# Patient Record
Sex: FEMALE | Race: BLACK OR AFRICAN AMERICAN | NOT HISPANIC OR LATINO | Employment: OTHER | URBAN - METROPOLITAN AREA
[De-identification: names, ages, dates, MRNs, and addresses within clinical notes are randomized per-mention and may not be internally consistent; named-entity substitution may affect disease eponyms.]

---

## 2017-01-19 RX ORDER — HYDROCODONE BITARTRATE AND ACETAMINOPHEN 10; 325 MG/1; MG/1
1 TABLET ORAL 2 TIMES DAILY PRN
Qty: 120 TABLET | Refills: 0 | Status: SHIPPED | OUTPATIENT
Start: 2017-01-19 | End: 2017-03-24 | Stop reason: SDUPTHER

## 2017-01-19 NOTE — TELEPHONE ENCOUNTER
----- Message from Malu Connell sent at 1/19/2017 10:04 AM CST -----  Contact: pt  Call pt at [phone]///regarding a refill rx for hydrocodone/please call pt/jeffy limon

## 2017-01-27 ENCOUNTER — TELEPHONE (OUTPATIENT)
Dept: INTERNAL MEDICINE | Facility: CLINIC | Age: 62
End: 2017-01-27

## 2017-01-27 ENCOUNTER — OFFICE VISIT (OUTPATIENT)
Dept: URGENT CARE | Facility: CLINIC | Age: 62
End: 2017-01-27
Payer: MEDICARE

## 2017-01-27 VITALS
HEIGHT: 68 IN | SYSTOLIC BLOOD PRESSURE: 120 MMHG | WEIGHT: 242.19 LBS | TEMPERATURE: 98 F | DIASTOLIC BLOOD PRESSURE: 74 MMHG | BODY MASS INDEX: 36.71 KG/M2 | RESPIRATION RATE: 20 BRPM | HEART RATE: 70 BPM | OXYGEN SATURATION: 98 %

## 2017-01-27 DIAGNOSIS — J40 SINOBRONCHITIS: Primary | ICD-10-CM

## 2017-01-27 DIAGNOSIS — J32.9 SINOBRONCHITIS: Primary | ICD-10-CM

## 2017-01-27 PROCEDURE — 99213 OFFICE O/P EST LOW 20 MIN: CPT | Mod: S$PBB,,, | Performed by: NURSE PRACTITIONER

## 2017-01-27 PROCEDURE — 99215 OFFICE O/P EST HI 40 MIN: CPT | Mod: PBBFAC,PN | Performed by: NURSE PRACTITIONER

## 2017-01-27 PROCEDURE — 99999 PR PBB SHADOW E&M-EST. PATIENT-LVL V: CPT | Mod: PBBFAC,,, | Performed by: NURSE PRACTITIONER

## 2017-01-27 PROCEDURE — 96372 THER/PROPH/DIAG INJ SC/IM: CPT | Mod: PBBFAC,PN

## 2017-01-27 RX ORDER — BETAMETHASONE SODIUM PHOSPHATE AND BETAMETHASONE ACETATE 3; 3 MG/ML; MG/ML
9 INJECTION, SUSPENSION INTRA-ARTICULAR; INTRALESIONAL; INTRAMUSCULAR; SOFT TISSUE
Status: COMPLETED | OUTPATIENT
Start: 2017-01-27 | End: 2017-01-27

## 2017-01-27 RX ORDER — PROMETHAZINE HYDROCHLORIDE AND DEXTROMETHORPHAN HYDROBROMIDE 6.25; 15 MG/5ML; MG/5ML
5 SYRUP ORAL NIGHTLY PRN
Qty: 180 ML | Refills: 0 | Status: SHIPPED | OUTPATIENT
Start: 2017-01-27 | End: 2017-02-06

## 2017-01-27 RX ORDER — AMOXICILLIN AND CLAVULANATE POTASSIUM 875; 125 MG/1; MG/1
1 TABLET, FILM COATED ORAL 2 TIMES DAILY
Qty: 20 TABLET | Refills: 0 | Status: SHIPPED | OUTPATIENT
Start: 2017-01-27 | End: 2017-02-06

## 2017-01-27 RX ORDER — BENZONATATE 200 MG/1
200 CAPSULE ORAL 3 TIMES DAILY PRN
Qty: 30 CAPSULE | Refills: 0 | Status: SHIPPED | OUTPATIENT
Start: 2017-01-27 | End: 2018-03-05

## 2017-01-27 RX ADMIN — BETAMETHASONE ACETATE AND BETAMETHASONE SODIUM PHOSPHATE 9 MG: 3; 3 INJECTION, SUSPENSION INTRA-ARTICULAR; INTRALESIONAL; INTRAMUSCULAR; SOFT TISSUE at 05:01

## 2017-01-27 NOTE — TELEPHONE ENCOUNTER
"S/w pt. C/o congestion for awhile now. Requested an antibiotic. I advised that pt will need to be evaluated. Informed her of [facility] near her home. She agreed to "go to the uc there"/TGD  "

## 2017-01-27 NOTE — PROGRESS NOTES
"Subjective:      Patient ID: Maryviji Vo is a 61 y.o. female.    Chief Complaint: Cough; Nasal Congestion; and Headache    Cough   This is a new problem. The current episode started 1 to 4 weeks ago (2 weeks). The problem has been gradually worsening. The problem occurs every few minutes. The cough is non-productive. Associated symptoms include ear congestion, headaches, nasal congestion, postnasal drip, rhinorrhea and wheezing. Pertinent negatives include no fever, sore throat (resolved) or shortness of breath. The symptoms are aggravated by lying down. Risk factors: former smoker. Treatments tried: Mucinex, OTC cough syrup, cough drops. The treatment provided no relief. There is no history of asthma.     Review of Systems   Constitutional: Negative.  Negative for fever.   HENT: Positive for congestion, postnasal drip, rhinorrhea and sinus pressure. Negative for sore throat (resolved).    Respiratory: Positive for cough and wheezing. Negative for shortness of breath.    Cardiovascular: Negative.    Gastrointestinal: Negative.    Musculoskeletal: Negative.    Skin: Negative.    Neurological: Positive for headaches.   Hematological: Negative.        Objective:     Visit Vitals    /74 (BP Location: Right arm, Patient Position: Sitting, BP Method: Manual)    Pulse 70    Temp 98.2 °F (36.8 °C) (Tympanic)    Resp 20    Ht 5' 8.4" (1.737 m)    Wt 109.8 kg (242 lb 2.8 oz)    SpO2 98%    BMI 36.39 kg/m2     Physical Exam   Constitutional: She is oriented to person, place, and time. She appears well-developed and well-nourished. She is active and cooperative. No distress.   HENT:   Head: Normocephalic and atraumatic.   Right Ear: A middle ear effusion is present.   Left Ear: A middle ear effusion is present.   Nose: Mucosal edema and sinus tenderness present.   Mouth/Throat: Uvula is midline and mucous membranes are normal. Posterior oropharyngeal erythema present. No oropharyngeal exudate or posterior " oropharyngeal edema.   Eyes: Right eye exhibits no discharge. Left eye exhibits no discharge.   Neck: Normal range of motion. Neck supple.   Cardiovascular: Regular rhythm and normal heart sounds.    Pulmonary/Chest: Effort normal and breath sounds normal. She has no wheezes.   Musculoskeletal: Normal range of motion.   Lymphadenopathy:     She has no cervical adenopathy.   Neurological: She is alert and oriented to person, place, and time.   Skin: Skin is warm. No rash noted. She is not diaphoretic.   Nursing note and vitals reviewed.    Assessment:      1. Sinobronchitis       Plan:   Sinobronchitis  -     amoxicillin-clavulanate 875-125mg (AUGMENTIN) 875-125 mg per tablet; Take 1 tablet by mouth 2 (two) times daily.  Dispense: 20 tablet; Refill: 0  -     betamethasone acetate-betamethasone sodium phosphate injection 9 mg; Inject 1.5 mLs (9 mg total) into the muscle one time.  -     promethazine-dextromethorphan (PROMETHAZINE-DM) 6.25-15 mg/5 mL Syrp; Take 5 mLs by mouth nightly as needed.  Dispense: 180 mL; Refill: 0  -     benzonatate (TESSALON) 200 MG capsule; Take 1 capsule (200 mg total) by mouth 3 (three) times daily as needed for Cough.  Dispense: 30 capsule; Refill: 0    Instructions, follow up, and supportive care as per AVS.  Follow up with PCP if not improved or for any new or worsening symptoms.  Discussed risks of treatment with steroid injection and offered PO as alternative. Mrs. Vo preferred steroid shot.

## 2017-01-27 NOTE — TELEPHONE ENCOUNTER
----- Message from Suad Correia sent at 1/27/2017  3:24 PM CST -----  Contact: pt  Pt calling to speak to nurse...states that she has been experiencing some severe sinus drainage/ congestion...states that she has been taking over-the-counter meds (mucinex) but still cannot get rid of symptoms..the patient wants to know if she can get some antibiotics or something to dry up symptoms called in to pharmacy before Dr peres for the day...please adv/call pt back at [phone]///thx jw ..    [facility] -  - [place] - [address]  [address] [facility] [address]  Phone: [phone] Fax: [phone]

## 2017-01-27 NOTE — PROGRESS NOTES
Sn Administered Betamethasone 9 mg given IM left ventrogluteal. Patient tolerated well. No distress. Patient was instructed to wait 15 minutes in lobby after injection to assure that no reaction to the medication occurs. Patient was informed that if any unusual feeling occurs to let the  know so that we can address it. Patient stated understanding.

## 2017-01-27 NOTE — PATIENT INSTRUCTIONS
· Rest and increase fluids.   · May apply warm compresses as needed.   · Saline nasal spray or saline irrigation (Neti pot) to loosen nasal congestion.  · Flonase or Nasacort to reduce inflammation in the sinus cavities.  · Take antibiotics exactly as prescribed. Make sure to complete the entire course of antibiotics even if you start feeling better. This will prevent recurrence of your infection and bacterial resistance.   · Do not drive, drink alcohol, or take any other sedating medications or substances while taking cough syrup.   · Follow up with your primary care provider or with ENT if not improved within a few days or sooner for any new or worsening symptoms.   · Go to the ER for any fever that does not improve with Tylenol/Ibuprofen, neck stiffness, rash, severe headache, vision changes, shortness of breath, chest pain, severe facial pain or swelling, or for any other new and concerning symptoms.     Sinusitis (Antibiotic Treatment)    The sinuses are air-filled spaces within the bones of the face. They connect to the inside of the nose. Sinusitis is an inflammation of the tissue lining the sinus cavity. Sinus inflammation can occur during a cold. It can also be due to allergies to pollens and other particles in the air. Sinusitis can cause symptoms of sinus congestion and fullness. A sinus infection causes fever, headache and facial pain. There is often green or yellow drainage from the nose or into the back of the throat (post-nasal drip). You have been given antibiotics to treat this condition.  Home care:  · Take the full course of antibiotics as instructed. Do not stop taking them, even if you feel better.  · Drink plenty of water, hot tea, and other liquids. This may help thin mucus. It also may promote sinus drainage.  · Heat may help soothe painful areas of the face. Use a towel soaked in hot water. Or,  the shower and direct the hot spray onto your face. Using a vaporizer along with a  menthol rub at night may also help.   · An expectorant containing guaifenesin may help thin the mucus and promote drainage from the sinuses.  · Over-the-counter decongestants may be used unless a similar medicine was prescribed. Nasal sprays work the fastest. Use one that contains phenylephrine or oxymetazoline. First blow the nose gently. Then use the spray. Do not use these medicines more often than directed on the label or symptoms may get worse. You may also use tablets containing pseudoephedrine. Avoid products that combine ingredients, because side effects may be increased. Read labels. You can also ask the pharmacist for help. (NOTE: Persons with high blood pressure should not use decongestants. They can raise blood pressure.)  · Over-the-counter antihistamines may help if allergies contributed to your sinusitis.    · Do not use nasal rinses or irrigation during an acute sinus infection, unless told to by your health care provider. Rinsing may spread the infection to other sinuses.  · Use acetaminophen or ibuprofen to control pain, unless another pain medicine was prescribed. (If you have chronic liver or kidney disease or ever had a stomach ulcer, talk with your doctor before using these medicines. Aspirin should never be used in anyone under 18 years of age who is ill with a fever. It may cause severe liver damage.)  · Don't smoke. This can worsen symptoms.  Follow-up care  Follow up with your healthcare provider or our staff if you are not improving within the next week.  When to seek medical advice  Call your healthcare provider if any of these occur:  · Facial pain or headache becoming more severe  · Stiff neck  · Unusual drowsiness or confusion  · Swelling of the forehead or eyelids  · Vision problems, including blurred or double vision  · Fever of 100.4ºF (38ºC) or higher, or as directed by your healthcare provider  · Seizure  · Breathing problems  · Symptoms not resolving within 10 days  © 2694-3851  [employer]. [address] [place] [address]. All rights reserved. This information is not intended as a substitute for professional medical care. Always follow your healthcare professional's instructions.        Bronchitis, Antibiotic Treatment (Adult)    Bronchitis is an infection of the air passages (bronchial tubes) in your lungs. It often occurs when you have a cold. This illness is contagious during the first few days and is spread through the air by coughing and sneezing, or by direct contact (touching the sick person and then touching your own eyes, nose, or mouth).  Symptoms of bronchitis include cough with mucus (phlegm) and low-grade fever. Bronchitis usually lasts 7 to 14 days. Mild cases can be treated with simple home remedies. More severe infection is treated with an antibiotic.  Home care  Follow these guidelines when caring for yourself at home:  · If your symptoms are severe, rest at home for the first 2 to 3 days. When you go back to your usual activities, don't let yourself get too tired.  · Do not smoke. Also avoid being exposed to secondhand smoke.  · You may use over-the-counter medicines to control fever or pain, unless another medicine was prescribed. (Note: If you have chronic liver or kidney disease or have ever had a stomach ulcer or gastrointestinal bleeding, talk with your healthcare provider before using these medicines. Also talk to your provider if you are taking medicine to prevent blood clots.) Aspirin should never be given to anyone younger than 18 years of age who is ill with a viral infection or fever. It may cause severe liver or brain damage.  · Your appetite may be poor, so a light diet is fine. Avoid dehydration by drinking 6 to 8 glasses of fluids per day (such as water, soft drinks, sports drinks, juices, tea, or soup). Extra fluids will help loosen secretions in the nose and lungs.  · Over-the-counter cough, cold, and sore-throat medicines will not  shorten the length of the illness, but they may be helpful to reduce symptoms. (Note: Do not use decongestants if you have high blood pressure.)  · Finish all antibiotic medicine. Do this even if you are feeling better after only a few days.  Follow-up care  Follow up with your healthcare provider, or as advised. If you had an X-ray or ECG (electrocardiogram), a specialist will review it. You will be notified of any new findings that may affect your care.  Note: If you are age 65 or older, or if you have a chronic lung disease or condition that affects your immune system, or you smoke, talk to your healthcare provider about having pneumococcal vaccinations and a yearly influenza vaccination (flu shot).  When to seek medical advice  Call your healthcare provider right away if any of these occur:  · Fever of 100.4°F (38°C) or higher  · Coughing up increased amounts of colored sputum  · Weakness, drowsiness, headache, facial pain, ear pain, or a stiff neck  Call 911, or get immediate medical care  Contact emergency services right away if any of these occur.  · Coughing up blood  · Worsening weakness, drowsiness, headache, or stiff neck  · Trouble breathing, wheezing, or pain with breathing  © 0879-9262 [employer]. [address] [place] [address], [place] [address]. All rights reserved. This information is not intended as a substitute for professional medical care. Always follow your healthcare professional's instructions.

## 2017-01-27 NOTE — MR AVS SNAPSHOT
[facility]  [address]  [place] [address]  Phone: [phone]                  Mary Vo   2017 5:10 PM   Office Visit    Description:  Female : 1955   Provider:  Tory Byers NP   Department:  [facility]           Reason for Visit     Cough     Nasal Congestion     Headache           Diagnoses this Visit        Comments    Sinobronchitis    -  Primary            To Do List           Future Appointments        Provider Department Dept Phone    3/31/2017 8:00 AM Elias Cannon MD [facility] Medicine [phone]      Goals (5 Years of Data)     None      Follow-Up and Disposition     Return if symptoms worsen or fail to improve.       These Medications        Disp Refills Start End    amoxicillin-clavulanate 875-125mg (AUGMENTIN) 875-125 mg per tablet 20 tablet 0 2017    Take 1 tablet by mouth 2 (two) times daily. - Oral    Pharmacy: [facility] [address] Ph #: [phone]       promethazine-dextromethorphan (PROMETHAZINE-DM) 6.25-15 mg/5 mL Syrp 180 mL 0 2017    Take 5 mLs by mouth nightly as needed. - Oral    Pharmacy: [facility] [address] Ph #: [phone]       benzonatate (TESSALON) 200 MG capsule 30 capsule 0 2017     Take 1 capsule (200 mg total) by mouth 3 (three) times daily as needed for Cough. - Oral    Pharmacy: [facility] [address] Ph #: [phone]         [facility]     [facility] -  Assistance  Registered nurses in the [facility] provide clinical advisement, health education, appointment booking, and other advisory services.  Call for this free service at [phone].             Medications           Message regarding Medications     Verify the changes and/or additions to your  medication regime listed below are the same as discussed with your clinician today.  If any of these changes or additions are incorrect, please notify your healthcare provider.        START taking these NEW medications        Refills    amoxicillin-clavulanate 875-125mg (AUGMENTIN) 875-125 mg per tablet 0    Sig: Take 1 tablet by mouth 2 (two) times daily.    Class: Normal    Route: Oral    promethazine-dextromethorphan (PROMETHAZINE-DM) 6.25-15 mg/5 mL Syrp 0    Sig: Take 5 mLs by mouth nightly as needed.    Class: Normal    Route: Oral    benzonatate (TESSALON) 200 MG capsule 0    Sig: Take 1 capsule (200 mg total) by mouth 3 (three) times daily as needed for Cough.    Class: Normal    Route: Oral      These medications were administered today        Dose Freq    betamethasone acetate-betamethasone sodium phosphate injection 9 mg 9 mg [facility] 1 time    Sig: Inject 1.5 mLs (9 mg total) into the muscle one time.    Class: Normal    Route: Intramuscular           Verify that the below list of medications is an accurate representation of the medications you are currently taking.  If none reported, the list may be blank. If incorrect, please contact your healthcare provider. Carry this list with you in case of emergency.           Current Medications     alprazolam (XANAX) 1 MG tablet TAKE 1 TABLET BY MOUTH ONCE A DAY NIGHLTY AS NEEDED FOR ANXIETY    amoxicillin-clavulanate 875-125mg (AUGMENTIN) 875-125 mg per tablet Take 1 tablet by mouth 2 (two) times daily.    benzonatate (TESSALON) 200 MG capsule Take 1 capsule (200 mg total) by mouth 3 (three) times daily as needed for Cough.    bumetanide (BUMEX) 1 MG tablet TAKE ONE TABLET BY MOUTH TWICE DAILY    citalopram (CELEXA) 40 MG tablet TAKE 1 TABLET BY MOUTH ONCE A DAY    cloNIDine (CATAPRES) 0.2 MG tablet Take 1 tablet (0.2 mg total) by mouth every evening.    ERGOCALCIFEROL, VITAMIN D2, (VITAMIN D ORAL) Take 1,000 Units by mouth once daily.    esomeprazole  "(NEXIUM) 40 MG capsule Take 1 capsule (40 mg total) by mouth before breakfast.    hydrocodone-acetaminophen 10-325mg (NORCO)  mg Tab Take 1 tablet by mouth 2 (two) times daily as needed.    lisinopril-hydrochlorothiazide (PRINZIDE,ZESTORETIC) 20-12.5 mg per tablet Take 1 tablet by mouth once daily.    potassium chloride SA (KLOR-CON M20) 20 MEQ tablet Take 1 tablet (20 mEq total) by mouth 2 (two) times daily.    pravastatin (PRAVACHOL) 40 MG tablet Take 1 tablet (40 mg total) by mouth once daily.    promethazine-dextromethorphan (PROMETHAZINE-DM) 6.25-15 mg/5 mL Syrp Take 5 mLs by mouth nightly as needed.    tramadol (ULTRAM) 50 mg tablet TAKE 1 TABLET BY MOUTH EVERY 6 HOURS AS NEEDED FOR PAIN    zolpidem (AMBIEN) 10 mg Tab Take 1 tablet (10 mg total) by mouth nightly as needed.           Clinical Reference Information           Vital Signs - Last Recorded  Most recent update: 1/27/2017  5:11 PM by Eva Wills LPN    BP Pulse Temp Resp    120/74 (BP Location: Right arm, Patient Position: Sitting, BP Method: Manual) 70 98.2 °F (36.8 °C) (Tympanic) 20    Ht Wt SpO2 BMI    5' 8.4" (1.737 m) 109.8 kg (242 lb 2.8 oz) 98% 36.39 kg/m2      Blood Pressure          Most Recent Value    BP  120/74      Allergies as of 1/27/2017     No Known Allergies      Immunizations Administered on Date of Encounter - 1/27/2017     None      Instructions    · Rest and increase fluids.   · May apply warm compresses as needed.   · Saline nasal spray or saline irrigation (Neti pot) to loosen nasal congestion.  · Flonase or Nasacort to reduce inflammation in the sinus cavities.  · Take antibiotics exactly as prescribed. Make sure to complete the entire course of antibiotics even if you start feeling better. This will prevent recurrence of your infection and bacterial resistance.   · Do not drive, drink alcohol, or take any other sedating medications or substances while taking cough syrup.   · Follow up with your primary care " provider or with ENT if not improved within a few days or sooner for any new or worsening symptoms.   · Go to the ER for any fever that does not improve with Tylenol/Ibuprofen, neck stiffness, rash, severe headache, vision changes, shortness of breath, chest pain, severe facial pain or swelling, or for any other new and concerning symptoms.     Sinusitis (Antibiotic Treatment)    The sinuses are air-filled spaces within the bones of the face. They connect to the inside of the nose. Sinusitis is an inflammation of the tissue lining the sinus cavity. Sinus inflammation can occur during a cold. It can also be due to allergies to pollens and other particles in the air. Sinusitis can cause symptoms of sinus congestion and fullness. A sinus infection causes fever, headache and facial pain. There is often green or yellow drainage from the nose or into the back of the throat (post-nasal drip). You have been given antibiotics to treat this condition.  Home care:  · Take the full course of antibiotics as instructed. Do not stop taking them, even if you feel better.  · Drink plenty of water, hot tea, and other liquids. This may help thin mucus. It also may promote sinus drainage.  · Heat may help soothe painful areas of the face. Use a towel soaked in hot water. Or,  the shower and direct the hot spray onto your face. Using a vaporizer along with a menthol rub at night may also help.   · An expectorant containing guaifenesin may help thin the mucus and promote drainage from the sinuses.  · Over-the-counter decongestants may be used unless a similar medicine was prescribed. Nasal sprays work the fastest. Use one that contains phenylephrine or oxymetazoline. First blow the nose gently. Then use the spray. Do not use these medicines more often than directed on the label or symptoms may get worse. You may also use tablets containing pseudoephedrine. Avoid products that combine ingredients, because side effects may be  increased. Read labels. You can also ask the pharmacist for help. (NOTE: Persons with high blood pressure should not use decongestants. They can raise blood pressure.)  · Over-the-counter antihistamines may help if allergies contributed to your sinusitis.    · Do not use nasal rinses or irrigation during an acute sinus infection, unless told to by your health care provider. Rinsing may spread the infection to other sinuses.  · Use acetaminophen or ibuprofen to control pain, unless another pain medicine was prescribed. (If you have chronic liver or kidney disease or ever had a stomach ulcer, talk with your doctor before using these medicines. Aspirin should never be used in anyone under 18 years of age who is ill with a fever. It may cause severe liver damage.)  · Don't smoke. This can worsen symptoms.  Follow-up care  Follow up with your healthcare provider or our staff if you are not improving within the next week.  When to seek medical advice  Call your healthcare provider if any of these occur:  · Facial pain or headache becoming more severe  · Stiff neck  · Unusual drowsiness or confusion  · Swelling of the forehead or eyelids  · Vision problems, including blurred or double vision  · Fever of 100.4ºF (38ºC) or higher, or as directed by your healthcare provider  · Seizure  · Breathing problems  · Symptoms not resolving within 10 days  © 0490-1196 [employer]. [address] [place] [address], [place] [address]. All rights reserved. This information is not intended as a substitute for professional medical care. Always follow your healthcare professional's instructions.        Bronchitis, Antibiotic Treatment (Adult)    Bronchitis is an infection of the air passages (bronchial tubes) in your lungs. It often occurs when you have a cold. This illness is contagious during the first few days and is spread through the air by coughing and sneezing, or by direct contact (touching the sick person and then touching your  own eyes, nose, or mouth).  Symptoms of bronchitis include cough with mucus (phlegm) and low-grade fever. Bronchitis usually lasts 7 to 14 days. Mild cases can be treated with simple home remedies. More severe infection is treated with an antibiotic.  Home care  Follow these guidelines when caring for yourself at home:  · If your symptoms are severe, rest at home for the first 2 to 3 days. When you go back to your usual activities, don't let yourself get too tired.  · Do not smoke. Also avoid being exposed to secondhand smoke.  · You may use over-the-counter medicines to control fever or pain, unless another medicine was prescribed. (Note: If you have chronic liver or kidney disease or have ever had a stomach ulcer or gastrointestinal bleeding, talk with your healthcare provider before using these medicines. Also talk to your provider if you are taking medicine to prevent blood clots.) Aspirin should never be given to anyone younger than 18 years of age who is ill with a viral infection or fever. It may cause severe liver or brain damage.  · Your appetite may be poor, so a light diet is fine. Avoid dehydration by drinking 6 to 8 glasses of fluids per day (such as water, soft drinks, sports drinks, juices, tea, or soup). Extra fluids will help loosen secretions in the nose and lungs.  · Over-the-counter cough, cold, and sore-throat medicines will not shorten the length of the illness, but they may be helpful to reduce symptoms. (Note: Do not use decongestants if you have high blood pressure.)  · Finish all antibiotic medicine. Do this even if you are feeling better after only a few days.  Follow-up care  Follow up with your healthcare provider, or as advised. If you had an X-ray or ECG (electrocardiogram), a specialist will review it. You will be notified of any new findings that may affect your care.  Note: If you are age 65 or older, or if you have a chronic lung disease or condition that affects your immune  system, or you smoke, talk to your healthcare provider about having pneumococcal vaccinations and a yearly influenza vaccination (flu shot).  When to seek medical advice  Call your healthcare provider right away if any of these occur:  · Fever of 100.4°F (38°C) or higher  · Coughing up increased amounts of colored sputum  · Weakness, drowsiness, headache, facial pain, ear pain, or a stiff neck  Call 911, or get immediate medical care  Contact emergency services right away if any of these occur.  · Coughing up blood  · Worsening weakness, drowsiness, headache, or stiff neck  · Trouble breathing, wheezing, or pain with breathing  © 1162-5137 [employer]. [address] [place] [address], [place] [address]. All rights reserved. This information is not intended as a substitute for professional medical care. Always follow your healthcare professional's instructions.

## 2017-02-15 ENCOUNTER — TELEPHONE (OUTPATIENT)
Dept: INTERNAL MEDICINE | Facility: CLINIC | Age: 62
End: 2017-02-15

## 2017-02-15 RX ORDER — FLUCONAZOLE 150 MG/1
150 TABLET ORAL DAILY
Qty: 1 TABLET | Refills: 0 | Status: SHIPPED | OUTPATIENT
Start: 2017-02-15 | End: 2017-02-16

## 2017-02-15 NOTE — TELEPHONE ENCOUNTER
----- Message from Vivina Joseph sent at 2/15/2017  9:31 AM CST -----  Contact: pt  Pt is requesting an rx for a yeast infection be called in to ..  [facility] [place] - [address]  [address] [address]  Phone: [phone] Fax: [phone]    Pt can be reached at [phone]

## 2017-02-15 NOTE — TELEPHONE ENCOUNTER
Returned call. Spoke with patient she states that she was on penicillin for her colon issues and now states she has a yeast infection and would like to know if something can please be called in to pharm for her. Informed would forward info to doctor and return call when response was received.

## 2017-03-13 RX ORDER — ZOLPIDEM TARTRATE 10 MG/1
TABLET ORAL
Qty: 20 TABLET | Refills: 0 | Status: SHIPPED | OUTPATIENT
Start: 2017-03-13 | End: 2017-04-11 | Stop reason: SDUPTHER

## 2017-03-13 RX ORDER — TRAMADOL HYDROCHLORIDE 50 MG/1
50 TABLET ORAL EVERY 6 HOURS PRN
Qty: 60 TABLET | Refills: 5 | Status: SHIPPED | OUTPATIENT
Start: 2017-03-13 | End: 2017-08-09 | Stop reason: SDUPTHER

## 2017-03-15 ENCOUNTER — TELEPHONE (OUTPATIENT)
Dept: INTERNAL MEDICINE | Facility: CLINIC | Age: 62
End: 2017-03-15

## 2017-03-15 NOTE — TELEPHONE ENCOUNTER
----- Message from Devin Mcconnell sent at 3/15/2017 11:42 AM CDT -----  Contact: Patient  Pt missed a call and would like a return call @  [phone]. Thank you/NH

## 2017-03-24 ENCOUNTER — OFFICE VISIT (OUTPATIENT)
Dept: INTERNAL MEDICINE | Facility: CLINIC | Age: 62
End: 2017-03-24
Payer: MEDICARE

## 2017-03-24 VITALS
TEMPERATURE: 97 F | HEART RATE: 96 BPM | WEIGHT: 253.75 LBS | SYSTOLIC BLOOD PRESSURE: 164 MMHG | HEIGHT: 68 IN | DIASTOLIC BLOOD PRESSURE: 90 MMHG | BODY MASS INDEX: 38.46 KG/M2 | OXYGEN SATURATION: 98 %

## 2017-03-24 DIAGNOSIS — E78.00 PURE HYPERCHOLESTEROLEMIA: ICD-10-CM

## 2017-03-24 DIAGNOSIS — I10 ESSENTIAL HYPERTENSION: Primary | ICD-10-CM

## 2017-03-24 PROCEDURE — 99213 OFFICE O/P EST LOW 20 MIN: CPT | Mod: S$PBB,,, | Performed by: INTERNAL MEDICINE

## 2017-03-24 PROCEDURE — 99213 OFFICE O/P EST LOW 20 MIN: CPT | Mod: PBBFAC,PO | Performed by: INTERNAL MEDICINE

## 2017-03-24 PROCEDURE — 99999 PR PBB SHADOW E&M-EST. PATIENT-LVL III: CPT | Mod: PBBFAC,,, | Performed by: INTERNAL MEDICINE

## 2017-03-24 RX ORDER — LOSARTAN POTASSIUM AND HYDROCHLOROTHIAZIDE 25; 100 MG/1; MG/1
1 TABLET ORAL DAILY
Qty: 90 TABLET | Refills: 3 | Status: SHIPPED | OUTPATIENT
Start: 2017-03-24 | End: 2017-12-09 | Stop reason: SDUPTHER

## 2017-03-24 RX ORDER — HYDROCODONE BITARTRATE AND ACETAMINOPHEN 10; 325 MG/1; MG/1
1 TABLET ORAL 2 TIMES DAILY PRN
Qty: 120 TABLET | Refills: 0 | Status: SHIPPED | OUTPATIENT
Start: 2017-03-24 | End: 2017-06-06 | Stop reason: SDUPTHER

## 2017-03-24 RX ORDER — FLUTICASONE PROPIONATE 50 MCG
1 SPRAY, SUSPENSION (ML) NASAL DAILY
Qty: 1 BOTTLE | Refills: 11 | Status: SHIPPED | OUTPATIENT
Start: 2017-03-24 | End: 2018-12-11 | Stop reason: SDUPTHER

## 2017-03-24 NOTE — PROGRESS NOTES
"HPI:  Patient is a 61-year-old female who comes today for follow-up of her hypertension.  Patient states her blood pressure home is 130/70.  She states she checks it almost every day.  Patient on this time only complains of persistent cough.  She states it's dry.  She also has nasal ALLERGIES.  She denies any fever.      Current meds have been verified and updated per the EMR  Exam:BP (!) 164/90  Pulse 96  Temp 96.8 °F (36 °C) (Tympanic)   Ht 5' 8.4" (1.737 m)  Wt 115.1 kg (253 lb 12 oz)  SpO2 98%  BMI 38.13 kg/m2  Chest clear  Cardiovascular regular rate and rhythm without murmur, gallop or rub    Lab Results   Component Value Date    WBC 5.35 12/16/2016    HGB 14.3 12/16/2016    HCT 44.3 12/16/2016     12/16/2016    CHOL 163 12/16/2016    TRIG 139 12/16/2016    HDL 52 12/16/2016    ALT 25 12/16/2016    AST 26 12/16/2016     12/16/2016    K 4.4 12/16/2016     12/16/2016    CREATININE 0.9 12/16/2016    BUN 9 12/16/2016    CO2 30 (H) 12/16/2016    TSH 1.932 12/16/2016       Impression:  Hypertension, controlled by her home blood pressure readings  Persistent cough for 2 months, most likely her ACE inhibitor  Nasal ALLERGIES  Patient Active Problem List   Diagnosis    HTN (hypertension)    Generalized osteoarthrosis, involving multiple sites    History of breast cancer    Obesity, unspecified    Hyperlipidemia       Plan:  Orders Placed This Encounter    Lipid panel    Basic metabolic panel    fluticasone (FLONASE) 50 mcg/actuation nasal spray    losartan-hydrochlorothiazide 100-25 mg (HYZAAR) 100-25 mg per tablet    hydrocodone-acetaminophen 10-325mg (NORCO)  mg Tab     She will switch from lisinopril to losartan.  She will start on Flonase.  She should also take Allegra.  She'll be seen again in 3 months with the above lab work.    "

## 2017-04-11 RX ORDER — ZOLPIDEM TARTRATE 10 MG/1
TABLET ORAL
Qty: 20 TABLET | Refills: 5 | Status: SHIPPED | OUTPATIENT
Start: 2017-04-11 | End: 2017-09-11 | Stop reason: SDUPTHER

## 2017-04-11 RX ORDER — ALPRAZOLAM 1 MG/1
TABLET ORAL
Qty: 30 TABLET | Refills: 5 | Status: SHIPPED | OUTPATIENT
Start: 2017-04-11 | End: 2017-10-12 | Stop reason: SDUPTHER

## 2017-04-24 ENCOUNTER — TELEPHONE (OUTPATIENT)
Dept: INTERNAL MEDICINE | Facility: CLINIC | Age: 62
End: 2017-04-24

## 2017-04-24 NOTE — TELEPHONE ENCOUNTER
----- Message from Renetta Fox sent at 4/24/2017  9:29 AM CDT -----  Contact: pt  Pt calling for a prescription for Allegra. Pt states she is breaking out everywhere.

## 2017-04-24 NOTE — TELEPHONE ENCOUNTER
"S/w pt. Pt is requesting a prescription for "allegra". I advsied that since the medication is OTC , pt does not need a prescription. Pt states, " I am itching all over. Sometimes it's bad and then it goes away and pops right back up. It is red ". Denies SOB. I encouraged pt to go to the UC to be evaluated for best treatment. Pt verbalized understanding and agreed to "go to the uc [place] [facility]  "

## 2017-06-02 ENCOUNTER — LAB VISIT (OUTPATIENT)
Dept: LAB | Facility: HOSPITAL | Age: 62
End: 2017-06-02
Attending: INTERNAL MEDICINE
Payer: MEDICARE

## 2017-06-02 DIAGNOSIS — I10 ESSENTIAL HYPERTENSION: ICD-10-CM

## 2017-06-02 LAB
ANION GAP SERPL CALC-SCNC: 8 MMOL/L
BUN SERPL-MCNC: 15 MG/DL
CALCIUM SERPL-MCNC: 9.6 MG/DL
CHLORIDE SERPL-SCNC: 104 MMOL/L
CHOLEST/HDLC SERPL: 3.7 {RATIO}
CO2 SERPL-SCNC: 31 MMOL/L
CREAT SERPL-MCNC: 0.9 MG/DL
EST. GFR  (AFRICAN AMERICAN): >60 ML/MIN/1.73 M^2
EST. GFR  (NON AFRICAN AMERICAN): >60 ML/MIN/1.73 M^2
GLUCOSE SERPL-MCNC: 101 MG/DL
HDL/CHOLESTEROL RATIO: 26.8 %
HDLC SERPL-MCNC: 157 MG/DL
HDLC SERPL-MCNC: 42 MG/DL
LDLC SERPL CALC-MCNC: 80.2 MG/DL
NONHDLC SERPL-MCNC: 115 MG/DL
POTASSIUM SERPL-SCNC: 3.5 MMOL/L
SODIUM SERPL-SCNC: 143 MMOL/L
TRIGL SERPL-MCNC: 174 MG/DL

## 2017-06-02 PROCEDURE — 36415 COLL VENOUS BLD VENIPUNCTURE: CPT | Mod: PO

## 2017-06-02 PROCEDURE — 80061 LIPID PANEL: CPT

## 2017-06-02 PROCEDURE — 80048 BASIC METABOLIC PNL TOTAL CA: CPT

## 2017-06-06 ENCOUNTER — OFFICE VISIT (OUTPATIENT)
Dept: INTERNAL MEDICINE | Facility: CLINIC | Age: 62
End: 2017-06-06
Payer: MEDICARE

## 2017-06-06 VITALS
HEART RATE: 64 BPM | SYSTOLIC BLOOD PRESSURE: 126 MMHG | DIASTOLIC BLOOD PRESSURE: 76 MMHG | WEIGHT: 255.75 LBS | BODY MASS INDEX: 40.14 KG/M2 | OXYGEN SATURATION: 96 % | HEIGHT: 67 IN | TEMPERATURE: 98 F

## 2017-06-06 DIAGNOSIS — E78.00 PURE HYPERCHOLESTEROLEMIA: ICD-10-CM

## 2017-06-06 DIAGNOSIS — Z12.31 SCREENING MAMMOGRAM, ENCOUNTER FOR: ICD-10-CM

## 2017-06-06 DIAGNOSIS — I10 ESSENTIAL HYPERTENSION: Primary | ICD-10-CM

## 2017-06-06 PROCEDURE — 99213 OFFICE O/P EST LOW 20 MIN: CPT | Mod: S$PBB,,, | Performed by: INTERNAL MEDICINE

## 2017-06-06 PROCEDURE — 99213 OFFICE O/P EST LOW 20 MIN: CPT | Mod: PBBFAC,PO | Performed by: INTERNAL MEDICINE

## 2017-06-06 PROCEDURE — 99999 PR PBB SHADOW E&M-EST. PATIENT-LVL III: CPT | Mod: PBBFAC,,, | Performed by: INTERNAL MEDICINE

## 2017-06-06 RX ORDER — HYDROCODONE BITARTRATE AND ACETAMINOPHEN 10; 325 MG/1; MG/1
1 TABLET ORAL 2 TIMES DAILY PRN
Qty: 120 TABLET | Refills: 0 | Status: SHIPPED | OUTPATIENT
Start: 2017-06-06 | End: 2017-08-14 | Stop reason: SDUPTHER

## 2017-06-06 NOTE — PROGRESS NOTES
"HPI:  Well.  There's been no problems or complaints.  She states her blood pressure home is been very well-controlled.  He's had no other new problems or complaints    Current meds have been verified and updated per the EMR  Exam:/76   Pulse 64   Temp 98.2 °F (36.8 °C) (Tympanic)   Ht 5' 7" (1.702 m)   Wt 116 kg (255 lb 11.7 oz)   SpO2 96%   BMI 40.05 kg/m²   Exam deferred    Lab Results   Component Value Date    WBC 5.35 12/16/2016    HGB 14.3 12/16/2016    HCT 44.3 12/16/2016     12/16/2016    CHOL 157 06/02/2017    TRIG 174 (H) 06/02/2017    HDL 42 06/02/2017    ALT 25 12/16/2016    AST 26 12/16/2016     06/02/2017    K 3.5 06/02/2017     06/02/2017    CREATININE 0.9 06/02/2017    BUN 15 06/02/2017    CO2 31 (H) 06/02/2017    TSH 1.932 12/16/2016       Impression:  Hypertension, well controlled  Patient Active Problem List   Diagnosis    HTN (hypertension)    Generalized osteoarthrosis, involving multiple sites    History of breast cancer    Obesity, unspecified    Hyperlipidemia       Plan:  Orders Placed This Encounter    Mammo Digital Screening Bilat with CAD    Comprehensive metabolic panel    Lipid panel    TSH    CBC auto differential    hydrocodone-acetaminophen 10-325mg (NORCO)  mg Tab     She will see me again in 6 months.  She's due for her mammogram.  She'll have the above lab work done prior to her next appointment.    "

## 2017-06-29 ENCOUNTER — HOSPITAL ENCOUNTER (OUTPATIENT)
Dept: RADIOLOGY | Facility: HOSPITAL | Age: 62
Discharge: HOME | End: 2017-06-29
Attending: INTERNAL MEDICINE
Payer: MEDICARE

## 2017-06-29 VITALS — WEIGHT: 255 LBS | HEIGHT: 67 IN | BODY MASS INDEX: 40.02 KG/M2

## 2017-06-29 DIAGNOSIS — Z12.31 SCREENING MAMMOGRAM, ENCOUNTER FOR: ICD-10-CM

## 2017-06-29 PROCEDURE — 77067 SCR MAMMO BI INCL CAD: CPT | Mod: 26,,, | Performed by: RADIOLOGY

## 2017-06-29 PROCEDURE — 77067 SCR MAMMO BI INCL CAD: CPT | Mod: TC

## 2017-08-09 RX ORDER — TRAMADOL HYDROCHLORIDE 50 MG/1
TABLET ORAL
Qty: 60 TABLET | Refills: 5 | Status: SHIPPED | OUTPATIENT
Start: 2017-08-09 | End: 2018-02-08 | Stop reason: SDUPTHER

## 2017-08-09 RX ORDER — CLONIDINE HYDROCHLORIDE 0.2 MG/1
TABLET ORAL
Qty: 30 TABLET | Refills: 11 | Status: SHIPPED | OUTPATIENT
Start: 2017-08-09 | End: 2017-12-05

## 2017-08-14 RX ORDER — HYDROCODONE BITARTRATE AND ACETAMINOPHEN 10; 325 MG/1; MG/1
1 TABLET ORAL 2 TIMES DAILY PRN
Qty: 120 TABLET | Refills: 0 | Status: SHIPPED | OUTPATIENT
Start: 2017-08-14 | End: 2017-10-30 | Stop reason: SDUPTHER

## 2017-08-14 NOTE — TELEPHONE ENCOUNTER
Pt called requesting a refill of NORCO. Last refilled on 06/06/17 for a quantity of #120 tablets. LV 06/06/17. NV 12/07/17/PANFILO

## 2017-08-14 NOTE — TELEPHONE ENCOUNTER
----- Message from Zohra Aparicio sent at 8/14/2017  9:40 AM CDT -----  Contact: Pt  ...1. What is the name of the medication you are requesting? HYDROCODONE  2. What is the dose? 10 MG  3. How do you take the medication? Orally, topically, etc? Orally  4. How often do you take this medication? Every 6-8 hours  5. Do you need a 30 day or 90 day supply? 30  6. How many refills are you requesting? 1  7. What is your preferred pharmacy and location of the pharmacy? ..  [facility] [address]  [address] [address]  Phone: [phone] Fax: [phone]  8. Who can we contact with further questions? ..[phone] (home)

## 2017-09-08 ENCOUNTER — TELEPHONE (OUTPATIENT)
Dept: INTERNAL MEDICINE | Facility: CLINIC | Age: 62
End: 2017-09-08

## 2017-09-08 NOTE — TELEPHONE ENCOUNTER
----- Message from Evie Moore sent at 2017  7:55 AM CDT -----  Contact: pt  States the prescription she had .     1. What is the name of the medication you are requesting? nexium  2. What is the dose? 40 mg  3. How do you take the medication? Orally, topically, etc? Orally  4. How often do you take this medication? One every morning before breakfast  5. Do you need a 30 day or 90 day supply? ?  6. How many refills are you requesting? ?  7. What is your preferred pharmacy and location of the pharmacy? .  [facility] [address]  [address] [address]  Phone: [phone] Fax: [phone]  8. Who can we contact with further questions? Pt at [phone]  Thank you

## 2017-09-11 RX ORDER — CITALOPRAM 40 MG/1
TABLET, FILM COATED ORAL
Qty: 30 TABLET | Refills: 11 | Status: SHIPPED | OUTPATIENT
Start: 2017-09-11 | End: 2018-09-23 | Stop reason: SDUPTHER

## 2017-09-11 RX ORDER — ZOLPIDEM TARTRATE 10 MG/1
TABLET ORAL
Qty: 20 TABLET | Refills: 5 | Status: SHIPPED | OUTPATIENT
Start: 2017-09-11 | End: 2018-03-06 | Stop reason: SDUPTHER

## 2017-10-02 ENCOUNTER — OFFICE VISIT (OUTPATIENT)
Dept: INTERNAL MEDICINE | Facility: CLINIC | Age: 62
End: 2017-10-02
Payer: MEDICARE

## 2017-10-02 VITALS
DIASTOLIC BLOOD PRESSURE: 80 MMHG | HEART RATE: 64 BPM | WEIGHT: 257.5 LBS | TEMPERATURE: 98 F | HEIGHT: 67 IN | SYSTOLIC BLOOD PRESSURE: 138 MMHG | OXYGEN SATURATION: 98 % | BODY MASS INDEX: 40.42 KG/M2

## 2017-10-02 DIAGNOSIS — M54.12 CERVICAL RADICULOPATHY: Primary | ICD-10-CM

## 2017-10-02 PROCEDURE — 99213 OFFICE O/P EST LOW 20 MIN: CPT | Mod: S$PBB,,, | Performed by: INTERNAL MEDICINE

## 2017-10-02 PROCEDURE — 3075F SYST BP GE 130 - 139MM HG: CPT | Mod: ,,, | Performed by: INTERNAL MEDICINE

## 2017-10-02 PROCEDURE — 99214 OFFICE O/P EST MOD 30 MIN: CPT | Mod: PBBFAC,PO,25 | Performed by: INTERNAL MEDICINE

## 2017-10-02 PROCEDURE — 99999 PR PBB SHADOW E&M-EST. PATIENT-LVL IV: CPT | Mod: PBBFAC,,, | Performed by: INTERNAL MEDICINE

## 2017-10-02 PROCEDURE — 3079F DIAST BP 80-89 MM HG: CPT | Mod: ,,, | Performed by: INTERNAL MEDICINE

## 2017-10-02 PROCEDURE — G0008 ADMIN INFLUENZA VIRUS VAC: HCPCS | Mod: PBBFAC,PO

## 2017-10-02 RX ORDER — MELOXICAM 15 MG/1
15 TABLET ORAL DAILY
Qty: 30 TABLET | Refills: 5 | Status: SHIPPED | OUTPATIENT
Start: 2017-10-02 | End: 2017-12-05 | Stop reason: SDUPTHER

## 2017-10-02 RX ORDER — TIZANIDINE 4 MG/1
4 TABLET ORAL EVERY 6 HOURS PRN
Qty: 90 TABLET | Refills: 1 | Status: SHIPPED | OUTPATIENT
Start: 2017-10-02 | End: 2017-10-30 | Stop reason: SDUPTHER

## 2017-10-02 RX ORDER — GABAPENTIN 300 MG/1
300 CAPSULE ORAL 3 TIMES DAILY
Qty: 90 CAPSULE | Refills: 11 | Status: SHIPPED | OUTPATIENT
Start: 2017-10-02 | End: 2018-03-05

## 2017-10-02 NOTE — PROGRESS NOTES
"HPI:  Pt comes with c/o LUE pain that began about four weeks ago. Pain radiates down into her arm from the shoulder/posterior neck area. In last week she has had numbness along inside of left arm and forearm. She denies any trauma. ER eval was unremarkable.       Current meds have been verified and updated per the EMR  Exam:/80   Pulse 64   Temp 97.5 °F (36.4 °C) (Tympanic)   Ht 5' 7" (1.702 m)   Wt 116.8 kg (257 lb 8 oz)   SpO2 98%   BMI 40.33 kg/m²   Pain on palpation over left posterior medial trapezius muscle. Has pain with extension of left arm and rotation of neck to the right. Has subjective decrease tactile sensation alone medial left arm and forearm    Lab Results   Component Value Date    WBC 5.35 12/16/2016    HGB 14.3 12/16/2016    HCT 44.3 12/16/2016     12/16/2016    CHOL 157 06/02/2017    TRIG 174 (H) 06/02/2017    HDL 42 06/02/2017    ALT 25 12/16/2016    AST 26 12/16/2016     06/02/2017    K 3.5 06/02/2017     06/02/2017    CREATININE 0.9 06/02/2017    BUN 15 06/02/2017    CO2 31 (H) 06/02/2017    TSH 1.932 12/16/2016       Impression:  Suspect cervical radiculopathy.   Patient Active Problem List   Diagnosis    HTN (hypertension)    Generalized osteoarthrosis, involving multiple sites    History of breast cancer    Obesity, unspecified    Hyperlipidemia       Plan:  Orders Placed This Encounter    EMG - 2 Extremities    gabapentin (NEURONTIN) 300 MG capsule    meloxicam (MOBIC) 15 MG tablet    tizanidine (ZANAFLEX) 4 MG tablet     As above    "

## 2017-10-05 ENCOUNTER — TELEPHONE (OUTPATIENT)
Dept: INTERNAL MEDICINE | Facility: CLINIC | Age: 62
End: 2017-10-05

## 2017-10-05 NOTE — TELEPHONE ENCOUNTER
Called pt.  She will check with her insurance as to neurologists covered and will call back to get a referal

## 2017-10-05 NOTE — TELEPHONE ENCOUNTER
----- Message from Ayala Horner sent at 10/5/2017  3:58 PM CDT -----  Would like to speak to nurse about referral. Please call back at [phone]. thanks

## 2017-10-12 RX ORDER — PRAVASTATIN SODIUM 40 MG/1
TABLET ORAL
Qty: 30 TABLET | Refills: 11 | Status: SHIPPED | OUTPATIENT
Start: 2017-10-12 | End: 2018-08-23 | Stop reason: SDUPTHER

## 2017-10-13 RX ORDER — ALPRAZOLAM 1 MG/1
TABLET ORAL
Qty: 30 TABLET | Refills: 5 | Status: SHIPPED | OUTPATIENT
Start: 2017-10-13 | End: 2018-03-06 | Stop reason: SDUPTHER

## 2017-10-30 RX ORDER — HYDROCODONE BITARTRATE AND ACETAMINOPHEN 10; 325 MG/1; MG/1
1 TABLET ORAL 2 TIMES DAILY PRN
Qty: 120 TABLET | Refills: 0 | Status: SHIPPED | OUTPATIENT
Start: 2017-10-30 | End: 2017-12-05 | Stop reason: SDUPTHER

## 2017-10-30 RX ORDER — TIZANIDINE 4 MG/1
TABLET ORAL
Qty: 90 TABLET | Refills: 1 | Status: SHIPPED | OUTPATIENT
Start: 2017-10-30 | End: 2018-03-05

## 2017-10-30 NOTE — TELEPHONE ENCOUNTER
----- Message from Millicenternestina Soria sent at 10/30/2017 10:23 AM CDT -----  Contact: pt  1. What is the name of the medication you are requesting? hydrocodone  2. What is the dose? 10mg  3. How do you take the medication? Orally, topically, etc? orally  4. How often do you take this medication? 2xday  5. Do you need a 30 day or 90 day supply? 30  6. How many refills are you requesting? 1  7. What is your preferred pharmacy and location of the pharmacy? [facility]  8. Who can we contact with further questions? [phone]

## 2017-11-30 ENCOUNTER — LAB VISIT (OUTPATIENT)
Dept: LAB | Facility: HOSPITAL | Age: 62
End: 2017-11-30
Attending: INTERNAL MEDICINE
Payer: MEDICARE

## 2017-11-30 DIAGNOSIS — I10 ESSENTIAL HYPERTENSION: ICD-10-CM

## 2017-11-30 LAB
ALBUMIN SERPL BCP-MCNC: 3.7 G/DL
ALP SERPL-CCNC: 83 U/L
ALT SERPL W/O P-5'-P-CCNC: 31 U/L
ANION GAP SERPL CALC-SCNC: 9 MMOL/L
AST SERPL-CCNC: 30 U/L
BASOPHILS # BLD AUTO: 0.06 K/UL
BASOPHILS NFR BLD: 1.2 %
BILIRUB SERPL-MCNC: 0.4 MG/DL
BUN SERPL-MCNC: 13 MG/DL
CALCIUM SERPL-MCNC: 9.8 MG/DL
CHLORIDE SERPL-SCNC: 102 MMOL/L
CHOLEST SERPL-MCNC: 156 MG/DL
CHOLEST/HDLC SERPL: 3.5 {RATIO}
CO2 SERPL-SCNC: 32 MMOL/L
CREAT SERPL-MCNC: 0.9 MG/DL
DIFFERENTIAL METHOD: NORMAL
EOSINOPHIL # BLD AUTO: 0.2 K/UL
EOSINOPHIL NFR BLD: 3 %
ERYTHROCYTE [DISTWIDTH] IN BLOOD BY AUTOMATED COUNT: 12.8 %
EST. GFR  (AFRICAN AMERICAN): >60 ML/MIN/1.73 M^2
EST. GFR  (NON AFRICAN AMERICAN): >60 ML/MIN/1.73 M^2
GLUCOSE SERPL-MCNC: 109 MG/DL
HCT VFR BLD AUTO: 42.6 %
HDLC SERPL-MCNC: 44 MG/DL
HDLC SERPL: 28.2 %
HGB BLD-MCNC: 13.7 G/DL
IMM GRANULOCYTES # BLD AUTO: 0.01 K/UL
IMM GRANULOCYTES NFR BLD AUTO: 0.2 %
LDLC SERPL CALC-MCNC: 82 MG/DL
LYMPHOCYTES # BLD AUTO: 2 K/UL
LYMPHOCYTES NFR BLD: 39.6 %
MCH RBC QN AUTO: 30.2 PG
MCHC RBC AUTO-ENTMCNC: 32.2 G/DL
MCV RBC AUTO: 94 FL
MONOCYTES # BLD AUTO: 0.5 K/UL
MONOCYTES NFR BLD: 10.3 %
NEUTROPHILS # BLD AUTO: 2.3 K/UL
NEUTROPHILS NFR BLD: 45.7 %
NONHDLC SERPL-MCNC: 112 MG/DL
NRBC BLD-RTO: 0 /100 WBC
PLATELET # BLD AUTO: 208 K/UL
PMV BLD AUTO: 11.5 FL
POTASSIUM SERPL-SCNC: 4.1 MMOL/L
PROT SERPL-MCNC: 7 G/DL
RBC # BLD AUTO: 4.54 M/UL
SODIUM SERPL-SCNC: 143 MMOL/L
TRIGL SERPL-MCNC: 150 MG/DL
TSH SERPL DL<=0.005 MIU/L-ACNC: 1.91 UIU/ML
WBC # BLD AUTO: 5.07 K/UL

## 2017-11-30 PROCEDURE — 36415 COLL VENOUS BLD VENIPUNCTURE: CPT | Mod: PO

## 2017-11-30 PROCEDURE — 84443 ASSAY THYROID STIM HORMONE: CPT

## 2017-11-30 PROCEDURE — 80061 LIPID PANEL: CPT

## 2017-11-30 PROCEDURE — 80053 COMPREHEN METABOLIC PANEL: CPT

## 2017-11-30 PROCEDURE — 85025 COMPLETE CBC W/AUTO DIFF WBC: CPT

## 2017-12-05 ENCOUNTER — OFFICE VISIT (OUTPATIENT)
Dept: INTERNAL MEDICINE | Facility: CLINIC | Age: 62
End: 2017-12-05
Payer: MEDICARE

## 2017-12-05 VITALS
HEIGHT: 67 IN | DIASTOLIC BLOOD PRESSURE: 70 MMHG | BODY MASS INDEX: 40.38 KG/M2 | HEART RATE: 58 BPM | TEMPERATURE: 98 F | OXYGEN SATURATION: 97 % | SYSTOLIC BLOOD PRESSURE: 112 MMHG | WEIGHT: 257.25 LBS

## 2017-12-05 DIAGNOSIS — Z85.3 HISTORY OF BREAST CANCER: ICD-10-CM

## 2017-12-05 DIAGNOSIS — I10 ESSENTIAL HYPERTENSION: Primary | ICD-10-CM

## 2017-12-05 DIAGNOSIS — E78.00 PURE HYPERCHOLESTEROLEMIA: ICD-10-CM

## 2017-12-05 PROCEDURE — 99999 PR PBB SHADOW E&M-EST. PATIENT-LVL IV: CPT | Mod: PBBFAC,,, | Performed by: INTERNAL MEDICINE

## 2017-12-05 PROCEDURE — 99214 OFFICE O/P EST MOD 30 MIN: CPT | Mod: 25,S$PBB,, | Performed by: INTERNAL MEDICINE

## 2017-12-05 PROCEDURE — 90670 PCV13 VACCINE IM: CPT | Mod: PBBFAC,PO

## 2017-12-05 PROCEDURE — G0009 ADMIN PNEUMOCOCCAL VACCINE: HCPCS | Mod: PBBFAC,PO

## 2017-12-05 PROCEDURE — 99214 OFFICE O/P EST MOD 30 MIN: CPT | Mod: PBBFAC,PO | Performed by: INTERNAL MEDICINE

## 2017-12-05 RX ORDER — MELOXICAM 15 MG/1
15 TABLET ORAL DAILY
Qty: 30 TABLET | Refills: 11 | Status: SHIPPED | OUTPATIENT
Start: 2017-12-05 | End: 2019-01-02 | Stop reason: SDUPTHER

## 2017-12-05 RX ORDER — HYDROCODONE BITARTRATE AND ACETAMINOPHEN 10; 325 MG/1; MG/1
1 TABLET ORAL 2 TIMES DAILY PRN
Qty: 120 TABLET | Refills: 0 | Status: SHIPPED | OUTPATIENT
Start: 2017-12-05 | End: 2018-03-05 | Stop reason: SDUPTHER

## 2017-12-05 NOTE — PROGRESS NOTES
"HPI:  Patient is a 62-year-old female who comes in today for follow-up of her hypertension and lipids.  In her annual physical.  Patient is been doing well.  She reports no problems or complaints.  The cervical radiculopathy symptoms.  She was having several months ago.  Has abated.    Current MEDS: medcard review, verified and update  Allergies: Per the electronic medical record    Past Medical History:   Diagnosis Date    Breast cancer     Generalized osteoarthrosis, involving multiple sites     s/p THR bilateral    History of breast cancer 2007    lumpectomy/XRT    HTN (hypertension)     Hyperlipidemia     Obesity, unspecified        Past Surgical History:   Procedure Laterality Date    BREAST LUMPECTOMY Right 2006    XRT    COLONOSCOPY N/A 10/15/2015    Procedure: COLONOSCOPY;  Surgeon: Maylin Shipley MD;  Location: [facility];  Service: Endoscopy;  Laterality: N/A;    TOTAL ABDOMINAL HYSTERECTOMY W/ BILATERAL SALPINGOOPHORECTOMY         SHx: per the electronic medical record    FHx: recorded in the electronic medical record    ROS:    denies any chest pains or shortness of breath. Denies any nausea, vomiting or diarrhea. Denies any fever, chills or sweats. Denies any change in weight, voice, stool, skin or hair. Denies any dysuria, dyspepsia or dysphagia. Denies any change in vision, hearing or headaches. Denies any swollen lymph nodes or loss of memory.    PE:  /70 (BP Location: Right arm)   Pulse (!) 58   Temp 98.2 °F (36.8 °C) (Tympanic)   Ht 5' 7" (1.702 m)   Wt 116.7 kg (257 lb 4.4 oz)   SpO2 97%   BMI 40.30 kg/m²   Gen: Well-developed, well-nourished, female, in no acute distress, oriented x3  HEENT: neck is supple, no adenopathy, carotids 2+ equal without bruits, thyroid exam normal size without nodules.  CHEST: clear to auscultation and percussion  CVS: regular rate and rhythm without significant murmur, gallop, or rubs  ABD: soft, benign, no rebound no guarding, no distention. " Bowel sounds are normal.     Nontender,  no palpable masses, no organomegaly and no audible bruits.  BREAST: no masses.  She has lumpectomy scar consistent with her surgery.  She has no nipple inversion, retraction or deviation.  EXT: no clubbing, cyanosis, or edema  LYMPH: no cervical, inguinal, or axillary adenopathy  FEET: no loss of sensation.  No ulcers or pressure sores.  NEURO: gait normal.  Cranial nerves II- XII intact. No nystagmus.  Speech normal.   Gross motor and sensory unremarkable.  PELVIC: deferred    Lab Results   Component Value Date    WBC 5.07 11/30/2017    HGB 13.7 11/30/2017    HCT 42.6 11/30/2017     11/30/2017    CHOL 156 11/30/2017    TRIG 150 11/30/2017    HDL 44 11/30/2017    ALT 31 11/30/2017    AST 30 11/30/2017     11/30/2017    K 4.1 11/30/2017     11/30/2017    CREATININE 0.9 11/30/2017    BUN 13 11/30/2017    CO2 32 (H) 11/30/2017    TSH 1.912 11/30/2017       Impression:  Multiple medical problems below, stable  Patient Active Problem List   Diagnosis    HTN (hypertension)    Generalized osteoarthrosis, involving multiple sites    History of breast cancer    Obesity, unspecified    Hyperlipidemia       Plan:   Orders Placed This Encounter    (In Office Administered) Pneumococcal Conjugate Vaccine (13 Valent) (IM)    meloxicam (MOBIC) 15 MG tablet    hydrocodone-acetaminophen 10-325mg (NORCO)  mg Tab     She was given Prevnar vaccine.  She'll be seen again in 6 months.  She will see me otherwise as needed

## 2017-12-10 RX ORDER — LOSARTAN POTASSIUM AND HYDROCHLOROTHIAZIDE 25; 100 MG/1; MG/1
TABLET ORAL
Qty: 90 TABLET | Refills: 3 | Status: SHIPPED | OUTPATIENT
Start: 2017-12-10 | End: 2019-02-04 | Stop reason: SDUPTHER

## 2018-02-10 RX ORDER — TRAMADOL HYDROCHLORIDE 50 MG/1
TABLET ORAL
Qty: 60 TABLET | Refills: 5 | Status: SHIPPED | OUTPATIENT
Start: 2018-02-10 | End: 2018-05-19 | Stop reason: SDUPTHER

## 2018-02-11 RX ORDER — POTASSIUM CHLORIDE 20 MEQ/1
TABLET, EXTENDED RELEASE ORAL
Qty: 60 TABLET | Refills: 11 | Status: SHIPPED | OUTPATIENT
Start: 2018-02-11 | End: 2018-09-24

## 2018-03-05 ENCOUNTER — OFFICE VISIT (OUTPATIENT)
Dept: INTERNAL MEDICINE | Facility: CLINIC | Age: 63
End: 2018-03-05
Payer: MEDICARE

## 2018-03-05 VITALS
WEIGHT: 261.44 LBS | HEIGHT: 67 IN | OXYGEN SATURATION: 95 % | TEMPERATURE: 98 F | RESPIRATION RATE: 18 BRPM | DIASTOLIC BLOOD PRESSURE: 78 MMHG | HEART RATE: 68 BPM | BODY MASS INDEX: 41.03 KG/M2 | SYSTOLIC BLOOD PRESSURE: 132 MMHG

## 2018-03-05 DIAGNOSIS — E78.00 PURE HYPERCHOLESTEROLEMIA: ICD-10-CM

## 2018-03-05 DIAGNOSIS — M15.9 GENERALIZED OSTEOARTHROSIS, INVOLVING MULTIPLE SITES: ICD-10-CM

## 2018-03-05 DIAGNOSIS — I10 ESSENTIAL HYPERTENSION: Primary | ICD-10-CM

## 2018-03-05 DIAGNOSIS — G89.4 CHRONIC PAIN SYNDROME: ICD-10-CM

## 2018-03-05 PROCEDURE — 99213 OFFICE O/P EST LOW 20 MIN: CPT | Mod: PBBFAC,PO | Performed by: INTERNAL MEDICINE

## 2018-03-05 PROCEDURE — 99999 PR PBB SHADOW E&M-EST. PATIENT-LVL III: CPT | Mod: PBBFAC,,, | Performed by: INTERNAL MEDICINE

## 2018-03-05 PROCEDURE — 99213 OFFICE O/P EST LOW 20 MIN: CPT | Mod: S$PBB,,, | Performed by: INTERNAL MEDICINE

## 2018-03-05 RX ORDER — HYDROCODONE BITARTRATE AND ACETAMINOPHEN 10; 325 MG/1; MG/1
1 TABLET ORAL 2 TIMES DAILY PRN
Qty: 120 TABLET | Refills: 0 | OUTPATIENT
Start: 2018-03-05

## 2018-03-05 RX ORDER — HYDROCODONE BITARTRATE AND ACETAMINOPHEN 10; 325 MG/1; MG/1
1 TABLET ORAL 2 TIMES DAILY PRN
Qty: 120 TABLET | Refills: 0 | Status: SHIPPED | OUTPATIENT
Start: 2018-03-05 | End: 2018-05-21 | Stop reason: SDUPTHER

## 2018-03-05 NOTE — TELEPHONE ENCOUNTER
----- Message from Shelli Hollis sent at 3/5/2018  8:01 AM CST -----  Contact: Patient  1. What is the name of the medication you are requesting? Rx Hydrocodone  2. What is the dose? 10 mg  3. How do you take the medication? Orally, topically, etc? oral  4. How often do you take this medication? As needed   5. Do you need a 30 day or 90 day supply? 90  6. How many refills are you requesting? n/a  7. What is your preferred pharmacy and location of the pharmacy?   [facility] - [facility] [address]  [address] [address]  Phone: [phone] Fax: [phone]  8. Who can we contact with further questions? Patient/[phone]

## 2018-03-05 NOTE — PROGRESS NOTES
"HPI:  Patient is a 62-year-old female who comes in today for refills of her hydrocodone.  She is doing fine.  She has no complaints.  Her pain is controlled on her current dosage.    Current meds have been verified and updated per the EMR  Exam:/78   Pulse 68   Temp 97.8 °F (36.6 °C)   Resp 18   Ht 5' 7" (1.702 m)   Wt 118.6 kg (261 lb 7.5 oz)   SpO2 95%   BMI 40.95 kg/m²   Exam deferred    Lab Results   Component Value Date    WBC 5.07 11/30/2017    HGB 13.7 11/30/2017    HCT 42.6 11/30/2017     11/30/2017    CHOL 156 11/30/2017    TRIG 150 11/30/2017    HDL 44 11/30/2017    ALT 31 11/30/2017    AST 30 11/30/2017     11/30/2017    K 4.1 11/30/2017     11/30/2017    CREATININE 0.9 11/30/2017    BUN 13 11/30/2017    CO2 32 (H) 11/30/2017    TSH 1.912 11/30/2017       Impression:  Chronic pain secondary to osteoarthritis  Patient Active Problem List   Diagnosis    HTN (hypertension)    Generalized osteoarthrosis, involving multiple sites    History of breast cancer    Obesity, unspecified    Hyperlipidemia       Plan:  Orders Placed This Encounter    Lipid panel    Comprehensive metabolic panel    hydrocodone-acetaminophen 10-325mg (NORCO)  mg Tab     She was given 3 month refill of her hydrocodone.  She will be seen again in 3 months    "

## 2018-03-05 NOTE — PROGRESS NOTES
Patient, Mary Vo (MRN #[id number]), presented with a recorded BMI of 40.95 kg/m^2 consistent with the definition of morbid obesity (ICD-10 E66.01). The patient's morbid obesity was monitored, evaluated, addressed and/or treated. This addendum to the medical record is made on 03/05/2018.

## 2018-03-06 RX ORDER — ZOLPIDEM TARTRATE 10 MG/1
TABLET ORAL
Qty: 20 TABLET | Refills: 5 | Status: SHIPPED | OUTPATIENT
Start: 2018-03-06 | End: 2018-07-23 | Stop reason: SDUPTHER

## 2018-03-06 RX ORDER — ALPRAZOLAM 1 MG/1
TABLET ORAL
Qty: 30 TABLET | Refills: 5 | Status: SHIPPED | OUTPATIENT
Start: 2018-03-06 | End: 2018-09-23 | Stop reason: SDUPTHER

## 2018-05-20 RX ORDER — TRAMADOL HYDROCHLORIDE 50 MG/1
TABLET ORAL
Qty: 60 TABLET | Refills: 0 | Status: SHIPPED | OUTPATIENT
Start: 2018-05-20 | End: 2018-07-23 | Stop reason: SDUPTHER

## 2018-05-21 RX ORDER — HYDROCODONE BITARTRATE AND ACETAMINOPHEN 10; 325 MG/1; MG/1
1 TABLET ORAL 2 TIMES DAILY PRN
Qty: 120 TABLET | Refills: 0 | Status: SHIPPED | OUTPATIENT
Start: 2018-05-21 | End: 2018-07-30 | Stop reason: SDUPTHER

## 2018-05-24 ENCOUNTER — PATIENT OUTREACH (OUTPATIENT)
Dept: ADMINISTRATIVE | Facility: HOSPITAL | Age: 63
End: 2018-05-24

## 2018-05-31 ENCOUNTER — LAB VISIT (OUTPATIENT)
Dept: LAB | Facility: HOSPITAL | Age: 63
End: 2018-05-31
Attending: INTERNAL MEDICINE
Payer: MEDICARE

## 2018-05-31 DIAGNOSIS — I10 ESSENTIAL HYPERTENSION: ICD-10-CM

## 2018-05-31 LAB
ALBUMIN SERPL BCP-MCNC: 4.1 G/DL
ALP SERPL-CCNC: 84 U/L
ALT SERPL W/O P-5'-P-CCNC: 36 U/L
ANION GAP SERPL CALC-SCNC: 9 MMOL/L
AST SERPL-CCNC: 31 U/L
BILIRUB SERPL-MCNC: 0.4 MG/DL
BUN SERPL-MCNC: 13 MG/DL
CALCIUM SERPL-MCNC: 10.1 MG/DL
CHLORIDE SERPL-SCNC: 106 MMOL/L
CHOLEST SERPL-MCNC: 158 MG/DL
CHOLEST/HDLC SERPL: 3.4 {RATIO}
CO2 SERPL-SCNC: 27 MMOL/L
CREAT SERPL-MCNC: 0.9 MG/DL
EST. GFR  (AFRICAN AMERICAN): >60 ML/MIN/1.73 M^2
EST. GFR  (NON AFRICAN AMERICAN): >60 ML/MIN/1.73 M^2
GLUCOSE SERPL-MCNC: 115 MG/DL
HDLC SERPL-MCNC: 47 MG/DL
HDLC SERPL: 29.7 %
LDLC SERPL CALC-MCNC: 83.2 MG/DL
NONHDLC SERPL-MCNC: 111 MG/DL
POTASSIUM SERPL-SCNC: 3.9 MMOL/L
PROT SERPL-MCNC: 7.2 G/DL
SODIUM SERPL-SCNC: 142 MMOL/L
TRIGL SERPL-MCNC: 139 MG/DL

## 2018-05-31 PROCEDURE — 80053 COMPREHEN METABOLIC PANEL: CPT

## 2018-05-31 PROCEDURE — 36415 COLL VENOUS BLD VENIPUNCTURE: CPT | Mod: PO

## 2018-05-31 PROCEDURE — 80061 LIPID PANEL: CPT

## 2018-06-04 ENCOUNTER — OFFICE VISIT (OUTPATIENT)
Dept: INTERNAL MEDICINE | Facility: CLINIC | Age: 63
End: 2018-06-04
Payer: MEDICARE

## 2018-06-04 VITALS
RESPIRATION RATE: 16 BRPM | SYSTOLIC BLOOD PRESSURE: 138 MMHG | DIASTOLIC BLOOD PRESSURE: 80 MMHG | WEIGHT: 259.69 LBS | TEMPERATURE: 98 F | OXYGEN SATURATION: 98 % | HEIGHT: 67 IN | BODY MASS INDEX: 40.76 KG/M2 | HEART RATE: 98 BPM

## 2018-06-04 DIAGNOSIS — M54.12 CERVICAL RADICULOPATHY: ICD-10-CM

## 2018-06-04 DIAGNOSIS — I10 ESSENTIAL HYPERTENSION: Primary | ICD-10-CM

## 2018-06-04 DIAGNOSIS — M15.9 GENERALIZED OSTEOARTHROSIS, INVOLVING MULTIPLE SITES: ICD-10-CM

## 2018-06-04 DIAGNOSIS — E78.00 PURE HYPERCHOLESTEROLEMIA: ICD-10-CM

## 2018-06-04 PROCEDURE — 99213 OFFICE O/P EST LOW 20 MIN: CPT | Mod: PBBFAC,PO | Performed by: INTERNAL MEDICINE

## 2018-06-04 PROCEDURE — 99999 PR PBB SHADOW E&M-EST. PATIENT-LVL III: CPT | Mod: PBBFAC,,, | Performed by: INTERNAL MEDICINE

## 2018-06-04 PROCEDURE — 99213 OFFICE O/P EST LOW 20 MIN: CPT | Mod: S$PBB,,, | Performed by: INTERNAL MEDICINE

## 2018-06-04 RX ORDER — HYDROCODONE BITARTRATE AND ACETAMINOPHEN 10; 325 MG/1; MG/1
1 TABLET ORAL 2 TIMES DAILY PRN
Qty: 120 TABLET | Refills: 0 | Status: CANCELLED | OUTPATIENT
Start: 2018-06-04

## 2018-06-04 NOTE — PROGRESS NOTES
"HPI:  Patient is a 63-year-old female comes today for follow-up of her hypertension.  Patient at this time complaints of radiculopathy type symptoms in the right upper extremity.  She had similar problems about 9 months ago with this.  she never got the EMG test done.  She otherwise is also interested in having weight loss surgery.  She needs to check with her insurance to see if it is a covered benefit.    Current meds have been verified and updated per the EMR  Exam:/80   Pulse 98   Temp 97.6 °F (36.4 °C)   Resp 16   Ht 5' 7" (1.702 m)   Wt 117.8 kg (259 lb 11.2 oz)   SpO2 98%   BMI 40.68 kg/m²   Exam deferred    Lab Results   Component Value Date    WBC 5.07 11/30/2017    HGB 13.7 11/30/2017    HCT 42.6 11/30/2017     11/30/2017    CHOL 158 05/31/2018    TRIG 139 05/31/2018    HDL 47 05/31/2018    ALT 36 05/31/2018    AST 31 05/31/2018     05/31/2018    K 3.9 05/31/2018     05/31/2018    CREATININE 0.9 05/31/2018    BUN 13 05/31/2018    CO2 27 05/31/2018    TSH 1.912 11/30/2017       Impression:  Cervical radiculopathy, mild  Hypertension in lipids well controlled  Patient Active Problem List   Diagnosis    HTN (hypertension)    Generalized osteoarthrosis, involving multiple sites    History of breast cancer    Obesity, unspecified    Hyperlipidemia       Plan:  Orders Placed This Encounter    Nerve conduction test     She will be set up to have nerve conduction test done.  Patient will see me again in 3 months.    "

## 2018-07-11 ENCOUNTER — TELEPHONE (OUTPATIENT)
Dept: INTERNAL MEDICINE | Facility: CLINIC | Age: 63
End: 2018-07-11

## 2018-07-11 DIAGNOSIS — Z12.39 SCREENING BREAST EXAMINATION: Primary | ICD-10-CM

## 2018-07-11 NOTE — TELEPHONE ENCOUNTER
----- Message from Radha George sent at 7/11/2018  7:44 AM CDT -----  pls input [facility] order...[phone]

## 2018-07-23 RX ORDER — ZOLPIDEM TARTRATE 10 MG/1
TABLET ORAL
Qty: 20 TABLET | Refills: 5 | Status: SHIPPED | OUTPATIENT
Start: 2018-07-23 | End: 2019-02-04 | Stop reason: SDUPTHER

## 2018-07-23 RX ORDER — CLONIDINE HYDROCHLORIDE 0.2 MG/1
TABLET ORAL
Qty: 30 TABLET | Refills: 11 | Status: SHIPPED | OUTPATIENT
Start: 2018-07-23 | End: 2018-09-10

## 2018-07-23 RX ORDER — TRAMADOL HYDROCHLORIDE 50 MG/1
TABLET ORAL
Qty: 60 TABLET | Refills: 5 | Status: SHIPPED | OUTPATIENT
Start: 2018-07-23 | End: 2018-09-10

## 2018-07-30 RX ORDER — HYDROCODONE BITARTRATE AND ACETAMINOPHEN 10; 325 MG/1; MG/1
1 TABLET ORAL 2 TIMES DAILY PRN
Qty: 40 TABLET | Refills: 0 | Status: SHIPPED | OUTPATIENT
Start: 2018-07-30 | End: 2018-09-10 | Stop reason: SDUPTHER

## 2018-07-30 RX ORDER — HYDROCODONE BITARTRATE AND ACETAMINOPHEN 10; 325 MG/1; MG/1
1 TABLET ORAL 2 TIMES DAILY PRN
Qty: 120 TABLET | Refills: 0 | OUTPATIENT
Start: 2018-07-30

## 2018-07-30 NOTE — TELEPHONE ENCOUNTER
LOV 3/5/18    Dr. Cannon, I spoke with the patient and she stated that she usually gets the hydrocodone every 3 months and she usually gets a quantity of  #120, I informed her that I would mention this to you, please advise, thanks

## 2018-07-30 NOTE — TELEPHONE ENCOUNTER
Call patient and tell her that I really think she should just take only hydrocodone or tramadol and not both.  The for now I will give her 1 month of the hydrocodone and we will discuss in detail her narcotic use at her next appointment.  Tell her that tramadol is also a narcotic.

## 2018-07-30 NOTE — TELEPHONE ENCOUNTER
----- Message from Zohra Aparicio sent at 7/30/2018  9:38 AM CDT -----  Contact: Pt  ..1. What is the name of the medication you are requesting? Hydrocodone  2. What is the dose? 10 mg  3. How do you take the medication? Orally, topically, etc? Orally  4. How often do you take this medication? Daily  5. Do you need a 30 day or 90 day supply?90  6. How many refills are you requesting?1  7. What is your preferred pharmacy and location of the pharmacy? ..  [facility] [address]  [address] [address]  Phone: [phone] Fax: [phone]    8. Who can we contact with further questions? ..[phone] (home)

## 2018-08-02 ENCOUNTER — HOSPITAL ENCOUNTER (OUTPATIENT)
Dept: RADIOLOGY | Facility: HOSPITAL | Age: 63
Discharge: HOME | End: 2018-08-02
Attending: INTERNAL MEDICINE
Payer: MEDICARE

## 2018-08-02 DIAGNOSIS — Z12.39 SCREENING BREAST EXAMINATION: ICD-10-CM

## 2018-08-02 PROCEDURE — 77067 SCR MAMMO BI INCL CAD: CPT | Mod: TC

## 2018-08-02 PROCEDURE — 77067 SCR MAMMO BI INCL CAD: CPT | Mod: 26,,, | Performed by: RADIOLOGY

## 2018-08-22 ENCOUNTER — TELEPHONE (OUTPATIENT)
Dept: INTERNAL MEDICINE | Facility: CLINIC | Age: 63
End: 2018-08-22

## 2018-08-22 NOTE — TELEPHONE ENCOUNTER
----- Message from Celina Busch sent at 8/22/2018  8:08 AM CDT -----  Contact: self [phone]  States that she would like to be worked in for an appt on 09/10/18 at 1pm. States that she can not do the appt time that she has scheduled. Please call back at [phone]//thank you acc

## 2018-08-24 RX ORDER — PRAVASTATIN SODIUM 40 MG/1
TABLET ORAL
Qty: 30 TABLET | Refills: 11 | Status: SHIPPED | OUTPATIENT
Start: 2018-08-24 | End: 2019-08-19 | Stop reason: SDUPTHER

## 2018-09-10 ENCOUNTER — TELEPHONE (OUTPATIENT)
Dept: INTERNAL MEDICINE | Facility: CLINIC | Age: 63
End: 2018-09-10

## 2018-09-10 ENCOUNTER — TELEPHONE (OUTPATIENT)
Dept: PHYSICAL MEDICINE AND REHAB | Facility: CLINIC | Age: 63
End: 2018-09-10

## 2018-09-10 ENCOUNTER — OFFICE VISIT (OUTPATIENT)
Dept: INTERNAL MEDICINE | Facility: CLINIC | Age: 63
End: 2018-09-10
Payer: MEDICARE

## 2018-09-10 ENCOUNTER — HOSPITAL ENCOUNTER (OUTPATIENT)
Dept: RADIOLOGY | Facility: HOSPITAL | Age: 63
Discharge: HOME | End: 2018-09-10
Attending: INTERNAL MEDICINE
Payer: MEDICARE

## 2018-09-10 VITALS
SYSTOLIC BLOOD PRESSURE: 148 MMHG | TEMPERATURE: 98 F | HEIGHT: 67 IN | BODY MASS INDEX: 39.1 KG/M2 | DIASTOLIC BLOOD PRESSURE: 88 MMHG | OXYGEN SATURATION: 95 % | WEIGHT: 249.13 LBS | HEART RATE: 60 BPM

## 2018-09-10 DIAGNOSIS — G89.29 HIP PAIN, CHRONIC, LEFT: ICD-10-CM

## 2018-09-10 DIAGNOSIS — E78.00 PURE HYPERCHOLESTEROLEMIA: ICD-10-CM

## 2018-09-10 DIAGNOSIS — M25.552 HIP PAIN, CHRONIC, LEFT: ICD-10-CM

## 2018-09-10 DIAGNOSIS — I10 ESSENTIAL HYPERTENSION: Primary | ICD-10-CM

## 2018-09-10 DIAGNOSIS — M81.0 POSTMENOPAUSAL BONE LOSS: ICD-10-CM

## 2018-09-10 PROCEDURE — 99213 OFFICE O/P EST LOW 20 MIN: CPT | Mod: S$PBB,,, | Performed by: INTERNAL MEDICINE

## 2018-09-10 PROCEDURE — 99213 OFFICE O/P EST LOW 20 MIN: CPT | Mod: PBBFAC,PO,25 | Performed by: INTERNAL MEDICINE

## 2018-09-10 PROCEDURE — 73502 X-RAY EXAM HIP UNI 2-3 VIEWS: CPT | Mod: TC,PO,LT

## 2018-09-10 PROCEDURE — 73502 X-RAY EXAM HIP UNI 2-3 VIEWS: CPT | Mod: 26,LT,, | Performed by: RADIOLOGY

## 2018-09-10 PROCEDURE — 99999 PR PBB SHADOW E&M-EST. PATIENT-LVL III: CPT | Mod: PBBFAC,,, | Performed by: INTERNAL MEDICINE

## 2018-09-10 RX ORDER — HYDROCODONE BITARTRATE AND ACETAMINOPHEN 10; 325 MG/1; MG/1
1 TABLET ORAL 2 TIMES DAILY PRN
Qty: 120 TABLET | Refills: 0 | Status: SHIPPED | OUTPATIENT
Start: 2018-09-10 | End: 2018-12-11 | Stop reason: SDUPTHER

## 2018-09-10 NOTE — PROGRESS NOTES
"HPI:  Patient is a 63-year-old female comes today for follow-up of her hypertension.  Patient is having increasing problems with pain in her left hip.  She had a hip replacement done 5-6 years ago.  Patient denies any recent trauma or injury.  Otherwise, she has been doing fairly well.  She has been going to weight watchers and has lost about 10 lb of weight in the last 3 months.    Current meds have been verified and updated per the EMR  Exam:BP (!) 148/88 (BP Location: Right arm, Patient Position: Sitting, BP Method: Medium (Manual))   Pulse 60   Temp 98 °F (36.7 °C) (Tympanic)   Ht 5' 7" (1.702 m)   Wt 113 kg (249 lb 1.9 oz)   SpO2 95%   BMI 39.02 kg/m²   Carotids 2+ equal without bruits  Chest clear  Cardiovascular regular rate and rhythm without murmur gallop or rub      Lab Results   Component Value Date    WBC 5.07 11/30/2017    HGB 13.7 11/30/2017    HCT 42.6 11/30/2017     11/30/2017    CHOL 158 05/31/2018    TRIG 139 05/31/2018    HDL 47 05/31/2018    ALT 36 05/31/2018    AST 31 05/31/2018     05/31/2018    K 3.9 05/31/2018     05/31/2018    CREATININE 0.9 05/31/2018    BUN 13 05/31/2018    CO2 27 05/31/2018    TSH 1.912 11/30/2017       Impression:  Hypertension, slightly elevated.  Hopefully with further weight loss that will continue to improve  Left hip pain  Multiple other problems below, stable  Patient Active Problem List   Diagnosis    HTN (hypertension)    Generalized osteoarthrosis, involving multiple sites    History of breast cancer    Obesity, unspecified    Hyperlipidemia       Plan:  Orders Placed This Encounter    DXA Bone Density Spine And Hip    X-Ray Hip 2 View Left    Comprehensive metabolic panel    Lipid panel    TSH    CBC auto differential    Ambulatory consult to Orthopedics    HYDROcodone-acetaminophen (NORCO)  mg per tablet     Patient will have x-rays of her left hip.  She was referred to see Orthopedics.  She will see me again in 6 " months with above lab work and DEXA scan.

## 2018-09-11 ENCOUNTER — TELEPHONE (OUTPATIENT)
Dept: INTERNAL MEDICINE | Facility: CLINIC | Age: 63
End: 2018-09-11

## 2018-09-11 NOTE — TELEPHONE ENCOUNTER
----- Message from Shanice Hollis sent at 9/11/2018  9:16 AM CDT -----  Contact: pt   Pt was returning nurse call    .[phone] (home)

## 2018-09-11 NOTE — TELEPHONE ENCOUNTER
Call pt and tell her that her left hip xrays suggest the artificial hip has loosened and there maybe some micro-fractures as well. Make sure she goes to her ortho apt Thursday

## 2018-09-13 ENCOUNTER — TELEPHONE (OUTPATIENT)
Dept: INTERNAL MEDICINE | Facility: CLINIC | Age: 63
End: 2018-09-13

## 2018-09-13 ENCOUNTER — OFFICE VISIT (OUTPATIENT)
Dept: PHYSICAL MEDICINE AND REHAB | Facility: CLINIC | Age: 63
End: 2018-09-13
Payer: MEDICARE

## 2018-09-13 VITALS
BODY MASS INDEX: 39.08 KG/M2 | HEART RATE: 53 BPM | WEIGHT: 249 LBS | DIASTOLIC BLOOD PRESSURE: 65 MMHG | RESPIRATION RATE: 14 BRPM | SYSTOLIC BLOOD PRESSURE: 130 MMHG | HEIGHT: 67 IN

## 2018-09-13 DIAGNOSIS — M79.18 MYOFASCIAL PAIN: Primary | ICD-10-CM

## 2018-09-13 DIAGNOSIS — R29.898 ARM WEAKNESS: ICD-10-CM

## 2018-09-13 DIAGNOSIS — G56.03 BILATERAL CARPAL TUNNEL SYNDROME: ICD-10-CM

## 2018-09-13 PROCEDURE — 99999 PR PBB SHADOW E&M-EST. PATIENT-LVL III: CPT | Mod: PBBFAC,,, | Performed by: PHYSICAL MEDICINE & REHABILITATION

## 2018-09-13 PROCEDURE — 99204 OFFICE O/P NEW MOD 45 MIN: CPT | Mod: 25,S$PBB,, | Performed by: PHYSICAL MEDICINE & REHABILITATION

## 2018-09-13 PROCEDURE — 99213 OFFICE O/P EST LOW 20 MIN: CPT | Mod: PBBFAC,PO,25 | Performed by: PHYSICAL MEDICINE & REHABILITATION

## 2018-09-13 PROCEDURE — 95910 NRV CNDJ TEST 7-8 STUDIES: CPT | Mod: PBBFAC,PO | Performed by: PHYSICAL MEDICINE & REHABILITATION

## 2018-09-13 PROCEDURE — 95910 NRV CNDJ TEST 7-8 STUDIES: CPT | Mod: 26,S$PBB,, | Performed by: PHYSICAL MEDICINE & REHABILITATION

## 2018-09-13 NOTE — LETTER
September 13, 2018      Elias Cannon MD  [address]  [address]  [place] [address]           [facility]  [address]  [place] [address]  Phone: [phone]  Fax: [phone]          Patient: Mary Vo   MR Number: [id number]   YOB: 1955   Date of Visit: 9/13/2018       Dear Dr. Elias Cannon:    Thank you for referring Mary Vo to me for evaluation. Attached you will find relevant portions of my assessment and plan of care.    If you have questions, please do not hesitate to call me. I look forward to following Mary Vo along with you.    Sincerely,    Halie Bagley MD    Enclosure  CC:  No Recipients    If you would like to receive this communication electronically, please contact [email or web address] or [phone] to request more information on [employer] access.    For providers and/or their staff who would like to refer a patient to [facility], please contact us through our one-stop-shop provider referral line, [facility], [place], at [phone].    If you feel you have received this communication in error or would no longer like to receive these types of communications, please e-mail [email or web address]

## 2018-09-13 NOTE — PATIENT INSTRUCTIONS
Carpal Tunnel Syndrome    Carpal tunnel syndrome is a painful condition of the wrist and arm. It is caused by pressure on the median nerve.  The median nerve is one of the nerves that give feeling and movement to the hand. It passes through a tunnel in the wrist called the carpal tunnel. This tunnel is made up of bones and ligaments. Narrowing of this tunnel or swelling of the tissues inside the tunnel puts pressure on the median nerve. This causes numbness, pins and needles, or electric shooting pains in your hand and forearm. Often the pain is worse at night and may wake you when you are asleep.  Carpal tunnel syndrome may occur during pregnancy and with use of birth control pills. It is more common in workers who must often bend their wrists. It is also common in people who work with power tools that cause strong vibrations.  Home care  · Rest the painful wrist. Avoid repeated bending of the wrist back and forth. This puts pressure on the median nerve. Avoid using power tools with strong vibrations.  · If you were given a splint, wear it at night while you sleep. You may also wear it during the day for comfort.  · Move your fingers and wrists often to avoid stiffness.  · Elevate your arms on pillows when you lie down.  · Try using the unaffected hand more.  · Try not to hold your wrists in a bent, downward position.  · Sometimes changes in the work place may ease symptoms. If you type most of the day, it may help to change the position of your keyboard or add a wrist support. Your wrist should be in a neutral position and not bent back when typing.  · You may use over-the-counter pain medicine to treat pain and inflammation, unless another medicine was prescribed. Anti-inflammatory pain medicines, such as ibuprofen or naproxen may be more effective than acetaminophen, which treats pain, but not inflammation. If you have chronic liver or kidney disease or ever had a stomach ulcer or GI bleeding, talk with your  doctor before using these medicines.  · Opioid pain medicine will only give temporary relief and does not treat the problem. If pain continues, you may need a shot of a steroid drug into your wrist.  · If the above methods fail, you may need surgery. This will open the carpal tunnel and release the pressure on the trapped nerve.  Follow-up care  Follow up with your healthcare provider, or as advised, if the pain doesnt begin to improve within the next week.  If X-rays were taken, you will be notified of any new findings that may affect your care.  When to seek medical advice  Call your healthcare provider right away if any of these occur:  · Pain not improving with the above treatment  · Fingers or hand become cold, blue, numb, or tingly  · Your whole arm becomes swollen or weak  Date Last Reviewed: 11/23/2015  © 8342-1129 [employer]. [address] [place] [address]. All rights reserved. This information is not intended as a substitute for professional medical care. Always follow your healthcare professional's instructions.        Carpal Tunnel Syndrome Prevention Tips  Some repetitive hand activities put you at higher risk for carpal tunnel syndrome (CTS). But you can reduce your risk. Learn how to change the way you use your hands. Below are tips for at home and on the job. Be sure to also follow the hand and wrist safety policies at your workplace.      Keep your wrist in a neutral (straight) position when exercising.      Keep your wrist in neutral  Keep a neutral (straight) wrist position as often as you can. Dont use your wrist in a bent (flexed) position for long periods of time. This includes extended or twisted positions.  Watch your   Dont just use your thumb and index finger to grasp or lift. This can put stress on your wrist. When you can, use your whole hand and all its fingers to grasp an object.  Minimize repetition  Dont move your arms or hands or hold an object in  the same way for long periods of time. Even simple, light tasks can cause injury this way. Instead, alternate tasks or switch hands.  Rest your hands  Give your hands a break from time to time with a rest. Even a few minutes once an hour can help.  Reduce speed and force  Slow down the speed in which you do a forceful, repetitive motion. This gives your wrist time to recover from the effort. Use power tools to help reduce the force.  Strengthen the muscles  Weak muscles may lead to a poor wrist or arm position. Exercises will make your hand and arm muscles stronger. This can help you keep a better position.  Date Last Reviewed: 9/11/2015 © 2000-2017 [employer]. [address] [place] [address], [place] [address]. All rights reserved. This information is not intended as a substitute for professional medical care. Always follow your healthcare professional's instructions.        Understanding Carpal Tunnel Syndrome    The carpal tunnel is a narrow space inside the wrist. It is ringed by bone and a band of tough tissue called the transverse carpal ligament. A major nerve called the median nerve runs from the forearm into the hand through the carpal tunnel. Tendons also run through the carpal tunnel.  With carpal tunnel syndrome, the tendons or nearby tissues within the carpal tunnel may swell or thicken. Or the transverse carpal ligament may harden and shorten. This narrows the space in the carpal tunnel and puts pressure on the median nerve. This pressure leads to tingling and numbness of the hand and wrist. In time, the condition can make even simple tasks hard to do.  What causes carpal tunnel syndrome?  Doctors arent entirely clear why the condition occurs. Certain things may make a person more likely to have it. These include:  · Being female  · Being pregnant  · Being overweight  · Having diabetes or rheumatoid arthritis  Symptoms of carpal tunnel syndrome  Symptoms often come and go. At first, symptoms  may occur mainly at night. Later, they may be noticed during the day as well. They may get worse with activities such as driving, reading, typing, or holding a phone. Symptoms can include:  · Tingling and numbness in the hand or wrist  · Sharp pain that shoots up the arm or down to the fingers  · Hand stiffness or cramping, especially in the morning  · Trouble making a fist  · Hand weakness and clumsiness  Treatment for carpal tunnel syndrome  Certain treatments help reduce the pressure on the median nerve and relieve symptoms. Choices for treatment may include one or more of the following:  · Wrist splint. This involves wearing a special brace on the wrist and hand. The splint holds the wrist straight, in a neutral position. This helps keep the carpal tunnel as open as possible.  · Cortisone shots. Cortisone is a medicine that helps reduce swelling. It is injected directly into the wrist. It helps shrink tissues inside the carpal tunnel. This relieves symptoms for a time.  · Pain medicines. You may take over-the-counter or prescription medicines to help reduce swelling and relieve symptoms.  · Surgery. If the condition doesnt respond to other treatments and doesnt go away on its own, you may need surgery. During surgery, the surgeon cuts the transverse carpal ligament to relieve pressure on the median nerve.     When to call your healthcare provider  Call your healthcare provider right away if you have any of these:  · Fever of 100.4°F (38°C) or higher, or as directed  · Symptoms that dont get better, or get worse  · New symptoms   Date Last Reviewed: 3/10/2016  © 6210-8043 [employer]. [address] [place] [address], [place] [address]. All rights reserved. This information is not intended as a substitute for professional medical care. Always follow your healthcare professional's instructions.        Myofascial Pain Syndrome: Fibrositis  Your pain is caused by a state of chronic muscle tension. This  condition is called by various names: myofascial pain, fibrositis and trigger point pain. This can also be due to mechanical stress (such as working at a computer terminal for long periods; or work that requires repetitive motions of the arms or hands) or emotional stress (such as problems on the job or in your personal life). Sometimes there is no obvious cause. The pain can occur in the area of the muscle spasm or at a site distant to it. For example, spasm of a neck muscle can cause headache. Spasm of the muscle near the shoulder blade can cause pain shooting down the arm.  Home Care:  · Try to identify the factors that may be causing your problem and change them:  ¨ If you feel that emotional stress is a cause of your pain, learn methods to deal more effectively with the stress in your life. These may include regular exercise, muscle relaxation techniques, meditation or simply taking time out for yourself. Consult your doctor or go to a local bookstore and review the many books and tapes available on the subject of stress reduction.  ¨ If you feel that physical stress is a cause for your pain, try to modify any poor work habits.  · You may use acetaminophen (Tylenol) or ibuprofen (Motrin, Advil) to control pain, unless another medicine was prescribed. [NOTE: If you have chronic liver or kidney disease or ever had a stomach ulcer or GI bleeding, talk with your doctor before using these medicines.]  · The use of heat to the muscle (hot compress or heating pad) will be helpful to reduce muscle spasm. Some persons get relief with ice packs. Apply an ice pack (crushed or cubed ice in a plastic bag, wrapped in a towel) for 20 minutes at a time as needed. Use the method that feels best to you.  · Massaging the trigger point and stretching out the muscle are an important parts of prevention and treatment. Trigger point massage can be done by first applying heat to the area to warm and prepare the muscle. Have someone  apply steady thumb pressure directly on the knot in the muscle (the most tender point) for 30 seconds. Release the pressure, then massage the surrounding muscle. Repeat the process, applying more pressure to the trigger point each time. Do this up to the limit of pain. With each treatment, the trigger point should become less tender and the pain should decrease. You can apply local pressure to trigger points in the back by lying on the floor with a tennis ball under the trigger point.  Follow Up  with your doctor as advised or if not improving within the next week. It may be necessary for you to receive physical therapy if you do not respond to home treatment alone.  Get Prompt Medical Attention  if any of the following occur:  · If your trigger point is in the chest muscles, observe for pain that becomes more severe, lasts longer, or spreads into your shoulder/arm, neck or back; you develop trouble breathing, sweating, nausea or vomiting in association with chest pain  · If you develop weakness or numbness in an extremity  · If your pain worsens, regardless of its location  © 0055-6651 [employer] [address] [place] [address]. All rights reserved. This information is not intended as a substitute for professional medical care. Always follow your healthcare professional's instructions.          Trigger Point Injection  The cause of your muscle pain or spasms may be one or more trigger points. Your health care provider may decide to inject the painful spots to relax the muscle. This can help relieve your pain. Relaxing the muscle can also make movement easier. You may then be able to exercise to strengthen the muscle and help it heal.    What is a trigger point?  A trigger point is a tight, painful knot of muscle fiber. It can form where a muscle is strained or injured. The knot can sometimes be felt under the skin. A trigger point is very tender to the touch. Pain may also spread to  other parts of the affected muscle. Muscles around a knee, shoulder blade, or other bones are prone to trigger points. This is because these muscles are more likely to be injured.    About the injections  Any muscle in the body can have one or more trigger points. Several injections may be needed in each trigger point to best relieve pain. These injections may be given in sessions about 2 weeks apart, depending on the preference of your health care provider. In some cases, you may not feel much change in your symptoms until after the third injection.     © 7997-7593 [employer] [address] [place] [address]. All rights reserved. This information is not intended as a substitute for professional medical care. Always follow your healthcare professional's instructions.            Your Neck Muscles  The muscles in the neck and shoulders need to be strong to hold the neck and head in place. These muscles also help move the neck and shoulders. Your health care provider can recommend exercises to help stretch and strengthen your neck muscles.    © 1245-7051 [employer] [address] [place] [address]. All rights reserved. This information is not intended as a substitute for professional medical care. Always follow your healthcare professional's instructions.          Neck Problems: Relieving Your Symptoms  The first goal of treatment is to relieve your symptoms. Your health care provider may recommend self-care treatments. These include resting, applying ice and heat, taking medication, and doing exercises. Your health care provider may also recommend that you see a physical therapist, who can teach you ways to care for and strengthen your neck.    Self-Care Treatments  Pain can end quickly or last awhile. Either way, youll want relief as soon as possible. Your health care provider can tell you which treatments to do at home to help relieve your pain.  · Lying down for a  short time takes pressure from the head off the neck.  · Ice and heat can help reduce pain. To bring down swelling, rest an ice pack wrapped in a thin towel on your neck for 15 minutes. To relax sore muscles, apply a warm, wet towel to the area. Or take a warm bath or shower.  · Over-the-counter medications, such as ibuprofen, naproxen, and aspirin, can help reduce pain and swelling. Acetaminophen can help relieve pain. Use these only as directed.  · Exercises can relax muscles and prevent stiffness. To prepare, drape a warm, wet towel around your neck and shoulders for 5 minutes. Remove the towel. Then do any exercises recommended to you by your health care provider.  Physical Therapy  If self-care treatments arent helping relieve neck pain, your health care provider may suggest one or more sessions of physical therapy. Physical therapy is performed by a specialist trained to treat injuries. Your physical therapist (PT) will teach you how to strengthen muscles, improve the spines alignment, and help you move properly. Treatment methods used in physical therapy may include:  · Heat. A special heating pad called a neck pack may be applied to your neck.  · Exercises. Your PT will teach you exercises to help strengthen your neck and improve its range of motion.  · Joint mobilization. The PT gently moves your vertebrae to help restore motion in your neck joints and reduce neck pain.  · Soft tissue mobilization. The PT massages and stretches the muscles in your neck and shoulders.  · Electrical stimulation. Electrical impulses are sent into your neck. This helps reduce soreness and inflammation.  · Education in body mechanics. The PT shows you ways to position and move your body that protect the neck.  Other Treatments  If physical therapy doesnt relieve your neck pain, your health care provider may suggest other treatments. For example, medications or injections can help relieve pain and swelling. In some cases,  surgery may be needed to treat neck problems.  © 5257-1669 [employer]. [address] [place] [address], [place] [address]. All rights reserved. This information is not intended as a substitute for professional medical care. Always follow your healthcare professional's instructions.          Understanding Neck Problems       If you suffer from neck pain, youre not alone. Many people have neck pain at some point in their lives. Problems such as poor posture, injury, and wear and tear can lead to neck pain. Your health care provider will work with you to find the treatment thats best for your neck.  Types of Neck Problems  The following problems can cause pain or injury in your neck:  · Strains and sprains: Strains (stretched or torn muscles) and sprains (stretched or torn ligaments) can cause neck pain. Strains and sprains can occur during an accident, or when you overuse your neck through repetitive motion. They can also cause your muscles and ligaments to become inflamed (swollen and painful).  · Whiplash and other injuries: Whiplash can result when an impact throws your head, forcing your neck too far forward (hyperflexion), then too far backward (hyperextension). When combined, the two motions can cause a painful injury to different parts of your neck, such as muscles, ligaments, or joints. The most common cause of whiplash is a car accident. But it can also happen during a fall or sports injury.  · Weakened disks: A simple action, such as a sneeze or a cough, can cause one of your disks to bulge (herniate). A herniated disk can put pressure on your nerve and cause pain. Over time, disks can also thin out (degenerate). Flattened disks dont cushion vertebrae well and can cause vertebrae to rub together. Rubbing vertebrae can pinch nerves and cause pain.  · Weakened joints: Aging and injury can cause joints to slowly degenerate. Thinned joints can also cause vertebrae to rub together. This can cause  abnormal growths of bone (bone spurs) to form on vertebrae. Bone spurs put pressure on nerves, causing pain.  Common Symptoms  If you have a neck problem, you may have one or more of the following symptoms:  · Muscle tension and spasm: You may not be able to move your neck, arms, or shoulders comfortably if you have muscle tension or stiffness in your neck. If your symptoms arent relieved, you may experience muscle spasms, or knots of contracted tissue (trigger points) in areas of your neck and shoulders.  · Aches and pains: Dull aches in your head or neck, sharp pains, and swelling of the soft tissue of your neck and shoulders are common symptoms. If theres pressure on the nerves in your neck, you may feel pain in your arms or hands (referred pain).  · Numbness or weakness: If you injure the nerves in your neck, you may experience numbness, tingling, or weakness in your shoulders, arms, or hands. These symptoms arise when disks or bone spurs press on the nerves in your neck.  © 5883-9807 [employer]. [address] [place] [address]. All rights reserved. This information is not intended as a substitute for professional medical care. Always follow your healthcare professional's instructions.          Neck Spasm [No Trauma]    Spasm of the neck muscles can occur after a sudden awkward neck movement. Sleeping with your neck in a crooked position can also cause spasm. Some persons respond to emotional stress by tensing the muscles of their neck, shoulders and upper back. If neck spasm lasts long enough, it can cause headache.  The treatment described below will usually help the pain to go away in 5-7 days. Pain that continues may require further evaluation or other types of treatment such as physical therapy.  Home Care:  1. Rest and relax the muscles. Use a comfortable pillow that supports the head and keeps the spine in a neutral position. The position of the head should not be tilted forward  or backward. A rolled up towel may help for a custom fit.  2. Some persons find relief with heat (hot shower, hot bath or heating pad) and massage, while others prefer cold packs (crushed or cubed ice in a plastic bag, wrapped in a towel). Try both and use the method that feels best for 20 minutes several times a day.  3. You may use acetaminophen (Tylenol) or ibuprofen (Motrin, Advil) to control pain, unless another medicine was prescribed. [NOTE: If you have chronic liver or kidney disease or ever had a stomach ulcer or GI bleeding, talk with your doctor before using these medicines.]  Follow Up  with your doctor or this facility if your symptoms do not show signs of improvement after one week. Physical therapy or further evaluation may be needed.  [NOTE: If x-rays were taken, they will be reviewed by a radiologist. You will be notified of any new findings that may affect your care.]  Return Promptly  or contact your doctor if any of the following occurs:  · Pain becomes worse or spreads into one or both arms  · Weakness or numbness in one or both arms  · Increasing headache with nausea or vomiting  · Fever over 100.4ºF (38.0ºC)  © 3141-8589 [employer]. [address] [place] [address], [place] [address]. All rights reserved. This information is not intended as a substitute for professional medical care. Always follow your healthcare professional's instructions.          Know Your Neck: The Cervical Spine  By learning about the parts of the neck, you can better understand your neck problem. The bones of the neck are called cervical vertebrae, commonly identified as C1 through C7. Together, they form a bony column called the spine. Vertebrae also protect the spinal cord, a pathway for messages to reach the brain. Surrounding the spine are soft tissues such as muscles, tendons, and nerves.        Flexibility Is Key  For the neck to function normally, it has to be flexible enough to move without discomfort. A  healthy neck can move easily in six different directions.    © 7331-7341 [employer]. [address] [place] [address], [place] [address]. All rights reserved. This information is not intended as a substitute for professional medical care. Always follow your healthcare professional's instructions.          Protecting Your Neck: Posture and Body Mechanics  Protecting your neck from injuries and pain involves practicing good posture and body mechanics. This may mean correcting bad habits you have related to the way you hold and move your body. The tips below can help you improve your posture and body mechanics.    What Is Posture and Why Does It Matter?  Posture is the way you hold your body. For many of us, this means hunching over, thrusting the chin forward, and slouching the shoulders. But this kind of poor posture keeps muscles from properly supporting the neck and puts stress on muscles, disks, ligaments, and joints in your neck. As a result, injury and pain can occur.  How Is Your Posture?  Use a full-length mirror to check your posture. To begin, stand normally. Then slowly back up against a wall. Is there space between your head and the wall? Do you slouch your shoulders? Is your chin pointing up or down? All these can cause neck pain and injury.  Improving Your Posture  Follow these steps to improve your posture:  · Pull your shoulders back.  · Think of the ears, shoulders, and hips as a series of dots. Now, adjust your body to connect the dots in a straight line.  · Keep your chin level.  What Are Body Mechanics and Why Do They Matter?  The way you move and position your body during daily activities is called body mechanics. Good body mechanics help protect the neck. This means learning the right ways to stand, sit, and even sleep. So do whats best for your neck and practice good body mechanics.  Standing   To protect your neck while standing:  · Carry objects close to your body.  · Keep your ears and  shoulders in a line while standing or walking.  · To lower yourself, bend at the knees with a straight back. Do this instead of looking down and reaching for objects.  · Work at eye level. Dont reach above your head or tilt your head back.  Sitting   To protect your neck while sitting:  · Set up your workstation so your monitor is at eye level. Also, use a document [employer]mlin when viewing papers or books.  · Keep your knees at or slightly below the level of your hips.  · Sit up straight, with feet flat on the floor. If your feet dont touch the floor, use a footrest.  · Avoid sitting or driving for long periods. Take frequent breaks.  Sleeping   To protect your neck while sleeping:  · Sleep on your back with a pillow under your knees, or on your side with a pillow between bent knees. This helps align the spine.  · Avoid using pillows that are too high or too low. Instead, use a neck roll or pillow under your neck while you sleep to keep the neck straight.  · Sleep on a mattress that supports you, with a pillow under your neck.  © 9812-7413 "[employer]. [address] [place] [address]. All rights reserved. This information is not intended as a substitute for professional medical care. Always follow your healthcare professional's instructions.          Exercises at Your Workstation: Eyes, Neck, and Head     Tired eyes? Stiff neck? A few easy moves can help prevent these kinds of problems. Take a few minutes during your day to do these exercises--right at your desk. They'll loosen up your muscles, keep you more alert, and make a big difference in how you work and feel.    For your eyes  Eye cup  · Lean forward with your elbows on your desk.  · Cup your hands and place them lightly over your closed eyes. Hold for a minute, while breathing deeply in and out.  · Slowly uncover and open your eyes. Repeat 2 times.  Eye roll  · Close your eyes. Slowly roll your eyeballs clockwise all the way around.  Repeat 3 times.  · Now slowly roll them all the way around counterclockwise. Repeat 3 times.  Eye rest  · Every 20 minutes, look away from the computer screen. Focus on an object at least 20 feet away. Stay focused on this object for a full 20 seconds.    For your neck and head  Warm-up  · Drop your head gently to your chest. While breathing in, slowly roll your head up to your left shoulder. While breathing out, slowly roll your head back to center. Repeat to the right.  · Repeat 3 times on each side.  Head tilt  · Sit up straight. Tuck in your chin.  · Slowly tip your head to the left. Return to the center. Then, tip your head to the right.  · Repeat 3 times on each side.    Head turn  · Sit up straight.  · Slowly turn your head and look over your left shoulder. Hold for a few seconds. Go back to the center, then repeat to your right.  · Repeat 3 times on each side.  © 6081-0325 [employer]. [address] [place] [address]. All rights reserved. This information is not intended as a substitute for professional medical care. Always follow your healthcare professional's instructions.          Reach and Hold Exercise    Do this exercise on your hands and knees. Keep your knees under your hips and your hands under your shoulders. Keep your spine in a neutral position (not arched or sagging). Keep your ears in line with your shoulders. Hold for a few seconds before starting the exercise:  4. Tighten your abdominal muscles and raise one arm straight in front of you, palm down. Hold for 5 seconds, then lower. Repeat 5 times.  5. Do the exercise again, this time lifting your arm to the side. Repeat 5 times.  6. Do the exercise again, this time lifting your arm backward, palm up. Repeat 5 times.  Switch sides and do each exercise with the other arm.  © 5165-4787 [employer]. [address] [place] [address], [place] [address]. All rights reserved. This information is not intended as a substitute  for professional medical care. Always follow your healthcare professional's instructions.        Shoulder and Upper Back Stretch  To start, stand tall with your ears, shoulders, and hips in line. Your feet should be slightly apart, positioned just under your hips. Focus your eyes directly in front of you.  this position for a few seconds before starting your exercise. This helps increase your awareness of proper posture.  Reach overhead and slightly back with both arms. Keep your shoulders and neck aligned and your elbows behind your shoulders:  · With your palms facing the ceiling, turn your fingers inward.  · Take a deep breath. Breathe out, and lower your elbows toward your buttocks. Hold for 5 seconds, then return to starting position.  · Repeat 3 times.    © 8040-1111 [employer]. [address] [place] [address]. All rights reserved. This information is not intended as a substitute for professional medical care. Always follow your healthcare professional's instructions.          Shoulder Clock Exercise  To start, stand tall with your ears, shoulders, and hips in line. Your feet should be slightly apart, positioned just under your hips. Focus your eyes directly in front of you.  this position for a few seconds before starting your exercise. This helps increase your awareness of proper posture.  · Imagine that your right shoulder is the center of a clock. With the outer point of your shoulder, roll it around to slowly trace the outer edge of the clock.  · Move clockwise first, then counterclockwise.  · Repeat 3 times. Switch shoulders.   © 9417-7005 [employer]. [address] [place] [address]. All rights reserved. This information is not intended as a substitute for professional medical care. Always follow your healthcare professional's instructions.          Shoulder Girdle Stretch     To start, sit in a chair with your feet flat on the floor. Your  weight should be slightly forward so that youre balanced evenly on your buttocks. Relax your shoulders and keep your head level. Using a chair with arms may help you keep your balance:  · Place 1 hand on the outside elbow of the other arm.  · Pull the arm across your body. Hold for 30 to 60 seconds. Repeat once.  · Switch sides.    © 3139-0879 [employer]. [address] [place] [address]. All rights reserved. This information is not intended as a substitute for professional medical care. Always follow your healthcare professional's instructions.          Shoulder Exercises      To start, sit in a chair with your feet flat on the floor. Your weight should be slightly forward so that youre balanced evenly on your buttocks. Relax your shoulders and keep your head level. Avoid arching your back or rounding your shoulders. Using a chair with arms may help you keep your balance.  · Raise your arms, elbows bent, to shoulder height.  · Slowly move your forearms together. Hold for 5 seconds.  · Return to starting position. Repeat 5 times.  © 1055-0659 [employer]. [address] [place] [address]. All rights reserved. This information is not intended as a substitute for professional medical care. Always follow your healthcare professional's instructions.        Shoulder Shrug Exercise  To start, sit in a chair with your feet flat on the floor. Shift your weight slightly forward to avoid rounding your back. Relax. Keep your ears, shoulders, and hips aligned:  · Raise both of your shoulders as high as you can, as if you were trying to touch them to your ears. Keep your head and neck still and relaxed.  · Hold for a count of 10. Release.  · Repeat 5 times.    © 2000-2015 [employer]. [address] [place] [address]. All rights reserved. This information is not intended as a substitute for professional medical care. Always follow your healthcare  professional's instructions.          Shoulder Squeeze Exercise     To start, sit in a chair with your feet flat on the floor. Shift your weight slightly forward to avoid rounding your back. Relax. Keep your ears, shoulders, and hips aligned:  · Raise your arms to shoulder height, elbows bent and palms forward.  · Move your arms back, squeezing your shoulder blades together.  · Hold for 10 seconds. Return to starting position.   · Repeat 5 times.     © 9803-7061 [employer]. [address] [place] [address], [place] [address]. All rights reserved. This information is not intended as a substitute for professional medical care. Always follow your healthcare professional's instructions.

## 2018-09-13 NOTE — PROGRESS NOTES
[facility] [address] [place] [address]  Phone: [phone]          Full Name: karen mcgregor YOB: 1955  Patient ID: [id number]      Visit Date: 9/13/2018 14:51  Age: 63 Years 4 Months Old  Examining Physician: Halie Bagley M.D.  Referring Physician:   Reason for Referral: uex pain      Chief Complaint   Patient presents with    Neck Pain    Arm Pain     left       HPI: This is a 63 y.o.  female being seen in clinic today for evaluation of left arm achy pain and numbness that bothered her off and over over the past few weeks, but has gradually improved.  She had tightness around her shoulder that was worse with increased usage. Resting her arms/not overdoing activities provide some relief.  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     History obtained from patient    Past family, medical, social, and surgical history reviewed in chart    Review of Systems:     General- denies lethargy, weight change, fever, chills  Head/neck- denies swallowing difficulties  ENT- denies hearing changes  Cardiovascular-denies chest pain  Pulmonary- denies shortness of breath  GI- denies constipation or bowel incontinence  - denies bladder incontinence  Skin- denies wounds or rashes  Musculoskeletal- denies weakness, +pain  Neurologic- +/-numbness and tingling  Psychiatric- denies depressive or psychotic features, denies anxiety  Lymphatic-denies swelling  Endocrine- denies hypoglycemic symptoms/DM history  All other pertinent systems negative     Physical Examination:  General: Well developed, well nourished female, NAD  HEENT:NCAT EOMI bilaterally   Pulmonary:Normal respirations    Spinal Examination: CERVICAL  Active ROM is within normal limits.  Inspection: No deformity of spinal alignment.  Tight and tender bands at left trapezius and rhomboids, mild at teres minor on left    Spinal Examination: LUMBAR or THORACIC  Active ROM is within  normal limits.  Inspection: No deformity of spinal alignment.      Musculoskeletal Tests:  Phalen:   Elbow compression (ulnar): neg  Tinels at wrist: neg    Bilateral Upper and Lower Extremities:  Pulses are 2+ at radial bilaterally.  Shoulder/Elbow/Wrist/Hand ROM wnl, arthritic nodules at fingers, rot cuff weakness bilaterally-slightly worse on left  Hip/Knee/Ankle ROM   Bilateral Extremities show normal capillary refill.  No signs of cyanosis, rubor, edema, skin changes, or dysvascular changes of appendages.  Nails appear intact.    Neurological Exam:  Cranial Nerves:  II-XII grossly intact    Manual Muscle Testing: (Motor 5=normal)  5/5 strength bilateral upper extremities    No focal atrophy is noted of either upper extremity.    Bilateral Reflexes:hhypo at bic tric br  Coburn's response is absent bilaterally.    Sensation: tested to light touch  - intact in arms  Gait: Narrow base and good arm swing.      Entire procedure explained to patient prior to proceeding.  Verbal consent obtained      SNC      Nerve / Sites Rec. Site Onset Lat Peak Lat Amp Segments Distance Velocity     ms ms µV  mm m/s   L Median - Digit II (Antidromic)      Wrist Dig II 3.1 4.0 15.2 Wrist - Dig  45   R Median - Digit II (Antidromic)      Wrist Dig II 3.3 4.5 13.3 Wrist - Dig  42   L Ulnar - Digit V (Antidromic)      Wrist Dig V 2.6 3.3 13.4 Wrist - Dig V 140 54   L Radial - Anatomical snuff box (Forearm)      Forearm Wrist 1.7 2.5 11.0 Forearm - Wrist 100 58       MNC      Nerve / Sites Muscle Latency Amplitude Duration Rel Amp Segments Distance Lat Diff Velocity     ms mV ms %  mm ms m/s   L Median - APB      Wrist APB 3.6 12.7 5.3 100 Wrist - APB 80        Elbow APB 8.5 9.7 5.6 76.4 Elbow - Wrist 250 4.9 51   R Median - APB      Wrist APB 3.5 4.3 6.1 100 Wrist - APB 80        Elbow APB 7.9 3.7 4.8 85.9 Elbow - Wrist 250 4.3 58   L Ulnar - ADM      Wrist ADM 3.0 10.2 5.8 100 Wrist - ADM 80        B.Elbow ADM 7.2 8.2 6.3  80.2 B.Elbow - Wrist 230 4.2 55      A.Elbow ADM 9.4 8.9 6.1 108 A.Elbow - B.Elbow 110 2.2 50         A.Elbow - Wrist  6.4                               INTERPRETATION  -Bilateral median motor nerve conduction study showed normal latency, dec amplitude on the right when compared to left, and normal conduction velocity  -Bilateral median sensory nerve conduction study showed prolonged peak latency and normal amplitude  -Bilateral ulnar motor nerve conduction study showed normal latency, amplitude, and conduction velocity  -Bilateral ulnar sensory nerve conduction study showed normal peak latency and amplitude  -Bilateral radial sensory nerve conduction study showed normal peak latency and amplitude   -Needle EMG examination not performed     IMPRESSION  1. ABNORMAL study  2. There is electrodiagnostic evidence of a MILD demyelinating median neuropathy (Carpal tunnel syndrome) across the left wrist and MILD-MODERATE across the RIGHT right wrist.   3. There is clinical evidence of cervical myofascial pain and rotator cuff weakness     PLAN  1. Follow up with referring provider: Dr. Elias Cannon  2. Handouts on CTS provided. Handouts on myofascial pain, exercise, stretch provided. Consider PT/OT and wrist braces  3. This study is good for one year. If symptoms worsen or do not improve, please re-consult.    Halie Bagley M.D.  Physical Medicine and Rehab

## 2018-09-14 NOTE — TELEPHONE ENCOUNTER
Patient notified of results she verbalized understanding. She was informed to wear wrist splints on both wrist nightly.

## 2018-09-14 NOTE — TELEPHONE ENCOUNTER
Call pt and tell her that her nerve test confirms she has carpel tunnel syndrome. She needs to wear wrist splints on both wrist every night.

## 2018-09-24 ENCOUNTER — OFFICE VISIT (OUTPATIENT)
Dept: ORTHOPEDICS | Facility: CLINIC | Age: 63
End: 2018-09-24
Payer: MEDICARE

## 2018-09-24 VITALS
DIASTOLIC BLOOD PRESSURE: 75 MMHG | HEART RATE: 60 BPM | WEIGHT: 240 LBS | RESPIRATION RATE: 18 BRPM | SYSTOLIC BLOOD PRESSURE: 135 MMHG | HEIGHT: 67 IN | BODY MASS INDEX: 37.67 KG/M2

## 2018-09-24 DIAGNOSIS — Z96.642 HISTORY OF TOTAL HIP ARTHROPLASTY, LEFT: ICD-10-CM

## 2018-09-24 DIAGNOSIS — Z96.641 HISTORY OF TOTAL HIP ARTHROPLASTY, RIGHT: Primary | ICD-10-CM

## 2018-09-24 PROCEDURE — 99999 PR PBB SHADOW E&M-EST. PATIENT-LVL III: CPT | Mod: PBBFAC,,, | Performed by: ORTHOPAEDIC SURGERY

## 2018-09-24 PROCEDURE — 99213 OFFICE O/P EST LOW 20 MIN: CPT | Mod: PBBFAC,PO | Performed by: ORTHOPAEDIC SURGERY

## 2018-09-24 PROCEDURE — 99204 OFFICE O/P NEW MOD 45 MIN: CPT | Mod: S$PBB,,, | Performed by: ORTHOPAEDIC SURGERY

## 2018-09-24 RX ORDER — TRAMADOL HYDROCHLORIDE 50 MG/1
50 TABLET ORAL EVERY 6 HOURS PRN
Refills: 5 | COMMUNITY
Start: 2018-09-23 | End: 2019-09-18

## 2018-09-24 RX ORDER — CITALOPRAM 40 MG/1
TABLET, FILM COATED ORAL
Qty: 30 TABLET | Refills: 11 | OUTPATIENT
Start: 2018-09-24

## 2018-09-24 RX ORDER — ALPRAZOLAM 1 MG/1
TABLET ORAL
Qty: 30 TABLET | Refills: 5 | Status: SHIPPED | OUTPATIENT
Start: 2018-09-24 | End: 2019-03-07 | Stop reason: SDUPTHER

## 2018-09-24 RX ORDER — CITALOPRAM 40 MG/1
TABLET, FILM COATED ORAL
Qty: 30 TABLET | Refills: 11 | Status: SHIPPED | OUTPATIENT
Start: 2018-09-24 | End: 2019-10-14 | Stop reason: SDUPTHER

## 2018-09-24 RX ORDER — ALPRAZOLAM 1 MG/1
TABLET ORAL
Qty: 30 TABLET | Refills: 5 | OUTPATIENT
Start: 2018-09-24

## 2018-09-24 NOTE — PROGRESS NOTES
Subjective:     Patient ID: Mary Vo is a 63 y.o. female.    Chief Complaint: Pain of the Left Hip    Consult from Dr. Cannon, will receive report electronically    Patient is here for left hip pain. States she had a Lt ABELARDO done in March 2012 and had a Lt ABELARDO revision in July in 2012 by Dr. Leroy. Rt ABELARDO in 2016 by Dr. Vilchis. States it has been bothering for 2 months. No TWIN.      Hip Pain    The pain is present in the left hip. This is a new problem. The current episode started more than 1 month ago. The problem occurs intermittently. The problem has been gradually worsening. The quality of the pain is described as aching and sharp. The pain is at a severity of 6/10. Pertinent negatives include no fever or numbness. The symptoms are aggravated by walking, standing and bearing weight. She has tried NSAIDs for the symptoms. The treatment provided mild relief. Physical therapy was not tried.      Past Medical History:   Diagnosis Date    Breast cancer     Generalized osteoarthrosis, involving multiple sites     s/p THR bilateral    History of breast cancer 2007    lumpectomy/XRT    HTN (hypertension)     Hyperlipidemia     Obesity, unspecified      Past Surgical History:   Procedure Laterality Date    BREAST LUMPECTOMY Right 2006    XRT    COLONOSCOPY N/A 10/15/2015    Procedure: COLONOSCOPY;  Surgeon: Maylin Shipley MD;  [facility];  Service: Endoscopy;  Laterality: N/A;    COLONOSCOPY N/A 10/15/2015    [facility] ENDO    TOTAL ABDOMINAL HYSTERECTOMY W/ BILATERAL SALPINGOOPHORECTOMY       Family History   Problem Relation Age of Onset    Cancer Mother     Diabetes Mother     Hyperlipidemia Father     Hypertension Father     Breast cancer Sister     Breast cancer Sister     Breast cancer Sister     Breast cancer Sister     Eczema Neg Hx     Lupus Neg Hx     Psoriasis Neg Hx     Melanoma Neg Hx      Social History     Socioeconomic  History    Marital status: Single     Spouse name: Not on file    Number of children: Not on file    Years of education: Not on file    Highest education level: Not on file   Social Needs    Financial resource strain: Not on file    Food insecurity - worry: Not on file    Food insecurity - inability: Not on file    Transportation needs - medical: Not on file    Transportation needs - non-medical: Not on file   Occupational History    Occupation: Disabled   Tobacco Use    Smoking status: Former Smoker    Smokeless tobacco: Never Used   Substance and Sexual Activity    Alcohol use: Yes    Drug use: No    Sexual activity: No   Other Topics Concern    Are you pregnant or think you may be? Not Asked    Breast-feeding Not Asked   Social History Narrative    Not on file        Medication List           Accurate as of 9/24/18 10:46 AM. If you have any questions, ask your nurse or doctor.               CONTINUE taking these medications    ALPRAZolam 1 MG tablet  Commonly known as:  XANAX  TAKE 1 TABLET BY MOUTH ONCE A DAY NIGHTLY AS NEEDED FOR ANXIETY     citalopram 40 MG tablet  Commonly known as:  CELEXA  TAKE 1 TABLET BY MOUTH ONCE A DAY     cloNIDine 0.2 MG tablet  Commonly known as:  CATAPRES  Take 1 tablet (0.2 mg total) by mouth every evening.     fluticasone 50 mcg/actuation nasal spray  Commonly known as:  FLONASE  1 spray by Each Nare route once daily.     HYDROcodone-acetaminophen  mg per tablet  Commonly known as:  NORCO  Take 1 tablet by mouth 2 (two) times daily as needed.     losartan-hydrochlorothiazide 100-25 mg 100-25 mg per tablet  Commonly known as:  HYZAAR  TAKE 1 TABLET BY MOUTH DAILY     meloxicam 15 MG tablet  Commonly known as:  MOBIC  Take 1 tablet (15 mg total) by mouth once daily.     pravastatin 40 MG tablet  Commonly known as:  PRAVACHOL  TAKE ONE TABLET BY MOUTH DAILY     traMADol 50 mg tablet  Commonly known as:  ULTRAM     VITAMIN D ORAL     zolpidem 10 mg  Tab  Commonly known as:  AMBIEN  TAKE ONE TABLET BY MOUTH AT BEDTIME AS NEEDED        STOP taking these medications    potassium chloride SA 20 MEQ tablet  Commonly known as:  WAYLON-DUR,KLOR-CON  Stopped by:  Mike Franco MD          Review of patient's allergies indicates:  No Known Allergies  Review of Systems   Constitution: Negative for fever.   HENT: Negative for hearing loss.    Eyes: Negative for blurred vision.   Cardiovascular: Negative for chest pain and leg swelling.   Respiratory: Negative for shortness of breath.    Endocrine: Negative for polyuria.   Hematologic/Lymphatic: Negative for bleeding problem.   Skin: Negative for rash.   Musculoskeletal: Positive for back pain, joint pain and muscle weakness. Negative for joint swelling and muscle cramps.   Gastrointestinal: Negative for melena.   Genitourinary: Negative for hematuria.   Neurological: Negative for loss of balance, numbness and paresthesias.   Psychiatric/Behavioral: Negative for altered mental status.       Objective:   Body mass index is 37.59 kg/m².  Vitals:    09/24/18 1003   BP: 135/75   Pulse: 60   Resp: 18       General: Mary is well-developed, well-nourished, appears stated age, in no acute distress, alert and oriented to time, place and person.       General    Vitals reviewed.  Constitutional: She is oriented to person, place, and time. She appears well-developed and well-nourished. No distress.   HENT:   Mouth/Throat: No oropharyngeal exudate.   Eyes: Right eye exhibits no discharge. Left eye exhibits no discharge.   Neck: Normal range of motion.   Cardiovascular: Normal rate.    Pulmonary/Chest: Effort normal and breath sounds normal. No respiratory distress.   Neurological: She is alert and oriented to person, place, and time. She has normal reflexes. No cranial nerve deficit. Coordination normal.   Psychiatric: She has a normal mood and affect. Her behavior is normal. Judgment and thought content normal.     General  Musculoskeletal Exam   Gait: normal   Pelvic Obliquity: none      Right Knee Exam     Inspection   Alignment:  normal  Effusion: absent    Left Knee Exam     Inspection   Alignment:  normal  Effusion: absent    Right Hip Exam     Inspection   Scars: present  Swelling: absent  Bruising: absent  No deformity of hip.  Quadriceps Atrophy:  Negative  Erythema: absent    Range of Motion   Abduction: 40   Adduction: 20   Extension: 0   Flexion: 110   External rotation: 40   Internal rotation: 30     Tests   Pain w/ forced internal rotation (MICHAELA): absent  Pain w/ forced external rotation (FADIR): absent  Era: positive  Log Roll: negative    Other   Sensation: normal  Left Hip Exam     Inspection   Scars: present  Swelling: absent  No deformity of hip.  Quadriceps Atrophy:  negative  Erythema: absent  Bruising: absent    Tenderness   The patient tender to palpation of the trochanteric bursa.    Range of Motion   Abduction: 40   Adduction: 20   Extension: 0   Flexion: 100   External rotation: 40   Internal rotation: 30     Tests   Pain w/ forced internal rotation (MICHAELA): absent  Pain w/ forced external rotation (FADIR): absent  Era: positive  Log Roll: negative    Other   Sensation: normal      Back (L-Spine & T-Spine) / Neck (C-Spine) Exam   Back exam is normal.      Muscle Strength   Right Lower Extremity   Hip Abduction: 5/5   Hip Adduction: 5/5   Hip Flexion: 5/5   Left Lower Extremity   Hip Abduction: 5/5   Hip Adduction: 5/5   Hip Flexion: 4/5     Reflexes     Left Side  Quadriceps:  2+  Achilles:  2+    Right Side   Quadriceps:  2+  Achilles:  2+    Vascular Exam     Right Pulses  Dorsalis Pedis:      2+  Posterior Tibial:      2+        Left Pulses  Dorsalis Pedis:      2+  Posterior Tibial:      2+        Capillary Refill  Right Hand: normal capillary refill  Left Hand: normal capillary refill    Edema  Right Upper Leg: absent  Left Upper Leg: absent    Narrative     EXAMINATION:  XR HIP 2 VIEW LEFT    CLINICAL  HISTORY:  Pain in left hip    TECHNIQUE:  AP view of the pelvis and frog leg lateral view of the left hip were performed.    COMPARISON:  None    FINDINGS:  Prominent degenerative change in the included lumbosacral spine and to lesser extent bilateral SI joints.  There is partial visualization of right total hip arthroplasty.  Acetabular and visualized femoral components appear well seated without fracture or dislocation.  Pelvic ring is intact.  Numerous calcifications within the lower pelvis.    Findings on the left consistent with total hip arthroplasty with the femoral component possibly representing revision following proximal femoral fracture.  There is fragmentation proximally with appearance of a bone resorption along the minimally displaced fragments.  Concomitant loosening and fracture of the proximal cerclage wires noted.  Correlate clinically.      Impression       Status post left total hip arthroplasty with findings consistent with.  Proximal loosening and acute/subacute fracture involving the proximal femur with minimally displaced fragments and fractured proximal cerclage wires.  See above.    Additional findings as detailed above.    This report was flagged in Epic as abnormal.      Electronically signed by: Momo Torres MD  Date: 09/10/2018  Time: 17:18         Assessment:     Encounter Diagnoses   Name Primary?    History of total hip arthroplasty, right Yes    History of total hip arthroplasty, left         Plan:     Discussed findings with patients. She had a complex revision hip replacement by outside surgeon and is having issue with it. She would like to keep all of her care at [facility] at this time.  I will get her set up with Dr. Lorenzana for evaluation for consideration for revision surgery.

## 2018-09-24 NOTE — LETTER
September 24, 2018      Elias Cannon MD  [address]  [address]  [place] [address]           [facility]  [address]  [place] [address]  Phone: [phone]  Fax: [phone]          Patient: Mary Vo   MR Number: [id number]   YOB: 1955   Date of Visit: 9/24/2018       Dear Dr. Elias Cannon:    Thank you for referring Mary Vo to me for evaluation. Attached you will find relevant portions of my assessment and plan of care.    If you have questions, please do not hesitate to call me. I look forward to following Mary Vo along with you.    Sincerely,    Mike Franco MD    Enclosure  CC:  No Recipients    If you would like to receive this communication electronically, please contact [email or web address] or [phone] to request more information on [employer] access.    For providers and/or their staff who would like to refer a patient to [facility], please contact us through our one-stop-shop provider referral line, [facility], at [phone].    If you feel you have received this communication in error or would no longer like to receive these types of communications, please e-mail [email or web address]

## 2018-09-27 ENCOUNTER — HOSPITAL ENCOUNTER (OUTPATIENT)
Dept: RADIOLOGY | Facility: HOSPITAL | Age: 63
Discharge: HOME | End: 2018-09-27
Attending: ORTHOPAEDIC SURGERY
Payer: MEDICARE

## 2018-09-27 ENCOUNTER — OFFICE VISIT (OUTPATIENT)
Dept: ORTHOPEDICS | Facility: CLINIC | Age: 63
End: 2018-09-27
Payer: MEDICARE

## 2018-09-27 VITALS
HEART RATE: 53 BPM | WEIGHT: 235 LBS | BODY MASS INDEX: 36.88 KG/M2 | DIASTOLIC BLOOD PRESSURE: 58 MMHG | HEIGHT: 67 IN | SYSTOLIC BLOOD PRESSURE: 144 MMHG

## 2018-09-27 DIAGNOSIS — M54.5 LOW BACK PAIN, UNSPECIFIED BACK PAIN LATERALITY, UNSPECIFIED CHRONICITY, WITH SCIATICA PRESENCE UNSPECIFIED: Primary | ICD-10-CM

## 2018-09-27 DIAGNOSIS — M54.5 LOW BACK PAIN, UNSPECIFIED BACK PAIN LATERALITY, UNSPECIFIED CHRONICITY, WITH SCIATICA PRESENCE UNSPECIFIED: ICD-10-CM

## 2018-09-27 DIAGNOSIS — M70.62 GREATER TROCHANTERIC BURSITIS OF LEFT HIP: ICD-10-CM

## 2018-09-27 PROCEDURE — 72148 MRI LUMBAR SPINE W/O DYE: CPT | Mod: TC,PO

## 2018-09-27 PROCEDURE — 99213 OFFICE O/P EST LOW 20 MIN: CPT | Mod: S$PBB,,, | Performed by: ORTHOPAEDIC SURGERY

## 2018-09-27 PROCEDURE — 99999 PR PBB SHADOW E&M-EST. PATIENT-LVL III: CPT | Mod: PBBFAC,,, | Performed by: ORTHOPAEDIC SURGERY

## 2018-09-27 PROCEDURE — 72148 MRI LUMBAR SPINE W/O DYE: CPT | Mod: 26,,, | Performed by: RADIOLOGY

## 2018-09-27 PROCEDURE — 99213 OFFICE O/P EST LOW 20 MIN: CPT | Mod: PBBFAC,25,PO | Performed by: ORTHOPAEDIC SURGERY

## 2018-09-27 NOTE — PROGRESS NOTES
CC:  This is a 63-year-old female that complains of left hip and buttock pain.  Chief Complaint   Patient presents with    Right Hip - Pain, Follow-up    Left Hip - Pain, Follow-up       HPI:  Patient is status post left total hip replacement by Dr. sparks in 2012.  The patient indicates that 7 months later the left hip failed and she underwent a left total hip revision.  Subsequently, the patient underwent a right total hip replacement by Dr. Vilchis    PMH:    Past Medical History:   Diagnosis Date    Breast cancer     Generalized osteoarthrosis, involving multiple sites     s/p THR bilateral    History of breast cancer 2007    lumpectomy/XRT    HTN (hypertension)     Hyperlipidemia     Obesity, unspecified        PSH:    Past Surgical History:   Procedure Laterality Date    BREAST LUMPECTOMY Right 2006    XRT    COLONOSCOPY N/A 10/15/2015    Procedure: COLONOSCOPY;  Surgeon: Maylin Shipley MD;  Location: [facility];  Service: Endoscopy;  Laterality: N/A;    COLONOSCOPY N/A 10/15/2015    Performed by [facility]lin [facility]ley [facility] at [facility] ENDO    TOTAL ABDOMINAL HYSTERECTOMY W/ BILATERAL SALPINGOOPHORECTOMY         Family Hx:    Family History   Problem Relation Age of Onset    Cancer Mother     Diabetes Mother     Hyperlipidemia Father     Hypertension Father     Breast cancer Sister     Breast cancer Sister     Breast cancer Sister     Breast cancer Sister     Eczema Neg Hx     Lupus Neg Hx     Psoriasis Neg Hx     Melanoma Neg Hx        Allergy:  Review of patient's allergies indicates:  No Known Allergies    Medication:    Current Outpatient Medications:     ALPRAZolam (XANAX) 1 MG tablet, TAKE 1 TABLET BY MOUTH ONCE A DAY NIGHTLY AS NEEDED FOR ANXIETY, Disp: 30 tablet, Rfl: 5    citalopram (CELEXA) 40 MG tablet, TAKE 1 TABLET BY MOUTH ONCE A DAY, Disp: 30 tablet, Rfl: 11    cloNIDine (CATAPRES) 0.2 MG tablet, Take 1 tablet (0.2 mg total) by mouth every evening., Disp: 30 tablet, Rfl:  "11    ERGOCALCIFEROL, VITAMIN D2, (VITAMIN D ORAL), Take 1,000 Units by mouth once daily., Disp: , Rfl:     fluticasone (FLONASE) 50 mcg/actuation nasal spray, 1 spray by Each Nare route once daily., Disp: 1 Bottle, Rfl: 11    HYDROcodone-acetaminophen (NORCO)  mg per tablet, Take 1 tablet by mouth 2 (two) times daily as needed., Disp: 120 tablet, Rfl: 0    losartan-hydrochlorothiazide 100-25 mg (HYZAAR) 100-25 mg per tablet, TAKE 1 TABLET BY MOUTH DAILY, Disp: 90 tablet, Rfl: 3    meloxicam (MOBIC) 15 MG tablet, Take 1 tablet (15 mg total) by mouth once daily., Disp: 30 tablet, Rfl: 11    pravastatin (PRAVACHOL) 40 MG tablet, TAKE ONE TABLET BY MOUTH DAILY, Disp: 30 tablet, Rfl: 11    traMADol (ULTRAM) 50 mg tablet, Take 50 mg by mouth every 6 (six) hours as needed., Disp: , Rfl: 5    zolpidem (AMBIEN) 10 mg Tab, TAKE ONE TABLET BY MOUTH AT BEDTIME AS NEEDED, Disp: 20 tablet, Rfl: 5    Social History:    Social History     Socioeconomic History    Marital status: Single     Spouse name: Not on file    Number of children: Not on file    Years of education: Not on file    Highest education level: Not on file   Social Needs    Financial resource strain: Not on file    Food insecurity - worry: Not on file    Food insecurity - inability: Not on file    Transportation needs - medical: Not on file    Transportation needs - non-medical: Not on file   Occupational History    Occupation: Disabled   Tobacco Use    Smoking status: Former Smoker    Smokeless tobacco: Never Used   Substance and Sexual Activity    Alcohol use: Yes    Drug use: No    Sexual activity: No   Other Topics Concern    Are you pregnant or think you may be? Not Asked    Breast-feeding Not Asked   Social History Narrative    Not on file       Vitals:   BP (!) 144/58   Pulse (!) 53   Ht 5' 7" (1.702 m)   Wt 106.6 kg (235 lb)   BMI 36.81 kg/m²      ROS:  GENERAL: No fever, chills, fatigability or weight loss.  SKIN: No " rashes, itching or changes in color or texture of skin.  PERIPHERAL VASCULAR: No claudication or cyanosis.  NEUROLOGIC: No history of seizures, paralysis, alteration of gait or coordination.  MUSCULOSKELETAL: See HPI    PE:  APPEARANCE: Well nourished, well developed, in no acute distress.   NEUROLOGIC: Cranial Nerves: II-XII grossly intact, also see MUSCULOSKELETAL  MUSCULOSKELETAL:     Lumbar spine-negative straight leg raise, symmetrical deep tendon reflexes, motor strength and sensory examination.  Negative long tract sign.  Left greater sciatic notch tenderness.    Left hip-tenderness over the greater trochanter as well as the left greater sciatic notch.  Fair range of motion of the left hip. The patient is grossly neurovascularly intact.        Assessment:  X-Ray Hip 2 View Left  Narrative: EXAMINATION:  XR HIP 2 VIEW LEFT    CLINICAL HISTORY:  Pain in left hip    TECHNIQUE:  AP view of the pelvis and frog leg lateral view of the left hip were performed.    COMPARISON:  None    FINDINGS:  Prominent degenerative change in the included lumbosacral spine and to lesser extent bilateral SI joints.  There is partial visualization of right total hip arthroplasty.  Acetabular and visualized femoral components appear well seated without fracture or dislocation.  Pelvic ring is intact.  Numerous calcifications within the lower pelvis.    Findings on the left consistent with total hip arthroplasty with the femoral component possibly representing revision following proximal femoral fracture.  There is fragmentation proximally with appearance of a bone resorption along the minimally displaced fragments.  Concomitant loosening and fracture of the proximal cerclage wires noted.  Correlate clinically.  Impression: Status post left total hip arthroplasty with findings consistent with.  Proximal loosening and acute/subacute fracture involving the proximal femur with minimally displaced fragments and fractured proximal cerclage  wires.  See above.    Additional findings as detailed above.    This report was flagged in Epic as abnormal.    Electronically signed by: Momo Torres MD  Date:    09/10/2018  Time:    17:18             Diagnosis:         1.  Lumbar spondylosis          2.  Left hip pain and greater trochanteric bursitis                 Diagnostic Studies  MRI-Yes, lumbar spine  X-Ray-Yes, lumbar spine  EMG/NCV-No  Arthrogram-No  Bone Scan-No  CT Scan-No  Doppler-No  ESR-No  CRP-No  CBC with Diff-No   Rheumatoid/Arthritis Panel-No      Plan:                                                 1. PT-yes                                                 2.OT-no                                          3.NSAID-yes                                        4. Narcotics-no                                     5. Wound care-No                                 6. Rest-no                                           7. Surgery-no                                         8. JONATHAN Hose-no                                    9. Anticoagulation therapy-no               10. Elevation-no                                     11. Crutches-no                                    12. Walker-no             13. Cane no                        14. Referral-yes, physiatry or spine surgeon                                  15.Injection-no                            16. Splint   /    Cast   /   Cast Shoe-No              17. RICE-none            18. Follow up-  3 months p.r.n.

## 2018-09-28 PROBLEM — M70.62 GREATER TROCHANTERIC BURSITIS OF LEFT HIP: Status: ACTIVE | Noted: 2018-09-28

## 2018-09-28 PROBLEM — M54.50 LOW BACK PAIN: Status: ACTIVE | Noted: 2018-09-28

## 2018-09-28 NOTE — PATIENT INSTRUCTIONS
Back Care Tips    Caring for your back  These are things you can do to prevent a recurrence of acute back pain and to reduce symptoms from chronic back pain:  · Maintain a healthy weight. If you are overweight, losing weight will help most types of back pain.  · Exercise is an important part of recovery from most types of back pain. The muscles behind and in front of the spine support the back. This means strengthening both the back muscles and the abdominal muscles will provide better support for your spine.   · Swimming and brisk walking are good overall exercises to improve your fitness level.  · Practice safe lifting methods (below).  · Practice good posture when sitting, standing and walking. Avoid prolonged sitting. This puts more stress on the lower back than standing or walking.  · Wear quality shoes with sufficient arch support. Foot and ankle alignment can affect back symptoms. Women should avoid wearing high heels.  · Therapeutic massage can help relax the back muscles without stretching them.  · During the first 24 to 72 hours after an acute injury or flare-up of chronic back pain, apply an ice pack to the painful area for 20 minutes and then remove it for 20 minutes, over a period of 60 to 90 minutes, or several times a day. As a safety precaution, do not use a heating pad at bedtime. Sleeping on a heating pad can lead to skin burns or tissue damage.  · You can alternate ice and heat therapies.  Medications  Talk to your healthcare provider before using medicines, especially if you have other medical problems or are taking other medicines.  · You may use acetaminophen or ibuprofen to control pain, unless your healthcare provider prescribed other pain medicine. If you have chronic conditions like diabetes, liver or kidney disease, stomach ulcers, or gastrointestinal bleeding, or are taking blood thinners, talk with your healthcare provider before taking any medicines.  · Be careful if you are given  prescription pain medicines, narcotics, or medicine for muscle spasm. They can cause drowsiness, affect your coordination, reflexes, and judgment. Do not drive or operate heavy machinery while taking these types of medicines. Take prescription pain medicine only as prescribed by your healthcare provider.  Lumbar stretch  Here is a simple stretching exercise that will help relax muscle spasm and keep your back more limber. If exercise makes your back pain worse, dont do it.  · Lie on your back with your knees bent and both feet on the ground.  · Slowly raise your left knee to your chest as you flatten your lower back against the floor. Hold for 5 seconds.  · Relax and repeat the exercise with your right knee.  · Do 10 of these exercises for each leg.  Safe lifting method  · Dont bend over at the waist to lift an object off the floor.  Instead, bend your knees and hips in a squat.   · Keep your back and head upright  · Hold the object close to your body, directly in front of you.  · Straighten your legs to lift the object.   · Lower the object to the floor in the reverse fashion.  · If you must slide something across the floor, push it.  Posture tips  Sitting  Sit in chairs with straight backs or low-back support. Keep your knees lower than your hips, with your feet flat on the floor.  When driving, sit up straight. Adjust the seat forward so you are not leaning toward the steering wheel.  A small pillow or rolled towel behind your lower back may help if you are driving long distances.   Standing  When standing for long periods, shift most of your weight to one leg at a time. Alternate legs every few minutes.   Sleeping  The best way to sleep is on your side with your knees bent. Put a low pillow under your head to support your neck in a neutral spine position. Avoid thick pillows that bend your neck to one side. Put a pillow between your legs to further relax your lower back. If you sleep on your back, put pillows  under your knees to support your legs in a slightly flexed position. Use a firm mattress. If your mattress sags, replace it, or use a 1/2-inch plywood board under the mattress to add support.  Follow-up care  Follow up with your healthcare provider, or as advised.  If X-rays, a CT scan or an MRI scan were taken, they will be reviewed by a radiologist. You will be notified of any new findings that may affect your care.  Call 911  Seek emergency medical care if any of the following occur:  · Trouble breathing  · Confusion  · Very drowsy  · Fainting or loss of consciousness  · Rapid or very slow heart rate  · Loss of  bowel or bladder control  When to seek medical care  Call your healthcare provider if any of the following occur:  · Pain becomes worse or spreads to your arms or legs  · Weakness or numbness in one or both arms or legs  · Numbness in the groin area  Date Last Reviewed: 6/1/2016  © 6983-9668 [employer] [address] [place] [address], [place] [address]. All rights reserved. This information is not intended as a substitute for professional medical care. Always follow your healthcare professional's instructions.

## 2018-10-12 ENCOUNTER — TELEPHONE (OUTPATIENT)
Dept: PAIN MEDICINE | Facility: CLINIC | Age: 63
End: 2018-10-12

## 2018-10-12 NOTE — TELEPHONE ENCOUNTER
Contacted pt. Pt requesting work in appt. Offered pt appt tomorrow   Date: 10/13/2018 Status: [facility]   Appt Time: 8:45 AM     . Pt gladly accepted. All questions answered.//lp

## 2018-10-12 NOTE — TELEPHONE ENCOUNTER
----- Message from Germania Cordova sent at 10/12/2018 12:53 PM CDT -----  Pt is returning a call to nurse in regards to moving her appt up.        Please call pt back at [phone] ( work

## 2018-10-13 ENCOUNTER — OFFICE VISIT (OUTPATIENT)
Dept: PAIN MEDICINE | Facility: CLINIC | Age: 63
End: 2018-10-13
Payer: MEDICARE

## 2018-10-13 VITALS
RESPIRATION RATE: 16 BRPM | HEIGHT: 67 IN | SYSTOLIC BLOOD PRESSURE: 139 MMHG | WEIGHT: 235 LBS | DIASTOLIC BLOOD PRESSURE: 77 MMHG | BODY MASS INDEX: 36.88 KG/M2 | HEART RATE: 55 BPM

## 2018-10-13 DIAGNOSIS — M54.16 LUMBAR RADICULOPATHY: Primary | ICD-10-CM

## 2018-10-13 DIAGNOSIS — M25.552 BILATERAL HIP PAIN: ICD-10-CM

## 2018-10-13 DIAGNOSIS — M47.816 LUMBAR SPONDYLOSIS: ICD-10-CM

## 2018-10-13 DIAGNOSIS — M25.551 BILATERAL HIP PAIN: ICD-10-CM

## 2018-10-13 PROCEDURE — 99204 OFFICE O/P NEW MOD 45 MIN: CPT | Mod: S$PBB,,, | Performed by: PAIN MEDICINE

## 2018-10-13 PROCEDURE — 99214 OFFICE O/P EST MOD 30 MIN: CPT | Mod: PBBFAC,PO | Performed by: PAIN MEDICINE

## 2018-10-13 PROCEDURE — 99999 PR PBB SHADOW E&M-EST. PATIENT-LVL IV: CPT | Mod: PBBFAC,,, | Performed by: PAIN MEDICINE

## 2018-10-13 RX ORDER — GABAPENTIN 100 MG/1
100 CAPSULE ORAL NIGHTLY
Qty: 30 CAPSULE | Refills: 0 | Status: SHIPPED | OUTPATIENT
Start: 2018-10-13 | End: 2018-11-12

## 2018-10-13 NOTE — LETTER
October 13, 2018      Tomer Lorenzana Sr., MD  [address] [place] [address]           [facility]  [address] [place] [address]  Phone: [phone]  Fax: [phone]          Patient: Mary Vo   MR Number: [id number]   YOB: 1955   Date of Visit: 10/13/2018       Dear Dr. Tomer Lorenzana Sr.:    Thank you for referring Mary Vo to me for evaluation. Attached you will find relevant portions of my assessment and plan of care.    If you have questions, please do not hesitate to call me. I look forward to following Mary Vo along with you.    Sincerely,    Paul Lobato MD    Enclosure  CC:  No Recipients    If you would like to receive this communication electronically, please contact [email or web address] or [phone] to request more information on [employer] Link access.    For providers and/or their staff who would like to refer a patient to [facility], please contact us through our one-stop-shop provider referral line, [facility], at [phone].    If you feel you have received this communication in error or would no longer like to receive these types of communications, please e-mail [email or web address]

## 2018-10-13 NOTE — PROGRESS NOTES
Chief Pain Complaint:  Back Pain (Pt c/o back pain x 5+ years )      History of Present Illness:   This patient is a 63 y.o. female who presents today complaining of the above noted pain/s. The patient describes the pain as follows.  Ms. Vo has a history of low back pain for approximately 5 years.  She is status post bilateral hip replacements with left femoral fracture and has had cerclage wires placed. She has participated in physical therapy which cause increased left lower extremity pain and water aerobics which also caused left lower extremity pain. Currently her pain is rated 7/10 and is described as a constant aching sensation which goes down the left leg to the knee but not below.  She denies having symptoms on the right.  She denies having numbness and weakness in the left lower extremity.  She denies having bowel bladder difficulties.  She currently takes Mobic and Norco; she was previously taking Tramadol however this medication is recently been stopped.  There was no inciting event her symptoms are worse with walking and activity while improved with rest.    Previous Therapy:  Medications:  Mobic, Norco, tramadol  Injections:  None  Surgeries:  Bilateral hip replacements, no lumbar surgery  Physical Therapy:  Has participated in physical therapy and aquatherapy over 1 year ago    Past Surgical History:   Procedure Laterality Date    BREAST LUMPECTOMY Right 2006    XRT    COLONOSCOPY N/A 10/15/2015    Procedure: COLONOSCOPY;  Surgeon: Maylin Shipley MD;  Location: [facility];  Service: Endoscopy;  Laterality: N/A;    COLONOSCOPY N/A 10/15/2015    Performed by Maylin Shipley MD at [facility]    TOTAL ABDOMINAL HYSTERECTOMY W/ BILATERAL SALPINGOOPHORECTOMY           Imaging / Labs / Studies (reviewed on 10/13/2018):    Results for orders placed during the hospital encounter of 09/27/18   MRI Lumbar Spine Without Contrast    Narrative EXAMINATION:  MRI LUMBAR SPINE WITHOUT  CONTRAST    CLINICAL HISTORY:  lumbar spine pain; Low back pain    TECHNIQUE:  Multiplanar, multisequence MR images were acquired from the thoracolumbar junction to the sacrum without the administration of contrast.    COMPARISON:  Left hip radiographs from September 10, 2018.    FINDINGS:  The osseous structures demonstrate nonspecific heterogeneous marrow signal intensity.  No fracture.  No listhesis.  Multilevel degenerative changes are seen with spurring of the endplates and intervertebral disc space narrowing at L4-L5.    Visualized spinal cord demonstrates normal signal intensity.    L1-L2: Mild facet arthropathy.  No spinal canal or neural foraminal encroachment.    L2-L3: Posterior disc bulge and facet and ligamentum flavum hypertrophy.  Mild effacement of the ventral thecal sac.  No spinal canal or neural foraminal stenosis.    L3-L4: Small annular tear is seen.  There is posterior disc protrusion and facet arthropathy.  This results in asymmetric neural foraminal and crow joint slightly greater on the right side.    L4-L5: Posterior disc protrusion and facet ligamentum flavum hypertrophy.  There is mild to moderate narrowing of the spinal canal and bilateral neural foraminal encroachment.    L5-S1: Facet and ligamentum flavum hypertrophy and posterior disc bulge.  Mild bilateral neural foraminal encroachment.  Spinal canal is maintained.      Impression Multifocal areas of facet and ligamentum flavum hypertrophy with disc protrusion resulting in neural foraminal encroachment at multiple levels.  Findings are most pronounced at L4-L5 where there is also spinal canal stenosis.  Degenerative changes and other findings as outlined above.       Review of Systems:  CONSTITUTIONAL: patient denies any fever, chills, or weight loss  SKIN: patient denies any rash or itching  RESPIRATORY: patient denies having any shortness of breath  GASTROINTESTINAL: patient denies having any diarrhea, constipation, or bowel  "incontinence  GENITOURINARY: patient denies having any abnormal bladder function    MUSCULOSKELETAL:  - patient complains of the above noted pain/s (see chief pain complaint)    NEUROLOGICAL:   - pain as above  - strength in Lower extremities is intact, BILATERALLY  - sensation in Lower extremities is intact, BILATERALLY  - patient denies any loss of bowel or bladder control      PSYCHIATRIC: patient denies any change in mood    Other:  All other systems reviewed and are negative      Physical Exam:  /77 (BP Location: Right arm, Patient Position: Sitting)   Pulse (!) 55   Resp 16   Ht 5' 7" (1.702 m)   Wt 106.6 kg (235 lb 0.2 oz)   BMI 36.81 kg/m²  (reviewed on 10/13/2018)\  General:  Alert and oriented, No acute distress.    HEENT:       EOMI.  Normocephalic, atraumatic.   Cardiovascular:  Regular rate.    Gastrointestinal:  Soft.    Respiratory:  Respirations are non-labored.    Cervical Spine:  No masses or atrophy,    Range of motion - Flexion, Extension,  Right Rotation, Left Rotation, Right Lat Bending,  Left Lat Bending         Increased pain with          Palpation - nontender         Spurling's - negative   Thoracic Spine:  Palpation normal.    Lumbar Spine:  No masses or atrophy,         Range of motion - Flexion, Extension,  Right Lat Bending,  Left Lat Bending        Increased pain with -        Palpation - nontender         PSIS tenderness -         Edin's/MICHAELA -         Gaenslen's -              Yeomans (prone) -               SLR - negative  Motor Exam:      Strength:  Rate on 1-5 scale Right Left   L2- hip flexion  5 5   L3- knee extension  5 4   L4- ankle dorsiflexion 5 5   L5- great toe extension 5 5   S1- ankle plantarflexion 5 5     Sensory Exam:  Full and equal sensation to light touch throughout except over left lateral/anterior thigh which has increased sensation  Reflexes   Left DTR's     Knee.   2  Ankle.   2  Right DTR's     Knee.   2  Ankle.   2  Neurologic:  Cranial Nerves " II-XII are grossly intact.    Pathologic reflexes:            Clonus - Neg  Psychiatric:  Cooperative.    Gait: Normal    Assessment  Lumbar Spondylosis  Hip OA status post hip replacement    1. 63 y.o. year old patient with PMH of   Past Medical History:   Diagnosis Date    Breast cancer     Generalized osteoarthrosis, involving multiple sites     s/p THR bilateral    History of breast cancer 2007    lumpectomy/XRT    HTN (hypertension)     Hyperlipidemia     Obesity, unspecified       presenting with pain located left lateral thigh  2. Pain Generators / Etiology ; lumbar degenerative disc disease, lumbar spondylosis, lumbar radiculopathy, bilateral hip replacements; I suspect that the left lateral leg pain that she is feeling is due to loosening of the hardware with the cerclage wires associated with previous femoral fracture however will perform left L3 transforaminal epidural steroid injection for treatment and diagnostic benefit  3. Failed Meds:  Norco, tramadol, Mobic  4. Physical Therapy - has done all water aerobics and land-based physical therapy but it has been many months ago  5. Psychological comorbidities -  none  6. Anticoagulants / Antiplatelets:  None     PLAN:  1. Medications :  Recommend continuing Mobic and Norco as prescribed, we will restart gabapentin 100 mg at bedtime (patient reports she has felt crazy on this medication before at higher doses)  2. PT - provide a referral for physical therapy for lumbar spine  3. Psychological - none  4. Labs - obtain  none; reviewed labs from 05/31/2018, creatinine 0.9  5. Imaging - obtain  none; reviewed lumbar MRI and hip x-rays today with patient in clinic  6. Interventions - schedule left L3 transforaminal epidural steroid injection  7. Referrals - none  8. Records - none  9. Follow up visit - follow up in clinic in 6 weeks  10. Patient Questions - answered all the patient's questions regarding diagnosis, therapy, treatment    MARCY Lobato,  MD  Interventional Pain  [facility]

## 2018-10-13 NOTE — H&P (VIEW-ONLY)
Chief Pain Complaint:  Back Pain (Pt c/o back pain x 5+ years )      History of Present Illness:   This patient is a 63 y.o. female who presents today complaining of the above noted pain/s. The patient describes the pain as follows.  Ms. Vo has a history of low back pain for approximately 5 years.  She is status post bilateral hip replacements with left femoral fracture and has had cerclage wires placed. She has participated in physical therapy which cause increased left lower extremity pain and water aerobics which also caused left lower extremity pain. Currently her pain is rated 7/10 and is described as a constant aching sensation which goes down the left leg to the knee but not below.  She denies having symptoms on the right.  She denies having numbness and weakness in the left lower extremity.  She denies having bowel bladder difficulties.  She currently takes Mobic and Norco; she was previously taking Tramadol however this medication is recently been stopped.  There was no inciting event her symptoms are worse with walking and activity while improved with rest.    Previous Therapy:  Medications:  Mobic, Norco, tramadol  Injections:  None  Surgeries:  Bilateral hip replacements, no lumbar surgery  Physical Therapy:  Has participated in physical therapy and aquatherapy over 1 year ago    Past Surgical History:   Procedure Laterality Date    BREAST LUMPECTOMY Right 2006    XRT    COLONOSCOPY N/A 10/15/2015    Procedure: COLONOSCOPY;  Surgeon: Maylin Shipley MD;  Location: [facility];  Service: Endoscopy;  Laterality: N/A;    COLONOSCOPY N/A 10/15/2015    Performed by Maylin Shipley MD at [facility]    TOTAL ABDOMINAL HYSTERECTOMY W/ BILATERAL SALPINGOOPHORECTOMY           Imaging / Labs / Studies (reviewed on 10/13/2018):    Results for orders placed during the hospital encounter of 09/27/18   MRI Lumbar Spine Without Contrast    Narrative EXAMINATION:  MRI LUMBAR SPINE WITHOUT  CONTRAST    CLINICAL HISTORY:  lumbar spine pain; Low back pain    TECHNIQUE:  Multiplanar, multisequence MR images were acquired from the thoracolumbar junction to the sacrum without the administration of contrast.    COMPARISON:  Left hip radiographs from September 10, 2018.    FINDINGS:  The osseous structures demonstrate nonspecific heterogeneous marrow signal intensity.  No fracture.  No listhesis.  Multilevel degenerative changes are seen with spurring of the endplates and intervertebral disc space narrowing at L4-L5.    Visualized spinal cord demonstrates normal signal intensity.    L1-L2: Mild facet arthropathy.  No spinal canal or neural foraminal encroachment.    L2-L3: Posterior disc bulge and facet and ligamentum flavum hypertrophy.  Mild effacement of the ventral thecal sac.  No spinal canal or neural foraminal stenosis.    L3-L4: Small annular tear is seen.  There is posterior disc protrusion and facet arthropathy.  This results in asymmetric neural foraminal and crow joint slightly greater on the right side.    L4-L5: Posterior disc protrusion and facet ligamentum flavum hypertrophy.  There is mild to moderate narrowing of the spinal canal and bilateral neural foraminal encroachment.    L5-S1: Facet and ligamentum flavum hypertrophy and posterior disc bulge.  Mild bilateral neural foraminal encroachment.  Spinal canal is maintained.      Impression Multifocal areas of facet and ligamentum flavum hypertrophy with disc protrusion resulting in neural foraminal encroachment at multiple levels.  Findings are most pronounced at L4-L5 where there is also spinal canal stenosis.  Degenerative changes and other findings as outlined above.       Review of Systems:  CONSTITUTIONAL: patient denies any fever, chills, or weight loss  SKIN: patient denies any rash or itching  RESPIRATORY: patient denies having any shortness of breath  GASTROINTESTINAL: patient denies having any diarrhea, constipation, or bowel  "incontinence  GENITOURINARY: patient denies having any abnormal bladder function    MUSCULOSKELETAL:  - patient complains of the above noted pain/s (see chief pain complaint)    NEUROLOGICAL:   - pain as above  - strength in Lower extremities is intact, BILATERALLY  - sensation in Lower extremities is intact, BILATERALLY  - patient denies any loss of bowel or bladder control      PSYCHIATRIC: patient denies any change in mood    Other:  All other systems reviewed and are negative      Physical Exam:  /77 (BP Location: Right arm, Patient Position: Sitting)   Pulse (!) 55   Resp 16   Ht 5' 7" (1.702 m)   Wt 106.6 kg (235 lb 0.2 oz)   BMI 36.81 kg/m²  (reviewed on 10/13/2018)\  General:  Alert and oriented, No acute distress.    HEENT:       EOMI.  Normocephalic, atraumatic.   Cardiovascular:  Regular rate.    Gastrointestinal:  Soft.    Respiratory:  Respirations are non-labored.    Cervical Spine:  No masses or atrophy,    Range of motion - Flexion, Extension,  Right Rotation, Left Rotation, Right Lat Bending,  Left Lat Bending         Increased pain with          Palpation - nontender         Spurling's - negative   Thoracic Spine:  Palpation normal.    Lumbar Spine:  No masses or atrophy,         Range of motion - Flexion, Extension,  Right Lat Bending,  Left Lat Bending        Increased pain with -        Palpation - nontender         PSIS tenderness -         Edin's/MICHAELA -         Gaenslen's -              Yeomans (prone) -               SLR - negative  Motor Exam:      Strength:  Rate on 1-5 scale Right Left   L2- hip flexion  5 5   L3- knee extension  5 4   L4- ankle dorsiflexion 5 5   L5- great toe extension 5 5   S1- ankle plantarflexion 5 5     Sensory Exam:  Full and equal sensation to light touch throughout except over left lateral/anterior thigh which has increased sensation  Reflexes   Left DTR's     Knee.   2  Ankle.   2  Right DTR's     Knee.   2  Ankle.   2  Neurologic:  Cranial Nerves " II-XII are grossly intact.    Pathologic reflexes:            Clonus - Neg  Psychiatric:  Cooperative.    Gait: Normal    Assessment  Lumbar Spondylosis  Hip OA status post hip replacement    1. 63 y.o. year old patient with PMH of   Past Medical History:   Diagnosis Date    Breast cancer     Generalized osteoarthrosis, involving multiple sites     s/p THR bilateral    History of breast cancer 2007    lumpectomy/XRT    HTN (hypertension)     Hyperlipidemia     Obesity, unspecified       presenting with pain located left lateral thigh  2. Pain Generators / Etiology ; lumbar degenerative disc disease, lumbar spondylosis, lumbar radiculopathy, bilateral hip replacements; I suspect that the left lateral leg pain that she is feeling is due to loosening of the hardware with the cerclage wires associated with previous femoral fracture however will perform left L3 transforaminal epidural steroid injection for treatment and diagnostic benefit  3. Failed Meds:  Norco, tramadol, Mobic  4. Physical Therapy - has done all water aerobics and land-based physical therapy but it has been many months ago  5. Psychological comorbidities -  none  6. Anticoagulants / Antiplatelets:  None     PLAN:  1. Medications :  Recommend continuing Mobic and Norco as prescribed, we will restart gabapentin 100 mg at bedtime (patient reports she has felt crazy on this medication before at higher doses)  2. PT - provide a referral for physical therapy for lumbar spine  3. Psychological - none  4. Labs - obtain  none; reviewed labs from 05/31/2018, creatinine 0.9  5. Imaging - obtain  none; reviewed lumbar MRI and hip x-rays today with patient in clinic  6. Interventions - schedule left L3 transforaminal epidural steroid injection  7. Referrals - none  8. Records - none  9. Follow up visit - follow up in clinic in 6 weeks  10. Patient Questions - answered all the patient's questions regarding diagnosis, therapy, treatment    MARCY Lobato,  MD  Interventional Pain  [facility] [place]

## 2018-10-13 NOTE — PATIENT INSTRUCTIONS
-restart gabapentin 100 mg at bedtime  -Continue Mobic as prescribed  -provide a referral for physical therapy  -will schedule left L3 transforaminal epidural steroid injection  -follow up in clinic in 6 weeks

## 2018-10-29 ENCOUNTER — TELEPHONE (OUTPATIENT)
Dept: ORTHOPEDICS | Facility: CLINIC | Age: 63
End: 2018-10-29

## 2018-10-29 NOTE — TELEPHONE ENCOUNTER
Left a message for the patient to let her know that Dr. man will be moving to our [facility] location in December. I informed her that he will still see her the same date and time 12/27/18 at 1:30 just at [facility]. I asked her to give the office a call back if she has any questions.FP

## 2018-11-02 ENCOUNTER — TELEPHONE (OUTPATIENT)
Dept: INTERNAL MEDICINE | Facility: CLINIC | Age: 63
End: 2018-11-02

## 2018-11-02 NOTE — TELEPHONE ENCOUNTER
----- Message from Angela Cedillo sent at 11/2/2018 12:18 PM CDT -----  Contact: pt  Pt would like nurse to please return call. Pt didn't state reason for call.

## 2018-11-02 NOTE — TELEPHONE ENCOUNTER
Spoke with the patient and informed her that we could not give her any information on another patient.

## 2018-11-06 ENCOUNTER — HOSPITAL ENCOUNTER (OUTPATIENT)
Facility: HOSPITAL | Age: 63
Discharge: HOME | End: 2018-11-06
Attending: PAIN MEDICINE | Admitting: PAIN MEDICINE
Payer: MEDICARE

## 2018-11-06 VITALS
RESPIRATION RATE: 16 BRPM | TEMPERATURE: 98 F | HEART RATE: 58 BPM | SYSTOLIC BLOOD PRESSURE: 172 MMHG | OXYGEN SATURATION: 95 % | DIASTOLIC BLOOD PRESSURE: 79 MMHG

## 2018-11-06 DIAGNOSIS — M54.16 LUMBAR RADICULOPATHY: ICD-10-CM

## 2018-11-06 PROCEDURE — 63600175 PHARM REV CODE 636 W HCPCS: Performed by: PAIN MEDICINE

## 2018-11-06 PROCEDURE — 64483 NJX AA&/STRD TFRM EPI L/S 1: CPT | Mod: LT,,, | Performed by: PAIN MEDICINE

## 2018-11-06 PROCEDURE — 63600175 PHARM REV CODE 636 W HCPCS

## 2018-11-06 RX ORDER — DEXAMETHASONE SODIUM PHOSPHATE 10 MG/ML
INJECTION INTRAMUSCULAR; INTRAVENOUS
Status: DISCONTINUED | OUTPATIENT
Start: 2018-11-06 | End: 2018-11-06 | Stop reason: HOSPADM

## 2018-11-06 RX ORDER — GABAPENTIN 100 MG/1
100 CAPSULE ORAL NIGHTLY
Qty: 30 CAPSULE | Refills: 3 | Status: SHIPPED | OUTPATIENT
Start: 2018-11-06 | End: 2019-03-22 | Stop reason: SDUPTHER

## 2018-11-06 NOTE — PLAN OF CARE
Problem: Patient Care Overview  Goal: Plan of Care Review  Outcome: Outcome(s) achieved Date Met: 11/06/18  Patient discharged home in stable condition via wheelchair with ride. Verbalized understanding of discharge instructions. Patient voiced no complaints at this time. Patient stood at side of bed, walked steps with no new motor or sensory deficits. Neurologically intact.

## 2018-11-06 NOTE — OP NOTE
"Procedure: Lumbar Transforaminal Epidural Steroid Injection under Fluoroscopic Guidance (supraneural approach)    Level: L3/4     Side: Left    PROCEDURE DATE: 11/6/2018    Pre-operative Diagnosis: Lumbar Radiculopathy  Post-operative Diagnosis: Lumbar Radiculopathy    Provider: MARCY Lobato MD  Assistant(s): None    Anesthesia: Local, No Sedation    >> 0 mg of VERSED    >> 0 mcg of FENTANYL     Indication: Low back pain with radiculopathy consistent with distribution of targeted nerve. Symptoms unresponsive to conservative treatments. Fluoroscopy was used to optimize visualization of needle placement and to maximize safety.     Procedure Description / Technique:  The patient was seen and identified in the preoperative area. Risks, benefits, complications, and alternatives were discussed with the patient. The patient agreed to proceed with the procedure and signed the consent. The site and side of the procedure was identified and marked. An IV was not placed for this procedure. The patient was taken to the procedural suite.    The patient was positioned in prone orientation on procedure table and a pillow was placed under the abdomen to reduce lumbar lordosis. A time out was performed prior to any intervention. The procedure, site, side, and allergies were stated and agreed to by all present. The lumbosacral area was widely prepped with ChloraPrep. The procedural site was draped in usual sterile fashion. Vital signs were closely monitored throughout this procedure. Conscious sedation was not used for this procedure.    The target area was visualized under fluoroscopy. The cephalocaudal angle of the fluoroscope was adjusted as to align the vertebral end plates. The fluoroscopic arm was rotated ipsilaterally to an angle of approximately 30 degrees until the "martín dog" outline came into view and the tip of the inferior superior articular process pointed towards the midline, 6:00 position of the above pedicle. A 25 " "gauge 3.5 inch spinal needle was directed towards the "chin" of the "martín dog" (adjacent to the pars interarticularis and inferior to the pedicle). The needle was advanced until OS was met at the inferior border of the pedicle / pars interface. The needle was adjusted so that it would pass inferior to the osseous border. The fluoroscope was then placed in the lateral position and the needle was slowly advanced until it rested in the posterior 1/3rd of the vertebral foramen. AP fluoroscopy was checked and the needle tip rested at the 6:00 position under the pedicle. No paresthesia was elicited during needle placement. With the needle tip in its final position, gentle aspiration was negative for blood and CSF. Omnipaque 240 (1 to 2 mL) was injected under live fluoroscopy. Microbore tubing was used for injection. There was no pain or paresthesia on injection. The contrast clearly delineated the targeted nerve root on AP fluoroscopy. No vascular uptake was seen. A solution containing 1 mL of 1% PF Lidocaine and 1 mL of Dexamethasone (10 mg/mL) was mixed and 2 mL was injected slowly at each level targeted. There was minimal resistance on injection. No pain or paresthesia was elicited on injection. The stylet was replaced and the needle was withdrawn intact. This procedure was performed for each of the above indicated levels.     Description of Findings: Not applicable    Prosthetic devices, grafts, tissues, or devices implanted: None    Specimen Removed: No    Estimated Blood Loss: minimal    COMPLICATIONS: None    DISPOSITION / PLANS: The patient was transferred to the recovery area in a stable condition for observation. The patient was reexamined prior to discharge. There was no evidence of acute neurologic injury following the procedure.  Patient was discharged from the recovery room after meeting discharge criteria. Home discharge instructions were given to the patient by the staff.      "

## 2018-11-13 ENCOUNTER — TELEPHONE (OUTPATIENT)
Dept: ORTHOPEDICS | Facility: CLINIC | Age: 63
End: 2018-11-13

## 2018-12-10 ENCOUNTER — TELEPHONE (OUTPATIENT)
Dept: INTERNAL MEDICINE | Facility: CLINIC | Age: 63
End: 2018-12-10

## 2018-12-10 RX ORDER — HYDROCODONE BITARTRATE AND ACETAMINOPHEN 10; 325 MG/1; MG/1
1 TABLET ORAL 2 TIMES DAILY PRN
Qty: 120 TABLET | Refills: 0 | OUTPATIENT
Start: 2018-12-10

## 2018-12-11 ENCOUNTER — OFFICE VISIT (OUTPATIENT)
Dept: INTERNAL MEDICINE | Facility: CLINIC | Age: 63
End: 2018-12-11
Payer: MEDICARE

## 2018-12-11 VITALS
HEART RATE: 74 BPM | BODY MASS INDEX: 36.32 KG/M2 | SYSTOLIC BLOOD PRESSURE: 132 MMHG | WEIGHT: 231.94 LBS | OXYGEN SATURATION: 96 % | TEMPERATURE: 98 F | DIASTOLIC BLOOD PRESSURE: 84 MMHG

## 2018-12-11 DIAGNOSIS — I10 ESSENTIAL HYPERTENSION: ICD-10-CM

## 2018-12-11 DIAGNOSIS — M54.16 LUMBAR RADICULOPATHY: Primary | ICD-10-CM

## 2018-12-11 DIAGNOSIS — M54.5 LOW BACK PAIN, UNSPECIFIED BACK PAIN LATERALITY, UNSPECIFIED CHRONICITY, WITH SCIATICA PRESENCE UNSPECIFIED: ICD-10-CM

## 2018-12-11 PROCEDURE — 99213 OFFICE O/P EST LOW 20 MIN: CPT | Mod: PBBFAC,PO | Performed by: INTERNAL MEDICINE

## 2018-12-11 PROCEDURE — 99999 PR PBB SHADOW E&M-EST. PATIENT-LVL III: CPT | Mod: PBBFAC,,, | Performed by: INTERNAL MEDICINE

## 2018-12-11 PROCEDURE — 99213 OFFICE O/P EST LOW 20 MIN: CPT | Mod: S$PBB,,, | Performed by: INTERNAL MEDICINE

## 2018-12-11 RX ORDER — FLUTICASONE PROPIONATE 50 MCG
1 SPRAY, SUSPENSION (ML) NASAL DAILY
Qty: 1 BOTTLE | Refills: 11 | Status: SHIPPED | OUTPATIENT
Start: 2018-12-11 | End: 2022-03-03 | Stop reason: SDUPTHER

## 2018-12-11 RX ORDER — HYDROCODONE BITARTRATE AND ACETAMINOPHEN 10; 325 MG/1; MG/1
1 TABLET ORAL 2 TIMES DAILY PRN
Qty: 120 TABLET | Refills: 0 | Status: SHIPPED | OUTPATIENT
Start: 2018-12-11 | End: 2019-02-18 | Stop reason: SDUPTHER

## 2018-12-11 NOTE — PROGRESS NOTES
HPI:  Patient is a 63-year-old female comes today for follow-up of her chronic back pain.  She is due for refills of her hydrocodone.  She uses about 1-2 per day.  It has been a steady fixed dose.    Current meds have been verified and updated per the EMR  Exam:/84   Pulse 74   Temp 97.8 °F (36.6 °C) (Tympanic)   Wt 105.2 kg (231 lb 14.8 oz)   SpO2 96%   BMI 36.32 kg/m²   Exam deferred    Lab Results   Component Value Date    WBC 5.07 11/30/2017    HGB 13.7 11/30/2017    HCT 42.6 11/30/2017     11/30/2017    CHOL 158 05/31/2018    TRIG 139 05/31/2018    HDL 47 05/31/2018    ALT 36 05/31/2018    AST 31 05/31/2018     05/31/2018    K 3.9 05/31/2018     05/31/2018    CREATININE 0.9 05/31/2018    BUN 13 05/31/2018    CO2 27 05/31/2018    TSH 1.912 11/30/2017       Impression:  Stable problems below  Patient Active Problem List   Diagnosis    HTN (hypertension)    Generalized osteoarthrosis, involving multiple sites    History of breast cancer    Obesity, unspecified    Hyperlipidemia    Myofascial pain    Bilateral carpal tunnel syndrome    History of total hip arthroplasty, right    History of total hip arthroplasty, left    Low back pain    Greater trochanteric bursitis of left hip    Lumbar radiculopathy       Plan:  Orders Placed This Encounter    HYDROcodone-acetaminophen (NORCO)  mg per tablet    fluticasone (FLONASE) 50 mcg/actuation nasal spray     Patient was given refills of her hydrocodone.  She will be seen again in 3 months

## 2018-12-13 ENCOUNTER — OFFICE VISIT (OUTPATIENT)
Dept: PAIN MEDICINE | Facility: CLINIC | Age: 63
End: 2018-12-13
Payer: MEDICARE

## 2018-12-13 ENCOUNTER — HOSPITAL ENCOUNTER (OUTPATIENT)
Dept: RADIOLOGY | Facility: HOSPITAL | Age: 63
Discharge: HOME | End: 2018-12-13
Attending: PAIN MEDICINE
Payer: MEDICARE

## 2018-12-13 VITALS
RESPIRATION RATE: 18 BRPM | WEIGHT: 231 LBS | SYSTOLIC BLOOD PRESSURE: 114 MMHG | HEART RATE: 60 BPM | HEIGHT: 67 IN | BODY MASS INDEX: 36.26 KG/M2 | DIASTOLIC BLOOD PRESSURE: 68 MMHG

## 2018-12-13 DIAGNOSIS — M54.16 LUMBAR RADICULOPATHY: Primary | ICD-10-CM

## 2018-12-13 DIAGNOSIS — M54.12 CERVICAL RADICULOPATHY: ICD-10-CM

## 2018-12-13 PROCEDURE — 99999 PR PBB SHADOW E&M-EST. PATIENT-LVL IV: CPT | Mod: PBBFAC,,, | Performed by: PAIN MEDICINE

## 2018-12-13 PROCEDURE — 99214 OFFICE O/P EST MOD 30 MIN: CPT | Mod: S$PBB,,, | Performed by: PAIN MEDICINE

## 2018-12-13 PROCEDURE — 99214 OFFICE O/P EST MOD 30 MIN: CPT | Mod: PBBFAC,25 | Performed by: PAIN MEDICINE

## 2018-12-13 PROCEDURE — 72040 X-RAY EXAM NECK SPINE 2-3 VW: CPT | Mod: TC

## 2018-12-13 PROCEDURE — 72040 X-RAY EXAM NECK SPINE 2-3 VW: CPT | Mod: 26,,, | Performed by: RADIOLOGY

## 2018-12-13 NOTE — PROGRESS NOTES
Chief Pain Complaint:  Low-back Pain      History of Present Illness:   Ms. Vo returns to clinic.  She underwent left L3 transforaminal epidural steroid injection on November 6, 2018 and reports having ongoing 80% pain relief.  Currently her pain is rated 6/10 and is described as an aching sensation in the low back and a sharp shooting pain in the left shoulder.  She describes left shoulder pain happens periodically throughout the day but does bother her every single day.  She denies having radiation distal to the shoulder and denies having weakness in her upper extremities. She notices this sensation mostly when she rotates her head to the left and to the right.  Her symptoms are worse with activity improved with rest.  She denies having bowel bladder difficulties.    Initial HPI:  This patient is a 63 y.o. female who presents today complaining of the above noted pain/s. The patient describes the pain as follows.  Ms. Vo has a history of low back pain for approximately 5 years.  She is status post bilateral hip replacements with left femoral fracture and has had cerclage wires placed. She has participated in physical therapy which cause increased left lower extremity pain and water aerobics which also caused left lower extremity pain. Currently her pain is rated 7/10 and is described as a constant aching sensation which goes down the left leg to the knee but not below.  She denies having symptoms on the right.  She denies having numbness and weakness in the left lower extremity.  She denies having bowel bladder difficulties.  She currently takes Mobic and Norco; she was previously taking Tramadol however this medication is recently been stopped.  There was no inciting event her symptoms are worse with walking and activity while improved with rest.    Previous Therapy:  Medications:  Mobic, Norco, tramadol, gabapentin  Injections:  Left L3 transforaminal epidural steroid injection  Surgeries:  Bilateral hip  replacements, no lumbar surgery  Physical Therapy:  Has participated in physical therapy and aquatherapy over 1 year ago    Past Surgical History:   Procedure Laterality Date    BREAST LUMPECTOMY Right 2006    XRT    COLONOSCOPY N/A 10/15/2015    Procedure: COLONOSCOPY;  Surgeon: Maylin Shipley MD;  Location[facility];  Service: Endoscopy;  Laterality: N/A;    COLONOSCOPY N/A 10/15/2015    Performed by Maylin Shipley MD at [facility] ENDO    TOTAL ABDOMINAL HYSTERECTOMY W/ BILATERAL SALPINGOOPHORECTOMY       Imaging / Labs / Studies (reviewed on 12/13/2018):    Results for orders placed during the hospital encounter of 09/27/18   MRI Lumbar Spine Without Contrast    Narrative EXAMINATION:  MRI LUMBAR SPINE WITHOUT CONTRAST    CLINICAL HISTORY:  lumbar spine pain; Low back pain    TECHNIQUE:  Multiplanar, multisequence MR images were acquired from the thoracolumbar junction to the sacrum without the administration of contrast.    COMPARISON:  Left hip radiographs from September 10, 2018.    FINDINGS:  The osseous structures demonstrate nonspecific heterogeneous marrow signal intensity.  No fracture.  No listhesis.  Multilevel degenerative changes are seen with spurring of the endplates and intervertebral disc space narrowing at L4-L5.    Visualized spinal cord demonstrates normal signal intensity.    L1-L2: Mild facet arthropathy.  No spinal canal or neural foraminal encroachment.    L2-L3: Posterior disc bulge and facet and ligamentum flavum hypertrophy.  Mild effacement of the ventral thecal sac.  No spinal canal or neural foraminal stenosis.    L3-L4: Small annular tear is seen.  There is posterior disc protrusion and facet arthropathy.  This results in asymmetric neural foraminal and crow joint slightly greater on the right side.    L4-L5: Posterior disc protrusion and facet ligamentum flavum hypertrophy.  There is mild to moderate narrowing of the spinal canal and bilateral neural foraminal  "encroachment.    L5-S1: Facet and ligamentum flavum hypertrophy and posterior disc bulge.  Mild bilateral neural foraminal encroachment.  Spinal canal is maintained.      Impression Multifocal areas of facet and ligamentum flavum hypertrophy with disc protrusion resulting in neural foraminal encroachment at multiple levels.  Findings are most pronounced at L4-L5 where there is also spinal canal stenosis.  Degenerative changes and other findings as outlined above.     Review of Systems:  CONSTITUTIONAL: patient denies any fever, chills, or weight loss  SKIN: patient denies any rash or itching  RESPIRATORY: patient denies having any shortness of breath  GASTROINTESTINAL: patient denies having any diarrhea, constipation, or bowel incontinence  GENITOURINARY: patient denies having any abnormal bladder function    MUSCULOSKELETAL:  - patient complains of the above noted pain/s (see chief pain complaint)    NEUROLOGICAL:   - pain as above  - strength in Lower extremities is intact, BILATERALLY  - sensation in Lower extremities is intact, BILATERALLY  - patient denies any loss of bowel or bladder control      PSYCHIATRIC: patient denies any change in mood    Other:  All other systems reviewed and are negative    Physical Exam:  /68 (BP Location: Right arm, Patient Position: Sitting, BP Method: Medium (Automatic))   Pulse 60   Resp 18   Ht 5' 7" (1.702 m)   Wt 104.8 kg (231 lb)   BMI 36.18 kg/m²  (reviewed on 12/13/2018)\  General:  Alert and oriented, No acute distress.    HEENT:       EOMI.  Normocephalic, atraumatic.   Cardiovascular:  Regular rate.    Gastrointestinal:  Soft.    Respiratory:  Respirations are non-labored.    Cervical Spine:  No masses or atrophy,    Range of motion - Flexion, Extension,  Right Rotation, Left Rotation, Right Lat Bending,  Left Lat Bending         Increased pain with          Palpation - nontender         Spurling's - negative   Thoracic Spine:  Palpation normal.    Lumbar " Spine:        Range of motion - full Flexion, Extension,  Right Lat Bending,  Left Lat Bending  Motor Exam:      Strength:  Rate on 1-5 scale Right Left   L2- hip flexion  5 5   L3- knee extension  5 4   L4- ankle dorsiflexion 5 5   L5- great toe extension 5 5   S1- ankle plantarflexion 5 5     Sensory Exam:  Full and equal sensation to light touch throughout except over left lateral/anterior thigh which has increased sensation  Reflexes   Left DTR's     Knee.   2  Ankle.   2  Right DTR's     Knee.   2  Ankle.   2  Neurologic:  Cranial Nerves II-XII are grossly intact.    Pathologic reflexes:            Clonus - Neg  Psychiatric:  Cooperative.    Gait: Normal    Assessment  Lumbar Spondylosis  Hip OA status post hip replacement    1. 63 y.o. year old patient with PMH of   Past Medical History:   Diagnosis Date    Breast cancer     Generalized osteoarthrosis, involving multiple sites     s/p THR bilateral    History of breast cancer 2007    lumpectomy/XRT    HTN (hypertension)     Hyperlipidemia     Obesity, unspecified       presenting with pain located left lateral thigh  2. Pain Generators / Etiology ; lumbar degenerative disc disease, lumbar spondylosis, lumbar radiculopathy, bilateral hip replacements; I suspect that the left lateral leg pain that she is feeling is due to loosening of the hardware with the cerclage wires associated with previous femoral fracture however will perform left L3 transforaminal epidural steroid injection for treatment and diagnostic benefit  3. Failed Meds:  Norco, tramadol, Mobic  4. Physical Therapy - has done all water aerobics and land-based physical therapy but it has been many months ago  5. Psychological comorbidities -  none  6. Anticoagulants / Antiplatelets:  None     PLAN:  1. Medications :  Recommend continuing Mobic and Norco as prescribed, continue gabapentin 100 mg at bedtime  2. PT - provided a referral for physical therapy for lumbar spine  3. Psychological -  none  4. Labs - obtain  none; reviewed labs from 05/31/2018, creatinine 0.9  5. Imaging - will obtain cervical x-ray today to evaluate radicular symptoms  6. Interventions - none  7. Referrals - none  8. Records - none  9. Follow up visit - follow up in clinic in 6 weeks  10. Patient Questions - answered all the patient's questions regarding diagnosis, therapy, treatment    MARCY Lobato MD  Interventional Pain  [facility] [place]

## 2018-12-13 NOTE — PATIENT INSTRUCTIONS
Provide a referral for physical therapy specifically aqua therapy  Continue gabapentin 100 mg at bedtime  Will obtain cervical spine x-ray today for evaluation of radicular pain  Follow up in clinic in 8 weeks

## 2018-12-31 RX ORDER — TRAMADOL HYDROCHLORIDE 50 MG/1
TABLET ORAL
Qty: 60 TABLET | Refills: 3 | Status: SHIPPED | OUTPATIENT
Start: 2018-12-31 | End: 2020-09-01

## 2019-01-03 RX ORDER — MELOXICAM 15 MG/1
TABLET ORAL
Qty: 30 TABLET | Refills: 11 | Status: SHIPPED | OUTPATIENT
Start: 2019-01-03 | End: 2019-12-04 | Stop reason: SDUPTHER

## 2019-01-07 ENCOUNTER — TELEPHONE (OUTPATIENT)
Dept: INTERNAL MEDICINE | Facility: CLINIC | Age: 64
End: 2019-01-07

## 2019-01-07 NOTE — TELEPHONE ENCOUNTER
Spoke with patient regarding television commercial about losartan recalls. Advised patient to contact pharmacy in regards to brand/lot # specifics. Patient verbalized understanding.

## 2019-01-07 NOTE — TELEPHONE ENCOUNTER
----- Message from Celina Busch sent at 1/7/2019 10:08 AM CST -----  Contact: self [phone]  States that she is calling to speak to nurse regarding the recall on her blood pressure medication Losartan. Please call back at [phone]//thank you acc

## 2019-02-04 RX ORDER — ZOLPIDEM TARTRATE 10 MG/1
TABLET ORAL
Qty: 20 TABLET | Refills: 5 | Status: SHIPPED | OUTPATIENT
Start: 2019-02-04 | End: 2019-07-05 | Stop reason: SDUPTHER

## 2019-02-04 RX ORDER — LOSARTAN POTASSIUM AND HYDROCHLOROTHIAZIDE 25; 100 MG/1; MG/1
TABLET ORAL
Qty: 90 TABLET | Refills: 3 | Status: SHIPPED | OUTPATIENT
Start: 2019-02-04 | End: 2020-01-23

## 2019-02-18 RX ORDER — HYDROCODONE BITARTRATE AND ACETAMINOPHEN 10; 325 MG/1; MG/1
1 TABLET ORAL 2 TIMES DAILY PRN
Qty: 120 TABLET | Refills: 0 | Status: SHIPPED | OUTPATIENT
Start: 2019-02-18 | End: 2019-02-19 | Stop reason: SDUPTHER

## 2019-02-18 NOTE — TELEPHONE ENCOUNTER
----- Message from Celina Busch sent at 2/18/2019  8:29 AM CST -----  Contact: self [phone]  Type:  RX Refill Request    Who Called: Mary Vo  Refill or New Rx: refill  RX Name and Strength: 10mg  How is the patient currently taking it? (ex. 1XDay): 2x day  Is this a 30 day or 90 day RX:30 day  Preferred Pharmacy with phone number:    [facility] [address] [address]  Phone: [phone] Fax: [phone]    Local or Mail Order: local  Ordering Provider: Davis  Would the patient rather a call back or a response via [facility]? Call back  Best Call Back Number:[phone]  Additional Information:

## 2019-02-19 ENCOUNTER — TELEPHONE (OUTPATIENT)
Dept: INTERNAL MEDICINE | Facility: CLINIC | Age: 64
End: 2019-02-19

## 2019-02-19 RX ORDER — HYDROCODONE BITARTRATE AND ACETAMINOPHEN 10; 325 MG/1; MG/1
1 TABLET ORAL 2 TIMES DAILY PRN
Qty: 120 TABLET | Refills: 0 | Status: SHIPPED | OUTPATIENT
Start: 2019-02-19 | End: 2019-05-20 | Stop reason: SDUPTHER

## 2019-02-19 NOTE — TELEPHONE ENCOUNTER
----- Message from Nat Wilkerson sent at 2/19/2019 10:24 AM CST -----  Contact: self  Type:  RX Refill Request    Who Called: pt  Refill or New Rx:refill  RX Name and Strength:narco-10mg  How is the patient currently taking it? (ex. 1XDay):2xday  Is this a 30 day or 90 day RX:90  Preferred Pharmacy with phone number:  [facility] [address] [facility]  [address] [facility] [address]  Phone: [phone] Fax: [phone]  Local or Mail Order:local  Ordering Provider:Dr. Cannon  Would the patient rather a call back or a response via [facility]? Call back  Best Call Back Number:[phone]  Additional Information: pt wants to know if its too soon to get refill but she's only has three days left.    Thanks,  Nat Wilkerson

## 2019-03-06 ENCOUNTER — PES CALL (OUTPATIENT)
Dept: ADMINISTRATIVE | Facility: CLINIC | Age: 64
End: 2019-03-06

## 2019-03-07 ENCOUNTER — LAB VISIT (OUTPATIENT)
Dept: LAB | Facility: HOSPITAL | Age: 64
End: 2019-03-07
Attending: INTERNAL MEDICINE
Payer: MEDICARE

## 2019-03-07 DIAGNOSIS — I10 ESSENTIAL HYPERTENSION: ICD-10-CM

## 2019-03-07 LAB
ALBUMIN SERPL BCP-MCNC: 4 G/DL
ALP SERPL-CCNC: 95 U/L
ALT SERPL W/O P-5'-P-CCNC: 17 U/L
ANION GAP SERPL CALC-SCNC: 8 MMOL/L
AST SERPL-CCNC: 20 U/L
BASOPHILS # BLD AUTO: 0.06 K/UL
BASOPHILS NFR BLD: 1 %
BILIRUB SERPL-MCNC: 0.3 MG/DL
BUN SERPL-MCNC: 15 MG/DL
CALCIUM SERPL-MCNC: 10 MG/DL
CHLORIDE SERPL-SCNC: 104 MMOL/L
CHOLEST SERPL-MCNC: 143 MG/DL
CHOLEST/HDLC SERPL: 2.9 {RATIO}
CO2 SERPL-SCNC: 30 MMOL/L
CREAT SERPL-MCNC: 0.9 MG/DL
DIFFERENTIAL METHOD: ABNORMAL
EOSINOPHIL # BLD AUTO: 0.2 K/UL
EOSINOPHIL NFR BLD: 3.3 %
ERYTHROCYTE [DISTWIDTH] IN BLOOD BY AUTOMATED COUNT: 12.7 %
EST. GFR  (AFRICAN AMERICAN): >60 ML/MIN/1.73 M^2
EST. GFR  (NON AFRICAN AMERICAN): >60 ML/MIN/1.73 M^2
GLUCOSE SERPL-MCNC: 99 MG/DL
HCT VFR BLD AUTO: 45.1 %
HDLC SERPL-MCNC: 50 MG/DL
HDLC SERPL: 35 %
HGB BLD-MCNC: 14.4 G/DL
IMM GRANULOCYTES # BLD AUTO: 0.01 K/UL
IMM GRANULOCYTES NFR BLD AUTO: 0.2 %
LDLC SERPL CALC-MCNC: 74.8 MG/DL
LYMPHOCYTES # BLD AUTO: 2 K/UL
LYMPHOCYTES NFR BLD: 34.5 %
MCH RBC QN AUTO: 30.7 PG
MCHC RBC AUTO-ENTMCNC: 31.9 G/DL
MCV RBC AUTO: 96 FL
MONOCYTES # BLD AUTO: 0.5 K/UL
MONOCYTES NFR BLD: 9.1 %
NEUTROPHILS # BLD AUTO: 3 K/UL
NEUTROPHILS NFR BLD: 51.9 %
NONHDLC SERPL-MCNC: 93 MG/DL
NRBC BLD-RTO: 0 /100 WBC
PLATELET # BLD AUTO: 223 K/UL
PMV BLD AUTO: 11.1 FL
POTASSIUM SERPL-SCNC: 3.8 MMOL/L
PROT SERPL-MCNC: 7.1 G/DL
RBC # BLD AUTO: 4.69 M/UL
SODIUM SERPL-SCNC: 142 MMOL/L
TRIGL SERPL-MCNC: 91 MG/DL
TSH SERPL DL<=0.005 MIU/L-ACNC: 1.53 UIU/ML
WBC # BLD AUTO: 5.8 K/UL

## 2019-03-07 PROCEDURE — 84443 ASSAY THYROID STIM HORMONE: CPT

## 2019-03-07 PROCEDURE — 85025 COMPLETE CBC W/AUTO DIFF WBC: CPT

## 2019-03-07 PROCEDURE — 80061 LIPID PANEL: CPT

## 2019-03-07 PROCEDURE — 80053 COMPREHEN METABOLIC PANEL: CPT

## 2019-03-07 PROCEDURE — 36415 COLL VENOUS BLD VENIPUNCTURE: CPT

## 2019-03-07 RX ORDER — ALPRAZOLAM 1 MG/1
TABLET ORAL
Qty: 30 TABLET | Refills: 5 | Status: SHIPPED | OUTPATIENT
Start: 2019-03-07 | End: 2019-04-05 | Stop reason: SDUPTHER

## 2019-03-14 ENCOUNTER — OFFICE VISIT (OUTPATIENT)
Dept: INTERNAL MEDICINE | Facility: CLINIC | Age: 64
End: 2019-03-14
Payer: MEDICARE

## 2019-03-14 VITALS
SYSTOLIC BLOOD PRESSURE: 132 MMHG | HEIGHT: 67 IN | BODY MASS INDEX: 35.98 KG/M2 | TEMPERATURE: 99 F | RESPIRATION RATE: 16 BRPM | HEART RATE: 68 BPM | DIASTOLIC BLOOD PRESSURE: 76 MMHG | OXYGEN SATURATION: 97 % | WEIGHT: 229.25 LBS

## 2019-03-14 DIAGNOSIS — E78.00 PURE HYPERCHOLESTEROLEMIA: Primary | ICD-10-CM

## 2019-03-14 DIAGNOSIS — Z85.3 HISTORY OF BREAST CANCER: ICD-10-CM

## 2019-03-14 DIAGNOSIS — M54.16 LUMBAR RADICULOPATHY: ICD-10-CM

## 2019-03-14 DIAGNOSIS — I10 ESSENTIAL HYPERTENSION: ICD-10-CM

## 2019-03-14 DIAGNOSIS — M54.5 LOW BACK PAIN, UNSPECIFIED BACK PAIN LATERALITY, UNSPECIFIED CHRONICITY, WITH SCIATICA PRESENCE UNSPECIFIED: ICD-10-CM

## 2019-03-14 PROCEDURE — 99999 PR PBB SHADOW E&M-EST. PATIENT-LVL IV: CPT | Mod: PBBFAC,,, | Performed by: INTERNAL MEDICINE

## 2019-03-14 PROCEDURE — 99214 OFFICE O/P EST MOD 30 MIN: CPT | Mod: S$PBB,,, | Performed by: INTERNAL MEDICINE

## 2019-03-14 PROCEDURE — 99214 PR OFFICE/OUTPT VISIT, EST, LEVL IV, 30-39 MIN: ICD-10-PCS | Mod: S$PBB,,, | Performed by: INTERNAL MEDICINE

## 2019-03-14 PROCEDURE — 99214 OFFICE O/P EST MOD 30 MIN: CPT | Mod: PBBFAC,PO | Performed by: INTERNAL MEDICINE

## 2019-03-14 PROCEDURE — 99999 PR PBB SHADOW E&M-EST. PATIENT-LVL IV: ICD-10-PCS | Mod: PBBFAC,,, | Performed by: INTERNAL MEDICINE

## 2019-03-14 NOTE — PROGRESS NOTES
"HPI:  Patient is a 63-year-old female who comes today for follow-up of her hypertension, lipids, and her annual physical.  She is doing fairly well.  There has been no major problems or complaints.    Current MEDS: medcard review, verified and update  Allergies: Per the electronic medical record    Past Medical History:   Diagnosis Date    Breast cancer     Generalized osteoarthrosis, involving multiple sites     s/p THR bilateral    History of breast cancer 2007    lumpectomy/XRT    HTN (hypertension)     Hyperlipidemia     Obesity, unspecified        Past Surgical History:   Procedure Laterality Date    BREAST LUMPECTOMY Right 2006    XRT    COLONOSCOPY N/A 10/15/2015    Performed by [facility]lin [facility] at [facility]    TOTAL ABDOMINAL HYSTERECTOMY W/ BILATERAL SALPINGOOPHORECTOMY         SHx: per the electronic medical record    FHx: recorded in the electronic medical record    ROS:    denies any chest pains or shortness of breath. Denies any nausea, vomiting or diarrhea. Denies any fever, chills or sweats. Denies any change in weight, voice, stool, skin or hair. Denies any dysuria, dyspepsia or dysphagia. Denies any change in vision, hearing or headaches. Denies any swollen lymph nodes or loss of memory.    PE:  /76 (BP Location: Left arm, Patient Position: Sitting, BP Method: Large (Manual))   Pulse 68   Temp 98.5 °F (36.9 °C) (Tympanic)   Resp 16   Ht 5' 7" (1.702 m)   Wt 104 kg (229 lb 4.5 oz)   SpO2 97%   BMI 35.91 kg/m²   Gen: Well-developed, well-nourished, female, in no acute distress, oriented x3  HEENT: neck is supple, no adenopathy, carotids 2+ equal without bruits, thyroid exam normal size without nodules.  CHEST: clear to auscultation and percussion  CVS: regular rate and rhythm without significant murmur, gallop, or rubs  ABD: soft, benign, no rebound no guarding, no distention. Bowel sounds are normal.     Nontender,  no palpable masses, no organomegaly and no audible " bruits.  BREAST:  Right breast with obvious deformities from her lumpectomy.  No masses.  No nipple inversion, retraction, or deviation.  EXT: no clubbing, cyanosis, or edema  LYMPH: no cervical, inguinal, or axillary adenopathy  FEET: no loss of sensation.  No ulcers or pressure sores.  NEURO: gait normal.  Cranial nerves II- XII intact. No nystagmus.  Speech normal.   Gross motor and sensory unremarkable.  PELVIC: deferred    Lab Results   Component Value Date    WBC 5.80 03/07/2019    HGB 14.4 03/07/2019    HCT 45.1 03/07/2019     03/07/2019    CHOL 143 03/07/2019    TRIG 91 03/07/2019    HDL 50 03/07/2019    ALT 17 03/07/2019    AST 20 03/07/2019     03/07/2019    K 3.8 03/07/2019     03/07/2019    CREATININE 0.9 03/07/2019    BUN 15 03/07/2019    CO2 30 (H) 03/07/2019    TSH 1.528 03/07/2019       Impression:  Multiple medical problems below, stable  Patient Active Problem List   Diagnosis    HTN (hypertension)    Generalized osteoarthrosis, involving multiple sites    History of breast cancer    Obesity, unspecified    Hyperlipidemia    Myofascial pain    Bilateral carpal tunnel syndrome    Low back pain    Lumbar radiculopathy       Plan:      Medications remain the same.  She will be seen again in 3 months.    This note is generated with speech recognition software and is subject to transcription error and sound alike phrases that may be missed by proofreading.

## 2019-03-22 ENCOUNTER — TELEPHONE (OUTPATIENT)
Dept: PAIN MEDICINE | Facility: CLINIC | Age: 64
End: 2019-03-22

## 2019-03-22 RX ORDER — GABAPENTIN 100 MG/1
100 CAPSULE ORAL NIGHTLY
Qty: 30 CAPSULE | Refills: 3 | Status: SHIPPED | OUTPATIENT
Start: 2019-03-22 | End: 2019-08-13 | Stop reason: DRUGHIGH

## 2019-03-22 RX ORDER — GABAPENTIN 100 MG/1
100 CAPSULE ORAL NIGHTLY
Qty: 30 CAPSULE | Refills: 11 | Status: SHIPPED | OUTPATIENT
Start: 2019-03-22 | End: 2019-03-22 | Stop reason: SDUPTHER

## 2019-03-22 NOTE — TELEPHONE ENCOUNTER
No answer. Left message stating that gabapentin was sent to preferred pharmacy. Left message to call back if needed.     ----- Message from Paul Lobato MD sent at 3/22/2019 10:33 AM CDT -----  Contact: Patient  Done!  ----- Message -----  From: Adelfo Rider LPN  Sent: 3/21/2019   9:16 AM  To: Paul Lobato MD    Refill request for gabapentin!      ----- Message -----  From: Frances Hamptondebryant  Sent: 3/21/2019   7:59 AM  To: Luis Alfredo Miller Staff    1. What is the name of the medication you are requesting? Gabapentin  2. What is the dose? 100mg  3. How do you take the medication? Orally, topically, etc? orally  4. How often do you take this medication? Once daily  5. Do you need a 30 day or 90 day supply?  30  6. How many refills are you requesting? 6  7. What is your preferred pharmacy and location of the pharmacy? [facility]  8. Who can we contact with further questions? Patient

## 2019-04-05 RX ORDER — ALPRAZOLAM 1 MG/1
TABLET ORAL
Qty: 30 TABLET | Refills: 5 | Status: SHIPPED | OUTPATIENT
Start: 2019-04-05 | End: 2019-09-10 | Stop reason: SDUPTHER

## 2019-05-20 ENCOUNTER — TELEPHONE (OUTPATIENT)
Dept: INTERNAL MEDICINE | Facility: CLINIC | Age: 64
End: 2019-05-20

## 2019-05-20 RX ORDER — HYDROCODONE BITARTRATE AND ACETAMINOPHEN 10; 325 MG/1; MG/1
1 TABLET ORAL 2 TIMES DAILY PRN
Qty: 120 TABLET | Refills: 0 | Status: SHIPPED | OUTPATIENT
Start: 2019-05-20 | End: 2019-08-12 | Stop reason: SDUPTHER

## 2019-05-20 NOTE — TELEPHONE ENCOUNTER
----- Message from Caryl Kelley sent at 5/20/2019  9:23 AM CDT -----  Contact: self  Type:  RX Refill Request    Who Called: Patient  Refill or New Rx: Refill  RX Name and Strength: Hydrocodone 10 mg   How is the patient currently taking it? (ex. 1XDay): 2xday  Is this a 30 day or 90 day RX: 90  Preferred Pharmacy with phone number:  Pt uses:  [facility] - [address] [address]  Phone: [phone] Fax: [phone]    Local or Mail Order: Local  Ordering Provider: Dr. Cannon  Would the patient rather a call back or a response via [facility]? Call back  Best Call Back Number: [phone]  Additional Information: n/a    Thanks,   Caryl Kelley

## 2019-06-06 ENCOUNTER — OFFICE VISIT (OUTPATIENT)
Dept: INTERNAL MEDICINE | Facility: CLINIC | Age: 64
End: 2019-06-06
Payer: MEDICARE

## 2019-06-06 VITALS
OXYGEN SATURATION: 96 % | TEMPERATURE: 98 F | SYSTOLIC BLOOD PRESSURE: 138 MMHG | HEART RATE: 97 BPM | DIASTOLIC BLOOD PRESSURE: 82 MMHG | HEIGHT: 67 IN | WEIGHT: 240.94 LBS | BODY MASS INDEX: 37.82 KG/M2

## 2019-06-06 DIAGNOSIS — M54.5 LOW BACK PAIN, UNSPECIFIED BACK PAIN LATERALITY, UNSPECIFIED CHRONICITY, WITH SCIATICA PRESENCE UNSPECIFIED: ICD-10-CM

## 2019-06-06 DIAGNOSIS — Z12.39 SCREENING BREAST EXAMINATION: ICD-10-CM

## 2019-06-06 DIAGNOSIS — M54.16 LUMBAR RADICULOPATHY: ICD-10-CM

## 2019-06-06 DIAGNOSIS — I10 ESSENTIAL HYPERTENSION: Primary | ICD-10-CM

## 2019-06-06 PROCEDURE — 99999 PR PBB SHADOW E&M-EST. PATIENT-LVL III: ICD-10-PCS | Mod: PBBFAC,,, | Performed by: INTERNAL MEDICINE

## 2019-06-06 PROCEDURE — 99213 OFFICE O/P EST LOW 20 MIN: CPT | Mod: PBBFAC,PO | Performed by: INTERNAL MEDICINE

## 2019-06-06 PROCEDURE — 99213 PR OFFICE/OUTPT VISIT, EST, LEVL III, 20-29 MIN: ICD-10-PCS | Mod: S$PBB,,, | Performed by: INTERNAL MEDICINE

## 2019-06-06 PROCEDURE — 99213 OFFICE O/P EST LOW 20 MIN: CPT | Mod: S$PBB,,, | Performed by: INTERNAL MEDICINE

## 2019-06-06 PROCEDURE — 99999 PR PBB SHADOW E&M-EST. PATIENT-LVL III: CPT | Mod: PBBFAC,,, | Performed by: INTERNAL MEDICINE

## 2019-06-06 RX ORDER — HYDROCODONE BITARTRATE AND ACETAMINOPHEN 10; 325 MG/1; MG/1
1 TABLET ORAL 2 TIMES DAILY PRN
Qty: 120 TABLET | Refills: 0 | Status: CANCELLED | OUTPATIENT
Start: 2019-06-06

## 2019-06-06 NOTE — PROGRESS NOTES
"HPI:  Patient is a 64-year-old female who comes today for follow-up of her hypertension.  She has been doing well.  Her blood pressure readings at home have been well controlled.  There has been no other new compound or complaints.    Current meds have been verified and updated per the EMR  Exam:/82   Pulse 97   Temp 98.3 °F (36.8 °C)   Ht 5' 7" (1.702 m)   Wt 109.3 kg (240 lb 15.4 oz)   SpO2 96%   BMI 37.74 kg/m²   Chest clear  Cardiovascular regular rate and rhythm without murmur gallop or rub    Lab Results   Component Value Date    WBC 5.80 03/07/2019    HGB 14.4 03/07/2019    HCT 45.1 03/07/2019     03/07/2019    CHOL 143 03/07/2019    TRIG 91 03/07/2019    HDL 50 03/07/2019    ALT 17 03/07/2019    AST 20 03/07/2019     03/07/2019    K 3.8 03/07/2019     03/07/2019    CREATININE 0.9 03/07/2019    BUN 15 03/07/2019    CO2 30 (H) 03/07/2019    TSH 1.528 03/07/2019       Impression:  Multiple problems below, stable  Patient Active Problem List   Diagnosis    HTN (hypertension)    Generalized osteoarthrosis, involving multiple sites    History of breast cancer    Obesity, unspecified    Hyperlipidemia    Myofascial pain    Bilateral carpal tunnel syndrome    Low back pain    Lumbar radiculopathy       Plan:  Orders Placed This Encounter    Mammo Digital Screening Bilat    Lipid panel    Basic metabolic panel     She will see me in 3 months with above lab work and mammogram.    This note is generated with speech recognition software and is subject to transcription error and sound alike phrases that may be missed by proofreading.    "

## 2019-07-06 RX ORDER — ZOLPIDEM TARTRATE 10 MG/1
TABLET ORAL
Qty: 20 TABLET | Refills: 5 | Status: SHIPPED | OUTPATIENT
Start: 2019-07-06 | End: 2020-01-03

## 2019-07-18 ENCOUNTER — TELEPHONE (OUTPATIENT)
Dept: INTERNAL MEDICINE | Facility: CLINIC | Age: 64
End: 2019-07-18

## 2019-07-18 NOTE — TELEPHONE ENCOUNTER
----- Message from Radha George sent at 7/18/2019  8:36 AM CDT -----  .Type:  Patient Returning Call    Who Called:self  Who Left Message for Patient:  Does the patient know what this is regarding?:appt  Would the patient rather a call back or a response via [facility]? call  Best Call Back Number:[phone]  Additional Information:

## 2019-08-05 RX ORDER — CLONIDINE HYDROCHLORIDE 0.2 MG/1
TABLET ORAL
Qty: 30 TABLET | Refills: 11 | Status: SHIPPED | OUTPATIENT
Start: 2019-08-05 | End: 2019-09-18

## 2019-08-12 ENCOUNTER — TELEPHONE (OUTPATIENT)
Dept: INTERNAL MEDICINE | Facility: CLINIC | Age: 64
End: 2019-08-12

## 2019-08-12 DIAGNOSIS — G89.4 CHRONIC PAIN SYNDROME: ICD-10-CM

## 2019-08-12 RX ORDER — HYDROCODONE BITARTRATE AND ACETAMINOPHEN 10; 325 MG/1; MG/1
1 TABLET ORAL 2 TIMES DAILY PRN
Qty: 120 TABLET | Refills: 0 | Status: SHIPPED | OUTPATIENT
Start: 2019-08-12 | End: 2019-08-12 | Stop reason: SDUPTHER

## 2019-08-12 RX ORDER — HYDROCODONE BITARTRATE AND ACETAMINOPHEN 10; 325 MG/1; MG/1
1 TABLET ORAL 2 TIMES DAILY PRN
Qty: 120 TABLET | Refills: 0 | Status: SHIPPED | OUTPATIENT
Start: 2019-08-12 | End: 2019-09-18 | Stop reason: SDUPTHER

## 2019-08-12 NOTE — TELEPHONE ENCOUNTER
----- Message from Angela Cedillo sent at 8/12/2019 12:10 PM CDT -----  Contact: Deysi with [facility]  Caller asked that nurse please contact him regarding pt medication, caller states they need a new prescription for pt. Please contact pt at information listed below:        [facility] -  - [place] - [address] [facility]  [address] [facility] [address]  Phone: [phone] Fax: [phone]

## 2019-08-12 NOTE — TELEPHONE ENCOUNTER
Per pharmacy V[facility] has to be written on a script pad for 30 days due to the new law unless pt has a dx of chronic pain or cancer.  If not, they only dispense 7 days worth.     Please Advise

## 2019-08-13 ENCOUNTER — OFFICE VISIT (OUTPATIENT)
Dept: PAIN MEDICINE | Facility: CLINIC | Age: 64
End: 2019-08-13
Payer: MEDICARE

## 2019-08-13 VITALS
BODY MASS INDEX: 37.35 KG/M2 | SYSTOLIC BLOOD PRESSURE: 125 MMHG | HEIGHT: 67 IN | HEART RATE: 60 BPM | RESPIRATION RATE: 18 BRPM | DIASTOLIC BLOOD PRESSURE: 81 MMHG | WEIGHT: 238 LBS

## 2019-08-13 DIAGNOSIS — M25.519 SHOULDER PAIN, UNSPECIFIED CHRONICITY, UNSPECIFIED LATERALITY: ICD-10-CM

## 2019-08-13 DIAGNOSIS — M54.12 CERVICAL RADICULOPATHY: ICD-10-CM

## 2019-08-13 DIAGNOSIS — M47.816 LUMBAR SPONDYLOSIS: ICD-10-CM

## 2019-08-13 DIAGNOSIS — M54.12 LEFT CERVICAL RADICULOPATHY: ICD-10-CM

## 2019-08-13 DIAGNOSIS — M25.552 BILATERAL HIP PAIN: ICD-10-CM

## 2019-08-13 DIAGNOSIS — M25.551 BILATERAL HIP PAIN: ICD-10-CM

## 2019-08-13 DIAGNOSIS — M50.122 CERVICAL DISC DISORDER AT C5-C6 LEVEL WITH RADICULOPATHY: ICD-10-CM

## 2019-08-13 DIAGNOSIS — M54.16 LUMBAR RADICULOPATHY: Primary | ICD-10-CM

## 2019-08-13 PROCEDURE — 99999 PR PBB SHADOW E&M-EST. PATIENT-LVL IV: ICD-10-PCS | Mod: PBBFAC,,, | Performed by: PHYSICIAN ASSISTANT

## 2019-08-13 PROCEDURE — 99214 PR OFFICE/OUTPT VISIT, EST, LEVL IV, 30-39 MIN: ICD-10-PCS | Mod: S$PBB,,, | Performed by: PHYSICIAN ASSISTANT

## 2019-08-13 PROCEDURE — 99214 OFFICE O/P EST MOD 30 MIN: CPT | Mod: PBBFAC | Performed by: PHYSICIAN ASSISTANT

## 2019-08-13 PROCEDURE — 99999 PR PBB SHADOW E&M-EST. PATIENT-LVL IV: CPT | Mod: PBBFAC,,, | Performed by: PHYSICIAN ASSISTANT

## 2019-08-13 PROCEDURE — 99214 OFFICE O/P EST MOD 30 MIN: CPT | Mod: S$PBB,,, | Performed by: PHYSICIAN ASSISTANT

## 2019-08-13 RX ORDER — GABAPENTIN 100 MG/1
200 CAPSULE ORAL NIGHTLY
Qty: 60 CAPSULE | Refills: 1 | Status: SHIPPED | OUTPATIENT
Start: 2019-08-13 | End: 2019-09-12

## 2019-08-13 NOTE — PROGRESS NOTES
Chief Pain Complaint:  Low back pain, left shoulder/ arm pain (possibly along C5)    Interval History: Since last visit on 12-13-18, she reports that she has continued neck and left arm pain.  It has worsened over last 4 weeks.  Pain is worse at night. She denies any injury.  She reports 8/10 pain today.    Interval History: Ms. Vo returns to clinic.  She underwent left L3 transforaminal epidural steroid injection on November 6, 2018 and reports having ongoing 80% pain relief.  Currently her pain is rated 6/10 and is described as an aching sensation in the low back and a sharp shooting pain in the left shoulder.  She describes left shoulder pain happens periodically throughout the day but does bother her every single day.  She denies having radiation distal to the shoulder and denies having weakness in her upper extremities. She notices this sensation mostly when she rotates her head to the left and to the right.  Her symptoms are worse with activity improved with rest.  She denies having bowel bladder difficulties.    Initial History of Present Illness:  This patient is a 64 y.o. female who presents today complaining of the above noted pain/s. The patient describes the pain as follows.  Ms. Vo has a history of low back pain for approximately 5 years.  She is status post bilateral hip replacements with left femoral fracture and has had cerclage wires placed. She has participated in physical therapy which cause increased left lower extremity pain and water aerobics which also caused left lower extremity pain. Currently her pain is rated 7/10 and is described as a constant aching sensation which goes down the left leg to the knee but not below.  She denies having symptoms on the right.  She denies having numbness and weakness in the left lower extremity.  She denies having bowel bladder difficulties.  She currently takes Mobic and Norco; she was previously taking Tramadol however this medication is recently been  stopped.  There was no inciting event her symptoms are worse with walking and activity while improved with rest.    Previous Therapy:  Medications:  Mobic, Norco, tramadol, gabapentin  Injections:  Left L3 transforaminal epidural steroid injection on 11-6-18 with 80% pain relief   Surgeries:  Bilateral hip replacements, no lumbar surgery  Physical Therapy:  Has participated in physical therapy and aquatherapy over 1 year ago    Past Surgical History:   Procedure Laterality Date    BREAST LUMPECTOMY Right 2006    XRT    COLONOSCOPY N/A 10/15/2015    Performed [facility]    TOTAL ABDOMINAL HYSTERECTOMY W/ BILATERAL SALPINGOOPHORECTOMY           Imaging / Labs / Studies (reviewed on 8/13/2019):    Results for orders placed during the hospital encounter of 12/13/18   X-Ray Cervical Spine AP And Lateral    Narrative FINDINGS:  Straightening of normal cervical lordosis.  This may be positional or secondary to muscle spasm.  No fracture or listhesis.  Multilevel degenerative changes are seen with findings most pronounced at C3-C4 and C5-C6 with intervertebral disc space narrowing and marginal spurring with facet arthropathy.  Odontoid process remains intact.  Lateral masses are symmetric.  Prevertebral soft tissues are maintained.  Calcification about the left carotid vasculature is noted     Results for orders placed during the hospital encounter of 09/27/18   MRI Lumbar Spine Without Contrast    Narrative COMPARISON:  Left hip radiographs from September 10, 2018.  FINDINGS:  The osseous structures demonstrate nonspecific heterogeneous marrow signal intensity.  No fracture.  No listhesis.  Multilevel degenerative changes are seen with spurring of the endplates and intervertebral disc space narrowing at L4-L5.  Visualized spinal cord demonstrates normal signal intensity.  L1-L2: Mild facet arthropathy.  No spinal canal or neural foraminal encroachment.  L2-L3: Posterior disc bulge and facet  "and ligamentum flavum hypertrophy.  Mild effacement of the ventral thecal sac.  No spinal canal or neural foraminal stenosis.  L3-L4: Small annular tear is seen.  There is posterior disc protrusion and facet arthropathy.  This results in asymmetric neural foraminal and crow joint slightly greater on the right side.  L4-L5: Posterior disc protrusion and facet ligamentum flavum hypertrophy.  There is mild to moderate narrowing of the spinal canal and bilateral neural foraminal encroachment.  L5-S1: Facet and ligamentum flavum hypertrophy and posterior disc bulge.  Mild bilateral neural foraminal encroachment.  Spinal canal is maintained.    Impression Multifocal areas of facet and ligamentum flavum hypertrophy with disc protrusion resulting in neural foraminal encroachment at multiple levels.  Findings are most pronounced at L4-L5 where there is also spinal canal stenosis.  Degenerative changes and other findings as outlined above.         Review of Systems:  CONSTITUTIONAL: patient denies any fever, chills, or weight loss  SKIN: patient denies any rash or itching  RESPIRATORY: patient denies having any shortness of breath  GASTROINTESTINAL: patient denies having any diarrhea, constipation, or bowel incontinence  GENITOURINARY: patient denies having any abnormal bladder function    MUSCULOSKELETAL:  - patient complains of the above noted pain/s (see chief pain complaint)    NEUROLOGICAL:   - pain as above  - strength in Lower extremities is intact, BILATERALLY  - sensation in Lower extremities is intact, BILATERALLY  - patient denies any loss of bowel or bladder control      PSYCHIATRIC: patient denies any change in mood    Other:  All other systems reviewed and are negative        Physical Exam:  Vitals:  /81 (BP Location: Right arm, Patient Position: Sitting, BP Method: Medium (Automatic))   Pulse 60   Resp 18   Ht 5' 7" (1.702 m)   Wt 108 kg (238 lb)   BMI 37.28 kg/m²   (reviewed on " 8/13/2019)    General: alert and oriented, in no apparent distress.  Gait: normal gait.  Skin: no rashes, no discoloration, no obvious lesions  HEENT: normocephalic, atraumatic. Pupils equal and round.  Cardiovascular: no significant peripheral edema present.  Respiratory: without use of accessory muscles of respiration.    Musculoskeletal - Cervical Spine:  - Pain on flexion of cervical spine: Present  - Pain on extension of cervical spine: Absent   - Cervical facet loading: Absent   - TTP over the cervical facet joints: Absent  - TTP over the cervical paraspinals: Present  - Spurling's: Equivocal  On left    Left Shoulder:  - Pain on abduction: Absent   - ROM: preserved  - TTP over the AC and GH joint: Absent   - Neer's: Absent   - Hawkin's: Absent   - Apley's Scratch test: Absent     Neuro - Lower Extremities:  - RLE Strength:     >> 5/5 strength with right hip flexion/ extension    >> 5/5 strength with right knee flexion/ extension    >> 5/5 strength in right ankle with plantar and dorsiflexion  - LLE Strength:     >> 5/5 strength with left hip flexion/ extension    >> 4/5 strength with knee flexion extension on the left     >> 5/5 strength in left ankle with plantar and dorsiflexion  - Extremity Reflexes: Brisk and symmetric throughout  - Sensory: Sensation to light touch intact bilaterally      Psych:  Mood and affect is appropriate          Assessment  Lumbar Spondylosis  Hip OA status post hip replacement    1. 64 y.o. year old patient with PMH of   Past Medical History:   Diagnosis Date    Breast cancer     Chronic pain syndrome     Generalized osteoarthrosis, involving multiple sites     s/p THR bilateral    History of breast cancer 2007    lumpectomy/XRT    HTN (hypertension)     Hyperlipidemia     Obesity, unspecified       presenting with pain located left lateral thigh, back pain, shoulder pain.  Diagnoses include:    ICD-10-CM ICD-9-CM   1. Lumbar radiculopathy M54.16 724.4   2. Cervical  radiculopathy M54.12 723.4   3. Bilateral hip pain M25.551 719.45    M25.552    4. Lumbar spondylosis M47.816 721.3   5. Shoulder pain, unspecified chronicity, unspecified laterality M25.519 719.41       2. Pain Generators / Etiology ; lumbar degenerative disc disease, lumbar spondylosis, lumbar radiculopathy, bilateral hip replacements; I suspect that the left lateral leg pain that she is feeling is due to loosening of the hardware with the cerclage wires associated with previous femoral fracture however will perform left L3 transforaminal epidural steroid injection for treatment and diagnostic benefit  3. Failed Meds:  Norco, tramadol, Mobic  4. Physical Therapy - has done all water aerobics and land-based physical therapy but it has been many months ago  5. Psychological comorbidities -  none  6. Anticoagulants / Antiplatelets:  None       Plan:  1. Interventional: None for now. Consider KATYA after reviewing MRI.     2. Pharmacologic:   - Increase  gabapentin 100 mg QHS to 200mg QHS. She reports she has taken higher doses in the past.  - Continue Mobic 15mg QD PRN, Norco 10mg BID PRN (from Dr. Cannon).    3. Rehabilitative: Encouraged regular exercise.    4. Diagnostic: Order cervical MRI to evaluate LUE radiculopathy/ potential discogenic sources of pain.     5. Follow up: will call patient with MRI results/ to schedule KATYA if warranted.     - I discussed the risks, benefits, and alternatives to potential treatment options. All questions and concerns were fully addressed today in clinic. Dr. Lobato was consulted regarding the patient plan and agrees.

## 2019-08-19 RX ORDER — PRAVASTATIN SODIUM 40 MG/1
TABLET ORAL
Qty: 30 TABLET | Refills: 11 | Status: SHIPPED | OUTPATIENT
Start: 2019-08-19 | End: 2020-09-01

## 2019-08-22 ENCOUNTER — HOSPITAL ENCOUNTER (OUTPATIENT)
Dept: RADIOLOGY | Facility: HOSPITAL | Age: 64
Discharge: HOME | End: 2019-08-22
Attending: PHYSICIAN ASSISTANT
Payer: MEDICARE

## 2019-08-22 DIAGNOSIS — M50.122 CERVICAL DISC DISORDER AT C5-C6 LEVEL WITH RADICULOPATHY: ICD-10-CM

## 2019-08-22 DIAGNOSIS — M54.12 LEFT CERVICAL RADICULOPATHY: ICD-10-CM

## 2019-08-22 PROCEDURE — 72141 MRI NECK SPINE W/O DYE: CPT | Mod: TC

## 2019-08-26 ENCOUNTER — DOCUMENTATION ONLY (OUTPATIENT)
Dept: PAIN MEDICINE | Facility: CLINIC | Age: 64
End: 2019-08-26

## 2019-08-26 ENCOUNTER — TELEPHONE (OUTPATIENT)
Dept: PAIN MEDICINE | Facility: CLINIC | Age: 64
End: 2019-08-26

## 2019-08-26 DIAGNOSIS — M54.12 CERVICAL RADICULOPATHY: Primary | ICD-10-CM

## 2019-08-26 NOTE — LETTER
Pain Management Pre-Procedure Instructions  (also available in your [employer] account)    Patient Name:___Mary Vo MRN: [id number] you are scheduled to have the following procedure: Epidural Steroid Injection  withLLarisa Lobato MD on: September 10th, 2019 at: [facility]    You will be contacted the day before your procedure to be given an arrival and procedure time                                                                                                            Day of Procedure   Ensure you have obtained arrival time from the Pain Management department  o We will call 48 hours in advance with your arrival time. Please check any voicemails you may have  o If you arrive past your scheduled procedure time, you may be asked to reschedule your procedure.   For your safety, ensure you have a  with you to remain present throughout your procedure   o If you arrive without a responsible adult to stay with you and drive you home, you may be asked to reschedule your procedure   Take all of your prescribed medications (exceptions noted below) with a small amount of water  o [] Nothing by mouth two (2) hours before your procedure.  It is ok to take your regular medications with a small sip of water.  o [x] Nothing by mouth after midnight the night before your procedure.  It is ok to take your regular medications with a small sip of water.     Wear loose, comfortable clothing    You may wear glasses, dentures, contact lenses and/or hearing aids. Please leave all valuable items at home.   Contact the Pain Management department at [phone] or via [employer] if you are:  o Running a fever above 100 degrees  o Feel ill, have any type of infection, or are taking antibiotics now or have in the past 2 weeks  o Have had any outpatient procedures in the past 2 weeks (colonoscopy, major dental work, etc.)  o If you are allergic to iodine, IVP dye or shellfish.      Contact Information:  [phone], ask to speak to the pain management department with any questions or concerns or send a message via [employer]

## 2019-08-26 NOTE — PROGRESS NOTES
Cervical MRI reviewed, detailed below.     Results for orders placed during the hospital encounter of 08/22/19   MRI Cervical Spine Without Contrast    Narrative COMPARISON:  None.  FINDINGS:  There is no fracture or malalignment of the cervical spine.  No bone marrow replacing process.  No bone marrow edema.  The prevertebral soft tissues have a normal appearance in the STIR sequence without evidence for ligamentous injury.  The cervicocranial junction has a normal appearance.  There is no paraspinal or intrathecal mass.  C2-C3: Mild disc bulge without central canal stenosis.  Mild bilateral neural foraminal narrowing from facet joint hypertrophic changes.  C3-C4: Posterior disc osteophyte complex with ventral surface thecal sac effacement and ventral surface cord contact without cord impingement.  Bilateral uncovertebral joint spurring with moderate bilateral neural foraminal stenosis.  C4-C5: There is a right paracentral focal disc protrusion with right ventral surface cord contact in subtle right ventral surface cord remodeling.  Mild right neural foraminal narrowing.  C5-C6: Degenerative disc disease.  Disc bulge with mild central canal stenosis and ventral surface cord contact and mild ventral surface cord remodeling but no abnormal cord signal intensity.  There is severe bilateral neural foraminal stenosis from uncovertebral joint spurring with probable impingement upon the exiting bilateral C6 nerve roots in the neural foramen.   C6-C7: Focal disc protrusion with mild ventral surface cord remodeling but no abnormal cord signal intensity.  Severe bilateral neural foraminal stenosis from uncovertebral joint spurring.  C7-T1: Mild disc bulge with mild ventral surface thecal sac effacement but no cord impingement.  Moderate bilateral neural foraminal stenosis.    Impression 1. There are multiple levels of neural foraminal stenosis as detailed above.  Of note there is severe bilateral neural foraminal stenosis at  C5-C6 and C6-C7.  There is probable impingement upon the exiting bilateral C6 nerve roots at the C5-C6 level.  2. Multiple disc bulges and posterior disc osteophyte complexes throughout the cervical spine causing mild central canal stenosis as detailed above at each level, some with mild ventral surface cord remodeling but no abnormal cord signal intensity seen in the where in the cervical spine.     Will call patient today with results.

## 2019-09-04 ENCOUNTER — TELEPHONE (OUTPATIENT)
Dept: PAIN MEDICINE | Facility: CLINIC | Age: 64
End: 2019-09-04

## 2019-09-06 ENCOUNTER — LAB VISIT (OUTPATIENT)
Dept: LAB | Facility: HOSPITAL | Age: 64
End: 2019-09-06
Attending: INTERNAL MEDICINE
Payer: MEDICARE

## 2019-09-06 DIAGNOSIS — I10 ESSENTIAL HYPERTENSION: ICD-10-CM

## 2019-09-06 LAB
ANION GAP SERPL CALC-SCNC: 10 MMOL/L (ref 8–16)
BUN SERPL-MCNC: 20 MG/DL (ref 8–23)
CALCIUM SERPL-MCNC: 9.9 MG/DL (ref 8.7–10.5)
CHLORIDE SERPL-SCNC: 104 MMOL/L (ref 95–110)
CHOLEST SERPL-MCNC: 163 MG/DL (ref 120–199)
CHOLEST/HDLC SERPL: 3.1 {RATIO} (ref 2–5)
CO2 SERPL-SCNC: 30 MMOL/L (ref 23–29)
CREAT SERPL-MCNC: 0.9 MG/DL (ref 0.5–1.4)
EST. GFR  (AFRICAN AMERICAN): >60 ML/MIN/1.73 M^2
EST. GFR  (NON AFRICAN AMERICAN): >60 ML/MIN/1.73 M^2
GLUCOSE SERPL-MCNC: 98 MG/DL (ref 70–110)
HDLC SERPL-MCNC: 53 MG/DL (ref 40–75)
HDLC SERPL: 32.5 % (ref 20–50)
LDLC SERPL CALC-MCNC: 80.8 MG/DL (ref 63–159)
NONHDLC SERPL-MCNC: 110 MG/DL
POTASSIUM SERPL-SCNC: 4.6 MMOL/L (ref 3.5–5.1)
SODIUM SERPL-SCNC: 144 MMOL/L (ref 136–145)
TRIGL SERPL-MCNC: 146 MG/DL (ref 30–150)

## 2019-09-06 PROCEDURE — 80061 LIPID PANEL: CPT

## 2019-09-06 PROCEDURE — 36415 COLL VENOUS BLD VENIPUNCTURE: CPT

## 2019-09-06 PROCEDURE — 80048 BASIC METABOLIC PNL TOTAL CA: CPT

## 2019-09-10 RX ORDER — ALPRAZOLAM 1 MG/1
TABLET ORAL
Qty: 30 TABLET | Refills: 5 | Status: SHIPPED | OUTPATIENT
Start: 2019-09-10 | End: 2020-03-03

## 2019-09-12 ENCOUNTER — TELEPHONE (OUTPATIENT)
Dept: INTERNAL MEDICINE | Facility: CLINIC | Age: 64
End: 2019-09-12

## 2019-09-12 RX ORDER — GABAPENTIN 300 MG/1
300 CAPSULE ORAL 3 TIMES DAILY
Qty: 90 CAPSULE | Refills: 11 | Status: SHIPPED | OUTPATIENT
Start: 2019-09-12 | End: 2020-10-03

## 2019-09-12 NOTE — TELEPHONE ENCOUNTER
----- Message from Karen Freitas sent at 9/12/2019  1:46 PM CDT -----  Contact: pt  The pt request a call concerning her Gabapentin medication, no additional info given and can be reached at [phone]///[id number]

## 2019-09-12 NOTE — TELEPHONE ENCOUNTER
Patient states her gabapentin was increase to 200 mg bid. It is not helping she is requesting script for gabapentin 200 mg to take tid. Please send to [facility]

## 2019-09-13 ENCOUNTER — HOSPITAL ENCOUNTER (OUTPATIENT)
Dept: RADIOLOGY | Facility: HOSPITAL | Age: 64
Discharge: HOME | End: 2019-09-13
Attending: INTERNAL MEDICINE
Payer: MEDICARE

## 2019-09-13 VITALS — HEIGHT: 67 IN | BODY MASS INDEX: 37.35 KG/M2 | WEIGHT: 238 LBS

## 2019-09-13 DIAGNOSIS — Z12.39 SCREENING BREAST EXAMINATION: ICD-10-CM

## 2019-09-13 PROCEDURE — 77067 SCR MAMMO BI INCL CAD: CPT | Mod: 26,,, | Performed by: RADIOLOGY

## 2019-09-13 PROCEDURE — 77067 MAMMO DIGITAL SCREENING BILAT WITH CAD: ICD-10-PCS | Mod: 26,,, | Performed by: RADIOLOGY

## 2019-09-13 PROCEDURE — 77067 SCR MAMMO BI INCL CAD: CPT | Mod: TC

## 2019-09-17 ENCOUNTER — HOSPITAL ENCOUNTER (OUTPATIENT)
Facility: HOSPITAL | Age: 64
Discharge: HOME | End: 2019-09-17
Attending: PAIN MEDICINE | Admitting: PAIN MEDICINE
Payer: MEDICARE

## 2019-09-17 VITALS
TEMPERATURE: 98 F | HEIGHT: 69 IN | BODY MASS INDEX: 37.37 KG/M2 | WEIGHT: 252.31 LBS | SYSTOLIC BLOOD PRESSURE: 144 MMHG | OXYGEN SATURATION: 96 % | RESPIRATION RATE: 18 BRPM | HEART RATE: 63 BPM | DIASTOLIC BLOOD PRESSURE: 70 MMHG

## 2019-09-17 DIAGNOSIS — M54.12 CERVICAL RADICULOPATHY: Primary | ICD-10-CM

## 2019-09-17 DIAGNOSIS — M54.16 LUMBAR RADICULOPATHY: ICD-10-CM

## 2019-09-17 PROCEDURE — 25000003 PHARM REV CODE 250: Performed by: PAIN MEDICINE

## 2019-09-17 PROCEDURE — 64479 PR INJECT ANES/STEROID FORAMEN CERV/THORACIC W IMG GUIDE ,1 LEVEL: ICD-10-PCS | Mod: LT,,, | Performed by: PAIN MEDICINE

## 2019-09-17 PROCEDURE — 64479 NJX AA&/STRD TFRM EPI C/T 1: CPT | Mod: LT,,, | Performed by: PAIN MEDICINE

## 2019-09-17 PROCEDURE — 99152 MOD SED SAME PHYS/QHP 5/>YRS: CPT | Mod: ,,, | Performed by: PAIN MEDICINE

## 2019-09-17 PROCEDURE — 63600175 PHARM REV CODE 636 W HCPCS: Performed by: PAIN MEDICINE

## 2019-09-17 PROCEDURE — 25500020 PHARM REV CODE 255: Performed by: PAIN MEDICINE

## 2019-09-17 PROCEDURE — 99152 PR MOD CONSCIOUS SEDATION, SAME PHYS, 5+ YRS, FIRST 15 MIN: ICD-10-PCS | Mod: ,,, | Performed by: PAIN MEDICINE

## 2019-09-17 PROCEDURE — 64479 NJX AA&/STRD TFRM EPI C/T 1: CPT | Performed by: PAIN MEDICINE

## 2019-09-17 RX ORDER — LIDOCAINE HYDROCHLORIDE 10 MG/ML
INJECTION, SOLUTION EPIDURAL; INFILTRATION; INTRACAUDAL; PERINEURAL
Status: DISCONTINUED | OUTPATIENT
Start: 2019-09-17 | End: 2019-09-17 | Stop reason: HOSPADM

## 2019-09-17 RX ORDER — FENTANYL CITRATE 50 UG/ML
INJECTION, SOLUTION INTRAMUSCULAR; INTRAVENOUS
Status: DISCONTINUED | OUTPATIENT
Start: 2019-09-17 | End: 2019-09-17 | Stop reason: HOSPADM

## 2019-09-17 RX ORDER — MIDAZOLAM HYDROCHLORIDE 1 MG/ML
INJECTION, SOLUTION INTRAMUSCULAR; INTRAVENOUS
Status: DISCONTINUED | OUTPATIENT
Start: 2019-09-17 | End: 2019-09-17 | Stop reason: HOSPADM

## 2019-09-17 RX ORDER — DEXAMETHASONE SODIUM PHOSPHATE 10 MG/ML
INJECTION INTRAMUSCULAR; INTRAVENOUS
Status: DISCONTINUED | OUTPATIENT
Start: 2019-09-17 | End: 2019-09-17 | Stop reason: HOSPADM

## 2019-09-17 NOTE — PLAN OF CARE
Problem: Adult Inpatient Plan of Care  Goal: Plan of Care Review  Outcome: Outcome(s) achieved Date Met: 09/17/19  Patient d/c home in stable condition via wheelchair with ride. Verbalized understanding of d/c instructions. Patient voiced no complaints. Patient stood at side of bed, walked steps with no new motor deficits. Neuro intact.

## 2019-09-17 NOTE — H&P
Progress Notes        Chief Pain Complaint:  Low back pain, left shoulder/ arm pain (possibly along C5)     Interval History: Since last visit on 12-13-18, she reports that she has continued neck and left arm pain.  It has worsened over last 4 weeks.  Pain is worse at night. She denies any injury.  She reports 8/10 pain today.     Interval History: Ms. Vo returns to clinic.  She underwent left L3 transforaminal epidural steroid injection on November 6, 2018 and reports having ongoing 80% pain relief.  Currently her pain is rated 6/10 and is described as an aching sensation in the low back and a sharp shooting pain in the left shoulder.  She describes left shoulder pain happens periodically throughout the day but does bother her every single day.  She denies having radiation distal to the shoulder and denies having weakness in her upper extremities. She notices this sensation mostly when she rotates her head to the left and to the right.  Her symptoms are worse with activity improved with rest.  She denies having bowel bladder difficulties.     Initial History of Present Illness:  This patient is a 64 y.o. female who presents today complaining of the above noted pain/s. The patient describes the pain as follows.  Ms. Vo has a history of low back pain for approximately 5 years.  She is status post bilateral hip replacements with left femoral fracture and has had cerclage wires placed. She has participated in physical therapy which cause increased left lower extremity pain and water aerobics which also caused left lower extremity pain. Currently her pain is rated 7/10 and is described as a constant aching sensation which goes down the left leg to the knee but not below.  She denies having symptoms on the right.  She denies having numbness and weakness in the left lower extremity.  She denies having bowel bladder difficulties.  She currently takes Mobic and Norco; she was previously taking Tramadol however this  medication is recently been stopped.  There was no inciting event her symptoms are worse with walking and activity while improved with rest.     Previous Therapy:  Medications:  Mobic, Norco, tramadol, gabapentin  Injections:  Left L3 transforaminal epidural steroid injection on 11-6-18 with 80% pain relief   Surgeries:  Bilateral hip replacements, no lumbar surgery  Physical Therapy:  Has participated in physical therapy and aquatherapy over 1 year ago           Past Surgical History:   Procedure Laterality Date    BREAST LUMPECTOMY Right 2006     XRT    COLONOSCOPY N/A 10/15/2015     Per[facility]lin [facility]ley [facility]    TOTAL ABDOMINAL HYSTERECTOMY W/ BILATERAL SALPINGOOPHORECTOMY                Imaging / Labs / Studies (reviewed on 8/13/2019):          Results for orders placed during the hospital encounter of 12/13/18   X-Ray Cervical Spine AP And Lateral     Narrative FINDINGS:  Straightening of normal cervical lordosis.  This may be positional or secondary to muscle spasm.  No fracture or listhesis.  Multilevel degenerative changes are seen with findings most pronounced at C3-C4 and C5-C6 with intervertebral disc space narrowing and marginal spurring with facet arthropathy.  Odontoid process remains intact.  Lateral masses are symmetric.  Prevertebral soft tissues are maintained.  Calcification about the left carotid vasculature is noted           Results for orders placed during the hospital encounter of 09/27/18   MRI Lumbar Spine Without Contrast     Narrative COMPARISON:  Left hip radiographs from September 10, 2018.  FINDINGS:  The osseous structures demonstrate nonspecific heterogeneous marrow signal intensity.  No fracture.  No listhesis.  Multilevel degenerative changes are seen with spurring of the endplates and intervertebral disc space narrowing at L4-L5.  Visualized spinal cord demonstrates normal signal intensity.  L1-L2: Mild facet arthropathy.  No spinal canal or neural  "foraminal encroachment.  L2-L3: Posterior disc bulge and facet and ligamentum flavum hypertrophy.  Mild effacement of the ventral thecal sac.  No spinal canal or neural foraminal stenosis.  L3-L4: Small annular tear is seen.  There is posterior disc protrusion and facet arthropathy.  This results in asymmetric neural foraminal and crow joint slightly greater on the right side.  L4-L5: Posterior disc protrusion and facet ligamentum flavum hypertrophy.  There is mild to moderate narrowing of the spinal canal and bilateral neural foraminal encroachment.  L5-S1: Facet and ligamentum flavum hypertrophy and posterior disc bulge.  Mild bilateral neural foraminal encroachment.  Spinal canal is maintained.     Impression Multifocal areas of facet and ligamentum flavum hypertrophy with disc protrusion resulting in neural foraminal encroachment at multiple levels.  Findings are most pronounced at L4-L5 where there is also spinal canal stenosis.  Degenerative changes and other findings as outlined above.            Review of Systems:  CONSTITUTIONAL: patient denies any fever, chills, or weight loss  SKIN: patient denies any rash or itching  RESPIRATORY: patient denies having any shortness of breath  GASTROINTESTINAL: patient denies having any diarrhea, constipation, or bowel incontinence  GENITOURINARY: patient denies having any abnormal bladder function     MUSCULOSKELETAL:  - patient complains of the above noted pain/s (see chief pain complaint)     NEUROLOGICAL:   - pain as above  - strength in Lower extremities is intact, BILATERALLY  - sensation in Lower extremities is intact, BILATERALLY  - patient denies any loss of bowel or bladder control      PSYCHIATRIC: patient denies any change in mood     Other:  All other systems reviewed and are negative           Physical Exam:  Vitals:  /81 (BP Location: Right arm, Patient Position: Sitting, BP Method: Medium (Automatic))   Pulse 60   Resp 18   Ht 5' 7" (1.702 m)   " Wt 108 kg (238 lb)   BMI 37.28 kg/m²   (reviewed on 8/13/2019)     General: alert and oriented, in no apparent distress.  Gait: normal gait.  Skin: no rashes, no discoloration, no obvious lesions  HEENT: normocephalic, atraumatic. Pupils equal and round.  Cardiovascular: no significant peripheral edema present.  Respiratory: without use of accessory muscles of respiration.     Musculoskeletal - Cervical Spine:  - Pain on flexion of cervical spine: Present  - Pain on extension of cervical spine: Absent   - Cervical facet loading: Absent   - TTP over the cervical facet joints: Absent  - TTP over the cervical paraspinals: Present  - Spurling's: Equivocal  On left     Left Shoulder:  - Pain on abduction: Absent   - ROM: preserved  - TTP over the AC and GH joint: Absent   - Neer's: Absent   - Hawkin's: Absent   - Apley's Scratch test: Absent      Neuro - Lower Extremities:  - RLE Strength:     >> 5/5 strength with right hip flexion/ extension    >> 5/5 strength with right knee flexion/ extension    >> 5/5 strength in right ankle with plantar and dorsiflexion  - LLE Strength:     >> 5/5 strength with left hip flexion/ extension    >> 4/5 strength with knee flexion extension on the left     >> 5/5 strength in left ankle with plantar and dorsiflexion  - Extremity Reflexes: Brisk and symmetric throughout  - Sensory: Sensation to light touch intact bilaterally      Psych:  Mood and affect is appropriate              Assessment  Lumbar Spondylosis  Hip OA status post hip replacement     1. 64 y.o. year old patient with PMH of        Past Medical History:   Diagnosis Date    Breast cancer      Chronic pain syndrome      Generalized osteoarthrosis, involving multiple sites       s/p THR bilateral    History of breast cancer 2007     lumpectomy/XRT    HTN (hypertension)      Hyperlipidemia      Obesity, unspecified         presenting with pain located left lateral thigh, back pain, shoulder pain.  Diagnoses include:       ICD-10-CM ICD-9-CM   1. Lumbar radiculopathy M54.16 724.4   2. Cervical radiculopathy M54.12 723.4   3. Bilateral hip pain M25.551 719.45     M25.552     4. Lumbar spondylosis M47.816 721.3   5. Shoulder pain, unspecified chronicity, unspecified laterality M25.519 719.41         2. Pain Generators / Etiology ; lumbar degenerative disc disease, lumbar spondylosis, lumbar radiculopathy, bilateral hip replacements; I suspect that the left lateral leg pain that she is feeling is due to loosening of the hardware with the cerclage wires associated with previous femoral fracture however will perform left L3 transforaminal epidural steroid injection for treatment and diagnostic benefit  3. Failed Meds:  Norco, tramadol, Mobic  4. Physical Therapy - has done all water aerobics and land-based physical therapy but it has been many months ago  5. Psychological comorbidities -  none  6. Anticoagulants / Antiplatelets:  None        Plan:  1. Interventional: Schedule for left C5/6 TFESI    MARCY Lobato MD  Interventional Pain Medicine  [facility] [place]

## 2019-09-17 NOTE — OP NOTE
Procedure: Cervical Transforaminal Epidural Steroid Injection under Fluoroscopic Guidance (supraneural approach)    Level: C5/6     Side: Left    PROCEDURE DATE: 9/17/2019    Pre-operative Diagnosis: Lumbar Radiculopathy  Post-operative Diagnosis: Lumbar Radiculopathy    Provider: MARCY Lobato MD  Assistant(s): None    Anesthesia: Local, IV Sedation    >> 1 mg of VERSED    >> 25 mcg of FENTANYL     Indication: Low back pain with radiculopathy consistent with distribution of targeted nerve. Symptoms unresponsive to conservative treatments. Fluoroscopy was used to optimize visualization of needle placement and to maximize safety.     Procedure Description / Technique:  The patient was seen and identified in the preoperative area. Risks, benefits, complications, and alternatives were discussed with the patient. The patient agreed to proceed with the procedure and signed the consent. The site and side of the procedure was identified and marked. An IV was started. The patient was taken to the procedural suite.    The patient was positioned in prone orientation on procedure table and a pillow was placed under the abdomen to reduce lumbar lordosis. A time out was performed prior to any intervention. The procedure, site, side, and allergies were stated and agreed to by all present. The lumbosacral area was widely prepped with ChloraPrep. The procedural site was draped in usual sterile fashion. Vital signs were closely monitored throughout this procedure. Conscious sedation was used for this procedure to decrease patient anxiety.    The target area was visualized under fluoroscopy. The cephalocaudal angle of the fluoroscope was adjusted as to align the vertebral end plates. The fluoroscopic arm was rotated ipsilaterally to an angle of approximately 30 degrees until the neural foramen were visualized. A 25 gauge 3.5 inch spinal needle was directed towards the superior posterior aspect of the foramen. The fluoroscope was  then placed in the AP position and the needle was slowly advanced until it rested in the posterior 1/3rd of the vertebral foramen.  No paresthesia was elicited during needle placement. With the needle tip in its final position, gentle aspiration was negative for blood and CSF. Omnipaque 240 (1 to 2 mL) was injected under live fluoroscopy. Microbore tubing was used for injection. There was no pain or paresthesia on injection. The contrast clearly delineated the targeted nerve root on AP fluoroscopy. No vascular uptake was seen. A solution containing 1 mL of 1% PF Lidocaine and 1 mL of Dexamethasone (10 mg/mL) was mixed and 1.5 mL was injected slowly at each level targeted. There was minimal resistance on injection. No pain or paresthesia was elicited on injection. The stylet was replaced and the needle was withdrawn intact. This procedure was performed for each of the above indicated levels.     Description of Findings: Not applicable    Prosthetic devices, grafts, tissues, or devices implanted: None    Specimen Removed: No    Estimated Blood Loss: minimal    COMPLICATIONS: None    DISPOSITION / PLANS: The patient was transferred to the recovery area in a stable condition for observation. The patient was reexamined prior to discharge. There was no evidence of acute neurologic injury following the procedure.  Patient was discharged from the recovery room after meeting discharge criteria. Home discharge instructions were given to the patient by the staff.

## 2019-09-18 ENCOUNTER — OFFICE VISIT (OUTPATIENT)
Dept: INTERNAL MEDICINE | Facility: CLINIC | Age: 64
End: 2019-09-18
Payer: MEDICARE

## 2019-09-18 VITALS
HEART RATE: 66 BPM | DIASTOLIC BLOOD PRESSURE: 82 MMHG | BODY MASS INDEX: 38.53 KG/M2 | WEIGHT: 260.13 LBS | SYSTOLIC BLOOD PRESSURE: 136 MMHG | OXYGEN SATURATION: 99 % | TEMPERATURE: 97 F | HEIGHT: 69 IN

## 2019-09-18 DIAGNOSIS — E78.00 PURE HYPERCHOLESTEROLEMIA: ICD-10-CM

## 2019-09-18 DIAGNOSIS — I10 ESSENTIAL HYPERTENSION: Primary | ICD-10-CM

## 2019-09-18 PROCEDURE — 99213 OFFICE O/P EST LOW 20 MIN: CPT | Mod: S$PBB,,, | Performed by: INTERNAL MEDICINE

## 2019-09-18 PROCEDURE — 99213 PR OFFICE/OUTPT VISIT, EST, LEVL III, 20-29 MIN: ICD-10-PCS | Mod: S$PBB,,, | Performed by: INTERNAL MEDICINE

## 2019-09-18 PROCEDURE — 99213 OFFICE O/P EST LOW 20 MIN: CPT | Mod: PBBFAC,PO | Performed by: INTERNAL MEDICINE

## 2019-09-18 PROCEDURE — 99999 PR PBB SHADOW E&M-EST. PATIENT-LVL III: ICD-10-PCS | Mod: PBBFAC,,, | Performed by: INTERNAL MEDICINE

## 2019-09-18 PROCEDURE — 99999 PR PBB SHADOW E&M-EST. PATIENT-LVL III: CPT | Mod: PBBFAC,,, | Performed by: INTERNAL MEDICINE

## 2019-09-18 RX ORDER — HYDROCODONE BITARTRATE AND ACETAMINOPHEN 10; 325 MG/1; MG/1
1 TABLET ORAL 2 TIMES DAILY PRN
Qty: 180 TABLET | Refills: 0 | Status: SHIPPED | OUTPATIENT
Start: 2019-09-18 | End: 2019-10-17 | Stop reason: SDUPTHER

## 2019-09-18 NOTE — PROGRESS NOTES
"HPI:  Patient is a 64-year-old female comes in today for her regular follow-up of her hypertension and for her chronic pain.  Patient has been doing about the same.  There has been no major changes.  She did have cervical nerve root injection done yesterday.    Current meds have been verified and updated per the EMR  Exam:/82 (BP Location: Right arm)   Pulse 66   Temp 96.9 °F (36.1 °C) (Tympanic)   Ht 5' 9" (1.753 m)   Wt 118 kg (260 lb 2.3 oz)   SpO2 99%   BMI 38.42 kg/m²   Exam deferred    Lab Results   Component Value Date    WBC 5.80 03/07/2019    HGB 14.4 03/07/2019    HCT 45.1 03/07/2019     03/07/2019    CHOL 163 09/06/2019    TRIG 146 09/06/2019    HDL 53 09/06/2019    ALT 17 03/07/2019    AST 20 03/07/2019     09/06/2019    K 4.6 09/06/2019     09/06/2019    CREATININE 0.9 09/06/2019    BUN 20 09/06/2019    CO2 30 (H) 09/06/2019    TSH 1.528 03/07/2019       Impression:  Multiple medical problems below, stable  Patient Active Problem List   Diagnosis    HTN (hypertension)    Generalized osteoarthrosis, involving multiple sites    History of breast cancer    Obesity, unspecified    Hyperlipidemia    Myofascial pain    Bilateral carpal tunnel syndrome    Lumbar radiculopathy    Chronic pain syndrome    Cervical radiculopathy       Plan:  Orders Placed This Encounter    HYDROcodone-acetaminophen (NORCO)  mg per tablet     She will be seen again in 3 months.    This note is generated with speech recognition software and is subject to transcription error and sound alike phrases that may be missed by proofreading.    "

## 2019-09-19 NOTE — DISCHARGE SUMMARY
[facility]  Short Stay  Discharge Summary    Admit Date: 9/17/2019    Discharge Date and Time: 9/17/2019  9:15 AM      Discharge Attending Physician: MARCY Lobato MD     Hospital Course (synopsis of major diagnoses, care, treatment, and services provided during the course of the hospital stay): Patient was admitted to Pre-op where informed consent was signed.  The patient was then taken to the procedure suite where the procedure was performed.  The patient was then return to the Pre-Op area and discharge was performed.     Final Diagnoses:    Principal Problem: <principal problem not specified>   Secondary Diagnoses:   Active Hospital Problems    Diagnosis  POA    Cervical radiculopathy [M54.12]  Yes      Resolved Hospital Problems   No resolved problems to display.       Discharged Condition: good    Disposition: Home or Self Care    Follow up/Patient Instructions:    Medications:  Reconciled Home Medications:      Medication List      CHANGE how you take these medications    cloNIDine 0.2 MG tablet  Commonly known as:  CATAPRES  Take 1 tablet (0.2 mg total) by mouth every evening.  What changed:  Another medication with the same name was removed. Continue taking this medication, and follow the directions you see here.     traMADol 50 mg tablet  Commonly known as:  ULTRAM  TAKE ONE TABLET BY MOUTH EVERY 6 HOURS AS NEEDED  What changed:  Another medication with the same name was removed. Continue taking this medication, and follow the directions you see here.        CONTINUE taking these medications    ALPRAZolam 1 MG tablet  Commonly known as:  XANAX  TAKE 1 TABLET BY MOUTH ONCE A DAY NIGHTLY AS NEEDED FOR ANXIETY     citalopram 40 MG tablet  Commonly known as:  CELEXA  TAKE 1 TABLET BY MOUTH ONCE A DAY     fluticasone propionate 50 mcg/actuation nasal spray  Commonly known as:  FLONASE  1 spray (50 mcg total) by Each Nare route once daily.     gabapentin 300 MG capsule  Commonly known as:   NEURONTIN  Take 1 capsule (300 mg total) by mouth 3 (three) times daily.     losartan-hydrochlorothiazide 100-25 mg 100-25 mg per tablet  Commonly known as:  HYZAAR  TAKE 1 TABLET BY MOUTH DAILY     meloxicam 15 MG tablet  Commonly known as:  MOBIC  TAKE ONE TABLET BY MOUTH DAILY WITH FOOD     pravastatin 40 MG tablet  Commonly known as:  PRAVACHOL  TAKE ONE TABLET BY MOUTH DAILY     VITAMIN D ORAL  Take 1,000 Units by mouth once daily.     zolpidem 10 mg Tab  Commonly known as:  AMBIEN  TAKE ONE TABLET BY MOUTH AT BEDTIME AS NEEDED          Discharge Procedure Orders   Diet general     Call MD for:  severe uncontrolled pain     Call MD for:  difficulty breathing, headache or visual disturbances     Call MD for:  redness, tenderness, or signs of infection (pain, swelling, redness, odor or green/yellow discharge around incision site)     Activity as tolerated

## 2019-10-11 ENCOUNTER — OFFICE VISIT (OUTPATIENT)
Dept: PAIN MEDICINE | Facility: CLINIC | Age: 64
End: 2019-10-11
Payer: MEDICARE

## 2019-10-11 VITALS
HEART RATE: 73 BPM | SYSTOLIC BLOOD PRESSURE: 130 MMHG | DIASTOLIC BLOOD PRESSURE: 68 MMHG | RESPIRATION RATE: 18 BRPM | WEIGHT: 260 LBS | HEIGHT: 69 IN | BODY MASS INDEX: 38.51 KG/M2

## 2019-10-11 DIAGNOSIS — M50.122 CERVICAL DISC DISORDER AT C5-C6 LEVEL WITH RADICULOPATHY: ICD-10-CM

## 2019-10-11 DIAGNOSIS — M47.812 SPONDYLOSIS OF CERVICAL JOINT WITHOUT MYELOPATHY: ICD-10-CM

## 2019-10-11 DIAGNOSIS — M50.30 DDD (DEGENERATIVE DISC DISEASE), CERVICAL: ICD-10-CM

## 2019-10-11 DIAGNOSIS — M54.12 CERVICAL RADICULOPATHY: Primary | ICD-10-CM

## 2019-10-11 PROCEDURE — 99213 OFFICE O/P EST LOW 20 MIN: CPT | Mod: PBBFAC | Performed by: PHYSICIAN ASSISTANT

## 2019-10-11 PROCEDURE — 99214 PR OFFICE/OUTPT VISIT, EST, LEVL IV, 30-39 MIN: ICD-10-PCS | Mod: S$PBB,,, | Performed by: PHYSICIAN ASSISTANT

## 2019-10-11 PROCEDURE — 99999 PR PBB SHADOW E&M-EST. PATIENT-LVL III: CPT | Mod: PBBFAC,,, | Performed by: PHYSICIAN ASSISTANT

## 2019-10-11 PROCEDURE — 99999 PR PBB SHADOW E&M-EST. PATIENT-LVL III: ICD-10-PCS | Mod: PBBFAC,,, | Performed by: PHYSICIAN ASSISTANT

## 2019-10-11 PROCEDURE — 99214 OFFICE O/P EST MOD 30 MIN: CPT | Mod: S$PBB,,, | Performed by: PHYSICIAN ASSISTANT

## 2019-10-11 NOTE — PROGRESS NOTES
Chief Pain Complaint:  Low back pain, left shoulder/ arm pain (possibly along C5)    Interval History: Patient was seen on 9/17/19. At that time she underwent .  The patient reports that she is/was better following the procedure.  she reports 80% pain relief.  The changes lasted 3 weeks so far.  The changes have continued through this visit.  She continues to have numbness, but pain has improved.     Interval History: Since last visit on 12-13-18, she reports that she has continued neck and left arm pain.  It has worsened over last 4 weeks.  Pain is worse at night. She denies any injury.  She reports 8/10 pain today.    Interval History: Ms. Vo returns to clinic.  She underwent left L3 transforaminal epidural steroid injection on November 6, 2018 and reports having ongoing 80% pain relief.  Currently her pain is rated 6/10 and is described as an aching sensation in the low back and a sharp shooting pain in the left shoulder.  She describes left shoulder pain happens periodically throughout the day but does bother her every single day.  She denies having radiation distal to the shoulder and denies having weakness in her upper extremities. She notices this sensation mostly when she rotates her head to the left and to the right.  Her symptoms are worse with activity improved with rest.  She denies having bowel bladder difficulties.    Initial History of Present Illness:  This patient is a 64 y.o. female who presents today complaining of the above noted pain/s. The patient describes the pain as follows.  Ms. Vo has a history of low back pain for approximately 5 years.  She is status post bilateral hip replacements with left femoral fracture and has had cerclage wires placed. She has participated in physical therapy which cause increased left lower extremity pain and water aerobics which also caused left lower extremity pain. Currently her pain is rated 7/10 and is described as a constant aching sensation which  goes down the left leg to the knee but not below.  She denies having symptoms on the right.  She denies having numbness and weakness in the left lower extremity.  She denies having bowel bladder difficulties.  She currently takes Mobic and Norco; she was previously taking Tramadol however this medication is recently been stopped.  There was no inciting event her symptoms are worse with walking and activity while improved with rest.    Previous Therapy:  Medications:  Mobic, Norco, tramadol, gabapentin  Injections:     - Left L3 transforaminal epidural steroid injection on 11-6-18 with 80% pain relief    - Right C5/6 TF KATYA with RN IV sedation on 9/17/19 with 80% pain relief  Surgeries:  Bilateral hip replacements, no lumbar surgery  Physical Therapy:  Has participated in physical therapy and aquatherapy over 1 year ago    Past Surgical History:   Procedure Laterality Date    BREAST LUMPECTOMY Right 2006    XRT    COLONOSCOPY N/A 10/15/2015    Procedure: COLONOSCOPY;  Surgeon: Maylin Shipley MD;  Location: [facility];  Service: Endoscopy;  Laterality: N/A;    HYSTERECTOMY      TRANSFORAMINAL EPIDURAL INJECTION OF STEROID Left 9/17/2019    Procedure: Left C5/6 TF KATYA w/ RN IV sedation;  Surgeon: Paul Lobato MD;  Location: [facility];  Service: Pain Management;  Laterality: Left;         Imaging / Labs / Studies (reviewed on 10/11/2019):    Results for orders placed during the hospital encounter of 08/22/19   MRI Cervical Spine Without Contrast    Narrative COMPARISON:  None.  FINDINGS:  There is no fracture or malalignment of the cervical spine.  No bone marrow replacing process.  No bone marrow edema.  The prevertebral soft tissues have a normal appearance in the STIR sequence without evidence for ligamentous injury.  The cervicocranial junction has a normal appearance.  There is no paraspinal or intrathecal mass.  C2-C3: Mild disc bulge without central canal stenosis.  Mild bilateral neural  foraminal narrowing from facet joint hypertrophic changes.  C3-C4: Posterior disc osteophyte complex with ventral surface thecal sac effacement and ventral surface cord contact without cord impingement.  Bilateral uncovertebral joint spurring with moderate bilateral neural foraminal stenosis.  C4-C5: There is a right paracentral focal disc protrusion with right ventral surface cord contact in subtle right ventral surface cord remodeling.  Mild right neural foraminal narrowing.  C5-C6: Degenerative disc disease.  Disc bulge with mild central canal stenosis and ventral surface cord contact and mild ventral surface cord remodeling but no abnormal cord signal intensity.  There is severe bilateral neural foraminal stenosis from uncovertebral joint spurring with probable impingement upon the exiting bilateral C6 nerve roots in the neural foramen.   C6-C7: Focal disc protrusion with mild ventral surface cord remodeling but no abnormal cord signal intensity.  Severe bilateral neural foraminal stenosis from uncovertebral joint spurring.  C7-T1: Mild disc bulge with mild ventral surface thecal sac effacement but no cord impingement.  Moderate bilateral neural foraminal stenosis.    Impression 1. There are multiple levels of neural foraminal stenosis as detailed above.  Of note there is severe bilateral neural foraminal stenosis at C5-C6 and C6-C7.  There is probable impingement upon the exiting bilateral C6 nerve roots at the C5-C6 level.  2. Multiple disc bulges and posterior disc osteophyte complexes throughout the cervical spine causing mild central canal stenosis as detailed above at each level, some with mild ventral surface cord remodeling but no abnormal cord signal intensity seen in the where in the cervical spine.     Results for orders placed during the hospital encounter of 12/13/18   X-Ray Cervical Spine AP And Lateral    Narrative FINDINGS:  Straightening of normal cervical lordosis.  This may be positional or  secondary to muscle spasm.  No fracture or listhesis.  Multilevel degenerative changes are seen with findings most pronounced at C3-C4 and C5-C6 with intervertebral disc space narrowing and marginal spurring with facet arthropathy.  Odontoid process remains intact.  Lateral masses are symmetric.  Prevertebral soft tissues are maintained.  Calcification about the left carotid vasculature is noted     Results for orders placed during the hospital encounter of 09/27/18   MRI Lumbar Spine Without Contrast    Narrative COMPARISON:  Left hip radiographs from September 10, 2018.  FINDINGS:  The osseous structures demonstrate nonspecific heterogeneous marrow signal intensity.  No fracture.  No listhesis.  Multilevel degenerative changes are seen with spurring of the endplates and intervertebral disc space narrowing at L4-L5.  Visualized spinal cord demonstrates normal signal intensity.  L1-L2: Mild facet arthropathy.  No spinal canal or neural foraminal encroachment.  L2-L3: Posterior disc bulge and facet and ligamentum flavum hypertrophy.  Mild effacement of the ventral thecal sac.  No spinal canal or neural foraminal stenosis.  L3-L4: Small annular tear is seen.  There is posterior disc protrusion and facet arthropathy.  This results in asymmetric neural foraminal and crow joint slightly greater on the right side.  L4-L5: Posterior disc protrusion and facet ligamentum flavum hypertrophy.  There is mild to moderate narrowing of the spinal canal and bilateral neural foraminal encroachment.  L5-S1: Facet and ligamentum flavum hypertrophy and posterior disc bulge.  Mild bilateral neural foraminal encroachment.  Spinal canal is maintained.    Impression Multifocal areas of facet and ligamentum flavum hypertrophy with disc protrusion resulting in neural foraminal encroachment at multiple levels.  Findings are most pronounced at L4-L5 where there is also spinal canal stenosis.  Degenerative changes and other findings as outlined  "above.         Review of Systems:  CONSTITUTIONAL: patient denies any fever, chills, or weight loss  SKIN: patient denies any rash or itching  RESPIRATORY: patient denies having any shortness of breath  GASTROINTESTINAL: patient denies having any diarrhea, constipation, or bowel incontinence  GENITOURINARY: patient denies having any abnormal bladder function    MUSCULOSKELETAL:  - patient complains of the above noted pain/s (see chief pain complaint)    NEUROLOGICAL:   - pain as above  - strength in Lower extremities is intact, BILATERALLY  - sensation in Lower extremities is intact, BILATERALLY  - patient denies any loss of bowel or bladder control      PSYCHIATRIC: patient denies any change in mood    Other:  All other systems reviewed and are negative        Physical Exam:  Vitals:  /68 (BP Location: Right arm, Patient Position: Sitting, BP Method: Medium (Automatic))   Pulse 73   Resp 18   Ht 5' 9" (1.753 m)   Wt 117.9 kg (260 lb)   BMI 38.40 kg/m²   (reviewed on 10/11/2019)    General: alert and oriented, in no apparent distress.  Gait: normal gait.  Skin: no rashes, no discoloration, no obvious lesions  HEENT: normocephalic, atraumatic. Pupils equal and round.  Cardiovascular: no significant peripheral edema present.  Respiratory: without use of accessory muscles of respiration.    Musculoskeletal - Cervical Spine:  - Pain on flexion of cervical spine: Present, improved  - Pain on extension of cervical spine: Absent   - Cervical facet loading: Absent   - TTP over the cervical facet joints: Absent  - TTP over the cervical paraspinals: Present  - Spurling's: Equivocal  On left    Left Shoulder:  - ROM: preserved  - TTP over the AC and GH joint: Absent   - Neer's: Absent   - Hawkin's: Absent   - Apley's Scratch test: Absent     Neuro - Lower Extremities:  - RLE Strength:     >> 5/5 strength with right hip flexion/ extension    >> 5/5 strength with right knee flexion/ extension    >> 5/5 strength in " right ankle with plantar and dorsiflexion  - LLE Strength:     >> 5/5 strength with left hip flexion/ extension    >> 4/5 strength with knee flexion extension on the left     >> 5/5 strength in left ankle with plantar and dorsiflexion  - Extremity Reflexes: Brisk and symmetric throughout  - Sensory: Sensation to light touch intact bilaterally      Psych:  Mood and affect is appropriate          Assessment  Lumbar Spondylosis  Hip OA status post hip replacement    1. 64 y.o. year old patient with PMH of   Past Medical History:   Diagnosis Date    Breast cancer     Chronic pain syndrome     Generalized osteoarthrosis, involving multiple sites     s/p THR bilateral    History of breast cancer 2007    lumpectomy/XRT    HTN (hypertension)     Hyperlipidemia     Obesity, unspecified       presenting with pain located left lateral thigh, back pain, shoulder pain.  Diagnoses include:    ICD-10-CM ICD-9-CM   1. Cervical radiculopathy M54.12 723.4   2. Cervical disc disorder at C5-C6 level with radiculopathy M50.122 722.91     723.4   3. Spondylosis of cervical joint without myelopathy M47.812 721.0   4. DDD (degenerative disc disease), cervical M50.30 722.4       2. Pain Generators / Etiology ; lumbar degenerative disc disease, lumbar spondylosis, lumbar radiculopathy, bilateral hip replacements; I suspect that the left lateral leg pain that she is feeling is due to loosening of the hardware with the cerclage wires associated with previous femoral fracture however will perform left L3 transforaminal epidural steroid injection for treatment and diagnostic benefit  3. Failed Meds:  Norco, tramadol, Mobic  4. Physical Therapy - has done all water aerobics and land-based physical therapy but it has been many months ago  5. Psychological comorbidities -  none  6. Anticoagulants / Antiplatelets:  None       Plan:  1. Interventional: S/p Right C5/6 TF KATYA with RN IV sedation on 9/17/19 with 80% pain relief.    2.  Pharmacologic:   - Continue gabapentin 300mg up to TID.    - Continue Mobic 15mg QD PRN, Norco 10mg BID PRN (from Dr. Cannon).    3. Rehabilitative: Encouraged regular exercise.    4. Diagnostic: None.     5. Follow up: PRN.     - I discussed the risks, benefits, and alternatives to potential treatment options. All questions and concerns were fully addressed today in clinic. Dr. Lobato was consulted regarding the patient plan and agrees.

## 2019-10-14 RX ORDER — CITALOPRAM 40 MG/1
TABLET, FILM COATED ORAL
Qty: 30 TABLET | Refills: 11 | Status: SHIPPED | OUTPATIENT
Start: 2019-10-14 | End: 2019-10-15 | Stop reason: SDUPTHER

## 2019-10-15 RX ORDER — CITALOPRAM 40 MG/1
TABLET, FILM COATED ORAL
Qty: 30 TABLET | Refills: 11 | Status: SHIPPED | OUTPATIENT
Start: 2019-10-15 | End: 2020-11-17 | Stop reason: SDUPTHER

## 2019-10-16 ENCOUNTER — TELEPHONE (OUTPATIENT)
Dept: INTERNAL MEDICINE | Facility: CLINIC | Age: 64
End: 2019-10-16

## 2019-10-16 NOTE — TELEPHONE ENCOUNTER
----- Message from Shivani Benjamin sent at 10/16/2019  8:51 AM CDT -----  Contact: Pt   States she's calling to speak with Kenyon rg her prescription / she was told to call to discuss and can be reached at [phone] until 3//thanks/gabe

## 2019-10-16 NOTE — TELEPHONE ENCOUNTER
Patient states that you increased her Norco to 180 tablets to take two to three times daily. She was seen on 09/18 and states you told her to call this week to have script sent in. Please advise.

## 2019-10-16 NOTE — TELEPHONE ENCOUNTER
----- Message from Jennifer Deyanira sent at 10/16/2019  2:03 PM CDT -----  Type:  RX Refill Request    Who Called:  Pt  Mary  Refill or New Rx:   Refill   RX Name and Strength: Norco  How is the patient currently taking it? (ex. 1XDay):  Takes 1-2 daily as needed  Is this a 30 day or 90 day RX:    Preferred Pharmacy with phone number   [facility]  Local or Mail Order:  Local  Ordering Provider:    Dr Cannon  Would the patient rather a call back or a response via [facility]?    Call back  Best Call Back Number: [phone]////////States she had left a message earlier and has not gotten a response//Dr Cannon had increased her med and she has run out early//she is needing a refilll///please call//abby/trista  Additional Information:

## 2019-10-16 NOTE — TELEPHONE ENCOUNTER
----- Message from Shivani Benjamin sent at 10/16/2019  8:51 AM CDT -----  Contact: Pt   States she's calling to speak with Kenyon rg her prescription / she was told to call to discuss and can be reached at [phone] until 3//thanks/gabe

## 2019-10-17 RX ORDER — HYDROCODONE BITARTRATE AND ACETAMINOPHEN 10; 325 MG/1; MG/1
1 TABLET ORAL 2 TIMES DAILY PRN
Qty: 180 TABLET | Refills: 0 | Status: SHIPPED | OUTPATIENT
Start: 2019-10-17 | End: 2019-12-24 | Stop reason: SDUPTHER

## 2019-10-18 ENCOUNTER — TELEPHONE (OUTPATIENT)
Dept: INTERNAL MEDICINE | Facility: CLINIC | Age: 64
End: 2019-10-18

## 2019-10-18 NOTE — TELEPHONE ENCOUNTER
----- Message from Shivani Benjamin sent at 10/18/2019 12:03 PM CDT -----  Contact: Cassie from [facility]  Type:  Pharmacy Calling to Clarify an RX    Name of Caller: cassie   Pharmacy Name:[facility]  Prescription Name: norco  What do they need to clarify?:zeinab states a number of things   Best Call Back Number:[phone]  Additional Information:

## 2019-10-18 NOTE — TELEPHONE ENCOUNTER
----- Message from Chapincito Correia sent at 10/18/2019  9:48 AM CDT -----  Contact: pt   .the patient calling to check on script request spoke with nurse about previously. pls return call.             [phone]

## 2019-12-05 RX ORDER — MELOXICAM 15 MG/1
TABLET ORAL
Qty: 30 TABLET | Refills: 11 | Status: SHIPPED | OUTPATIENT
Start: 2019-12-05 | End: 2020-09-01

## 2019-12-10 ENCOUNTER — TELEPHONE (OUTPATIENT)
Dept: OPHTHALMOLOGY | Facility: CLINIC | Age: 64
End: 2019-12-10

## 2019-12-10 NOTE — TELEPHONE ENCOUNTER
----- Message from Ashwini Hall LPN sent at 12/10/2019 12:20 PM CST -----  Hi,  Pt has been Dx with cataracts and would like to schedule appt with Dr. Poe.  Please Contact pts at [phone]     Thanks,   Loree VIDAL LPN Care Coordinator  Care Coordination Department  [facility]  [phone]

## 2019-12-24 ENCOUNTER — OFFICE VISIT (OUTPATIENT)
Dept: INTERNAL MEDICINE | Facility: CLINIC | Age: 64
End: 2019-12-24
Payer: MEDICARE

## 2019-12-24 VITALS
WEIGHT: 259.5 LBS | DIASTOLIC BLOOD PRESSURE: 78 MMHG | BODY MASS INDEX: 38.44 KG/M2 | OXYGEN SATURATION: 98 % | SYSTOLIC BLOOD PRESSURE: 132 MMHG | HEART RATE: 53 BPM | TEMPERATURE: 98 F | HEIGHT: 69 IN

## 2019-12-24 DIAGNOSIS — G89.4 CHRONIC PAIN SYNDROME: ICD-10-CM

## 2019-12-24 DIAGNOSIS — E78.00 PURE HYPERCHOLESTEROLEMIA: ICD-10-CM

## 2019-12-24 DIAGNOSIS — I10 ESSENTIAL HYPERTENSION: Primary | ICD-10-CM

## 2019-12-24 DIAGNOSIS — Z85.3 HISTORY OF BREAST CANCER: ICD-10-CM

## 2019-12-24 DIAGNOSIS — M54.16 LUMBAR RADICULOPATHY: ICD-10-CM

## 2019-12-24 DIAGNOSIS — M15.9 GENERALIZED OSTEOARTHROSIS, INVOLVING MULTIPLE SITES: ICD-10-CM

## 2019-12-24 PROCEDURE — 99213 PR OFFICE/OUTPT VISIT, EST, LEVL III, 20-29 MIN: ICD-10-PCS | Mod: S$PBB,,, | Performed by: INTERNAL MEDICINE

## 2019-12-24 PROCEDURE — 99999 PR PBB SHADOW E&M-EST. PATIENT-LVL III: ICD-10-PCS | Mod: PBBFAC,,, | Performed by: INTERNAL MEDICINE

## 2019-12-24 PROCEDURE — 99213 OFFICE O/P EST LOW 20 MIN: CPT | Mod: PBBFAC,PO | Performed by: INTERNAL MEDICINE

## 2019-12-24 PROCEDURE — 99999 PR PBB SHADOW E&M-EST. PATIENT-LVL III: CPT | Mod: PBBFAC,,, | Performed by: INTERNAL MEDICINE

## 2019-12-24 PROCEDURE — 99213 OFFICE O/P EST LOW 20 MIN: CPT | Mod: S$PBB,,, | Performed by: INTERNAL MEDICINE

## 2019-12-24 RX ORDER — HYDROCODONE BITARTRATE AND ACETAMINOPHEN 10; 325 MG/1; MG/1
1 TABLET ORAL 2 TIMES DAILY PRN
Qty: 180 TABLET | Refills: 0 | Status: SHIPPED | OUTPATIENT
Start: 2020-01-17 | End: 2020-04-17 | Stop reason: SDUPTHER

## 2019-12-24 NOTE — PROGRESS NOTES
"HPI:  Patient is a 64-year-old female comes today for follow-up of her hypertension and also for refills of her hydrocodone.  She has been doing well.  She does not check her blood pressure though.  She has no new complaints    Current meds have been verified and updated per the EMR  Exam:/78   Pulse (!) 53   Temp 97.6 °F (36.4 °C) (Tympanic)   Ht 5' 9" (1.753 m)   Wt 117.7 kg (259 lb 7.7 oz)   SpO2 98%   BMI 38.32 kg/m²   Exam deferred    Lab Results   Component Value Date    WBC 5.80 03/07/2019    HGB 14.4 03/07/2019    HCT 45.1 03/07/2019     03/07/2019    CHOL 163 09/06/2019    TRIG 146 09/06/2019    HDL 53 09/06/2019    ALT 17 03/07/2019    AST 20 03/07/2019     09/06/2019    K 4.6 09/06/2019     09/06/2019    CREATININE 0.9 09/06/2019    BUN 20 09/06/2019    CO2 30 (H) 09/06/2019    TSH 1.528 03/07/2019       Impression:  Stable problems below  Patient Active Problem List   Diagnosis    HTN (hypertension)    Generalized osteoarthrosis, involving multiple sites    History of breast cancer    Obesity, unspecified    Hyperlipidemia    Myofascial pain    Bilateral carpal tunnel syndrome    Lumbar radiculopathy    Chronic pain syndrome    Cervical radiculopathy       Plan:  Orders Placed This Encounter    Lipid panel    Comprehensive metabolic panel    TSH    CBC auto differential    HYDROcodone-acetaminophen (NORCO)  mg per tablet    She will see me in April with the above lab work.  She is due for her hydrocodone refill on January 18th.  It was given for 3 months.      This note is generated with speech recognition software and is subject to transcription error and sound alike phrases that may be missed by proofreading.    "

## 2020-01-02 ENCOUNTER — OFFICE VISIT (OUTPATIENT)
Dept: OPHTHALMOLOGY | Facility: CLINIC | Age: 65
End: 2020-01-02
Payer: MEDICARE

## 2020-01-02 DIAGNOSIS — H35.89: ICD-10-CM

## 2020-01-02 DIAGNOSIS — H26.9 CORTICAL CATARACT OF BOTH EYES: ICD-10-CM

## 2020-01-02 DIAGNOSIS — H25.13 NUCLEAR SCLEROSIS OF BOTH EYES: Primary | ICD-10-CM

## 2020-01-02 PROCEDURE — 92134 CPTRZ OPH DX IMG PST SGM RTA: CPT | Mod: PBBFAC | Performed by: OPHTHALMOLOGY

## 2020-01-02 PROCEDURE — 99999 PR PBB SHADOW E&M-EST. PATIENT-LVL II: CPT | Mod: PBBFAC,,, | Performed by: OPHTHALMOLOGY

## 2020-01-02 PROCEDURE — 92136 OPHTHALMIC BIOMETRY: CPT | Mod: PBBFAC,LT | Performed by: OPHTHALMOLOGY

## 2020-01-02 PROCEDURE — 92136 IOL MASTER - OS - LEFT EYE: ICD-10-PCS | Mod: 26,S$PBB,, | Performed by: OPHTHALMOLOGY

## 2020-01-02 PROCEDURE — 92134 POSTERIOR SEGMENT OCT RETINA (OCULAR COHERENCE TOMOGRAPHY)-BOTH EYES: ICD-10-PCS | Mod: 26,S$PBB,, | Performed by: OPHTHALMOLOGY

## 2020-01-02 PROCEDURE — 99999 PR PBB SHADOW E&M-EST. PATIENT-LVL II: ICD-10-PCS | Mod: PBBFAC,,, | Performed by: OPHTHALMOLOGY

## 2020-01-02 PROCEDURE — 92004 COMPRE OPH EXAM NEW PT 1/>: CPT | Mod: S$PBB,,, | Performed by: OPHTHALMOLOGY

## 2020-01-02 PROCEDURE — 99212 OFFICE O/P EST SF 10 MIN: CPT | Mod: PBBFAC | Performed by: OPHTHALMOLOGY

## 2020-01-02 PROCEDURE — 92004 PR EYE EXAM, NEW PATIENT,COMPREHESV: ICD-10-PCS | Mod: S$PBB,,, | Performed by: OPHTHALMOLOGY

## 2020-01-02 RX ORDER — GATIFLOXACIN 5 MG/ML
1 SOLUTION/ DROPS OPHTHALMIC 2 TIMES DAILY
Qty: 1 BOTTLE | Refills: 2 | Status: SHIPPED | OUTPATIENT
Start: 2020-01-02 | End: 2020-05-22

## 2020-01-02 RX ORDER — KETOROLAC TROMETHAMINE 5 MG/ML
1 SOLUTION OPHTHALMIC 4 TIMES DAILY
Qty: 1 BOTTLE | Refills: 2 | Status: SHIPPED | OUTPATIENT
Start: 2020-01-02 | End: 2020-05-22

## 2020-01-02 RX ORDER — PREDNISOLONE ACETATE 10 MG/ML
1 SUSPENSION/ DROPS OPHTHALMIC 4 TIMES DAILY
Qty: 1 BOTTLE | Refills: 2 | Status: SHIPPED | OUTPATIENT
Start: 2020-01-02 | End: 2020-05-22

## 2020-01-02 NOTE — PROGRESS NOTES
SUBJECTIVE:   Mary Vo is a 64 y.o. female   Corrected distance visual acuity was 20/50 in the right eye and 20/60 in the left eye.   Chief Complaint   Patient presents with    Cataract     Cataract Eval        HPI:  HPI     Cataract      Additional comments: Cataract Eval              Comments     PT C/O: VA had decreased at all ranges in OU gradually over the last 2-3   years, images appear cloudy and dull along with glare sensitivity when   driving at night so patient has limited her nighttime driving.  Also at   times OU will feel dry and bothersome.  Patient was seen by a doctor [facility] about 1 month ago and was told she had cataracts in OU.    Daughter is a [employer] employee    1. [employer]          Last edited by Ursula Molina, Patient Care Assistant on 1/2/2020  1:40   PM. (History)        Assessment /Plan :  1. Nuclear sclerosis of both eyes  Visually Significant Cataract OS > OD:   Patient reports decreased vision consistent with the clinical amount of lenticular opacity,  which reaches the level of visual significance and affects activities of daily living such as  drive. Risks, benefits, and alternatives to cataract surgery were  discussed.  IOL options were discussed, including Premium IOL'S and the associated  side effects and additional patient cost associated with them as well as patient's visual  goals. The pt expressed a desire to proceed with surgery with the potential for some  reasonable degree of visual improvement and was consented.  Risks of loss of vision and eye reviewed as well as possibility of need for spectacle correction after surgery even with premium implants. Verbal and written preop  instructions were provided to the patient.       Pt is interested in traditional monofocal IOL aiming for:    Distance OU and understands that glasses will be generally needed at all times for near vision and often for finer distance correction especially for higher degrees of  astigmatism.       Final visual acuity may be limited by foveal cyst and astigmatism    Pt wishes to have Phaco/IOL  OS     Requests a Monofocal IOL.    Will aim for distance    Other considerations: None     2. Cortical cataract of both eyes as above   3. Cystic lesion of fovea of eye, left eye - monitor for now

## 2020-01-03 RX ORDER — ZOLPIDEM TARTRATE 10 MG/1
TABLET ORAL
Qty: 20 TABLET | Refills: 5 | Status: SHIPPED | OUTPATIENT
Start: 2020-01-03 | End: 2020-05-22

## 2020-01-15 ENCOUNTER — OUTSIDE PLACE OF SERVICE (OUTPATIENT)
Dept: ADMINISTRATIVE | Facility: OTHER | Age: 65
End: 2020-01-15
Payer: MEDICARE

## 2020-01-15 PROCEDURE — 66984 PR REMOVAL, CATARACT, W/INSRT INTRAOC LENS, W/O ENDO CYCLO: ICD-10-PCS | Mod: LT,,, | Performed by: OPHTHALMOLOGY

## 2020-01-15 PROCEDURE — 66984 XCAPSL CTRC RMVL W/O ECP: CPT | Mod: LT,,, | Performed by: OPHTHALMOLOGY

## 2020-01-16 ENCOUNTER — OFFICE VISIT (OUTPATIENT)
Dept: OPHTHALMOLOGY | Facility: CLINIC | Age: 65
End: 2020-01-16
Payer: MEDICARE

## 2020-01-16 DIAGNOSIS — Z98.890 POST-OPERATIVE STATE: Primary | ICD-10-CM

## 2020-01-16 PROCEDURE — 99024 POSTOP FOLLOW-UP VISIT: CPT | Mod: POP,,, | Performed by: OPHTHALMOLOGY

## 2020-01-16 PROCEDURE — 99999 PR PBB SHADOW E&M-EST. PATIENT-LVL I: CPT | Mod: PBBFAC,,, | Performed by: OPHTHALMOLOGY

## 2020-01-16 PROCEDURE — 99211 OFF/OP EST MAY X REQ PHY/QHP: CPT | Mod: PBBFAC | Performed by: OPHTHALMOLOGY

## 2020-01-16 PROCEDURE — 99999 PR PBB SHADOW E&M-EST. PATIENT-LVL I: ICD-10-PCS | Mod: PBBFAC,,, | Performed by: OPHTHALMOLOGY

## 2020-01-16 PROCEDURE — 99024 PR POST-OP FOLLOW-UP VISIT: ICD-10-PCS | Mod: POP,,, | Performed by: OPHTHALMOLOGY

## 2020-01-16 NOTE — PROGRESS NOTES
SUBJECTIVE:   Mary Vo is a 64 y.o. female   Uncorrected distance visual acuity was not recorded in the right eye and 20/40 in the left eye.   Chief Complaint   Patient presents with    Post-op Evaluation        HPI:  HPI     Pt here for 1 day PCIOL OS PO. No pain or discomfort. Pt states her   vision is cloudy. 100% compliant with gtts.     Daughter is a [employer] employee    1. Catatacts OD  PCIOL OS 1/15/20    OS: Gatifloxacin BID, Pred QID, Ketorolac QID    Last edited by Fuentes Spencer, Patient Care Assistant on 1/16/2020  1:00   PM. (History)        Assessment /Plan :  1. Post-operative state Exam:  SLE:  S/C: normal  K : trace k fold  AC: trace cell and flare  Iris: normal  Lens: PCIOL    IMP:  PO Day 1 S/P Phaco/IOL OS : Doing well.    Plan:  Continue gtts to operative eye:  Pred 1% QID  Ketorolac QID  Zymaxid BID  Reinstructed in importance of absolute compliance with Post-OP instructions including medications, shield at bedtime, and limitation of activities. Follow up appointments in approximately one and six weeks or call immediately for increased pain, redness or vision loss.

## 2020-01-23 ENCOUNTER — OFFICE VISIT (OUTPATIENT)
Dept: OPHTHALMOLOGY | Facility: CLINIC | Age: 65
End: 2020-01-23
Payer: MEDICARE

## 2020-01-23 DIAGNOSIS — H25.11 NUCLEAR SCLEROSIS OF RIGHT EYE: ICD-10-CM

## 2020-01-23 DIAGNOSIS — H26.9 CORTICAL CATARACT OF RIGHT EYE: ICD-10-CM

## 2020-01-23 DIAGNOSIS — Z98.890 POST-OPERATIVE STATE: Primary | ICD-10-CM

## 2020-01-23 PROCEDURE — 99999 PR PBB SHADOW E&M-EST. PATIENT-LVL III: ICD-10-PCS | Mod: PBBFAC,,, | Performed by: OPHTHALMOLOGY

## 2020-01-23 PROCEDURE — 99213 OFFICE O/P EST LOW 20 MIN: CPT | Mod: PBBFAC | Performed by: OPHTHALMOLOGY

## 2020-01-23 PROCEDURE — 99024 PR POST-OP FOLLOW-UP VISIT: ICD-10-PCS | Mod: POP,,, | Performed by: OPHTHALMOLOGY

## 2020-01-23 PROCEDURE — 99999 PR PBB SHADOW E&M-EST. PATIENT-LVL III: CPT | Mod: PBBFAC,,, | Performed by: OPHTHALMOLOGY

## 2020-01-23 PROCEDURE — 99024 POSTOP FOLLOW-UP VISIT: CPT | Mod: POP,,, | Performed by: OPHTHALMOLOGY

## 2020-01-23 RX ORDER — KETOROLAC TROMETHAMINE 5 MG/ML
1 SOLUTION OPHTHALMIC 4 TIMES DAILY
Qty: 1 BOTTLE | Refills: 2 | Status: CANCELLED | OUTPATIENT
Start: 2020-01-23

## 2020-01-23 RX ORDER — PREDNISOLONE ACETATE 10 MG/ML
1 SUSPENSION/ DROPS OPHTHALMIC 4 TIMES DAILY
Qty: 1 BOTTLE | Refills: 2 | Status: SHIPPED | OUTPATIENT
Start: 2020-01-23 | End: 2020-05-22

## 2020-01-23 RX ORDER — PREDNISOLONE ACETATE 10 MG/ML
1 SUSPENSION/ DROPS OPHTHALMIC 4 TIMES DAILY
Qty: 1 BOTTLE | Refills: 2 | Status: CANCELLED | OUTPATIENT
Start: 2020-01-23

## 2020-01-23 RX ORDER — KETOROLAC TROMETHAMINE 5 MG/ML
1 SOLUTION OPHTHALMIC 4 TIMES DAILY
Qty: 1 BOTTLE | Refills: 2 | Status: SHIPPED | OUTPATIENT
Start: 2020-01-23 | End: 2020-05-22

## 2020-01-23 RX ORDER — GATIFLOXACIN 5 MG/ML
1 SOLUTION/ DROPS OPHTHALMIC 2 TIMES DAILY
Qty: 1 BOTTLE | Refills: 2 | Status: CANCELLED | OUTPATIENT
Start: 2020-01-23

## 2020-01-23 RX ORDER — LOSARTAN POTASSIUM AND HYDROCHLOROTHIAZIDE 25; 100 MG/1; MG/1
TABLET ORAL
Qty: 90 TABLET | Refills: 3 | Status: SHIPPED | OUTPATIENT
Start: 2020-01-23 | End: 2020-08-03

## 2020-01-23 RX ORDER — GATIFLOXACIN 5 MG/ML
1 SOLUTION/ DROPS OPHTHALMIC 2 TIMES DAILY
Qty: 1 BOTTLE | Refills: 2 | Status: SHIPPED | OUTPATIENT
Start: 2020-01-23 | End: 2020-05-22 | Stop reason: SDUPTHER

## 2020-01-23 NOTE — PROGRESS NOTES
SUBJECTIVE  Mary HERNÁN Vo is 64 y.o. female  Uncorrected distance visual acuity was not recorded in the right eye and 20/20 -1 in the left eye.   Chief Complaint   Patient presents with    Post-op Evaluation     1 wk s/p phaco OS. OS outer corner feels like pressure     Blurred Vision     pt c/o blurred vision OD at a distance           HPI     Post-op Evaluation      Additional comments: 1 wk s/p phaco OS. OS outer corner feels like   pressure               Blurred Vision      Additional comments: pt c/o blurred vision OD at a distance               Comments     1. Catatacts OD  PCIOL OS +22.0 SN60WF (Distance) / 1/15/2020    OS: Gatifloxacin BID, Pred QID, Ketorolac QID          Last edited by Suad Tierney MA on 1/23/2020  1:12 PM. (History)         Assessment /Plan :  1. Post-operative state Slit lamp exam:  L/L: nl  K: clear, wound sealed  AC: 0 cell and flare  Iris/Lens: IOL centered and stable      IMP:  PO Week 1 S/P Phaco/ IOL OS : Doing well with no evidence of infection or abnormal inflammation.     Plan:  Pt given and instructed in one week PO instructions. D/C zymaxid and start to taper off Ketorlac and Pred Forte 1% weekly. Can resume normal activitites and d/c eye shield. OTC reading glasses can be used until evaluated for final MR. Follow up in one month or PRN pain, redness, vision loss, or other concerns.     2. Cortical cataract of right eye   Patient reports decreased vision in the fellow eye consistent with the clinical amount of lenticular opacity, which reaches the level of visual significance and affects activities of daily living including reading and glare. Risks, benefits, and alternatives to cataract surgery were discussed and pt desired to schedule cataract surgery. Pt was consented and the biometry and lens options were reviewed.    Schedule cataract surgery in OD       Pt is interested in traditional monofocal IOL aiming for:    Distance OU and understands that glasses will  be generally needed at all times for near vision and often for finer distance correction especially for higher degrees of astigmatism.       Final visual acuity may be limited by astigmatism    Pt wishes to have Phaco/IOL  OD     Requests a Monofocal IOL.    Will aim for distance    Other considerations: None       3. Nuclear sclerosis of right eye as above

## 2020-01-29 ENCOUNTER — OUTSIDE PLACE OF SERVICE (OUTPATIENT)
Dept: ADMINISTRATIVE | Facility: OTHER | Age: 65
End: 2020-01-29
Payer: MEDICARE

## 2020-01-29 PROCEDURE — 66984 PR REMOVAL, CATARACT, W/INSRT INTRAOC LENS, W/O ENDO CYCLO: ICD-10-PCS | Mod: 79,RT,, | Performed by: OPHTHALMOLOGY

## 2020-01-29 PROCEDURE — 66984 XCAPSL CTRC RMVL W/O ECP: CPT | Mod: 79,RT,, | Performed by: OPHTHALMOLOGY

## 2020-01-30 ENCOUNTER — OFFICE VISIT (OUTPATIENT)
Dept: OPHTHALMOLOGY | Facility: CLINIC | Age: 65
End: 2020-01-30
Payer: MEDICARE

## 2020-01-30 DIAGNOSIS — Z98.890 POST-OPERATIVE STATE: Primary | ICD-10-CM

## 2020-01-30 PROCEDURE — 99999 PR PBB SHADOW E&M-EST. PATIENT-LVL II: CPT | Mod: PBBFAC,,, | Performed by: OPHTHALMOLOGY

## 2020-01-30 PROCEDURE — 99024 PR POST-OP FOLLOW-UP VISIT: ICD-10-PCS | Mod: POP,,, | Performed by: OPHTHALMOLOGY

## 2020-01-30 PROCEDURE — 99212 OFFICE O/P EST SF 10 MIN: CPT | Mod: PBBFAC | Performed by: OPHTHALMOLOGY

## 2020-01-30 PROCEDURE — 99024 POSTOP FOLLOW-UP VISIT: CPT | Mod: POP,,, | Performed by: OPHTHALMOLOGY

## 2020-01-30 PROCEDURE — 99999 PR PBB SHADOW E&M-EST. PATIENT-LVL II: ICD-10-PCS | Mod: PBBFAC,,, | Performed by: OPHTHALMOLOGY

## 2020-01-30 NOTE — PROGRESS NOTES
SUBJECTIVE  Maryreji Vo is 64 y.o. female  Uncorrected distance visual acuity was 20/25 in the right eye and not recorded in the left eye.   Chief Complaint   Patient presents with    Post-op Evaluation     1 day s/p phaco OD           HPI     Post-op Evaluation      Additional comments: 1 day s/p phaco OD               Comments     1. PCIOL OD +22.0 SN60WF 1/29/20  PCIOL OS +22.0 SN60WF (Distance) / 1/15/2020    OD: Gatifloxacin BID, Pred QID, Ketorolac QID  OS: pred TID, ket TID          Last edited by Suad Tierney MA on 1/30/2020  1:09 PM. (History)         Assessment /Plan :  1. Post-operative state Exam:  SLE:  S/C: normal  K : trace k fold  AC: trace cell and flare  Iris: normal  Lens: PCIOL    IMP:  PO Day 1 S/P Phaco/IOL OD : Doing well.    Plan:  Continue gtts to operative eye:  Pred 1% QID  Ketorolac QID  Zymaxid BID  Reinstructed in importance of absolute compliance with Post-OP instructions including medications, shield at bedtime, and limitation of activities. Follow up appointments in approximately one and six weeks or call immediately for increased pain, redness or vision loss.

## 2020-02-06 ENCOUNTER — OFFICE VISIT (OUTPATIENT)
Dept: OPHTHALMOLOGY | Facility: CLINIC | Age: 65
End: 2020-02-06
Payer: MEDICARE

## 2020-02-06 DIAGNOSIS — Z98.890 POST-OPERATIVE STATE: Primary | ICD-10-CM

## 2020-02-06 PROCEDURE — 99024 PR POST-OP FOLLOW-UP VISIT: ICD-10-PCS | Mod: POP,,, | Performed by: OPHTHALMOLOGY

## 2020-02-06 PROCEDURE — 99999 PR PBB SHADOW E&M-EST. PATIENT-LVL II: ICD-10-PCS | Mod: PBBFAC,,, | Performed by: OPHTHALMOLOGY

## 2020-02-06 PROCEDURE — 99999 PR PBB SHADOW E&M-EST. PATIENT-LVL II: CPT | Mod: PBBFAC,,, | Performed by: OPHTHALMOLOGY

## 2020-02-06 PROCEDURE — 99024 POSTOP FOLLOW-UP VISIT: CPT | Mod: POP,,, | Performed by: OPHTHALMOLOGY

## 2020-02-06 PROCEDURE — 99212 OFFICE O/P EST SF 10 MIN: CPT | Mod: PBBFAC | Performed by: OPHTHALMOLOGY

## 2020-02-06 NOTE — PROGRESS NOTES
SUBJECTIVE  Mary HERNÁN Vo is 64 y.o. female  Uncorrected distance visual acuity was 20/25 -1 in the right eye and not recorded in the left eye.   Chief Complaint   Patient presents with    Post-op Evaluation                HPI     Post-op Evaluation      Additional comments:                Comments     1 week Phaco OD the patient states that her right eye is doing well. 100%   drop compliance.    Daughter is a [employer] employee    1. PCIOL OD +22.0 SN60WF 1/29/20  PCIOL OS +22.0 SN60WF (Distance) / 1/15/2020    OD: Gatifloxacin BID, Pred QID, Ketorolac QID  OS: pred TID, ket TID          Last edited by Otilia Warner on 2/6/2020  2:09 PM. (History)         Assessment /Plan :  1. Post-operative state Slit lamp exam:  L/L: nl  K: clear, wound sealed  AC: 0 cell and flare  Iris/Lens: IOL centered and stable      IMP:  PO Week 1 S/P Phaco/ IOL OD : Doing well with no evidence of infection or abnormal inflammation.     Plan:  Pt given and instructed in one week PO instructions. D/C zymaxid and start to taper off Ketorlac and Pred Forte 1% weekly. Can resume normal activitites and d/c eye shield. OTC reading glasses can be used until evaluated for final MR. Follow up in one month or PRN pain, redness, vision loss, or other concerns.

## 2020-03-03 RX ORDER — ALPRAZOLAM 1 MG/1
TABLET ORAL
Qty: 30 TABLET | Refills: 5 | Status: SHIPPED | OUTPATIENT
Start: 2020-03-03 | End: 2020-09-02

## 2020-03-12 ENCOUNTER — OFFICE VISIT (OUTPATIENT)
Dept: OPHTHALMOLOGY | Facility: CLINIC | Age: 65
End: 2020-03-12
Payer: MEDICARE

## 2020-03-12 DIAGNOSIS — Z98.890 POST-OPERATIVE STATE: Primary | ICD-10-CM

## 2020-03-12 PROCEDURE — 99024 POSTOP FOLLOW-UP VISIT: CPT | Mod: POP,,, | Performed by: OPHTHALMOLOGY

## 2020-03-12 PROCEDURE — 99024 PR POST-OP FOLLOW-UP VISIT: ICD-10-PCS | Mod: POP,,, | Performed by: OPHTHALMOLOGY

## 2020-03-12 PROCEDURE — 99999 PR PBB SHADOW E&M-EST. PATIENT-LVL II: CPT | Mod: PBBFAC,,, | Performed by: OPHTHALMOLOGY

## 2020-03-12 PROCEDURE — 99212 OFFICE O/P EST SF 10 MIN: CPT | Mod: PBBFAC | Performed by: OPHTHALMOLOGY

## 2020-03-12 PROCEDURE — 99999 PR PBB SHADOW E&M-EST. PATIENT-LVL II: ICD-10-PCS | Mod: PBBFAC,,, | Performed by: OPHTHALMOLOGY

## 2020-03-12 NOTE — PROGRESS NOTES
SUBJECTIVE  Maryreji Vo is 64 y.o. female  Uncorrected distance visual acuity was 20/20 in the right eye and 20/20 in the left eye.   Chief Complaint   Patient presents with    Post-op Evaluation     1 month phaco OD 1/29/2020           HPI     Post-op Evaluation      Additional comments: 1 month phaco OD 1/29/2020               Comments     Pt states no problems since her last visit.     Daughter is a [employer] employee    1. PCIOL OD +22.0 SN60WF 1/29/20  PCIOL OS +22.0 SN60WF (Distance) / 1/15/2020          Last edited by Jonathan Hwang on 3/12/2020  1:50 PM. (History)         Assessment /Plan :  1. Post-operative state   Exam:    Slit lamp exam:  L/L: nl  S/C: quiet and uninflamed  K: clear, wound sealed  AC: no cell or flare  Iris/Lens: IOL centered and stable      Assessment:    PO Month 1 S/P Phaco/IOL OD : Doing Well and completing healing process. Final visual correction options discussed and appropriate prescriptions and/or OTC reading glass recommendations offered to patient. Pt desires OTC Readers and to keep current glasses. Pt instructed to follow up with Dr. Houston in 6 months for next eye exam or PRN any pain, redness, vision changes or other concerns.

## 2020-03-17 ENCOUNTER — LAB VISIT (OUTPATIENT)
Dept: LAB | Facility: HOSPITAL | Age: 65
End: 2020-03-17
Attending: INTERNAL MEDICINE
Payer: MEDICARE

## 2020-03-17 DIAGNOSIS — I10 ESSENTIAL HYPERTENSION: ICD-10-CM

## 2020-03-17 LAB
ALBUMIN SERPL BCP-MCNC: 3.8 G/DL (ref 3.5–5.2)
ALP SERPL-CCNC: 82 U/L (ref 55–135)
ALT SERPL W/O P-5'-P-CCNC: 25 U/L (ref 10–44)
ANION GAP SERPL CALC-SCNC: 9 MMOL/L (ref 8–16)
AST SERPL-CCNC: 26 U/L (ref 10–40)
BASOPHILS # BLD AUTO: 0.08 K/UL (ref 0–0.2)
BASOPHILS NFR BLD: 1.3 % (ref 0–1.9)
BILIRUB SERPL-MCNC: 0.4 MG/DL (ref 0.1–1)
BUN SERPL-MCNC: 13 MG/DL (ref 8–23)
CALCIUM SERPL-MCNC: 9.5 MG/DL (ref 8.7–10.5)
CHLORIDE SERPL-SCNC: 101 MMOL/L (ref 95–110)
CHOLEST SERPL-MCNC: 153 MG/DL (ref 120–199)
CHOLEST/HDLC SERPL: 3 {RATIO} (ref 2–5)
CO2 SERPL-SCNC: 31 MMOL/L (ref 23–29)
CREAT SERPL-MCNC: 0.9 MG/DL (ref 0.5–1.4)
DIFFERENTIAL METHOD: ABNORMAL
EOSINOPHIL # BLD AUTO: 0.3 K/UL (ref 0–0.5)
EOSINOPHIL NFR BLD: 4.1 % (ref 0–8)
ERYTHROCYTE [DISTWIDTH] IN BLOOD BY AUTOMATED COUNT: 12.6 % (ref 11.5–14.5)
EST. GFR  (AFRICAN AMERICAN): >60 ML/MIN/1.73 M^2
EST. GFR  (NON AFRICAN AMERICAN): >60 ML/MIN/1.73 M^2
GLUCOSE SERPL-MCNC: 106 MG/DL (ref 70–110)
HCT VFR BLD AUTO: 44.5 % (ref 37–48.5)
HDLC SERPL-MCNC: 51 MG/DL (ref 40–75)
HDLC SERPL: 33.3 % (ref 20–50)
HGB BLD-MCNC: 13.9 G/DL (ref 12–16)
IMM GRANULOCYTES # BLD AUTO: 0.01 K/UL (ref 0–0.04)
IMM GRANULOCYTES NFR BLD AUTO: 0.2 % (ref 0–0.5)
LDLC SERPL CALC-MCNC: 70.2 MG/DL (ref 63–159)
LYMPHOCYTES # BLD AUTO: 2.5 K/UL (ref 1–4.8)
LYMPHOCYTES NFR BLD: 39.3 % (ref 18–48)
MCH RBC QN AUTO: 30.2 PG (ref 27–31)
MCHC RBC AUTO-ENTMCNC: 31.2 G/DL (ref 32–36)
MCV RBC AUTO: 97 FL (ref 82–98)
MONOCYTES # BLD AUTO: 0.6 K/UL (ref 0.3–1)
MONOCYTES NFR BLD: 10 % (ref 4–15)
NEUTROPHILS # BLD AUTO: 2.9 K/UL (ref 1.8–7.7)
NEUTROPHILS NFR BLD: 45.1 % (ref 38–73)
NONHDLC SERPL-MCNC: 102 MG/DL
NRBC BLD-RTO: 0 /100 WBC
PLATELET # BLD AUTO: 223 K/UL (ref 150–350)
PMV BLD AUTO: 11.2 FL (ref 9.2–12.9)
POTASSIUM SERPL-SCNC: 3.5 MMOL/L (ref 3.5–5.1)
PROT SERPL-MCNC: 7.2 G/DL (ref 6–8.4)
RBC # BLD AUTO: 4.6 M/UL (ref 4–5.4)
SODIUM SERPL-SCNC: 141 MMOL/L (ref 136–145)
TRIGL SERPL-MCNC: 159 MG/DL (ref 30–150)
TSH SERPL DL<=0.005 MIU/L-ACNC: 2.71 UIU/ML (ref 0.4–4)
WBC # BLD AUTO: 6.39 K/UL (ref 3.9–12.7)

## 2020-03-17 PROCEDURE — 36415 COLL VENOUS BLD VENIPUNCTURE: CPT | Mod: PO

## 2020-03-17 PROCEDURE — 84443 ASSAY THYROID STIM HORMONE: CPT

## 2020-03-17 PROCEDURE — 80061 LIPID PANEL: CPT

## 2020-03-17 PROCEDURE — 85025 COMPLETE CBC W/AUTO DIFF WBC: CPT

## 2020-03-17 PROCEDURE — 80053 COMPREHEN METABOLIC PANEL: CPT

## 2020-03-18 ENCOUNTER — PATIENT MESSAGE (OUTPATIENT)
Dept: INTERNAL MEDICINE | Facility: CLINIC | Age: 65
End: 2020-03-18

## 2020-03-18 ENCOUNTER — TELEPHONE (OUTPATIENT)
Dept: INTERNAL MEDICINE | Facility: CLINIC | Age: 65
End: 2020-03-18

## 2020-03-18 NOTE — TELEPHONE ENCOUNTER
Call patient and tell her that her lab work was all in acceptable ranges.  Tell her I would like to postpone her appointment to see me until May.  Please read book.

## 2020-03-18 NOTE — TELEPHONE ENCOUNTER
----- Message from Jasmin Joseph sent at 3/18/2020 11:31 AM CDT -----  Contact: pt  Type:  Patient Returning Call    Who Called:pt  Who Left Message for Patient:nurse  Does the patient know what this is regarding?:not suure  Would the patient rather a call back or a response via [facility]? Call back  Best Call Back Number:[phone]  Additional Information:

## 2020-03-18 NOTE — TELEPHONE ENCOUNTER
Spoke with patient and informed her that her labs are in acceptable ranges and postpone her appointment until May. Patient verbalized understanding and stated that she will need her pain medication next week. Please Advise

## 2020-03-19 NOTE — TELEPHONE ENCOUNTER
Spoke with patient and informed her of what the provider stated and she stated she will call next week when she needs it

## 2020-04-08 ENCOUNTER — TELEPHONE (OUTPATIENT)
Dept: INTERNAL MEDICINE | Facility: CLINIC | Age: 65
End: 2020-04-08

## 2020-04-08 RX ORDER — HYDROCODONE BITARTRATE AND ACETAMINOPHEN 10; 325 MG/1; MG/1
1 TABLET ORAL 2 TIMES DAILY PRN
Qty: 180 TABLET | Refills: 0 | OUTPATIENT
Start: 2020-04-08

## 2020-04-08 NOTE — TELEPHONE ENCOUNTER
Pt called stating it is time for refill for her pain medication pt wants to know why refill denied

## 2020-04-08 NOTE — TELEPHONE ENCOUNTER
----- Message from Millicent Soria sent at 4/8/2020  8:11 AM CDT -----  Contact: p  Type:  RX Refill Request    Who Called: Patient  Refill or New Rxrefill  RX Name and Strength:Hydrocodone   How is the patient currently taking it? (ex. 1XDay): 2xday  Is this a 30 day or 90 day RX:30  Preferred Pharmacy with phone number[facility]  Local or Mail Order:local  Ordering Provider:Dr Cannon  Would the patient rather a call back or a response via [facility]? Call back  Best Call Back Number:[phone]  Additional Information: na

## 2020-04-08 NOTE — TELEPHONE ENCOUNTER
site shows last refill 1/17/20 so her next refill is not till 4/17/20. She is 9 days too soon. Please always check  site and explain to pt that Dr Cannon always does. No early refills

## 2020-04-08 NOTE — TELEPHONE ENCOUNTER
Informed pt of what provider stated, patient verbalized understanding and will give us a cb on the 17th

## 2020-04-08 NOTE — TELEPHONE ENCOUNTER
----- Message from Nick Durant sent at 4/8/2020  3:07 PM CDT -----  Contact: pt  Type:  RX Refill Request    Who Called: JEFFY MAR   Refill or New Rx: refill  RX Name and Strength: NORCO  mg  How is the patient currently taking it? (ex. 1XDay):  Twice daily  Is this a 30 day or 90 day RX:   Preferred Pharmacy with phone number:     [facility] - [address] [address]  Phone: [phone] Fax: [phone]    Local or Mail Order:  local  Ordering Provider: Davis  Would the patient rather a call back or a response via [facility]?  call  Best Call Back Number: [phone]  Additional Information: this is the pt 3rd time calling please call the pt to let her know that you did receive this message please

## 2020-04-08 NOTE — TELEPHONE ENCOUNTER
----- Message from Nick Durant sent at 4/8/2020  3:07 PM CDT -----  Contact: pt  Type:  RX Refill Request    Who Called: JEFFY MAR   Refill or New Rx: refill  RX Name and Strength: NORCO  mg  How is the patient currently taking it? (ex. 1XDay):  Twice daily  Is this a 30 day or 90 day RX:   Preferred Pharmacy with phone number:     [facility] - [address] [address]  Phone: [phone] Fax: [phone]    Local or Mail Order:  local  Ordering Provider: Davis  Would the patient rather a call back or a response via [facility]?  call  Best Call Back Number: [phone]  Additional Information: this is the pt 3rd time calling please call the pt to let her know that you did receive this message please

## 2020-04-17 ENCOUNTER — TELEPHONE (OUTPATIENT)
Dept: INTERNAL MEDICINE | Facility: CLINIC | Age: 65
End: 2020-04-17

## 2020-04-17 RX ORDER — HYDROCODONE BITARTRATE AND ACETAMINOPHEN 10; 325 MG/1; MG/1
1 TABLET ORAL 2 TIMES DAILY PRN
Qty: 180 TABLET | Refills: 0 | Status: SHIPPED | OUTPATIENT
Start: 2020-04-17 | End: 2020-07-15 | Stop reason: SDUPTHER

## 2020-04-17 NOTE — TELEPHONE ENCOUNTER
----- Message from Frances Vora sent at 4/17/2020  9:48 AM CDT -----  Contact: Patient  Patient, is checking on status of a refill for Norco, a message was sent but pharmacy has not received anything, please call her back at [phone]. Thank you

## 2020-04-17 NOTE — TELEPHONE ENCOUNTER
Her narco was last refilled on 12/24 for a 90 day supply which means she is not due till 4/24/20 which is a week from today. Can not refill early.     In future when pt's call about controlled drug refills please check the  site and if it is early just let them know. No need to send message to me.

## 2020-04-17 NOTE — TELEPHONE ENCOUNTER
Patient called to confirm medication was refilled for her Norco. I informed her it was sent in today.

## 2020-04-17 NOTE — TELEPHONE ENCOUNTER
----- Message from Millicent Soria sent at 4/17/2020 12:17 PM CDT -----  Contact: pt  Type:  Patient Returning Call    Who Called:Patient  Who Left Message for Patient:nurse  Does the patient know what this is regarding?:na  Would the patient rather a call back or a response via [facility]? Call back  Best Call Back Number:[phone]  Additional Information: na

## 2020-05-22 ENCOUNTER — OFFICE VISIT (OUTPATIENT)
Dept: INTERNAL MEDICINE | Facility: CLINIC | Age: 65
End: 2020-05-22
Payer: MEDICARE

## 2020-05-22 VITALS
SYSTOLIC BLOOD PRESSURE: 148 MMHG | DIASTOLIC BLOOD PRESSURE: 78 MMHG | HEART RATE: 75 BPM | HEIGHT: 69 IN | WEIGHT: 257.5 LBS | OXYGEN SATURATION: 98 % | BODY MASS INDEX: 38.14 KG/M2

## 2020-05-22 DIAGNOSIS — I10 ESSENTIAL HYPERTENSION: Primary | ICD-10-CM

## 2020-05-22 DIAGNOSIS — M54.12 CERVICAL RADICULOPATHY: ICD-10-CM

## 2020-05-22 DIAGNOSIS — G89.4 CHRONIC PAIN SYNDROME: ICD-10-CM

## 2020-05-22 DIAGNOSIS — M54.16 LUMBAR RADICULOPATHY: ICD-10-CM

## 2020-05-22 DIAGNOSIS — Z85.3 HISTORY OF BREAST CANCER: ICD-10-CM

## 2020-05-22 DIAGNOSIS — E66.9 OBESITY, UNSPECIFIED CLASSIFICATION, UNSPECIFIED OBESITY TYPE, UNSPECIFIED WHETHER SERIOUS COMORBIDITY PRESENT: ICD-10-CM

## 2020-05-22 DIAGNOSIS — Z12.31 BREAST CANCER SCREENING BY MAMMOGRAM: ICD-10-CM

## 2020-05-22 DIAGNOSIS — E78.00 PURE HYPERCHOLESTEROLEMIA: ICD-10-CM

## 2020-05-22 PROCEDURE — 99213 OFFICE O/P EST LOW 20 MIN: CPT | Mod: PBBFAC,PO | Performed by: INTERNAL MEDICINE

## 2020-05-22 PROCEDURE — 99214 OFFICE O/P EST MOD 30 MIN: CPT | Mod: S$PBB,,, | Performed by: INTERNAL MEDICINE

## 2020-05-22 PROCEDURE — 99999 PR PBB SHADOW E&M-EST. PATIENT-LVL III: CPT | Mod: PBBFAC,,, | Performed by: INTERNAL MEDICINE

## 2020-05-22 PROCEDURE — 99999 PR PBB SHADOW E&M-EST. PATIENT-LVL III: ICD-10-PCS | Mod: PBBFAC,,, | Performed by: INTERNAL MEDICINE

## 2020-05-22 PROCEDURE — 99214 PR OFFICE/OUTPT VISIT, EST, LEVL IV, 30-39 MIN: ICD-10-PCS | Mod: S$PBB,,, | Performed by: INTERNAL MEDICINE

## 2020-05-22 NOTE — PROGRESS NOTES
"HPI:  Patient is a 65-year-old female who comes today for follow-up of her hypertension, lipids, and for her annual physical.  She has been doing well.  There have been no new problems or complaints.    Current MEDS: medcard review, verified and update  Allergies: Per the electronic medical record    Past Medical History:   Diagnosis Date    Breast cancer     Chronic pain syndrome     Generalized osteoarthrosis, involving multiple sites     s/p THR bilateral    History of breast cancer 2007    lumpectomy/XRT    HTN (hypertension)     Hyperlipidemia     Obesity, unspecified        Past Surgical History:   Procedure Laterality Date    BREAST LUMPECTOMY Right 2006    XRT    CATARACT EXTRACTION W/  INTRAOCULAR LENS IMPLANT Left 01/15/2020    COLONOSCOPY N/A 10/15/2015    Procedure: COLONOSCOPY;  Surgeon: Maylin Shipley MD;  Location: [facility];  Service: Endoscopy;  Laterality: N/A;    HYSTERECTOMY      TRANSFORAMINAL EPIDURAL INJECTION OF STEROID Left 9/17/2019    Procedure: Left C5/6 TF KATYA w/ RN IV sedation;  Surgeon: Paul Lobato MD;  Location: [facility];  Service: Pain Management;  Laterality: Left;       SHx: per the electronic medical record    FHx: recorded in the electronic medical record    ROS:    denies any chest pains or shortness of breath. Denies any nausea, vomiting or diarrhea. Denies any fever, chills or sweats. Denies any change in weight, voice, stool, skin or hair. Denies any dysuria, dyspepsia or dysphagia. Denies any change in vision, hearing or headaches. Denies any swollen lymph nodes or loss of memory.    PE:  BP (!) 148/78   Pulse 75   Ht 5' 9" (1.753 m)   Wt 116.8 kg (257 lb 8 oz)   SpO2 98%   BMI 38.03 kg/m²   Gen: Well-developed, well-nourished, female, in no acute distress, oriented x3  HEENT: neck is supple, no adenopathy, carotids 2+ equal without bruits, thyroid exam normal size without nodules.  CHEST: clear to auscultation and percussion  CVS: regular " rate and rhythm without significant murmur, gallop, or rubs  ABD: soft, benign, no rebound no guarding, no distention. Bowel sounds are normal.     Nontender,  no palpable masses, no organomegaly and no audible bruits.  BREAST: no masses.  No nipple inversion, retraction, or deviation.  She has lumpectomy scar in the right breast  EXT: no clubbing, cyanosis, or edema  LYMPH: no cervical, inguinal, or axillary adenopathy  FEET: no loss of sensation.  No ulcers or pressure sores.  NEURO: gait normal.  Cranial nerves II- XII intact. No nystagmus.  Speech normal.   Gross motor and sensory unremarkable.  PELVIC: deferred    Lab Results   Component Value Date    WBC 6.39 03/17/2020    HGB 13.9 03/17/2020    HCT 44.5 03/17/2020     03/17/2020    CHOL 153 03/17/2020    TRIG 159 (H) 03/17/2020    HDL 51 03/17/2020    ALT 25 03/17/2020    AST 26 03/17/2020     03/17/2020    K 3.5 03/17/2020     03/17/2020    CREATININE 0.9 03/17/2020    BUN 13 03/17/2020    CO2 31 (H) 03/17/2020    TSH 2.705 03/17/2020       Impression:  Multiple medical problems below, stable  Patient Active Problem List   Diagnosis    HTN (hypertension)    Generalized osteoarthrosis, involving multiple sites    History of breast cancer    Obesity, unspecified    Hyperlipidemia    Myofascial pain    Bilateral carpal tunnel syndrome    Lumbar radiculopathy    Chronic pain syndrome    Cervical radiculopathy       Plan:   Orders Placed This Encounter    Mammo Digital Screening Bilat    Comprehensive metabolic panel    Lipid Panel    TSH    CBC auto differential    Medications remain the same.  She will be seen again in 6 months with above lab work      This note is generated with speech recognition software and is subject to transcription error and sound alike phrases that may be missed by proofreading.

## 2020-07-15 RX ORDER — HYDROCODONE BITARTRATE AND ACETAMINOPHEN 10; 325 MG/1; MG/1
1 TABLET ORAL 2 TIMES DAILY PRN
Qty: 180 TABLET | Refills: 0 | Status: SHIPPED | OUTPATIENT
Start: 2020-07-15 | End: 2020-10-16 | Stop reason: SDUPTHER

## 2020-07-15 NOTE — TELEPHONE ENCOUNTER
----- Message from Carylbeatrice De Souza sent at 7/15/2020  1:32 PM CDT -----  Contact: Self   Requesting an RX refill or new RX.  Is this a refill or new RX:    RX name and strength: HYDROcodone-acetaminophen (NORCO)  mg per tablet  Directions (copy/paste from chart):  Take 1 tablet by mouth 2 (two) times daily as needed. - Oral   Is this a 30 day or 90 day RX:    Local pharmacy or mail order pharmacy:    Pharmacy name and phone # (copy/paste from chart):   [facility] - [address] [phone] (Phone)  [phone] (Fax)      Comments:

## 2020-09-01 ENCOUNTER — TELEPHONE (OUTPATIENT)
Dept: PAIN MEDICINE | Facility: CLINIC | Age: 65
End: 2020-09-01

## 2020-09-01 ENCOUNTER — OFFICE VISIT (OUTPATIENT)
Dept: INTERNAL MEDICINE | Facility: CLINIC | Age: 65
End: 2020-09-01
Payer: MEDICARE

## 2020-09-01 VITALS
WEIGHT: 262.81 LBS | HEIGHT: 69 IN | SYSTOLIC BLOOD PRESSURE: 160 MMHG | HEART RATE: 68 BPM | OXYGEN SATURATION: 96 % | DIASTOLIC BLOOD PRESSURE: 90 MMHG | BODY MASS INDEX: 38.93 KG/M2 | TEMPERATURE: 98 F

## 2020-09-01 DIAGNOSIS — M54.16 LUMBAR RADICULOPATHY: ICD-10-CM

## 2020-09-01 DIAGNOSIS — F41.1 GAD (GENERALIZED ANXIETY DISORDER): Primary | ICD-10-CM

## 2020-09-01 DIAGNOSIS — I10 ESSENTIAL HYPERTENSION: Primary | ICD-10-CM

## 2020-09-01 DIAGNOSIS — E78.5 HYPERLIPIDEMIA, UNSPECIFIED HYPERLIPIDEMIA TYPE: Primary | ICD-10-CM

## 2020-09-01 DIAGNOSIS — G89.4 CHRONIC PAIN SYNDROME: ICD-10-CM

## 2020-09-01 PROCEDURE — 99999 PR PBB SHADOW E&M-EST. PATIENT-LVL IV: CPT | Mod: PBBFAC,,, | Performed by: INTERNAL MEDICINE

## 2020-09-01 PROCEDURE — 99999 PR PBB SHADOW E&M-EST. PATIENT-LVL IV: ICD-10-PCS | Mod: PBBFAC,,, | Performed by: INTERNAL MEDICINE

## 2020-09-01 PROCEDURE — 99213 PR OFFICE/OUTPT VISIT, EST, LEVL III, 20-29 MIN: ICD-10-PCS | Mod: S$PBB,,, | Performed by: INTERNAL MEDICINE

## 2020-09-01 PROCEDURE — 99214 OFFICE O/P EST MOD 30 MIN: CPT | Mod: PBBFAC,PO | Performed by: INTERNAL MEDICINE

## 2020-09-01 PROCEDURE — 99213 OFFICE O/P EST LOW 20 MIN: CPT | Mod: S$PBB,,, | Performed by: INTERNAL MEDICINE

## 2020-09-01 RX ORDER — PRAVASTATIN SODIUM 40 MG/1
TABLET ORAL
Qty: 30 TABLET | Refills: 11 | Status: SHIPPED | OUTPATIENT
Start: 2020-09-01 | End: 2021-08-02

## 2020-09-01 RX ORDER — CELECOXIB 200 MG/1
200 CAPSULE ORAL 2 TIMES DAILY
Qty: 60 CAPSULE | Refills: 11 | Status: SHIPPED | OUTPATIENT
Start: 2020-09-01 | End: 2021-04-13

## 2020-09-01 RX ORDER — AMLODIPINE BESYLATE 5 MG/1
5 TABLET ORAL DAILY
Qty: 90 TABLET | Refills: 3 | Status: SHIPPED | OUTPATIENT
Start: 2020-09-01 | End: 2021-04-13

## 2020-09-01 NOTE — TELEPHONE ENCOUNTER
----- Message from Eve Freitas NP sent at 9/1/2020  3:24 PM CDT -----  Please call pt,she's requesting to see Dr. Lobato due to severe neck/arm pain

## 2020-09-01 NOTE — PROGRESS NOTES
"HPI:  Patient is 65-year-old female comes today for follow-up of her hypertension.  She states has been running high at home.  It has been in the 140-160/90 range.    Current meds have been verified and updated per the EMR  Exam:BP (!) 160/90 (BP Location: Left arm)   Pulse 68   Temp 97.7 °F (36.5 °C) (Tympanic)   Ht 5' 9" (1.753 m)   Wt 119.2 kg (262 lb 12.6 oz)   SpO2 96%   BMI 38.81 kg/m²   Exam deferred    Lab Results   Component Value Date    WBC 6.39 03/17/2020    HGB 13.9 03/17/2020    HCT 44.5 03/17/2020     03/17/2020    CHOL 153 03/17/2020    TRIG 159 (H) 03/17/2020    HDL 51 03/17/2020    ALT 25 03/17/2020    AST 26 03/17/2020     03/17/2020    K 3.5 03/17/2020     03/17/2020    CREATININE 0.9 03/17/2020    BUN 13 03/17/2020    CO2 31 (H) 03/17/2020    TSH 2.705 03/17/2020       Impression:  Hypertension, not to goal  Patient Active Problem List   Diagnosis    HTN (hypertension)    Generalized osteoarthrosis, involving multiple sites    History of breast cancer    Obesity, unspecified    Hyperlipidemia    Myofascial pain    Bilateral carpal tunnel syndrome    Lumbar radiculopathy    Chronic pain syndrome    Cervical radiculopathy       Plan:  Orders Placed This Encounter    amLODIPine (NORVASC) 5 MG tablet    celecoxib (CELEBREX) 200 MG capsule     We will add Norvasc 5 mg a day to her regimen.  Patient has been encouraged to lose weight and exercise.  Patient will see me again and 7 weeks to recheck her blood pressure.    This note is generated with speech recognition software and is subject to transcription error and sound alike phrases that may be missed by proofreading.      "

## 2020-09-02 ENCOUNTER — TELEPHONE (OUTPATIENT)
Dept: PAIN MEDICINE | Facility: CLINIC | Age: 65
End: 2020-09-02

## 2020-09-02 RX ORDER — ALPRAZOLAM 1 MG/1
TABLET ORAL
Qty: 30 TABLET | Refills: 5 | Status: SHIPPED | OUTPATIENT
Start: 2020-09-02 | End: 2021-04-02

## 2020-09-02 NOTE — TELEPHONE ENCOUNTER
----- Message from Karen Freitas sent at 9/2/2020 10:11 AM CDT -----  Contact: pt  Type:  Patient Returning Call    Who Called: pt  Who Left Message for Patient: unknown   Does the patient know what this is regarding?: no  Would the patient rather a call back or a response via [facility]? Call back  Best Call Back Number: [phone] or [phone]  Additional Information:  n/a

## 2020-09-02 NOTE — TELEPHONE ENCOUNTER
Was able tog et pt scheduled for in clinic visti with Lobato 09/08/2020 all questions answered//dp

## 2020-09-04 ENCOUNTER — TELEPHONE (OUTPATIENT)
Dept: INTERNAL MEDICINE | Facility: CLINIC | Age: 65
End: 2020-09-04

## 2020-09-04 NOTE — TELEPHONE ENCOUNTER
----- Message from Marta Benjamin sent at 9/4/2020 11:38 AM CDT -----  Contact: JEFFY  Would like to consult with nurse about a mammo please call back  [phone]

## 2020-09-08 ENCOUNTER — OFFICE VISIT (OUTPATIENT)
Dept: PAIN MEDICINE | Facility: CLINIC | Age: 65
End: 2020-09-08
Payer: MEDICARE

## 2020-09-08 ENCOUNTER — TELEPHONE (OUTPATIENT)
Dept: ORTHOPEDICS | Facility: CLINIC | Age: 65
End: 2020-09-08

## 2020-09-08 VITALS
WEIGHT: 257.69 LBS | SYSTOLIC BLOOD PRESSURE: 134 MMHG | DIASTOLIC BLOOD PRESSURE: 73 MMHG | HEART RATE: 63 BPM | HEIGHT: 69 IN | BODY MASS INDEX: 38.17 KG/M2

## 2020-09-08 DIAGNOSIS — M54.12 CERVICAL RADICULOPATHY: Primary | ICD-10-CM

## 2020-09-08 PROCEDURE — 99214 OFFICE O/P EST MOD 30 MIN: CPT | Mod: S$PBB,,, | Performed by: PAIN MEDICINE

## 2020-09-08 PROCEDURE — 96372 THER/PROPH/DIAG INJ SC/IM: CPT | Mod: PBBFAC

## 2020-09-08 PROCEDURE — 99999 PR PBB SHADOW E&M-EST. PATIENT-LVL IV: ICD-10-PCS | Mod: PBBFAC,,, | Performed by: PAIN MEDICINE

## 2020-09-08 PROCEDURE — 99214 PR OFFICE/OUTPT VISIT, EST, LEVL IV, 30-39 MIN: ICD-10-PCS | Mod: S$PBB,,, | Performed by: PAIN MEDICINE

## 2020-09-08 PROCEDURE — 99214 OFFICE O/P EST MOD 30 MIN: CPT | Mod: PBBFAC,25 | Performed by: PAIN MEDICINE

## 2020-09-08 PROCEDURE — 99999 PR PBB SHADOW E&M-EST. PATIENT-LVL IV: CPT | Mod: PBBFAC,,, | Performed by: PAIN MEDICINE

## 2020-09-08 RX ORDER — KETOROLAC TROMETHAMINE 30 MG/ML
15 INJECTION, SOLUTION INTRAMUSCULAR; INTRAVENOUS ONCE
Status: COMPLETED | OUTPATIENT
Start: 2020-09-08 | End: 2020-09-08

## 2020-09-08 RX ADMIN — KETOROLAC TROMETHAMINE 15 MG: 30 INJECTION, SOLUTION INTRAMUSCULAR; INTRAVENOUS at 02:09

## 2020-09-08 NOTE — H&P (VIEW-ONLY)
"Chief Pain Complaint:  Low back pain, left shoulder/ arm pain (possibly along C5)    Interval History:  Ms. Vo returns to clinic for follow-up.  She underwent a left C5/6 transforaminal epidural steroid injection a September of 2019 reports 100% symptomatic pain relief.  She reports she has started to have symptoms again in her left upper extremity.  She has numbness in her left hand in a nondermatomal distribution.  She occasionally has symptoms in the right upper extremity deny newly is frequently and left.  She is looking for a "fix" her her current pain complaints.  Her symptoms mostly started return approximately 2-3 months ago and she currently rates her pain as 7-8/10.    Interval History: Patient was seen on 9/17/19. At that time she underwent .  The patient reports that she is/was better following the procedure.  she reports 80% pain relief.  The changes lasted 3 weeks so far.  The changes have continued through this visit.  She continues to have numbness, but pain has improved.     Interval History: Since last visit on 12-13-18, she reports that she has continued neck and left arm pain.  It has worsened over last 4 weeks.  Pain is worse at night. She denies any injury.  She reports 8/10 pain today.    Interval History: Ms. Vo returns to clinic.  She underwent left L3 transforaminal epidural steroid injection on November 6, 2018 and reports having ongoing 80% pain relief.  Currently her pain is rated 6/10 and is described as an aching sensation in the low back and a sharp shooting pain in the left shoulder.  She describes left shoulder pain happens periodically throughout the day but does bother her every single day.  She denies having radiation distal to the shoulder and denies having weakness in her upper extremities. She notices this sensation mostly when she rotates her head to the left and to the right.  Her symptoms are worse with activity improved with rest.  She denies having bowel bladder " difficulties.    Initial History of Present Illness:  This patient is a 65 y.o. female who presents today complaining of the above noted pain/s. The patient describes the pain as follows.  Ms. Vo has a history of low back pain for approximately 5 years.  She is status post bilateral hip replacements with left femoral fracture and has had cerclage wires placed. She has participated in physical therapy which cause increased left lower extremity pain and water aerobics which also caused left lower extremity pain. Currently her pain is rated 7/10 and is described as a constant aching sensation which goes down the left leg to the knee but not below.  She denies having symptoms on the right.  She denies having numbness and weakness in the left lower extremity.  She denies having bowel bladder difficulties.  She currently takes Mobic and Norco; she was previously taking Tramadol however this medication is recently been stopped.  There was no inciting event her symptoms are worse with walking and activity while improved with rest.    Previous Therapy:  Medications:  Mobic, Norco, tramadol, gabapentin  Injections:     - Left L3 transforaminal epidural steroid injection on 11-6-18 with 80% pain relief    - Right C5/6 TF KATYA with RN IV sedation on 9/17/19 with 80% pain relief  Surgeries:  Bilateral hip replacements, no lumbar surgery  Physical Therapy:  Has participated in physical therapy and aquatherapy over 1 year ago    Past Surgical History:   Procedure Laterality Date    BREAST LUMPECTOMY Right 2006    XRT    CATARACT EXTRACTION W/  INTRAOCULAR LENS IMPLANT Left 01/15/2020    COLONOSCOPY N/A 10/15/2015    Procedure: COLONOSCOPY;  Surgeon: Maylin Shipley MD;  Location: [facility];  Service: Endoscopy;  Laterality: N/A;    HYSTERECTOMY      TRANSFORAMINAL EPIDURAL INJECTION OF STEROID Left 9/17/2019    Procedure: Left C5/6 TF KATYA w/ RN IV sedation;  Surgeon: Paul Lobato MD;  Location: [facility];   Service: Pain Management;  Laterality: Left;     Imaging / Labs / Studies (reviewed on 9/8/2020):  Results for orders placed during the hospital encounter of 08/22/19   MRI Cervical Spine Without Contrast    Narrative COMPARISON:  None.  FINDINGS:  There is no fracture or malalignment of the cervical spine.  No bone marrow replacing process.  No bone marrow edema.  The prevertebral soft tissues have a normal appearance in the STIR sequence without evidence for ligamentous injury.  The cervicocranial junction has a normal appearance.  There is no paraspinal or intrathecal mass.  C2-C3: Mild disc bulge without central canal stenosis.  Mild bilateral neural foraminal narrowing from facet joint hypertrophic changes.  C3-C4: Posterior disc osteophyte complex with ventral surface thecal sac effacement and ventral surface cord contact without cord impingement.  Bilateral uncovertebral joint spurring with moderate bilateral neural foraminal stenosis.  C4-C5: There is a right paracentral focal disc protrusion with right ventral surface cord contact in subtle right ventral surface cord remodeling.  Mild right neural foraminal narrowing.  C5-C6: Degenerative disc disease.  Disc bulge with mild central canal stenosis and ventral surface cord contact and mild ventral surface cord remodeling but no abnormal cord signal intensity.  There is severe bilateral neural foraminal stenosis from uncovertebral joint spurring with probable impingement upon the exiting bilateral C6 nerve roots in the neural foramen.   C6-C7: Focal disc protrusion with mild ventral surface cord remodeling but no abnormal cord signal intensity.  Severe bilateral neural foraminal stenosis from uncovertebral joint spurring.  C7-T1: Mild disc bulge with mild ventral surface thecal sac effacement but no cord impingement.  Moderate bilateral neural foraminal stenosis.    Impression 1. There are multiple levels of neural foraminal stenosis as detailed above.  Of  note there is severe bilateral neural foraminal stenosis at C5-C6 and C6-C7.  There is probable impingement upon the exiting bilateral C6 nerve roots at the C5-C6 level.  2. Multiple disc bulges and posterior disc osteophyte complexes throughout the cervical spine causing mild central canal stenosis as detailed above at each level, some with mild ventral surface cord remodeling but no abnormal cord signal intensity seen in the where in the cervical spine.     Results for orders placed during the hospital encounter of 12/13/18   X-Ray Cervical Spine AP And Lateral    Narrative FINDINGS:  Straightening of normal cervical lordosis.  This may be positional or secondary to muscle spasm.  No fracture or listhesis.  Multilevel degenerative changes are seen with findings most pronounced at C3-C4 and C5-C6 with intervertebral disc space narrowing and marginal spurring with facet arthropathy.  Odontoid process remains intact.  Lateral masses are symmetric.  Prevertebral soft tissues are maintained.  Calcification about the left carotid vasculature is noted     Results for orders placed during the hospital encounter of 09/27/18   MRI Lumbar Spine Without Contrast    Narrative COMPARISON:  Left hip radiographs from September 10, 2018.  FINDINGS:  The osseous structures demonstrate nonspecific heterogeneous marrow signal intensity.  No fracture.  No listhesis.  Multilevel degenerative changes are seen with spurring of the endplates and intervertebral disc space narrowing at L4-L5.  Visualized spinal cord demonstrates normal signal intensity.  L1-L2: Mild facet arthropathy.  No spinal canal or neural foraminal encroachment.  L2-L3: Posterior disc bulge and facet and ligamentum flavum hypertrophy.  Mild effacement of the ventral thecal sac.  No spinal canal or neural foraminal stenosis.  L3-L4: Small annular tear is seen.  There is posterior disc protrusion and facet arthropathy.  This results in asymmetric neural foraminal and  "crow joint slightly greater on the right side.  L4-L5: Posterior disc protrusion and facet ligamentum flavum hypertrophy.  There is mild to moderate narrowing of the spinal canal and bilateral neural foraminal encroachment.  L5-S1: Facet and ligamentum flavum hypertrophy and posterior disc bulge.  Mild bilateral neural foraminal encroachment.  Spinal canal is maintained.    Impression Multifocal areas of facet and ligamentum flavum hypertrophy with disc protrusion resulting in neural foraminal encroachment at multiple levels.  Findings are most pronounced at L4-L5 where there is also spinal canal stenosis.  Degenerative changes and other findings as outlined above.     Review of Systems:  CONSTITUTIONAL: patient denies any fever, chills, or weight loss  SKIN: patient denies any rash or itching  RESPIRATORY: patient denies having any shortness of breath  GASTROINTESTINAL: patient denies having any diarrhea, constipation, or bowel incontinence  GENITOURINARY: patient denies having any abnormal bladder function    MUSCULOSKELETAL:  - patient complains of the above noted pain/s (see chief pain complaint)    NEUROLOGICAL:   - pain as above  - strength in Lower extremities is intact, BILATERALLY  - sensation in Lower extremities is intact, BILATERALLY  - patient denies any loss of bowel or bladder control      PSYCHIATRIC: patient denies any change in mood    Other:  All other systems reviewed and are negative    Physical Exam:  Vitals:  /73   Pulse 63   Ht 5' 9" (1.753 m)   Wt 116.9 kg (257 lb 11.5 oz)   BMI 38.06 kg/m²   (reviewed on 9/8/2020)    General: alert and oriented, in no apparent distress.  Gait: normal gait.  Skin: no rashes, no discoloration, no obvious lesions  HEENT: normocephalic, atraumatic. Pupils equal and round.  Cardiovascular: no significant peripheral edema present.  Respiratory: without use of accessory muscles of respiration.    Musculoskeletal - Cervical Spine:  - Pain on flexion of " cervical spine: Present, improved  - Pain on extension of cervical spine:  Present   - Cervical facet loading:  Present  - TTP over the cervical facet joints: Absent  - TTP over the cervical paraspinals: Present  - Spurling's:  Negative for radicular symptoms    Neuro - Upper extremities  5/5 strength bilateral upper extremities including deltoid, biceps, triceps    - Extremity Reflexes: Brisk and symmetric throughout  - Sensory: Sensation to light touch intact bilaterally      Psych:  Mood and affect is appropriate    Assessment  Lumbar Spondylosis  Hip OA status post hip replacement    1. 65 y.o. year old patient with PMH of   Past Medical History:   Diagnosis Date    Breast cancer     Chronic pain syndrome     Generalized osteoarthrosis, involving multiple sites     s/p THR bilateral    History of breast cancer 2007    lumpectomy/XRT    HTN (hypertension)     Hyperlipidemia     Obesity, unspecified       presenting with pain located left lateral thigh, back pain, shoulder pain.  Diagnoses include:    ICD-10-CM ICD-9-CM   1. Cervical radiculopathy  M54.12 723.4       2. Pain Generators / Etiology ; lumbar degenerative disc disease, lumbar spondylosis, lumbar radiculopathy, bilateral hip replacements; I suspect that the left lateral leg pain that she is feeling is due to loosening of the hardware with the cerclage wires associated with previous femoral fracture however will perform left L3 transforaminal epidural steroid injection for treatment and diagnostic benefit  3. Failed Meds:  Norco, tramadol, Mobic  4. Physical Therapy - has done all water aerobics and land-based physical therapy but it has been many months ago  5. Psychological comorbidities -  none  6. Anticoagulants / Antiplatelets:  None       Plan:  1. Interventional:  Will schedule for C7/T1 interlaminar epidural steroid injection    2. Pharmacologic:   - Continue gabapentin 300mg up to TID.    - Continue Mobic 15mg QD PRN, Norco 10mg BID PRN  as prescribed  -will provide a 15 mg Toradol injection today in clinic    3. Rehabilitative: Encouraged regular exercise.    4. Diagnostic: None.     5. Follow up:  Follow up in clinic 4 weeks after the injection; will also put in a referral for patient to be seen and evaluated by Neurosurgery    - I discussed the risks, benefits, and alternatives to potential treatment options. All questions and concerns were fully addressed today in clinic. Dr. Lobato was consulted regarding the patient plan and agrees.    MARCY Lobato MD  Interventional Pain Medicine  [facility] [place]

## 2020-09-08 NOTE — PROGRESS NOTES
"Chief Pain Complaint:  Low back pain, left shoulder/ arm pain (possibly along C5)    Interval History:  Ms. Vo returns to clinic for follow-up.  She underwent a left C5/6 transforaminal epidural steroid injection a September of 2019 reports 100% symptomatic pain relief.  She reports she has started to have symptoms again in her left upper extremity.  She has numbness in her left hand in a nondermatomal distribution.  She occasionally has symptoms in the right upper extremity deny newly is frequently and left.  She is looking for a "fix" her her current pain complaints.  Her symptoms mostly started return approximately 2-3 months ago and she currently rates her pain as 7-8/10.    Interval History: Patient was seen on 9/17/19. At that time she underwent .  The patient reports that she is/was better following the procedure.  she reports 80% pain relief.  The changes lasted 3 weeks so far.  The changes have continued through this visit.  She continues to have numbness, but pain has improved.     Interval History: Since last visit on 12-13-18, she reports that she has continued neck and left arm pain.  It has worsened over last 4 weeks.  Pain is worse at night. She denies any injury.  She reports 8/10 pain today.    Interval History: Ms. Vo returns to clinic.  She underwent left L3 transforaminal epidural steroid injection on November 6, 2018 and reports having ongoing 80% pain relief.  Currently her pain is rated 6/10 and is described as an aching sensation in the low back and a sharp shooting pain in the left shoulder.  She describes left shoulder pain happens periodically throughout the day but does bother her every single day.  She denies having radiation distal to the shoulder and denies having weakness in her upper extremities. She notices this sensation mostly when she rotates her head to the left and to the right.  Her symptoms are worse with activity improved with rest.  She denies having bowel bladder " difficulties.    Initial History of Present Illness:  This patient is a 65 y.o. female who presents today complaining of the above noted pain/s. The patient describes the pain as follows.  Ms. Vo has a history of low back pain for approximately 5 years.  She is status post bilateral hip replacements with left femoral fracture and has had cerclage wires placed. She has participated in physical therapy which cause increased left lower extremity pain and water aerobics which also caused left lower extremity pain. Currently her pain is rated 7/10 and is described as a constant aching sensation which goes down the left leg to the knee but not below.  She denies having symptoms on the right.  She denies having numbness and weakness in the left lower extremity.  She denies having bowel bladder difficulties.  She currently takes Mobic and Norco; she was previously taking Tramadol however this medication is recently been stopped.  There was no inciting event her symptoms are worse with walking and activity while improved with rest.    Previous Therapy:  Medications:  Mobic, Norco, tramadol, gabapentin  Injections:     - Left L3 transforaminal epidural steroid injection on 11-6-18 with 80% pain relief    - Right C5/6 TF KATYA with RN IV sedation on 9/17/19 with 80% pain relief  Surgeries:  Bilateral hip replacements, no lumbar surgery  Physical Therapy:  Has participated in physical therapy and aquatherapy over 1 year ago    Past Surgical History:   Procedure Laterality Date    BREAST LUMPECTOMY Right 2006    XRT    CATARACT EXTRACTION W/  INTRAOCULAR LENS IMPLANT Left 01/15/2020    COLONOSCOPY N/A 10/15/2015    Procedure: COLONOSCOPY;  Surgeon: Maylin Shipley MD;  Location: [facility];  Service: Endoscopy;  Laterality: N/A;    HYSTERECTOMY      TRANSFORAMINAL EPIDURAL INJECTION OF STEROID Left 9/17/2019    Procedure: Left C5/6 TF KATYA w/ RN IV sedation;  Surgeon: Paul Lobato MD;  Location: [facility];   Service: Pain Management;  Laterality: Left;     Imaging / Labs / Studies (reviewed on 9/8/2020):  Results for orders placed during the hospital encounter of 08/22/19   MRI Cervical Spine Without Contrast    Narrative COMPARISON:  None.  FINDINGS:  There is no fracture or malalignment of the cervical spine.  No bone marrow replacing process.  No bone marrow edema.  The prevertebral soft tissues have a normal appearance in the STIR sequence without evidence for ligamentous injury.  The cervicocranial junction has a normal appearance.  There is no paraspinal or intrathecal mass.  C2-C3: Mild disc bulge without central canal stenosis.  Mild bilateral neural foraminal narrowing from facet joint hypertrophic changes.  C3-C4: Posterior disc osteophyte complex with ventral surface thecal sac effacement and ventral surface cord contact without cord impingement.  Bilateral uncovertebral joint spurring with moderate bilateral neural foraminal stenosis.  C4-C5: There is a right paracentral focal disc protrusion with right ventral surface cord contact in subtle right ventral surface cord remodeling.  Mild right neural foraminal narrowing.  C5-C6: Degenerative disc disease.  Disc bulge with mild central canal stenosis and ventral surface cord contact and mild ventral surface cord remodeling but no abnormal cord signal intensity.  There is severe bilateral neural foraminal stenosis from uncovertebral joint spurring with probable impingement upon the exiting bilateral C6 nerve roots in the neural foramen.   C6-C7: Focal disc protrusion with mild ventral surface cord remodeling but no abnormal cord signal intensity.  Severe bilateral neural foraminal stenosis from uncovertebral joint spurring.  C7-T1: Mild disc bulge with mild ventral surface thecal sac effacement but no cord impingement.  Moderate bilateral neural foraminal stenosis.    Impression 1. There are multiple levels of neural foraminal stenosis as detailed above.  Of  note there is severe bilateral neural foraminal stenosis at C5-C6 and C6-C7.  There is probable impingement upon the exiting bilateral C6 nerve roots at the C5-C6 level.  2. Multiple disc bulges and posterior disc osteophyte complexes throughout the cervical spine causing mild central canal stenosis as detailed above at each level, some with mild ventral surface cord remodeling but no abnormal cord signal intensity seen in the where in the cervical spine.     Results for orders placed during the hospital encounter of 12/13/18   X-Ray Cervical Spine AP And Lateral    Narrative FINDINGS:  Straightening of normal cervical lordosis.  This may be positional or secondary to muscle spasm.  No fracture or listhesis.  Multilevel degenerative changes are seen with findings most pronounced at C3-C4 and C5-C6 with intervertebral disc space narrowing and marginal spurring with facet arthropathy.  Odontoid process remains intact.  Lateral masses are symmetric.  Prevertebral soft tissues are maintained.  Calcification about the left carotid vasculature is noted     Results for orders placed during the hospital encounter of 09/27/18   MRI Lumbar Spine Without Contrast    Narrative COMPARISON:  Left hip radiographs from September 10, 2018.  FINDINGS:  The osseous structures demonstrate nonspecific heterogeneous marrow signal intensity.  No fracture.  No listhesis.  Multilevel degenerative changes are seen with spurring of the endplates and intervertebral disc space narrowing at L4-L5.  Visualized spinal cord demonstrates normal signal intensity.  L1-L2: Mild facet arthropathy.  No spinal canal or neural foraminal encroachment.  L2-L3: Posterior disc bulge and facet and ligamentum flavum hypertrophy.  Mild effacement of the ventral thecal sac.  No spinal canal or neural foraminal stenosis.  L3-L4: Small annular tear is seen.  There is posterior disc protrusion and facet arthropathy.  This results in asymmetric neural foraminal and  "crow joint slightly greater on the right side.  L4-L5: Posterior disc protrusion and facet ligamentum flavum hypertrophy.  There is mild to moderate narrowing of the spinal canal and bilateral neural foraminal encroachment.  L5-S1: Facet and ligamentum flavum hypertrophy and posterior disc bulge.  Mild bilateral neural foraminal encroachment.  Spinal canal is maintained.    Impression Multifocal areas of facet and ligamentum flavum hypertrophy with disc protrusion resulting in neural foraminal encroachment at multiple levels.  Findings are most pronounced at L4-L5 where there is also spinal canal stenosis.  Degenerative changes and other findings as outlined above.     Review of Systems:  CONSTITUTIONAL: patient denies any fever, chills, or weight loss  SKIN: patient denies any rash or itching  RESPIRATORY: patient denies having any shortness of breath  GASTROINTESTINAL: patient denies having any diarrhea, constipation, or bowel incontinence  GENITOURINARY: patient denies having any abnormal bladder function    MUSCULOSKELETAL:  - patient complains of the above noted pain/s (see chief pain complaint)    NEUROLOGICAL:   - pain as above  - strength in Lower extremities is intact, BILATERALLY  - sensation in Lower extremities is intact, BILATERALLY  - patient denies any loss of bowel or bladder control      PSYCHIATRIC: patient denies any change in mood    Other:  All other systems reviewed and are negative    Physical Exam:  Vitals:  /73   Pulse 63   Ht 5' 9" (1.753 m)   Wt 116.9 kg (257 lb 11.5 oz)   BMI 38.06 kg/m²   (reviewed on 9/8/2020)    General: alert and oriented, in no apparent distress.  Gait: normal gait.  Skin: no rashes, no discoloration, no obvious lesions  HEENT: normocephalic, atraumatic. Pupils equal and round.  Cardiovascular: no significant peripheral edema present.  Respiratory: without use of accessory muscles of respiration.    Musculoskeletal - Cervical Spine:  - Pain on flexion of " cervical spine: Present, improved  - Pain on extension of cervical spine:  Present   - Cervical facet loading:  Present  - TTP over the cervical facet joints: Absent  - TTP over the cervical paraspinals: Present  - Spurling's:  Negative for radicular symptoms    Neuro - Upper extremities  5/5 strength bilateral upper extremities including deltoid, biceps, triceps    - Extremity Reflexes: Brisk and symmetric throughout  - Sensory: Sensation to light touch intact bilaterally      Psych:  Mood and affect is appropriate    Assessment  Lumbar Spondylosis  Hip OA status post hip replacement    1. 65 y.o. year old patient with PMH of   Past Medical History:   Diagnosis Date    Breast cancer     Chronic pain syndrome     Generalized osteoarthrosis, involving multiple sites     s/p THR bilateral    History of breast cancer 2007    lumpectomy/XRT    HTN (hypertension)     Hyperlipidemia     Obesity, unspecified       presenting with pain located left lateral thigh, back pain, shoulder pain.  Diagnoses include:    ICD-10-CM ICD-9-CM   1. Cervical radiculopathy  M54.12 723.4       2. Pain Generators / Etiology ; lumbar degenerative disc disease, lumbar spondylosis, lumbar radiculopathy, bilateral hip replacements; I suspect that the left lateral leg pain that she is feeling is due to loosening of the hardware with the cerclage wires associated with previous femoral fracture however will perform left L3 transforaminal epidural steroid injection for treatment and diagnostic benefit  3. Failed Meds:  Norco, tramadol, Mobic  4. Physical Therapy - has done all water aerobics and land-based physical therapy but it has been many months ago  5. Psychological comorbidities -  none  6. Anticoagulants / Antiplatelets:  None       Plan:  1. Interventional:  Will schedule for C7/T1 interlaminar epidural steroid injection    2. Pharmacologic:   - Continue gabapentin 300mg up to TID.    - Continue Mobic 15mg QD PRN, Norco 10mg BID PRN  as prescribed  -will provide a 15 mg Toradol injection today in clinic    3. Rehabilitative: Encouraged regular exercise.    4. Diagnostic: None.     5. Follow up:  Follow up in clinic 4 weeks after the injection; will also put in a referral for patient to be seen and evaluated by Neurosurgery    - I discussed the risks, benefits, and alternatives to potential treatment options. All questions and concerns were fully addressed today in clinic. Dr. Lobato was consulted regarding the patient plan and agrees.    MARCY Lobato MD  Interventional Pain Medicine  [facility] [place]

## 2020-09-08 NOTE — PATIENT INSTRUCTIONS
-will schedule for C7/T1 interlaminar epidural steroid injection  -will provide 15 mg Toradol injection today in clinic  -will provide referral to Neurosurgery for surgical evaluation  -continue all medications as prescribed  -follow up in clinic 4 weeks after the injection

## 2020-09-15 ENCOUNTER — HOSPITAL ENCOUNTER (OUTPATIENT)
Dept: RADIOLOGY | Facility: HOSPITAL | Age: 65
Discharge: HOME | End: 2020-09-15
Attending: INTERNAL MEDICINE
Payer: MEDICARE

## 2020-09-15 VITALS — BODY MASS INDEX: 38.17 KG/M2 | HEIGHT: 69 IN | WEIGHT: 257.69 LBS

## 2020-09-15 DIAGNOSIS — Z12.31 BREAST CANCER SCREENING BY MAMMOGRAM: ICD-10-CM

## 2020-09-15 PROCEDURE — 77067 SCR MAMMO BI INCL CAD: CPT | Mod: TC

## 2020-09-15 PROCEDURE — 77067 MAMMO DIGITAL SCREENING BILAT WITH CAD: ICD-10-PCS | Mod: 26,,, | Performed by: RADIOLOGY

## 2020-09-15 PROCEDURE — 77067 SCR MAMMO BI INCL CAD: CPT | Mod: 26,,, | Performed by: RADIOLOGY

## 2020-09-18 NOTE — PRE-PROCEDURE INSTRUCTIONS
Spoke with patient regarding procedure scheduled on 9/25  Arrival time 0800    Has patient been sick with fever or on antibiotics within the last 7 days? No    Has patient received a vaccination within the last 7 days? no    Has the patient stopped all medications as directed? celebrex and vitamin d on 9/18.     Does patient have a pacemaker and or defibrillator? no    Does the patient have a ride to and from procedure and someone reliable to remain with patient? Coordinating transportation. Aware of  Policy     Is the patient diabetic? no    Does the patient have sleep apnea? Or use O2 at home? No and no     Is the patient receiving sedation? yes    Is the patient instructed to remain NPO beginning at midnight the night before their procedure? yes    Procedure location confirmed with patient? Yes    Covid- Denies signs/symptoms. Instructed to notify PAT/MD if any changes.

## 2020-09-22 ENCOUNTER — PATIENT OUTREACH (OUTPATIENT)
Dept: ADMINISTRATIVE | Facility: OTHER | Age: 65
End: 2020-09-22

## 2020-09-22 ENCOUNTER — OFFICE VISIT (OUTPATIENT)
Dept: OPHTHALMOLOGY | Facility: CLINIC | Age: 65
End: 2020-09-22
Payer: MEDICARE

## 2020-09-22 DIAGNOSIS — Z96.1 PSEUDOPHAKIA: Primary | ICD-10-CM

## 2020-09-22 PROCEDURE — 92014 PR EYE EXAM, EST PATIENT,COMPREHESV: ICD-10-PCS | Mod: S$PBB,,, | Performed by: OPHTHALMOLOGY

## 2020-09-22 PROCEDURE — 99212 OFFICE O/P EST SF 10 MIN: CPT | Mod: PBBFAC | Performed by: OPHTHALMOLOGY

## 2020-09-22 PROCEDURE — 92014 COMPRE OPH EXAM EST PT 1/>: CPT | Mod: S$PBB,,, | Performed by: OPHTHALMOLOGY

## 2020-09-22 PROCEDURE — 99999 PR PBB SHADOW E&M-EST. PATIENT-LVL II: ICD-10-PCS | Mod: PBBFAC,,, | Performed by: OPHTHALMOLOGY

## 2020-09-22 PROCEDURE — 99999 PR PBB SHADOW E&M-EST. PATIENT-LVL II: CPT | Mod: PBBFAC,,, | Performed by: OPHTHALMOLOGY

## 2020-09-22 NOTE — PROGRESS NOTES
SUBJECTIVE  Mary HERNÁN Vo is 65 y.o. female  Uncorrected distance visual acuity was 20/30 +1 in the right eye and 20/20 in the left eye.   Chief Complaint   Patient presents with    Hypertensive Eye Exam          HPI     6 month Pseudophakic check  No changes noted in [facility]  No pain or discomfort  Wearing +2.00    Daughter is a [employer] employee    1. PCIOL OD +22.0 SN60WF 1/29/20  PCIOL OS +22.0 SN60WF (Distance) / 1/15/2020    Last edited by Dayana Mackey on 9/22/2020 10:21 AM. (History)         Assessment /Plan :  1. Pseudophakia  -- Condition stable, no therapeutic change required. Monitoring routinely.    RTC in 1 year or prn any changes

## 2020-09-22 NOTE — PROGRESS NOTES
Health Maintenance Due   Topic Date Due    HIV Screening  04/28/1970    Shingles Vaccine (1 of 2) 04/28/2006     Updates were requested from care everywhere.  Chart was reviewed for overdue Proactive [facility] Encounters (TEE) topics (CRS, Breast Cancer Screening, Eye exam)  Health Maintenance has been updated.  LINKS immunization registry triggered.  Immunizations were reconciled.

## 2020-09-25 ENCOUNTER — HOSPITAL ENCOUNTER (OUTPATIENT)
Facility: HOSPITAL | Age: 65
Discharge: HOME | End: 2020-09-25
Attending: PAIN MEDICINE | Admitting: PAIN MEDICINE
Payer: MEDICARE

## 2020-09-25 VITALS
RESPIRATION RATE: 16 BRPM | HEART RATE: 58 BPM | DIASTOLIC BLOOD PRESSURE: 67 MMHG | HEIGHT: 69 IN | OXYGEN SATURATION: 97 % | SYSTOLIC BLOOD PRESSURE: 138 MMHG | BODY MASS INDEX: 37.22 KG/M2 | TEMPERATURE: 98 F | WEIGHT: 251.31 LBS

## 2020-09-25 DIAGNOSIS — M54.12 CERVICAL RADICULOPATHY: Primary | ICD-10-CM

## 2020-09-25 PROCEDURE — 63600175 PHARM REV CODE 636 W HCPCS: Performed by: PAIN MEDICINE

## 2020-09-25 PROCEDURE — 25500020 PHARM REV CODE 255: Performed by: PAIN MEDICINE

## 2020-09-25 PROCEDURE — 99152 MOD SED SAME PHYS/QHP 5/>YRS: CPT | Mod: ,,, | Performed by: PAIN MEDICINE

## 2020-09-25 PROCEDURE — 99152 PR MOD CONSCIOUS SEDATION, SAME PHYS, 5+ YRS, FIRST 15 MIN: ICD-10-PCS | Mod: ,,, | Performed by: PAIN MEDICINE

## 2020-09-25 PROCEDURE — 25000003 PHARM REV CODE 250: Performed by: PAIN MEDICINE

## 2020-09-25 PROCEDURE — 62321 NJX INTERLAMINAR CRV/THRC: CPT | Performed by: PAIN MEDICINE

## 2020-09-25 PROCEDURE — 64483 NJX AA&/STRD TFRM EPI L/S 1: CPT

## 2020-09-25 PROCEDURE — 62323 NJX INTERLAMINAR LMBR/SAC: CPT | Mod: ,,, | Performed by: PAIN MEDICINE

## 2020-09-25 PROCEDURE — 62323 PR INJ LUMBAR/SACRAL, W/IMAGING GUIDANCE: ICD-10-PCS | Mod: ,,, | Performed by: PAIN MEDICINE

## 2020-09-25 RX ORDER — DEXAMETHASONE SODIUM PHOSPHATE 10 MG/ML
INJECTION INTRAMUSCULAR; INTRAVENOUS
Status: DISCONTINUED | OUTPATIENT
Start: 2020-09-25 | End: 2020-09-25 | Stop reason: HOSPADM

## 2020-09-25 RX ORDER — LIDOCAINE HYDROCHLORIDE 10 MG/ML
INJECTION, SOLUTION EPIDURAL; INFILTRATION; INTRACAUDAL; PERINEURAL
Status: DISCONTINUED | OUTPATIENT
Start: 2020-09-25 | End: 2020-09-25 | Stop reason: HOSPADM

## 2020-09-25 RX ORDER — FENTANYL CITRATE 50 UG/ML
INJECTION, SOLUTION INTRAMUSCULAR; INTRAVENOUS
Status: DISCONTINUED | OUTPATIENT
Start: 2020-09-25 | End: 2020-09-25 | Stop reason: HOSPADM

## 2020-09-25 RX ORDER — MIDAZOLAM HYDROCHLORIDE 1 MG/ML
INJECTION, SOLUTION INTRAMUSCULAR; INTRAVENOUS
Status: DISCONTINUED | OUTPATIENT
Start: 2020-09-25 | End: 2020-09-25 | Stop reason: HOSPADM

## 2020-09-25 NOTE — DISCHARGE INSTRUCTIONS

## 2020-09-25 NOTE — OP NOTE
PROCEDURE: Cervical epidural steroid injection under fluoroscopic guidance    LEVEL: C7/T1   SIDE: Midline     PROCEDURE DATE: 9/25/2020    PREOPERATIVE DIAGNOSIS: Cervical radiculopathy  POSTOPERATIVE DIAGNOSIS: Cervical radiculopathy    PROVIDER: MARCY Lobato MD  Assistant(s): None    ANESTHESIA: Local, IV Sedation    >> 1 mg of VERSED    >> 25 mcg of FENTANYL     INDICATION: The patient has neck pain and radiculopathy symptoms unresponsive to conservative treatments. Fluoroscopy was used to optimize visualization of needle placement and to maximize safety.        PROCEDURE DESCRIPTION / TECHNIQUE:   The patient was seen and identified in the preoperative area. Risks, benefits, complications, and alternatives were discussed with the patient. The patient agreed to proceed with the procedure and signed the consent. The site and side of the procedure was identified and marked. An IV was started.    The patient was taken to the procedural suite. The patient was positioned in prone orientation on procedure table. A time out was performed prior to any intervention. The procedure, site, side, and allergies were stated and agreed to by all present. The posterior cervical area was widely prepped with ChloraPrep. The procedural site was draped in usual sterile fashion. Vital signs were closely monitored throughout this procedure. Conscious sedation was used for this procedure to decrease patient anxiety.    Using anterior-posterior fluoroscopy, the above noted INTERLAMINAR SPACE was identified and the overlying subcutaneous tissues were infiltrated with 3-4 mL of PF 1% LIDOCAINE using a 1.5 inch 27 gauge needle. A 20-gauge Tuohy epidural needle was then introduced through the same puncture site and advanced toward the epidural space incrementally under fluoroscopic guidance. A Loss of Resistance technique was used as the epidural needle was advanced. Contralateral oblique imaging was used to help gauge needle depth as  the Tuohy was advanced. Once loss of resistance was realized and after negative aspiration of blood and spinal fluid, correct needle placement within the epidural space was verified with the injection of 0.5 to 1 mL of water soluble contrast dye (Omnipaque 240). Appropriate epidural contrast spread was seen on imaging. Again, after negative aspiration of blood and spinal fluid a 3 mL mixture containing 1 mL of Dexamethasone (10 mg/mL) and 1 mL of preservative free Normal Saline and 1ml preservative free 1% lidocaine was injected. No pain or paresthesias were noted with injection. There was low resistance during the injection. Washout of epidurogram was seen on fluoroscopy following injection. The stylet was replaced and the Tuohy needle was withdrawn intact. The skin was cleansed, and bandages were applied.    Description of Findings: Not applicable    Prosthetic devices, grafts, tissues, or devices implanted: None    Specimen Removed: No    Estimated Blood Loss: minimal    COMPLICATIONS: None    DISPOSITION / PLANS: The patient was transferred to the recovery area in a stable condition for observation. The patient was reexamined prior to discharge. There was no evidence of acute neurologic injury following the procedure.  Patient was discharged from the recovery room after meeting discharge criteria. Home discharge instructions were given to the patient by the staff.

## 2020-09-28 NOTE — DISCHARGE SUMMARY
[facility]  Short Stay  Discharge Summary    Admit Date: 9/25/2020    Discharge Date and Time: 9/25/2020  9:48 AM      Discharge Attending Physician: MARCY Lobato MD     Hospital Course (synopsis of major diagnoses, care, treatment, and services provided during the course of the hospital stay): Patient was admitted to Pre-op where informed consent was signed.  The patient was then taken to the procedure suite where the procedure was performed.  The patient was then return to the Pre-Op area and discharge was performed.     Final Diagnoses:    Principal Problem: <principal problem not specified>   Secondary Diagnoses:   Active Hospital Problems    Diagnosis  POA    Cervical radiculopathy [M54.12]  Yes      Resolved Hospital Problems   No resolved problems to display.       Discharged Condition: good    Disposition: Home or Self Care    Follow up/Patient Instructions:    Medications:  Reconciled Home Medications:      Medication List      CONTINUE taking these medications    ALPRAZolam 1 MG tablet  Commonly known as: XANAX  TAKE 1 TABLET BY MOUTH ONCE A DAY NIGHTLY AS NEEDED FOR ANXIETY     amLODIPine 5 MG tablet  Commonly known as: NORVASC  Take 1 tablet (5 mg total) by mouth once daily.     celecoxib 200 MG capsule  Commonly known as: CeleBREX  Take 1 capsule (200 mg total) by mouth 2 (two) times daily.     citalopram 40 MG tablet  Commonly known as: CELEXA  TAKE ONE TABLET BY MOUTH DAILY     cloNIDine 0.2 MG tablet  Commonly known as: CATAPRES  TAKE ONE TABLET BY MOUTH EACH EVENING     fluticasone propionate 50 mcg/actuation nasal spray  Commonly known as: FLONASE  1 spray (50 mcg total) by Each Nare route once daily.     gabapentin 300 MG capsule  Commonly known as: NEURONTIN  Take 1 capsule (300 mg total) by mouth 3 (three) times daily.     HYDROcodone-acetaminophen  mg per tablet  Commonly known as: NORCO  Take 1 tablet by mouth 2 (two) times daily as needed.      losartan-hydrochlorothiazide 100-25 mg 100-25 mg per tablet  Commonly known as: HYZAAR  TAKE 1 TABLET BY MOUTH ONCE DAILY     pravastatin 40 MG tablet  Commonly known as: PRAVACHOL  TAKE ONE TABLET BY MOUTH DAILY     VITAMIN D ORAL  Take 1,000 Units by mouth once daily.     zolpidem 10 mg Tab  Commonly known as: AMBIEN  TAKE ONE TABLET BY MOUTH AT BEDTIME AS NEEDED          Discharge Procedure Orders   Diet general     Call MD for:  severe uncontrolled pain     Call MD for:  difficulty breathing, headache or visual disturbances     Call MD for:  redness, tenderness, or signs of infection (pain, swelling, redness, odor or green/yellow discharge around incision site)     Activity as tolerated

## 2020-09-29 ENCOUNTER — OFFICE VISIT (OUTPATIENT)
Dept: PAIN MEDICINE | Facility: CLINIC | Age: 65
End: 2020-09-29
Payer: MEDICARE

## 2020-09-29 ENCOUNTER — PATIENT MESSAGE (OUTPATIENT)
Dept: OTHER | Facility: OTHER | Age: 65
End: 2020-09-29

## 2020-09-29 ENCOUNTER — HOSPITAL ENCOUNTER (OUTPATIENT)
Dept: RADIOLOGY | Facility: HOSPITAL | Age: 65
Discharge: HOME | End: 2020-09-29
Attending: NEUROLOGICAL SURGERY
Payer: MEDICARE

## 2020-09-29 ENCOUNTER — TELEPHONE (OUTPATIENT)
Dept: NEUROSURGERY | Facility: CLINIC | Age: 65
End: 2020-09-29

## 2020-09-29 ENCOUNTER — OFFICE VISIT (OUTPATIENT)
Dept: NEUROSURGERY | Facility: CLINIC | Age: 65
End: 2020-09-29
Payer: MEDICARE

## 2020-09-29 VITALS
WEIGHT: 256.81 LBS | DIASTOLIC BLOOD PRESSURE: 72 MMHG | RESPIRATION RATE: 16 BRPM | SYSTOLIC BLOOD PRESSURE: 125 MMHG | HEIGHT: 69 IN | BODY MASS INDEX: 38.04 KG/M2 | HEART RATE: 60 BPM

## 2020-09-29 VITALS
DIASTOLIC BLOOD PRESSURE: 64 MMHG | HEART RATE: 65 BPM | BODY MASS INDEX: 38.04 KG/M2 | SYSTOLIC BLOOD PRESSURE: 118 MMHG | WEIGHT: 256.81 LBS | HEIGHT: 69 IN

## 2020-09-29 DIAGNOSIS — M54.2 NECK PAIN: Primary | ICD-10-CM

## 2020-09-29 DIAGNOSIS — M54.12 RADICULOPATHY, CERVICAL REGION: ICD-10-CM

## 2020-09-29 DIAGNOSIS — M54.16 LUMBAR RADICULOPATHY: Primary | ICD-10-CM

## 2020-09-29 DIAGNOSIS — M47.892 OTHER SPONDYLOSIS, CERVICAL REGION: ICD-10-CM

## 2020-09-29 DIAGNOSIS — M54.12 CERVICAL RADICULOPATHY: ICD-10-CM

## 2020-09-29 DIAGNOSIS — M54.9 DORSALGIA, UNSPECIFIED: ICD-10-CM

## 2020-09-29 DIAGNOSIS — M54.2 NECK PAIN: ICD-10-CM

## 2020-09-29 PROCEDURE — 99214 OFFICE O/P EST MOD 30 MIN: CPT | Mod: S$PBB,,, | Performed by: PHYSICIAN ASSISTANT

## 2020-09-29 PROCEDURE — 99215 OFFICE O/P EST HI 40 MIN: CPT | Mod: PBBFAC,25,27 | Performed by: PAIN MEDICINE

## 2020-09-29 PROCEDURE — 72040 X-RAY EXAM NECK SPINE 2-3 VW: CPT | Mod: TC

## 2020-09-29 PROCEDURE — 99214 PR OFFICE/OUTPT VISIT, EST, LEVL IV, 30-39 MIN: ICD-10-PCS | Mod: S$PBB,,, | Performed by: PAIN MEDICINE

## 2020-09-29 PROCEDURE — 99999 PR PBB SHADOW E&M-EST. PATIENT-LVL V: CPT | Mod: PBBFAC,,, | Performed by: PAIN MEDICINE

## 2020-09-29 PROCEDURE — 99999 PR PBB SHADOW E&M-EST. PATIENT-LVL V: ICD-10-PCS | Mod: PBBFAC,,, | Performed by: PAIN MEDICINE

## 2020-09-29 PROCEDURE — 99214 PR OFFICE/OUTPT VISIT, EST, LEVL IV, 30-39 MIN: ICD-10-PCS | Mod: S$PBB,,, | Performed by: PHYSICIAN ASSISTANT

## 2020-09-29 PROCEDURE — 72040 XR CERVICAL SPINE 2 OR 3 VIEWS: ICD-10-PCS | Mod: 26,,, | Performed by: RADIOLOGY

## 2020-09-29 PROCEDURE — 99999 PR PBB SHADOW E&M-EST. PATIENT-LVL IV: ICD-10-PCS | Mod: PBBFAC,,, | Performed by: PHYSICIAN ASSISTANT

## 2020-09-29 PROCEDURE — 99214 OFFICE O/P EST MOD 30 MIN: CPT | Mod: PBBFAC,25 | Performed by: PHYSICIAN ASSISTANT

## 2020-09-29 PROCEDURE — 99999 PR PBB SHADOW E&M-EST. PATIENT-LVL IV: CPT | Mod: PBBFAC,,, | Performed by: PHYSICIAN ASSISTANT

## 2020-09-29 PROCEDURE — 99214 OFFICE O/P EST MOD 30 MIN: CPT | Mod: S$PBB,,, | Performed by: PAIN MEDICINE

## 2020-09-29 PROCEDURE — 72040 X-RAY EXAM NECK SPINE 2-3 VW: CPT | Mod: 26,,, | Performed by: RADIOLOGY

## 2020-09-29 RX ORDER — DIAZEPAM 5 MG/1
TABLET ORAL
Qty: 1 TABLET | Refills: 0 | Status: ON HOLD | OUTPATIENT
Start: 2020-09-29 | End: 2020-11-03 | Stop reason: ALTCHOICE

## 2020-09-29 RX ORDER — INFLUENZA A VIRUS A/NEBRASKA/14/2019 (H1N1) ANTIGEN (MDCK CELL DERIVED, PROPIOLACTONE INACTIVATED), INFLUENZA A VIRUS A/DELAWARE/39/2019 (H3N2) ANTIGEN (MDCK CELL DERIVED, PROPIOLACTONE INACTIVATED), INFLUENZA B VIRUS B/SINGAPORE/INFTT-16-0610/2016 ANTIGEN (MDCK CELL DERIVED, PROPIOLACTONE INACTIVATED), INFLUENZA B VIRUS B/DARWIN/7/2019 ANTIGEN (MDCK CELL DERIVED, PROPIOLACTONE INACTIVATED) 15; 15; 15; 15 UG/.5ML; UG/.5ML; UG/.5ML; UG/.5ML
INJECTION, SUSPENSION INTRAMUSCULAR
COMMUNITY
Start: 2020-09-16 | End: 2021-04-13

## 2020-09-29 NOTE — LETTER
September 29, 2020      Paul Lobato MD  [address]  [place] [address]           [facility]  [address]  [place] [address]  Phone: [phone]  Fax: [phone]          Patient: Mary Vo   MR Number: [id number]   YOB: 1955   Date of Visit: 9/29/2020       Dear Dr. Paul Lobato:    Thank you for referring Mary Vo to me for evaluation. Attached you will find relevant portions of my assessment and plan of care.    If you have questions, please do not hesitate to call me. I look forward to following Mary Vo along with you.    Sincerely,    Miesha Linn PA-C    Enclosure  CC:  No Recipients    If you would like to receive this communication electronically, please contact [email or web address] or [phone] to request more information on [employer] Link access.    For providers and/or their staff who would like to refer a patient to [facility], please contact us through our one-stop-shop provider referral line, [facility], at [phone].    If you feel you have received this communication in error or would no longer like to receive these types of communications, please e-mail [email or web address]          intact

## 2020-09-29 NOTE — PROGRESS NOTES
Subjective:      Patient ID: Mary Vo is a 65 y.o. female.    Chief Complaint: Neck Pain    HPI  The patient is here today for evaluation of neck pain.  She is referred to us by Dr. Lobato.  Patient states that she started having neck pain about 2-3 years ago.    Her pain is localized on both sides of her neck and arms. It is worse on the L side.  She has numbness of her hands, worse on the L, also diagnosed with CTS.  Does report weakness and decreased  strength.    Currently taking Gabapentin and Norco, prescribed by Dr. Lobato.  Patient did have injections for her neck pain.   No previous neck surgery.  Rates her pain 7/10 today.    Previous Therapy:  Medications:  Mobic, Norco, tramadol, gabapentin  Injections:                - Left L3 transforaminal epidural steroid injection on 11-6-18 with 80% pain relief               - Right C5/6 TF KATYA with RN IV sedation on 9/17/19 with 80% pain relief              - C7/T1 IL KATYA (still too soon to determine)  Surgeries:  Bilateral hip replacements, no lumbar surgery  Physical Therapy:  Has participated in physical therapy and aquatherapy over 1 year ago.      Past Medical History:   Diagnosis Date    Breast cancer     Chronic pain syndrome     Generalized osteoarthrosis, involving multiple sites     s/p THR bilateral    History of breast cancer 2007    lumpectomy/XRT    HTN (hypertension)     Hyperlipidemia     Obesity, unspecified      Past Surgical History:   Procedure Laterality Date    BREAST LUMPECTOMY Right 2006    XRT    CATARACT EXTRACTION W/  INTRAOCULAR LENS IMPLANT Left 01/15/2020    CATARACT EXTRACTION W/  INTRAOCULAR LENS IMPLANT Right 01/29/2020    COLONOSCOPY N/A 10/15/2015    Procedure: COLONOSCOPY;  Surgeon: Maylin Shipley MD;  [facility];  Service: Endoscopy;  Laterality: N/A;    EPIDURAL STEROID INJECTION INTO CERVICAL SPINE N/A 9/25/2020    Procedure: C7/T1 IL KATYA;  Surgeon: Paul Lobato MD;  Location: [facility]  PAIN MGT;  Service: Pain Management;  Laterality: N/A;    HYSTERECTOMY      TRANSFORAMINAL EPIDURAL INJECTION OF STEROID Left 9/17/2019    Procedure: Left C5/6 TF KATYA w/ RN IV sedation;  Surgeon: Paul Lobato MD;  Location: [facility] PAIN [facility]GT;  Service: Pain Management;  Laterality: Left;     Family History   Problem Relation Age of Onset    Cancer Mother     Diabetes Mother     Hyperlipidemia Father     Hypertension Father     Breast cancer Sister     Breast cancer Sister     Breast cancer Sister     Breast cancer Sister     Eczema Neg Hx     Lupus Neg Hx     Psoriasis Neg Hx     Melanoma Neg Hx      Social History     Socioeconomic History    Marital status: Single     Spouse name: Not on file    Number of children: Not on file    Years of education: Not on file    Highest education level: Not on file   Occupational History    Occupation: Disabled   Social Needs    Financial resource strain: Not on file    Food insecurity     Worry: Not on file     Inability: Not on file    Transportation needs     Medical: Not on file     Non-medical: Not on file   Tobacco Use    Smoking status: Former Smoker    Smokeless tobacco: Never Used   Substance and Sexual Activity    Alcohol use: Yes    Drug use: No    Sexual activity: Never   Lifestyle    Physical activity     Days per week: Not on file     Minutes per session: Not on file    Stress: Not on file   Relationships    Social connections     Talks on phone: Not on file     Gets together: Not on file     Attends Taoist service: Not on file     Active member of club or organization: Not on file     Attends meetings of clubs or organizations: Not on file     Relationship status: Not on file   Other Topics Concern    Are you pregnant or think you may be? Not Asked    Breast-feeding Not Asked   Social History Narrative    Not on file     Medication List with Changes/Refills   New Medications    DIAZEPAM (VALIUM) 5 MG TABLET    Take 1 tablet by  mouth 30 minutes prior to MRI to help decrease anxiety.   Current Medications    ALPRAZOLAM (XANAX) 1 MG TABLET    TAKE 1 TABLET BY MOUTH ONCE A DAY NIGHTLY AS NEEDED FOR ANXIETY    AMLODIPINE (NORVASC) 5 MG TABLET    Take 1 tablet (5 mg total) by mouth once daily.    CELECOXIB (CELEBREX) 200 MG CAPSULE    Take 1 capsule (200 mg total) by mouth 2 (two) times daily.    CITALOPRAM (CELEXA) 40 MG TABLET    TAKE ONE TABLET BY MOUTH DAILY    CLONIDINE (CATAPRES) 0.2 MG TABLET    TAKE ONE TABLET BY MOUTH EACH EVENING    ERGOCALCIFEROL, VITAMIN D2, (VITAMIN D ORAL)    Take 1,000 Units by mouth once daily.    FLUCELVAX QUAD 2707-4113, PF, 60 MCG (15 MCG X 4)/0.5 ML SYRG    ADM 0.5ML IM UTD    FLUTICASONE (FLONASE) 50 MCG/ACTUATION NASAL SPRAY    1 spray (50 mcg total) by Each Nare route once daily.    GABAPENTIN (NEURONTIN) 300 MG CAPSULE    Take 1 capsule (300 mg total) by mouth 3 (three) times daily.    HYDROCODONE-ACETAMINOPHEN (NORCO)  MG PER TABLET    Take 1 tablet by mouth 2 (two) times daily as needed.    LOSARTAN-HYDROCHLOROTHIAZIDE 100-25 MG (HYZAAR) 100-25 MG PER TABLET    TAKE 1 TABLET BY MOUTH ONCE DAILY    PRAVASTATIN (PRAVACHOL) 40 MG TABLET    TAKE ONE TABLET BY MOUTH DAILY    ZOLPIDEM (AMBIEN) 10 MG TAB    TAKE ONE TABLET BY MOUTH AT BEDTIME AS NEEDED     Review of patient's allergies indicates:  No Known Allergies  Review of Systems   Constitution: Negative for chills and decreased appetite.   HENT: Negative for congestion.    Eyes: Negative for blurred vision.   Cardiovascular: Negative for chest pain and claudication.   Respiratory: Negative for cough and hemoptysis.    Endocrine: Negative for cold intolerance.   Skin: Negative for color change and rash.   Musculoskeletal: Positive for neck pain.   Gastrointestinal: Negative for abdominal pain.   Genitourinary: Negative for bladder incontinence.   Neurological: Positive for numbness. Negative for tremors.   Psychiatric/Behavioral: Negative for  altered mental status.   Allergic/Immunologic: Negative for environmental allergies.         Objective:     Body mass index is 37.93 kg/m².  Vitals:    09/29/20 1415   BP: 125/72   Pulse: 60   Resp: 16      Neurosurgery Physical Exam  Ortho/SPM Exam    Nursing note and vitals reviewed  Gen:Oriented to person, place, and time.             Appears stated age   Skin: no rashes or lesions identified   Head:Normocephalic and atraumatic.  Nose: Nose normal.    Eyes: EOM are normal. Pupils are equal, round, and reactive to light.   Neck: Neck supple. No masses or lesions palpated  Cardiovascular: Intact distal pulses.    Abdominal: Soft.   Neurological: Alert and oriented to person, place, and time.  No cranial nerve deficit.  Coordination normal. Normal muscle tone  Psychiatric: Normal mood and affect. Behavior is normal.      Neck:  None  Paraspinal tenderness   None  Paraspinal muscle spasms   None  Pain with flexion and extention   WNL  Range of motion shoulders   Neg  Spurling's sign     Motor:   Right Right Left Left  Level Group   5 4+ 5 4+ C5 Deltoid   5 4+ 5 4+ C6 Bicep   5  5   Wrist extension    5  5  C7 Triceps   5  5   Wrist flexion   5  5  C8    5  5  T1 Interossei      Sensation:  NL Decreased (R/L/BL) Level Sensation    X  C5 Lateral upper arm   X  C6 Thumb and index finger, lat forearm   X  C7 Middle finger   X  C8 Ring and little finger   X  T1 Medial arm      Reflex:  2+  Bicep tendon   2+  Brachioradialis   2+  Triceps tendon   Not present  Coburn's   none  Clonus   neg + Left Tinel's             Results:  Results for orders placed during the hospital encounter of 08/22/19   MRI Cervical Spine Without Contrast    Narrative EXAMINATION:  MRI CERVICAL SPINE WITHOUT CONTRAST    CLINICAL HISTORY:  CERVICAL RADICULOPATHY/ DDD;.  Radiculopathy, cervical region    TECHNIQUE:  Multiplanar, multisequence MR images of the cervical spine were acquired without the administration of  contrast.    COMPARISON:  None.    FINDINGS:  There is no fracture or malalignment of the cervical spine.  No bone marrow replacing process.  No bone marrow edema.  The prevertebral soft tissues have a normal appearance in the STIR sequence without evidence for ligamentous injury.  The cervicocranial junction has a normal appearance.  There is no paraspinal or intrathecal mass.    C2-C3: Mild disc bulge without central canal stenosis.  Mild bilateral neural foraminal narrowing from facet joint hypertrophic changes.    C3-C4: Posterior disc osteophyte complex with ventral surface thecal sac effacement and ventral surface cord contact without cord impingement.  Bilateral uncovertebral joint spurring with moderate bilateral neural foraminal stenosis.    C4-C5: There is a right paracentral focal disc protrusion with right ventral surface cord contact in subtle right ventral surface cord remodeling.  Mild right neural foraminal narrowing.    C5-C6: Degenerative disc disease.  Disc bulge with mild central canal stenosis and ventral surface cord contact and mild ventral surface cord remodeling but no abnormal cord signal intensity.  There is severe bilateral neural foraminal stenosis from uncovertebral joint spurring with probable impingement upon the exiting bilateral C6 nerve roots in the neural foramen.    C6-C7: Focal disc protrusion with mild ventral surface cord remodeling but no abnormal cord signal intensity.  Severe bilateral neural foraminal stenosis from uncovertebral joint spurring.    C7-T1: Mild disc bulge with mild ventral surface thecal sac effacement but no cord impingement.  Moderate bilateral neural foraminal stenosis.      Impression 1. There are multiple levels of neural foraminal stenosis as detailed above.  Of note there is severe bilateral neural foraminal stenosis at C5-C6 and C6-C7.  There is probable impingement upon the exiting bilateral C6 nerve roots at the C5-C6 level.  2. Multiple disc  bulges and posterior disc osteophyte complexes throughout the cervical spine causing mild central canal stenosis as detailed above at each level, some with mild ventral surface cord remodeling but no abnormal cord signal intensity seen in the where in the cervical spine.      Electronically signed by: Keven Kyle MD  Date:    08/22/2019  Time:    18:31      Results for orders placed during the hospital encounter of 09/29/20   X-Ray Cervical Spine 2 or 3 Views    Narrative EXAMINATION:  XR CERVICAL SPINE 2 OR 3 VIEWS    CLINICAL HISTORY:  Cervicalgia    TECHNIQUE:  AP, lateral, and open mouth views of the cervical spine were performed.    COMPARISON:  12/13/2018    FINDINGS:  There is slight retrolisthesis of C3 on C4 which was not well demonstrated on prior.  Vertebral body heights appear within normal limits.  There is mild-to-moderate disc height loss from C3-4 through C6-7, most severe at C5-6.  No appreciable change from prior.  No acute fractures visualized.  Odontoid process appears intact.  Atlantoaxial articulations appear normal.  Prevertebral soft tissues are within normal limits.      Impression Degenerative findings as above      Electronically signed by: Gary Powell MD  Date:    09/29/2020  Time:    13:23         Relevant imaging results reviewed and interpreted by me, discussed with the patient and / or family today.  Assessment:     1. Neck pain    2. Cervical radiculopathy    3. Other spondylosis, cervical region    4. Radiculopathy, cervical region      Plan:     Neck pain    Cervical radiculopathy  -     Ambulatory referral/consult to Neurosurgery    Other spondylosis, cervical region  -     MRI Cervical Spine Without Contrast; Future; Expected date: 09/29/2020    Radiculopathy, cervical region  -     MRI Cervical Spine Without Contrast; Future; Expected date: 09/29/2020    Other orders  -     diazePAM (VALIUM) 5 MG tablet; Take 1 tablet by mouth 30 minutes prior to MRI to help decrease  anxiety.  Dispense: 1 tablet; Refill: 0      Will get updated MRI of cervical spine due to previous MRI being over 1 year out and persistent symptoms.  Patient was given Rx for Valium to take prior to MRI since she has anxiety with MRIs.  She will follow-up after MRI- pt prefers to wait at least 2 weeks- to go over imaging and discuss any other treatment options.  She is not interested in surgery at this time.      Miesha Linn PA-C  [facility]

## 2020-09-29 NOTE — PATIENT INSTRUCTIONS
-will schedule for left L5/S1 transforaminal epidural steroid injection  -will schedule for updated lumbar MRI  -continue all medications as prescribed  -follow up in clinic 4 weeks after the injection

## 2020-09-29 NOTE — PROGRESS NOTES
"Chief Pain Complaint:  Low back pain, and leg pain    Interval History:  Ms. Vo returns to clinic for follow-up.  She has been having severe pain starts in the low back and radiates into the left lower extremity distally to the ankle in the L5 distribution.  She describes a sharp and aching sensation which is constant.  Today she rates her pain as a 7/10 and finds that her symptoms are worse with activity and somewhat improved with rest.  She denies having any symptoms in the right lower extremity.  She has undergone left L3/4 transforaminal epidural steroid injection in the past which provided significant pain relief for her for a long period of time.    Interval History:  Ms. Vo returns to clinic for follow-up.  She underwent a left C5/6 transforaminal epidural steroid injection a September of 2019 reports 100% symptomatic pain relief.  She reports she has started to have symptoms again in her left upper extremity.  She has numbness in her left hand in a nondermatomal distribution.  She occasionally has symptoms in the right upper extremity deny newly is frequently and left.  She is looking for a "fix" her her current pain complaints.  Her symptoms mostly started return approximately 2-3 months ago and she currently rates her pain as 7-8/10.    Interval History: Patient was seen on 9/17/19. At that time she underwent .  The patient reports that she is/was better following the procedure.  she reports 80% pain relief.  The changes lasted 3 weeks so far.  The changes have continued through this visit.  She continues to have numbness, but pain has improved.     Interval History: Since last visit on 12-13-18, she reports that she has continued neck and left arm pain.  It has worsened over last 4 weeks.  Pain is worse at night. She denies any injury.  She reports 8/10 pain today.    Interval History: Ms. Vo returns to clinic.  She underwent left L3 transforaminal epidural steroid injection on November 6, 2018 " and reports having ongoing 80% pain relief.  Currently her pain is rated 6/10 and is described as an aching sensation in the low back and a sharp shooting pain in the left shoulder.  She describes left shoulder pain happens periodically throughout the day but does bother her every single day.  She denies having radiation distal to the shoulder and denies having weakness in her upper extremities. She notices this sensation mostly when she rotates her head to the left and to the right.  Her symptoms are worse with activity improved with rest.  She denies having bowel bladder difficulties.    Initial History of Present Illness:  This patient is a 65 y.o. female who presents today complaining of the above noted pain/s. The patient describes the pain as follows.  Ms. Vo has a history of low back pain for approximately 5 years.  She is status post bilateral hip replacements with left femoral fracture and has had cerclage wires placed. She has participated in physical therapy which cause increased left lower extremity pain and water aerobics which also caused left lower extremity pain. Currently her pain is rated 7/10 and is described as a constant aching sensation which goes down the left leg to the knee but not below.  She denies having symptoms on the right.  She denies having numbness and weakness in the left lower extremity.  She denies having bowel bladder difficulties.  She currently takes Mobic and Norco; she was previously taking Tramadol however this medication is recently been stopped.  There was no inciting event her symptoms are worse with walking and activity while improved with rest.    Previous Therapy:  Medications:  Mobic, Norco, tramadol, gabapentin  Injections:     - Left L3 transforaminal epidural steroid injection on 11-6-18 with 80% pain relief    - Right C5/6 TF KATYA with RN IV sedation on 9/17/19 with 80% pain relief  Surgeries:  Bilateral hip replacements, no lumbar surgery  Physical Therapy:   Has participated in physical therapy and aquatherapy over 1 year ago    Past Surgical History:   Procedure Laterality Date    BREAST LUMPECTOMY Right 2006    XRT    CATARACT EXTRACTION W/  INTRAOCULAR LENS IMPLANT Left 01/15/2020    CATARACT EXTRACTION W/  INTRAOCULAR LENS IMPLANT Right 01/29/2020    COLONOSCOPY N/A 10/15/2015    Procedure: COLONOSCOPY;  Surgeon: Maylin Shipley MD;  [facility]: [facility];  Service: Endoscopy;  Laterality: N/A;    EPIDURAL STEROID INJECTION INTO CERVICAL SPINE N/A 9/25/2020    Procedure: C7/T1 IL KATYA;  Surgeon: Paul Lobato MD;  Location: [facility];  Service: Pain Management;  Laterality: N/A;    HYSTERECTOMY      TRANSFORAMINAL EPIDURAL INJECTION OF STEROID Left 9/17/2019    Procedure: Left C5/6 TF KATYA w/ RN IV sedation;  Surgeon: Paul Lobato MD;  Location: [facility];  Service: Pain Management;  Laterality: Left;     Imaging / Labs / Studies (reviewed on 9/29/2020):  Results for orders placed during the hospital encounter of 08/22/19   MRI Cervical Spine Without Contrast    Narrative COMPARISON:  None.  FINDINGS:  There is no fracture or malalignment of the cervical spine.  No bone marrow replacing process.  No bone marrow edema.  The prevertebral soft tissues have a normal appearance in the STIR sequence without evidence for ligamentous injury.  The cervicocranial junction has a normal appearance.  There is no paraspinal or intrathecal mass.  C2-C3: Mild disc bulge without central canal stenosis.  Mild bilateral neural foraminal narrowing from facet joint hypertrophic changes.  C3-C4: Posterior disc osteophyte complex with ventral surface thecal sac effacement and ventral surface cord contact without cord impingement.  Bilateral uncovertebral joint spurring with moderate bilateral neural foraminal stenosis.  C4-C5: There is a right paracentral focal disc protrusion with right ventral surface cord contact in subtle right ventral surface cord remodeling.   Mild right neural foraminal narrowing.  C5-C6: Degenerative disc disease.  Disc bulge with mild central canal stenosis and ventral surface cord contact and mild ventral surface cord remodeling but no abnormal cord signal intensity.  There is severe bilateral neural foraminal stenosis from uncovertebral joint spurring with probable impingement upon the exiting bilateral C6 nerve roots in the neural foramen.   C6-C7: Focal disc protrusion with mild ventral surface cord remodeling but no abnormal cord signal intensity.  Severe bilateral neural foraminal stenosis from uncovertebral joint spurring.  C7-T1: Mild disc bulge with mild ventral surface thecal sac effacement but no cord impingement.  Moderate bilateral neural foraminal stenosis.    Impression 1. There are multiple levels of neural foraminal stenosis as detailed above.  Of note there is severe bilateral neural foraminal stenosis at C5-C6 and C6-C7.  There is probable impingement upon the exiting bilateral C6 nerve roots at the C5-C6 level.  2. Multiple disc bulges and posterior disc osteophyte complexes throughout the cervical spine causing mild central canal stenosis as detailed above at each level, some with mild ventral surface cord remodeling but no abnormal cord signal intensity seen in the where in the cervical spine.     Results for orders placed during the hospital encounter of 12/13/18   X-Ray Cervical Spine AP And Lateral    Narrative FINDINGS:  Straightening of normal cervical lordosis.  This may be positional or secondary to muscle spasm.  No fracture or listhesis.  Multilevel degenerative changes are seen with findings most pronounced at C3-C4 and C5-C6 with intervertebral disc space narrowing and marginal spurring with facet arthropathy.  Odontoid process remains intact.  Lateral masses are symmetric.  Prevertebral soft tissues are maintained.  Calcification about the left carotid vasculature is noted     Results for orders placed during the  hospital encounter of 09/27/18   MRI Lumbar Spine Without Contrast    Narrative COMPARISON:  Left hip radiographs from September 10, 2018.  FINDINGS:  The osseous structures demonstrate nonspecific heterogeneous marrow signal intensity.  No fracture.  No listhesis.  Multilevel degenerative changes are seen with spurring of the endplates and intervertebral disc space narrowing at L4-L5.  Visualized spinal cord demonstrates normal signal intensity.  L1-L2: Mild facet arthropathy.  No spinal canal or neural foraminal encroachment.  L2-L3: Posterior disc bulge and facet and ligamentum flavum hypertrophy.  Mild effacement of the ventral thecal sac.  No spinal canal or neural foraminal stenosis.  L3-L4: Small annular tear is seen.  There is posterior disc protrusion and facet arthropathy.  This results in asymmetric neural foraminal and crow joint slightly greater on the right side.  L4-L5: Posterior disc protrusion and facet ligamentum flavum hypertrophy.  There is mild to moderate narrowing of the spinal canal and bilateral neural foraminal encroachment.  L5-S1: Facet and ligamentum flavum hypertrophy and posterior disc bulge.  Mild bilateral neural foraminal encroachment.  Spinal canal is maintained.    Impression Multifocal areas of facet and ligamentum flavum hypertrophy with disc protrusion resulting in neural foraminal encroachment at multiple levels.  Findings are most pronounced at L4-L5 where there is also spinal canal stenosis.  Degenerative changes and other findings as outlined above.     Review of Systems:  CONSTITUTIONAL: patient denies any fever, chills, or weight loss  SKIN: patient denies any rash or itching  RESPIRATORY: patient denies having any shortness of breath  GASTROINTESTINAL: patient denies having any diarrhea, constipation, or bowel incontinence  GENITOURINARY: patient denies having any abnormal bladder function    MUSCULOSKELETAL:  - patient complains of the above noted pain/s (see chief pain  "complaint)    NEUROLOGICAL:   - pain as above  - strength in Lower extremities is intact, BILATERALLY  - sensation in Lower extremities is intact, BILATERALLY  - patient denies any loss of bowel or bladder control      PSYCHIATRIC: patient denies any change in mood    Other:  All other systems reviewed and are negative    Physical Exam:  Vitals:  /64   Pulse 65   Ht 5' 9" (1.753 m)   Wt 116.5 kg (256 lb 13.4 oz)   BMI 37.93 kg/m²   (reviewed on 9/29/2020)    General: alert and oriented, in no apparent distress.  Gait: normal gait.  Skin: no rashes, no discoloration, no obvious lesions  HEENT: normocephalic, atraumatic. Pupils equal and round.  Cardiovascular: no significant peripheral edema present.  Respiratory: without use of accessory muscles of respiration.    Musculoskeletal:  Lumbar spine  -5/5 strength with hip flexion, knee extension, knee flexion, plantar in dorsiflexion bilaterally    Neuro:  -positive straight leg raise on the left for radicular symptoms, negative on the right; sensation intact to light touch bilateral lower extremities    - Extremity Reflexes: Brisk and symmetric throughout  - Sensory: Sensation to light touch intact bilaterally      Psych:  Mood and affect is appropriate    Assessment  Lumbar Spondylosis  Hip OA status post hip replacement    1. 65 y.o. year old patient with PMH of   Past Medical History:   Diagnosis Date    Breast cancer     Chronic pain syndrome     Generalized osteoarthrosis, involving multiple sites     s/p THR bilateral    History of breast cancer 2007    lumpectomy/XRT    HTN (hypertension)     Hyperlipidemia     Obesity, unspecified       presenting with pain located left lateral thigh, back pain, shoulder pain.  Diagnoses include:    ICD-10-CM ICD-9-CM   1. Lumbar radiculopathy  M54.16 724.4   2. Dorsalgia, unspecified  M54.9 724.5       2. Pain Generators / Etiology ; lumbar degenerative disc disease, lumbar spondylosis, lumbar radiculopathy, " bilateral hip replacements; I suspect that the left lateral leg pain that she is feeling is due to loosening of the hardware with the cerclage wires associated with previous femoral fracture however will perform left L3 transforaminal epidural steroid injection for treatment and diagnostic benefit  3. Failed Meds:  Norco, tramadol, Mobic  4. Physical Therapy - has done all water aerobics and land-based physical therapy but it has been many months ago  5. Psychological comorbidities -  none  6. Anticoagulants / Antiplatelets:  None       Plan:  1. Interventional:  Will schedule for left L5/S1 transforaminal epidural steroid injection    2. Pharmacologic:   - Continue gabapentin 300mg up to TID.    - Continue Mobic 15mg QD PRN, Norco 10mg BID PRN as prescribed    3. Rehabilitative: Encouraged regular exercise.    4. Diagnostic:  Will obtain updated lumbar MRI for worsening pain in the left leg    5. Follow up:  With patient follow up in clinic 4 weeks after the injections      MARCY Lobato MD  Interventional Pain Medicine  [facility] [place]

## 2020-10-13 ENCOUNTER — HOSPITAL ENCOUNTER (OUTPATIENT)
Dept: RADIOLOGY | Facility: HOSPITAL | Age: 65
Discharge: HOME | End: 2020-10-13
Attending: PAIN MEDICINE
Payer: MEDICARE

## 2020-10-13 ENCOUNTER — HOSPITAL ENCOUNTER (OUTPATIENT)
Dept: RADIOLOGY | Facility: HOSPITAL | Age: 65
Discharge: HOME | End: 2020-10-13
Attending: PHYSICIAN ASSISTANT
Payer: MEDICARE

## 2020-10-13 DIAGNOSIS — M47.892 OTHER SPONDYLOSIS, CERVICAL REGION: ICD-10-CM

## 2020-10-13 DIAGNOSIS — M54.12 RADICULOPATHY, CERVICAL REGION: ICD-10-CM

## 2020-10-13 DIAGNOSIS — M54.9 DORSALGIA, UNSPECIFIED: ICD-10-CM

## 2020-10-13 PROCEDURE — 72148 MRI LUMBAR SPINE W/O DYE: CPT | Mod: 26,,, | Performed by: RADIOLOGY

## 2020-10-13 PROCEDURE — 72141 MRI NECK SPINE W/O DYE: CPT | Mod: 26,,, | Performed by: RADIOLOGY

## 2020-10-13 PROCEDURE — 72148 MRI LUMBAR SPINE WITHOUT CONTRAST: ICD-10-PCS | Mod: 26,,, | Performed by: RADIOLOGY

## 2020-10-13 PROCEDURE — 72141 MRI NECK SPINE W/O DYE: CPT | Mod: TC

## 2020-10-13 PROCEDURE — 72148 MRI LUMBAR SPINE W/O DYE: CPT | Mod: TC

## 2020-10-13 PROCEDURE — 72141 MRI CERVICAL SPINE WITHOUT CONTRAST: ICD-10-PCS | Mod: 26,,, | Performed by: RADIOLOGY

## 2020-10-14 ENCOUNTER — TELEPHONE (OUTPATIENT)
Dept: NEUROSURGERY | Facility: CLINIC | Age: 65
End: 2020-10-14

## 2020-10-14 DIAGNOSIS — M54.16 LUMBAR RADICULOPATHY: Primary | ICD-10-CM

## 2020-10-14 NOTE — TELEPHONE ENCOUNTER
Spoke with pt and informed her that she'd missed her 2:30 appt with Rickygreg today and asked would she like to reschedule the pt stated she needed her appt to fall on the same day due to her staying so far out. I asked the pt if she'd like to do a virtual appt instead of a in clinic visit. I was able to get the pt scheduled for a vv appt on 10/23 at 3pm. The pt verbalized understanding of her new appt.

## 2020-10-15 ENCOUNTER — TELEPHONE (OUTPATIENT)
Dept: PAIN MEDICINE | Facility: CLINIC | Age: 65
End: 2020-10-15

## 2020-10-15 NOTE — TELEPHONE ENCOUNTER
----- Message from Paul Lobato MD sent at 10/14/2020  6:37 PM CDT -----  Ajith Gottlieb,    Let's change her to an L5/S1 ILESI.  I'll place the orders.    Thanks!

## 2020-10-16 ENCOUNTER — OFFICE VISIT (OUTPATIENT)
Dept: INTERNAL MEDICINE | Facility: CLINIC | Age: 65
End: 2020-10-16
Payer: MEDICARE

## 2020-10-16 VITALS
SYSTOLIC BLOOD PRESSURE: 120 MMHG | OXYGEN SATURATION: 98 % | WEIGHT: 256.63 LBS | RESPIRATION RATE: 20 BRPM | HEART RATE: 82 BPM | HEIGHT: 67 IN | DIASTOLIC BLOOD PRESSURE: 84 MMHG | BODY MASS INDEX: 40.28 KG/M2 | TEMPERATURE: 98 F

## 2020-10-16 DIAGNOSIS — E78.00 PURE HYPERCHOLESTEROLEMIA: ICD-10-CM

## 2020-10-16 DIAGNOSIS — I10 ESSENTIAL HYPERTENSION: Primary | ICD-10-CM

## 2020-10-16 PROCEDURE — 99213 OFFICE O/P EST LOW 20 MIN: CPT | Mod: S$PBB,,, | Performed by: INTERNAL MEDICINE

## 2020-10-16 PROCEDURE — 99999 PR PBB SHADOW E&M-EST. PATIENT-LVL IV: CPT | Mod: PBBFAC,,, | Performed by: INTERNAL MEDICINE

## 2020-10-16 PROCEDURE — 99999 PR PBB SHADOW E&M-EST. PATIENT-LVL IV: ICD-10-PCS | Mod: PBBFAC,,, | Performed by: INTERNAL MEDICINE

## 2020-10-16 PROCEDURE — 99214 OFFICE O/P EST MOD 30 MIN: CPT | Mod: PBBFAC,PO | Performed by: INTERNAL MEDICINE

## 2020-10-16 PROCEDURE — 99213 PR OFFICE/OUTPT VISIT, EST, LEVL III, 20-29 MIN: ICD-10-PCS | Mod: S$PBB,,, | Performed by: INTERNAL MEDICINE

## 2020-10-16 RX ORDER — HYDROCODONE BITARTRATE AND ACETAMINOPHEN 10; 325 MG/1; MG/1
1 TABLET ORAL 2 TIMES DAILY PRN
Qty: 180 TABLET | Refills: 0 | Status: SHIPPED | OUTPATIENT
Start: 2020-10-16 | End: 2021-01-12 | Stop reason: SDUPTHER

## 2020-10-16 NOTE — PROGRESS NOTES
"HPI:  Is a 65-year-old female comes in today for follow-up of her hypertension.  Been doing well.  She has no complaints.  Her blood pressure readings are much improved    Current meds have been verified and updated per the EMR  Exam:/84 (BP Location: Left arm, Patient Position: Sitting, BP Method: Large (Manual))   Pulse 82   Temp 98.1 °F (36.7 °C) (Temporal)   Resp 20   Ht 5' 6.5" (1.689 m)   Wt 116.4 kg (256 lb 9.9 oz)   SpO2 98%   BMI 40.80 kg/m²   Chest clear  Cardiovascular regular rate and rhythm without murmur gallop or rub    Lab Results   Component Value Date    WBC 6.39 03/17/2020    HGB 13.9 03/17/2020    HCT 44.5 03/17/2020     03/17/2020    CHOL 153 03/17/2020    TRIG 159 (H) 03/17/2020    HDL 51 03/17/2020    ALT 25 03/17/2020    AST 26 03/17/2020     03/17/2020    K 3.5 03/17/2020     03/17/2020    CREATININE 0.9 03/17/2020    BUN 13 03/17/2020    CO2 31 (H) 03/17/2020    TSH 2.705 03/17/2020       Impression:  Hypertension, well controlled  Patient Active Problem List   Diagnosis    HTN (hypertension)    Generalized osteoarthrosis, involving multiple sites    History of breast cancer    Obesity, unspecified    Hyperlipidemia    Myofascial pain    Bilateral carpal tunnel syndrome    Lumbar radiculopathy    Chronic pain syndrome    Cervical radiculopathy       Plan:  Orders Placed This Encounter    HYDROcodone-acetaminophen (NORCO)  mg per tablet     Medications remain the same.  She will be seen again in 3 months.    This note is generated with speech recognition software and is subject to transcription error and sound alike phrases that may be missed by proofreading.      "

## 2020-10-20 ENCOUNTER — PATIENT MESSAGE (OUTPATIENT)
Dept: PAIN MEDICINE | Facility: CLINIC | Age: 65
End: 2020-10-20

## 2020-10-20 ENCOUNTER — TELEPHONE (OUTPATIENT)
Dept: PAIN MEDICINE | Facility: CLINIC | Age: 65
End: 2020-10-20

## 2020-10-20 NOTE — TELEPHONE ENCOUNTER
Attempted to call patient to confirm appointment with Dr. Lobato tomorrow 10/21/2020. Left v/m for patient.

## 2020-10-23 ENCOUNTER — PATIENT MESSAGE (OUTPATIENT)
Dept: NEUROSURGERY | Facility: CLINIC | Age: 65
End: 2020-10-23

## 2020-10-23 ENCOUNTER — OFFICE VISIT (OUTPATIENT)
Dept: NEUROSURGERY | Facility: CLINIC | Age: 65
End: 2020-10-23
Payer: MEDICARE

## 2020-10-23 DIAGNOSIS — M50.30 DDD (DEGENERATIVE DISC DISEASE), CERVICAL: Primary | ICD-10-CM

## 2020-10-23 DIAGNOSIS — M54.12 CERVICAL RADICULOPATHY: ICD-10-CM

## 2020-10-23 DIAGNOSIS — M47.892 OTHER SPONDYLOSIS, CERVICAL REGION: ICD-10-CM

## 2020-10-23 PROCEDURE — 99441 PR PHYSICIAN TELEPHONE EVALUATION 5-10 MIN: CPT | Mod: 95,,, | Performed by: PHYSICIAN ASSISTANT

## 2020-10-23 PROCEDURE — 99441 PR PHYSICIAN TELEPHONE EVALUATION 5-10 MIN: ICD-10-PCS | Mod: 95,,, | Performed by: PHYSICIAN ASSISTANT

## 2020-10-23 NOTE — PROGRESS NOTES
Established Patient - Audio Only Telehealth Visit     The patient location is: Home  The chief complaint leading to consultation is: MRI follow-up  Visit type: Virtual visit with audio only (telephone)  Total time spent with patient: 10 min       The reason for the audio only service rather than synchronous audio and video virtual visit was related to technical difficulties or patient preference/necessity.     Each patient to whom I provide medical services by telemedicine is:  (1) informed of the relationship between the physician and patient and the respective role of any other health care provider with respect to management of the patient; and (2) notified that they may decline to receive medical services by telemedicine and may withdraw from such care at any time. Patient verbally consented to receive this service via voice-only telephone call.       HPI:   Patient is here today for imaging review- MRI of C spine.  No changes since last visit.      From previous notes-  She is referred to us by Dr. Lobato.  Patient states that she started having neck pain about 2-3 years ago.   Her pain is localized on both sides of her neck and arms. It is worse on the L side.  She has numbness of her hands, worse on the L, also diagnosed with CTS.  Does report weakness and decreased  strength.   Currently taking Gabapentin and Norco, prescribed by Dr. Lobato.  Patient did have injections for her neck pain.   No previous neck surgery.  Rates her pain 7/10 today.   Previous Therapy:  Medications:  Mobic, Norco, tramadol, gabapentin  Injections:                - Left L3 transforaminal epidural steroid injection on 11-6-18 with 80% pain relief               - Right C5/6 TF KATYA with RN IV sedation on 9/17/19 with 80% pain relief              - C7/T1 IL KATYA  Surgeries:  Bilateral hip replacements, no lumbar surgery  Physical Therapy:  Has participated in physical therapy and aquatherapy over 1 year ago.        Assessment and plan:       Imaging/results-  Narrative & Impression     EXAMINATION:  MRI CERVICAL SPINE WITHOUT CONTRAST     CLINICAL HISTORY:  Cervical osteoarthritis;Cervical radiculopathy; Other spondylosis, cervical region     TECHNIQUE:  Multiplanar, multisequence MR images of the cervical spine were performed without the administration of contrast.     COMPARISON:  09/29/2020 radiograph; MRI cervical spine 08/22/2019     FINDINGS:  Vertebral body heights and alignment are unchanged with minimal retrolisthesis of C3 on C4.  No concerning marrow signal present.  Similar multilevel disc desiccation and height loss most prevalent at C5-6 and C6-7.     Posterior fossa is unremarkable.  Spinal cord is unremarkable.     Neck soft tissue structures unremarkable.     C2-C3: No significant spinal canal stenosis or neural foraminal narrowing.     C3-C4: Similar posterior disc osteophyte complex present effacing the anterior thecal sac without significant spinal canal stenosis.  Bilateral uncovertebral degenerative changes resulting in mild-to-moderate neural foraminal stenosis.     C4-C5: Posterior disc osteophyte complex present with right paracentral disc protrusion which contacts the anterior right aspect of the cervical cord.  Right greater than left facet/uncovertebral hypertrophy resulting mild right neural foraminal narrowing.     C5-C6: Posterior disc osteophyte complex slightly asymmetric to the right paracentral location effacing the anterior thecal sac and causing mild spinal cord flattening.  No intrinsic cord signal abnormality.  Spinal canal measures 7.6 mm in midline AP dimension.  Bilateral uncovertebral/facet degenerative findings causes severe bilateral neural foraminal narrowing.     C6-C7: Posterior disc osteophyte complex present with more focal central disc protrusion causing similar effacement and mild indention of the thecal sac and spinal cord without intrinsic cord signal abnormality.  Spinal canal measures 8.3 mm  in AP midline dimension.  Bilateral moderate to severe neural foraminal narrowing present.     C7-T1: Mild posterior disc osteophyte complex present mildly effacing the anterior thecal sac without significant spinal stenosis.  Mild-to-moderate bilateral neural foraminal narrowing.     T1-2: Posterior disc osteophyte changes present causing at least mild spinal canal stenosis with severe right and moderate left neural foraminal narrowing.     Impression:     Similar multilevel degenerative findings as above          I reviewed imaging studies with the patient today and informed her that recent MRI reveals worsening disc protrusion with foraminal narrowing especially at C5/6.    At this time patient wants to hold off on any surgical intervention. She wants to see if injections will help.    I would like for her to follow-up with us in 2 months. In the event that she does have worsening pain or any changes she will update us. Will recommend CT of C spine and flex/ext x-rays before she sees MD Fernando.               This service was not originating from a related E/M service provided within the previous 7 days nor will  to an E/M service or procedure within the next 24 hours or my soonest available appointment.  Prevailing standard of care was able to be met in this audio-only visit.

## 2020-10-27 NOTE — PRE-PROCEDURE INSTRUCTIONS
Spoke with patient regarding procedure scheduled on 11/3    Arrival time 0700    Has patient been sick with fever or on antibiotics within the last 7 days? No    Has patient received a vaccination within the last 7 days? no    Has the patient stopped all medications as directed? Celebrex and vit. D on 10.27. Hold vitamins, supplements and other NSAIDS. Denies blood thinners.    Does patient have a pacemaker and or defibrillator? no    Does the patient have a ride to and from procedure and someone reliable to remain with patient? Coordinating transportation. Aware of policy.    Is the patient diabetic? no    Does the patient have sleep apnea? Or use O2 at home? No and no     Is the patient receiving sedation? yes    Is the patient instructed to remain NPO beginning at midnight the night before their procedure? yes    Procedure location confirmed with patient? Yes    Covid- Denies signs/symptoms. Instructed to notify PAT/MD if any changes.

## 2020-11-03 ENCOUNTER — HOSPITAL ENCOUNTER (OUTPATIENT)
Facility: HOSPITAL | Age: 65
Discharge: HOME | End: 2020-11-03
Attending: PAIN MEDICINE | Admitting: PAIN MEDICINE
Payer: MEDICARE

## 2020-11-03 VITALS
BODY MASS INDEX: 39.62 KG/M2 | TEMPERATURE: 98 F | SYSTOLIC BLOOD PRESSURE: 155 MMHG | HEART RATE: 62 BPM | DIASTOLIC BLOOD PRESSURE: 72 MMHG | WEIGHT: 252.44 LBS | RESPIRATION RATE: 18 BRPM | OXYGEN SATURATION: 95 % | HEIGHT: 67 IN

## 2020-11-03 DIAGNOSIS — M54.16 LUMBAR RADICULOPATHY: Primary | ICD-10-CM

## 2020-11-03 PROCEDURE — 25000003 PHARM REV CODE 250: Performed by: PAIN MEDICINE

## 2020-11-03 PROCEDURE — 25500020 PHARM REV CODE 255: Performed by: PAIN MEDICINE

## 2020-11-03 PROCEDURE — 62323 NJX INTERLAMINAR LMBR/SAC: CPT | Mod: ,,, | Performed by: PAIN MEDICINE

## 2020-11-03 PROCEDURE — 62323 PR INJ LUMBAR/SACRAL, W/IMAGING GUIDANCE: ICD-10-PCS | Mod: ,,, | Performed by: PAIN MEDICINE

## 2020-11-03 PROCEDURE — 62323 NJX INTERLAMINAR LMBR/SAC: CPT | Performed by: PAIN MEDICINE

## 2020-11-03 PROCEDURE — 63600175 PHARM REV CODE 636 W HCPCS: Performed by: PAIN MEDICINE

## 2020-11-03 RX ORDER — LIDOCAINE HYDROCHLORIDE 10 MG/ML
INJECTION, SOLUTION EPIDURAL; INFILTRATION; INTRACAUDAL; PERINEURAL
Status: DISCONTINUED | OUTPATIENT
Start: 2020-11-03 | End: 2020-11-03 | Stop reason: HOSPADM

## 2020-11-03 RX ORDER — FENTANYL CITRATE 50 UG/ML
INJECTION, SOLUTION INTRAMUSCULAR; INTRAVENOUS
Status: DISCONTINUED | OUTPATIENT
Start: 2020-11-03 | End: 2020-11-03 | Stop reason: HOSPADM

## 2020-11-03 RX ORDER — DEXAMETHASONE SODIUM PHOSPHATE 10 MG/ML
INJECTION INTRAMUSCULAR; INTRAVENOUS
Status: DISCONTINUED | OUTPATIENT
Start: 2020-11-03 | End: 2020-11-03 | Stop reason: HOSPADM

## 2020-11-03 RX ORDER — MIDAZOLAM HYDROCHLORIDE 1 MG/ML
INJECTION, SOLUTION INTRAMUSCULAR; INTRAVENOUS
Status: DISCONTINUED | OUTPATIENT
Start: 2020-11-03 | End: 2020-11-03 | Stop reason: HOSPADM

## 2020-11-03 NOTE — DISCHARGE SUMMARY
[facility]  Short Stay  Discharge Summary    Admit Date: 11/3/2020    Discharge Date and Time: 11/3/2020  8:56 AM      Discharge Attending Physician: MARCY Lobato MD     Hospital Course (synopsis of major diagnoses, care, treatment, and services provided during the course of the hospital stay): Patient was admitted to Pre-op where informed consent was signed.  The patient was then taken to the procedure suite where the procedure was performed.  The patient was then return to the Pre-Op area and discharge was performed.     Final Diagnoses:    Principal Problem: <principal problem not specified>   Secondary Diagnoses:   Active Hospital Problems    Diagnosis  POA    Lumbar radiculopathy [M54.16]  Yes      Resolved Hospital Problems   No resolved problems to display.       Discharged Condition: good    Disposition: Home or Self Care    Follow up/Patient Instructions:    Medications:  Reconciled Home Medications:      Medication List      CONTINUE taking these medications    ALPRAZolam 1 MG tablet  Commonly known as: XANAX  TAKE 1 TABLET BY MOUTH ONCE A DAY NIGHTLY AS NEEDED FOR ANXIETY     amLODIPine 5 MG tablet  Commonly known as: NORVASC  Take 1 tablet (5 mg total) by mouth once daily.     celecoxib 200 MG capsule  Commonly known as: CeleBREX  Take 1 capsule (200 mg total) by mouth 2 (two) times daily.     citalopram 40 MG tablet  Commonly known as: CELEXA  TAKE ONE TABLET BY MOUTH DAILY     cloNIDine 0.2 MG tablet  Commonly known as: CATAPRES  TAKE ONE TABLET BY MOUTH EACH EVENING     FLUCELVAX QUAD 1917-1223 (PF) 60 mcg (15 mcg x 4)/0.5 mL Syrg  Generic drug: flu vac qs 2020(4 yr up)CD(PF)  ADM 0.5ML IM UTD     fluticasone propionate 50 mcg/actuation nasal spray  Commonly known as: FLONASE  1 spray (50 mcg total) by Each Nare route once daily.     gabapentin 300 MG capsule  Commonly known as: NEURONTIN  Take 1 capsule (300 mg total) by mouth 3 (three) times daily.     HYDROcodone-acetaminophen   mg per tablet  Commonly known as: NORCO  Take 1 tablet by mouth 2 (two) times daily as needed.     losartan-hydrochlorothiazide 100-25 mg 100-25 mg per tablet  Commonly known as: HYZAAR  TAKE 1 TABLET BY MOUTH ONCE DAILY     pravastatin 40 MG tablet  Commonly known as: PRAVACHOL  TAKE ONE TABLET BY MOUTH DAILY     VITAMIN D ORAL  Take 1,000 Units by mouth once daily.     zolpidem 10 mg Tab  Commonly known as: AMBIEN  TAKE ONE TABLET BY MOUTH AT BEDTIME AS NEEDED          Discharge Procedure Orders   Diet general     Call MD for:  severe uncontrolled pain     Call MD for:  difficulty breathing, headache or visual disturbances     Call MD for:  redness, tenderness, or signs of infection (pain, swelling, redness, odor or green/yellow discharge around incision site)     Activity as tolerated

## 2020-11-03 NOTE — OP NOTE
PROCEDURE: Lumbar epidural steroid injection under fluoroscopic guidance    LEVEL: L5/S1  SIDE: Midline     PROCEDURE DATE: 11/3/2020    PREOPERATIVE DIAGNOSIS: Lumbar radiculopathy  POSTOPERATIVE DIAGNOSIS: Lumbar radiculopathy    PROVIDER: MARCY Lobato MD  Assistant(s): None    ANESTHESIA: Local, IV Sedation    >> 1 mg of VERSED    >> 75 mcg of FENTANYL    INDICATION: The patient has low back pain and radiculopathy symptoms unresponsive to conservative treatments. Fluoroscopy was used to optimize visualization of needle placement and to maximize safety.        PROCEDURE DESCRIPTION / TECHNIQUE:   The patient was seen and identified in the preoperative area. Risks, benefits, complications, and alternatives were discussed with the patient. The patient agreed to proceed with the procedure and signed the consent. The site and side of the procedure was identified and marked. An IV was started.    The patient was taken to the procedural suite. The patient was positioned in prone orientation on procedure table and a pillow was placed under the abdomen to reduce lumbar lordosis. A time out was performed prior to any intervention. The procedure, site, side, and allergies were stated and agreed to by all present. The lumbosacral area was widely prepped with ChloraPrep. The procedural site was draped in usual sterile fashion. Vital signs were closely monitored throughout this procedure. Conscious sedation was used for this procedure to decrease patient anxiety.    Using anterior-posterior fluoroscopy, the above noted INTERLAMINAR SPACE was identified and the skin over this site of intended entry was marked and then infiltrated with 3-4 mL of PF 1% LIDOCAINE subcutaneously using a 1.5 inch 27 gauge needle. A 20-gauge Tuohy epidural needle was then inserted and advanced toward the epidural space incrementally under fluoroscopic guidance. A Loss of Resistance technique was used as the epidural needle was advanced. Once  loss of resistance was realized and after negative aspiration of blood and spinal fluid, correct needle position within the epidural space was verified with injection of 0.5 to 1 mL of contrast dye (Omnipaque 240). Appropriate epidural spread was seen on imaging. Again, after negative aspiration for blood and spinal fluid a 5 mL mixture containing 1 mL of Dexamethasone (10 mg/mL) and 2 mL of preservative free Normal Saline and 2ml of 1% preservative free lidocaine was then injected. No pain or paresthesias were noted with injection. There was low resistance during the injection. Washout of epidurogram was seen on fluoroscopy following injection of the above solution. The stylet was replaced and the Tuohy needle was withdrawn intact. The skin was cleansed, and bandages were applied.    Description of Findings: Not applicable    Prosthetic devices, grafts, tissues, or devices implanted: None    Specimen Removed: No    Estimated Blood Loss: minimal    COMPLICATIONS: None    DISPOSITION / PLANS: The patient was transferred to the recovery area in a stable condition for observation. The patient was reexamined prior to discharge. There was no evidence of acute neurologic injury following the procedure.  Patient was discharged from the recovery room after meeting discharge criteria. Home discharge instructions were given to the patient by the staff.

## 2020-11-03 NOTE — H&P
"Progress Notes  Paul Lobato MD (Physician)   Pain Medicine   9/29/2020  1:40 PM   Signed     Chief Pain Complaint:  Low back pain, and leg pain     Interval History:  Ms. Vo returns to clinic for follow-up.  She has been having severe pain starts in the low back and radiates into the left lower extremity distally to the ankle in the L5 distribution.  She describes a sharp and aching sensation which is constant.  Today she rates her pain as a 7/10 and finds that her symptoms are worse with activity and somewhat improved with rest.  She denies having any symptoms in the right lower extremity.  She has undergone left L3/4 transforaminal epidural steroid injection in the past which provided significant pain relief for her for a long period of time.     Interval History:  Ms. Vo returns to clinic for follow-up.  She underwent a left C5/6 transforaminal epidural steroid injection a September of 2019 reports 100% symptomatic pain relief.  She reports she has started to have symptoms again in her left upper extremity.  She has numbness in her left hand in a nondermatomal distribution.  She occasionally has symptoms in the right upper extremity deny newly is frequently and left.  She is looking for a "fix" her her current pain complaints.  Her symptoms mostly started return approximately 2-3 months ago and she currently rates her pain as 7-8/10.     Interval History: Patient was seen on 9/17/19. At that time she underwent .  The patient reports that she is/was better following the procedure.  she reports 80% pain relief.  The changes lasted 3 weeks so far.  The changes have continued through this visit.  She continues to have numbness, but pain has improved.      Interval History: Since last visit on 12-13-18, she reports that she has continued neck and left arm pain.  It has worsened over last 4 weeks.  Pain is worse at night. She denies any injury.  She reports 8/10 pain today.     Interval History: Ms. " Tavo returns to clinic.  She underwent left L3 transforaminal epidural steroid injection on November 6, 2018 and reports having ongoing 80% pain relief.  Currently her pain is rated 6/10 and is described as an aching sensation in the low back and a sharp shooting pain in the left shoulder.  She describes left shoulder pain happens periodically throughout the day but does bother her every single day.  She denies having radiation distal to the shoulder and denies having weakness in her upper extremities. She notices this sensation mostly when she rotates her head to the left and to the right.  Her symptoms are worse with activity improved with rest.  She denies having bowel bladder difficulties.     Initial History of Present Illness:  This patient is a 65 y.o. female who presents today complaining of the above noted pain/s. The patient describes the pain as follows.  Ms. Vo has a history of low back pain for approximately 5 years.  She is status post bilateral hip replacements with left femoral fracture and has had cerclage wires placed. She has participated in physical therapy which cause increased left lower extremity pain and water aerobics which also caused left lower extremity pain. Currently her pain is rated 7/10 and is described as a constant aching sensation which goes down the left leg to the knee but not below.  She denies having symptoms on the right.  She denies having numbness and weakness in the left lower extremity.  She denies having bowel bladder difficulties.  She currently takes Mobic and Norco; she was previously taking Tramadol however this medication is recently been stopped.  There was no inciting event her symptoms are worse with walking and activity while improved with rest.     Previous Therapy:  Medications:  Mobic, Norco, tramadol, gabapentin  Injections:                - Left L3 transforaminal epidural steroid injection on 11-6-18 with 80% pain relief               - Right C5/6 TF  KATYA with RN IV sedation on 9/17/19 with 80% pain relief  Surgeries:  Bilateral hip replacements, no lumbar surgery  Physical Therapy:  Has participated in physical therapy and aquatherapy over 1 year ago           Past Surgical History:   Procedure Laterality Date    BREAST LUMPECTOMY Right 2006     XRT    CATARACT EXTRACTION W/  INTRAOCULAR LENS IMPLANT Left 01/15/2020    CATARACT EXTRACTION W/  INTRAOCULAR LENS IMPLANT Right 01/29/2020    COLONOSCOPY N/A 10/15/2015     Procedure: COLONOSCOPY;  Surgeon: Maylin Shipley MD;  Location: [facility] ENDO;  Service: Endoscopy;  Laterality: N/A;    EPIDURAL STEROID INJECTION INTO CERVICAL SPINE N/A 9/25/2020     Procedure: C7/T1 IL KATYA;  Surgeon: Paul Lobato MD;  Location: [facility] PAIN MGT;  Service: Pain Management;  Laterality: N/A;    HYSTERECTOMY        TRANSFORAMINAL EPIDURAL INJECTION OF STEROID Left 9/17/2019     Procedure: Left C5/6 TF KATYA w/ RN IV sedation;  Surgeon: Paul Lobato MD;  Location: [facility] PAIN MGT;  Service: Pain Management;  Laterality: Left;      Imaging / Labs / Studies (reviewed on 9/29/2020):       Results for orders placed during the hospital encounter of 08/22/19   MRI Cervical Spine Without Contrast     Narrative COMPARISON:  None.  FINDINGS:  There is no fracture or malalignment of the cervical spine.  No bone marrow replacing process.  No bone marrow edema.  The prevertebral soft tissues have a normal appearance in the STIR sequence without evidence for ligamentous injury.  The cervicocranial junction has a normal appearance.  There is no paraspinal or intrathecal mass.  C2-C3: Mild disc bulge without central canal stenosis.  Mild bilateral neural foraminal narrowing from facet joint hypertrophic changes.  C3-C4: Posterior disc osteophyte complex with ventral surface thecal sac effacement and ventral surface cord contact without cord impingement.  Bilateral uncovertebral joint spurring with moderate bilateral neural foraminal  stenosis.  C4-C5: There is a right paracentral focal disc protrusion with right ventral surface cord contact in subtle right ventral surface cord remodeling.  Mild right neural foraminal narrowing.  C5-C6: Degenerative disc disease.  Disc bulge with mild central canal stenosis and ventral surface cord contact and mild ventral surface cord remodeling but no abnormal cord signal intensity.  There is severe bilateral neural foraminal stenosis from uncovertebral joint spurring with probable impingement upon the exiting bilateral C6 nerve roots in the neural foramen.   C6-C7: Focal disc protrusion with mild ventral surface cord remodeling but no abnormal cord signal intensity.  Severe bilateral neural foraminal stenosis from uncovertebral joint spurring.  C7-T1: Mild disc bulge with mild ventral surface thecal sac effacement but no cord impingement.  Moderate bilateral neural foraminal stenosis.     Impression 1. There are multiple levels of neural foraminal stenosis as detailed above.  Of note there is severe bilateral neural foraminal stenosis at C5-C6 and C6-C7.  There is probable impingement upon the exiting bilateral C6 nerve roots at the C5-C6 level.  2. Multiple disc bulges and posterior disc osteophyte complexes throughout the cervical spine causing mild central canal stenosis as detailed above at each level, some with mild ventral surface cord remodeling but no abnormal cord signal intensity seen in the where in the cervical spine.           Results for orders placed during the hospital encounter of 12/13/18   X-Ray Cervical Spine AP And Lateral     Narrative FINDINGS:  Straightening of normal cervical lordosis.  This may be positional or secondary to muscle spasm.  No fracture or listhesis.  Multilevel degenerative changes are seen with findings most pronounced at C3-C4 and C5-C6 with intervertebral disc space narrowing and marginal spurring with facet arthropathy.  Odontoid process remains intact.  Lateral  masses are symmetric.  Prevertebral soft tissues are maintained.  Calcification about the left carotid vasculature is noted           Results for orders placed during the hospital encounter of 09/27/18   MRI Lumbar Spine Without Contrast     Narrative COMPARISON:  Left hip radiographs from September 10, 2018.  FINDINGS:  The osseous structures demonstrate nonspecific heterogeneous marrow signal intensity.  No fracture.  No listhesis.  Multilevel degenerative changes are seen with spurring of the endplates and intervertebral disc space narrowing at L4-L5.  Visualized spinal cord demonstrates normal signal intensity.  L1-L2: Mild facet arthropathy.  No spinal canal or neural foraminal encroachment.  L2-L3: Posterior disc bulge and facet and ligamentum flavum hypertrophy.  Mild effacement of the ventral thecal sac.  No spinal canal or neural foraminal stenosis.  L3-L4: Small annular tear is seen.  There is posterior disc protrusion and facet arthropathy.  This results in asymmetric neural foraminal and crow joint slightly greater on the right side.  L4-L5: Posterior disc protrusion and facet ligamentum flavum hypertrophy.  There is mild to moderate narrowing of the spinal canal and bilateral neural foraminal encroachment.  L5-S1: Facet and ligamentum flavum hypertrophy and posterior disc bulge.  Mild bilateral neural foraminal encroachment.  Spinal canal is maintained.     Impression Multifocal areas of facet and ligamentum flavum hypertrophy with disc protrusion resulting in neural foraminal encroachment at multiple levels.  Findings are most pronounced at L4-L5 where there is also spinal canal stenosis.  Degenerative changes and other findings as outlined above.      Review of Systems:  CONSTITUTIONAL: patient denies any fever, chills, or weight loss  SKIN: patient denies any rash or itching  RESPIRATORY: patient denies having any shortness of breath  GASTROINTESTINAL: patient denies having any diarrhea,  "constipation, or bowel incontinence  GENITOURINARY: patient denies having any abnormal bladder function     MUSCULOSKELETAL:  - patient complains of the above noted pain/s (see chief pain complaint)     NEUROLOGICAL:   - pain as above  - strength in Lower extremities is intact, BILATERALLY  - sensation in Lower extremities is intact, BILATERALLY  - patient denies any loss of bowel or bladder control      PSYCHIATRIC: patient denies any change in mood     Other:  All other systems reviewed and are negative     Physical Exam:  Vitals:  /64   Pulse 65   Ht 5' 9" (1.753 m)   Wt 116.5 kg (256 lb 13.4 oz)   BMI 37.93 kg/m²   (reviewed on 9/29/2020)     General: alert and oriented, in no apparent distress.  Gait: normal gait.  Skin: no rashes, no discoloration, no obvious lesions  HEENT: normocephalic, atraumatic. Pupils equal and round.  Cardiovascular: no significant peripheral edema present.  Respiratory: without use of accessory muscles of respiration.     Musculoskeletal:  Lumbar spine  -5/5 strength with hip flexion, knee extension, knee flexion, plantar in dorsiflexion bilaterally     Neuro:  -positive straight leg raise on the left for radicular symptoms, negative on the right; sensation intact to light touch bilateral lower extremities     - Extremity Reflexes: Brisk and symmetric throughout  - Sensory: Sensation to light touch intact bilaterally      Psych:  Mood and affect is appropriate     Assessment  Lumbar Spondylosis  Hip OA status post hip replacement     1. 65 y.o. year old patient with PMH of        Past Medical History:   Diagnosis Date    Breast cancer      Chronic pain syndrome      Generalized osteoarthrosis, involving multiple sites       s/p THR bilateral    History of breast cancer 2007     lumpectomy/XRT    HTN (hypertension)      Hyperlipidemia      Obesity, unspecified         presenting with pain located left lateral thigh, back pain, shoulder pain.  Diagnoses include:      " ICD-10-CM ICD-9-CM   1. Lumbar radiculopathy  M54.16 724.4   2. Dorsalgia, unspecified  M54.9 724.5         2. Pain Generators / Etiology ; lumbar degenerative disc disease, lumbar spondylosis, lumbar radiculopathy, bilateral hip replacements; I suspect that the left lateral leg pain that she is feeling is due to loosening of the hardware with the cerclage wires associated with previous femoral fracture however will perform left L3 transforaminal epidural steroid injection for treatment and diagnostic benefit  3. Failed Meds:  Norco, tramadol, Mobic  4. Physical Therapy - has done all water aerobics and land-based physical therapy but it has been many months ago  5. Psychological comorbidities -  none  6. Anticoagulants / Antiplatelets:  None        Plan:  1. Interventional:  Will schedule for L5/S1 ILESI     2. Pharmacologic:   - Continue gabapentin 300mg up to TID.    - Continue Mobic 15mg QD PRN, Norco 10mg BID PRN as prescribed     3. Rehabilitative: Encouraged regular exercise.     4. Diagnostic:  Will obtain updated lumbar MRI for worsening pain in the left leg     5. Follow up:  With patient follow up in clinic 4 weeks after the injections        MARCY Lobato MD  Interventional Pain Medicine  [facility] [place]

## 2020-11-03 NOTE — DISCHARGE INSTRUCTIONS

## 2020-11-17 RX ORDER — CITALOPRAM 40 MG/1
TABLET, FILM COATED ORAL
Qty: 30 TABLET | Refills: 11 | Status: SHIPPED | OUTPATIENT
Start: 2020-11-17 | End: 2021-11-02

## 2020-12-11 ENCOUNTER — PATIENT MESSAGE (OUTPATIENT)
Dept: OTHER | Facility: OTHER | Age: 65
End: 2020-12-11

## 2020-12-15 ENCOUNTER — OFFICE VISIT (OUTPATIENT)
Dept: NEUROSURGERY | Facility: CLINIC | Age: 65
End: 2020-12-15
Payer: MEDICARE

## 2020-12-15 ENCOUNTER — OFFICE VISIT (OUTPATIENT)
Dept: PAIN MEDICINE | Facility: CLINIC | Age: 65
End: 2020-12-15
Attending: FAMILY MEDICINE
Payer: MEDICARE

## 2020-12-15 VITALS — BODY MASS INDEX: 41.11 KG/M2 | HEIGHT: 67 IN | WEIGHT: 261.94 LBS | RESPIRATION RATE: 18 BRPM

## 2020-12-15 VITALS
HEART RATE: 61 BPM | SYSTOLIC BLOOD PRESSURE: 134 MMHG | BODY MASS INDEX: 41.12 KG/M2 | WEIGHT: 262 LBS | RESPIRATION RATE: 18 BRPM | HEIGHT: 67 IN | DIASTOLIC BLOOD PRESSURE: 70 MMHG

## 2020-12-15 DIAGNOSIS — M54.12 CERVICAL RADICULOPATHY: ICD-10-CM

## 2020-12-15 DIAGNOSIS — M50.30 DDD (DEGENERATIVE DISC DISEASE), CERVICAL: Primary | ICD-10-CM

## 2020-12-15 DIAGNOSIS — G89.4 CHRONIC PAIN SYNDROME: ICD-10-CM

## 2020-12-15 DIAGNOSIS — M54.16 LUMBAR RADICULOPATHY: Primary | ICD-10-CM

## 2020-12-15 PROCEDURE — 99212 PR OFFICE/OUTPT VISIT, EST, LEVL II, 10-19 MIN: ICD-10-PCS | Mod: S$PBB,,, | Performed by: PHYSICIAN ASSISTANT

## 2020-12-15 PROCEDURE — 99999 PR PBB SHADOW E&M-EST. PATIENT-LVL III: CPT | Mod: PBBFAC,,, | Performed by: PHYSICIAN ASSISTANT

## 2020-12-15 PROCEDURE — 99213 OFFICE O/P EST LOW 20 MIN: CPT | Mod: S$PBB,,, | Performed by: PAIN MEDICINE

## 2020-12-15 PROCEDURE — 99999 PR PBB SHADOW E&M-EST. PATIENT-LVL IV: ICD-10-PCS | Mod: PBBFAC,,, | Performed by: PAIN MEDICINE

## 2020-12-15 PROCEDURE — 99999 PR PBB SHADOW E&M-EST. PATIENT-LVL III: ICD-10-PCS | Mod: PBBFAC,,, | Performed by: PHYSICIAN ASSISTANT

## 2020-12-15 PROCEDURE — 99212 OFFICE O/P EST SF 10 MIN: CPT | Mod: S$PBB,,, | Performed by: PHYSICIAN ASSISTANT

## 2020-12-15 PROCEDURE — 99213 OFFICE O/P EST LOW 20 MIN: CPT | Mod: PBBFAC,27 | Performed by: PHYSICIAN ASSISTANT

## 2020-12-15 PROCEDURE — 99214 OFFICE O/P EST MOD 30 MIN: CPT | Mod: PBBFAC | Performed by: PAIN MEDICINE

## 2020-12-15 PROCEDURE — 99213 PR OFFICE/OUTPT VISIT, EST, LEVL III, 20-29 MIN: ICD-10-PCS | Mod: S$PBB,,, | Performed by: PAIN MEDICINE

## 2020-12-15 PROCEDURE — 99999 PR PBB SHADOW E&M-EST. PATIENT-LVL IV: CPT | Mod: PBBFAC,,, | Performed by: PAIN MEDICINE

## 2020-12-15 NOTE — PROGRESS NOTES
"Chief Pain Complaint:  Low back pain, and leg pain    Interval History:  Ms. Vo returns to clinic for follow-up.  She underwent L5/S1 interlaminar epidural steroid injection on November 3, 2020 is sensitive to her recent symptomatic pain relief rates her pain as a 0/10.  She has been very pleased with the results of the injection.  She continues to have some left thigh pain associated with a bj in her leg and finds that she limps and has to use a cane for ambulation.  Previous x-rays of the hips have shown a cerclage wire that is broken and she reports that she has seen orthopedics in the past has been told there is nothing that can be done for this.    Interval History:  Ms. Vo returns to clinic for follow-up.  She has been having severe pain starts in the low back and radiates into the left lower extremity distally to the ankle in the L5 distribution.  She describes a sharp and aching sensation which is constant.  Today she rates her pain as a 7/10 and finds that her symptoms are worse with activity and somewhat improved with rest.  She denies having any symptoms in the right lower extremity.  She has undergone left L3/4 transforaminal epidural steroid injection in the past which provided significant pain relief for her for a long period of time.    Interval History:  Ms. Vo returns to clinic for follow-up.  She underwent a left C5/6 transforaminal epidural steroid injection a September of 2019 reports 100% symptomatic pain relief.  She reports she has started to have symptoms again in her left upper extremity.  She has numbness in her left hand in a nondermatomal distribution.  She occasionally has symptoms in the right upper extremity deny newly is frequently and left.  She is looking for a "fix" her her current pain complaints.  Her symptoms mostly started return approximately 2-3 months ago and she currently rates her pain as 7-8/10.    Interval History: Patient was seen on 9/17/19. At that time she " underwent .  The patient reports that she is/was better following the procedure.  she reports 80% pain relief.  The changes lasted 3 weeks so far.  The changes have continued through this visit.  She continues to have numbness, but pain has improved.     Interval History: Since last visit on 12-13-18, she reports that she has continued neck and left arm pain.  It has worsened over last 4 weeks.  Pain is worse at night. She denies any injury.  She reports 8/10 pain today.    Interval History: Ms. Vo returns to clinic.  She underwent left L3 transforaminal epidural steroid injection on November 6, 2018 and reports having ongoing 80% pain relief.  Currently her pain is rated 6/10 and is described as an aching sensation in the low back and a sharp shooting pain in the left shoulder.  She describes left shoulder pain happens periodically throughout the day but does bother her every single day.  She denies having radiation distal to the shoulder and denies having weakness in her upper extremities. She notices this sensation mostly when she rotates her head to the left and to the right.  Her symptoms are worse with activity improved with rest.  She denies having bowel bladder difficulties.    Initial History of Present Illness:  This patient is a 65 y.o. female who presents today complaining of the above noted pain/s. The patient describes the pain as follows.  Ms. Vo has a history of low back pain for approximately 5 years.  She is status post bilateral hip replacements with left femoral fracture and has had cerclage wires placed. She has participated in physical therapy which cause increased left lower extremity pain and water aerobics which also caused left lower extremity pain. Currently her pain is rated 7/10 and is described as a constant aching sensation which goes down the left leg to the knee but not below.  She denies having symptoms on the right.  She denies having numbness and weakness in the left lower  extremity.  She denies having bowel bladder difficulties.  She currently takes Mobic and Norco; she was previously taking Tramadol however this medication is recently been stopped.  There was no inciting event her symptoms are worse with walking and activity while improved with rest.    Previous Therapy:  Medications:  Mobic, Norco, tramadol, gabapentin  Injections:     - Left L3 transforaminal epidural steroid injection on 11-6-18 with 80% pain relief    - Right C5/6 TF KATYA with RN IV sedation on 9/17/19 with 80% pain relief   -L5/S1 interlaminar epidural steroid injection with 100% symptomatic pain relief on November 3, 2020  Surgeries:  Bilateral hip replacements, no lumbar surgery  Physical Therapy:  Has participated in physical therapy and aquatherapy over 1 year ago    Past Surgical History:   Procedure Laterality Date    BREAST LUMPECTOMY Right 2006    XRT    CATARACT EXTRACTION W/  INTRAOCULAR LENS IMPLANT Left 01/15/2020    CATARACT EXTRACTION W/  INTRAOCULAR LENS IMPLANT Right 01/29/2020    COLONOSCOPY N/A 10/15/2015    Procedure: COLONOSCOPY;  Surgeon: Maylin Shipley MD;  [facility];  Service: Endoscopy;  Laterality: N/A;    EPIDURAL STEROID INJECTION N/A 11/3/2020    Procedure: Lumbar L5/S1 IL KATYA;  Surgeon: Paul Lobato MD;  Location: [facility];  Service: Pain Management;  Laterality: N/A;    EPIDURAL STEROID INJECTION INTO CERVICAL SPINE N/A 9/25/2020    Procedure: C7/T1 IL KATYA;  Surgeon: Paul Lobato MD;  Location: [facility];  Service: Pain Management;  Laterality: N/A;    HYSTERECTOMY      TRANSFORAMINAL EPIDURAL INJECTION OF STEROID Left 9/17/2019    Procedure: Left C5/6 TF KATYA w/ RN IV sedation;  Surgeon: Paul Lobato MD;  Location: [facility];  Service: Pain Management;  Laterality: Left;     Imaging / Labs / Studies (reviewed on 12/15/2020):  Results for orders placed during the hospital encounter of 08/22/19   MRI Cervical Spine Without Contrast     Narrative COMPARISON:  None.  FINDINGS:  There is no fracture or malalignment of the cervical spine.  No bone marrow replacing process.  No bone marrow edema.  The prevertebral soft tissues have a normal appearance in the STIR sequence without evidence for ligamentous injury.  The cervicocranial junction has a normal appearance.  There is no paraspinal or intrathecal mass.  C2-C3: Mild disc bulge without central canal stenosis.  Mild bilateral neural foraminal narrowing from facet joint hypertrophic changes.  C3-C4: Posterior disc osteophyte complex with ventral surface thecal sac effacement and ventral surface cord contact without cord impingement.  Bilateral uncovertebral joint spurring with moderate bilateral neural foraminal stenosis.  C4-C5: There is a right paracentral focal disc protrusion with right ventral surface cord contact in subtle right ventral surface cord remodeling.  Mild right neural foraminal narrowing.  C5-C6: Degenerative disc disease.  Disc bulge with mild central canal stenosis and ventral surface cord contact and mild ventral surface cord remodeling but no abnormal cord signal intensity.  There is severe bilateral neural foraminal stenosis from uncovertebral joint spurring with probable impingement upon the exiting bilateral C6 nerve roots in the neural foramen.   C6-C7: Focal disc protrusion with mild ventral surface cord remodeling but no abnormal cord signal intensity.  Severe bilateral neural foraminal stenosis from uncovertebral joint spurring.  C7-T1: Mild disc bulge with mild ventral surface thecal sac effacement but no cord impingement.  Moderate bilateral neural foraminal stenosis.    Impression 1. There are multiple levels of neural foraminal stenosis as detailed above.  Of note there is severe bilateral neural foraminal stenosis at C5-C6 and C6-C7.  There is probable impingement upon the exiting bilateral C6 nerve roots at the C5-C6 level.  2. Multiple disc bulges and posterior  disc osteophyte complexes throughout the cervical spine causing mild central canal stenosis as detailed above at each level, some with mild ventral surface cord remodeling but no abnormal cord signal intensity seen in the where in the cervical spine.     Results for orders placed during the hospital encounter of 12/13/18   X-Ray Cervical Spine AP And Lateral    Narrative FINDINGS:  Straightening of normal cervical lordosis.  This may be positional or secondary to muscle spasm.  No fracture or listhesis.  Multilevel degenerative changes are seen with findings most pronounced at C3-C4 and C5-C6 with intervertebral disc space narrowing and marginal spurring with facet arthropathy.  Odontoid process remains intact.  Lateral masses are symmetric.  Prevertebral soft tissues are maintained.  Calcification about the left carotid vasculature is noted     Results for orders placed during the hospital encounter of 09/27/18   MRI Lumbar Spine Without Contrast    Narrative COMPARISON:  Left hip radiographs from September 10, 2018.  FINDINGS:  The osseous structures demonstrate nonspecific heterogeneous marrow signal intensity.  No fracture.  No listhesis.  Multilevel degenerative changes are seen with spurring of the endplates and intervertebral disc space narrowing at L4-L5.  Visualized spinal cord demonstrates normal signal intensity.  L1-L2: Mild facet arthropathy.  No spinal canal or neural foraminal encroachment.  L2-L3: Posterior disc bulge and facet and ligamentum flavum hypertrophy.  Mild effacement of the ventral thecal sac.  No spinal canal or neural foraminal stenosis.  L3-L4: Small annular tear is seen.  There is posterior disc protrusion and facet arthropathy.  This results in asymmetric neural foraminal and crow joint slightly greater on the right side.  L4-L5: Posterior disc protrusion and facet ligamentum flavum hypertrophy.  There is mild to moderate narrowing of the spinal canal and bilateral neural foraminal  "encroachment.  L5-S1: Facet and ligamentum flavum hypertrophy and posterior disc bulge.  Mild bilateral neural foraminal encroachment.  Spinal canal is maintained.    Impression Multifocal areas of facet and ligamentum flavum hypertrophy with disc protrusion resulting in neural foraminal encroachment at multiple levels.  Findings are most pronounced at L4-L5 where there is also spinal canal stenosis.  Degenerative changes and other findings as outlined above.     Review of Systems:  CONSTITUTIONAL: patient denies any fever, chills, or weight loss  SKIN: patient denies any rash or itching  RESPIRATORY: patient denies having any shortness of breath  GASTROINTESTINAL: patient denies having any diarrhea, constipation, or bowel incontinence  GENITOURINARY: patient denies having any abnormal bladder function    MUSCULOSKELETAL:  - patient complains of the above noted pain/s (see chief pain complaint)    NEUROLOGICAL:   - pain as above  - strength in Lower extremities is intact, BILATERALLY  - sensation in Lower extremities is intact, BILATERALLY  - patient denies any loss of bowel or bladder control      PSYCHIATRIC: patient denies any change in mood    Other:  All other systems reviewed and are negative    Physical Exam:  Vitals:  /70 (BP Location: Right arm, Patient Position: Sitting, BP Method: Medium (Automatic))   Pulse 61   Resp 18   Ht 5' 7" (1.702 m)   Wt 118.8 kg (262 lb)   BMI 41.04 kg/m²   (reviewed on 12/15/2020)    General: alert and oriented, in no apparent distress.  Gait: normal gait.  Skin: no rashes, no discoloration, no obvious lesions  HEENT: normocephalic, atraumatic. Pupils equal and round.  Cardiovascular: no significant peripheral edema present.  Respiratory: without use of accessory muscles of respiration.    Musculoskeletal:  Moves all extremities well    Neuro:  Cranial nerves 2-12 grossly intact      Psych:  Mood and affect is appropriate    Assessment  Lumbar Spondylosis  Hip OA " status post hip replacement    1. 65 y.o. year old patient with PMH of   Past Medical History:   Diagnosis Date    Breast cancer     Chronic pain syndrome     Generalized osteoarthrosis, involving multiple sites     s/p THR bilateral    History of breast cancer 2007    lumpectomy/XRT    HTN (hypertension)     Hyperlipidemia     Obesity, unspecified       presenting with pain located left lateral thigh, back pain, shoulder pain.  Diagnoses include:  No diagnosis found.    2. Pain Generators / Etiology ; lumbar degenerative disc disease, lumbar spondylosis, lumbar radiculopathy, bilateral hip replacements; I suspect that the left lateral leg pain that she is feeling is due to loosening of the hardware with the cerclage wires associated with previous femoral fracture however will perform left L3 transforaminal epidural steroid injection for treatment and diagnostic benefit  3. Failed Meds:  Norco, tramadol, Mobic  4. Physical Therapy - has done all water aerobics and land-based physical therapy but it has been many months ago  5. Psychological comorbidities -  none  6. Anticoagulants / Antiplatelets:  None       Plan:  1. Interventional:  None    2. Pharmacologic:   - Continue gabapentin 300mg up to TID.    - Continue Mobic 15mg QD PRN, Norco 10mg BID PRN as prescribed    3. Rehabilitative: Encouraged regular exercise.  Will consider aqua physical therapy in the future    4. Diagnostic:  None; will send message to our orthopedic surgeon asking to look at imaging if there is anything they can do for the broken cerclage wire    5. Follow up:  Will have patient follow up in clinic as needed      MARCY Lobato MD  Interventional Pain Medicine  [facility] [place]

## 2021-01-07 ENCOUNTER — LAB VISIT (OUTPATIENT)
Dept: LAB | Facility: HOSPITAL | Age: 66
End: 2021-01-07
Attending: INTERNAL MEDICINE
Payer: MEDICARE

## 2021-01-07 DIAGNOSIS — I10 ESSENTIAL HYPERTENSION: ICD-10-CM

## 2021-01-07 LAB
ALBUMIN SERPL BCP-MCNC: 3.8 G/DL (ref 3.5–5.2)
ALP SERPL-CCNC: 89 U/L (ref 55–135)
ALT SERPL W/O P-5'-P-CCNC: 22 U/L (ref 10–44)
ANION GAP SERPL CALC-SCNC: 8 MMOL/L (ref 8–16)
AST SERPL-CCNC: 25 U/L (ref 10–40)
BASOPHILS # BLD AUTO: 0.09 K/UL (ref 0–0.2)
BASOPHILS NFR BLD: 1.5 % (ref 0–1.9)
BILIRUB SERPL-MCNC: 0.4 MG/DL (ref 0.1–1)
BUN SERPL-MCNC: 11 MG/DL (ref 8–23)
CALCIUM SERPL-MCNC: 9.4 MG/DL (ref 8.7–10.5)
CHLORIDE SERPL-SCNC: 101 MMOL/L (ref 95–110)
CHOLEST SERPL-MCNC: 143 MG/DL (ref 120–199)
CHOLEST/HDLC SERPL: 3 {RATIO} (ref 2–5)
CO2 SERPL-SCNC: 32 MMOL/L (ref 23–29)
CREAT SERPL-MCNC: 0.9 MG/DL (ref 0.5–1.4)
DIFFERENTIAL METHOD: ABNORMAL
EOSINOPHIL # BLD AUTO: 0.2 K/UL (ref 0–0.5)
EOSINOPHIL NFR BLD: 3 % (ref 0–8)
ERYTHROCYTE [DISTWIDTH] IN BLOOD BY AUTOMATED COUNT: 13 % (ref 11.5–14.5)
EST. GFR  (AFRICAN AMERICAN): >60 ML/MIN/1.73 M^2
EST. GFR  (NON AFRICAN AMERICAN): >60 ML/MIN/1.73 M^2
GLUCOSE SERPL-MCNC: 111 MG/DL (ref 70–110)
HCT VFR BLD AUTO: 43 % (ref 37–48.5)
HDLC SERPL-MCNC: 47 MG/DL (ref 40–75)
HDLC SERPL: 32.9 % (ref 20–50)
HGB BLD-MCNC: 13.2 G/DL (ref 12–16)
IMM GRANULOCYTES # BLD AUTO: 0 K/UL (ref 0–0.04)
IMM GRANULOCYTES NFR BLD AUTO: 0 % (ref 0–0.5)
LDLC SERPL CALC-MCNC: 63.2 MG/DL (ref 63–159)
LYMPHOCYTES # BLD AUTO: 2.1 K/UL (ref 1–4.8)
LYMPHOCYTES NFR BLD: 35.2 % (ref 18–48)
MCH RBC QN AUTO: 29.7 PG (ref 27–31)
MCHC RBC AUTO-ENTMCNC: 30.7 G/DL (ref 32–36)
MCV RBC AUTO: 97 FL (ref 82–98)
MONOCYTES # BLD AUTO: 0.8 K/UL (ref 0.3–1)
MONOCYTES NFR BLD: 12.5 % (ref 4–15)
NEUTROPHILS # BLD AUTO: 2.9 K/UL (ref 1.8–7.7)
NEUTROPHILS NFR BLD: 47.8 % (ref 38–73)
NONHDLC SERPL-MCNC: 96 MG/DL
NRBC BLD-RTO: 0 /100 WBC
PLATELET # BLD AUTO: 221 K/UL (ref 150–350)
PMV BLD AUTO: 11.4 FL (ref 9.2–12.9)
POTASSIUM SERPL-SCNC: 3.9 MMOL/L (ref 3.5–5.1)
PROT SERPL-MCNC: 7.1 G/DL (ref 6–8.4)
RBC # BLD AUTO: 4.45 M/UL (ref 4–5.4)
SODIUM SERPL-SCNC: 141 MMOL/L (ref 136–145)
TRIGL SERPL-MCNC: 164 MG/DL (ref 30–150)
TSH SERPL DL<=0.005 MIU/L-ACNC: 2.42 UIU/ML (ref 0.4–4)
WBC # BLD AUTO: 6 K/UL (ref 3.9–12.7)

## 2021-01-07 PROCEDURE — 80061 LIPID PANEL: CPT

## 2021-01-07 PROCEDURE — 84443 ASSAY THYROID STIM HORMONE: CPT

## 2021-01-07 PROCEDURE — 80053 COMPREHEN METABOLIC PANEL: CPT

## 2021-01-07 PROCEDURE — 36415 COLL VENOUS BLD VENIPUNCTURE: CPT | Mod: PO

## 2021-01-07 PROCEDURE — 85025 COMPLETE CBC W/AUTO DIFF WBC: CPT

## 2021-01-12 ENCOUNTER — OFFICE VISIT (OUTPATIENT)
Dept: INTERNAL MEDICINE | Facility: CLINIC | Age: 66
End: 2021-01-12
Payer: MEDICARE

## 2021-01-12 VITALS
HEIGHT: 67 IN | HEART RATE: 68 BPM | OXYGEN SATURATION: 93 % | SYSTOLIC BLOOD PRESSURE: 126 MMHG | WEIGHT: 257.69 LBS | BODY MASS INDEX: 40.45 KG/M2 | DIASTOLIC BLOOD PRESSURE: 80 MMHG | TEMPERATURE: 97 F

## 2021-01-12 DIAGNOSIS — M54.16 LUMBAR RADICULOPATHY: ICD-10-CM

## 2021-01-12 DIAGNOSIS — E78.00 PURE HYPERCHOLESTEROLEMIA: ICD-10-CM

## 2021-01-12 DIAGNOSIS — I10 ESSENTIAL HYPERTENSION: Primary | ICD-10-CM

## 2021-01-12 DIAGNOSIS — Z85.3 HISTORY OF BREAST CANCER: ICD-10-CM

## 2021-01-12 DIAGNOSIS — M15.9 GENERALIZED OSTEOARTHROSIS, INVOLVING MULTIPLE SITES: ICD-10-CM

## 2021-01-12 DIAGNOSIS — M54.12 CERVICAL RADICULOPATHY: ICD-10-CM

## 2021-01-12 PROCEDURE — 99213 PR OFFICE/OUTPT VISIT, EST, LEVL III, 20-29 MIN: ICD-10-PCS | Mod: S$PBB,,, | Performed by: INTERNAL MEDICINE

## 2021-01-12 PROCEDURE — 99213 OFFICE O/P EST LOW 20 MIN: CPT | Mod: S$PBB,,, | Performed by: INTERNAL MEDICINE

## 2021-01-12 PROCEDURE — 99213 OFFICE O/P EST LOW 20 MIN: CPT | Mod: PBBFAC,PO | Performed by: INTERNAL MEDICINE

## 2021-01-12 PROCEDURE — 99999 PR PBB SHADOW E&M-EST. PATIENT-LVL III: CPT | Mod: PBBFAC,,, | Performed by: INTERNAL MEDICINE

## 2021-01-12 PROCEDURE — 99999 PR PBB SHADOW E&M-EST. PATIENT-LVL III: ICD-10-PCS | Mod: PBBFAC,,, | Performed by: INTERNAL MEDICINE

## 2021-01-12 RX ORDER — HYDROCODONE BITARTRATE AND ACETAMINOPHEN 10; 325 MG/1; MG/1
1 TABLET ORAL 2 TIMES DAILY PRN
Qty: 180 TABLET | Refills: 0 | Status: SHIPPED | OUTPATIENT
Start: 2021-01-12 | End: 2021-04-13 | Stop reason: SDUPTHER

## 2021-04-13 ENCOUNTER — OFFICE VISIT (OUTPATIENT)
Dept: INTERNAL MEDICINE | Facility: CLINIC | Age: 66
End: 2021-04-13
Payer: MEDICARE

## 2021-04-13 VITALS
HEIGHT: 67 IN | SYSTOLIC BLOOD PRESSURE: 128 MMHG | DIASTOLIC BLOOD PRESSURE: 80 MMHG | TEMPERATURE: 98 F | BODY MASS INDEX: 42.25 KG/M2 | OXYGEN SATURATION: 99 % | HEART RATE: 67 BPM | WEIGHT: 269.19 LBS

## 2021-04-13 DIAGNOSIS — M54.16 LUMBAR RADICULOPATHY: ICD-10-CM

## 2021-04-13 DIAGNOSIS — M15.9 GENERALIZED OSTEOARTHROSIS, INVOLVING MULTIPLE SITES: ICD-10-CM

## 2021-04-13 DIAGNOSIS — E78.00 PURE HYPERCHOLESTEROLEMIA: ICD-10-CM

## 2021-04-13 DIAGNOSIS — Z85.3 HISTORY OF BREAST CANCER: ICD-10-CM

## 2021-04-13 DIAGNOSIS — I10 ESSENTIAL HYPERTENSION: Primary | ICD-10-CM

## 2021-04-13 PROCEDURE — 99999 PR PBB SHADOW E&M-EST. PATIENT-LVL III: ICD-10-PCS | Mod: PBBFAC,,, | Performed by: INTERNAL MEDICINE

## 2021-04-13 PROCEDURE — 99999 PR PBB SHADOW E&M-EST. PATIENT-LVL III: CPT | Mod: PBBFAC,,, | Performed by: INTERNAL MEDICINE

## 2021-04-13 PROCEDURE — 99213 OFFICE O/P EST LOW 20 MIN: CPT | Mod: S$PBB,,, | Performed by: INTERNAL MEDICINE

## 2021-04-13 PROCEDURE — 99213 PR OFFICE/OUTPT VISIT, EST, LEVL III, 20-29 MIN: ICD-10-PCS | Mod: S$PBB,,, | Performed by: INTERNAL MEDICINE

## 2021-04-13 PROCEDURE — 99213 OFFICE O/P EST LOW 20 MIN: CPT | Mod: PBBFAC,PO | Performed by: INTERNAL MEDICINE

## 2021-04-13 RX ORDER — HYDROCODONE BITARTRATE AND ACETAMINOPHEN 10; 325 MG/1; MG/1
1 TABLET ORAL 2 TIMES DAILY PRN
Qty: 180 TABLET | Refills: 0 | Status: SHIPPED | OUTPATIENT
Start: 2021-04-13 | End: 2021-07-12 | Stop reason: SDUPTHER

## 2021-04-21 ENCOUNTER — TELEPHONE (OUTPATIENT)
Dept: INTERNAL MEDICINE | Facility: CLINIC | Age: 66
End: 2021-04-21

## 2021-04-22 ENCOUNTER — OFFICE VISIT (OUTPATIENT)
Dept: INTERNAL MEDICINE | Facility: CLINIC | Age: 66
End: 2021-04-22
Payer: MEDICARE

## 2021-04-22 VITALS
WEIGHT: 264.31 LBS | HEART RATE: 70 BPM | BODY MASS INDEX: 41.48 KG/M2 | SYSTOLIC BLOOD PRESSURE: 122 MMHG | HEIGHT: 67 IN | OXYGEN SATURATION: 98 % | TEMPERATURE: 98 F | DIASTOLIC BLOOD PRESSURE: 78 MMHG

## 2021-04-22 DIAGNOSIS — M15.9 GENERALIZED OSTEOARTHROSIS, INVOLVING MULTIPLE SITES: ICD-10-CM

## 2021-04-22 DIAGNOSIS — I10 ESSENTIAL HYPERTENSION: Primary | ICD-10-CM

## 2021-04-22 PROCEDURE — 99213 OFFICE O/P EST LOW 20 MIN: CPT | Mod: PBBFAC,PO | Performed by: INTERNAL MEDICINE

## 2021-04-22 PROCEDURE — 99213 OFFICE O/P EST LOW 20 MIN: CPT | Mod: S$PBB,,, | Performed by: INTERNAL MEDICINE

## 2021-04-22 PROCEDURE — 99999 PR PBB SHADOW E&M-EST. PATIENT-LVL III: ICD-10-PCS | Mod: PBBFAC,,, | Performed by: INTERNAL MEDICINE

## 2021-04-22 PROCEDURE — 99999 PR PBB SHADOW E&M-EST. PATIENT-LVL III: CPT | Mod: PBBFAC,,, | Performed by: INTERNAL MEDICINE

## 2021-04-22 PROCEDURE — 99213 PR OFFICE/OUTPT VISIT, EST, LEVL III, 20-29 MIN: ICD-10-PCS | Mod: S$PBB,,, | Performed by: INTERNAL MEDICINE

## 2021-04-22 RX ORDER — FUROSEMIDE 40 MG/1
40 TABLET ORAL DAILY PRN
Qty: 30 TABLET | Refills: 11 | Status: SHIPPED | OUTPATIENT
Start: 2021-04-22 | End: 2021-10-28 | Stop reason: SDUPTHER

## 2021-04-28 ENCOUNTER — PATIENT MESSAGE (OUTPATIENT)
Dept: RESEARCH | Facility: HOSPITAL | Age: 66
End: 2021-04-28

## 2021-05-10 ENCOUNTER — TELEPHONE (OUTPATIENT)
Dept: ORTHOPEDICS | Facility: CLINIC | Age: 66
End: 2021-05-10

## 2021-05-10 ENCOUNTER — TELEPHONE (OUTPATIENT)
Dept: INTERNAL MEDICINE | Facility: CLINIC | Age: 66
End: 2021-05-10

## 2021-05-10 DIAGNOSIS — M17.0 PRIMARY OSTEOARTHRITIS OF KNEES, BILATERAL: Primary | ICD-10-CM

## 2021-05-10 DIAGNOSIS — M25.569 KNEE PAIN, UNSPECIFIED CHRONICITY, UNSPECIFIED LATERALITY: Primary | ICD-10-CM

## 2021-05-11 ENCOUNTER — HOSPITAL ENCOUNTER (OUTPATIENT)
Dept: RADIOLOGY | Facility: HOSPITAL | Age: 66
Discharge: HOME | End: 2021-05-11
Attending: ORTHOPAEDIC SURGERY
Payer: MEDICARE

## 2021-05-11 ENCOUNTER — TELEPHONE (OUTPATIENT)
Dept: ORTHOPEDICS | Facility: CLINIC | Age: 66
End: 2021-05-11

## 2021-05-11 ENCOUNTER — OFFICE VISIT (OUTPATIENT)
Dept: ORTHOPEDICS | Facility: CLINIC | Age: 66
End: 2021-05-11
Payer: MEDICARE

## 2021-05-11 ENCOUNTER — HOSPITAL ENCOUNTER (OUTPATIENT)
Dept: RADIOLOGY | Facility: HOSPITAL | Age: 66
Discharge: HOME | End: 2021-05-11
Attending: PHYSICAL MEDICINE & REHABILITATION
Payer: MEDICARE

## 2021-05-11 VITALS — BODY MASS INDEX: 41.48 KG/M2 | WEIGHT: 264.31 LBS | HEIGHT: 67 IN

## 2021-05-11 DIAGNOSIS — M79.661 RIGHT CALF PAIN: Primary | ICD-10-CM

## 2021-05-11 DIAGNOSIS — M79.89 LEG SWELLING: ICD-10-CM

## 2021-05-11 DIAGNOSIS — M17.0 PRIMARY OSTEOARTHRITIS OF KNEES, BILATERAL: ICD-10-CM

## 2021-05-11 DIAGNOSIS — M25.569 KNEE PAIN, UNSPECIFIED CHRONICITY, UNSPECIFIED LATERALITY: ICD-10-CM

## 2021-05-11 DIAGNOSIS — M79.661 RIGHT CALF PAIN: ICD-10-CM

## 2021-05-11 PROCEDURE — 99999 PR PBB SHADOW E&M-EST. PATIENT-LVL IV: ICD-10-PCS | Mod: PBBFAC,,, | Performed by: PHYSICAL MEDICINE & REHABILITATION

## 2021-05-11 PROCEDURE — 93971 EXTREMITY STUDY: CPT | Mod: TC,RT

## 2021-05-11 PROCEDURE — 73564 X-RAY EXAM KNEE 4 OR MORE: CPT | Mod: 26,50,, | Performed by: RADIOLOGY

## 2021-05-11 PROCEDURE — 93971 US LOWER EXTREMITY VEINS RIGHT: ICD-10-PCS | Mod: 26,RT,, | Performed by: RADIOLOGY

## 2021-05-11 PROCEDURE — 99214 PR OFFICE/OUTPT VISIT, EST, LEVL IV, 30-39 MIN: ICD-10-PCS | Mod: S$PBB,ICN,, | Performed by: PHYSICAL MEDICINE & REHABILITATION

## 2021-05-11 PROCEDURE — 99214 OFFICE O/P EST MOD 30 MIN: CPT | Mod: PBBFAC,25 | Performed by: PHYSICAL MEDICINE & REHABILITATION

## 2021-05-11 PROCEDURE — 73564 XR KNEE ORTHO BILAT WITH FLEXION: ICD-10-PCS | Mod: 26,50,, | Performed by: RADIOLOGY

## 2021-05-11 PROCEDURE — 93971 EXTREMITY STUDY: CPT | Mod: 26,RT,, | Performed by: RADIOLOGY

## 2021-05-11 PROCEDURE — 73564 X-RAY EXAM KNEE 4 OR MORE: CPT | Mod: TC,50

## 2021-05-11 PROCEDURE — 99999 PR PBB SHADOW E&M-EST. PATIENT-LVL IV: CPT | Mod: PBBFAC,,, | Performed by: PHYSICAL MEDICINE & REHABILITATION

## 2021-05-11 PROCEDURE — 99214 OFFICE O/P EST MOD 30 MIN: CPT | Mod: S$PBB,ICN,, | Performed by: PHYSICAL MEDICINE & REHABILITATION

## 2021-06-11 ENCOUNTER — PES CALL (OUTPATIENT)
Dept: ADMINISTRATIVE | Facility: CLINIC | Age: 66
End: 2021-06-11

## 2021-06-22 ENCOUNTER — OFFICE VISIT (OUTPATIENT)
Dept: ORTHOPEDICS | Facility: CLINIC | Age: 66
End: 2021-06-22
Payer: MEDICARE

## 2021-06-22 VITALS — HEIGHT: 67 IN | WEIGHT: 264 LBS | BODY MASS INDEX: 41.44 KG/M2

## 2021-06-22 DIAGNOSIS — M79.89 LEG SWELLING: ICD-10-CM

## 2021-06-22 DIAGNOSIS — M17.0 PRIMARY OSTEOARTHRITIS OF KNEES, BILATERAL: ICD-10-CM

## 2021-06-22 DIAGNOSIS — M79.661 RIGHT CALF PAIN: Primary | ICD-10-CM

## 2021-06-22 PROCEDURE — 99213 OFFICE O/P EST LOW 20 MIN: CPT | Mod: S$PBB,,, | Performed by: PHYSICAL MEDICINE & REHABILITATION

## 2021-06-22 PROCEDURE — 99999 PR PBB SHADOW E&M-EST. PATIENT-LVL III: ICD-10-PCS | Mod: PBBFAC,,, | Performed by: PHYSICAL MEDICINE & REHABILITATION

## 2021-06-22 PROCEDURE — 99999 PR PBB SHADOW E&M-EST. PATIENT-LVL III: CPT | Mod: PBBFAC,,, | Performed by: PHYSICAL MEDICINE & REHABILITATION

## 2021-06-22 PROCEDURE — 99213 OFFICE O/P EST LOW 20 MIN: CPT | Mod: PBBFAC | Performed by: PHYSICAL MEDICINE & REHABILITATION

## 2021-06-22 PROCEDURE — 99213 PR OFFICE/OUTPT VISIT, EST, LEVL III, 20-29 MIN: ICD-10-PCS | Mod: S$PBB,,, | Performed by: PHYSICAL MEDICINE & REHABILITATION

## 2021-07-07 RX ORDER — LOSARTAN POTASSIUM AND HYDROCHLOROTHIAZIDE 25; 100 MG/1; MG/1
1 TABLET ORAL DAILY
Qty: 90 TABLET | Refills: 3 | Status: SHIPPED | OUTPATIENT
Start: 2021-07-07 | End: 2022-05-04

## 2021-07-08 ENCOUNTER — LAB VISIT (OUTPATIENT)
Dept: LAB | Facility: HOSPITAL | Age: 66
End: 2021-07-08
Attending: INTERNAL MEDICINE
Payer: MEDICARE

## 2021-07-08 DIAGNOSIS — I10 ESSENTIAL HYPERTENSION: ICD-10-CM

## 2021-07-08 LAB
ALBUMIN SERPL BCP-MCNC: 3.7 G/DL (ref 3.5–5.2)
ALP SERPL-CCNC: 83 U/L (ref 55–135)
ALT SERPL W/O P-5'-P-CCNC: 28 U/L (ref 10–44)
ANION GAP SERPL CALC-SCNC: 12 MMOL/L (ref 8–16)
AST SERPL-CCNC: 28 U/L (ref 10–40)
BASOPHILS # BLD AUTO: 0.06 K/UL (ref 0–0.2)
BASOPHILS NFR BLD: 1 % (ref 0–1.9)
BILIRUB SERPL-MCNC: 0.4 MG/DL (ref 0.1–1)
BUN SERPL-MCNC: 17 MG/DL (ref 8–23)
CALCIUM SERPL-MCNC: 9.7 MG/DL (ref 8.7–10.5)
CHLORIDE SERPL-SCNC: 103 MMOL/L (ref 95–110)
CHOLEST SERPL-MCNC: 161 MG/DL (ref 120–199)
CHOLEST/HDLC SERPL: 3.6 {RATIO} (ref 2–5)
CO2 SERPL-SCNC: 30 MMOL/L (ref 23–29)
CREAT SERPL-MCNC: 0.9 MG/DL (ref 0.5–1.4)
DIFFERENTIAL METHOD: ABNORMAL
EOSINOPHIL # BLD AUTO: 0.2 K/UL (ref 0–0.5)
EOSINOPHIL NFR BLD: 3 % (ref 0–8)
ERYTHROCYTE [DISTWIDTH] IN BLOOD BY AUTOMATED COUNT: 13 % (ref 11.5–14.5)
EST. GFR  (AFRICAN AMERICAN): >60 ML/MIN/1.73 M^2
EST. GFR  (NON AFRICAN AMERICAN): >60 ML/MIN/1.73 M^2
GLUCOSE SERPL-MCNC: 105 MG/DL (ref 70–110)
HCT VFR BLD AUTO: 44.1 % (ref 37–48.5)
HDLC SERPL-MCNC: 45 MG/DL (ref 40–75)
HDLC SERPL: 28 % (ref 20–50)
HGB BLD-MCNC: 13.9 G/DL (ref 12–16)
IMM GRANULOCYTES # BLD AUTO: 0.01 K/UL (ref 0–0.04)
IMM GRANULOCYTES NFR BLD AUTO: 0.2 % (ref 0–0.5)
LDLC SERPL CALC-MCNC: 82.4 MG/DL (ref 63–159)
LYMPHOCYTES # BLD AUTO: 2.7 K/UL (ref 1–4.8)
LYMPHOCYTES NFR BLD: 46.2 % (ref 18–48)
MCH RBC QN AUTO: 30.6 PG (ref 27–31)
MCHC RBC AUTO-ENTMCNC: 31.5 G/DL (ref 32–36)
MCV RBC AUTO: 97 FL (ref 82–98)
MONOCYTES # BLD AUTO: 0.6 K/UL (ref 0.3–1)
MONOCYTES NFR BLD: 9.8 % (ref 4–15)
NEUTROPHILS # BLD AUTO: 2.4 K/UL (ref 1.8–7.7)
NEUTROPHILS NFR BLD: 39.8 % (ref 38–73)
NONHDLC SERPL-MCNC: 116 MG/DL
NRBC BLD-RTO: 0 /100 WBC
PLATELET # BLD AUTO: 224 K/UL (ref 150–450)
PMV BLD AUTO: 11.3 FL (ref 9.2–12.9)
POTASSIUM SERPL-SCNC: 3.4 MMOL/L (ref 3.5–5.1)
PROT SERPL-MCNC: 6.9 G/DL (ref 6–8.4)
RBC # BLD AUTO: 4.54 M/UL (ref 4–5.4)
SODIUM SERPL-SCNC: 145 MMOL/L (ref 136–145)
TRIGL SERPL-MCNC: 168 MG/DL (ref 30–150)
TSH SERPL DL<=0.005 MIU/L-ACNC: 2.99 UIU/ML (ref 0.4–4)
WBC # BLD AUTO: 5.91 K/UL (ref 3.9–12.7)

## 2021-07-08 PROCEDURE — 84443 ASSAY THYROID STIM HORMONE: CPT | Performed by: INTERNAL MEDICINE

## 2021-07-08 PROCEDURE — 80061 LIPID PANEL: CPT | Performed by: INTERNAL MEDICINE

## 2021-07-08 PROCEDURE — 36415 COLL VENOUS BLD VENIPUNCTURE: CPT | Mod: PO | Performed by: INTERNAL MEDICINE

## 2021-07-08 PROCEDURE — 85025 COMPLETE CBC W/AUTO DIFF WBC: CPT | Performed by: INTERNAL MEDICINE

## 2021-07-08 PROCEDURE — 80053 COMPREHEN METABOLIC PANEL: CPT | Performed by: INTERNAL MEDICINE

## 2021-07-12 ENCOUNTER — OFFICE VISIT (OUTPATIENT)
Dept: INTERNAL MEDICINE | Facility: CLINIC | Age: 66
End: 2021-07-12
Payer: MEDICARE

## 2021-07-12 ENCOUNTER — TELEPHONE (OUTPATIENT)
Dept: INTERNAL MEDICINE | Facility: CLINIC | Age: 66
End: 2021-07-12

## 2021-07-12 VITALS
WEIGHT: 265 LBS | DIASTOLIC BLOOD PRESSURE: 80 MMHG | HEART RATE: 51 BPM | OXYGEN SATURATION: 98 % | SYSTOLIC BLOOD PRESSURE: 128 MMHG | HEIGHT: 66 IN | BODY MASS INDEX: 42.59 KG/M2

## 2021-07-12 DIAGNOSIS — Z12.31 BREAST CANCER SCREENING BY MAMMOGRAM: ICD-10-CM

## 2021-07-12 DIAGNOSIS — E66.01 MORBID OBESITY: ICD-10-CM

## 2021-07-12 DIAGNOSIS — G56.03 BILATERAL CARPAL TUNNEL SYNDROME: ICD-10-CM

## 2021-07-12 DIAGNOSIS — E66.01 MORBID OBESITY WITH BMI OF 40.0-44.9, ADULT: ICD-10-CM

## 2021-07-12 DIAGNOSIS — E78.00 PURE HYPERCHOLESTEROLEMIA: ICD-10-CM

## 2021-07-12 DIAGNOSIS — I10 ESSENTIAL HYPERTENSION: Primary | ICD-10-CM

## 2021-07-12 DIAGNOSIS — M54.12 CERVICAL RADICULOPATHY: ICD-10-CM

## 2021-07-12 DIAGNOSIS — G89.4 CHRONIC PAIN SYNDROME: ICD-10-CM

## 2021-07-12 DIAGNOSIS — M54.16 LUMBAR RADICULOPATHY: ICD-10-CM

## 2021-07-12 DIAGNOSIS — Z85.3 HISTORY OF BREAST CANCER: ICD-10-CM

## 2021-07-12 PROCEDURE — 99214 OFFICE O/P EST MOD 30 MIN: CPT | Mod: PBBFAC,PO | Performed by: INTERNAL MEDICINE

## 2021-07-12 PROCEDURE — 99214 OFFICE O/P EST MOD 30 MIN: CPT | Mod: S$PBB,,, | Performed by: INTERNAL MEDICINE

## 2021-07-12 PROCEDURE — 99999 PR PBB SHADOW E&M-EST. PATIENT-LVL IV: ICD-10-PCS | Mod: PBBFAC,,, | Performed by: INTERNAL MEDICINE

## 2021-07-12 PROCEDURE — 99214 PR OFFICE/OUTPT VISIT, EST, LEVL IV, 30-39 MIN: ICD-10-PCS | Mod: S$PBB,,, | Performed by: INTERNAL MEDICINE

## 2021-07-12 PROCEDURE — 99999 PR PBB SHADOW E&M-EST. PATIENT-LVL IV: CPT | Mod: PBBFAC,,, | Performed by: INTERNAL MEDICINE

## 2021-07-12 RX ORDER — HYDROCODONE BITARTRATE AND ACETAMINOPHEN 10; 325 MG/1; MG/1
1 TABLET ORAL 2 TIMES DAILY PRN
Qty: 180 TABLET | Refills: 0 | Status: SHIPPED | OUTPATIENT
Start: 2021-07-12 | End: 2021-10-04 | Stop reason: SDUPTHER

## 2021-08-17 ENCOUNTER — PATIENT OUTREACH (OUTPATIENT)
Dept: ADMINISTRATIVE | Facility: OTHER | Age: 66
End: 2021-08-17

## 2021-08-18 ENCOUNTER — OFFICE VISIT (OUTPATIENT)
Dept: SURGERY | Facility: CLINIC | Age: 66
End: 2021-08-18
Payer: MEDICARE

## 2021-08-18 VITALS
DIASTOLIC BLOOD PRESSURE: 77 MMHG | WEIGHT: 263.88 LBS | SYSTOLIC BLOOD PRESSURE: 131 MMHG | TEMPERATURE: 98 F | HEART RATE: 59 BPM | BODY MASS INDEX: 42.59 KG/M2

## 2021-08-18 DIAGNOSIS — I10 ESSENTIAL HYPERTENSION: Primary | ICD-10-CM

## 2021-08-18 DIAGNOSIS — E66.01 MORBID OBESITY WITH BMI OF 40.0-44.9, ADULT: ICD-10-CM

## 2021-08-18 DIAGNOSIS — E78.00 PURE HYPERCHOLESTEROLEMIA: ICD-10-CM

## 2021-08-18 PROCEDURE — 99214 OFFICE O/P EST MOD 30 MIN: CPT | Mod: PBBFAC | Performed by: SURGERY

## 2021-08-18 PROCEDURE — 99999 PR PBB SHADOW E&M-EST. PATIENT-LVL IV: CPT | Mod: PBBFAC,,, | Performed by: SURGERY

## 2021-08-18 PROCEDURE — 99204 PR OFFICE/OUTPT VISIT, NEW, LEVL IV, 45-59 MIN: ICD-10-PCS | Mod: S$PBB,,, | Performed by: SURGERY

## 2021-08-18 PROCEDURE — 99999 PR PBB SHADOW E&M-EST. PATIENT-LVL IV: ICD-10-PCS | Mod: PBBFAC,,, | Performed by: SURGERY

## 2021-08-18 PROCEDURE — 99204 OFFICE O/P NEW MOD 45 MIN: CPT | Mod: S$PBB,,, | Performed by: SURGERY

## 2021-08-23 ENCOUNTER — HOSPITAL ENCOUNTER (OUTPATIENT)
Dept: RADIOLOGY | Facility: HOSPITAL | Age: 66
Discharge: HOME | End: 2021-08-23
Attending: SURGERY
Payer: MEDICARE

## 2021-08-23 ENCOUNTER — CLINICAL SUPPORT (OUTPATIENT)
Dept: CARDIOLOGY | Facility: CLINIC | Age: 66
End: 2021-08-23
Payer: MEDICARE

## 2021-08-23 ENCOUNTER — CLINICAL SUPPORT (OUTPATIENT)
Dept: INTERNAL MEDICINE | Facility: CLINIC | Age: 66
End: 2021-08-23
Payer: MEDICARE

## 2021-08-23 ENCOUNTER — TELEPHONE (OUTPATIENT)
Dept: NEUROSURGERY | Facility: CLINIC | Age: 66
End: 2021-08-23

## 2021-08-23 DIAGNOSIS — M47.892 OTHER SPONDYLOSIS, CERVICAL REGION: ICD-10-CM

## 2021-08-23 DIAGNOSIS — Z01.818 ENCOUNTER FOR OTHER PREPROCEDURAL EXAMINATION: ICD-10-CM

## 2021-08-23 DIAGNOSIS — M50.30 DDD (DEGENERATIVE DISC DISEASE), CERVICAL: Primary | ICD-10-CM

## 2021-08-23 DIAGNOSIS — E78.00 PURE HYPERCHOLESTEROLEMIA: ICD-10-CM

## 2021-08-23 DIAGNOSIS — I10 ESSENTIAL HYPERTENSION: ICD-10-CM

## 2021-08-23 DIAGNOSIS — Z71.3 ENCOUNTER FOR NUTRITION EVALUATION PRIOR TO BARIATRIC SURGERY: ICD-10-CM

## 2021-08-23 DIAGNOSIS — E66.01 MORBID OBESITY WITH BMI OF 40.0-44.9, ADULT: ICD-10-CM

## 2021-08-23 PROCEDURE — 93010 ELECTROCARDIOGRAM REPORT: CPT | Mod: ,,, | Performed by: INTERNAL MEDICINE

## 2021-08-23 PROCEDURE — 71046 X-RAY EXAM CHEST 2 VIEWS: CPT | Mod: 26,,, | Performed by: RADIOLOGY

## 2021-08-23 PROCEDURE — 93005 ELECTROCARDIOGRAM TRACING: CPT

## 2021-08-23 PROCEDURE — 97802 MEDICAL NUTRITION INDIV IN: CPT | Mod: 95,,, | Performed by: DIETITIAN, REGISTERED

## 2021-08-23 PROCEDURE — 71046 XR CHEST PA AND LATERAL: ICD-10-PCS | Mod: 26,,, | Performed by: RADIOLOGY

## 2021-08-23 PROCEDURE — 97802 PR MED NUTR THER, 1ST, INDIV, EA 15 MIN: ICD-10-PCS | Mod: 95,,, | Performed by: DIETITIAN, REGISTERED

## 2021-08-23 PROCEDURE — 93010 EKG 12-LEAD: ICD-10-PCS | Mod: ,,, | Performed by: INTERNAL MEDICINE

## 2021-08-23 PROCEDURE — 71046 X-RAY EXAM CHEST 2 VIEWS: CPT | Mod: TC,PO

## 2021-08-24 ENCOUNTER — TELEPHONE (OUTPATIENT)
Dept: NEUROSURGERY | Facility: CLINIC | Age: 66
End: 2021-08-24

## 2021-08-24 ENCOUNTER — PATIENT MESSAGE (OUTPATIENT)
Dept: NEUROSURGERY | Facility: CLINIC | Age: 66
End: 2021-08-24

## 2021-08-24 DIAGNOSIS — Z01.818 ENCOUNTER FOR OTHER PREPROCEDURAL EXAMINATION: ICD-10-CM

## 2021-08-24 DIAGNOSIS — M50.30 DDD (DEGENERATIVE DISC DISEASE), CERVICAL: Primary | ICD-10-CM

## 2021-08-31 ENCOUNTER — TELEPHONE (OUTPATIENT)
Dept: RADIOLOGY | Facility: HOSPITAL | Age: 66
End: 2021-08-31

## 2021-09-01 ENCOUNTER — HOSPITAL ENCOUNTER (OUTPATIENT)
Dept: RADIOLOGY | Facility: HOSPITAL | Age: 66
Discharge: HOME | End: 2021-09-01
Attending: PHYSICIAN ASSISTANT
Payer: MEDICARE

## 2021-09-01 ENCOUNTER — PATIENT MESSAGE (OUTPATIENT)
Dept: NEUROSURGERY | Facility: CLINIC | Age: 66
End: 2021-09-01

## 2021-09-01 DIAGNOSIS — M47.892 OTHER SPONDYLOSIS, CERVICAL REGION: ICD-10-CM

## 2021-09-01 DIAGNOSIS — Z01.818 ENCOUNTER FOR OTHER PREPROCEDURAL EXAMINATION: ICD-10-CM

## 2021-09-01 DIAGNOSIS — M50.30 DDD (DEGENERATIVE DISC DISEASE), CERVICAL: ICD-10-CM

## 2021-09-01 PROCEDURE — 72125 CT NECK SPINE W/O DYE: CPT | Mod: TC

## 2021-09-01 PROCEDURE — 72125 CT NECK SPINE W/O DYE: CPT | Mod: 26,,, | Performed by: RADIOLOGY

## 2021-09-01 PROCEDURE — 72125 CT CERVICAL SPINE WITHOUT CONTRAST: ICD-10-PCS | Mod: 26,,, | Performed by: RADIOLOGY

## 2021-09-01 PROCEDURE — 72040 XR CERVICAL SPINE FLEXION  AND EXTENSION ONLY: ICD-10-PCS | Mod: 26,,, | Performed by: RADIOLOGY

## 2021-09-01 PROCEDURE — 72040 X-RAY EXAM NECK SPINE 2-3 VW: CPT | Mod: 26,,, | Performed by: RADIOLOGY

## 2021-09-01 PROCEDURE — 72040 X-RAY EXAM NECK SPINE 2-3 VW: CPT | Mod: TC

## 2021-09-02 ENCOUNTER — OFFICE VISIT (OUTPATIENT)
Dept: NEUROSURGERY | Facility: CLINIC | Age: 66
End: 2021-09-02
Payer: MEDICARE

## 2021-09-02 ENCOUNTER — HOSPITAL ENCOUNTER (OUTPATIENT)
Dept: RADIOLOGY | Facility: HOSPITAL | Age: 66
Discharge: HOME | End: 2021-09-02
Attending: PHYSICIAN ASSISTANT
Payer: MEDICARE

## 2021-09-02 VITALS
DIASTOLIC BLOOD PRESSURE: 74 MMHG | HEART RATE: 59 BPM | BODY MASS INDEX: 41.8 KG/M2 | SYSTOLIC BLOOD PRESSURE: 128 MMHG | WEIGHT: 260.13 LBS | HEIGHT: 66 IN | RESPIRATION RATE: 16 BRPM

## 2021-09-02 DIAGNOSIS — M47.892 OTHER SPONDYLOSIS, CERVICAL REGION: ICD-10-CM

## 2021-09-02 DIAGNOSIS — M50.30 DDD (DEGENERATIVE DISC DISEASE), CERVICAL: ICD-10-CM

## 2021-09-02 DIAGNOSIS — M50.30 DDD (DEGENERATIVE DISC DISEASE), CERVICAL: Primary | ICD-10-CM

## 2021-09-02 DIAGNOSIS — M54.12 CERVICAL RADICULOPATHY: ICD-10-CM

## 2021-09-02 PROCEDURE — 99213 OFFICE O/P EST LOW 20 MIN: CPT | Mod: PBBFAC | Performed by: PHYSICIAN ASSISTANT

## 2021-09-02 PROCEDURE — 99999 PR PBB SHADOW E&M-EST. PATIENT-LVL III: CPT | Mod: PBBFAC,,, | Performed by: PHYSICIAN ASSISTANT

## 2021-09-02 PROCEDURE — 72050 XR CERVICAL SPINE COMPLETE 5 VIEW: ICD-10-PCS | Mod: 26,,, | Performed by: RADIOLOGY

## 2021-09-02 PROCEDURE — 99214 PR OFFICE/OUTPT VISIT, EST, LEVL IV, 30-39 MIN: ICD-10-PCS | Mod: S$PBB,,, | Performed by: PHYSICIAN ASSISTANT

## 2021-09-02 PROCEDURE — 72050 X-RAY EXAM NECK SPINE 4/5VWS: CPT | Mod: 26,,, | Performed by: RADIOLOGY

## 2021-09-02 PROCEDURE — 99214 OFFICE O/P EST MOD 30 MIN: CPT | Mod: S$PBB,,, | Performed by: PHYSICIAN ASSISTANT

## 2021-09-02 PROCEDURE — 72050 X-RAY EXAM NECK SPINE 4/5VWS: CPT | Mod: TC

## 2021-09-02 PROCEDURE — 99999 PR PBB SHADOW E&M-EST. PATIENT-LVL III: ICD-10-PCS | Mod: PBBFAC,,, | Performed by: PHYSICIAN ASSISTANT

## 2021-09-20 ENCOUNTER — CLINICAL SUPPORT (OUTPATIENT)
Dept: INTERNAL MEDICINE | Facility: CLINIC | Age: 66
End: 2021-09-20
Payer: MEDICARE

## 2021-09-20 DIAGNOSIS — Z71.3 ENCOUNTER FOR NUTRITION EVALUATION PRIOR TO BARIATRIC SURGERY: ICD-10-CM

## 2021-09-20 PROCEDURE — 97803 PR MED NUTR THER, SUBSQ, INDIV, EA 15 MIN: ICD-10-PCS | Mod: 95,,, | Performed by: DIETITIAN, REGISTERED

## 2021-09-20 PROCEDURE — 97803 MED NUTRITION INDIV SUBSEQ: CPT | Mod: 95,,, | Performed by: DIETITIAN, REGISTERED

## 2021-09-22 ENCOUNTER — OFFICE VISIT (OUTPATIENT)
Dept: PHYSICAL MEDICINE AND REHAB | Facility: CLINIC | Age: 66
End: 2021-09-22
Payer: MEDICARE

## 2021-09-22 VITALS
DIASTOLIC BLOOD PRESSURE: 84 MMHG | HEIGHT: 66 IN | RESPIRATION RATE: 14 BRPM | HEART RATE: 57 BPM | WEIGHT: 260 LBS | BODY MASS INDEX: 41.78 KG/M2 | SYSTOLIC BLOOD PRESSURE: 162 MMHG

## 2021-09-22 DIAGNOSIS — G56.03 BILATERAL CARPAL TUNNEL SYNDROME: Primary | ICD-10-CM

## 2021-09-22 DIAGNOSIS — M54.12 CERVICAL RADICULOPATHY: ICD-10-CM

## 2021-09-22 PROCEDURE — 95912 NRV CNDJ TEST 11-12 STUDIES: CPT | Mod: 26,S$PBB,, | Performed by: PHYSICAL MEDICINE & REHABILITATION

## 2021-09-22 PROCEDURE — 95912 NRV CNDJ TEST 11-12 STUDIES: CPT | Mod: PBBFAC | Performed by: PHYSICAL MEDICINE & REHABILITATION

## 2021-09-22 PROCEDURE — 95886 MUSC TEST DONE W/N TEST COMP: CPT | Mod: PBBFAC | Performed by: PHYSICAL MEDICINE & REHABILITATION

## 2021-09-22 PROCEDURE — 95886 PR EMG COMPLETE, W/ NERVE CONDUCTION STUDIES, 5+ MUSCLES: ICD-10-PCS | Mod: 26,S$PBB,, | Performed by: PHYSICAL MEDICINE & REHABILITATION

## 2021-09-22 PROCEDURE — 99999 PR PBB SHADOW E&M-EST. PATIENT-LVL III: ICD-10-PCS | Mod: PBBFAC,,, | Performed by: PHYSICAL MEDICINE & REHABILITATION

## 2021-09-22 PROCEDURE — 99999 PR PBB SHADOW E&M-EST. PATIENT-LVL III: CPT | Mod: PBBFAC,,, | Performed by: PHYSICAL MEDICINE & REHABILITATION

## 2021-09-22 PROCEDURE — 99214 PR OFFICE/OUTPT VISIT, EST, LEVL IV, 30-39 MIN: ICD-10-PCS | Mod: 25,S$PBB,, | Performed by: PHYSICAL MEDICINE & REHABILITATION

## 2021-09-22 PROCEDURE — 95912 PR NERVE CONDUCTION STUDY; 11 -12 STUDIES: ICD-10-PCS | Mod: 26,S$PBB,, | Performed by: PHYSICAL MEDICINE & REHABILITATION

## 2021-09-22 PROCEDURE — 95886 MUSC TEST DONE W/N TEST COMP: CPT | Mod: 26,S$PBB,, | Performed by: PHYSICAL MEDICINE & REHABILITATION

## 2021-09-22 PROCEDURE — 99214 OFFICE O/P EST MOD 30 MIN: CPT | Mod: 25,S$PBB,, | Performed by: PHYSICAL MEDICINE & REHABILITATION

## 2021-09-22 PROCEDURE — 99213 OFFICE O/P EST LOW 20 MIN: CPT | Mod: PBBFAC,25 | Performed by: PHYSICAL MEDICINE & REHABILITATION

## 2021-09-23 ENCOUNTER — PATIENT OUTREACH (OUTPATIENT)
Dept: ADMINISTRATIVE | Facility: OTHER | Age: 66
End: 2021-09-23

## 2021-09-24 ENCOUNTER — TELEPHONE (OUTPATIENT)
Dept: PAIN MEDICINE | Facility: CLINIC | Age: 66
End: 2021-09-24

## 2021-09-24 ENCOUNTER — OFFICE VISIT (OUTPATIENT)
Dept: NEUROSURGERY | Facility: CLINIC | Age: 66
End: 2021-09-24
Payer: MEDICARE

## 2021-09-24 VITALS
RESPIRATION RATE: 16 BRPM | BODY MASS INDEX: 41.78 KG/M2 | WEIGHT: 260 LBS | HEART RATE: 74 BPM | SYSTOLIC BLOOD PRESSURE: 141 MMHG | HEIGHT: 66 IN | DIASTOLIC BLOOD PRESSURE: 73 MMHG

## 2021-09-24 DIAGNOSIS — M47.892 OTHER SPONDYLOSIS, CERVICAL REGION: ICD-10-CM

## 2021-09-24 DIAGNOSIS — M54.12 CERVICAL RADICULOPATHY: ICD-10-CM

## 2021-09-24 DIAGNOSIS — M54.2 NECK PAIN, BILATERAL: ICD-10-CM

## 2021-09-24 DIAGNOSIS — M50.30 DDD (DEGENERATIVE DISC DISEASE), CERVICAL: Primary | ICD-10-CM

## 2021-09-24 DIAGNOSIS — M50.30 DEGENERATIVE DISC DISEASE, CERVICAL: ICD-10-CM

## 2021-09-24 PROCEDURE — 99999 PR PBB SHADOW E&M-EST. PATIENT-LVL III: ICD-10-PCS | Mod: PBBFAC,,, | Performed by: NEUROLOGICAL SURGERY

## 2021-09-24 PROCEDURE — 99213 OFFICE O/P EST LOW 20 MIN: CPT | Mod: S$PBB,,, | Performed by: NEUROLOGICAL SURGERY

## 2021-09-24 PROCEDURE — 99213 PR OFFICE/OUTPT VISIT, EST, LEVL III, 20-29 MIN: ICD-10-PCS | Mod: S$PBB,,, | Performed by: NEUROLOGICAL SURGERY

## 2021-09-24 PROCEDURE — 99999 PR PBB SHADOW E&M-EST. PATIENT-LVL III: CPT | Mod: PBBFAC,,, | Performed by: NEUROLOGICAL SURGERY

## 2021-09-24 PROCEDURE — 99213 OFFICE O/P EST LOW 20 MIN: CPT | Mod: PBBFAC | Performed by: NEUROLOGICAL SURGERY

## 2021-09-24 NOTE — PROGRESS NOTES
Subjective:      Patient ID: Mary Vo is a 66 y.o. female.    HPI:  Follow-up and Results (emg/xray)  Pt here for follow up   Neck pain and symptoms through the UE   Previously L uE worse than R   Now with R UE symptoms through her hand   Neck pain bilaterally    has had PT, injections, pain management without lasting symptom relief     CT     C4-C5: Bilateral right greater than left facet arthropathy with mild uncovertebral spurring.  Central and right paracentral disc osteophyte complex better demonstrated on the MRI.  There is either small focus of calcification of the posterior longitudinal ligament versus calcification of extruded disc material at this level on series 3, image 136.  No spinal canal stenosis.  There is mild right greater than left neural foraminal narrowing.     C5-C6: Posterior disc osteophyte complex with uncovertebral and facet arthropathy.  There is relative narrowing of the spinal canal at this level, unchanged since the MRI.  There is marked bilateral neural foraminal narrowing, also unchanged.     C6-C7: Posterior disc osteophyte complex with facet and uncovertebral hypertrophy.  There is mild relative narrowing of the spinal canal, unchanged.  There is marked bilateral neural foraminal narrowing.    MR      C4-C5: Posterior disc osteophyte complex present with right paracentral disc protrusion which contacts the anterior right aspect of the cervical cord.  Right greater than left facet/uncovertebral hypertrophy resulting mild right neural foraminal narrowing.     C5-C6: Posterior disc osteophyte complex slightly asymmetric to the right paracentral location effacing the anterior thecal sac and causing mild spinal cord flattening.  No intrinsic cord signal abnormality.  Spinal canal measures 7.6 mm in midline AP dimension.  Bilateral uncovertebral/facet degenerative findings causes severe bilateral neural foraminal narrowing.     C6-C7: Posterior disc osteophyte complex present  with more focal central disc protrusion causing similar effacement and mild indention of the thecal sac and spinal cord without intrinsic cord signal abnormality.  Spinal canal measures 8.3 mm in AP midline dimension.  Bilateral moderate to severe neural foraminal narrowing present.        Objective:     Body mass index is 41.97 kg/m².  Vitals:    09/24/21 0938   BP: (!) 141/73   Pulse: 74   Resp: 16            Neck:  None  Paraspinal tenderness   None  Paraspinal muscle spasms   None present Pain with flexion and extention   WNL  Range of motion shoulders   Neg Not tested Spurling's sign     Motor:   Right Right Left Left  Level Group   5  5  C5 Deltoid   5  5  C6 Bicep   5  5   Wrist extension    5 4+ 5 4+ C7 Triceps   5 4+ 5 4+  Wrist flexion   5  5  C8    5  5  T1 Interossei      Sensation:  NL Decreased (R/L/BL) Level Sensation    [x]   C5 Lateral upper arm   []  Dec mike C6 Thumb and index finger, lat forearm   []  Dec mike C7 Middle finger   []   C8 Ring and little finger   []   T1 Medial arm      Reflex:  2+  Bicep tendon   2+  Brachioradialis   2+  Triceps tendon   Not present  Coburn's   none  Clonus   neg + mike Tinel's         Lab Results   Component Value Date    WBC 5.12 12/15/2021    HCT 45.6 12/15/2021           Assessment:     1. DDD (degenerative disc disease), cervical    2. Other spondylosis, cervical region    3. Cervical radiculopathy    4. Degenerative disc disease, cervical    5. Neck pain, bilateral      Plan:     DDD (degenerative disc disease), cervical  -     Ambulatory referral/consult to Pain Clinic; Future; Expected date: 10/01/2021    Other spondylosis, cervical region  -     Ambulatory referral/consult to Pain Clinic; Future; Expected date: 10/01/2021    Cervical radiculopathy  -     Ambulatory referral/consult to Pain Clinic; Future; Expected date: 10/01/2021    Degenerative disc disease, cervical    Neck pain, bilateral      PLAN   Given pt symptoms I have consented her for acdf    She has UE symptoms with neck pain agoing through  The UE   Tried PT,pain management, injections without relief    The patient was informed of all benefits and potential risk of the operation including but not limited to:  Pain, infection, bleeding, coma, paralysis, death.  Cerebrospinal fluid leak, failure of any instrumentation, the need for additional procedures in the future. No guarantee was given that this procedure would alleviate all of the symptoms.    Consents signed in office today     Thank you for the referral   Please call with any questions    Pradip Fernando MD  Neurosurgery     Disclaimer: This note was prepared using a voice recognition system and is likely to have sound alike errors within the text.

## 2021-09-27 ENCOUNTER — TELEPHONE (OUTPATIENT)
Dept: PAIN MEDICINE | Facility: CLINIC | Age: 66
End: 2021-09-27

## 2021-09-28 ENCOUNTER — PATIENT MESSAGE (OUTPATIENT)
Dept: NEUROSURGERY | Facility: CLINIC | Age: 66
End: 2021-09-28

## 2021-09-28 ENCOUNTER — TELEPHONE (OUTPATIENT)
Dept: PAIN MEDICINE | Facility: CLINIC | Age: 66
End: 2021-09-28

## 2021-09-29 ENCOUNTER — OFFICE VISIT (OUTPATIENT)
Dept: PAIN MEDICINE | Facility: CLINIC | Age: 66
End: 2021-09-29
Payer: MEDICARE

## 2021-09-29 VITALS
HEIGHT: 66 IN | BODY MASS INDEX: 42.32 KG/M2 | WEIGHT: 263.31 LBS | SYSTOLIC BLOOD PRESSURE: 126 MMHG | HEART RATE: 61 BPM | DIASTOLIC BLOOD PRESSURE: 73 MMHG

## 2021-09-29 DIAGNOSIS — M79.2 NEUROPATHIC PAIN: ICD-10-CM

## 2021-09-29 DIAGNOSIS — M47.812 FACET ARTHROPATHY, CERVICAL: Primary | ICD-10-CM

## 2021-09-29 DIAGNOSIS — M54.12 CERVICAL RADICULOPATHY: ICD-10-CM

## 2021-09-29 DIAGNOSIS — M47.812 CERVICAL SPONDYLOSIS: ICD-10-CM

## 2021-09-29 PROCEDURE — 99999 PR PBB SHADOW E&M-EST. PATIENT-LVL IV: ICD-10-PCS | Mod: PBBFAC,,, | Performed by: ANESTHESIOLOGY

## 2021-09-29 PROCEDURE — 99214 PR OFFICE/OUTPT VISIT, EST, LEVL IV, 30-39 MIN: ICD-10-PCS | Mod: S$PBB,,, | Performed by: ANESTHESIOLOGY

## 2021-09-29 PROCEDURE — 99999 PR PBB SHADOW E&M-EST. PATIENT-LVL IV: CPT | Mod: PBBFAC,,, | Performed by: ANESTHESIOLOGY

## 2021-09-29 PROCEDURE — 99214 OFFICE O/P EST MOD 30 MIN: CPT | Mod: PBBFAC | Performed by: ANESTHESIOLOGY

## 2021-09-29 PROCEDURE — 99214 OFFICE O/P EST MOD 30 MIN: CPT | Mod: S$PBB,,, | Performed by: ANESTHESIOLOGY

## 2021-10-04 ENCOUNTER — OFFICE VISIT (OUTPATIENT)
Dept: INTERNAL MEDICINE | Facility: CLINIC | Age: 66
End: 2021-10-04
Payer: MEDICARE

## 2021-10-04 VITALS
DIASTOLIC BLOOD PRESSURE: 80 MMHG | HEIGHT: 66 IN | HEART RATE: 65 BPM | WEIGHT: 267.44 LBS | SYSTOLIC BLOOD PRESSURE: 132 MMHG | BODY MASS INDEX: 42.98 KG/M2 | OXYGEN SATURATION: 95 %

## 2021-10-04 DIAGNOSIS — E78.00 PURE HYPERCHOLESTEROLEMIA: ICD-10-CM

## 2021-10-04 DIAGNOSIS — M54.12 CERVICAL RADICULOPATHY: ICD-10-CM

## 2021-10-04 DIAGNOSIS — I10 PRIMARY HYPERTENSION: Primary | ICD-10-CM

## 2021-10-04 DIAGNOSIS — E66.01 MORBID OBESITY WITH BMI OF 40.0-44.9, ADULT: ICD-10-CM

## 2021-10-04 DIAGNOSIS — G89.4 CHRONIC PAIN SYNDROME: ICD-10-CM

## 2021-10-04 PROCEDURE — 99213 PR OFFICE/OUTPT VISIT, EST, LEVL III, 20-29 MIN: ICD-10-PCS | Mod: S$PBB,,, | Performed by: INTERNAL MEDICINE

## 2021-10-04 PROCEDURE — 99213 OFFICE O/P EST LOW 20 MIN: CPT | Mod: S$PBB,,, | Performed by: INTERNAL MEDICINE

## 2021-10-04 PROCEDURE — 99999 PR PBB SHADOW E&M-EST. PATIENT-LVL III: CPT | Mod: PBBFAC,,, | Performed by: INTERNAL MEDICINE

## 2021-10-04 PROCEDURE — 99999 PR PBB SHADOW E&M-EST. PATIENT-LVL III: ICD-10-PCS | Mod: PBBFAC,,, | Performed by: INTERNAL MEDICINE

## 2021-10-04 PROCEDURE — 99213 OFFICE O/P EST LOW 20 MIN: CPT | Mod: PBBFAC,PO | Performed by: INTERNAL MEDICINE

## 2021-10-04 RX ORDER — HYDROCODONE BITARTRATE AND ACETAMINOPHEN 10; 325 MG/1; MG/1
1 TABLET ORAL 2 TIMES DAILY PRN
Qty: 180 TABLET | Refills: 0 | Status: SHIPPED | OUTPATIENT
Start: 2021-10-04 | End: 2021-12-17 | Stop reason: SDUPTHER

## 2021-10-05 ENCOUNTER — HOSPITAL ENCOUNTER (OUTPATIENT)
Facility: HOSPITAL | Age: 66
Discharge: HOME | End: 2021-10-05
Attending: ANESTHESIOLOGY | Admitting: ANESTHESIOLOGY
Payer: MEDICARE

## 2021-10-05 VITALS
TEMPERATURE: 97 F | DIASTOLIC BLOOD PRESSURE: 58 MMHG | OXYGEN SATURATION: 95 % | HEIGHT: 67 IN | RESPIRATION RATE: 15 BRPM | HEART RATE: 58 BPM | WEIGHT: 263.88 LBS | BODY MASS INDEX: 41.42 KG/M2 | SYSTOLIC BLOOD PRESSURE: 123 MMHG

## 2021-10-05 DIAGNOSIS — M54.12 CERVICAL RADICULOPATHY: ICD-10-CM

## 2021-10-05 PROBLEM — M47.812 CERVICAL SPONDYLOSIS: Status: ACTIVE | Noted: 2021-10-05

## 2021-10-05 PROCEDURE — 63600175 PHARM REV CODE 636 W HCPCS: Performed by: ANESTHESIOLOGY

## 2021-10-05 PROCEDURE — 25000003 PHARM REV CODE 250: Performed by: ANESTHESIOLOGY

## 2021-10-05 PROCEDURE — 62321 PR INJ CERV/THORAC, W/GUIDANCE: ICD-10-PCS | Mod: ,,, | Performed by: ANESTHESIOLOGY

## 2021-10-05 PROCEDURE — 62321 NJX INTERLAMINAR CRV/THRC: CPT | Performed by: ANESTHESIOLOGY

## 2021-10-05 PROCEDURE — 25500020 PHARM REV CODE 255: Performed by: ANESTHESIOLOGY

## 2021-10-05 PROCEDURE — 62321 NJX INTERLAMINAR CRV/THRC: CPT | Mod: ,,, | Performed by: ANESTHESIOLOGY

## 2021-10-05 RX ORDER — MIDAZOLAM HYDROCHLORIDE 1 MG/ML
INJECTION, SOLUTION INTRAMUSCULAR; INTRAVENOUS
Status: DISCONTINUED | OUTPATIENT
Start: 2021-10-05 | End: 2021-10-05 | Stop reason: HOSPADM

## 2021-10-05 RX ORDER — DEXAMETHASONE SODIUM PHOSPHATE 10 MG/ML
INJECTION INTRAMUSCULAR; INTRAVENOUS
Status: DISCONTINUED | OUTPATIENT
Start: 2021-10-05 | End: 2021-10-05 | Stop reason: HOSPADM

## 2021-10-05 RX ORDER — SODIUM BICARBONATE 1 MEQ/ML
SYRINGE (ML) INTRAVENOUS
Status: DISCONTINUED | OUTPATIENT
Start: 2021-10-05 | End: 2021-10-05 | Stop reason: HOSPADM

## 2021-10-05 RX ORDER — FENTANYL CITRATE 50 UG/ML
INJECTION, SOLUTION INTRAMUSCULAR; INTRAVENOUS
Status: DISCONTINUED | OUTPATIENT
Start: 2021-10-05 | End: 2021-10-05 | Stop reason: HOSPADM

## 2021-10-05 RX ORDER — BUPIVACAINE HYDROCHLORIDE 2.5 MG/ML
INJECTION, SOLUTION EPIDURAL; INFILTRATION; INTRACAUDAL
Status: DISCONTINUED | OUTPATIENT
Start: 2021-10-05 | End: 2021-10-05 | Stop reason: HOSPADM

## 2021-10-11 ENCOUNTER — NUTRITION (OUTPATIENT)
Dept: INTERNAL MEDICINE | Facility: CLINIC | Age: 66
End: 2021-10-11
Payer: MEDICARE

## 2021-10-11 ENCOUNTER — OFFICE VISIT (OUTPATIENT)
Dept: PSYCHIATRY | Facility: CLINIC | Age: 66
End: 2021-10-11
Payer: MEDICARE

## 2021-10-11 DIAGNOSIS — I10 ESSENTIAL HYPERTENSION: ICD-10-CM

## 2021-10-11 DIAGNOSIS — Z71.3 ENCOUNTER FOR NUTRITION EVALUATION PRIOR TO BARIATRIC SURGERY: ICD-10-CM

## 2021-10-11 DIAGNOSIS — Z01.818 PREOP EXAMINATION: Primary | ICD-10-CM

## 2021-10-11 DIAGNOSIS — E66.01 MORBID OBESITY WITH BMI OF 40.0-44.9, ADULT: ICD-10-CM

## 2021-10-11 DIAGNOSIS — E78.00 PURE HYPERCHOLESTEROLEMIA: ICD-10-CM

## 2021-10-11 PROCEDURE — 97803 MED NUTRITION INDIV SUBSEQ: CPT | Mod: S$GLB,,, | Performed by: DIETITIAN, REGISTERED

## 2021-10-11 PROCEDURE — 90791 PSYCH DIAGNOSTIC EVALUATION: CPT | Mod: ,,, | Performed by: SOCIAL WORKER

## 2021-10-11 PROCEDURE — 97803 PR MED NUTR THER, SUBSQ, INDIV, EA 15 MIN: ICD-10-PCS | Mod: S$GLB,,, | Performed by: DIETITIAN, REGISTERED

## 2021-10-11 PROCEDURE — 90791 PR PSYCHIATRIC DIAGNOSTIC EVALUATION: ICD-10-PCS | Mod: ,,, | Performed by: SOCIAL WORKER

## 2021-10-19 ENCOUNTER — CLINICAL SUPPORT (OUTPATIENT)
Dept: INTERNAL MEDICINE | Facility: CLINIC | Age: 66
End: 2021-10-19
Payer: MEDICARE

## 2021-10-19 PROCEDURE — 99999 PR PBB SHADOW E&M-EST. PATIENT-LVL II: ICD-10-PCS | Mod: PBBFAC,,,

## 2021-10-19 PROCEDURE — 99999 PR PBB SHADOW E&M-EST. PATIENT-LVL II: CPT | Mod: PBBFAC,,,

## 2021-10-19 PROCEDURE — 99212 OFFICE O/P EST SF 10 MIN: CPT | Mod: PBBFAC,PN

## 2021-10-19 PROCEDURE — 90694 VACC AIIV4 NO PRSRV 0.5ML IM: CPT | Mod: PBBFAC,PN

## 2021-10-19 PROCEDURE — G0008 ADMIN INFLUENZA VIRUS VAC: HCPCS | Mod: PBBFAC,PN

## 2021-10-21 ENCOUNTER — HOSPITAL ENCOUNTER (OUTPATIENT)
Dept: RADIOLOGY | Facility: HOSPITAL | Age: 66
Discharge: HOME | End: 2021-10-21
Attending: INTERNAL MEDICINE
Payer: MEDICARE

## 2021-10-21 VITALS — BODY MASS INDEX: 41.42 KG/M2 | HEIGHT: 67 IN | WEIGHT: 263.88 LBS

## 2021-10-21 DIAGNOSIS — Z12.31 BREAST CANCER SCREENING BY MAMMOGRAM: ICD-10-CM

## 2021-10-21 PROCEDURE — 77063 MAMMO DIGITAL SCREENING BILAT WITH TOMO: ICD-10-PCS | Mod: 26,,, | Performed by: RADIOLOGY

## 2021-10-21 PROCEDURE — 77067 MAMMO DIGITAL SCREENING BILAT WITH TOMO: ICD-10-PCS | Mod: 26,,, | Performed by: RADIOLOGY

## 2021-10-21 PROCEDURE — 77067 SCR MAMMO BI INCL CAD: CPT | Mod: TC

## 2021-10-21 PROCEDURE — 77063 BREAST TOMOSYNTHESIS BI: CPT | Mod: 26,,, | Performed by: RADIOLOGY

## 2021-10-21 PROCEDURE — 77067 SCR MAMMO BI INCL CAD: CPT | Mod: 26,,, | Performed by: RADIOLOGY

## 2021-10-25 ENCOUNTER — PATIENT MESSAGE (OUTPATIENT)
Dept: PAIN MEDICINE | Facility: CLINIC | Age: 66
End: 2021-10-25
Payer: MEDICARE

## 2021-10-25 RX ORDER — GABAPENTIN 300 MG/1
CAPSULE ORAL
Qty: 90 CAPSULE | Refills: 2 | Status: SHIPPED | OUTPATIENT
Start: 2021-10-25 | End: 2022-01-06 | Stop reason: SDUPTHER

## 2021-10-27 ENCOUNTER — PATIENT MESSAGE (OUTPATIENT)
Dept: NEUROSURGERY | Facility: CLINIC | Age: 66
End: 2021-10-27
Payer: MEDICARE

## 2021-10-28 ENCOUNTER — PATIENT MESSAGE (OUTPATIENT)
Dept: NEUROSURGERY | Facility: CLINIC | Age: 66
End: 2021-10-28

## 2021-10-28 ENCOUNTER — TELEPHONE (OUTPATIENT)
Dept: PREADMISSION TESTING | Facility: HOSPITAL | Age: 66
End: 2021-10-28
Payer: MEDICARE

## 2021-10-28 ENCOUNTER — OFFICE VISIT (OUTPATIENT)
Dept: NEUROSURGERY | Facility: CLINIC | Age: 66
End: 2021-10-28
Payer: MEDICARE

## 2021-10-28 ENCOUNTER — HOSPITAL ENCOUNTER (OUTPATIENT)
Dept: RADIOLOGY | Facility: HOSPITAL | Age: 66
Discharge: HOME | End: 2021-10-28
Attending: NEUROLOGICAL SURGERY
Payer: MEDICARE

## 2021-10-28 ENCOUNTER — TELEPHONE (OUTPATIENT)
Dept: NEUROSURGERY | Facility: CLINIC | Age: 66
End: 2021-10-28
Payer: MEDICARE

## 2021-10-28 VITALS
BODY MASS INDEX: 41.28 KG/M2 | DIASTOLIC BLOOD PRESSURE: 76 MMHG | HEIGHT: 67 IN | RESPIRATION RATE: 16 BRPM | WEIGHT: 263 LBS | HEART RATE: 68 BPM | SYSTOLIC BLOOD PRESSURE: 130 MMHG

## 2021-10-28 DIAGNOSIS — M54.12 CERVICAL RADICULOPATHY: ICD-10-CM

## 2021-10-28 DIAGNOSIS — M50.30 DDD (DEGENERATIVE DISC DISEASE), CERVICAL: Primary | ICD-10-CM

## 2021-10-28 DIAGNOSIS — Z01.818 PRE-OP TESTING: ICD-10-CM

## 2021-10-28 DIAGNOSIS — M47.892 OTHER SPONDYLOSIS, CERVICAL REGION: ICD-10-CM

## 2021-10-28 DIAGNOSIS — D69.9 BLEEDING DISORDER: ICD-10-CM

## 2021-10-28 PROCEDURE — 71046 XR CHEST PA AND LATERAL PRE-OP: ICD-10-PCS | Mod: 26,,, | Performed by: RADIOLOGY

## 2021-10-28 PROCEDURE — 99999 PR PBB SHADOW E&M-EST. PATIENT-LVL IV: ICD-10-PCS | Mod: PBBFAC,,, | Performed by: NEUROLOGICAL SURGERY

## 2021-10-28 PROCEDURE — 71046 X-RAY EXAM CHEST 2 VIEWS: CPT | Mod: TC

## 2021-10-28 PROCEDURE — 99212 PR OFFICE/OUTPT VISIT, EST, LEVL II, 10-19 MIN: ICD-10-PCS | Mod: S$PBB,,, | Performed by: NEUROLOGICAL SURGERY

## 2021-10-28 PROCEDURE — 99214 OFFICE O/P EST MOD 30 MIN: CPT | Mod: PBBFAC,25,PO | Performed by: NEUROLOGICAL SURGERY

## 2021-10-28 PROCEDURE — 71046 X-RAY EXAM CHEST 2 VIEWS: CPT | Mod: 26,,, | Performed by: RADIOLOGY

## 2021-10-28 PROCEDURE — 99212 OFFICE O/P EST SF 10 MIN: CPT | Mod: S$PBB,,, | Performed by: NEUROLOGICAL SURGERY

## 2021-10-28 PROCEDURE — 99999 PR PBB SHADOW E&M-EST. PATIENT-LVL IV: CPT | Mod: PBBFAC,,, | Performed by: NEUROLOGICAL SURGERY

## 2021-10-28 RX ORDER — BUMETANIDE 2 MG/1
TABLET ORAL
COMMUNITY
End: 2021-10-28 | Stop reason: SDUPTHER

## 2021-10-28 RX ORDER — CLONIDINE HYDROCHLORIDE 0.1 MG/1
TABLET ORAL
COMMUNITY
End: 2021-10-28 | Stop reason: SDUPTHER

## 2021-10-28 RX ORDER — FUROSEMIDE 40 MG/1
40 TABLET ORAL DAILY
COMMUNITY
End: 2022-05-04

## 2021-10-28 RX ORDER — POTASSIUM CHLORIDE 20 MEQ/1
TABLET, EXTENDED RELEASE ORAL
COMMUNITY
End: 2022-03-22 | Stop reason: SDUPTHER

## 2021-11-02 ENCOUNTER — TELEPHONE (OUTPATIENT)
Dept: NEUROSURGERY | Facility: CLINIC | Age: 66
End: 2021-11-02
Payer: MEDICARE

## 2021-11-04 ENCOUNTER — HOSPITAL ENCOUNTER (OUTPATIENT)
Facility: HOSPITAL | Age: 66
Discharge: HOME | End: 2021-11-04
Attending: SURGERY | Admitting: SURGERY
Payer: MEDICARE

## 2021-11-04 ENCOUNTER — ANESTHESIA (OUTPATIENT)
Dept: ENDOSCOPY | Facility: HOSPITAL | Age: 66
End: 2021-11-04
Payer: MEDICARE

## 2021-11-04 ENCOUNTER — ANESTHESIA EVENT (OUTPATIENT)
Dept: ENDOSCOPY | Facility: HOSPITAL | Age: 66
End: 2021-11-04

## 2021-11-04 VITALS
BODY MASS INDEX: 40.57 KG/M2 | TEMPERATURE: 98 F | SYSTOLIC BLOOD PRESSURE: 139 MMHG | HEIGHT: 67 IN | RESPIRATION RATE: 16 BRPM | DIASTOLIC BLOOD PRESSURE: 70 MMHG | OXYGEN SATURATION: 97 % | WEIGHT: 258.5 LBS | HEART RATE: 66 BPM

## 2021-11-04 DIAGNOSIS — E66.01 MORBID OBESITY: ICD-10-CM

## 2021-11-04 DIAGNOSIS — Z12.11 COLON CANCER SCREENING: ICD-10-CM

## 2021-11-04 PROCEDURE — 63600175 PHARM REV CODE 636 W HCPCS: Performed by: NURSE ANESTHETIST, CERTIFIED REGISTERED

## 2021-11-04 PROCEDURE — 88305 TISSUE EXAM BY PATHOLOGIST: ICD-10-PCS | Mod: 26,,, | Performed by: PATHOLOGY

## 2021-11-04 PROCEDURE — D9220A PRA ANESTHESIA: ICD-10-PCS | Mod: ,,, | Performed by: NURSE ANESTHETIST, CERTIFIED REGISTERED

## 2021-11-04 PROCEDURE — 43239 EGD BIOPSY SINGLE/MULTIPLE: CPT | Mod: ,,, | Performed by: SURGERY

## 2021-11-04 PROCEDURE — 37000009 HC ANESTHESIA EA ADD 15 MINS: Performed by: SURGERY

## 2021-11-04 PROCEDURE — 88305 TISSUE EXAM BY PATHOLOGIST: CPT | Mod: 26,,, | Performed by: PATHOLOGY

## 2021-11-04 PROCEDURE — 27201012 HC FORCEPS, HOT/COLD, DISP: Performed by: SURGERY

## 2021-11-04 PROCEDURE — 25000003 PHARM REV CODE 250: Performed by: NURSE ANESTHETIST, CERTIFIED REGISTERED

## 2021-11-04 PROCEDURE — 43239 PR EGD, FLEX, W/BIOPSY, SGL/MULTI: ICD-10-PCS | Mod: ,,, | Performed by: SURGERY

## 2021-11-04 PROCEDURE — 63600175 PHARM REV CODE 636 W HCPCS: Performed by: SURGERY

## 2021-11-04 PROCEDURE — D9220A PRA ANESTHESIA: Mod: ,,, | Performed by: NURSE ANESTHETIST, CERTIFIED REGISTERED

## 2021-11-04 PROCEDURE — 43239 EGD BIOPSY SINGLE/MULTIPLE: CPT | Performed by: SURGERY

## 2021-11-04 PROCEDURE — 37000008 HC ANESTHESIA 1ST 15 MINUTES: Performed by: SURGERY

## 2021-11-04 PROCEDURE — 88305 TISSUE EXAM BY PATHOLOGIST: CPT | Performed by: PATHOLOGY

## 2021-11-04 RX ORDER — LIDOCAINE HYDROCHLORIDE 20 MG/ML
INJECTION INTRAVENOUS
Status: DISCONTINUED | OUTPATIENT
Start: 2021-11-04 | End: 2021-11-04

## 2021-11-04 RX ORDER — PROPOFOL 10 MG/ML
VIAL (ML) INTRAVENOUS
Status: DISCONTINUED | OUTPATIENT
Start: 2021-11-04 | End: 2021-11-04

## 2021-11-04 RX ORDER — SODIUM CHLORIDE, SODIUM LACTATE, POTASSIUM CHLORIDE, CALCIUM CHLORIDE 600; 310; 30; 20 MG/100ML; MG/100ML; MG/100ML; MG/100ML
INJECTION, SOLUTION INTRAVENOUS CONTINUOUS
Status: DISCONTINUED | OUTPATIENT
Start: 2021-11-04 | End: 2021-11-04 | Stop reason: HOSPADM

## 2021-11-04 RX ADMIN — PROPOFOL 20 MG: 10 INJECTION, EMULSION INTRAVENOUS at 09:11

## 2021-11-04 RX ADMIN — SODIUM CHLORIDE, SODIUM LACTATE, POTASSIUM CHLORIDE, AND CALCIUM CHLORIDE: .6; .31; .03; .02 INJECTION, SOLUTION INTRAVENOUS at 09:11

## 2021-11-04 RX ADMIN — Medication 100 MG: at 09:11

## 2021-11-04 RX ADMIN — PROPOFOL 100 MG: 10 INJECTION, EMULSION INTRAVENOUS at 09:11

## 2021-11-04 RX ADMIN — PROPOFOL 30 MG: 10 INJECTION, EMULSION INTRAVENOUS at 09:11

## 2021-11-04 RX ADMIN — PROPOFOL 50 MG: 10 INJECTION, EMULSION INTRAVENOUS at 09:11

## 2021-11-04 RX ADMIN — GLYCOPYRROLATE 0.2 MG: 0.2 INJECTION, SOLUTION INTRAMUSCULAR; INTRAVITREAL at 09:11

## 2021-11-12 LAB
FINAL PATHOLOGIC DIAGNOSIS: NORMAL
Lab: NORMAL

## 2021-11-16 ENCOUNTER — PATIENT MESSAGE (OUTPATIENT)
Dept: SURGERY | Facility: HOSPITAL | Age: 66
End: 2021-11-16
Payer: MEDICARE

## 2021-11-16 DIAGNOSIS — A04.8 H. PYLORI INFECTION: Primary | ICD-10-CM

## 2021-11-16 RX ORDER — PANTOPRAZOLE SODIUM 40 MG/1
40 TABLET, DELAYED RELEASE ORAL 2 TIMES DAILY
Qty: 28 TABLET | Refills: 0 | Status: ON HOLD | OUTPATIENT
Start: 2021-11-16 | End: 2023-02-03 | Stop reason: HOSPADM

## 2021-11-16 RX ORDER — BISMUTH SUBSALICYLATE 262 MG/1
1 TABLET ORAL 4 TIMES DAILY
Qty: 56 TABLET | Refills: 0 | Status: SHIPPED | OUTPATIENT
Start: 2021-11-16 | End: 2021-11-30

## 2021-11-16 RX ORDER — TETRACYCLINE HYDROCHLORIDE 500 MG/1
500 CAPSULE ORAL 4 TIMES DAILY
Qty: 56 CAPSULE | Refills: 0 | Status: SHIPPED | OUTPATIENT
Start: 2021-11-16 | End: 2021-11-30

## 2021-11-16 RX ORDER — METRONIDAZOLE 500 MG/1
500 TABLET ORAL 3 TIMES DAILY
Qty: 42 TABLET | Refills: 0 | Status: SHIPPED | OUTPATIENT
Start: 2021-11-16 | End: 2021-11-30

## 2021-11-18 ENCOUNTER — TELEPHONE (OUTPATIENT)
Dept: SURGERY | Facility: CLINIC | Age: 66
End: 2021-11-18
Payer: MEDICARE

## 2021-11-22 RX ORDER — AMOXICILLIN 500 MG/1
1000 TABLET, FILM COATED ORAL EVERY 12 HOURS
Qty: 56 TABLET | Refills: 0 | Status: SHIPPED | OUTPATIENT
Start: 2021-11-22 | End: 2021-12-06

## 2021-12-03 ENCOUNTER — TELEPHONE (OUTPATIENT)
Dept: INTERNAL MEDICINE | Facility: CLINIC | Age: 66
End: 2021-12-03
Payer: MEDICARE

## 2021-12-03 ENCOUNTER — TELEPHONE (OUTPATIENT)
Dept: NEUROSURGERY | Facility: CLINIC | Age: 66
End: 2021-12-03
Payer: MEDICARE

## 2021-12-03 DIAGNOSIS — Z01.818 PRE-OP TESTING: Primary | ICD-10-CM

## 2021-12-03 DIAGNOSIS — D69.9 BLEEDING DISORDER: ICD-10-CM

## 2021-12-03 RX ORDER — FLUCONAZOLE 150 MG/1
150 TABLET ORAL DAILY
Qty: 1 TABLET | Refills: 0 | Status: SHIPPED | OUTPATIENT
Start: 2021-12-03 | End: 2021-12-04

## 2021-12-03 NOTE — TELEPHONE ENCOUNTER
----- Message from Afua Rai LPN sent at 12/3/2021 11:13 AM CST -----  Please advise    Returned phone call pt requesting prescription for yeast infection surgeon that she was referred to started her on antibiotic amoxicillin 500mg she have been taking for a week now and started with vaginal itching on yesterday

## 2021-12-07 ENCOUNTER — TELEPHONE (OUTPATIENT)
Dept: INTERNAL MEDICINE | Facility: CLINIC | Age: 66
End: 2021-12-07
Payer: MEDICARE

## 2021-12-08 ENCOUNTER — TELEPHONE (OUTPATIENT)
Dept: INTERNAL MEDICINE | Facility: CLINIC | Age: 66
End: 2021-12-08
Payer: MEDICARE

## 2021-12-15 ENCOUNTER — LAB VISIT (OUTPATIENT)
Dept: LAB | Facility: HOSPITAL | Age: 66
End: 2021-12-15
Attending: INTERNAL MEDICINE
Payer: MEDICARE

## 2021-12-15 DIAGNOSIS — I10 PRIMARY HYPERTENSION: ICD-10-CM

## 2021-12-15 LAB — TSH SERPL DL<=0.005 MIU/L-ACNC: 2.16 UIU/ML (ref 0.4–4)

## 2021-12-15 PROCEDURE — 84443 ASSAY THYROID STIM HORMONE: CPT | Performed by: INTERNAL MEDICINE

## 2021-12-15 PROCEDURE — 80048 BASIC METABOLIC PNL TOTAL CA: CPT | Performed by: INTERNAL MEDICINE

## 2021-12-15 PROCEDURE — 80061 LIPID PANEL: CPT | Performed by: INTERNAL MEDICINE

## 2021-12-16 LAB
ANION GAP SERPL CALC-SCNC: 12 MMOL/L (ref 8–16)
BUN SERPL-MCNC: 13 MG/DL (ref 8–23)
CALCIUM SERPL-MCNC: 10.1 MG/DL (ref 8.7–10.5)
CHLORIDE SERPL-SCNC: 103 MMOL/L (ref 95–110)
CHOLEST SERPL-MCNC: 160 MG/DL (ref 120–199)
CHOLEST/HDLC SERPL: 3.8 {RATIO} (ref 2–5)
CO2 SERPL-SCNC: 28 MMOL/L (ref 23–29)
CREAT SERPL-MCNC: 0.8 MG/DL (ref 0.5–1.4)
EST. GFR  (AFRICAN AMERICAN): >60 ML/MIN/1.73 M^2
EST. GFR  (NON AFRICAN AMERICAN): >60 ML/MIN/1.73 M^2
GLUCOSE SERPL-MCNC: 109 MG/DL (ref 70–110)
HDLC SERPL-MCNC: 42 MG/DL (ref 40–75)
HDLC SERPL: 26.3 % (ref 20–50)
LDLC SERPL CALC-MCNC: 90.8 MG/DL (ref 63–159)
NONHDLC SERPL-MCNC: 118 MG/DL
POTASSIUM SERPL-SCNC: 4.4 MMOL/L (ref 3.5–5.1)
SODIUM SERPL-SCNC: 143 MMOL/L (ref 136–145)
TRIGL SERPL-MCNC: 136 MG/DL (ref 30–150)

## 2021-12-17 ENCOUNTER — OFFICE VISIT (OUTPATIENT)
Dept: INTERNAL MEDICINE | Facility: CLINIC | Age: 66
End: 2021-12-17
Payer: MEDICARE

## 2021-12-17 VITALS
WEIGHT: 260.81 LBS | HEIGHT: 67 IN | OXYGEN SATURATION: 96 % | BODY MASS INDEX: 40.93 KG/M2 | HEART RATE: 85 BPM | SYSTOLIC BLOOD PRESSURE: 120 MMHG | DIASTOLIC BLOOD PRESSURE: 68 MMHG | TEMPERATURE: 98 F

## 2021-12-17 DIAGNOSIS — M54.16 LUMBAR RADICULOPATHY: ICD-10-CM

## 2021-12-17 DIAGNOSIS — I10 PRIMARY HYPERTENSION: ICD-10-CM

## 2021-12-17 DIAGNOSIS — G89.4 CHRONIC PAIN SYNDROME: Primary | ICD-10-CM

## 2021-12-17 DIAGNOSIS — E78.00 PURE HYPERCHOLESTEROLEMIA: ICD-10-CM

## 2021-12-17 DIAGNOSIS — M54.12 CERVICAL RADICULOPATHY: ICD-10-CM

## 2021-12-17 PROCEDURE — 99213 OFFICE O/P EST LOW 20 MIN: CPT | Mod: PBBFAC,PO | Performed by: INTERNAL MEDICINE

## 2021-12-17 PROCEDURE — 99213 PR OFFICE/OUTPT VISIT, EST, LEVL III, 20-29 MIN: ICD-10-PCS | Mod: S$PBB,,, | Performed by: INTERNAL MEDICINE

## 2021-12-17 PROCEDURE — 99213 OFFICE O/P EST LOW 20 MIN: CPT | Mod: S$PBB,,, | Performed by: INTERNAL MEDICINE

## 2021-12-17 PROCEDURE — 99999 PR PBB SHADOW E&M-EST. PATIENT-LVL III: CPT | Mod: PBBFAC,,, | Performed by: INTERNAL MEDICINE

## 2021-12-17 PROCEDURE — 99999 PR PBB SHADOW E&M-EST. PATIENT-LVL III: ICD-10-PCS | Mod: PBBFAC,,, | Performed by: INTERNAL MEDICINE

## 2021-12-17 RX ORDER — HYDROCODONE BITARTRATE AND ACETAMINOPHEN 10; 325 MG/1; MG/1
1 TABLET ORAL 2 TIMES DAILY PRN
Qty: 180 TABLET | Refills: 0 | Status: SHIPPED | OUTPATIENT
Start: 2022-01-03 | End: 2022-03-22 | Stop reason: SDUPTHER

## 2021-12-28 ENCOUNTER — PATIENT MESSAGE (OUTPATIENT)
Dept: PREADMISSION TESTING | Facility: HOSPITAL | Age: 66
End: 2021-12-28
Payer: MEDICARE

## 2021-12-28 NOTE — PRE ADMISSION SCREENING
Pre op instructions reviewed with patient per phone:    To confirm, Your surgeon has instructed you:  Surgery is scheduled 01/03/22at 0700am.      [facility] main lobby by 0530am.   Pre admit office to call afternoon prior to surgery with final arrival time    INSTRUCTIONS IMPORTANT!!!  ¨ Do not eat, drink, or smoke after 12 midnight prior to surgery, including water. OK to brush teeth, no gum, candy or mints!    ¨ Take only these medicines with a small swallow of water-morning of surgery.  Citalopram  Gabapentin  Pravastatin      -------   Do not shave legs/underarms 3 days prior to surgery  ____   1 visitor is  allowed in the pre operative area, no one under the age of 16,and must adhere to social distancing guidelines.  1 visitor/family member is allowed to              visit non covid  in-patients in their room    ____   Family/caregivers will be updated re pt status via text/cell phone      ____  Do not wear makeup, including mascara.  ____  No powder, lotions or creams to surgical area.  ____  Please remove all jewelry, including piercings and leave at home.  ____  No money or valuables needed. Please leave at home.  ____  Please bring identification and insurance information to hospital.  ____  If going home the same day, arrange for a ride home. You will not be able to   drive if Anesthesia was used.  ____  Children, under 12 years old, must remain in the waiting room with an adult.  They are not allowed in patient areas.  ____  Wear loose fitting clothing. Allow for dressings, bandages.  ____  Stop Aspirin, Ibuprofen, Motrin and Aleve at least 5-7 days before surgery, unless otherwise instructed by your doctor, or the nurse.   You MAY use Tylenol/acetaminophen until day of surgery.  ____  If you take diabetic medication, do not take am of surgery unless instructed by   Doctor.  ____ Stop taking any Fish Oil supplement or any Vitamins that contain Vitamin E  at least 5 days prior to surgery.          Bathing Instructions-- The night before surgery and the morning prior to coming to the hospital:   -Do not shave the surgical area.   -Shower and wash your hair and body as usual with anti-bacterial  soap and shampoo.   -Rinse your hair and body completely.   -Use one packet of hibiclens to wash the surgical site (using your hand) gently for 5 minutes.  Do not scrub you skin too hard.   -Do not use hibiclens on your head, face, or genitals.   -Do not wash with anti-bacterial soap after you use the hibiclens.   -Rinse your body thoroughly.   -Dry with clean, soft towel.  Do not use lotion, cream, deodorant, or powders on   the surgical site.    Use antibacterial soap in place of hibiclens if your surgery is on the head, face or genitals.         Surgical Site Infection    Prevention of surgical site infections:     -Keep incisions clean and dry.   -Do not soak/submerge incisions in water until completely healed.   -Do not apply lotions, powders, creams, or deodorants to site.   -Always make sure hands are cleaned with antibacterial soap/ alcohol-based   prior to touching the surgical site.  (This includes doctors, nurses, staff, and yourself.)    Signs and symptoms:   -Redness and pain around the area where you had surgery   -Drainage of cloudy fluid from your surgical wound   -Fever over 100.4  I have read or had read and explained to me, and understand the above information.

## 2021-12-29 ENCOUNTER — TELEPHONE (OUTPATIENT)
Dept: NEUROSURGERY | Facility: CLINIC | Age: 66
End: 2021-12-29
Payer: MEDICARE

## 2021-12-29 ENCOUNTER — PATIENT MESSAGE (OUTPATIENT)
Dept: SURGERY | Facility: HOSPITAL | Age: 66
End: 2021-12-29
Payer: MEDICARE

## 2021-12-29 ENCOUNTER — PATIENT MESSAGE (OUTPATIENT)
Dept: PREADMISSION TESTING | Facility: HOSPITAL | Age: 66
End: 2021-12-29
Payer: MEDICARE

## 2021-12-29 DIAGNOSIS — Z01.818 PRE-OP TESTING: ICD-10-CM

## 2022-01-01 ENCOUNTER — LAB VISIT (OUTPATIENT)
Dept: LAB | Facility: HOSPITAL | Age: 67
End: 2022-01-01
Attending: NEUROLOGICAL SURGERY
Payer: MEDICARE

## 2022-01-01 ENCOUNTER — LAB VISIT (OUTPATIENT)
Dept: URGENT CARE | Facility: CLINIC | Age: 67
End: 2022-01-01
Payer: MEDICARE

## 2022-01-01 DIAGNOSIS — Z01.818 PRE-OP TESTING: ICD-10-CM

## 2022-01-01 LAB
ABO + RH BLD: NORMAL
BLD GP AB SCN CELLS X3 SERPL QL: NORMAL

## 2022-01-01 PROCEDURE — 36415 COLL VENOUS BLD VENIPUNCTURE: CPT | Performed by: NEUROLOGICAL SURGERY

## 2022-01-01 PROCEDURE — 86901 BLOOD TYPING SEROLOGIC RH(D): CPT | Performed by: NEUROLOGICAL SURGERY

## 2022-01-01 PROCEDURE — U0003 INFECTIOUS AGENT DETECTION BY NUCLEIC ACID (DNA OR RNA); SEVERE ACUTE RESPIRATORY SYNDROME CORONAVIRUS 2 (SARS-COV-2) (CORONAVIRUS DISEASE [COVID-19]), AMPLIFIED PROBE TECHNIQUE, MAKING USE OF HIGH THROUGHPUT TECHNOLOGIES AS DESCRIBED BY CMS-2020-01-R: HCPCS | Performed by: NEUROLOGICAL SURGERY

## 2022-01-03 ENCOUNTER — ANESTHESIA (OUTPATIENT)
Dept: SURGERY | Facility: HOSPITAL | Age: 67
End: 2022-01-03
Payer: MEDICARE

## 2022-01-03 ENCOUNTER — ANESTHESIA EVENT (OUTPATIENT)
Dept: SURGERY | Facility: HOSPITAL | Age: 67
End: 2022-01-03
Payer: MEDICARE

## 2022-01-03 ENCOUNTER — HOSPITAL ENCOUNTER (OUTPATIENT)
Facility: HOSPITAL | Age: 67
Discharge: HOME | End: 2022-01-04
Attending: NEUROLOGICAL SURGERY | Admitting: NEUROLOGICAL SURGERY
Payer: MEDICARE

## 2022-01-03 ENCOUNTER — PATIENT MESSAGE (OUTPATIENT)
Dept: SURGERY | Facility: HOSPITAL | Age: 67
End: 2022-01-03
Payer: MEDICARE

## 2022-01-03 DIAGNOSIS — M47.812 CERVICAL SPONDYLOSIS: ICD-10-CM

## 2022-01-03 DIAGNOSIS — M54.12 CERVICAL RADICULOPATHY: Primary | ICD-10-CM

## 2022-01-03 DIAGNOSIS — M50.30 DDD (DEGENERATIVE DISC DISEASE), CERVICAL: ICD-10-CM

## 2022-01-03 LAB — SARS-COV-2 RDRP RESP QL NAA+PROBE: NEGATIVE

## 2022-01-03 PROCEDURE — 22853 PR INSERT BIOMECH DEV W/INTERBODY ARTHRODESIS, EA CONTIGUOUS DEFECT: ICD-10-PCS | Mod: ,,, | Performed by: NEUROLOGICAL SURGERY

## 2022-01-03 PROCEDURE — 27800903 OPTIME MED/SURG SUP & DEVICES OTHER IMPLANTS: Performed by: NEUROLOGICAL SURGERY

## 2022-01-03 PROCEDURE — 22845 PR ANTERIOR INSTRUMENTATION 2-3 VERTEBRAL SEGMENTS: ICD-10-PCS | Mod: 59,,, | Performed by: NEUROLOGICAL SURGERY

## 2022-01-03 PROCEDURE — 25000003 PHARM REV CODE 250: Performed by: PHYSICIAN ASSISTANT

## 2022-01-03 PROCEDURE — 63600175 PHARM REV CODE 636 W HCPCS: Performed by: NEUROLOGICAL SURGERY

## 2022-01-03 PROCEDURE — 36000711: Performed by: NEUROLOGICAL SURGERY

## 2022-01-03 PROCEDURE — 94799 UNLISTED PULMONARY SVC/PX: CPT

## 2022-01-03 PROCEDURE — 20930 PR ALLOGRAFT FOR SPINE SURGERY ONLY MORSELIZED: ICD-10-PCS | Mod: ,,, | Performed by: NEUROLOGICAL SURGERY

## 2022-01-03 PROCEDURE — 22845 PR ANTERIOR INSTRUMENTATION 2-3 VERTEBRAL SEGMENTS: ICD-10-PCS | Mod: AS,59,, | Performed by: PHYSICIAN ASSISTANT

## 2022-01-03 PROCEDURE — 63600175 PHARM REV CODE 636 W HCPCS: Performed by: NURSE ANESTHETIST, CERTIFIED REGISTERED

## 2022-01-03 PROCEDURE — 22853 INSJ BIOMECHANICAL DEVICE: CPT | Mod: AS,,, | Performed by: PHYSICIAN ASSISTANT

## 2022-01-03 PROCEDURE — 22845 INSERT SPINE FIXATION DEVICE: CPT | Mod: 59,,, | Performed by: NEUROLOGICAL SURGERY

## 2022-01-03 PROCEDURE — 22552 PR ARTHRODESIS ANT INTERBODY INC DISCECTOMY, CERVICAL BELOW C2 EACH ADDL: ICD-10-PCS | Mod: ,,, | Performed by: NEUROLOGICAL SURGERY

## 2022-01-03 PROCEDURE — U0002 COVID-19 LAB TEST NON-CDC: HCPCS | Performed by: NEUROLOGICAL SURGERY

## 2022-01-03 PROCEDURE — 37000009 HC ANESTHESIA EA ADD 15 MINS: Performed by: NEUROLOGICAL SURGERY

## 2022-01-03 PROCEDURE — C1713 ANCHOR/SCREW BN/BN,TIS/BN: HCPCS | Performed by: NEUROLOGICAL SURGERY

## 2022-01-03 PROCEDURE — 71000033 HC RECOVERY, INTIAL HOUR: Performed by: NEUROLOGICAL SURGERY

## 2022-01-03 PROCEDURE — 22551 ARTHRD ANT NTRBDY CERVICAL: CPT | Mod: AS,,, | Performed by: PHYSICIAN ASSISTANT

## 2022-01-03 PROCEDURE — 22853 PR INSERT BIOMECH DEV W/INTERBODY ARTHRODESIS, EA CONTIGUOUS DEFECT: ICD-10-PCS | Mod: AS,,, | Performed by: PHYSICIAN ASSISTANT

## 2022-01-03 PROCEDURE — 27201423 OPTIME MED/SURG SUP & DEVICES STERILE SUPPLY: Performed by: NEUROLOGICAL SURGERY

## 2022-01-03 PROCEDURE — 22551 ARTHRD ANT NTRBDY CERVICAL: CPT | Mod: ,,, | Performed by: NEUROLOGICAL SURGERY

## 2022-01-03 PROCEDURE — 22551 PR ARTHRODESIS ANT INTERBODY INC DISCECTOMY, CERVICAL BELOW C2: ICD-10-PCS | Mod: AS,,, | Performed by: PHYSICIAN ASSISTANT

## 2022-01-03 PROCEDURE — 22552 ARTHRD ANT NTRBD CERVICAL EA: CPT | Mod: AS,,, | Performed by: PHYSICIAN ASSISTANT

## 2022-01-03 PROCEDURE — 22552 PR ARTHRODESIS ANT INTERBODY INC DISCECTOMY, CERVICAL BELOW C2 EACH ADDL: ICD-10-PCS | Mod: AS,,, | Performed by: PHYSICIAN ASSISTANT

## 2022-01-03 PROCEDURE — 25000003 PHARM REV CODE 250: Performed by: NURSE ANESTHETIST, CERTIFIED REGISTERED

## 2022-01-03 PROCEDURE — 22845 INSERT SPINE FIXATION DEVICE: CPT | Mod: AS,59,, | Performed by: PHYSICIAN ASSISTANT

## 2022-01-03 PROCEDURE — 63600175 PHARM REV CODE 636 W HCPCS: Performed by: PHYSICIAN ASSISTANT

## 2022-01-03 PROCEDURE — 22552 ARTHRD ANT NTRBD CERVICAL EA: CPT | Mod: ,,, | Performed by: NEUROLOGICAL SURGERY

## 2022-01-03 PROCEDURE — 20930 SP BONE ALGRFT MORSEL ADD-ON: CPT | Mod: ,,, | Performed by: NEUROLOGICAL SURGERY

## 2022-01-03 PROCEDURE — 71000039 HC RECOVERY, EACH ADD'L HOUR: Performed by: NEUROLOGICAL SURGERY

## 2022-01-03 PROCEDURE — 99900035 HC TECH TIME PER 15 MIN (STAT)

## 2022-01-03 PROCEDURE — 37000008 HC ANESTHESIA 1ST 15 MINUTES: Performed by: NEUROLOGICAL SURGERY

## 2022-01-03 PROCEDURE — 36000710: Performed by: NEUROLOGICAL SURGERY

## 2022-01-03 PROCEDURE — C1729 CATH, DRAINAGE: HCPCS | Performed by: NEUROLOGICAL SURGERY

## 2022-01-03 PROCEDURE — 22551 PR ARTHRODESIS ANT INTERBODY INC DISCECTOMY, CERVICAL BELOW C2: ICD-10-PCS | Mod: ,,, | Performed by: NEUROLOGICAL SURGERY

## 2022-01-03 PROCEDURE — 22853 INSJ BIOMECHANICAL DEVICE: CPT | Mod: ,,, | Performed by: NEUROLOGICAL SURGERY

## 2022-01-03 DEVICE — CAGE 5030741 ANATOMIC PTC 14X11X7MM
Type: IMPLANTABLE DEVICE | Site: NECK | Status: FUNCTIONAL
Brand: ANATOMIC PEEK PTC CERVICAL FUSION SYSTEM

## 2022-01-03 DEVICE — PIN FIXATION ANTERIOR CERVIAL: Type: IMPLANTABLE DEVICE | Site: NECK | Status: FUNCTIONAL

## 2022-01-03 DEVICE — PUTTY GRAFTON DBF 6CC: Type: IMPLANTABLE DEVICE | Site: NECK | Status: FUNCTIONAL

## 2022-01-03 DEVICE — SCREW ATLANTIS VASD 4MM X14MM: Type: IMPLANTABLE DEVICE | Site: NECK | Status: FUNCTIONAL

## 2022-01-03 DEVICE — PLATE ATLANTIS ANT CERV 37.5MM: Type: IMPLANTABLE DEVICE | Site: NECK | Status: FUNCTIONAL

## 2022-01-03 DEVICE — CAGE ANATOMIC PTC 14X11X6: Type: IMPLANTABLE DEVICE | Site: NECK | Status: FUNCTIONAL

## 2022-01-03 RX ORDER — VANCOMYCIN HCL IN 5 % DEXTROSE 1G/250ML
1000 PLASTIC BAG, INJECTION (ML) INTRAVENOUS
Status: COMPLETED | OUTPATIENT
Start: 2022-01-03 | End: 2022-01-03

## 2022-01-03 RX ORDER — ROCURONIUM BROMIDE 10 MG/ML
INJECTION, SOLUTION INTRAVENOUS
Status: DISCONTINUED | OUTPATIENT
Start: 2022-01-03 | End: 2022-01-03

## 2022-01-03 RX ORDER — HYDROMORPHONE HYDROCHLORIDE 2 MG/ML
0.2 INJECTION, SOLUTION INTRAMUSCULAR; INTRAVENOUS; SUBCUTANEOUS EVERY 5 MIN PRN
Status: DISCONTINUED | OUTPATIENT
Start: 2022-01-03 | End: 2022-01-03 | Stop reason: HOSPADM

## 2022-01-03 RX ORDER — DOCUSATE SODIUM 100 MG/1
100 CAPSULE, LIQUID FILLED ORAL DAILY
Status: DISCONTINUED | OUTPATIENT
Start: 2022-01-04 | End: 2022-01-04 | Stop reason: HOSPADM

## 2022-01-03 RX ORDER — OXYCODONE AND ACETAMINOPHEN 7.5; 325 MG/1; MG/1
1 TABLET ORAL EVERY 4 HOURS PRN
Status: DISCONTINUED | OUTPATIENT
Start: 2022-01-03 | End: 2022-01-04 | Stop reason: HOSPADM

## 2022-01-03 RX ORDER — DIPHENHYDRAMINE HCL 25 MG
50 CAPSULE ORAL EVERY 6 HOURS PRN
Status: DISCONTINUED | OUTPATIENT
Start: 2022-01-03 | End: 2022-01-04 | Stop reason: HOSPADM

## 2022-01-03 RX ORDER — AMOXICILLIN 250 MG
2 CAPSULE ORAL NIGHTLY PRN
Status: DISCONTINUED | OUTPATIENT
Start: 2022-01-03 | End: 2022-01-04 | Stop reason: HOSPADM

## 2022-01-03 RX ORDER — OXYCODONE AND ACETAMINOPHEN 10; 325 MG/1; MG/1
1 TABLET ORAL EVERY 4 HOURS PRN
Status: DISCONTINUED | OUTPATIENT
Start: 2022-01-03 | End: 2022-01-04 | Stop reason: HOSPADM

## 2022-01-03 RX ORDER — VANCOMYCIN HYDROCHLORIDE 1 G/20ML
INJECTION, POWDER, LYOPHILIZED, FOR SOLUTION INTRAVENOUS
Status: DISCONTINUED | OUTPATIENT
Start: 2022-01-03 | End: 2022-01-03 | Stop reason: HOSPADM

## 2022-01-03 RX ORDER — DEXTROSE MONOHYDRATE AND SODIUM CHLORIDE 5; .9 G/100ML; G/100ML
INJECTION, SOLUTION INTRAVENOUS CONTINUOUS
Status: DISCONTINUED | OUTPATIENT
Start: 2022-01-03 | End: 2022-01-03

## 2022-01-03 RX ORDER — PROPOFOL 10 MG/ML
VIAL (ML) INTRAVENOUS
Status: DISCONTINUED | OUTPATIENT
Start: 2022-01-03 | End: 2022-01-03

## 2022-01-03 RX ORDER — METHOCARBAMOL 750 MG/1
750 TABLET, FILM COATED ORAL EVERY 8 HOURS PRN
Status: DISCONTINUED | OUTPATIENT
Start: 2022-01-03 | End: 2022-01-04 | Stop reason: HOSPADM

## 2022-01-03 RX ORDER — PRAVASTATIN SODIUM 20 MG/1
40 TABLET ORAL DAILY
Status: DISCONTINUED | OUTPATIENT
Start: 2022-01-04 | End: 2022-01-04 | Stop reason: HOSPADM

## 2022-01-03 RX ORDER — EPHEDRINE SULFATE 50 MG/ML
INJECTION, SOLUTION INTRAVENOUS
Status: DISCONTINUED | OUTPATIENT
Start: 2022-01-03 | End: 2022-01-03

## 2022-01-03 RX ORDER — DEXAMETHASONE SODIUM PHOSPHATE 4 MG/ML
INJECTION, SOLUTION INTRA-ARTICULAR; INTRALESIONAL; INTRAMUSCULAR; INTRAVENOUS; SOFT TISSUE
Status: DISCONTINUED | OUTPATIENT
Start: 2022-01-03 | End: 2022-01-03

## 2022-01-03 RX ORDER — CITALOPRAM 20 MG/1
40 TABLET, FILM COATED ORAL DAILY
Status: DISCONTINUED | OUTPATIENT
Start: 2022-01-04 | End: 2022-01-04 | Stop reason: HOSPADM

## 2022-01-03 RX ORDER — LOSARTAN POTASSIUM 50 MG/1
100 TABLET ORAL DAILY
Status: DISCONTINUED | OUTPATIENT
Start: 2022-01-04 | End: 2022-01-04 | Stop reason: HOSPADM

## 2022-01-03 RX ORDER — OXYCODONE AND ACETAMINOPHEN 5; 325 MG/1; MG/1
1 TABLET ORAL EVERY 4 HOURS PRN
Status: DISCONTINUED | OUTPATIENT
Start: 2022-01-03 | End: 2022-01-04 | Stop reason: HOSPADM

## 2022-01-03 RX ORDER — ZOLPIDEM TARTRATE 5 MG/1
5 TABLET ORAL NIGHTLY PRN
Status: DISCONTINUED | OUTPATIENT
Start: 2022-01-03 | End: 2022-01-04 | Stop reason: HOSPADM

## 2022-01-03 RX ORDER — SODIUM CHLORIDE, SODIUM LACTATE, POTASSIUM CHLORIDE, CALCIUM CHLORIDE 600; 310; 30; 20 MG/100ML; MG/100ML; MG/100ML; MG/100ML
INJECTION, SOLUTION INTRAVENOUS CONTINUOUS PRN
Status: DISCONTINUED | OUTPATIENT
Start: 2022-01-03 | End: 2022-01-03

## 2022-01-03 RX ORDER — ONDANSETRON 8 MG/1
8 TABLET, ORALLY DISINTEGRATING ORAL EVERY 6 HOURS PRN
Status: DISCONTINUED | OUTPATIENT
Start: 2022-01-03 | End: 2022-01-04 | Stop reason: HOSPADM

## 2022-01-03 RX ORDER — PROCHLORPERAZINE EDISYLATE 5 MG/ML
5 INJECTION INTRAMUSCULAR; INTRAVENOUS EVERY 6 HOURS PRN
Status: DISCONTINUED | OUTPATIENT
Start: 2022-01-03 | End: 2022-01-04 | Stop reason: HOSPADM

## 2022-01-03 RX ORDER — HYDROCHLOROTHIAZIDE 25 MG/1
25 TABLET ORAL DAILY
Status: DISCONTINUED | OUTPATIENT
Start: 2022-01-04 | End: 2022-01-04 | Stop reason: HOSPADM

## 2022-01-03 RX ORDER — HYDROMORPHONE HYDROCHLORIDE 2 MG/ML
2 INJECTION, SOLUTION INTRAMUSCULAR; INTRAVENOUS; SUBCUTANEOUS
Status: DISCONTINUED | OUTPATIENT
Start: 2022-01-03 | End: 2022-01-04 | Stop reason: HOSPADM

## 2022-01-03 RX ORDER — FUROSEMIDE 40 MG/1
40 TABLET ORAL DAILY
Status: DISCONTINUED | OUTPATIENT
Start: 2022-01-04 | End: 2022-01-04 | Stop reason: HOSPADM

## 2022-01-03 RX ORDER — ONDANSETRON 2 MG/ML
4 INJECTION INTRAMUSCULAR; INTRAVENOUS DAILY PRN
Status: DISCONTINUED | OUTPATIENT
Start: 2022-01-03 | End: 2022-01-03 | Stop reason: HOSPADM

## 2022-01-03 RX ORDER — PROPOFOL 10 MG/ML
VIAL (ML) INTRAVENOUS CONTINUOUS PRN
Status: DISCONTINUED | OUTPATIENT
Start: 2022-01-03 | End: 2022-01-03

## 2022-01-03 RX ORDER — FENTANYL CITRATE 50 UG/ML
INJECTION, SOLUTION INTRAMUSCULAR; INTRAVENOUS
Status: DISCONTINUED | OUTPATIENT
Start: 2022-01-03 | End: 2022-01-03

## 2022-01-03 RX ORDER — MAG HYDROX/ALUMINUM HYD/SIMETH 200-200-20
30 SUSPENSION, ORAL (FINAL DOSE FORM) ORAL EVERY 4 HOURS PRN
Status: DISCONTINUED | OUTPATIENT
Start: 2022-01-03 | End: 2022-01-04 | Stop reason: HOSPADM

## 2022-01-03 RX ORDER — ONDANSETRON 2 MG/ML
INJECTION INTRAMUSCULAR; INTRAVENOUS
Status: DISCONTINUED | OUTPATIENT
Start: 2022-01-03 | End: 2022-01-03

## 2022-01-03 RX ORDER — SODIUM CHLORIDE 9 MG/ML
INJECTION, SOLUTION INTRAVENOUS CONTINUOUS
Status: ACTIVE | OUTPATIENT
Start: 2022-01-03 | End: 2022-01-04

## 2022-01-03 RX ORDER — OXYCODONE AND ACETAMINOPHEN 5; 325 MG/1; MG/1
1 TABLET ORAL
Status: DISCONTINUED | OUTPATIENT
Start: 2022-01-03 | End: 2022-01-03 | Stop reason: HOSPADM

## 2022-01-03 RX ORDER — CLONIDINE HYDROCHLORIDE 0.2 MG/1
0.2 TABLET ORAL NIGHTLY
Status: DISCONTINUED | OUTPATIENT
Start: 2022-01-03 | End: 2022-01-04 | Stop reason: HOSPADM

## 2022-01-03 RX ORDER — ACETAMINOPHEN 325 MG/1
650 TABLET ORAL EVERY 6 HOURS PRN
Status: DISCONTINUED | OUTPATIENT
Start: 2022-01-03 | End: 2022-01-04 | Stop reason: HOSPADM

## 2022-01-03 RX ORDER — SUCCINYLCHOLINE CHLORIDE 20 MG/ML
INJECTION INTRAMUSCULAR; INTRAVENOUS
Status: DISCONTINUED | OUTPATIENT
Start: 2022-01-03 | End: 2022-01-03

## 2022-01-03 RX ORDER — LIDOCAINE HYDROCHLORIDE 10 MG/ML
INJECTION, SOLUTION EPIDURAL; INFILTRATION; INTRACAUDAL; PERINEURAL
Status: DISCONTINUED | OUTPATIENT
Start: 2022-01-03 | End: 2022-01-03

## 2022-01-03 RX ORDER — LOSARTAN POTASSIUM AND HYDROCHLOROTHIAZIDE 25; 100 MG/1; MG/1
1 TABLET ORAL DAILY
Status: DISCONTINUED | OUTPATIENT
Start: 2022-01-04 | End: 2022-01-03 | Stop reason: CLARIF

## 2022-01-03 RX ADMIN — SODIUM CHLORIDE: 0.9 INJECTION, SOLUTION INTRAVENOUS at 05:01

## 2022-01-03 RX ADMIN — PROPOFOL 150 MG: 10 INJECTION, EMULSION INTRAVENOUS at 07:01

## 2022-01-03 RX ADMIN — LIDOCAINE HYDROCHLORIDE 50 MG: 10 INJECTION, SOLUTION EPIDURAL; INFILTRATION; INTRACAUDAL; PERINEURAL at 07:01

## 2022-01-03 RX ADMIN — PROPOFOL 100 MCG/KG/MIN: 10 INJECTION, EMULSION INTRAVENOUS at 07:01

## 2022-01-03 RX ADMIN — OXYCODONE AND ACETAMINOPHEN 1 TABLET: 10; 325 TABLET ORAL at 05:01

## 2022-01-03 RX ADMIN — FENTANYL CITRATE 100 MCG: 50 INJECTION, SOLUTION INTRAMUSCULAR; INTRAVENOUS at 07:01

## 2022-01-03 RX ADMIN — ROCURONIUM BROMIDE 15 MG: 10 INJECTION, SOLUTION INTRAVENOUS at 07:01

## 2022-01-03 RX ADMIN — VANCOMYCIN HYDROCHLORIDE 1500 MG: 1.5 INJECTION, POWDER, LYOPHILIZED, FOR SOLUTION INTRAVENOUS at 08:01

## 2022-01-03 RX ADMIN — OXYCODONE AND ACETAMINOPHEN 1 TABLET: 10; 325 TABLET ORAL at 09:01

## 2022-01-03 RX ADMIN — DEXTROSE AND SODIUM CHLORIDE: 5; .9 INJECTION, SOLUTION INTRAVENOUS at 03:01

## 2022-01-03 RX ADMIN — VANCOMYCIN HYDROCHLORIDE 1000 MG: 1 INJECTION, POWDER, LYOPHILIZED, FOR SOLUTION INTRAVENOUS at 08:01

## 2022-01-03 RX ADMIN — ONDANSETRON 4 MG: 2 INJECTION, SOLUTION INTRAMUSCULAR; INTRAVENOUS at 11:01

## 2022-01-03 RX ADMIN — REMIFENTANIL HYDROCHLORIDE 0.05 MCG/KG/MIN: 1 INJECTION, POWDER, LYOPHILIZED, FOR SOLUTION INTRAVENOUS at 08:01

## 2022-01-03 RX ADMIN — ROCURONIUM BROMIDE 5 MG: 10 INJECTION, SOLUTION INTRAVENOUS at 07:01

## 2022-01-03 RX ADMIN — SUCCINYLCHOLINE CHLORIDE 120 MG: 20 INJECTION, SOLUTION INTRAMUSCULAR; INTRAVENOUS at 07:01

## 2022-01-03 RX ADMIN — CLONIDINE HYDROCHLORIDE 0.2 MG: 0.2 TABLET ORAL at 08:01

## 2022-01-03 RX ADMIN — DEXAMETHASONE SODIUM PHOSPHATE 8 MG: 4 INJECTION, SOLUTION INTRAMUSCULAR; INTRAVENOUS at 08:01

## 2022-01-03 RX ADMIN — SODIUM CHLORIDE, SODIUM LACTATE, POTASSIUM CHLORIDE, AND CALCIUM CHLORIDE: 600; 310; 30; 20 INJECTION, SOLUTION INTRAVENOUS at 09:01

## 2022-01-03 RX ADMIN — EPHEDRINE SULFATE 15 MG: 50 INJECTION INTRAVENOUS at 10:01

## 2022-01-03 RX ADMIN — GLYCOPYRROLATE 0.2 MG: 0.2 INJECTION, SOLUTION INTRAMUSCULAR; INTRAVENOUS at 09:01

## 2022-01-03 RX ADMIN — EPHEDRINE SULFATE 10 MG: 50 INJECTION INTRAVENOUS at 09:01

## 2022-01-03 RX ADMIN — SODIUM CHLORIDE, SODIUM LACTATE, POTASSIUM CHLORIDE, AND CALCIUM CHLORIDE: 600; 310; 30; 20 INJECTION, SOLUTION INTRAVENOUS at 07:01

## 2022-01-03 NOTE — OP NOTE
[facility] - Surgery ([facility])  Neurosurgery  Operative Note    SUMMARY      Date of Procedure: 1/3/2022     Procedure: Procedure(s) (LRB):  DISCECTOMY, SPINE, CERVICAL, ANTERIOR APPROACH, WITH FUSION (Left)  APPLICATION, ACELLULAR HUMAN DERMAL ALLOGRAFT (Left)   ACDF C4-6    Surgeon(s) and Role:     * Pradip Fernando MD - Primary    Assistant: Miesha Johns PA-C    Pre-Operative Diagnosis: DDD (degenerative disc disease), cervical [M50.30]  Cervical radiculopathy [M54.12]  Other spondylosis, cervical region [M47.892]    Post-Operative Diagnosis: Post-Op Diagnosis Codes:     * DDD (degenerative disc disease), cervical [M50.30]     * Cervical radiculopathy [M54.12]     * Other spondylosis, cervical region [M47.892]    Anesthesia: General    Operative Findings (including complications, if any):   Degenerative disc with stenosis C4-6    Description of Technical Procedures: *  1.  Cervical diskectomy and decompression and bilateral foraminotomy C4-6  2.  Cervical arthrodesis C4-6  3.  Placement of peek interbody graft C4-6  4.  Placement on anterior plate and screws C4-6  5.  Use of allograft bone   6.  Use of microscope for dissection.   7.  Use of intraoperative neuro monitoring      Estimated Blood Loss (EBL): * No values recorded between 1/3/2022  8:27 AM and 1/3/2022 11:26 AM *           Specimens:   Specimen (24h ago, onward)            None           Implants:   Implant Name Type Inv. Item Serial No.  Lot No. LRB No. Used Action   PIN DISTRACTION 12MM - [id number]  PIN DISTRACTION 12MM  AESCULAP  Left 3 Implanted and Explanted   [id number]  PUTTY SANA DBF 6CC  [employer] [place]  Left 1 Implanted   CAGE ANATOMIC PTC [id number] - [id number]  CAGE ANATOMIC PTC [id number]  [employer] [place] [id number] Left 1 Implanted   SPACER 8MM X 14MM X 11MM    [employer] 24LH Left 1 Explanted   7mm x 14mm x 11mm    [employer] 20LV Left 1 Implanted   PLATE ATLANTIS ANT CERV 37.5MM - [id number]  PLATE ATLANTIS ANT CERV 37.5MM  [employer]  [place]  Left 1 Implanted   SCREW ATLANTIS VASD 4MM X14MM - [id number]  SCREW ATLANTIS VASD 4MM X14MM  [employer] [place]  Left 5 Implanted   15 mm rescue screw    [employer]  Left 1 Implanted   PIN FIXATION ANTERIOR CERVIAL - [id number]  PIN FIXATION ANTERIOR CERVIAL  [employer] [place]  Left 1 Implanted              Condition: Good    Disposition: PACU - hemodynamically stable.    Attestation: I was present and scrubbed for the entire procedure.     Patient is 66-year-old female who presented to my office with complaints of neck pain and symptoms going into the upper extremities.  She had an MRI which did show degenerative disc disease at multiple levels with stenosis worse at C4-5 5-6.  She did have physical therapy as well as EMG and pain management without any relief of the symptoms.  Because she was having continued symptoms I offered her an anterior cervical diskectomy with fusion.     The patient was informed of all benefits and potential risk of the operation including but not limited to:  Pain, infection, bleeding, coma, paralysis, death.  Cerebrospinal fluid leak, failure of any instrumentation, the need for additional procedures in the future. No guarantee was given that this procedure would alleviate all of the symptoms.     The patient was transferred onto the operating room table.  She was given preoperative prophylactic IV antibiotics.  The patient was sedated and intubated without difficulty by the anesthesia service.  Eyes were taped shut after ointment was applied to prevent corneal abrasion.  A Christopher Hugger was placed over the lower body to maintain control of the core body temperature. The neuro monitoring service then fixated there electrodes in the appropriate position.  The patient was placed supine with a sheet under the shoulder and a gel roll under the head.  All pressure points were carefully padded.     The skin was prepped and draped in the standard surgical fashion in the planned incision was made  using on marker pen as well as fluoroscopy.      Transverse incision with the lower left side of the neck.  The platysma was dissected sharply.  The medial border of the sternocleidomastoid was identified.  Using sharp dissection a plane was made along the medial border of the sternocleidomastoid to the anterior portion of spine.      The C-arm fluoroscopy was draped with sterile drapes and brought into the operative field.  The C 4-6 space was confirmed by placing a marker needle into the disc space and visualized with fluoroscopy.  The longus coli was undermined on either side using monopolar cautery along to expose the uncovertebral joint.  Self retraining retractors were inserted underneath the longus coli musculature.       A #11 Blade was used to initiate the diskectomy after incision of the annulus.  The diskectomy was carried out utilizing pituitary rongeurs, Kerrison Garcia and curettes to widen and cleaned the disc space.  The operating microscope was draped with sterile drapes and brought into the operative field and with microsurgical technique; the diskectomy was carried down to the level of the posterior longitudinal ligament and widened laterally to reveal bilateral uncovertebral joints.  Using a high-speed electric drill the posterior osteophytes were removed.       At the C4-5 level there was a  herniated disc with bilateral posterior osteophytes going into the foramen.  Combination of this along with a herniated disc was causing centralize cord compression.  The osteophytes removed without difficulty the superior endplate of the C5 vertebral body was drilled slightly to decompress the foramen as well as to remove the herniated disc.     At C5-6 again the annulus was incised.  Dissection was taken back to the ligament.  Plane was created using an upgoing curette.  Using the care since the thecal sac as well as foramina bilaterally were decompressed.  There was foraminal stenosis and dessication  posteriorly causing compression as well.      To achieve arthrodesis ease the endplates were brace and again and in a 14 x 6 mm  interbody cage was measured to size and inserted at C5-6 After sure in a good fit, for arthrodesis, the center of the interbody device was filled with several cc of allograft bone.  The interbody device was inserted without any difficulty.   At C4-5 a 14 x 7 mm interbody cage was placed       An anterior cervical plate was placed anterior to the construct  and was fastened into place with 6x13mm screws.       X-ray showed hardware in place ACDF C 4-6 without any hardware complications noted.     Fluoroscopy was used to confirm good placement of the cage screws. The wound was irrigated with bacitracin solution and hemostasis was of trained.  1 g of vancomycin powder was placed to the surgical cavity. Platysma was approximated using 0 Vicryl sutures as and  2-0 Vicryl sutures on the subcutaneous tissue.  Dermabond was used on the skin.     Sponge, needle and instrument counts were all accounted for at the end of the case x2.  The patient tolerated the procedure well and was transferred to the recovery room in a stable condition.  I was present for the entire portion of the procedure.    Pradip Fernando MD  Neurosurgery

## 2022-01-03 NOTE — OR NURSING
Patient following commands and attempting to takebipap mask off of self.    1230: Tried to wean patient off BiPaP to venti mask, patient did not tolerate with lowest O2 sat of 84%. BiPap placed back on patient at previous settings, patient tolerating well. Dr. Rider @ b/s and advised  to keep Bipap on longer and the patient should respond to weaning soon. VSS, safety measures in place, will continue to monitor.

## 2022-01-03 NOTE — ANESTHESIA PROCEDURE NOTES
Intubation    Date/Time: 1/3/2022 7:49 AM  Performed by: Edin Pascual CRNA  Authorized by: Lavelle Rider II, MD     Intubation:     Induction:  Intravenous    Intubated:  Postinduction    Mask Ventilation:  Easy with oral airway    Attempts:  1    Attempted By:  CRNA    Method of Intubation:  Direct    Blade:  Yohan 3    Laryngeal View Grade: Grade IIA - cords partially seen      Difficult Airway Encountered?: No      Complications:  None    Airway Device:  EMG ETT (NIMS)    Airway Device Size:  7.0    Style/Cuff Inflation:  Cuffed (inflated to minimal occlusive pressure)    Inflation Amount (mL):  7    Tube secured:  21    Secured at:  The lips    Placement Verified By:  Capnometry    Complicating Factors:  None    Findings Post-Intubation:  BS equal bilateral and atraumatic/condition of teeth unchanged

## 2022-01-03 NOTE — ANESTHESIA POSTPROCEDURE EVALUATION
Anesthesia Post Evaluation    Patient: Mary Vo    Procedure(s) Performed: Procedure(s) (LRB):  DISCECTOMY, SPINE, CERVICAL, ANTERIOR APPROACH, WITH FUSION (Left)  APPLICATION, ACELLULAR HUMAN DERMAL ALLOGRAFT (Left)    Final Anesthesia Type: general      Patient location during evaluation: PACU  Patient participation: Yes- Able to Participate  Level of consciousness: awake and alert  Post-procedure vital signs: reviewed and stable  Pain management: adequate  Airway patency: patent  RADHA mitigation strategies: Verification of full reversal of neuromuscular block, Postoperative administration of CPAP, nasopharyngeal airway, or oral appliance in the postanesthesia care unit (PACU) and Extubation and recovery carried out in lateral, semiupright, or other nonsupine position  PONV status at discharge: No PONV  Anesthetic complications: no      Cardiovascular status: hemodynamically stable  Respiratory status: spontaneous ventilation  Hydration status: euvolemic  Follow-up not needed.          Vitals Value Taken Time   /71 01/03/22 1427   Temp 36.4 °C (97.6 °F) 01/03/22 1205   Pulse 87 01/03/22 1434   Resp 65 01/03/22 1434   SpO2 91 % 01/03/22 1434   Vitals shown include unvalidated device data.      No case tracking events are documented in the log.      Pain/Macho Score: Macho Score: 8 (1/3/2022  1:30 PM)

## 2022-01-03 NOTE — TRANSFER OF CARE
"Anesthesia Transfer of Care Note    Patient: Mary Vo    Procedure(s) Performed: Procedure(s) (LRB):  DISCECTOMY, SPINE, CERVICAL, ANTERIOR APPROACH, WITH FUSION (Left)  APPLICATION, ACELLULAR HUMAN DERMAL ALLOGRAFT (Left)    Patient location: PACU    Anesthesia Type: general    Transport from OR: Transported from OR on room air with adequate spontaneous ventilation    Post pain: adequate analgesia    Post assessment: no apparent anesthetic complications and tolerated procedure well    Post vital signs: stable    Level of consciousness: responds to stimulation    Nausea/Vomiting: no nausea/vomiting    Complications: none    Transfer of care protocol was followed      Last vitals:   Visit Vitals  BP (!) 148/68 (BP Location: Right arm, Patient Position: Sitting)   Pulse (!) 55   Temp 36.3 °C (97.3 °F) (Tympanic)   Resp 16   Ht 5' 7" (1.702 m)   Wt 122 kg (268 lb 15.4 oz)   SpO2 96%   Breastfeeding No   BMI 42.13 kg/m²     "

## 2022-01-03 NOTE — PLAN OF CARE
POC discussed with pt. Verbalized understanding. VSS, safety measures in place. Will continue to monitor.

## 2022-01-03 NOTE — ANESTHESIA PREPROCEDURE EVALUATION
01/03/2022  Mary Vo is a 66 y.o., female.  Patient Active Problem List   Diagnosis    HTN (hypertension)    Generalized osteoarthrosis, involving multiple sites    History of breast cancer    Hyperlipidemia    Myofascial pain    Bilateral carpal tunnel syndrome    Lumbar radiculopathy    Chronic pain syndrome    Cervical radiculopathy    Morbid obesity with BMI of 40.0-44.9, adult    Cervical spondylosis     Past Surgical History:   Procedure Laterality Date    BREAST LUMPECTOMY Right 2006    XRT    CATARACT EXTRACTION W/  INTRAOCULAR LENS IMPLANT Left 01/15/2020    CATARACT EXTRACTION W/  INTRAOCULAR LENS IMPLANT Right 01/29/2020    COLONOSCOPY N/A 10/15/2015    Procedure: COLONOSCOPY;  Surgeon: Maylin Shipley MD;  Location: [facility] ENDO;  Service: Endoscopy;  Laterality: N/A;    EPIDURAL STEROID INJECTION N/A 11/3/2020    Procedure: Lumbar L5/S1 IL KAYTA;  Surgeon: Paul Lobato MD;  Location: [facility] PAIN MGT;  Service: Pain Management;  Laterality: N/A;    EPIDURAL STEROID INJECTION INTO CERVICAL SPINE N/A 9/25/2020    Procedure: C7/T1 IL KATYA;  Surgeon: Paul Lobato MD;  Location: [facility] PAIN MGT;  Service: Pain Management;  Laterality: N/A;    EPIDURAL STEROID INJECTION INTO CERVICAL SPINE N/A 10/5/2021    Procedure: C7/T1 IL KATYA with RN IV sedation;  Surgeon: Cynthia Soares MD;  Location: [facility] PAIN MGT;  Service: Pain Management;  Laterality: N/A;    ESOPHAGOGASTRODUODENOSCOPY N/A 11/4/2021    Procedure: ESOPHAGOGASTRODUODENOSCOPY (EGD);  Surgeon: Blas Hampton MD;  Location: [facility] ENDO;  Service: Endoscopy;  Laterality: N/A;    HYSTERECTOMY      TRANSFORAMINAL EPIDURAL INJECTION OF STEROID Left 9/17/2019    Procedure: Left C5/6 TF KATYA w/ RN IV sedation;  Surgeon: Paul Lboato MD;  Location: [facility] PAIN MGT;  Service: Pain Management;  Laterality: Left;        Anesthesia Evaluation    I have reviewed the Patient Summary Reports.    I have reviewed the Nursing Notes. I have reviewed the NPO Status.   I have reviewed the Medications.     Review of Systems  Anesthesia Hx:  No problems with previous Anesthesia    Social:  Former Smoker    Hematology/Oncology:  Hematology Normal        Cardiovascular:   Hypertension hyperlipidemia ECG has been reviewed.    Pulmonary:  Pulmonary Normal    Renal/:  Renal/ Normal     Hepatic/GI:  Hepatic/GI Normal    Musculoskeletal:   Chronic pain syndrome    Myofascial pain   Neurological:   Neuromuscular Disease, Lumbar radiculopathy  Chronic pain syndrome  Cervical radiculopathy    Cervical spondylosis   Endocrine:  Endocrine Normal        Physical Exam  General:  Morbid Obesity    Airway/Jaw/Neck:  Airway Findings: Mouth Opening: Normal Tongue: Normal  General Airway Assessment: Adult  Mallampati: II  TM Distance: 4 - 6 cm  Jaw/Neck Findings:  Neck ROM: Extension Painful, Extension Decreased, Mod.      Dental:  Dental Findings: In tact, Upper Dentures    Chest/Lungs:  Chest/Lungs Findings: Clear to auscultation, Normal Respiratory Rate     Heart/Vascular:  Heart Findings: Rate: Normal  Rhythm: Regular Rhythm  Sounds: Normal        Mental Status:  Mental Status Findings:  Cooperative, Alert and Oriented         Anesthesia Plan  Type of Anesthesia, risks & benefits discussed:  Anesthesia Type:  general    Patient's Preference:   Plan Factors:          Intra-op Monitoring Plan: standard ASA monitors  Intra-op Monitoring Plan Comments:   Post Op Pain Control Plan: multimodal analgesia and per primary service following discharge from PACU  Post Op Pain Control Plan Comments:     Induction:   IV  Beta Blocker:  Patient is not currently on a Beta-Blocker (No further documentation required).       Informed Consent: Patient understands risks and agrees with Anesthesia plan.  Questions answered. Anesthesia consent signed with patient.  ASA  Score: 3     Day of Surgery Review of History & Physical:  There are no significant changes.          Ready For Surgery From Anesthesia Perspective.         Chemistry        Component Value Date/Time     12/15/2021 1035     12/15/2021 1035    K 4.2 12/15/2021 1035    K 4.4 12/15/2021 1035     (H) 12/15/2021 1035     12/15/2021 1035    CO2 27 12/15/2021 1035    CO2 28 12/15/2021 1035    BUN 13 12/15/2021 1035    BUN 13 12/15/2021 1035    CREATININE 0.9 12/15/2021 1035    CREATININE 0.8 12/15/2021 1035     12/15/2021 1035     12/15/2021 1035        Component Value Date/Time    CALCIUM 10.1 12/15/2021 1035    CALCIUM 10.1 12/15/2021 1035    ALKPHOS 79 12/15/2021 1035    AST 39 12/15/2021 1035    ALT 43 12/15/2021 1035    BILITOT 0.6 12/15/2021 1035    ESTGFRAFRICA >60.0 12/15/2021 1035    ESTGFRAFRICA >60.0 12/15/2021 1035    EGFRNONAA >60.0 12/15/2021 1035    EGFRNONAA >60.0 12/15/2021 1035        Lab Results   Component Value Date    WBC 5.12 12/15/2021    HGB 14.5 12/15/2021    HCT 45.6 12/15/2021    MCV 95 12/15/2021     12/15/2021     Normal sinus rhythm   T wave abnormality, consider anterolateral ischemia   Abnormal ECG   When compared with ECG of 23-AUG-2021 10:27,   No significant change was found   Confirmed by MEHRAN TAYLOR MD ([id number]) on 8/24/2021 1:53:01 PM

## 2022-01-04 VITALS
BODY MASS INDEX: 41.39 KG/M2 | RESPIRATION RATE: 18 BRPM | SYSTOLIC BLOOD PRESSURE: 134 MMHG | HEIGHT: 67 IN | DIASTOLIC BLOOD PRESSURE: 60 MMHG | OXYGEN SATURATION: 96 % | HEART RATE: 60 BPM | TEMPERATURE: 98 F | WEIGHT: 263.69 LBS

## 2022-01-04 DIAGNOSIS — R33.9 RETENTION OF URINE, UNSPECIFIED: Primary | ICD-10-CM

## 2022-01-04 PROBLEM — M50.30 DDD (DEGENERATIVE DISC DISEASE), CERVICAL: Chronic | Status: ACTIVE | Noted: 2021-10-05

## 2022-01-04 PROCEDURE — 25000003 PHARM REV CODE 250: Performed by: NEUROLOGICAL SURGERY

## 2022-01-04 PROCEDURE — 99900035 HC TECH TIME PER 15 MIN (STAT)

## 2022-01-04 PROCEDURE — 25000003 PHARM REV CODE 250: Performed by: PHYSICIAN ASSISTANT

## 2022-01-04 PROCEDURE — 94761 N-INVAS EAR/PLS OXIMETRY MLT: CPT

## 2022-01-04 PROCEDURE — 97162 PT EVAL MOD COMPLEX 30 MIN: CPT | Performed by: PHYSICAL THERAPIST

## 2022-01-04 PROCEDURE — 27000221 HC OXYGEN, UP TO 24 HOURS

## 2022-01-04 PROCEDURE — 97530 THERAPEUTIC ACTIVITIES: CPT | Performed by: PHYSICAL THERAPIST

## 2022-01-04 PROCEDURE — 94799 UNLISTED PULMONARY SVC/PX: CPT

## 2022-01-04 RX ORDER — METHOCARBAMOL 750 MG/1
750 TABLET, FILM COATED ORAL EVERY 8 HOURS PRN
Qty: 30 TABLET | Refills: 0 | Status: SHIPPED | OUTPATIENT
Start: 2022-01-04 | End: 2022-01-04

## 2022-01-04 RX ORDER — DOCUSATE SODIUM 100 MG/1
100 CAPSULE, LIQUID FILLED ORAL DAILY
Qty: 12 CAPSULE | Refills: 0 | Status: SHIPPED | OUTPATIENT
Start: 2022-01-05 | End: 2022-01-04

## 2022-01-04 RX ORDER — DOCUSATE SODIUM 100 MG/1
100 CAPSULE, LIQUID FILLED ORAL DAILY
Qty: 12 CAPSULE | Refills: 0 | Status: SHIPPED | OUTPATIENT
Start: 2022-01-05 | End: 2022-09-14 | Stop reason: ALTCHOICE

## 2022-01-04 RX ORDER — METHOCARBAMOL 750 MG/1
750 TABLET, FILM COATED ORAL EVERY 8 HOURS PRN
Qty: 30 TABLET | Refills: 0 | Status: SHIPPED | OUTPATIENT
Start: 2022-01-04 | End: 2022-01-14

## 2022-01-04 RX ORDER — AMOXICILLIN 250 MG
1 CAPSULE ORAL 2 TIMES DAILY PRN
Qty: 20 TABLET | Refills: 0 | Status: SHIPPED | OUTPATIENT
Start: 2022-01-04 | End: 2022-01-04

## 2022-01-04 RX ORDER — AMOXICILLIN 250 MG
1 CAPSULE ORAL 2 TIMES DAILY PRN
Qty: 20 TABLET | Refills: 0 | Status: ON HOLD | OUTPATIENT
Start: 2022-01-04 | End: 2023-02-01

## 2022-01-04 RX ADMIN — THERA TABS 1 TABLET: TAB at 09:01

## 2022-01-04 RX ADMIN — CITALOPRAM HYDROBROMIDE 40 MG: 20 TABLET ORAL at 09:01

## 2022-01-04 RX ADMIN — DOCUSATE SODIUM 100 MG: 100 CAPSULE ORAL at 09:01

## 2022-01-04 RX ADMIN — LOSARTAN POTASSIUM 100 MG: 50 TABLET, FILM COATED ORAL at 09:01

## 2022-01-04 RX ADMIN — PRAVASTATIN SODIUM 40 MG: 20 TABLET ORAL at 09:01

## 2022-01-04 RX ADMIN — HYDROCHLOROTHIAZIDE 25 MG: 25 TABLET ORAL at 09:01

## 2022-01-04 RX ADMIN — OXYCODONE AND ACETAMINOPHEN 1 TABLET: 10; 325 TABLET ORAL at 02:01

## 2022-01-04 RX ADMIN — OXYCODONE AND ACETAMINOPHEN 1 TABLET: 10; 325 TABLET ORAL at 07:01

## 2022-01-04 RX ADMIN — SODIUM CHLORIDE: 0.9 INJECTION, SOLUTION INTRAVENOUS at 07:01

## 2022-01-04 RX ADMIN — OXYCODONE AND ACETAMINOPHEN 1 TABLET: 10; 325 TABLET ORAL at 11:01

## 2022-01-04 RX ADMIN — FUROSEMIDE 40 MG: 40 TABLET ORAL at 09:01

## 2022-01-04 NOTE — DISCHARGE INSTRUCTIONS
Please read instructions below:    Wear Aspen collar at all times except for taking a shower or eating. Okay to loosen when eating.   Adjust collar on side with velcro straps. Loosen/tighten with yellow and white knob on front of collar.     No NSAIDs (aleve, ibuprofen, advil, celebrex, etc.) for 3 months.    Please resume blood thinners (if taking any) in 5 days!    Minimize bending and lifting. No lifting anything greater than 5-10 lbs for first two weeks.    Patient may take a shower or bath. Don't scrub off the glue (Dermabond). Leave in place.

## 2022-01-04 NOTE — PT/OT/SLP EVAL
Physical Therapy Evaluation and Discharge Note    Patient Name:  Mary Vo   MRN:  [id number]    Recommendations:     Discharge Recommendations:  home with home health   Discharge Equipment Recommendations: none   Barriers to discharge: UNKNOWN    Assessment:     Mary Vo is a 66 y.o. female admitted with a medical diagnosis of DDD (degenerative disc disease), cervical. .  At this time, patient is functioning at their prior level of function and does not require further acute PT services.     Recent Surgery: Procedure(s) (LRB):  DISCECTOMY, SPINE, CERVICAL, ANTERIOR APPROACH, WITH FUSION (Left)  APPLICATION, ACELLULAR HUMAN DERMAL ALLOGRAFT (Left) 1 Day Post-Op    Plan:     During this hospitalization, patient does not require further acute PT services.  Please re-consult if situation changes.      Subjective     Chief Complaint: no complaints  Patient/Family Comments/goals: to return home  Pain/Comfort:  · Pain Rating 1: 0/10  · Location 1: cervical spine  · Pain Addressed 1: Pre-medicate for activity    Patients cultural, spiritual, Christian conflicts given the current situation:      Living Environment:  Lives alone I 1 story house with 4 steps and (B) Handrails. Pt reports that she has family that will be helping her at [place]. Pt is retired and still drives.   Prior to admission, patients level of function was Mod I with ADLs and Ambulates in home without AD and use APC for longer distances.  Equipment used at home: cane, straight,bath bench,other (see comments) (Has RW but does not use).  DME owned (not currently used): rolling walker.  Upon discharge, patient will have assistance from family.    Objective:     Communicated with Nurse Nanette MARTIN and epic chart review prior to session.  Patient found HOB elevated with peripheral IV,PureWick upon PT entry to room.    General Precautions: Standard, fall   Orthopedic Precautions:spinal precautions   Braces: Aspen collar   Respiratory Status: Room  air    Exams:  · Cognitive Exam:  Patient is oriented to Person, Place, Time and Situation  · Gross Motor Coordination:  WFL  · Postural Exam:  Patient presented with the following abnormalities:    · -       No postural abnormalities identified  · Sensation:    · -       Intact  · RLE ROM: WFL  · RLE Strength: WFL  · LLE ROM: WFL  · LLE Strength: WFL    Functional Mobility:  · Bed Mobility:     · Rolling Left:  supervision  · Rolling Right: supervision  · Scooting: supervision  · Supine to Sit: supervision  · Transfers:     · Sit to Stand:  supervision with rolling walker  · Bed to Chair: supervision with  rolling walker  using  Step Transfer  · Toilet Transfer: supervision with  rolling walker  using  Step Transfer  · Gait: ~230ft usign RW with Supervision; no LOBs  · Balance: Good standing   · Pt performed toilet hygiene Mod I    AM-PAC 6 CLICK MOBILITY  Total Score:20       Therapeutic Activities and Exercises:   PT Eval completed as listed above. Pt functioning at high level and does not require skilled PT services at this time. Pt is discharged to [facility] for continued maintenance program.     Pt educated in safety with bed mobility and t/fs, RW use, role of PT and POC. Pt encouraged to increase time OOB and to eat all meals sitting upright. Pt with good understanding of all.     AM-PAC 6 CLICK MOBILITY  Total Score:20     Patient left up in chair with all lines intact, call button in reach and Nurse Nanette MARTIN notified.    GOALS:   Multidisciplinary Problems     Physical Therapy Goals     Not on file                History:     Past Medical History:   Diagnosis Date    Breast cancer 1996    right    Chronic pain syndrome     Generalized osteoarthrosis, involving multiple sites     s/p THR bilateral    History of breast cancer 2007    lumpectomy/XRT    HTN (hypertension)     Hyperlipidemia     Morbid obesity with BMI of 40.0-44.9, adult        Past Surgical History:   Procedure Laterality  Date    ANTERIOR CERVICAL DISCECTOMY W/ FUSION Left 1/3/2022    Procedure: DISCECTOMY, SPINE, CERVICAL, ANTERIOR APPROACH, WITH FUSION;  Surgeon: Pradip Fernando MD;  Location: [facility] OR;  Service: Neurosurgery;  Laterality: Left;  Three Level ACDF  C4/5, 5/6, 6/7      BREAST LUMPECTOMY Right 2006    XRT    CATARACT EXTRACTION W/  INTRAOCULAR LENS IMPLANT Left 01/15/2020    CATARACT EXTRACTION W/  INTRAOCULAR LENS IMPLANT Right 01/29/2020    COLONOSCOPY N/A 10/15/2015    Procedure: COLONOSCOPY;  Surgeon: Maylin Shipley MD;  Location: [facility] ENDO;  Service: Endoscopy;  Laterality: N/A;    EPIDURAL STEROID INJECTION N/A 11/3/2020    Procedure: Lumbar L5/S1 IL KATYA;  Surgeon: Paul Lobato MD;  Location: [facility] PAIN MGT;  Service: Pain Management;  Laterality: N/A;    EPIDURAL STEROID INJECTION INTO CERVICAL SPINE N/A 9/25/2020    Procedure: C7/T1 IL KATYA;  Surgeon: Paul Lobato MD;  Location: [facility] PAIN MGT;  Service: Pain Management;  Laterality: N/A;    EPIDURAL STEROID INJECTION INTO CERVICAL SPINE N/A 10/5/2021    Procedure: C7/T1 IL KATYA with RN IV sedation;  Surgeon: Cynthia Soares MD;  Location: [facility] PAIN MGT;  Service: Pain Management;  Laterality: N/A;    ESOPHAGOGASTRODUODENOSCOPY N/A 11/4/2021    Procedure: ESOPHAGOGASTRODUODENOSCOPY (EGD);  Surgeon: Blas Hampton MD;  Location: [facility] ENDO;  Service: Endoscopy;  Laterality: N/A;    HYSTERECTOMY      PLACEMENT OF ACELLULAR HUMAN DERMAL ALLOGRAFT Left 1/3/2022    Procedure: APPLICATION, ACELLULAR HUMAN DERMAL ALLOGRAFT;  Surgeon: Pradip eFrnando MD;  Location: [facility] OR;  Service: Neurosurgery;  Laterality: Left;    TRANSFORAMINAL EPIDURAL INJECTION OF STEROID Left 9/17/2019    Procedure: Left C5/6 TF KATYA w/ RN IV sedation;  Surgeon: Paul Lobato MD;  Location: [facility] PAIN MGT;  Service: Pain Management;  Laterality: Left;       Time Tracking:     PT Received On: 01/04/22  PT Start Time: 0838     PT Stop Time: 0901  PT Total Time (min): 23  min     Billable Minutes: Evaluation 15 min and Therapeutic Activity 8 min      01/04/2022

## 2022-01-04 NOTE — PLAN OF CARE
Pt remains free from falls/injuries this shift. Safety precautions maintained. Pain managed with pain medication. No s/s of acute distress noted. Neck brace in place per orders. Drain removed by PA. Will continue to monitor. Chart check completed.

## 2022-01-04 NOTE — PT/OT/SLP PROGRESS
Occupational Therapy      Patient Name:  Mary Vo   MRN:  [id number]    EVAL RECEIVED AND CHART REVIEW COMPLETED. PT IS FUNCTIONING AT HIGH LEVEL AND THEREFORE NO O.T. SERVICES WARRANTED., THEREFORE, PT IS DISCHARGED FROM SKILLED O.T. THANK YOU  Jasmyn Snowden OT  12:40  1/4/2022

## 2022-01-04 NOTE — NURSING
Pt being discharged Home in stable condition. IV removed, catheter intact, pt tolerated well. Discharge instructions given to pt, pt verbalized understanding.

## 2022-01-04 NOTE — PLAN OF CARE
[facility]  Initial Discharge Assessment       Primary Care Provider: Elias Cannon MD    Admission Diagnosis: DDD (degenerative disc disease), cervical [M50.30]  Cervical radiculopathy [M54.12]  Other spondylosis, cervical region [M47.892]    Admission Date: 1/3/2022  Expected Discharge Date: 1/4/2022    Discharge Barriers Identified: None    Payor: MEDICARE / Plan: MEDICARE PART A & B / Product Type: Government /     Extended Emergency Contact Information  Primary Emergency Contact: Zoe Freitas   [facility]  Home Phone: [phone]  Relation: Daughter    Discharge Plan A: Home  Discharge Plan B: Home      [facility] - [facility] [address]  [address] [address]  Phone: [phone] Fax: [phone]    [facility] - [address] [address]  Phone: [phone] Fax: [phone]      Initial Assessment (most recent)     Adult Discharge Assessment - 01/04/22 1406        Discharge Assessment    Assessment Type Discharge Planning Assessment     Confirmed/corrected address, phone number and insurance Yes     Confirmed Demographics Correct on Facesheet     Source of Information patient     Communicated CASSIUS with patient/caregiver Yes     Reason For Admission degenerative disc disease     Lives With alone     Facility Arrived From: home     Do you expect to return to your current living situation? Yes     Do you have help at home or someone to help you manage your care at home? No     Prior to hospitilization cognitive status: Alert/Oriented     Current cognitive status: Alert/Oriented     Walking or Climbing Stairs Difficulty none     Dressing/Bathing Difficulty none     Equipment Currently Used at Home cane, quad     Readmission within 30 days? No     Patient currently being followed by outpatient case management? No     Do you currently have service(s) that help you manage your care at home?  No     Do you take prescription medications? Yes     Do you have prescription coverage? Yes     Coverage Medicare A&B     Do you have any problems affording any of your prescribed medications? No     Is the patient taking medications as prescribed? yes     Who is going to help you get home at discharge? patients daughter     How do you get to doctors appointments? car, drives self;family or friend will provide     Are you on dialysis? No     Do you take coumadin? No     Discharge Plan A Home     Discharge Plan B Home     DME Needed Upon Discharge  none     Discharge Plan discussed with: Patient     Discharge Barriers Identified None               Initial assessment completed with patient. Patient reports independence with ADL's  prior to hospitalization. Patient uses a cane at home. Patient currently has no cm needs upon DC. CM will continue following to assist with other needs.   CM provided a transitional care folder, information on advanced directives, information on pharmacy bedside delivery, and discharge planning begins on admission with contact information for any needs/questions.

## 2022-01-04 NOTE — PLAN OF CARE
PT Eval completed. Pt required Supervision with all bed mobility, t/fs, and ambulated ~230ft with RW. Pt is discharged to [facility] for continued maintenance program. Recommend home with Home Health.

## 2022-01-04 NOTE — H&P
Please note this in H&P for patient Tavo  Is linked to a prior clinic follow-up appointment  She is here today for anterior cervical diskectomy and fusion C4-C6    The patient was informed of all benefits and potential risk of the operation including but not limited to:  Pain, infection, bleeding, coma, paralysis, death.  Cerebrospinal fluid leak, failure of any instrumentation, the need for additional procedures in the future. No guarantee was given that this procedure would alleviate all of the symptoms.    Consents are signed in the chart    To OR this a.m. for C4-6  ACDF    Thank you for the referral   Please call with any questions    Pradip Fernando MD  Neurosurgery     Disclaimer: This note was prepared using a voice recognition system and is likely to have sound alike errors within the text.

## 2022-01-04 NOTE — PLAN OF CARE
[facility]  Discharge Final Note    Primary Care Provider: Elias Cannon MD    Expected Discharge Date: 1/4/2022    Final Discharge Note (most recent)     Final Note - 01/04/22 1411        Final Note    Assessment Type Final Discharge Note     Anticipated Discharge Disposition Home or Self Care     Hospital Resources/Appts/Education Provided Provided patient/caregiver with written discharge plan information;Appointments scheduled and added to AVS        Post-Acute Status    Discharge Delays None known at this time                 Important Message from Medicare             Contact Info     Miesha Linn PA-C   Specialty: Neurosurgery    [address] [place] [address] [address] [address]   Phone: [phone]       Next Steps: Go in 2 week(s)    Instructions: For wound re-check, X-rays

## 2022-01-04 NOTE — PROGRESS NOTES
[facility]  Neurosurgery  Progress Note    Subjective:     Interval History:  The patient was seen this AM.  No acute events overnight.  Drain output 30 mL recorded.    Denies increased pain, weakness or numbness/tingling.  Has posterior neck discomfort and pain with swallowing which is expected given her surgery.  Aspen collar in place.    Patient reports urinary retention- ongoing for at least 6 months.  States she feels the urge to urinate but unable to do so.  She has urinated 5-6X since her surgery (according to patient) but has not urinated overnight.      History of Present Illness: See H&P.    Post-Op Info:  Procedure(s) (LRB):  DISCECTOMY, SPINE, CERVICAL, ANTERIOR APPROACH, WITH FUSION (Left)  APPLICATION, ACELLULAR HUMAN DERMAL ALLOGRAFT (Left)   1 Day Post-Op      Medications:  Continuous Infusions:   sodium chloride 0.9% 75 mL/hr at 01/04/22 0722     Scheduled Meds:   citalopram  40 mg Oral Daily    cloNIDine  0.2 mg Oral QHS    docusate sodium  100 mg Oral Daily    furosemide  40 mg Oral Daily    losartan  100 mg Oral Daily    And    hydroCHLOROthiazide  25 mg Oral Daily    multivitamin  1 tablet Oral Daily    pravastatin  40 mg Oral Daily     PRN Meds:acetaminophen, aluminum-magnesium hydroxide-simethicone, diphenhydrAMINE, HYDROmorphone, methocarbamoL, ondansetron, oxyCODONE-acetaminophen, oxyCODONE-acetaminophen, oxyCODONE-acetaminophen, prochlorperazine, senna-docusate 8.6-50 mg, zolpidem     Review of Systems  Objective:     Weight: 119.6 kg (263 lb 10.7 oz)  Body mass index is 41.3 kg/m².  Vital Signs (Most Recent):  Temp: 98 °F (36.7 °C) (01/04/22 0722)  Pulse: (!) 59 (01/04/22 0722)  Resp: 18 (01/04/22 0722)  BP: 139/65 (01/04/22 0722)  SpO2: 95 % (01/04/22 0722) Vital Signs (24h Range):  Temp:  [97.6 °F (36.4 °C)-98.7 °F (37.1 °C)] 98 °F (36.7 °C)  Pulse:  [59-96] 59  Resp:  [14-23] 18  SpO2:  [87 %-100 %] 95 %  BP: (105-193)/(51-91) 139/65                 Oxygen Concentration (%):   [] 28         Closed/Suction Drain 01/03/22 1111 Anterior Neck Bulb 15 Fr. (Active)   Site Description Healing 01/03/22 1205   Dressing Type Biopatch in place 01/04/22 0302   Dressing Status Clean;Dry;Intact 01/04/22 0302   Dressing Intervention Integrity maintained 01/04/22 0302   Drainage Serosanguineous 01/04/22 0302   Status To bulb suction 01/04/22 0302   Output (mL) 30 mL 01/03/22 1800       Neurosurgery Physical Exam    Vitals reviewed  Moves all 4 Ext  Sensation intact to light touch  Trachea midline  Tolerates diet  Incision CDI  No erythema, swelling or fluctuance  Drain removed, drain site reinforced      Significant Labs:  No results for input(s): GLU, NA, K, CL, CO2, BUN, CREATININE, CALCIUM, MG in the last 48 hours.  No results for input(s): WBC, HGB, HCT, PLT in the last 48 hours.  No results for input(s): LABPT, INR, APTT in the last 48 hours.  Microbiology Results (last 7 days)       ** No results found for the last 168 hours. **          All pertinent labs from the last 24 hours have been reviewed.  Significant Diagnostics:  I have reviewed all pertinent imaging results/findings within the past 24 hours.    Assessment/Plan:     Active Diagnoses:    Diagnosis Date Noted POA    PRINCIPAL PROBLEM:  DDD (degenerative disc disease), cervical [M50.30] 10/05/2021 Yes    Cervical radiculopathy [M54.12] 09/17/2019 Yes     Chronic      Problems Resolved During this Admission:     POD #1  - Work with PT/OT  -Aspen collar in place  -Will recommend follow-up with outpatient urology  -Consider discharge to home this afternoon    Patient will follow-up in the clinic in 2 weeks for a wound check and x-rays.    Miesha Linn PA-C  Neurosurgery  [facility]

## 2022-01-04 NOTE — PLAN OF CARE
Discussed poc with pt, pt verbalized understanding     Purposeful rounding every 2hours     Fall precautions in place, remains injury free  Pain managed with PRN meds     IVFs  Bed locked at lowest position  Call light within reach     Chart check complete  Will cont with POC

## 2022-01-04 NOTE — PROGRESS NOTES
Subjective:      Patient ID: Mary Vo is a 66 y.o. female.    HPI:  Follow-up and Results  Pt here for follow up   Neck pain and symptoms through the UE   Previously L uE worse than R   Now with R UE symptoms through her hand   Neck pain bilaterally    has had PT, injections, pain management without lasting symptom relief     CT     C4-C5: Bilateral right greater than left facet arthropathy with mild uncovertebral spurring.  Central and right paracentral disc osteophyte complex better demonstrated on the MRI.  There is either small focus of calcification of the posterior longitudinal ligament versus calcification of extruded disc material at this level on series 3, image 136.  No spinal canal stenosis.  There is mild right greater than left neural foraminal narrowing.     C5-C6: Posterior disc osteophyte complex with uncovertebral and facet arthropathy.  There is relative narrowing of the spinal canal at this level, unchanged since the MRI.  There is marked bilateral neural foraminal narrowing, also unchanged.     C6-C7: Posterior disc osteophyte complex with facet and uncovertebral hypertrophy.  There is mild relative narrowing of the spinal canal, unchanged.  There is marked bilateral neural foraminal narrowing.    MR      C4-C5: Posterior disc osteophyte complex present with right paracentral disc protrusion which contacts the anterior right aspect of the cervical cord.  Right greater than left facet/uncovertebral hypertrophy resulting mild right neural foraminal narrowing.     C5-C6: Posterior disc osteophyte complex slightly asymmetric to the right paracentral location effacing the anterior thecal sac and causing mild spinal cord flattening.  No intrinsic cord signal abnormality.  Spinal canal measures 7.6 mm in midline AP dimension.  Bilateral uncovertebral/facet degenerative findings causes severe bilateral neural foraminal narrowing.     C6-C7: Posterior disc osteophyte complex present with more  focal central disc protrusion causing similar effacement and mild indention of the thecal sac and spinal cord without intrinsic cord signal abnormality.  Spinal canal measures 8.3 mm in AP midline dimension.  Bilateral moderate to severe neural foraminal narrowing present.    EMG  . There is electrodiagnostic evidence of an acute on chronic radiculopathy of the LEFT C6 nerve root and a subacute on chronic radic on the right C6 nerve root.  There is a chronic radiculopathy of the other nerve roots.    Objective:     Body mass index is 41.19 kg/m².  Vitals:    10/28/21 1047   BP: 130/76   Pulse: 68   Resp: 16            Neck:  None  Paraspinal tenderness   None  Paraspinal muscle spasms   None present Pain with flexion and extention   WNL  Range of motion shoulders   Neg Not tested Spurling's sign     Motor:   Right Right Left Left  Level Group   5  5  C5 Deltoid   5  5  C6 Bicep   5  5   Wrist extension    5 4+ 5 4+ C7 Triceps   5 4+ 5 4+  Wrist flexion   5  5  C8    5  5  T1 Interossei      Sensation:  NL Decreased (R/L/BL) Level Sensation    [x]   C5 Lateral upper arm   []  Dec mike C6 Thumb and index finger, lat forearm   []  Dec mike C7 Middle finger   []   C8 Ring and little finger   []   T1 Medial arm      Reflex:  2+  Bicep tendon   2+  Brachioradialis   2+  Triceps tendon   Not present  Coburn's   none  Clonus   neg + mike Tinel's         Lab Results   Component Value Date    WBC 5.12 12/15/2021    HCT 45.6 12/15/2021           Assessment:     1. DDD (degenerative disc disease), cervical    2. Cervical radiculopathy    3. Other spondylosis, cervical region      Plan:     DDD (degenerative disc disease), cervical  -     Case Request Operating Room: DISCECTOMY, SPINE, CERVICAL, ANTERIOR APPROACH, WITH FUSION    Cervical radiculopathy  -     Case Request Operating Room: DISCECTOMY, SPINE, CERVICAL, ANTERIOR APPROACH, WITH FUSION    Other spondylosis, cervical region  -     Case Request Operating Room:  DISCECTOMY, SPINE, CERVICAL, ANTERIOR APPROACH, WITH FUSION      PLAN   Given pt symptoms I have consented her for acdf   She has UE symptoms with neck pain agoing through  The UE   Tried PT,pain management, injections without relief    The patient was informed of all benefits and potential risk of the operation including but not limited to:  Pain, infection, bleeding, coma, paralysis, death.  Cerebrospinal fluid leak, failure of any instrumentation, the need for additional procedures in the future. No guarantee was given that this procedure would alleviate all of the symptoms.    Consents signed in office today plan for surgery Harry 3    Thank you for the referral   Please call with any questions    Pradip Fernando MD  Neurosurgery     Disclaimer: This note was prepared using a voice recognition system and is likely to have sound alike errors within the text.

## 2022-01-05 LAB
SARS-COV-2 RNA RESP QL NAA+PROBE: NOT DETECTED
SARS-COV-2- CYCLE NUMBER: NORMAL

## 2022-01-05 NOTE — DISCHARGE SUMMARY
"[facility]  Neurosurgery  Discharge Summary      Patient Name: Mary Vo  MRN: [id number]  Admission Date: 1/3/2022  Hospital Length of Stay: 0 days  Discharge Date and Time: 1/4/2022  3:32 PM  Attending Physician: No att. providers found   Discharging Provider: Pradip Fernando MD  Primary Care Provider: Elias Cannon MD    HPI:   No notes on file    Procedure(s) (LRB):  DISCECTOMY, SPINE, CERVICAL, ANTERIOR APPROACH, WITH FUSION (Left)  APPLICATION, ACELLULAR HUMAN DERMAL ALLOGRAFT (Left)     Hospital Course: No notes on file    Goals of Care Treatment Preferences:         Consults:     Significant Diagnostic Studies: none    Pending Diagnostic Studies:     None        Final Active Diagnoses:    Diagnosis Date Noted POA    PRINCIPAL PROBLEM:  DDD (degenerative disc disease), cervical [M50.30] 10/05/2021 Yes     Chronic    Cervical radiculopathy [M54.12] 09/17/2019 Yes     Chronic      Problems Resolved During this Admission:      Discharged Condition: good     Disposition: Home or Self Care    Follow Up:   Follow-up Information     Miesha Linn PA-C. Go in 2 weeks.    Specialty: Neurosurgery  Why: For wound re-check, X-rays  Contact information:  [address] [place] [address] [address] [place] [address] [phone]                       Patient Instructions:      WALKER FOR HOME USE     Order Specific Question Answer Comments   Type of Walker: Adult (5'4"-6'6")    With wheels? Yes    Height: 5' 7" (1.702 m)    Weight: 119.6 kg (263 lb 10.7 oz)    Length of need (1-99 months): 99    Please check all that apply: Patient's condition impairs ambulation.    Please check all that apply: Patient is unable to safely ambulate without equipment.      Diet general     Call MD for:  extreme fatigue     Call MD for:  hives     Call MD for:  redness, tenderness, or signs of infection (pain, swelling, redness, odor or green/yellow discharge around incision site)     Call MD for:  difficulty breathing, headache or " visual disturbances     Call MD for:  persistent nausea and vomiting     Call MD for:  temperature >100.4     Call MD for:  severe uncontrolled pain     Call MD for:  persistent dizziness or light-headedness     No dressing needed     Medications:  Reconciled Home Medications:      Medication List      START taking these medications    docusate sodium 100 MG capsule  Commonly known as: COLACE  Take 1 capsule (100 mg total) by mouth once daily.     methocarbamoL 750 MG Tab  Commonly known as: ROBAXIN  Take 1 tablet (750 mg total) by mouth every 8 (eight) hours as needed (take to relieve muscle spasms).     STOOL SOFTENER-LAXATIVE 8.6-50 mg per tablet  Generic drug: senna-docusate 8.6-50 mg  Take 1 tablet by mouth 2 (two) times daily as needed for Constipation.        CONTINUE taking these medications    ALPRAZolam 1 MG tablet  Commonly known as: XANAX  TAKE 1 TABLET BY MOUTH NIGHTLY AS NEEDED FOR ANXIETY     citalopram 40 MG tablet  Commonly known as: CeleXA  TAKE ONE TABLET BY MOUTH DAILY     cloNIDine 0.2 MG tablet  Commonly known as: CATAPRES  TAKE 1 TABLET BY MOUTH EVERY EVENING     fluticasone propionate 50 mcg/actuation nasal spray  Commonly known as: FLONASE  1 spray (50 mcg total) by Each Nare route once daily.     furosemide 40 MG tablet  Commonly known as: LASIX  Take 40 mg by mouth once daily.     gabapentin 300 MG capsule  Commonly known as: NEURONTIN  Take 1 capsule (300 mg total) by mouth once daily AND 1 capsule (300 mg total) after lunch AND 2 capsules (600 mg total) every evening.     HYDROcodone-acetaminophen  mg per tablet  Commonly known as: NORCO  Take 1 tablet by mouth 2 (two) times daily as needed.     losartan-hydrochlorothiazide 100-25 mg 100-25 mg per tablet  Commonly known as: HYZAAR  Take 1 tablet by mouth once daily.     pantoprazole 40 MG tablet  Commonly known as: PROTONIX  Take 1 tablet (40 mg total) by mouth 2 (two) times daily. for 14 days     potassium chloride SA 20 MEQ  tablet  Commonly known as: K-DUR,KLOR-CON, K-TAB  1 tab(s) By Mouth 2 Times A Day     pravastatin 40 MG tablet  Commonly known as: PRAVACHOL  TAKE ONE TABLET BY MOUTH DAILY     VITAMIN D ORAL  Take 1,000 Units by mouth once daily.     zolpidem 10 mg Tab  Commonly known as: AMBIEN  TAKE ONE TABLET BY MOUTH AT BEDTIME AS NEEDED            Pradip Fernando MD  Neurosurgery  '[facility]

## 2022-01-06 ENCOUNTER — PATIENT MESSAGE (OUTPATIENT)
Dept: INTERNAL MEDICINE | Facility: CLINIC | Age: 67
End: 2022-01-06
Payer: MEDICARE

## 2022-01-06 RX ORDER — CLONIDINE HYDROCHLORIDE 0.2 MG/1
TABLET ORAL
Qty: 90 TABLET | Refills: 4 | Status: SHIPPED | OUTPATIENT
Start: 2022-01-06 | End: 2022-11-29

## 2022-01-06 RX ORDER — ZOLPIDEM TARTRATE 10 MG/1
TABLET ORAL
Qty: 20 TABLET | Refills: 5 | Status: SHIPPED | OUTPATIENT
Start: 2022-01-06 | End: 2022-06-01

## 2022-01-06 RX ORDER — GABAPENTIN 300 MG/1
CAPSULE ORAL
Qty: 360 CAPSULE | Refills: 3 | Status: SHIPPED | OUTPATIENT
Start: 2022-01-06 | End: 2023-04-03

## 2022-01-06 NOTE — TELEPHONE ENCOUNTER
No new care gaps identified.  Powered by [employer]. Reference number: [id number].   1/06/2022 9:23:19 AM CST

## 2022-01-18 ENCOUNTER — HOSPITAL ENCOUNTER (OUTPATIENT)
Dept: RADIOLOGY | Facility: HOSPITAL | Age: 67
Discharge: HOME | End: 2022-01-18
Attending: PHYSICIAN ASSISTANT
Payer: MEDICARE

## 2022-01-18 ENCOUNTER — OFFICE VISIT (OUTPATIENT)
Dept: NEUROSURGERY | Facility: CLINIC | Age: 67
End: 2022-01-18
Payer: MEDICARE

## 2022-01-18 VITALS
DIASTOLIC BLOOD PRESSURE: 67 MMHG | RESPIRATION RATE: 20 BRPM | WEIGHT: 260 LBS | HEIGHT: 69 IN | BODY MASS INDEX: 38.51 KG/M2 | HEART RATE: 57 BPM | SYSTOLIC BLOOD PRESSURE: 106 MMHG

## 2022-01-18 DIAGNOSIS — Z48.89 ENCOUNTER FOR POSTOPERATIVE WOUND CHECK: ICD-10-CM

## 2022-01-18 DIAGNOSIS — M54.12 CERVICAL RADICULOPATHY: ICD-10-CM

## 2022-01-18 DIAGNOSIS — Z98.1 S/P CERVICAL SPINAL FUSION: ICD-10-CM

## 2022-01-18 DIAGNOSIS — M54.12 CERVICAL RADICULOPATHY: Primary | ICD-10-CM

## 2022-01-18 PROCEDURE — 99214 OFFICE O/P EST MOD 30 MIN: CPT | Mod: PBBFAC,PO | Performed by: PHYSICIAN ASSISTANT

## 2022-01-18 PROCEDURE — 72040 X-RAY EXAM NECK SPINE 2-3 VW: CPT | Mod: TC

## 2022-01-18 PROCEDURE — 99999 PR PBB SHADOW E&M-EST. PATIENT-LVL IV: ICD-10-PCS | Mod: PBBFAC,,, | Performed by: PHYSICIAN ASSISTANT

## 2022-01-18 PROCEDURE — 99024 PR POST-OP FOLLOW-UP VISIT: ICD-10-PCS | Mod: POP,,, | Performed by: PHYSICIAN ASSISTANT

## 2022-01-18 PROCEDURE — 72040 X-RAY EXAM NECK SPINE 2-3 VW: CPT | Mod: 26,,, | Performed by: RADIOLOGY

## 2022-01-18 PROCEDURE — 72040 XR CERVICAL SPINE AP LATERAL: ICD-10-PCS | Mod: 26,,, | Performed by: RADIOLOGY

## 2022-01-18 PROCEDURE — 99024 POSTOP FOLLOW-UP VISIT: CPT | Mod: POP,,, | Performed by: PHYSICIAN ASSISTANT

## 2022-01-18 PROCEDURE — 99999 PR PBB SHADOW E&M-EST. PATIENT-LVL IV: CPT | Mod: PBBFAC,,, | Performed by: PHYSICIAN ASSISTANT

## 2022-01-18 NOTE — PROGRESS NOTES
Subjective:      Patient ID: Mary Vo is a 66 y.o. female.    Chief Complaint: Post-op Evaluation (ACDF 1-3-22, no pain today. Wearing neck brace, ambulating with walker. Pt states she does not need the walker. )    HPI  The patient is here today for postop evaluation #1.  She is doing well and denies any worsening neck pain.  Patient does have intermittent tingling of L arm and around posterior neck.  Denies arm pain and weakness.  Currently tolerating diet.   Has some discomfort with swallowing but improving each day.  Aspen collar in place today but requests to transition out of it.  No acute bladder/bowel issues.    Date of Procedure: 1/3/2022    Procedure: Procedure(s) (LRB):  DISCECTOMY, SPINE, CERVICAL, ANTERIOR APPROACH, WITH FUSION (Left)  APPLICATION, ACELLULAR HUMAN DERMAL ALLOGRAFT (Left)   ACDF C4-6    Operative Findings (including complications, if any):   Degenerative disc with stenosis C4-6   Description of Technical Procedures: *  1.  Cervical diskectomy and decompression and bilateral foraminotomy C4-6  2.  Cervical arthrodesis C4-6  3.  Placement of peek interbody graft C4-6  4.  Placement on anterior plate and screws C4-6  5.  Use of allograft bone   6.  Use of microscope for dissection.   7.  Use of intraoperative neuro monitoring              Objective:            General    Constitutional: She is oriented to person, place, and time. She appears well-developed and well-nourished.   Cardiovascular: Normal rate and regular rhythm.    Pulmonary/Chest: Effort normal.   Abdominal: Soft.   Neurological: She is alert and oriented to person, place, and time.   Psychiatric: She has a normal mood and affect. Her behavior is normal.             Neurosurgery Exam:  Vitals reviewed  Tolerating diet  Trachea midline    Incision CDI  No erythema, swelling or fluctuance  MAEW                  Assessment:       Encounter Diagnoses   Name Primary?    Cervical radiculopathy Yes    Encounter for  postoperative wound check     S/P cervical spinal fusion           Plan:       Mary was seen today for post-op evaluation.    Diagnoses and all orders for this visit:    Cervical radiculopathy  -     X-Ray Cervical Spine AP And Lateral; Future  -     X-Ray Cervical Spine 2 or 3 Views; Future  -     Back/Cervical Brace For Home Use    Encounter for postoperative wound check  -     X-Ray Cervical Spine 2 or 3 Views; Future  -     Back/Cervical Brace For Home Use    S/P cervical spinal fusion  -     X-Ray Cervical Spine 2 or 3 Views; Future  -     Back/Cervical Brace For Home Use        The patient will transition to soft cervical collar today.  She was advised to consider PT in outpatient setting. At this time, she does not want to move forward with therapy.  She will get x-rays today and again in 6 weeks to check on her progress.  Please call with any changes or issues.            Miesha Linn PA-C

## 2022-02-17 DIAGNOSIS — A04.8 H. PYLORI INFECTION: Primary | ICD-10-CM

## 2022-02-22 ENCOUNTER — PATIENT MESSAGE (OUTPATIENT)
Dept: ADMINISTRATIVE | Facility: OTHER | Age: 67
End: 2022-02-22
Payer: MEDICARE

## 2022-02-24 ENCOUNTER — TELEPHONE (OUTPATIENT)
Dept: NEUROSURGERY | Facility: CLINIC | Age: 67
End: 2022-02-24
Payer: MEDICARE

## 2022-03-08 ENCOUNTER — HOSPITAL ENCOUNTER (OUTPATIENT)
Dept: RADIOLOGY | Facility: HOSPITAL | Age: 67
Discharge: HOME | End: 2022-03-08
Attending: PHYSICIAN ASSISTANT
Payer: MEDICARE

## 2022-03-08 ENCOUNTER — OFFICE VISIT (OUTPATIENT)
Dept: NEUROSURGERY | Facility: CLINIC | Age: 67
End: 2022-03-08
Payer: MEDICARE

## 2022-03-08 VITALS
BODY MASS INDEX: 38.2 KG/M2 | SYSTOLIC BLOOD PRESSURE: 132 MMHG | RESPIRATION RATE: 18 BRPM | HEIGHT: 69 IN | WEIGHT: 257.94 LBS | DIASTOLIC BLOOD PRESSURE: 76 MMHG | HEART RATE: 65 BPM

## 2022-03-08 DIAGNOSIS — Z98.1 S/P CERVICAL SPINAL FUSION: ICD-10-CM

## 2022-03-08 DIAGNOSIS — Z48.89 ENCOUNTER FOR POSTOPERATIVE WOUND CHECK: ICD-10-CM

## 2022-03-08 DIAGNOSIS — M54.12 CERVICAL RADICULOPATHY: ICD-10-CM

## 2022-03-08 DIAGNOSIS — Z48.89 ENCOUNTER FOR POSTOPERATIVE WOUND CHECK: Primary | ICD-10-CM

## 2022-03-08 PROCEDURE — 99024 POSTOP FOLLOW-UP VISIT: CPT | Mod: POP,,, | Performed by: NEUROLOGICAL SURGERY

## 2022-03-08 PROCEDURE — 99999 PR PBB SHADOW E&M-EST. PATIENT-LVL IV: CPT | Mod: PBBFAC,,, | Performed by: NEUROLOGICAL SURGERY

## 2022-03-08 PROCEDURE — 99024 PR POST-OP FOLLOW-UP VISIT: ICD-10-PCS | Mod: POP,,, | Performed by: NEUROLOGICAL SURGERY

## 2022-03-08 PROCEDURE — 72040 X-RAY EXAM NECK SPINE 2-3 VW: CPT | Mod: TC

## 2022-03-08 PROCEDURE — 72040 XR CERVICAL SPINE 2 OR 3 VIEWS: ICD-10-PCS | Mod: 26,,, | Performed by: RADIOLOGY

## 2022-03-08 PROCEDURE — 99214 OFFICE O/P EST MOD 30 MIN: CPT | Mod: PBBFAC,PO | Performed by: NEUROLOGICAL SURGERY

## 2022-03-08 PROCEDURE — 72040 X-RAY EXAM NECK SPINE 2-3 VW: CPT | Mod: 26,,, | Performed by: RADIOLOGY

## 2022-03-08 PROCEDURE — 99999 PR PBB SHADOW E&M-EST. PATIENT-LVL IV: ICD-10-PCS | Mod: PBBFAC,,, | Performed by: NEUROLOGICAL SURGERY

## 2022-03-08 NOTE — PROGRESS NOTES
Subjective:      Patient ID: Mary Vo is a 66 y.o. female.    Chief Complaint: Post-op Evaluation (The pt is here today for po#2 ACDF on 1/3/22. The pt stated she is 1/10 pain today and stated she having a little pain in her neck d/t looking down. The pt admitted to not wearing her neck brace all the time and verbally articulated to me that the provider informed her to wear the brace at all times unless she's bathing. The pt denies having any falls)    HPI  This here today postop 2.  She is compliant with her brace  Denies any symptoms down the arms  overall she is pleased with her progress   Pain is 1/10    Date of Procedure: 1/3/2022    Procedure: Procedure(s) (LRB):  DISCECTOMY, SPINE, CERVICAL, ANTERIOR APPROACH, WITH FUSION (Left)  APPLICATION, ACELLULAR HUMAN DERMAL ALLOGRAFT (Left)   ACDF C4-6    Operative Findings (including complications, if any):   Degenerative disc with stenosis C4-6   Description of Technical Procedures: *  1.  Cervical diskectomy and decompression and bilateral foraminotomy C4-6  2.  Cervical arthrodesis C4-6  3.  Placement of peek interbody graft C4-6  4.  Placement on anterior plate and screws C4-6  5.  Use of allograft bone   6.  Use of microscope for dissection.   7.  Use of intraoperative neuro monitoring              Objective:            General    Constitutional: She is oriented to person, place, and time. She appears well-developed and well-nourished.   Cardiovascular: Normal rate and regular rhythm.    Pulmonary/Chest: Effort normal.   Abdominal: Soft.   Neurological: She is alert and oriented to person, place, and time.   Psychiatric: She has a normal mood and affect. Her behavior is normal.             Neurosurgery Exam:  Vitals reviewed  Tolerating diet  Trachea midline    Incision CDI  No erythema, swelling or fluctuance  MAEW    X-ray shows instrumentation in place without any hardware complication noted            Assessment:       Encounter Diagnosis   Name  Primary?    Encounter for postoperative wound check Yes          Plan:       Mary was seen today for post-op evaluation.    Diagnoses and all orders for this visit:    Encounter for postoperative wound check  -     X-Ray Cervical Spine AP And Lateral; Future  -     Ambulatory referral/consult to Physical/Occupational Therapy; Future        Doing well wearing her cervical collar she will follow-up in 4-6 weeks after doing outpatient physical therapy with an AP lateral the x-ray for three-month follow-up visit        Thank you for the referral   Please call with any questions    Pradip Fernando MD  Neurosurgery     Disclaimer: This note was prepared using a voice recognition system and is likely to have sound alike errors within the text.

## 2022-03-21 ENCOUNTER — TELEPHONE (OUTPATIENT)
Dept: INTERNAL MEDICINE | Facility: CLINIC | Age: 67
End: 2022-03-21
Payer: MEDICARE

## 2022-03-21 NOTE — TELEPHONE ENCOUNTER
----- Message from Evie Moore sent at 3/21/2022 12:18 PM CDT -----  States she would like to be worked in tomorrow morning. States she has spots on her stomach. Please call pt [phone]. She would like a call back in an hour. Thank you

## 2022-03-21 NOTE — TELEPHONE ENCOUNTER
----- Message from Alva Chavez sent at 3/21/2022  4:01 PM CDT -----  Contact: PT  .Type:  Patient Returning Call    Who Called: Meka   Who Left Message for Patient: Donna   Does the patient know what this is regarding?: missed call   Would the patient rather a call back or a response via [facility]?  Callback   Best Call Back Number: .[phone]  Additional Information: #

## 2022-03-22 ENCOUNTER — OFFICE VISIT (OUTPATIENT)
Dept: INTERNAL MEDICINE | Facility: CLINIC | Age: 67
End: 2022-03-22
Payer: MEDICARE

## 2022-03-22 VITALS
HEIGHT: 69 IN | SYSTOLIC BLOOD PRESSURE: 110 MMHG | HEART RATE: 67 BPM | BODY MASS INDEX: 37.82 KG/M2 | TEMPERATURE: 98 F | WEIGHT: 255.31 LBS | DIASTOLIC BLOOD PRESSURE: 64 MMHG | OXYGEN SATURATION: 96 %

## 2022-03-22 DIAGNOSIS — L98.9 SKIN LESION: Primary | ICD-10-CM

## 2022-03-22 PROCEDURE — 99999 PR PBB SHADOW E&M-EST. PATIENT-LVL III: CPT | Mod: PBBFAC,,, | Performed by: INTERNAL MEDICINE

## 2022-03-22 PROCEDURE — 99213 PR OFFICE/OUTPT VISIT, EST, LEVL III, 20-29 MIN: ICD-10-PCS | Mod: S$PBB,,, | Performed by: INTERNAL MEDICINE

## 2022-03-22 PROCEDURE — 99213 OFFICE O/P EST LOW 20 MIN: CPT | Mod: S$PBB,,, | Performed by: INTERNAL MEDICINE

## 2022-03-22 PROCEDURE — 99999 PR PBB SHADOW E&M-EST. PATIENT-LVL III: ICD-10-PCS | Mod: PBBFAC,,, | Performed by: INTERNAL MEDICINE

## 2022-03-22 PROCEDURE — 99213 OFFICE O/P EST LOW 20 MIN: CPT | Mod: PBBFAC,PO | Performed by: INTERNAL MEDICINE

## 2022-03-22 RX ORDER — POTASSIUM CHLORIDE 20 MEQ/1
20 TABLET, EXTENDED RELEASE ORAL DAILY
Qty: 90 TABLET | Refills: 3 | Status: ON HOLD | OUTPATIENT
Start: 2022-03-22 | End: 2023-02-01

## 2022-03-22 RX ORDER — HYDROCODONE BITARTRATE AND ACETAMINOPHEN 10; 325 MG/1; MG/1
1 TABLET ORAL 2 TIMES DAILY PRN
Qty: 180 TABLET | Refills: 0 | Status: SHIPPED | OUTPATIENT
Start: 2022-03-22 | End: 2022-06-28 | Stop reason: SDUPTHER

## 2022-03-22 NOTE — PROGRESS NOTES
"HPI:  Patient is 66-year-old female comes today with complaints of a sore on her lower abdomen.  She states it began as 2 red blisters at that then opened up and drained and now it is just got a scab over it.  She denies any fever chills or sweats.    Current meds have been verified and updated per the EMR  Exam:/64   Pulse 67   Temp 97.7 °F (36.5 °C) (Temporal)   Ht 5' 9" (1.753 m)   Wt 115.8 kg (255 lb 4.7 oz)   SpO2 96%   BMI 37.70 kg/m²   She has 2 resolving skin lesions over the lower mid abdominal wall.  Both had a well-formed eschar over it.  Both have some mild granulation tissue around the edges.  There is no warmth.  There is no discharge.    Lab Results   Component Value Date    WBC 5.12 12/15/2021    HGB 14.5 12/15/2021    HCT 45.6 12/15/2021     12/15/2021    CHOL 160 12/15/2021    TRIG 136 12/15/2021    HDL 42 12/15/2021    ALT 43 12/15/2021    AST 39 12/15/2021     12/15/2021     12/15/2021    K 4.2 12/15/2021    K 4.4 12/15/2021     (H) 12/15/2021     12/15/2021    CREATININE 0.9 12/15/2021    CREATININE 0.8 12/15/2021    BUN 13 12/15/2021    BUN 13 12/15/2021    CO2 27 12/15/2021    CO2 28 12/15/2021    TSH 2.159 12/15/2021    INR 1.0 12/15/2021    HGBA1C 6.3 (H) 08/23/2021       Impression:  Resolving skin infection.  Patient Active Problem List   Diagnosis    HTN (hypertension)    Generalized osteoarthrosis, involving multiple sites    History of breast cancer    Hyperlipidemia    Myofascial pain    Bilateral carpal tunnel syndrome    Lumbar radiculopathy    Chronic pain syndrome    Cervical radiculopathy    Morbid obesity with BMI of 40.0-44.9, adult    DDD (degenerative disc disease), cervical       Plan:  Orders Placed This Encounter    potassium chloride SA (K-DUR,KLOR-CON, K-TAB) 20 MEQ tablet    HYDROcodone-acetaminophen (NORCO)  mg per tablet     Patient reassured that she does not need to do anything but leave it alone and let " it heal.    This note is generated with speech recognition software and is subject to transcription error and sound alike phrases that may be missed by proofreading.

## 2022-03-23 NOTE — TELEPHONE ENCOUNTER
Contacted patient; procedure scheduled with Dr. Lobato.  Instructions given and also mailed to listed address.  Patient verbalized understanding.    Yes

## 2022-04-18 ENCOUNTER — TELEPHONE (OUTPATIENT)
Dept: NEUROSURGERY | Facility: CLINIC | Age: 67
End: 2022-04-18
Payer: MEDICARE

## 2022-04-19 ENCOUNTER — HOSPITAL ENCOUNTER (OUTPATIENT)
Dept: RADIOLOGY | Facility: HOSPITAL | Age: 67
Discharge: HOME | End: 2022-04-19
Attending: NEUROLOGICAL SURGERY
Payer: MEDICARE

## 2022-04-19 ENCOUNTER — OFFICE VISIT (OUTPATIENT)
Dept: NEUROSURGERY | Facility: CLINIC | Age: 67
End: 2022-04-19
Payer: MEDICARE

## 2022-04-19 VITALS
SYSTOLIC BLOOD PRESSURE: 125 MMHG | RESPIRATION RATE: 16 BRPM | HEART RATE: 59 BPM | BODY MASS INDEX: 37.77 KG/M2 | HEIGHT: 69 IN | DIASTOLIC BLOOD PRESSURE: 71 MMHG | WEIGHT: 255 LBS

## 2022-04-19 DIAGNOSIS — Z98.1 S/P CERVICAL SPINAL FUSION: Primary | ICD-10-CM

## 2022-04-19 DIAGNOSIS — Z48.89 ENCOUNTER FOR POSTOPERATIVE WOUND CHECK: ICD-10-CM

## 2022-04-19 PROCEDURE — 99024 PR POST-OP FOLLOW-UP VISIT: ICD-10-PCS | Mod: POP,,, | Performed by: NEUROLOGICAL SURGERY

## 2022-04-19 PROCEDURE — 99999 PR PBB SHADOW E&M-EST. PATIENT-LVL III: ICD-10-PCS | Mod: PBBFAC,,, | Performed by: NEUROLOGICAL SURGERY

## 2022-04-19 PROCEDURE — 99213 OFFICE O/P EST LOW 20 MIN: CPT | Mod: PBBFAC,PO | Performed by: NEUROLOGICAL SURGERY

## 2022-04-19 PROCEDURE — 99999 PR PBB SHADOW E&M-EST. PATIENT-LVL III: CPT | Mod: PBBFAC,,, | Performed by: NEUROLOGICAL SURGERY

## 2022-04-19 PROCEDURE — 72040 XR CERVICAL SPINE AP LATERAL: ICD-10-PCS | Mod: 26,,, | Performed by: RADIOLOGY

## 2022-04-19 PROCEDURE — 72040 X-RAY EXAM NECK SPINE 2-3 VW: CPT | Mod: TC

## 2022-04-19 PROCEDURE — 99024 POSTOP FOLLOW-UP VISIT: CPT | Mod: POP,,, | Performed by: NEUROLOGICAL SURGERY

## 2022-04-19 PROCEDURE — 72040 X-RAY EXAM NECK SPINE 2-3 VW: CPT | Mod: 26,,, | Performed by: RADIOLOGY

## 2022-04-19 NOTE — PROGRESS NOTES
Subjective:      Patient ID: Mary Vo is a 66 y.o. female.    Chief Complaint: Post-op Evaluation (Pt is here today for PO#3 ACDF 1/3/22. Pt states that she is currently in no pain. Currently not in pain but when she is, it's an aching pain inback)    Patient is doing well overall 3 months status post ACDF C4-6  She has some chronic pain issue which is managed by hydrocodone as needed  Her neck pain today is 0/10  It will flare up with sleeping in certain positions  No difficulty with swallowing  She does not wear her cervical collar during the day  Will occasionally wear the collar at night for extra support  Overall her radicular symptoms have resolved and her neck pain is better than it was prior        Date of Procedure: 1/3/2022    Procedure: Procedure(s) (LRB):  DISCECTOMY, SPINE, CERVICAL, ANTERIOR APPROACH, WITH FUSION (Left)  APPLICATION, ACELLULAR HUMAN DERMAL ALLOGRAFT (Left)   ACDF C4-6        Objective:            General    Constitutional: She is oriented to person, place, and time. She appears well-developed and well-nourished.   Cardiovascular: Normal rate and regular rhythm.    Pulmonary/Chest: Effort normal.   Abdominal: Soft.   Neurological: She is alert and oriented to person, place, and time.   Psychiatric: She has a normal mood and affect. Her behavior is normal.             Neurosurgery Exam:  Vitals reviewed  Tolerating diet  Trachea midline    Incision CDI  No erythema, swelling or fluctuance  MAEW    ACDF instrumentation in place C4-C6  No hardware complication identified            Assessment:       Encounter Diagnosis   Name Primary?    S/P cervical spinal fusion Yes          Plan:       Mary was seen today for post-op evaluation.    Diagnoses and all orders for this visit:    S/P cervical spinal fusion  -     X-Ray Cervical Spine AP And Lateral; Future    Patient is doing well 3 months status post ACDF  Follow-up in 3 months for 6 month postop evaluation with AP and  lateral x-ray cervical spine        Thank you for the referral   Please call with any questions    Pradip Fernando MD  Neurosurgery     Disclaimer: This note was prepared using a voice recognition system and is likely to have sound alike errors within the text.

## 2022-04-25 ENCOUNTER — LAB VISIT (OUTPATIENT)
Dept: LAB | Facility: HOSPITAL | Age: 67
End: 2022-04-25
Attending: SURGERY
Payer: MEDICARE

## 2022-04-25 DIAGNOSIS — A04.8 H. PYLORI INFECTION: ICD-10-CM

## 2022-04-25 PROCEDURE — 87338 HPYLORI STOOL AG IA: CPT | Performed by: SURGERY

## 2022-05-03 NOTE — TELEPHONE ENCOUNTER
Refill Routing Note   Medication(s) are not appropriate for processing by [facility] for the following reason(s):      - D/C back in October    [facility] action(s):  Defer       Medication Therapy Plan: D/C 10/21  Medication reconciliation completed: No     Appointments  past 12m or future 3m with PCP    Date Provider   Last Visit   3/22/2022 Elias Cannon MD   Next Visit   6/28/2022 Elias Cannon MD   ED visits in past 90 days: 0        Note composed:6:48 PM 05/03/2022

## 2022-05-03 NOTE — TELEPHONE ENCOUNTER
No new care gaps identified.  Powered by [employer]. Reference number: [id number].   5/03/2022 8:37:48 AM CDT

## 2022-05-04 LAB
H PYLORI AG STL QL IA: NOT DETECTED
SPECIMEN SOURCE: NORMAL

## 2022-05-04 RX ORDER — FUROSEMIDE 40 MG/1
TABLET ORAL
Qty: 19 TABLET | Refills: 11 | Status: SHIPPED | OUTPATIENT
Start: 2022-05-04 | End: 2023-07-11

## 2022-05-04 RX ORDER — LOSARTAN POTASSIUM AND HYDROCHLOROTHIAZIDE 25; 100 MG/1; MG/1
TABLET ORAL
Qty: 90 TABLET | Refills: 3 | Status: SHIPPED | OUTPATIENT
Start: 2022-05-04 | End: 2023-02-28 | Stop reason: SDUPTHER

## 2022-05-04 NOTE — TELEPHONE ENCOUNTER
No new care gaps identified.  [facility] Embedded Care Gaps. Reference number: [id number]. 5/04/2022   9:00:40 AM CDT

## 2022-05-04 NOTE — TELEPHONE ENCOUNTER
Refill Routing Note   Medication(s) are not appropriate for processing by [facility] for the following reason(s):      - Drug-Drug Interaction (Duplicate Therapy: furosemide, losartan-hydrochlorothiazide 100-25 mg)    [facility] action(s):  Defer Medication-related problems identified: Drug-drug interaction        Medication reconciliation completed: No     Appointments  past 12m or future 3m with PCP    Date Provider   Last Visit   3/22/2022 Elias Cannon MD   Next Visit   6/28/2022 Elias Cannon MD   ED visits in past 90 days: 0        Note composed:3:26 PM 05/04/2022

## 2022-06-01 RX ORDER — ZOLPIDEM TARTRATE 10 MG/1
TABLET ORAL
Qty: 20 TABLET | Refills: 5 | Status: SHIPPED | OUTPATIENT
Start: 2022-06-01 | End: 2022-09-26 | Stop reason: SDUPTHER

## 2022-06-01 NOTE — TELEPHONE ENCOUNTER
No new care gaps identified.  [facility] Embedded Care Gaps. Reference number: [id number]. 6/01/2022   4:26:29 PM CDT

## 2022-06-22 ENCOUNTER — PATIENT MESSAGE (OUTPATIENT)
Dept: NEUROSURGERY | Facility: CLINIC | Age: 67
End: 2022-06-22
Payer: MEDICARE

## 2022-06-28 ENCOUNTER — OFFICE VISIT (OUTPATIENT)
Dept: INTERNAL MEDICINE | Facility: CLINIC | Age: 67
End: 2022-06-28
Payer: MEDICARE

## 2022-06-28 VITALS
HEIGHT: 69 IN | TEMPERATURE: 97 F | OXYGEN SATURATION: 96 % | BODY MASS INDEX: 38.53 KG/M2 | SYSTOLIC BLOOD PRESSURE: 130 MMHG | HEART RATE: 71 BPM | DIASTOLIC BLOOD PRESSURE: 78 MMHG | WEIGHT: 260.13 LBS

## 2022-06-28 DIAGNOSIS — M50.30 DDD (DEGENERATIVE DISC DISEASE), CERVICAL: Chronic | ICD-10-CM

## 2022-06-28 DIAGNOSIS — I10 PRIMARY HYPERTENSION: Primary | ICD-10-CM

## 2022-06-28 DIAGNOSIS — Z85.3 HISTORY OF BREAST CANCER: ICD-10-CM

## 2022-06-28 DIAGNOSIS — G89.4 CHRONIC PAIN SYNDROME: ICD-10-CM

## 2022-06-28 DIAGNOSIS — E78.00 PURE HYPERCHOLESTEROLEMIA: ICD-10-CM

## 2022-06-28 DIAGNOSIS — E66.01 MORBID OBESITY WITH BMI OF 40.0-44.9, ADULT: ICD-10-CM

## 2022-06-28 PROCEDURE — 99214 OFFICE O/P EST MOD 30 MIN: CPT | Mod: S$PBB,,, | Performed by: INTERNAL MEDICINE

## 2022-06-28 PROCEDURE — 99999 PR PBB SHADOW E&M-EST. PATIENT-LVL IV: ICD-10-PCS | Mod: PBBFAC,,, | Performed by: INTERNAL MEDICINE

## 2022-06-28 PROCEDURE — 99214 PR OFFICE/OUTPT VISIT, EST, LEVL IV, 30-39 MIN: ICD-10-PCS | Mod: S$PBB,,, | Performed by: INTERNAL MEDICINE

## 2022-06-28 PROCEDURE — 99214 OFFICE O/P EST MOD 30 MIN: CPT | Mod: PBBFAC,PO | Performed by: INTERNAL MEDICINE

## 2022-06-28 PROCEDURE — 99999 PR PBB SHADOW E&M-EST. PATIENT-LVL IV: CPT | Mod: PBBFAC,,, | Performed by: INTERNAL MEDICINE

## 2022-06-28 RX ORDER — HYDROCODONE BITARTRATE AND ACETAMINOPHEN 10; 325 MG/1; MG/1
1 TABLET ORAL 2 TIMES DAILY PRN
Qty: 180 TABLET | Refills: 0 | Status: SHIPPED | OUTPATIENT
Start: 2022-06-28 | End: 2022-09-21 | Stop reason: SDUPTHER

## 2022-06-28 NOTE — PROGRESS NOTES
"HPI:  Patient is 67-year-old female comes today for refills of her hydrocodone.  There has been no change in her status.  She is doing well.  She is going to be having weight loss surgery in the near future.    Current meds have been verified and updated per the EMR  Exam:/78   Pulse 71   Temp 96.9 °F (36.1 °C) (Temporal)   Ht 5' 9" (1.753 m)   Wt 118 kg (260 lb 2.3 oz)   SpO2 96%   BMI 38.42 kg/m²   Carotids 2+ equal without bruits  Chest clear  Cardiovascular regular rate and rhythm without murmur gallop or rub    Lab Results   Component Value Date    WBC 5.12 12/15/2021    HGB 14.5 12/15/2021    HCT 45.6 12/15/2021     12/15/2021    CHOL 160 12/15/2021    TRIG 136 12/15/2021    HDL 42 12/15/2021    ALT 43 12/15/2021    AST 39 12/15/2021     12/15/2021     12/15/2021    K 4.2 12/15/2021    K 4.4 12/15/2021     (H) 12/15/2021     12/15/2021    CREATININE 0.9 12/15/2021    CREATININE 0.8 12/15/2021    BUN 13 12/15/2021    BUN 13 12/15/2021    CO2 27 12/15/2021    CO2 28 12/15/2021    TSH 2.159 12/15/2021    INR 1.0 12/15/2021    HGBA1C 6.3 (H) 08/23/2021       Impression:  Multiple medical problems below, stable  Patient Active Problem List   Diagnosis    HTN (hypertension)    Generalized osteoarthrosis, involving multiple sites    History of breast cancer    Hyperlipidemia    Myofascial pain    Bilateral carpal tunnel syndrome    Lumbar radiculopathy    Chronic pain syndrome    Cervical radiculopathy    Morbid obesity with BMI of 40.0-44.9, adult    DDD (degenerative disc disease), cervical       Plan:  Orders Placed This Encounter    Lipid Panel    Comprehensive Metabolic Panel    TSH    HYDROcodone-acetaminophen (NORCO)  mg per tablet     She will see me again in 3 months with above lab work.  Her medications remain the same.  She is given refills of the hydrocodone.    This note is generated with speech recognition software and is subject to " transcription error and sound alike phrases that may be missed by proofreading.

## 2022-07-11 ENCOUNTER — OFFICE VISIT (OUTPATIENT)
Dept: SURGERY | Facility: CLINIC | Age: 67
End: 2022-07-11
Payer: MEDICARE

## 2022-07-11 VITALS
HEART RATE: 63 BPM | TEMPERATURE: 97 F | SYSTOLIC BLOOD PRESSURE: 143 MMHG | DIASTOLIC BLOOD PRESSURE: 80 MMHG | WEIGHT: 259.5 LBS | BODY MASS INDEX: 38.32 KG/M2

## 2022-07-11 DIAGNOSIS — I10 PRIMARY HYPERTENSION: ICD-10-CM

## 2022-07-11 DIAGNOSIS — I10 ESSENTIAL HYPERTENSION: ICD-10-CM

## 2022-07-11 DIAGNOSIS — E66.01 MORBID OBESITY WITH BMI OF 40.0-44.9, ADULT: Primary | ICD-10-CM

## 2022-07-11 DIAGNOSIS — E78.00 PURE HYPERCHOLESTEROLEMIA: ICD-10-CM

## 2022-07-11 PROCEDURE — 99213 OFFICE O/P EST LOW 20 MIN: CPT | Mod: PBBFAC | Performed by: SURGERY

## 2022-07-11 PROCEDURE — 99215 OFFICE O/P EST HI 40 MIN: CPT | Mod: S$PBB,,, | Performed by: SURGERY

## 2022-07-11 PROCEDURE — 99999 PR PBB SHADOW E&M-EST. PATIENT-LVL III: ICD-10-PCS | Mod: PBBFAC,,, | Performed by: SURGERY

## 2022-07-11 PROCEDURE — 99999 PR PBB SHADOW E&M-EST. PATIENT-LVL III: CPT | Mod: PBBFAC,,, | Performed by: SURGERY

## 2022-07-11 PROCEDURE — 99215 PR OFFICE/OUTPT VISIT, EST, LEVL V, 40-54 MIN: ICD-10-PCS | Mod: S$PBB,,, | Performed by: SURGERY

## 2022-07-11 RX ORDER — SODIUM CHLORIDE 9 MG/ML
INJECTION, SOLUTION INTRAVENOUS CONTINUOUS
Status: CANCELLED | OUTPATIENT
Start: 2022-07-11

## 2022-07-11 RX ORDER — LIDOCAINE HYDROCHLORIDE 10 MG/ML
1 INJECTION, SOLUTION EPIDURAL; INFILTRATION; INTRACAUDAL; PERINEURAL ONCE
Status: CANCELLED | OUTPATIENT
Start: 2022-07-11 | End: 2022-07-11

## 2022-07-26 NOTE — PROGRESS NOTES
BARIATRIC NEW PATIENT EVALUATION    CHIEF COMPLAINT:   morbid obesity with a BMI of 42 and inability to lose weight.    HISTORY OF PRESENT ILLNESS:  Mary Vo is a 67 y.o.-year-old female presents for bariatric follow up/preop. In the interim she has completed dietician and psych eval and felt to be a good candidate. She has also completed EGD.    Initially presenting for morbid obesity with a BMI of 42 and inability to lose weight. The patient has tried exercising which she enjoys as well as different diets but struggling to lose any further weight.  She has severe arthritis that is started to limit her mobility..    Height 5 ft 6 in  Weight 263 lb  BMI 42    HPI    CO-MORBIDITIES:  dyslipidemia, hypertension and osteoarthritis    PAST MEDICAL HISTORY:  Past Medical History:   Diagnosis Date    Breast cancer 1996    right    Chronic pain syndrome     Generalized osteoarthrosis, involving multiple sites     s/p THR bilateral    History of breast cancer 2007    lumpectomy/XRT    HTN (hypertension)     Hyperlipidemia     Morbid obesity with BMI of 40.0-44.9, adult         PAST SURGICAL HISTORY:  Past Surgical History:   Procedure Laterality Date    ANTERIOR CERVICAL DISCECTOMY W/ FUSION Left 1/3/2022    Procedure: DISCECTOMY, SPINE, CERVICAL, ANTERIOR APPROACH, WITH FUSION;  Surgeon: Pradip Fernando MD;  Location: [facility] OR;  Service: Neurosurgery;  Laterality: Left;  Three Level ACDF  C4/5, 5/6, 6/7      BREAST LUMPECTOMY Right 2006    XRT    CATARACT EXTRACTION W/  INTRAOCULAR LENS IMPLANT Left 01/15/2020    CATARACT EXTRACTION W/  INTRAOCULAR LENS IMPLANT Right 01/29/2020    COLONOSCOPY N/A 10/15/2015    Procedure: COLONOSCOPY;  Surgeon: Maylin Shipley MD;  Location: [facility] ENDO;  Service: Endoscopy;  Laterality: N/A;    EPIDURAL STEROID INJECTION N/A 11/3/2020    Procedure: Lumbar L5/S1 IL KATYA;  Surgeon: Paul Lobato MD;  Location: [facility] PAIN MGT;  Service: Pain Management;  Laterality:  N/A;    EPIDURAL STEROID INJECTION INTO CERVICAL SPINE N/A 9/25/2020    Procedure: C7/T1 IL KATYA;  Surgeon: Paul Lobato MD;  Location: [facility] PAIN MGT;  Service: Pain Management;  Laterality: N/A;    EPIDURAL STEROID INJECTION INTO CERVICAL SPINE N/A 10/5/2021    Procedure: C7/T1 IL KATYA with RN IV sedation;  Surgeon: Cynthia Soares MD;  Location: [facility] PAIN MGT;  Service: Pain Management;  Laterality: N/A;    ESOPHAGOGASTRODUODENOSCOPY N/A 11/4/2021    Procedure: ESOPHAGOGASTRODUODENOSCOPY (EGD);  Surgeon: Blas Hampton MD;  Location: [facility] ENDO;  Service: Endoscopy;  Laterality: N/A;    HYSTERECTOMY      PLACEMENT OF ACELLULAR HUMAN DERMAL ALLOGRAFT Left 1/3/2022    Procedure: APPLICATION, ACELLULAR HUMAN DERMAL ALLOGRAFT;  Surgeon: Pradip Fernando MD;  Location: [facility] OR;  Service: Neurosurgery;  Laterality: Left;    TRANSFORAMINAL EPIDURAL INJECTION OF STEROID Left 9/17/2019    Procedure: Left C5/6 TF KATYA w/ RN IV sedation;  Surgeon: Paul Lobato MD;  Location: [facility] PAIN MGT;  Service: Pain Management;  Laterality: Left;       FAMILY HISTORY:  Family History   Problem Relation Age of Onset    Cancer Mother     Diabetes Mother     Hyperlipidemia Father     Hypertension Father     Breast cancer Sister     Breast cancer Sister     Breast cancer Sister     Breast cancer Sister     Eczema Neg Hx     Lupus Neg Hx     Psoriasis Neg Hx     Melanoma Neg Hx         SOCIAL HISTORY:   reports that she has quit smoking. She has never used smokeless tobacco. She reports that she does not drink alcohol and does not use drugs.     MEDICATIONS:  Current Outpatient Medications on File Prior to Visit   Medication Sig Dispense Refill    ALPRAZolam (XANAX) 1 MG tablet TAKE 1 TABLET BY MOUTH NIGHTLY AS NEEDED FOR ANXIETY 30 tablet 5    citalopram (CELEXA) 40 MG tablet TAKE ONE TABLET BY MOUTH DAILY 30 tablet 11    cloNIDine (CATAPRES) 0.2 MG tablet TAKE 1 TABLET BY MOUTH EVERY EVENING 90 tablet 4    docusate  sodium (COLACE) 100 MG capsule Take 1 capsule (100 mg total) by mouth once daily. 12 capsule 0    ERGOCALCIFEROL, VITAMIN D2, (VITAMIN D ORAL) Take 1,000 Units by mouth once daily.      fluticasone propionate (FLONASE) 50 mcg/actuation nasal spray 2 sprays (100 mcg total) by Each Nostril route once daily. 48 g 3    furosemide (LASIX) 40 MG tablet TAKE ONE TABLET BY MOUTH ONCE A DAY AS NEEDED 19 tablet 11    HYDROcodone-acetaminophen (NORCO)  mg per tablet Take 1 tablet by mouth 2 (two) times daily as needed. 180 tablet 0    losartan-hydrochlorothiazide 100-25 mg (HYZAAR) 100-25 mg per tablet TAKE ONE TABLET BY MOUTH ONCE A DAY 90 tablet 3    potassium chloride SA (K-DUR,KLOR-CON, K-TAB) 20 MEQ tablet Take 1 tablet (20 mEq total) by mouth once daily. 90 tablet 3    pravastatin (PRAVACHOL) 40 MG tablet TAKE ONE TABLET BY MOUTH DAILY 30 tablet 11    senna-docusate 8.6-50 mg (PERICOLACE) 8.6-50 mg per tablet Take 1 tablet by mouth 2 (two) times daily as needed for Constipation. 20 tablet 0    zolpidem (AMBIEN) 10 mg Tab TAKE ONE TABLET BY MOUTH AT BEDTIME AS NEEDED 20 tablet 5    gabapentin (NEURONTIN) 300 MG capsule Take 1 capsule (300 mg total) by mouth once daily AND 1 capsule (300 mg total) after lunch AND 2 capsules (600 mg total) every evening. 360 capsule 3    pantoprazole (PROTONIX) 40 MG tablet Take 1 tablet (40 mg total) by mouth 2 (two) times daily. for 14 days 28 tablet 0     No current facility-administered medications on file prior to visit.       Medications have been reviewed.    ALLERGIES:  Review of patient's allergies indicates:  No Known Allergies    Allergies have been reviewed.    ROS:  Review of Systems   Constitutional: Negative for activity change, appetite change, chills, diaphoresis, fatigue, fever and unexpected weight change.   HENT: Negative for congestion, dental problem, rhinorrhea and sore throat.    Eyes: Negative for visual disturbance.   Respiratory: Negative for  cough, chest tightness, shortness of breath and wheezing.    Cardiovascular: Negative for chest pain, palpitations and leg swelling.   Gastrointestinal: Negative for abdominal distention, abdominal pain, constipation, diarrhea, nausea and vomiting.   Endocrine: Negative for cold intolerance, heat intolerance, polydipsia, polyphagia and polyuria.   Genitourinary: Negative for difficulty urinating, dysuria, frequency, hematuria and urgency.   Musculoskeletal: Negative for arthralgias, gait problem, myalgias and neck pain.   Skin: Negative for color change, pallor, rash and wound.   Neurological: Negative for dizziness, syncope, weakness, light-headedness, numbness and headaches.   Hematological: Negative for adenopathy. Does not bruise/bleed easily.   Psychiatric/Behavioral: Negative for confusion, decreased concentration and sleep disturbance. The patient is not nervous/anxious.        PE:  Physical Exam  Vitals reviewed.   Constitutional:       General: She is not in acute distress.     Appearance: She is well-developed. She is not diaphoretic.   HENT:      Head: Normocephalic and atraumatic.      Right Ear: External ear normal.      Left Ear: External ear normal.   Eyes:      General: No scleral icterus.     Conjunctiva/sclera: Conjunctivae normal.      Pupils: Pupils are equal, round, and reactive to light.   Neck:      Thyroid: No thyromegaly.      Trachea: No tracheal deviation.   Cardiovascular:      Rate and Rhythm: Normal rate and regular rhythm.      Heart sounds: Normal heart sounds. No murmur heard.    No friction rub. No gallop.   Pulmonary:      Effort: Pulmonary effort is normal. No respiratory distress.      Breath sounds: Normal breath sounds. No wheezing or rales.   Chest:      Chest wall: No tenderness.   Breasts:      Right: No inverted nipple, mass, nipple discharge, skin change or tenderness.      Left: No inverted nipple, mass, nipple discharge, skin change or tenderness.       Abdominal:       General: Bowel sounds are normal. There is no distension.      Palpations: Abdomen is soft.      Tenderness: There is no abdominal tenderness.      Hernia: No hernia is present.      Comments: Subcostal scar from prior open cholecystectomy  Prior abdominal plasty   Musculoskeletal:         General: No tenderness or deformity. Normal range of motion.      Cervical back: Normal range of motion and neck supple.   Lymphadenopathy:      Cervical: No cervical adenopathy.   Skin:     General: Skin is warm and dry.      Coloration: Skin is not pale.      Findings: No erythema or rash.   Neurological:      Mental Status: She is alert and oriented to person, place, and time.   Psychiatric:         Behavior: Behavior normal.         Thought Content: Thought content normal.         Judgment: Judgment normal.       EGD:  Impression:            - Normal esophagus.                          - Z-line regular, 38 cm from the incisors.                          - A few gastric polyps. Biopsied.                          - Normal antrum. Biopsied.                          - Normal second portion of the duodenum.    DIAGNOSIS:  1. Morbid obesity with a BMI of 42 and inability to lose weight.  2. Co-morbidities: dyslipidemia, hypertension and osteoarthritis    PLAN:  Robotic sleeve gastrectomy 8/30/22  Preop: cbc, cmp, ekg     I have outlined the risks of the procedures including, but not limited to, bleeding, slippage, erosion, stricture, death, deep venous thrombosis, pulmonary embolus, healing issues including intra-abdominal leakage or perforation with abscess or need for drainage or reoperation, wound infection, need for open surgery, injury to intra-abdominal organs or bleeding requiring transfusion, intensive care unit care, and possible prolonged hospitalization.    Other long-term issues were discussed including, but not limited to, permanent change in volume of food and type of foods eaten and importance of ongoing long-term  followup.  I advised the patient that if they can lose weight before surgery, it will reduce her operative risk.  The patient understands and wishes to proceed.    HTN - stable/monitor/antihypertensives  HLD - statin therapy/dietary modifications  Osteoarthritis - weight loss    Referring Physician: No ref. provider found  RTC: As scheduled.

## 2022-07-29 ENCOUNTER — HOSPITAL ENCOUNTER (OUTPATIENT)
Dept: RADIOLOGY | Facility: HOSPITAL | Age: 67
Discharge: HOME | End: 2022-07-29
Attending: NEUROLOGICAL SURGERY
Payer: MEDICARE

## 2022-07-29 ENCOUNTER — OFFICE VISIT (OUTPATIENT)
Dept: NEUROSURGERY | Facility: CLINIC | Age: 67
End: 2022-07-29
Payer: MEDICARE

## 2022-07-29 VITALS
HEIGHT: 69 IN | WEIGHT: 257.94 LBS | SYSTOLIC BLOOD PRESSURE: 148 MMHG | BODY MASS INDEX: 38.2 KG/M2 | DIASTOLIC BLOOD PRESSURE: 84 MMHG | RESPIRATION RATE: 18 BRPM | HEART RATE: 72 BPM

## 2022-07-29 DIAGNOSIS — M54.12 CERVICAL RADICULOPATHY: Primary | ICD-10-CM

## 2022-07-29 DIAGNOSIS — M50.30 DDD (DEGENERATIVE DISC DISEASE), CERVICAL: ICD-10-CM

## 2022-07-29 DIAGNOSIS — Z98.1 S/P CERVICAL SPINAL FUSION: ICD-10-CM

## 2022-07-29 DIAGNOSIS — M50.30 DEGENERATIVE DISC DISEASE, CERVICAL: ICD-10-CM

## 2022-07-29 PROCEDURE — 72040 X-RAY EXAM NECK SPINE 2-3 VW: CPT | Mod: 26,,, | Performed by: RADIOLOGY

## 2022-07-29 PROCEDURE — 99213 OFFICE O/P EST LOW 20 MIN: CPT | Mod: S$PBB,,, | Performed by: NEUROLOGICAL SURGERY

## 2022-07-29 PROCEDURE — 72040 XR CERVICAL SPINE AP LATERAL: ICD-10-PCS | Mod: 26,,, | Performed by: RADIOLOGY

## 2022-07-29 PROCEDURE — 99213 OFFICE O/P EST LOW 20 MIN: CPT | Mod: PBBFAC | Performed by: NEUROLOGICAL SURGERY

## 2022-07-29 PROCEDURE — 99999 PR PBB SHADOW E&M-EST. PATIENT-LVL III: ICD-10-PCS | Mod: PBBFAC,,, | Performed by: NEUROLOGICAL SURGERY

## 2022-07-29 PROCEDURE — 99213 PR OFFICE/OUTPT VISIT, EST, LEVL III, 20-29 MIN: ICD-10-PCS | Mod: S$PBB,,, | Performed by: NEUROLOGICAL SURGERY

## 2022-07-29 PROCEDURE — 99999 PR PBB SHADOW E&M-EST. PATIENT-LVL III: CPT | Mod: PBBFAC,,, | Performed by: NEUROLOGICAL SURGERY

## 2022-07-29 PROCEDURE — 72040 X-RAY EXAM NECK SPINE 2-3 VW: CPT | Mod: TC

## 2022-07-29 RX ORDER — BACLOFEN 10 MG/1
10 TABLET ORAL 3 TIMES DAILY
Qty: 90 TABLET | Refills: 0 | Status: ON HOLD | OUTPATIENT
Start: 2022-07-29 | End: 2023-02-01

## 2022-07-29 NOTE — PROGRESS NOTES
Subjective:      Patient ID: Mary Vo is a 67 y.o. female.    Chief Complaint: Follow-up (Pt presents today for 6 month f/u ACDF 1/20/22 w/XR. )    Patient is doing well overall 3 months status post ACDF C4-6  She has some chronic pain issue which is managed by hydrocodone as needed  Her neck pain today is 0/10    Also seeing gen surgery for gastric sleeve   She has severe DDD Lumbar spine   Stenosis L3-5     Pain through both legs   Down through toes bilateral   Ambulates unassisted       Follow-up          Date of Procedure: 1/3/2022    Procedure: Procedure(s) (LRB):  DISCECTOMY, SPINE, CERVICAL, ANTERIOR APPROACH, WITH FUSION (Left)  APPLICATION, ACELLULAR HUMAN DERMAL ALLOGRAFT (Left)   ACDF C4-6        Objective:            General    Constitutional: She is oriented to person, place, and time. She appears well-developed and well-nourished.   Cardiovascular: Normal rate and regular rhythm.    Pulmonary/Chest: Effort normal.   Abdominal: Soft.   Neurological: She is alert and oriented to person, place, and time.   Psychiatric: She has a normal mood and affect. Her behavior is normal.             Neurosurgery Exam:  Vitals reviewed  Tolerating diet  Trachea midline    Incision CDI  No erythema, swelling or fluctuance  MAEW    ACDF instrumentation in place C4-C6  No hardware complication identified            Assessment:       Encounter Diagnoses   Name Primary?    Cervical radiculopathy Yes    DDD (degenerative disc disease), cervical     Degenerative disc disease, cervical           Plan:       Mary was seen today for follow-up.    Diagnoses and all orders for this visit:    Cervical radiculopathy  -     baclofen (LIORESAL) 10 MG tablet; Take 1 tablet (10 mg total) by mouth 3 (three) times daily.    DDD (degenerative disc disease), cervical  -     baclofen (LIORESAL) 10 MG tablet; Take 1 tablet (10 mg total) by mouth 3 (three) times daily.    Degenerative disc disease, cervical  -     baclofen  (LIORESAL) 10 MG tablet; Take 1 tablet (10 mg total) by mouth 3 (three) times daily.    try baclofen for muscle relaxers   We will follow her back after gastric sleeve surgery   I do think that this will help with her recovery   She will call in the interm if having any new serve symptoms   Cervical spine  Healing well         Thank you for the referral   Please call with any questions    Pradip Fernando MD  Neurosurgery     Disclaimer: This note was prepared using a voice recognition system and is likely to have sound alike errors within the text.

## 2022-08-01 ENCOUNTER — TELEPHONE (OUTPATIENT)
Dept: NEUROSURGERY | Facility: CLINIC | Age: 67
End: 2022-08-01
Payer: MEDICARE

## 2022-08-01 ENCOUNTER — PATIENT MESSAGE (OUTPATIENT)
Dept: NEUROSURGERY | Facility: CLINIC | Age: 67
End: 2022-08-01
Payer: MEDICARE

## 2022-08-01 NOTE — TELEPHONE ENCOUNTER
I spoke to the pt in regards to her request for a call. The pt stated she is now and in a great deal of pain and wishes to move forward with sx. The pt asked if a MRI can be ordered and/or inj to help relieve the pain and find out what's wrong. The pt stated she'd gotten a response back from the provider via the [facility] and will send a message in regards to her request.    Understanding was verbalized        ----- Message from Karen Freitas sent at 8/1/2022  3:02 PM CDT -----  Contact: pt  Type:  Patient Returning Call    Who Called: pt  Who Left Message for Patient: unknown   Does the patient know what this is regarding? no  Would the patient rather a call back or a response via [facility]? Call back  Best Call Back Number: [phone]  Additional Information: n/a

## 2022-08-01 NOTE — TELEPHONE ENCOUNTER
I attempted to call the pt but was unsuccessful.. voicemail left.      ----- Message from Kim Ramírez sent at 8/1/2022  8:02 AM CDT -----  Mary Vo would like a call back at [phone], in regards to consulting with a nurse about her pain, and getting a MRI ordered. Pt would like a call back as soon as possible.

## 2022-08-02 ENCOUNTER — PATIENT MESSAGE (OUTPATIENT)
Dept: NEUROSURGERY | Facility: CLINIC | Age: 67
End: 2022-08-02
Payer: MEDICARE

## 2022-08-02 DIAGNOSIS — M54.9 DORSALGIA, UNSPECIFIED: ICD-10-CM

## 2022-08-03 ENCOUNTER — HOSPITAL ENCOUNTER (OUTPATIENT)
Dept: RADIOLOGY | Facility: HOSPITAL | Age: 67
Discharge: HOME | End: 2022-08-03
Attending: PHYSICIAN ASSISTANT
Payer: MEDICARE

## 2022-08-03 DIAGNOSIS — M54.9 DORSALGIA, UNSPECIFIED: ICD-10-CM

## 2022-08-03 PROCEDURE — 72148 MRI LUMBAR SPINE WITHOUT CONTRAST: ICD-10-PCS | Mod: 26,,, | Performed by: RADIOLOGY

## 2022-08-03 PROCEDURE — 72148 MRI LUMBAR SPINE W/O DYE: CPT | Mod: TC

## 2022-08-03 PROCEDURE — 72148 MRI LUMBAR SPINE W/O DYE: CPT | Mod: 26,,, | Performed by: RADIOLOGY

## 2022-08-04 ENCOUNTER — PATIENT MESSAGE (OUTPATIENT)
Dept: NEUROSURGERY | Facility: CLINIC | Age: 67
End: 2022-08-04
Payer: MEDICARE

## 2022-08-05 ENCOUNTER — PATIENT MESSAGE (OUTPATIENT)
Dept: NEUROSURGERY | Facility: CLINIC | Age: 67
End: 2022-08-05
Payer: MEDICARE

## 2022-08-08 ENCOUNTER — PATIENT MESSAGE (OUTPATIENT)
Dept: SURGERY | Facility: HOSPITAL | Age: 67
End: 2022-08-08
Payer: MEDICARE

## 2022-08-09 ENCOUNTER — PATIENT MESSAGE (OUTPATIENT)
Dept: NEUROSURGERY | Facility: CLINIC | Age: 67
End: 2022-08-09
Payer: MEDICARE

## 2022-08-09 ENCOUNTER — TELEPHONE (OUTPATIENT)
Dept: SURGERY | Facility: CLINIC | Age: 67
End: 2022-08-09
Payer: MEDICARE

## 2022-08-09 NOTE — TELEPHONE ENCOUNTER
----- Message from Joanne Jiang MA sent at 8/9/2022  9:17 AM CDT -----  There's no sx date discussed with the pt as of yet. Sx for this pt may not be until September. I'll have to get with the providers on Friday in regards to whether they have decided to move forward with sx for this pt.    Joanne  ----- Message -----  From: Susy Francis MA  Sent: 8/8/2022  11:59 AM CDT  To: [facility]ose[facility] Staff    PATIENT STATES SHE IS GOING TO BE HAVING SURGERY WITH DR PEDRAZA AND MIGHT HAVE TO RESCHEDULE HER SURGERY WITH DR MARES. I AM TRYING TO SEE IF WE WILL NEED TO RESCHEDULE HER SURGERY WITH DR MARES ON THE 30TH.

## 2022-08-15 NOTE — TELEPHONE ENCOUNTER
I called patient to review MRI results and tx plan.  We recommend that she proceed with surgery (gastric sleeve) and follow-up afterwards to discuss moving forward with back surgery.    At this time, her symptoms have improved from 1-2 weeks ago but she is still in pain.   No current bladder/bowel incontinence.  She is able to walk and move all extremities.    Patient will reach out to [facility] to see if they are okay with us prescribing steroids and/or pain medication.    Miesha Linn PA-C

## 2022-08-16 ENCOUNTER — LAB VISIT (OUTPATIENT)
Dept: LAB | Facility: HOSPITAL | Age: 67
End: 2022-08-16
Attending: SURGERY
Payer: MEDICARE

## 2022-08-16 DIAGNOSIS — E78.00 PURE HYPERCHOLESTEROLEMIA: ICD-10-CM

## 2022-08-16 DIAGNOSIS — I10 ESSENTIAL HYPERTENSION: ICD-10-CM

## 2022-08-16 DIAGNOSIS — E66.01 MORBID OBESITY WITH BMI OF 40.0-44.9, ADULT: ICD-10-CM

## 2022-08-16 PROCEDURE — 36415 COLL VENOUS BLD VENIPUNCTURE: CPT | Mod: PO | Performed by: SURGERY

## 2022-08-16 PROCEDURE — 85025 COMPLETE CBC W/AUTO DIFF WBC: CPT | Performed by: SURGERY

## 2022-08-16 PROCEDURE — 80053 COMPREHEN METABOLIC PANEL: CPT | Performed by: SURGERY

## 2022-08-17 LAB
ALBUMIN SERPL BCP-MCNC: 4.1 G/DL (ref 3.5–5.2)
ALP SERPL-CCNC: 81 U/L (ref 55–135)
ALT SERPL W/O P-5'-P-CCNC: 26 U/L (ref 10–44)
ANION GAP SERPL CALC-SCNC: 9 MMOL/L (ref 8–16)
AST SERPL-CCNC: 24 U/L (ref 10–40)
BASOPHILS # BLD AUTO: 0.07 K/UL (ref 0–0.2)
BASOPHILS NFR BLD: 1.1 % (ref 0–1.9)
BILIRUB SERPL-MCNC: 0.6 MG/DL (ref 0.1–1)
BUN SERPL-MCNC: 13 MG/DL (ref 8–23)
CALCIUM SERPL-MCNC: 10.2 MG/DL (ref 8.7–10.5)
CHLORIDE SERPL-SCNC: 102 MMOL/L (ref 95–110)
CO2 SERPL-SCNC: 31 MMOL/L (ref 23–29)
CREAT SERPL-MCNC: 0.8 MG/DL (ref 0.5–1.4)
DIFFERENTIAL METHOD: ABNORMAL
EOSINOPHIL # BLD AUTO: 0.2 K/UL (ref 0–0.5)
EOSINOPHIL NFR BLD: 2.4 % (ref 0–8)
ERYTHROCYTE [DISTWIDTH] IN BLOOD BY AUTOMATED COUNT: 12.7 % (ref 11.5–14.5)
EST. GFR  (NO RACE VARIABLE): >60 ML/MIN/1.73 M^2
GLUCOSE SERPL-MCNC: 101 MG/DL (ref 70–110)
HCT VFR BLD AUTO: 45.5 % (ref 37–48.5)
HGB BLD-MCNC: 14.3 G/DL (ref 12–16)
IMM GRANULOCYTES # BLD AUTO: 0.01 K/UL (ref 0–0.04)
IMM GRANULOCYTES NFR BLD AUTO: 0.2 % (ref 0–0.5)
LYMPHOCYTES # BLD AUTO: 2.8 K/UL (ref 1–4.8)
LYMPHOCYTES NFR BLD: 41.6 % (ref 18–48)
MCH RBC QN AUTO: 30.7 PG (ref 27–31)
MCHC RBC AUTO-ENTMCNC: 31.4 G/DL (ref 32–36)
MCV RBC AUTO: 98 FL (ref 82–98)
MONOCYTES # BLD AUTO: 0.6 K/UL (ref 0.3–1)
MONOCYTES NFR BLD: 9.3 % (ref 4–15)
NEUTROPHILS # BLD AUTO: 3 K/UL (ref 1.8–7.7)
NEUTROPHILS NFR BLD: 45.4 % (ref 38–73)
NRBC BLD-RTO: 0 /100 WBC
PLATELET # BLD AUTO: 217 K/UL (ref 150–450)
PMV BLD AUTO: 11.7 FL (ref 9.2–12.9)
POTASSIUM SERPL-SCNC: 4.2 MMOL/L (ref 3.5–5.1)
PROT SERPL-MCNC: 7.1 G/DL (ref 6–8.4)
RBC # BLD AUTO: 4.66 M/UL (ref 4–5.4)
SODIUM SERPL-SCNC: 142 MMOL/L (ref 136–145)
WBC # BLD AUTO: 6.66 K/UL (ref 3.9–12.7)

## 2022-08-17 NOTE — PRE ADMISSION SCREENING
Pre op instructions reviewed with patient per phone on 8/17: Spoke about pre op process and surgery instructions, verbalized understanding.    Surgery is scheduled on 8/30. Please arrive at 5:30am. We will call you the afternoon prior to surgery to confirm arrival time, as it is subject to change due to cancellations & emergencies.    Please report to the [facility] (1st Floor) at [facility] located off of [facility] (2nd building on the left, in front of the [facility]),address: [address] [place] [address] [place]. [id number]      INSTRUCTIONS IMPORTANT!!!  Do Not Eat, Drink, or Smoke after 12 midnight! NO WATER after midnight! OK to brush teeth, no gum, candy or mints!    Take only these medicines with a small swallow of water-morning of surgery:  Celexa  Pravastatin    ____  NO Acrylic/fake nails or nail polish worn day of surgery (specifically hand/arm & foot surgeries).  ____  NO powder, lotions, deodorants, oils or creams on body.  ____  Please Remove All jewelry & piercings prior to surgery.  ____  Please Remove Dentures, Hearing Aids & Contact Lens prior to the start of surgery.  ____  Please bring photo ID and insurance information to hospital (Leave Valuables at Home).  ____  If going home the same day, arrange for a ride home. You will not be able to drive 24 hrs if Anesthesia was used.   ____  Females (ages 11-60) may need to give a urine sample the morning of surgery; please see Pre op Nurse prior to voiding.  ____  Wear clean, loose fitting clothing. Allow for dressings, bandages.  ____  Stop all Aspirin products, Ibuprofen, Advil, Motrin & Aleve at least 5-7 days before surgery, unless otherwise instructed by your doctor, or the nurse.   ____  Blood Thinners are stopped based on your Provider's recommendation; Call Surgeon's Office to inquire when to stop/hold.  ____  Stop taking any Fish Oil supplements or Vitamins at least 5 days prior to surgery, unless instructed otherwise by your Doctor.             Diabetic Patients: If you take diabetic medication, do NOT take morning of surgery unless instructed by             Doctor. Metformin to be stopped 24 hrs prior to surgery time. DO NOT take long-acting insulin the evening before surgery. Blood sugars will be checked in pre-op morning of.    Bathing Instructions:    -Do not shave your face or body the day before or the day of surgery.              -Do not shave abdominal area prior to surgery   -Shower & Rinse your body as usual with anti-bacterial Soap (Dial)              -Use one packet of hibiclens to wash the surgical site (using your hand) gently for 5 minutes.                    -Do not scrub you skin too hard.        -Do not use hibiclens on your head, face, or genitals.   -Do not wash with anti-bacterial soap after you use the hibiclens.              -Rinse your body thoroughly.     [facility] Visitor/Ride Policy:  Only 2 adults allowed (over the age of 18) to accompany you to the Hospital. You Must have a ride home from a responsible adult that you know and trust. Medical Transport, Uber or Lyft can only be used if patient has a responsible adult to accompany them during ride home.    Post-Op Instructions: You will receive Post-op/Discharge instructions by your Discharge Nurse prior to going home. Please call your Surgeon's office with any post-surgery questions/concerns.    *Call [facility] Pre-Admissions Department with surgery instruction questions @ [phone] or [phone] (Mon-Fri 8 am to 4 pm)    *If you are running late or have questions the morning of surgery, please call the Surgery Dept @ [phone]  *Insurance/ Financial Questions, please call [phone].

## 2022-08-24 DIAGNOSIS — Z78.0 MENOPAUSE: ICD-10-CM

## 2022-08-29 ENCOUNTER — ANESTHESIA EVENT (OUTPATIENT)
Dept: SURGERY | Facility: HOSPITAL | Age: 67
DRG: 621 | End: 2022-08-29
Payer: MEDICARE

## 2022-08-29 ENCOUNTER — TELEPHONE (OUTPATIENT)
Dept: PREADMISSION TESTING | Facility: HOSPITAL | Age: 67
End: 2022-08-29
Payer: MEDICARE

## 2022-08-29 DIAGNOSIS — Z01.818 PREOP TESTING: Primary | ICD-10-CM

## 2022-08-29 NOTE — ANESTHESIA PREPROCEDURE EVALUATION
08/29/2022  Mary Vo is a 67 y.o., female.    Patient Active Problem List   Diagnosis    HTN (hypertension)    Generalized osteoarthrosis, involving multiple sites    History of breast cancer    Hyperlipidemia    Myofascial pain    Bilateral carpal tunnel syndrome    Lumbar radiculopathy    Chronic pain syndrome    Cervical radiculopathy    Morbid obesity with BMI of 40.0-44.9, adult    DDD (degenerative disc disease), cervical       Pre-op Assessment    I have reviewed the Patient Summary Reports.    I have reviewed the NPO Status.   I have reviewed the Medications.     Review of Systems  Anesthesia Hx:  No problems with previous Anesthesia    Social:  Non-Smoker    Hematology/Oncology:  Hematology Normal        Cardiovascular:   Hypertension hyperlipidemia ECG has been reviewed.    Pulmonary:  Pulmonary Normal    Renal/:  Renal/ Normal     Hepatic/GI:  Hepatic/GI Normal    Musculoskeletal:   Arthritis     Neurological:  Neurology Normal Lumbar radiculopathy  Cervical radiculopathy   Endocrine:  Endocrine Normal  Morbid Obesity / BMI > 40      Physical Exam  General: Well nourished    Airway:  Mallampati: II   Mouth Opening: Normal  TM Distance: Normal  Neck ROM: Normal ROM    Dental:  Edentulous        Anesthesia Plan  Type of Anesthesia, risks & benefits discussed:    Anesthesia Type: Gen ETT  Intra-op Monitoring Plan: Standard ASA Monitors  Post Op Pain Control Plan: multimodal analgesia  Induction:  IV  Airway Plan: , Post-Induction  Informed Consent: Informed consent signed with the Patient and all parties understand the risks and agree with anesthesia plan.  All questions answered.   ASA Score: 2    Ready For Surgery From Anesthesia Perspective.     .      Chemistry        Component Value Date/Time     08/16/2022 1022    K 4.2 08/16/2022 1022     08/16/2022 1022     CO2 31 (H) 08/16/2022 1022    BUN 13 08/16/2022 1022    CREATININE 0.8 08/16/2022 1022     08/16/2022 1022        Component Value Date/Time    CALCIUM 10.2 08/16/2022 1022    ALKPHOS 81 08/16/2022 1022    AST 24 08/16/2022 1022    ALT 26 08/16/2022 1022    BILITOT 0.6 08/16/2022 1022    ESTGFRAFRICA >60.0 12/15/2021 1035    ESTGFRAFRICA >60.0 12/15/2021 1035    EGFRNONAA >60.0 12/15/2021 1035    EGFRNONAA >60.0 12/15/2021 1035        Lab Results   Component Value Date    WBC 6.66 08/16/2022    HGB 14.3 08/16/2022    HCT 45.5 08/16/2022    MCV 98 08/16/2022     08/16/2022     Normal sinus rhythm   T wave abnormality, consider anterolateral ischemia   Abnormal ECG   When compared with ECG of 23-AUG-2021 10:27,   No significant change was found   Confirmed by MEHRAN TAYLOR MD ([id number]) on 8/24/2021 1:53:01 PM

## 2022-08-30 ENCOUNTER — HOSPITAL ENCOUNTER (INPATIENT)
Facility: HOSPITAL | Age: 67
LOS: 1 days | Discharge: HOME | DRG: 621 | End: 2022-08-31
Attending: SURGERY | Admitting: SURGERY
Payer: MEDICARE

## 2022-08-30 ENCOUNTER — ANESTHESIA (OUTPATIENT)
Dept: SURGERY | Facility: HOSPITAL | Age: 67
DRG: 621 | End: 2022-08-30
Payer: MEDICARE

## 2022-08-30 DIAGNOSIS — Z01.810 PREOP CARDIOVASCULAR EXAM: ICD-10-CM

## 2022-08-30 DIAGNOSIS — I10 ESSENTIAL HYPERTENSION: ICD-10-CM

## 2022-08-30 DIAGNOSIS — G89.4 CHRONIC PAIN SYNDROME: ICD-10-CM

## 2022-08-30 DIAGNOSIS — E66.01 MORBID OBESITY WITH BMI OF 40.0-44.9, ADULT: Primary | ICD-10-CM

## 2022-08-30 DIAGNOSIS — E78.00 PURE HYPERCHOLESTEROLEMIA: ICD-10-CM

## 2022-08-30 LAB — POCT GLUCOSE: 107 MG/DL (ref 70–110)

## 2022-08-30 PROCEDURE — 25000003 PHARM REV CODE 250: Performed by: SURGERY

## 2022-08-30 PROCEDURE — 37000008 HC ANESTHESIA 1ST 15 MINUTES: Performed by: SURGERY

## 2022-08-30 PROCEDURE — C1894 INTRO/SHEATH, NON-LASER: HCPCS | Performed by: SURGERY

## 2022-08-30 PROCEDURE — 21400001 HC TELEMETRY ROOM

## 2022-08-30 PROCEDURE — 88342 IMHCHEM/IMCYTCHM 1ST ANTB: CPT | Mod: 26,,, | Performed by: PATHOLOGY

## 2022-08-30 PROCEDURE — 36000713 HC OR TIME LEV V EA ADD 15 MIN: Performed by: SURGERY

## 2022-08-30 PROCEDURE — 36000712 HC OR TIME LEV V 1ST 15 MIN: Performed by: SURGERY

## 2022-08-30 PROCEDURE — 25000003 PHARM REV CODE 250: Performed by: NURSE ANESTHETIST, CERTIFIED REGISTERED

## 2022-08-30 PROCEDURE — 71000033 HC RECOVERY, INTIAL HOUR: Performed by: SURGERY

## 2022-08-30 PROCEDURE — 93010 ELECTROCARDIOGRAM REPORT: CPT | Mod: ,,, | Performed by: INTERNAL MEDICINE

## 2022-08-30 PROCEDURE — 93005 ELECTROCARDIOGRAM TRACING: CPT

## 2022-08-30 PROCEDURE — 27201423 OPTIME MED/SURG SUP & DEVICES STERILE SUPPLY: Performed by: SURGERY

## 2022-08-30 PROCEDURE — C9113 INJ PANTOPRAZOLE SODIUM, VIA: HCPCS | Performed by: SURGERY

## 2022-08-30 PROCEDURE — 88342 IMHCHEM/IMCYTCHM 1ST ANTB: CPT | Performed by: PATHOLOGY

## 2022-08-30 PROCEDURE — 37000009 HC ANESTHESIA EA ADD 15 MINS: Performed by: SURGERY

## 2022-08-30 PROCEDURE — 71000039 HC RECOVERY, EACH ADD'L HOUR: Performed by: SURGERY

## 2022-08-30 PROCEDURE — 99900035 HC TECH TIME PER 15 MIN (STAT)

## 2022-08-30 PROCEDURE — 93010 EKG 12-LEAD: ICD-10-PCS | Mod: ,,, | Performed by: INTERNAL MEDICINE

## 2022-08-30 PROCEDURE — 88307 TISSUE EXAM BY PATHOLOGIST: CPT | Performed by: PATHOLOGY

## 2022-08-30 PROCEDURE — 88307 TISSUE EXAM BY PATHOLOGIST: CPT | Mod: 26,,, | Performed by: PATHOLOGY

## 2022-08-30 PROCEDURE — 88342 CHG IMMUNOCYTOCHEMISTRY: ICD-10-PCS | Mod: 26,,, | Performed by: PATHOLOGY

## 2022-08-30 PROCEDURE — 63600175 PHARM REV CODE 636 W HCPCS: Performed by: NURSE ANESTHETIST, CERTIFIED REGISTERED

## 2022-08-30 PROCEDURE — 88307 PR  SURG PATH,LEVEL V: ICD-10-PCS | Mod: 26,,, | Performed by: PATHOLOGY

## 2022-08-30 PROCEDURE — 43775 PR LAP, GAST RESTRICT PROC, LONGITUDINAL GASTRECTOMY: ICD-10-PCS | Mod: ,,, | Performed by: SURGERY

## 2022-08-30 PROCEDURE — 27000221 HC OXYGEN, UP TO 24 HOURS

## 2022-08-30 PROCEDURE — 43775 LAP SLEEVE GASTRECTOMY: CPT | Mod: ,,, | Performed by: SURGERY

## 2022-08-30 PROCEDURE — 63600175 PHARM REV CODE 636 W HCPCS: Performed by: ANESTHESIOLOGY

## 2022-08-30 PROCEDURE — 63600175 PHARM REV CODE 636 W HCPCS: Performed by: SURGERY

## 2022-08-30 RX ORDER — NALOXONE HCL 0.4 MG/ML
0.02 VIAL (ML) INJECTION
Status: DISCONTINUED | OUTPATIENT
Start: 2022-08-30 | End: 2022-08-31 | Stop reason: HOSPADM

## 2022-08-30 RX ORDER — HYDROMORPHONE HCL IN 0.9% NACL 6 MG/30 ML
PATIENT CONTROLLED ANALGESIA SYRINGE INTRAVENOUS CONTINUOUS
Status: DISCONTINUED | OUTPATIENT
Start: 2022-08-30 | End: 2022-08-31

## 2022-08-30 RX ORDER — GLUCAGON 1 MG
1 KIT INJECTION
Status: DISCONTINUED | OUTPATIENT
Start: 2022-08-30 | End: 2022-08-31 | Stop reason: HOSPADM

## 2022-08-30 RX ORDER — INSULIN ASPART 100 [IU]/ML
0-5 INJECTION, SOLUTION INTRAVENOUS; SUBCUTANEOUS EVERY 6 HOURS PRN
Status: DISCONTINUED | OUTPATIENT
Start: 2022-08-30 | End: 2022-08-31 | Stop reason: HOSPADM

## 2022-08-30 RX ORDER — ONDANSETRON 2 MG/ML
4 INJECTION INTRAMUSCULAR; INTRAVENOUS DAILY PRN
Status: DISCONTINUED | OUTPATIENT
Start: 2022-08-30 | End: 2022-08-30 | Stop reason: HOSPADM

## 2022-08-30 RX ORDER — CEFAZOLIN SODIUM 1 G/3ML
INJECTION, POWDER, FOR SOLUTION INTRAMUSCULAR; INTRAVENOUS
Status: DISCONTINUED | OUTPATIENT
Start: 2022-08-30 | End: 2022-08-30

## 2022-08-30 RX ORDER — FENTANYL CITRATE 50 UG/ML
INJECTION, SOLUTION INTRAMUSCULAR; INTRAVENOUS
Status: DISCONTINUED | OUTPATIENT
Start: 2022-08-30 | End: 2022-08-30

## 2022-08-30 RX ORDER — PROPOFOL 10 MG/ML
VIAL (ML) INTRAVENOUS
Status: DISCONTINUED | OUTPATIENT
Start: 2022-08-30 | End: 2022-08-30

## 2022-08-30 RX ORDER — HEPARIN SODIUM 5000 [USP'U]/ML
5000 INJECTION, SOLUTION INTRAVENOUS; SUBCUTANEOUS ONCE
Status: DISCONTINUED | OUTPATIENT
Start: 2022-08-30 | End: 2022-08-30 | Stop reason: HOSPADM

## 2022-08-30 RX ORDER — LIDOCAINE HYDROCHLORIDE 20 MG/ML
INJECTION INTRAVENOUS
Status: DISCONTINUED | OUTPATIENT
Start: 2022-08-30 | End: 2022-08-30

## 2022-08-30 RX ORDER — PANTOPRAZOLE SODIUM 40 MG/10ML
40 INJECTION, POWDER, LYOPHILIZED, FOR SOLUTION INTRAVENOUS DAILY
Status: DISCONTINUED | OUTPATIENT
Start: 2022-08-30 | End: 2022-08-31 | Stop reason: HOSPADM

## 2022-08-30 RX ORDER — MIDAZOLAM HYDROCHLORIDE 1 MG/ML
INJECTION, SOLUTION INTRAMUSCULAR; INTRAVENOUS
Status: DISCONTINUED | OUTPATIENT
Start: 2022-08-30 | End: 2022-08-30

## 2022-08-30 RX ORDER — ONDANSETRON 2 MG/ML
4 INJECTION INTRAMUSCULAR; INTRAVENOUS EVERY 8 HOURS PRN
Status: DISCONTINUED | OUTPATIENT
Start: 2022-08-30 | End: 2022-08-31 | Stop reason: HOSPADM

## 2022-08-30 RX ORDER — LIDOCAINE HYDROCHLORIDE 10 MG/ML
1 INJECTION, SOLUTION EPIDURAL; INFILTRATION; INTRACAUDAL; PERINEURAL ONCE
Status: DISCONTINUED | OUTPATIENT
Start: 2022-08-30 | End: 2022-08-30 | Stop reason: HOSPADM

## 2022-08-30 RX ORDER — NEOSTIGMINE METHYLSULFATE 1 MG/ML
INJECTION, SOLUTION INTRAVENOUS
Status: DISCONTINUED | OUTPATIENT
Start: 2022-08-30 | End: 2022-08-30

## 2022-08-30 RX ORDER — HYDROMORPHONE HYDROCHLORIDE 2 MG/ML
0.2 INJECTION, SOLUTION INTRAMUSCULAR; INTRAVENOUS; SUBCUTANEOUS EVERY 5 MIN PRN
Status: DISCONTINUED | OUTPATIENT
Start: 2022-08-30 | End: 2022-08-30 | Stop reason: HOSPADM

## 2022-08-30 RX ORDER — BUPIVACAINE HYDROCHLORIDE 2.5 MG/ML
INJECTION, SOLUTION EPIDURAL; INFILTRATION; INTRACAUDAL
Status: DISCONTINUED | OUTPATIENT
Start: 2022-08-30 | End: 2022-08-30 | Stop reason: HOSPADM

## 2022-08-30 RX ORDER — HYDRALAZINE HYDROCHLORIDE 20 MG/ML
10 INJECTION INTRAMUSCULAR; INTRAVENOUS EVERY 6 HOURS PRN
Status: DISCONTINUED | OUTPATIENT
Start: 2022-08-30 | End: 2022-08-31 | Stop reason: HOSPADM

## 2022-08-30 RX ORDER — CEFAZOLIN SODIUM 2 G/50ML
2 SOLUTION INTRAVENOUS
Status: DISCONTINUED | OUTPATIENT
Start: 2022-08-30 | End: 2022-08-30 | Stop reason: HOSPADM

## 2022-08-30 RX ORDER — KETOROLAC TROMETHAMINE 30 MG/ML
15 INJECTION, SOLUTION INTRAMUSCULAR; INTRAVENOUS EVERY 8 HOURS PRN
Status: DISCONTINUED | OUTPATIENT
Start: 2022-08-30 | End: 2022-08-30 | Stop reason: HOSPADM

## 2022-08-30 RX ORDER — ONDANSETRON 2 MG/ML
INJECTION INTRAMUSCULAR; INTRAVENOUS
Status: DISCONTINUED | OUTPATIENT
Start: 2022-08-30 | End: 2022-08-30

## 2022-08-30 RX ORDER — SODIUM CHLORIDE 9 MG/ML
INJECTION, SOLUTION INTRAVENOUS CONTINUOUS
Status: DISCONTINUED | OUTPATIENT
Start: 2022-08-30 | End: 2022-08-30

## 2022-08-30 RX ORDER — SODIUM CHLORIDE 9 MG/ML
INJECTION, SOLUTION INTRAVENOUS CONTINUOUS
Status: DISCONTINUED | OUTPATIENT
Start: 2022-08-30 | End: 2022-08-31 | Stop reason: HOSPADM

## 2022-08-30 RX ORDER — OXYCODONE AND ACETAMINOPHEN 5; 325 MG/1; MG/1
1 TABLET ORAL
Status: DISCONTINUED | OUTPATIENT
Start: 2022-08-30 | End: 2022-08-30 | Stop reason: HOSPADM

## 2022-08-30 RX ORDER — LIDOCAINE HYDROCHLORIDE 10 MG/ML
INJECTION, SOLUTION EPIDURAL; INFILTRATION; INTRACAUDAL; PERINEURAL
Status: DISCONTINUED | OUTPATIENT
Start: 2022-08-30 | End: 2022-08-30 | Stop reason: HOSPADM

## 2022-08-30 RX ORDER — CHLORHEXIDINE GLUCONATE ORAL RINSE 1.2 MG/ML
10 SOLUTION DENTAL 2 TIMES DAILY
Status: DISCONTINUED | OUTPATIENT
Start: 2022-08-30 | End: 2022-08-31 | Stop reason: HOSPADM

## 2022-08-30 RX ORDER — PHENYLEPHRINE HYDROCHLORIDE 10 MG/ML
INJECTION INTRAVENOUS
Status: DISCONTINUED | OUTPATIENT
Start: 2022-08-30 | End: 2022-08-30

## 2022-08-30 RX ORDER — ROCURONIUM BROMIDE 10 MG/ML
INJECTION, SOLUTION INTRAVENOUS
Status: DISCONTINUED | OUTPATIENT
Start: 2022-08-30 | End: 2022-08-30

## 2022-08-30 RX ORDER — SUCCINYLCHOLINE CHLORIDE 20 MG/ML
INJECTION INTRAMUSCULAR; INTRAVENOUS
Status: DISCONTINUED | OUTPATIENT
Start: 2022-08-30 | End: 2022-08-30

## 2022-08-30 RX ORDER — DEXAMETHASONE SODIUM PHOSPHATE 4 MG/ML
INJECTION, SOLUTION INTRA-ARTICULAR; INTRALESIONAL; INTRAMUSCULAR; INTRAVENOUS; SOFT TISSUE
Status: DISCONTINUED | OUTPATIENT
Start: 2022-08-30 | End: 2022-08-30

## 2022-08-30 RX ADMIN — HYDROMORPHONE HYDROCHLORIDE 0.2 MG: 2 INJECTION, SOLUTION INTRAMUSCULAR; INTRAVENOUS; SUBCUTANEOUS at 10:08

## 2022-08-30 RX ADMIN — CEFAZOLIN 2 G: 1 INJECTION, POWDER, FOR SOLUTION INTRAMUSCULAR; INTRAVENOUS at 07:08

## 2022-08-30 RX ADMIN — GLYCOPYRROLATE 0.2 MG: 0.2 INJECTION, SOLUTION INTRAMUSCULAR; INTRAVENOUS at 07:08

## 2022-08-30 RX ADMIN — FENTANYL CITRATE 50 MCG: 50 INJECTION, SOLUTION INTRAMUSCULAR; INTRAVENOUS at 09:08

## 2022-08-30 RX ADMIN — CHLORHEXIDINE GLUCONATE 0.12% ORAL RINSE 10 ML: 1.2 LIQUID ORAL at 08:08

## 2022-08-30 RX ADMIN — FENTANYL CITRATE 100 MCG: 50 INJECTION, SOLUTION INTRAMUSCULAR; INTRAVENOUS at 07:08

## 2022-08-30 RX ADMIN — ROCURONIUM BROMIDE 45 MG: 10 INJECTION, SOLUTION INTRAVENOUS at 07:08

## 2022-08-30 RX ADMIN — FENTANYL CITRATE 50 MCG: 50 INJECTION, SOLUTION INTRAMUSCULAR; INTRAVENOUS at 08:08

## 2022-08-30 RX ADMIN — ONDANSETRON 4 MG: 2 INJECTION, SOLUTION INTRAMUSCULAR; INTRAVENOUS at 09:08

## 2022-08-30 RX ADMIN — SODIUM CHLORIDE, POTASSIUM CHLORIDE, SODIUM LACTATE AND CALCIUM CHLORIDE: 600; 310; 30; 20 INJECTION, SOLUTION INTRAVENOUS at 06:08

## 2022-08-30 RX ADMIN — NEOSTIGMINE METHYLSULFATE 5 MG: 1 INJECTION INTRAVENOUS at 09:08

## 2022-08-30 RX ADMIN — ROCURONIUM BROMIDE 30 MG: 10 INJECTION, SOLUTION INTRAVENOUS at 08:08

## 2022-08-30 RX ADMIN — ROCURONIUM BROMIDE 20 MG: 10 INJECTION, SOLUTION INTRAVENOUS at 09:08

## 2022-08-30 RX ADMIN — PHENYLEPHRINE HYDROCHLORIDE 200 MCG: 10 INJECTION INTRAVENOUS at 07:08

## 2022-08-30 RX ADMIN — PROPOFOL 100 MG: 10 INJECTION, EMULSION INTRAVENOUS at 07:08

## 2022-08-30 RX ADMIN — PANTOPRAZOLE SODIUM 40 MG: 40 INJECTION, POWDER, FOR SOLUTION INTRAVENOUS at 03:08

## 2022-08-30 RX ADMIN — SODIUM CHLORIDE: 9 INJECTION, SOLUTION INTRAVENOUS at 08:08

## 2022-08-30 RX ADMIN — LIDOCAINE HYDROCHLORIDE 100 MG: 20 INJECTION, SOLUTION INTRAVENOUS at 07:08

## 2022-08-30 RX ADMIN — KETOROLAC TROMETHAMINE 15 MG: 30 INJECTION, SOLUTION INTRAMUSCULAR at 10:08

## 2022-08-30 RX ADMIN — PHENYLEPHRINE HYDROCHLORIDE 100 MCG: 10 INJECTION INTRAVENOUS at 07:08

## 2022-08-30 RX ADMIN — DEXAMETHASONE SODIUM PHOSPHATE 4 MG: 4 INJECTION, SOLUTION INTRAMUSCULAR; INTRAVENOUS at 07:08

## 2022-08-30 RX ADMIN — SODIUM CHLORIDE, POTASSIUM CHLORIDE, SODIUM LACTATE AND CALCIUM CHLORIDE: 600; 310; 30; 20 INJECTION, SOLUTION INTRAVENOUS at 08:08

## 2022-08-30 RX ADMIN — Medication: at 03:08

## 2022-08-30 RX ADMIN — GLYCOPYRROLATE 0.8 MG: 0.2 INJECTION, SOLUTION INTRAMUSCULAR; INTRAVENOUS at 09:08

## 2022-08-30 RX ADMIN — SUGAMMADEX 200 MG: 100 INJECTION, SOLUTION INTRAVENOUS at 09:08

## 2022-08-30 RX ADMIN — ROCURONIUM BROMIDE 5 MG: 10 INJECTION, SOLUTION INTRAVENOUS at 07:08

## 2022-08-30 RX ADMIN — SUCCINYLCHOLINE CHLORIDE 160 MG: 20 INJECTION, SOLUTION INTRAMUSCULAR; INTRAVENOUS at 07:08

## 2022-08-30 RX ADMIN — MIDAZOLAM 2 MG: 1 INJECTION INTRAMUSCULAR; INTRAVENOUS at 06:08

## 2022-08-30 RX ADMIN — PHENYLEPHRINE HYDROCHLORIDE 300 MCG: 10 INJECTION INTRAVENOUS at 07:08

## 2022-08-30 RX ADMIN — SODIUM CHLORIDE: 9 INJECTION, SOLUTION INTRAVENOUS at 12:08

## 2022-08-30 NOTE — ANESTHESIA POSTPROCEDURE EVALUATION
Anesthesia Post Evaluation    Patient: Mary Vo    Procedure(s) Performed: Procedure(s) (LRB):  XI ROBOTIC SLEEVE GASTRECTOMY (N/A)  LYSIS, ADHESIONS, LAPAROSCOPIC (N/A)    Final Anesthesia Type: general      Patient location during evaluation: PACU  Patient participation: Yes- Able to Participate  Level of consciousness: awake  Post-procedure vital signs: reviewed and stable  Pain management: adequate  Airway patency: patent    PONV status at discharge: No PONV  Anesthetic complications: no      Cardiovascular status: blood pressure returned to baseline and hemodynamically stable  Respiratory status: unassisted and spontaneous ventilation  Hydration status: euvolemic  Follow-up not needed.          Vitals Value Taken Time   /63 08/30/22 0602   Temp 36.4 °C (97.5 °F) 08/30/22 0602   Pulse 51 08/30/22 0602   Resp 18 08/30/22 0602   SpO2 98 % 08/30/22 0602         No case tracking events are documented in the log.      Pain/Macho Score: No data recorded

## 2022-08-30 NOTE — H&P
BARIATRIC NEW PATIENT EVALUATION     CHIEF COMPLAINT:   morbid obesity with a BMI of 42 and inability to lose weight.     HISTORY OF PRESENT ILLNESS:  Mary Vo is a 67 y.o.-year-old female presents for bariatric follow up/preop. In the interim she has completed dietician and psych eval and felt to be a good candidate. She has also completed EGD.     Initially presenting for morbid obesity with a BMI of 42 and inability to lose weight. The patient has tried exercising which she enjoys as well as different diets but struggling to lose any further weight.  She has severe arthritis that is started to limit her mobility..     Height 5 ft 6 in  Weight 263 lb  BMI 42     HPI     CO-MORBIDITIES:  dyslipidemia, hypertension and osteoarthritis     PAST MEDICAL HISTORY:       Past Medical History:   Diagnosis Date    Breast cancer 1996     right    Chronic pain syndrome      Generalized osteoarthrosis, involving multiple sites       s/p THR bilateral    History of breast cancer 2007     lumpectomy/XRT    HTN (hypertension)      Hyperlipidemia      Morbid obesity with BMI of 40.0-44.9, adult           PAST SURGICAL HISTORY:        Past Surgical History:   Procedure Laterality Date    ANTERIOR CERVICAL DISCECTOMY W/ FUSION Left 1/3/2022     Procedure: DISCECTOMY, SPINE, CERVICAL, ANTERIOR APPROACH, WITH FUSION;  Surgeon: Pradip Fernando MD;  Location: [facility] OR;  Service: Neurosurgery;  Laterality: Left;  Three Level ACDF  C4/5, 5/6, 6/7       BREAST LUMPECTOMY Right 2006     XRT    CATARACT EXTRACTION W/  INTRAOCULAR LENS IMPLANT Left 01/15/2020    CATARACT EXTRACTION W/  INTRAOCULAR LENS IMPLANT Right 01/29/2020    COLONOSCOPY N/A 10/15/2015     Procedure: COLONOSCOPY;  Surgeon: Maylin Shipley MD;  Location: [facility] ENDO;  Service: Endoscopy;  Laterality: N/A;    EPIDURAL STEROID INJECTION N/A 11/3/2020     Procedure: Lumbar L5/S1 IL KATYA;  Surgeon: Paul Lobato MD;  Location: [facility] PAIN MGT;  Service: Pain  Management;  Laterality: N/A;    EPIDURAL STEROID INJECTION INTO CERVICAL SPINE N/A 9/25/2020     Procedure: C7/T1 IL KATYA;  Surgeon: Paul Lobato MD;  Location: [facility] PAIN MGT;  Service: Pain Management;  Laterality: N/A;    EPIDURAL STEROID INJECTION INTO CERVICAL SPINE N/A 10/5/2021     Procedure: C7/T1 IL KATYA with RN IV sedation;  Surgeon: Cynthia Soares MD;  Location: [facility] PAIN MGT;  Service: Pain Management;  Laterality: N/A;    ESOPHAGOGASTRODUODENOSCOPY N/A 11/4/2021     Procedure: ESOPHAGOGASTRODUODENOSCOPY (EGD);  Surgeon: Blas Hampton MD;  Location: [facility] ENDO;  Service: Endoscopy;  Laterality: N/A;    HYSTERECTOMY        PLACEMENT OF ACELLULAR HUMAN DERMAL ALLOGRAFT Left 1/3/2022     Procedure: APPLICATION, ACELLULAR HUMAN DERMAL ALLOGRAFT;  Surgeon: Pradip Fernando MD;  Location: [facility] OR;  Service: Neurosurgery;  Laterality: Left;    TRANSFORAMINAL EPIDURAL INJECTION OF STEROID Left 9/17/2019     Procedure: Left C5/6 TF KATYA w/ RN IV sedation;  Surgeon: Paul Lobato MD;  Location: [facility] PAIN MGT;  Service: Pain Management;  Laterality: Left;         FAMILY HISTORY:        Family History   Problem Relation Age of Onset    Cancer Mother      Diabetes Mother      Hyperlipidemia Father      Hypertension Father      Breast cancer Sister      Breast cancer Sister      Breast cancer Sister      Breast cancer Sister      Eczema Neg Hx      Lupus Neg Hx      Psoriasis Neg Hx      Melanoma Neg Hx           SOCIAL HISTORY:   reports that she has quit smoking. She has never used smokeless tobacco. She reports that she does not drink alcohol and does not use drugs.      MEDICATIONS:         Current Outpatient Medications on File Prior to Visit   Medication Sig Dispense Refill    ALPRAZolam (XANAX) 1 MG tablet TAKE 1 TABLET BY MOUTH NIGHTLY AS NEEDED FOR ANXIETY 30 tablet 5    citalopram (CELEXA) 40 MG tablet TAKE ONE TABLET BY MOUTH DAILY 30 tablet 11    cloNIDine (CATAPRES) 0.2 MG tablet TAKE 1 TABLET BY  MOUTH EVERY EVENING 90 tablet 4    docusate sodium (COLACE) 100 MG capsule Take 1 capsule (100 mg total) by mouth once daily. 12 capsule 0    ERGOCALCIFEROL, VITAMIN D2, (VITAMIN D ORAL) Take 1,000 Units by mouth once daily.        fluticasone propionate (FLONASE) 50 mcg/actuation nasal spray 2 sprays (100 mcg total) by Each Nostril route once daily. 48 g 3    furosemide (LASIX) 40 MG tablet TAKE ONE TABLET BY MOUTH ONCE A DAY AS NEEDED 19 tablet 11    HYDROcodone-acetaminophen (NORCO)  mg per tablet Take 1 tablet by mouth 2 (two) times daily as needed. 180 tablet 0    losartan-hydrochlorothiazide 100-25 mg (HYZAAR) 100-25 mg per tablet TAKE ONE TABLET BY MOUTH ONCE A DAY 90 tablet 3    potassium chloride SA (K-DUR,KLOR-CON, K-TAB) 20 MEQ tablet Take 1 tablet (20 mEq total) by mouth once daily. 90 tablet 3    pravastatin (PRAVACHOL) 40 MG tablet TAKE ONE TABLET BY MOUTH DAILY 30 tablet 11    senna-docusate 8.6-50 mg (PERICOLACE) 8.6-50 mg per tablet Take 1 tablet by mouth 2 (two) times daily as needed for Constipation. 20 tablet 0    zolpidem (AMBIEN) 10 mg Tab TAKE ONE TABLET BY MOUTH AT BEDTIME AS NEEDED 20 tablet 5    gabapentin (NEURONTIN) 300 MG capsule Take 1 capsule (300 mg total) by mouth once daily AND 1 capsule (300 mg total) after lunch AND 2 capsules (600 mg total) every evening. 360 capsule 3    pantoprazole (PROTONIX) 40 MG tablet Take 1 tablet (40 mg total) by mouth 2 (two) times daily. for 14 days 28 tablet 0      No current facility-administered medications on file prior to visit.         Medications have been reviewed.     ALLERGIES:  Review of patient's allergies indicates:  No Known Allergies     Allergies have been reviewed.     ROS:  Review of Systems   Constitutional: Negative for activity change, appetite change, chills, diaphoresis, fatigue, fever and unexpected weight change.   HENT: Negative for congestion, dental problem, rhinorrhea and sore throat.    Eyes: Negative for visual  disturbance.   Respiratory: Negative for cough, chest tightness, shortness of breath and wheezing.    Cardiovascular: Negative for chest pain, palpitations and leg swelling.   Gastrointestinal: Negative for abdominal distention, abdominal pain, constipation, diarrhea, nausea and vomiting.   Endocrine: Negative for cold intolerance, heat intolerance, polydipsia, polyphagia and polyuria.   Genitourinary: Negative for difficulty urinating, dysuria, frequency, hematuria and urgency.   Musculoskeletal: Negative for arthralgias, gait problem, myalgias and neck pain.   Skin: Negative for color change, pallor, rash and wound.   Neurological: Negative for dizziness, syncope, weakness, light-headedness, numbness and headaches.   Hematological: Negative for adenopathy. Does not bruise/bleed easily.   Psychiatric/Behavioral: Negative for confusion, decreased concentration and sleep disturbance. The patient is not nervous/anxious.          PE:  Physical Exam  Vitals reviewed.   Constitutional:       General: She is not in acute distress.     Appearance: She is well-developed. She is not diaphoretic.   HENT:      Head: Normocephalic and atraumatic.      Right Ear: External ear normal.      Left Ear: External ear normal.   Eyes:      General: No scleral icterus.     Conjunctiva/sclera: Conjunctivae normal.      Pupils: Pupils are equal, round, and reactive to light.   Neck:      Thyroid: No thyromegaly.      Trachea: No tracheal deviation.   Cardiovascular:      Rate and Rhythm: Normal rate and regular rhythm.      Heart sounds: Normal heart sounds. No murmur heard.    No friction rub. No gallop.   Pulmonary:      Effort: Pulmonary effort is normal. No respiratory distress.      Breath sounds: Normal breath sounds. No wheezing or rales.   Chest:      Chest wall: No tenderness.   Breasts:      Right: No inverted nipple, mass, nipple discharge, skin change or tenderness.      Left: No inverted nipple, mass, nipple discharge, skin  change or tenderness.         Abdominal:      General: Bowel sounds are normal. There is no distension.      Palpations: Abdomen is soft.      Tenderness: There is no abdominal tenderness.      Hernia: No hernia is present.      Comments: Subcostal scar from prior open cholecystectomy  Prior abdominal plasty   Musculoskeletal:         General: No tenderness or deformity. Normal range of motion.      Cervical back: Normal range of motion and neck supple.   Lymphadenopathy:      Cervical: No cervical adenopathy.   Skin:     General: Skin is warm and dry.      Coloration: Skin is not pale.      Findings: No erythema or rash.   Neurological:      Mental Status: She is alert and oriented to person, place, and time.   Psychiatric:         Behavior: Behavior normal.         Thought Content: Thought content normal.         Judgment: Judgment normal.         EGD:  Impression:            - Normal esophagus.                          - Z-line regular, 38 cm from the incisors.                          - A few gastric polyps. Biopsied.                          - Normal antrum. Biopsied.                          - Normal second portion of the duodenum.     DIAGNOSIS:  1. Morbid obesity with a BMI of 42 and inability to lose weight.  2. Co-morbidities: dyslipidemia, hypertension and osteoarthritis     PLAN:  Robotic sleeve gastrectomy 8/30/22  Preop: cbc, cmp, ekg     I have outlined the risks of the procedures including, but not limited to, bleeding, slippage, erosion, stricture, death, deep venous thrombosis, pulmonary embolus, healing issues including intra-abdominal leakage or perforation with abscess or need for drainage or reoperation, wound infection, need for open surgery, injury to intra-abdominal organs or bleeding requiring transfusion, intensive care unit care, and possible prolonged hospitalization.    Other long-term issues were discussed including, but not limited to, permanent change in volume of food and type of  foods eaten and importance of ongoing long-term followup.  I advised the patient that if they can lose weight before surgery, it will reduce her operative risk.  The patient understands and wishes to proceed.     HTN - stable/monitor/antihypertensives  HLD - statin therapy/dietary modifications  Osteoarthritis - weight loss     Referring Physician: No ref. provider found  RTC: As scheduled.

## 2022-08-30 NOTE — OP NOTE
[facility])  Surgery Department  Operative Note    SUMMARY     Date of Procedure: 8/30/2022     Procedure:   Laparoscopic lysis of adhesions   Robotic sleeve gastrectomy    Surgeon(s) and Role:     * Moshe Mares MD - Primary    Assisting Surgeon: None    Pre-Operative Diagnosis: Morbid obesity with BMI of 40.0-44.9, adult [E66.01, Z68.41]  Essential hypertension [I10]  Pure hypercholesterolemia [E78.00]    Post-Operative Diagnosis: Post-Op Diagnosis Codes:     * Morbid obesity with BMI of 40.0-44.9, adult [E66.01, Z68.41]     * Essential hypertension [I10]     * Pure hypercholesterolemia [E78.00]    Anesthesia: General    Operative Findings (including complications, if any): Laparoscopic lysis of adhesions   Robotic sleeve gastrectomy    Description of Technical Procedures:  Omental adhesions to abdominal wall throughout the right mid and upper abdomen lysed approximate time 30 minutes, robotic sleeve gastrectomy    Estimated Blood Loss (EBL): 20cc           Implants: * No implants in log *    Specimens:   Specimen (24h ago, onward)       Start     Ordered    08/30/22 0926  Specimen to Pathology, Surgery General Surgery  Once        Comments: Pre-op Diagnosis: Morbid obesity with BMI of 40.0-44.9, adult [E66.01, Z68.41]Essential hypertension [I10]Pure hypercholesterolemia [E78.00]Procedure(s):XI ROBOTIC SLEEVE GASTRECTOMYLYSIS, ADHESIONS, LAPAROSCOPIC Number of specimens:1Name of specimens1. Greater curvature of stomach-perm     References:    Click here for ordering Quick Tip   Question Answer Comment   Procedure Type: General Surgery    Specimen Class: Routine/Screening    Which provider would you like to cc? MOSHE MARES    Release to patient Immediate        08/30/22 0925    Pending  Specimen to Pathology, Surgery General Surgery  Once        Comments: Pre-op Diagnosis: Morbid obesity with BMI of 40.0-44.9, adult [E66.01, Z68.41]Essential hypertension [I10]Pure hypercholesterolemia  [E78.00]Procedure(s):XI ROBOTIC SLEEVE GASTRECTOMYLYSIS, ADHESIONS, LAPAROSCOPIC Number of specimens: 1Name of specimens:     References:    Click here for ordering Quick Tip   Question Answer Comment   Procedure Type: General Surgery    Specimen Class: Routine/Screening    Which provider would you like to cc? MOHSE MARES    Release to patient Immediate        Pending                            Condition: Good    Disposition: PACU - hemodynamically stable.    Procedure in detail:   The patient was placed under general anesthesia. Pre-op antibiotics were given within an hour before the incision and SCD's were placed for DVT prophylaxis. The abdomen was prepped and draped in sterile fashion. General anesthesia was applied with lidocaine.     An incision was made superior to the umbilicus. The fascia was elevated and the Veress needle was inserted. Proper position was confirmed by aspiration and saline meniscus test. The abdomen was insufflated with carbon dioxide to a pressure of 15 mmHg. The patient tolerated insufflation well.  An 8-mm trocar was then inserted under direct vision using the optiview technique in the left mid abdomen. 8 mm trocars were also placed in the left mid abdomen and at the supraumbilically.     Due to omental adhesions to the abdominal wall through the right mid and upper abdomen the laparoscopic LigaSure was used to lyse adhesions until adequate space to place ports. This took approximately 30 minutes.  Once the adhesions were lysed and adequate space was created we placed the following ports. A 5mm trocar was placed in the right subcostal area, and a 12 mm trocar was placed in the right mid abdomen.  A liver retractor was placed through the 5 mm port.     The short gastric vessels were divided with the vessel sealer from 5 cm proximal to the pylorus to the GE junction. A 38-Ukrainian bougie was then placed orally and positioned against the lesser curvature. A vertical sleeve gastrectomy was  then performed. Starting 5 cm proximal to the pylorus, the stomach was stapled, keeping the staple line several centimeters from the lesser curve when inferior to the angularis. Superior to the angularis, the staple line was placed against the bougie. The final staple firing was 1 cm lateral to the bougie in order to avoid stapling esophageal tissue. Two green loads were used on the inferior aspect of the staple line, and the rest were blue loads.  There was good hemostasis.     The superior aspect of the staple line was reinforced using strata fix absorbable suture.  The greater curve of the stomach was removed.   The liver retractor and trocars were removed.   The 12 mm trocar site was closed with 0 vicryl. Skin incisions were closed with 4-0 Monocryl and acrylate adhesive.  The patient tolerated the procedure in a stable and satisfactory condition and was transferred to recovery postoperatively.

## 2022-08-30 NOTE — ANESTHESIA PROCEDURE NOTES
Intubation    Date/Time: 8/30/2022 7:09 AM  Performed by: Сергей Hurtado CRNA  Authorized by: Lavelle Rider II, MD     Intubation:     Induction:  Intravenous    Intubated:  Postinduction    Mask Ventilation:  Easy mask    Attempts:  1    Attempted By:  CRNA and student    Method of Intubation:  Video laryngoscopy    Blade:  Hebert 3    Laryngeal View Grade: Grade I - full view of cords      Difficult Airway Encountered?: No      Complications:  None    Airway Device:  Oral endotracheal tube    Airway Device Size:  7.5    Style/Cuff Inflation:  Cuffed (inflated to minimal occlusive pressure)    Tube secured:  21    Secured at:  The lips    Placement Verified By:  Capnometry    Complicating Factors:  None    Findings Post-Intubation:  Atraumatic/condition of teeth unchanged and BS equal bilateral

## 2022-08-30 NOTE — TRANSFER OF CARE
"Anesthesia Transfer of Care Note    Patient: Mary Vo    Procedure(s) Performed: Procedure(s) (LRB):  XI ROBOTIC SLEEVE GASTRECTOMY (N/A)  LYSIS, ADHESIONS, LAPAROSCOPIC (N/A)    Patient location: PACU    Anesthesia Type: general    Transport from OR: Transported from OR on room air with adequate spontaneous ventilation    Post pain: adequate analgesia    Post assessment: no apparent anesthetic complications and tolerated procedure well    Post vital signs: stable    Level of consciousness: awake    Nausea/Vomiting: no nausea/vomiting    Complications: none    Transfer of care protocol was followed      Last vitals:   Visit Vitals  /63   Pulse (!) 51   Temp 36.4 °C (97.5 °F) (Temporal)   Resp 18   Ht 5' 9" (1.753 m)   Wt 112.2 kg (247 lb 7.5 oz)   SpO2 98%   Breastfeeding No   BMI 36.54 kg/m²     "

## 2022-08-31 VITALS
HEART RATE: 67 BPM | HEIGHT: 69 IN | BODY MASS INDEX: 38.07 KG/M2 | RESPIRATION RATE: 18 BRPM | SYSTOLIC BLOOD PRESSURE: 139 MMHG | WEIGHT: 257.06 LBS | TEMPERATURE: 99 F | OXYGEN SATURATION: 97 % | DIASTOLIC BLOOD PRESSURE: 73 MMHG

## 2022-08-31 LAB
ANION GAP SERPL CALC-SCNC: 13 MMOL/L (ref 8–16)
BUN SERPL-MCNC: 12 MG/DL (ref 8–23)
CALCIUM SERPL-MCNC: 8.9 MG/DL (ref 8.7–10.5)
CHLORIDE SERPL-SCNC: 105 MMOL/L (ref 95–110)
CO2 SERPL-SCNC: 26 MMOL/L (ref 23–29)
CREAT SERPL-MCNC: 0.8 MG/DL (ref 0.5–1.4)
ERYTHROCYTE [DISTWIDTH] IN BLOOD BY AUTOMATED COUNT: 12.3 % (ref 11.5–14.5)
EST. GFR  (NO RACE VARIABLE): >60 ML/MIN/1.73 M^2
GLUCOSE SERPL-MCNC: 90 MG/DL (ref 70–110)
HCT VFR BLD AUTO: 41.3 % (ref 37–48.5)
HGB BLD-MCNC: 13.1 G/DL (ref 12–16)
MCH RBC QN AUTO: 29.8 PG (ref 27–31)
MCHC RBC AUTO-ENTMCNC: 31.7 G/DL (ref 32–36)
MCV RBC AUTO: 94 FL (ref 82–98)
PLATELET # BLD AUTO: 212 K/UL (ref 150–450)
PMV BLD AUTO: 10.9 FL (ref 9.2–12.9)
POCT GLUCOSE: 103 MG/DL (ref 70–110)
POCT GLUCOSE: 112 MG/DL (ref 70–110)
POCT GLUCOSE: 85 MG/DL (ref 70–110)
POCT GLUCOSE: 87 MG/DL (ref 70–110)
POTASSIUM SERPL-SCNC: 3.6 MMOL/L (ref 3.5–5.1)
RBC # BLD AUTO: 4.4 M/UL (ref 4–5.4)
SODIUM SERPL-SCNC: 144 MMOL/L (ref 136–145)
WBC # BLD AUTO: 11.02 K/UL (ref 3.9–12.7)

## 2022-08-31 PROCEDURE — 99900035 HC TECH TIME PER 15 MIN (STAT)

## 2022-08-31 PROCEDURE — 36415 COLL VENOUS BLD VENIPUNCTURE: CPT | Performed by: SURGERY

## 2022-08-31 PROCEDURE — 85027 COMPLETE CBC AUTOMATED: CPT | Performed by: SURGERY

## 2022-08-31 PROCEDURE — 25000003 PHARM REV CODE 250: Performed by: SURGERY

## 2022-08-31 PROCEDURE — C9113 INJ PANTOPRAZOLE SODIUM, VIA: HCPCS | Performed by: SURGERY

## 2022-08-31 PROCEDURE — 63600175 PHARM REV CODE 636 W HCPCS: Performed by: SURGERY

## 2022-08-31 PROCEDURE — 80048 BASIC METABOLIC PNL TOTAL CA: CPT | Performed by: SURGERY

## 2022-08-31 RX ORDER — OXYCODONE HYDROCHLORIDE 5 MG/1
5 TABLET ORAL EVERY 6 HOURS PRN
Qty: 20 TABLET | Refills: 0 | Status: SHIPPED | OUTPATIENT
Start: 2022-08-31 | End: 2022-09-14 | Stop reason: ALTCHOICE

## 2022-08-31 RX ORDER — LOSARTAN POTASSIUM AND HYDROCHLOROTHIAZIDE 25; 100 MG/1; MG/1
1 TABLET ORAL DAILY
Status: DISCONTINUED | OUTPATIENT
Start: 2022-08-31 | End: 2022-08-31

## 2022-08-31 RX ORDER — CITALOPRAM 20 MG/1
40 TABLET, FILM COATED ORAL DAILY
Status: DISCONTINUED | OUTPATIENT
Start: 2022-08-31 | End: 2022-08-31 | Stop reason: HOSPADM

## 2022-08-31 RX ORDER — ALPRAZOLAM 1 MG/1
1 TABLET ORAL NIGHTLY PRN
Status: DISCONTINUED | OUTPATIENT
Start: 2022-08-31 | End: 2022-08-31 | Stop reason: HOSPADM

## 2022-08-31 RX ORDER — CLONIDINE HYDROCHLORIDE 0.2 MG/1
0.2 TABLET ORAL NIGHTLY
Status: DISCONTINUED | OUTPATIENT
Start: 2022-08-31 | End: 2022-08-31 | Stop reason: HOSPADM

## 2022-08-31 RX ORDER — PRAVASTATIN SODIUM 20 MG/1
40 TABLET ORAL DAILY
Status: DISCONTINUED | OUTPATIENT
Start: 2022-08-31 | End: 2022-08-31 | Stop reason: HOSPADM

## 2022-08-31 RX ORDER — MORPHINE SULFATE 2 MG/ML
2 INJECTION, SOLUTION INTRAMUSCULAR; INTRAVENOUS EVERY 4 HOURS PRN
Status: DISCONTINUED | OUTPATIENT
Start: 2022-08-31 | End: 2022-08-31 | Stop reason: HOSPADM

## 2022-08-31 RX ORDER — ENOXAPARIN SODIUM 100 MG/ML
40 INJECTION SUBCUTANEOUS EVERY 24 HOURS
Status: DISCONTINUED | OUTPATIENT
Start: 2022-08-31 | End: 2022-08-31 | Stop reason: HOSPADM

## 2022-08-31 RX ORDER — ONDANSETRON 4 MG/1
4 TABLET, FILM COATED ORAL EVERY 6 HOURS PRN
Qty: 1 TABLET | Refills: 0 | Status: SHIPPED | OUTPATIENT
Start: 2022-08-31 | End: 2022-09-01

## 2022-08-31 RX ORDER — GABAPENTIN 300 MG/1
300 CAPSULE ORAL 2 TIMES DAILY
Status: DISCONTINUED | OUTPATIENT
Start: 2022-08-31 | End: 2022-08-31 | Stop reason: HOSPADM

## 2022-08-31 RX ORDER — POLYETHYLENE GLYCOL 3350 17 G/17G
17 POWDER, FOR SOLUTION ORAL 2 TIMES DAILY
Status: DISCONTINUED | OUTPATIENT
Start: 2022-08-31 | End: 2022-08-31 | Stop reason: HOSPADM

## 2022-08-31 RX ORDER — LOSARTAN POTASSIUM 50 MG/1
100 TABLET ORAL DAILY
Status: DISCONTINUED | OUTPATIENT
Start: 2022-08-31 | End: 2022-08-31 | Stop reason: HOSPADM

## 2022-08-31 RX ORDER — HYDROCHLOROTHIAZIDE 25 MG/1
25 TABLET ORAL DAILY
Status: DISCONTINUED | OUTPATIENT
Start: 2022-08-31 | End: 2022-08-31 | Stop reason: HOSPADM

## 2022-08-31 RX ORDER — HYDROCODONE BITARTRATE AND ACETAMINOPHEN 10; 325 MG/1; MG/1
1 TABLET ORAL EVERY 4 HOURS PRN
Status: DISCONTINUED | OUTPATIENT
Start: 2022-08-31 | End: 2022-08-31 | Stop reason: HOSPADM

## 2022-08-31 RX ADMIN — PRAVASTATIN SODIUM 40 MG: 20 TABLET ORAL at 10:08

## 2022-08-31 RX ADMIN — ENOXAPARIN SODIUM 40 MG: 100 INJECTION SUBCUTANEOUS at 04:08

## 2022-08-31 RX ADMIN — HYDROCODONE BITARTRATE AND ACETAMINOPHEN 1 TABLET: 10; 325 TABLET ORAL at 08:08

## 2022-08-31 RX ADMIN — GABAPENTIN 300 MG: 300 CAPSULE ORAL at 10:08

## 2022-08-31 RX ADMIN — CITALOPRAM HYDROBROMIDE 40 MG: 20 TABLET ORAL at 10:08

## 2022-08-31 RX ADMIN — CHLORHEXIDINE GLUCONATE 0.12% ORAL RINSE 10 ML: 1.2 LIQUID ORAL at 08:08

## 2022-08-31 RX ADMIN — HYDROCHLOROTHIAZIDE 25 MG: 25 TABLET ORAL at 10:08

## 2022-08-31 RX ADMIN — PANTOPRAZOLE SODIUM 40 MG: 40 INJECTION, POWDER, FOR SOLUTION INTRAVENOUS at 08:08

## 2022-08-31 RX ADMIN — SODIUM CHLORIDE: 9 INJECTION, SOLUTION INTRAVENOUS at 05:08

## 2022-08-31 RX ADMIN — HYDRALAZINE HYDROCHLORIDE 10 MG: 20 INJECTION INTRAMUSCULAR; INTRAVENOUS at 08:08

## 2022-08-31 RX ADMIN — LOSARTAN POTASSIUM 100 MG: 50 TABLET, FILM COATED ORAL at 10:08

## 2022-08-31 RX ADMIN — ONDANSETRON 4 MG: 2 INJECTION INTRAMUSCULAR; INTRAVENOUS at 05:08

## 2022-08-31 NOTE — NURSING
Patient is A/O times 4, able to ambulate independently.   Tele monitor and IV discharged.  Discharge paper work given and prescriptions delivered to bedside.   Waiting for transportation.

## 2022-08-31 NOTE — HOSPITAL COURSE
Status post robotic sleeve gastrectomy     Postop day 1 pain controlled, ambulating, headache, trial bariatric clear liquids    Later this day, tolerating clears with pain controlled and no nausea and vomiting.

## 2022-08-31 NOTE — ASSESSMENT & PLAN NOTE
Status post robotic sleeve gastrectomy     Bariatric clear liquids   Pain control  Incentive spirometry  Ambulate/DVT/GI prophylaxis   Antiemetics p.r.n.

## 2022-08-31 NOTE — PLAN OF CARE
Patient in bed resting comfortably. Reviewed medications and PCA pump, patient encouraged to walk to help decrease gas pain. Call light in reach and safety devices in place. Family at bedside.  Problem: Adult Inpatient Plan of Care  Goal: Plan of Care Review  Outcome: Ongoing, Progressing  Goal: Patient-Specific Goal (Individualized)  Outcome: Ongoing, Progressing  Goal: Absence of Hospital-Acquired Illness or Injury  Outcome: Ongoing, Progressing  Goal: Optimal Comfort and Wellbeing  Outcome: Ongoing, Progressing  Goal: Readiness for Transition of Care  Outcome: Ongoing, Progressing

## 2022-08-31 NOTE — PLAN OF CARE
[facility] - Tele[facility] (Hospital)  Initial Discharge Assessment       Primary Care Provider: Elias Cannon MD    Admission Diagnosis: Morbid obesity with BMI of 40.0-44.9, adult [E66.01, Z68.41]  Essential hypertension [I10]  Pure hypercholesterolemia [E78.00]    Admission Date: 8/30/2022  Expected Discharge Date:     Discharge Barriers Identified: None    Payor: MEDICARE / Plan: MEDICARE PART A & B / Product Type: Government /     Extended Emergency Contact Information  Primary Emergency Contact: Lavelle Vo  Mobile Phone: [phone]  Relation: Grandchild  Preferred language: English   needed? No  Secondary Emergency Contact: Zoe Freitas   [facility]  Home Phone: [phone]  Relation: Daughter    Discharge Plan A: Home with family         [facility] [address]  [address] [facility] [address] [address]  Phone: [phone] Fax: [phone]    [facility] [address] [place] [id number]  Phone: [phone] Fax: [phone]      Initial Assessment (most recent)       Adult Discharge Assessment - 08/31/22 1109          Discharge Assessment    Assessment Type Discharge Planning Assessment     Confirmed/corrected address, phone number and insurance Yes     Confirmed Demographics Correct on Facesheet     Source of Information patient     Communicated CASSIUS with patient/caregiver Date not available/Unable to determine     Reason For Admission s/p robotic sleeve gastrectomy     Lives With alone     Facility Arrived From: home     Do you expect to return to your current living situation? Yes     Do you have help at home or someone to help you manage your care at home? Yes     Who are your caregiver(s) and their phone number(s)? son lives behind patient in apt.     Prior to hospitilization cognitive status: Alert/Oriented     Current cognitive status: Alert/Oriented     Walking or Climbing Stairs  Difficulty none     Dressing/Bathing Difficulty none     Equipment Currently Used at Home none     Readmission within 30 days? No     Patient currently being followed by outpatient case management? No     Do you currently have service(s) that help you manage your care at home? No     Do you take prescription medications? Yes     Do you have prescription coverage? Yes     Do you have any problems affording any of your prescribed medications? No     Is the patient taking medications as prescribed? yes     Who is going to help you get home at discharge? daughter     How do you get to doctors appointments? family or friend will provide     Are you on dialysis? No     Discharge Plan A Home with family     DME Needed Upon Discharge  none     Discharge Plan discussed with: Patient     Discharge Barriers Identified None                      Anticipated DC Dispo: home with family  Prior Level of Function: independent, son lives behind patient  PCP: Dr. Cannon  Comments:  CM met with patient at bedside to introduce role and discuss d/c planning. Patient is independent, no identified needs at this time.

## 2022-08-31 NOTE — SUBJECTIVE & OBJECTIVE
Interval History: pain controlled, ambulating, headache,     Medications:  Continuous Infusions:   sodium chloride 0.9% 75 mL/hr at 08/31/22 0707     Scheduled Meds:   chlorhexidine  10 mL Mouth/Throat BID    citalopram  40 mg Oral Daily    cloNIDine  0.2 mg Oral QHS    gabapentin  300 mg Oral BID    hydroCHLOROthiazide  25 mg Oral Daily    losartan  100 mg Oral Daily    pantoprazole  40 mg Intravenous Daily    pravastatin  40 mg Oral Daily     PRN Meds:ALPRAZolam, dextrose 10%, dextrose 10%, glucagon (human recombinant), hydrALAZINE, HYDROcodone-acetaminophen, insulin aspart U-100, morphine, naloxone, ondansetron     Review of patient's allergies indicates:  No Known Allergies  Objective:     Vital Signs (Most Recent):  Temp: 98.5 °F (36.9 °C) (08/31/22 0720)  Pulse: 69 (08/31/22 1011)  Resp: 18 (08/31/22 0857)  BP: (!) 188/80 (08/31/22 1018)  SpO2: (!) 93 % (08/31/22 0720)   Vital Signs (24h Range):  Temp:  [97.4 °F (36.3 °C)-98.5 °F (36.9 °C)] 98.5 °F (36.9 °C)  Pulse:  [60-74] 69  Resp:  [8-24] 18  SpO2:  [92 %-100 %] 93 %  BP: (140-201)/(65-85) 188/80     Weight: 116.6 kg (257 lb 0.9 oz)  Body mass index is 37.96 kg/m².    Intake/Output - Last 3 Shifts         08/29 0700 08/30 0659 08/30 0700 08/31 0659 08/31 0700 09/01 0659    P.O.   240    I.V. (mL/kg)  719.2 (6.2) 0 (0)    IV Piggyback  1300     Total Intake(mL/kg)  2019.2 (17.3) 240 (2.1)    Net  +2019.2 +240           Urine Occurrence  2 x 1 x            Physical Exam  Vitals reviewed.   Constitutional:       Appearance: She is well-developed. She is obese.   HENT:      Head: Normocephalic and atraumatic.   Cardiovascular:      Rate and Rhythm: Normal rate and regular rhythm.   Pulmonary:      Effort: Pulmonary effort is normal.      Breath sounds: Normal breath sounds.   Abdominal:      General: There is no distension.      Palpations: Abdomen is soft.      Tenderness: There is abdominal tenderness (incisional).      Comments: Incisions clean dry and  intact   Musculoskeletal:      Cervical back: Neck supple.   Skin:     General: Skin is warm and dry.   Neurological:      Mental Status: She is alert and oriented to person, place, and time.       Significant Labs:  I have reviewed all pertinent lab results within the past 24 hours.  CBC:   Recent Labs   Lab 08/31/22  0420   WBC 11.02   RBC 4.40   HGB 13.1   HCT 41.3      MCV 94   MCH 29.8   MCHC 31.7*     BMP:   Recent Labs   Lab 08/31/22  0420   GLU 90      K 3.6      CO2 26   BUN 12   CREATININE 0.8   CALCIUM 8.9       Significant Diagnostics:

## 2022-08-31 NOTE — PROGRESS NOTES
[facility] - Telemetry ([facility])  General Surgery  Progress Note    Subjective:     History of Present Illness:  No notes on file    Post-Op Info:  Procedure(s) (LRB):  XI ROBOTIC SLEEVE GASTRECTOMY (N/A)  LYSIS, ADHESIONS, LAPAROSCOPIC (N/A)   1 Day Post-Op     Interval History: pain controlled, ambulating, headache,     Medications:  Continuous Infusions:   sodium chloride 0.9% 75 mL/hr at 08/31/22 0707     Scheduled Meds:   chlorhexidine  10 mL Mouth/Throat BID    citalopram  40 mg Oral Daily    cloNIDine  0.2 mg Oral QHS    gabapentin  300 mg Oral BID    hydroCHLOROthiazide  25 mg Oral Daily    losartan  100 mg Oral Daily    pantoprazole  40 mg Intravenous Daily    pravastatin  40 mg Oral Daily     PRN Meds:ALPRAZolam, dextrose 10%, dextrose 10%, glucagon (human recombinant), hydrALAZINE, HYDROcodone-acetaminophen, insulin aspart U-100, morphine, naloxone, ondansetron     Review of patient's allergies indicates:  No Known Allergies  Objective:     Vital Signs (Most Recent):  Temp: 98.5 °F (36.9 °C) (08/31/22 0720)  Pulse: 69 (08/31/22 1011)  Resp: 18 (08/31/22 0857)  BP: (!) 188/80 (08/31/22 1018)  SpO2: (!) 93 % (08/31/22 0720)   Vital Signs (24h Range):  Temp:  [97.4 °F (36.3 °C)-98.5 °F (36.9 °C)] 98.5 °F (36.9 °C)  Pulse:  [60-74] 69  Resp:  [8-24] 18  SpO2:  [92 %-100 %] 93 %  BP: (140-201)/(65-85) 188/80     Weight: 116.6 kg (257 lb 0.9 oz)  Body mass index is 37.96 kg/m².    Intake/Output - Last 3 Shifts         08/29 0700 08/30 0659 08/30 0700 08/31 0659 08/31 0700 09/01 0659    P.O.   240    I.V. (mL/kg)  719.2 (6.2) 0 (0)    IV Piggyback  1300     Total Intake(mL/kg)  2019.2 (17.3) 240 (2.1)    Net  +2019.2 +240           Urine Occurrence  2 x 1 x            Physical Exam  Vitals reviewed.   Constitutional:       Appearance: She is well-developed. She is obese.   HENT:      Head: Normocephalic and atraumatic.   Cardiovascular:      Rate and Rhythm: Normal rate and regular rhythm.   Pulmonary:       Effort: Pulmonary effort is normal.      Breath sounds: Normal breath sounds.   Abdominal:      General: There is no distension.      Palpations: Abdomen is soft.      Tenderness: There is abdominal tenderness (incisional).      Comments: Incisions clean dry and intact   Musculoskeletal:      Cervical back: Neck supple.   Skin:     General: Skin is warm and dry.   Neurological:      Mental Status: She is alert and oriented to person, place, and time.       Significant Labs:  I have reviewed all pertinent lab results within the past 24 hours.  CBC:   Recent Labs   Lab 08/31/22  0420   WBC 11.02   RBC 4.40   HGB 13.1   HCT 41.3      MCV 94   MCH 29.8   MCHC 31.7*     BMP:   Recent Labs   Lab 08/31/22  0420   GLU 90      K 3.6      CO2 26   BUN 12   CREATININE 0.8   CALCIUM 8.9       Significant Diagnostics:      Assessment/Plan:     * Morbid obesity with BMI of 40.0-44.9, adult  Status post robotic sleeve gastrectomy     Bariatric clear liquids   Pain control  Incentive spirometry  Ambulate/DVT/GI prophylaxis   Antiemetics p.r.n.      Pure hypercholesterolemia  Home statin therapy   Dietary modifications/weight loss    Generalized osteoarthrosis, involving multiple sites  Weight loss    HTN (hypertension)  Monitor/stable   Home p.o. antihypertensives  IV hypertensives p.r.n. uncontrolled with p.o.        Blas Hampton MD  General Surgery  [facility] - Telemetry ([facility])

## 2022-08-31 NOTE — DISCHARGE SUMMARY
[facility] - Tele[facility] ([facility])  General Surgery  Discharge Summary      Patient Name: Mary Vo  MRN: [id number]  Admission Date: 8/30/2022  Hospital Length of Stay: 1 days  Discharge Date and Time:  08/31/2022 4:35 PM  Attending Physician: Blas Hampton MD   Discharging Provider: Joyce Farris PA-C  Primary Care Provider: Elias Cannon MD    HPI:   66 y/o obese female who presents for surgical management via sleeve gastrectomy.       Procedure(s) (LRB):  XI ROBOTIC SLEEVE GASTRECTOMY (N/A)  LYSIS, ADHESIONS, LAPAROSCOPIC (N/A)      Indwelling Lines/Drains at time of discharge:   Lines/Drains/Airways     None               Hospital Course: Status post robotic sleeve gastrectomy     Postop day 1 pain controlled, ambulating, headache, trial bariatric clear liquids    Later this day, tolerating clears with pain controlled and no nausea and vomiting.      Goals of Care Treatment Preferences:  Code Status: Full Code      Consults:     Significant Diagnostic Studies: Labs:   CMP   Recent Labs   Lab 08/31/22  0420      K 3.6      CO2 26   GLU 90   BUN 12   CREATININE 0.8   CALCIUM 8.9   ANIONGAP 13    and CBC   Recent Labs   Lab 08/31/22  0420   WBC 11.02   HGB 13.1   HCT 41.3          Pending Diagnostic Studies:     Procedure Component Value Units Date/Time    Specimen to Pathology, Surgery General Surgery [[id number]] Collected: 08/30/22 0926    Order Status: Sent Lab Status: In process Updated: 08/30/22 1037    Specimen: Tissue         Final Active Diagnoses:    Diagnosis Date Noted POA    PRINCIPAL PROBLEM:  Morbid obesity with BMI of 40.0-44.9, adult [E66.01, Z68.41]  Not Applicable    Pure hypercholesterolemia [E78.00] 08/30/2022 Yes    HTN (hypertension) [I10]  Yes    Generalized osteoarthrosis, involving multiple sites [M15.9]  Yes      Problems Resolved During this Admission:      Discharged Condition: good    Disposition: Home or Self Care    Follow Up:   Follow-up Information      Blas Hampton MD Follow up in 2 week(s).    Specialties: General Surgery, Bariatrics  Why: Post-op sleeve  Contact information:  [address] [address]  [address]  [place] [address]  [phone]                       Patient Instructions:      Ambulatory referral/consult to Outpatient Case Management   Referral Priority: Routine Referral Type: Consultation   Referral Reason: Specialty Services Required   Number of Visits Requested: 1     Lifting restrictions   Order Comments: No lifting over 10 pounds.     Notify your health care provider if you experience any of the following:  increased confusion or weakness     Notify your health care provider if you experience any of the following:  persistent dizziness, light-headedness, or visual disturbances     Notify your health care provider if you experience any of the following:  worsening rash     Notify your health care provider if you experience any of the following:  severe persistent headache     Notify your health care provider if you experience any of the following:  difficulty breathing or increased cough     Notify your health care provider if you experience any of the following:  redness, tenderness, or signs of infection (pain, swelling, redness, odor or green/yellow discharge around incision site)     Notify your health care provider if you experience any of the following:  severe uncontrolled pain     Notify your health care provider if you experience any of the following:  persistent nausea and vomiting or diarrhea     Notify your health care provider if you experience any of the following:  temperature >100.4     Shower on day dressing removed (No bath)   Order Comments: Okay to shower but no submerging incisions in water.     Medications:  Reconciled Home Medications:      Medication List      START taking these medications    ondansetron 4 MG tablet  Commonly known as: ZOFRAN  Take 1 tablet (4 mg total) by mouth every 6 (six) hours as needed for  Nausea.     oxyCODONE 5 MG immediate release tablet  Commonly known as: ROXICODONE  Take 1 tablet (5 mg total) by mouth every 6 (six) hours as needed for Pain.     simethicone 42 mg Chew  Take 42 mg by mouth 3 (three) times daily as needed (gas pains).        CHANGE how you take these medications    gabapentin 300 MG capsule  Commonly known as: NEURONTIN  Take 1 capsule (300 mg total) by mouth once daily AND 1 capsule (300 mg total) after lunch AND 2 capsules (600 mg total) every evening.  What changed: See the new instructions.        CONTINUE taking these medications    ALPRAZolam 1 MG tablet  Commonly known as: XANAX  TAKE 1 TABLET BY MOUTH NIGHTLY AS NEEDED FOR ANXIETY     baclofen 10 MG tablet  Commonly known as: LIORESAL  Take 1 tablet (10 mg total) by mouth 3 (three) times daily.     citalopram 40 MG tablet  Commonly known as: CeleXA  TAKE ONE TABLET BY MOUTH DAILY     cloNIDine 0.2 MG tablet  Commonly known as: CATAPRES  TAKE 1 TABLET BY MOUTH EVERY EVENING     docusate sodium 100 MG capsule  Commonly known as: COLACE  Take 1 capsule (100 mg total) by mouth once daily.     fluticasone propionate 50 mcg/actuation nasal spray  Commonly known as: FLONASE  2 sprays (100 mcg total) by Each Nostril route once daily.     furosemide 40 MG tablet  Commonly known as: LASIX  TAKE ONE TABLET BY MOUTH ONCE A DAY AS NEEDED     HYDROcodone-acetaminophen  mg per tablet  Commonly known as: NORCO  Take 1 tablet by mouth 2 (two) times daily as needed.     losartan-hydrochlorothiazide 100-25 mg 100-25 mg per tablet  Commonly known as: HYZAAR  TAKE ONE TABLET BY MOUTH ONCE A DAY     multivitamin capsule  Take 1 capsule by mouth once daily.     pantoprazole 40 MG tablet  Commonly known as: PROTONIX  Take 1 tablet (40 mg total) by mouth 2 (two) times daily. for 14 days     potassium chloride SA 20 MEQ tablet  Commonly known as: K-DUR,KLOR-CON, K-TAB  Take 1 tablet (20 mEq total) by mouth once daily.     pravastatin 40 MG  tablet  Commonly known as: PRAVACHOL  TAKE ONE TABLET BY MOUTH DAILY     STOOL SOFTENER-LAXATIVE 8.6-50 mg per tablet  Generic drug: senna-docusate 8.6-50 mg  Take 1 tablet by mouth 2 (two) times daily as needed for Constipation.     VITAMIN D ORAL  Take 1,000 Units by mouth once daily.     zolpidem 10 mg Tab  Commonly known as: AMBIEN  TAKE ONE TABLET BY MOUTH AT BEDTIME AS NEEDED          Time spent on the discharge of patient: 20 minutes    Joyce Farris PA-C  General Surgery  [facility] - Tele[facility] ([facility])

## 2022-09-02 ENCOUNTER — PATIENT MESSAGE (OUTPATIENT)
Dept: NEUROSURGERY | Facility: CLINIC | Age: 67
End: 2022-09-02
Payer: MEDICARE

## 2022-09-02 LAB
FINAL PATHOLOGIC DIAGNOSIS: NORMAL
GROSS: NORMAL
Lab: NORMAL
SUPPLEMENTAL DIAGNOSIS: NORMAL

## 2022-09-07 ENCOUNTER — OUTPATIENT CASE MANAGEMENT (OUTPATIENT)
Dept: ADMINISTRATIVE | Facility: OTHER | Age: 67
End: 2022-09-07
Payer: MEDICARE

## 2022-09-07 NOTE — PROGRESS NOTES
Attempt #:  1  This LMSW attempted to reach patient/caregiver to provide resource and left a message requesting a return call.       SW received a return call from patient. SW explained to patient reason for referral to assist with community resources. Patient denied having any social needs. SW will close case as no needs.

## 2022-09-14 ENCOUNTER — OFFICE VISIT (OUTPATIENT)
Dept: SURGERY | Facility: CLINIC | Age: 67
End: 2022-09-14
Payer: MEDICARE

## 2022-09-14 VITALS
SYSTOLIC BLOOD PRESSURE: 129 MMHG | BODY MASS INDEX: 35.36 KG/M2 | DIASTOLIC BLOOD PRESSURE: 68 MMHG | HEART RATE: 66 BPM | WEIGHT: 239.44 LBS

## 2022-09-14 DIAGNOSIS — E66.01 MORBID OBESITY WITH BMI OF 40.0-44.9, ADULT: Primary | ICD-10-CM

## 2022-09-14 PROCEDURE — 99024 POSTOP FOLLOW-UP VISIT: CPT | Mod: POP,,,

## 2022-09-14 PROCEDURE — 99999 PR PBB SHADOW E&M-EST. PATIENT-LVL IV: CPT | Mod: PBBFAC,,,

## 2022-09-14 PROCEDURE — 99999 PR PBB SHADOW E&M-EST. PATIENT-LVL IV: ICD-10-PCS | Mod: PBBFAC,,,

## 2022-09-14 PROCEDURE — 99024 PR POST-OP FOLLOW-UP VISIT: ICD-10-PCS | Mod: POP,,,

## 2022-09-14 PROCEDURE — 99214 OFFICE O/P EST MOD 30 MIN: CPT | Mod: PBBFAC

## 2022-09-14 NOTE — PROGRESS NOTES
BARIATRIC PATIENT EVALUATION    CHIEF COMPLAINT:   morbid obesity with a BMI of 42 and inability to lose weight.    HISTORY OF PRESENT ILLNESS:  Mary Vo is a 67 y.o.-year-old female presents for bariatric follow post-op s/p sleeve gastrectomy on 08/22/2022. She has been doing well since then. She has been advancing her diet and is starting pureed foods today. She is having some decreased frequency of bowel movements and has been drinking mag citrate PRN. She denies odynophagia, dysphagia, fevers, chills, nausea, and vomiting.     Initially presenting for morbid obesity with a BMI of 42 and inability to lose weight. The patient has tried exercising which she enjoys as well as different diets but struggling to lose any further weight.  She has severe arthritis that is started to limit her mobility..    Height 5 ft 6 in  Weight 263 lb --> 239lbs  BMI 42 --> 35      CO-MORBIDITIES:  dyslipidemia, hypertension and osteoarthritis    PAST MEDICAL HISTORY:  Past Medical History:   Diagnosis Date    Breast cancer 1996    right    Chronic pain syndrome     Generalized osteoarthrosis, involving multiple sites     s/p THR bilateral    History of breast cancer 2007    lumpectomy/XRT    HTN (hypertension)     Hyperlipidemia     Morbid obesity with BMI of 40.0-44.9, adult         PAST SURGICAL HISTORY:  Past Surgical History:   Procedure Laterality Date    ANTERIOR CERVICAL DISCECTOMY W/ FUSION Left 01/03/2022    Procedure: DISCECTOMY, SPINE, CERVICAL, ANTERIOR APPROACH, WITH FUSION;  Surgeon: Pradip Fernando MD;  Location: [facility] OR;  Service: Neurosurgery;  Laterality: Left;  Three Level ACDF  C4/5, 5/6, 6/7      BREAST LUMPECTOMY Right 2006    XRT    CATARACT EXTRACTION W/  INTRAOCULAR LENS IMPLANT Left 01/15/2020    CATARACT EXTRACTION W/  INTRAOCULAR LENS IMPLANT Right 01/29/2020    COLONOSCOPY N/A 10/15/2015    Procedure: COLONOSCOPY;  Surgeon: Maylin Shipley MD;  Location: [facility];  Service: Endoscopy;   Laterality: N/A;    EPIDURAL STEROID INJECTION N/A 11/03/2020    Procedure: Lumbar L5/S1 IL KATYA;  Surgeon: Paul Lobato MD;  Location: [facility] PAIN MGT;  Service: Pain Management;  Laterality: N/A;    EPIDURAL STEROID INJECTION INTO CERVICAL SPINE N/A 09/25/2020    Procedure: C7/T1 IL KATYA;  Surgeon: Paul Lobato MD;  Location: [facility] PAIN MGT;  Service: Pain Management;  Laterality: N/A;    EPIDURAL STEROID INJECTION INTO CERVICAL SPINE N/A 10/05/2021    Procedure: C7/T1 IL KATYA with RN IV sedation;  Surgeon: Cynthia Soares MD;  Location: [facility] PAIN MGT;  Service: Pain Management;  Laterality: N/A;    ESOPHAGOGASTRODUODENOSCOPY N/A 11/04/2021    Procedure: ESOPHAGOGASTRODUODENOSCOPY (EGD);  Surgeon: Blas Hampton MD;  Location: [facility] ENDO;  Service: Endoscopy;  Laterality: N/A;    HYSTERECTOMY      LAPAROSCOPIC LYSIS OF ADHESIONS N/A 8/30/2022    Procedure: LYSIS, ADHESIONS, LAPAROSCOPIC;  Surgeon: Blas Hampton MD;  Location: [facility] OR;  Service: General;  Laterality: N/A;    PLACEMENT OF ACELLULAR HUMAN DERMAL ALLOGRAFT Left 01/03/2022    Procedure: APPLICATION, ACELLULAR HUMAN DERMAL ALLOGRAFT;  Surgeon: Pradip Fernando MD;  Location: [facility] OR;  Service: Neurosurgery;  Laterality: Left;    ROBOT-ASSISTED LAPAROSCOPIC SLEEVE GASTRECTOMY USING DA FLORENTINO XI N/A 8/30/2022    Procedure: XI ROBOTIC SLEEVE GASTRECTOMY;  Surgeon: Blas Hampton MD;  Location: [facility] OR;  Service: General;  Laterality: N/A;    TRANSFORAMINAL EPIDURAL INJECTION OF STEROID Left 09/17/2019    Procedure: Left C5/6 TF KATYA w/ RN IV sedation;  Surgeon: Paul Lobato MD;  Location: [facility] PAIN MGT;  Service: Pain Management;  Laterality: Left;       FAMILY HISTORY:  Family History   Problem Relation Age of Onset    Cancer Mother     Diabetes Mother     Hyperlipidemia Father     Hypertension Father     Breast cancer Sister     Breast cancer Sister     Breast cancer Sister     Breast cancer Sister     Eczema Neg Hx     Lupus Neg Hx     Psoriasis Neg Hx      Melanoma Neg Hx         SOCIAL HISTORY:   reports that she has quit smoking. She has never used smokeless tobacco. She reports that she does not drink alcohol and does not use drugs.     MEDICATIONS:  Current Outpatient Medications on File Prior to Visit   Medication Sig Dispense Refill    ALPRAZolam (XANAX) 1 MG tablet TAKE 1 TABLET BY MOUTH NIGHTLY AS NEEDED FOR ANXIETY 30 tablet 5    baclofen (LIORESAL) 10 MG tablet Take 1 tablet (10 mg total) by mouth 3 (three) times daily. 90 tablet 0    citalopram (CELEXA) 40 MG tablet TAKE ONE TABLET BY MOUTH DAILY 30 tablet 11    cloNIDine (CATAPRES) 0.2 MG tablet TAKE 1 TABLET BY MOUTH EVERY EVENING (Patient taking differently: Take 0.2 mg by mouth every evening.) 90 tablet 4    docusate sodium (COLACE) 100 MG capsule Take 1 capsule (100 mg total) by mouth once daily. 12 capsule 0    ERGOCALCIFEROL, VITAMIN D2, (VITAMIN D ORAL) Take 1,000 Units by mouth once daily.      fluticasone propionate (FLONASE) 50 mcg/actuation nasal spray 2 sprays (100 mcg total) by Each Nostril route once daily. 48 g 3    furosemide (LASIX) 40 MG tablet TAKE ONE TABLET BY MOUTH ONCE A DAY AS NEEDED 19 tablet 11    gabapentin (NEURONTIN) 300 MG capsule Take 1 capsule (300 mg total) by mouth once daily AND 1 capsule (300 mg total) after lunch AND 2 capsules (600 mg total) every evening. (Patient taking differently: Take 1 capsule (300 mg total) by mouth once daily AND 1 capsule (300 mg total) after lunch AND 2 capsules (600 mg total) every evening.    8/17/22: takes in evening only) 360 capsule 3    HYDROcodone-acetaminophen (NORCO)  mg per tablet Take 1 tablet by mouth 2 (two) times daily as needed. 180 tablet 0    losartan-hydrochlorothiazide 100-25 mg (HYZAAR) 100-25 mg per tablet TAKE ONE TABLET BY MOUTH ONCE A DAY 90 tablet 3    multivitamin capsule Take 1 capsule by mouth once daily.      oxyCODONE (ROXICODONE) 5 MG immediate release tablet Take 1 tablet (5 mg total) by mouth every 6  (six) hours as needed for Pain. 20 tablet 0    pantoprazole (PROTONIX) 40 MG tablet Take 1 tablet (40 mg total) by mouth 2 (two) times daily. for 14 days 28 tablet 0    potassium chloride SA (K-DUR,KLOR-CON, K-TAB) 20 MEQ tablet Take 1 tablet (20 mEq total) by mouth once daily. 90 tablet 3    pravastatin (PRAVACHOL) 40 MG tablet TAKE ONE TABLET BY MOUTH DAILY 90 tablet 1    senna-docusate 8.6-50 mg (PERICOLACE) 8.6-50 mg per tablet Take 1 tablet by mouth 2 (two) times daily as needed for Constipation. 20 tablet 0    simethicone 42 mg Chew Take 42 mg by mouth 3 (three) times daily as needed (gas pains). 30 tablet 0    zolpidem (AMBIEN) 10 mg Tab TAKE ONE TABLET BY MOUTH AT BEDTIME AS NEEDED 20 tablet 5     No current facility-administered medications on file prior to visit.       Medications have been reviewed.    ALLERGIES:  Review of patient's allergies indicates:  No Known Allergies    Allergies have been reviewed.    ROS:  Review of Systems   Constitutional:  Negative for chills, fatigue, fever and unexpected weight change.   Respiratory:  Negative for cough, shortness of breath, wheezing and stridor.    Cardiovascular:  Negative for chest pain, palpitations and leg swelling.   Gastrointestinal:  Negative for abdominal distention, abdominal pain, constipation, diarrhea, nausea and vomiting.   Genitourinary:  Negative for difficulty urinating, dysuria, frequency, hematuria and urgency.   Skin:  Negative for color change, pallor, rash and wound.   Hematological:  Does not bruise/bleed easily.     PE:  Physical Exam  Vitals reviewed.   Constitutional:       General: She is not in acute distress.     Appearance: She is well-developed. She is not ill-appearing.   HENT:      Head: Normocephalic and atraumatic.      Right Ear: External ear normal.      Left Ear: External ear normal.   Eyes:      Extraocular Movements: Extraocular movements intact.      Conjunctiva/sclera: Conjunctivae normal.   Cardiovascular:      Rate  and Rhythm: Normal rate.   Pulmonary:      Effort: Pulmonary effort is normal. No respiratory distress.   Abdominal:      General: There is no distension.      Palpations: Abdomen is soft.      Tenderness: There is no abdominal tenderness.      Comments: Abdomen soft, non-tender. Incisions healing well without SOI   Musculoskeletal:      Cervical back: Neck supple.   Skin:     General: Skin is warm and dry.   Neurological:      Mental Status: She is alert and oriented to person, place, and time.   Psychiatric:         Behavior: Behavior normal.     EGD:  Impression:            - Normal esophagus.                          - Z-line regular, 38 cm from the incisors.                          - A few gastric polyps. Biopsied.                          - Normal antrum. Biopsied.                          - Normal second portion of the duodenum.    Pathology:  Final Pathologic Diagnosis 1. Stomach, sleeve gastrectomy:   Segment of stomach, lined by gastric oxyntic mucosa, with mild chronic   inactive gastritis.   Helicobacter pylori immunostain is pending and will be reported in an   addendum.  VC      Comment: Interp By Chapincito Whitten M.D., Signed on 09/02/2022 at 13:38   Supplemental Diagnosis Helicobacter pylori immunostain is negative.   IHC controls were reviewed and were adequate.          DIAGNOSIS:  1. Morbid obesity with a BMI of 42 and inability to lose weight.  2. Co-morbidities: dyslipidemia, hypertension and osteoarthritis    PLAN:  - Reinforced bariatric diet/dietician.  - Ease back into normal activity/exercise.  - Advance diet as previously discussed.  - RTC 3 months, will get bariatric labs at that time.     HTN - stable/monitor/antihypertensives  HLD - statin therapy/dietary modifications  Osteoarthritis - weight loss    Referring Physician: No ref. provider found  RTC: As scheduled.    Joyce Farris PA-C  [facility]

## 2022-09-16 ENCOUNTER — TELEPHONE (OUTPATIENT)
Dept: ADMINISTRATIVE | Facility: HOSPITAL | Age: 67
End: 2022-09-16
Payer: MEDICARE

## 2022-09-19 ENCOUNTER — PATIENT MESSAGE (OUTPATIENT)
Dept: SURGERY | Facility: CLINIC | Age: 67
End: 2022-09-19
Payer: MEDICARE

## 2022-09-19 ENCOUNTER — NUTRITION (OUTPATIENT)
Dept: INTERNAL MEDICINE | Facility: CLINIC | Age: 67
End: 2022-09-19
Payer: MEDICARE

## 2022-09-19 ENCOUNTER — LAB VISIT (OUTPATIENT)
Dept: LAB | Facility: HOSPITAL | Age: 67
End: 2022-09-19
Attending: INTERNAL MEDICINE
Payer: MEDICARE

## 2022-09-19 ENCOUNTER — PATIENT MESSAGE (OUTPATIENT)
Dept: INTERNAL MEDICINE | Facility: CLINIC | Age: 67
End: 2022-09-19
Payer: MEDICARE

## 2022-09-19 DIAGNOSIS — E66.9 OBESITY (BMI 30-39.9): ICD-10-CM

## 2022-09-19 DIAGNOSIS — Z71.3 DIETARY COUNSELING: ICD-10-CM

## 2022-09-19 DIAGNOSIS — I10 PRIMARY HYPERTENSION: ICD-10-CM

## 2022-09-19 DIAGNOSIS — Z98.84 STATUS POST BARIATRIC SURGERY: Primary | ICD-10-CM

## 2022-09-19 LAB
ALBUMIN SERPL BCP-MCNC: 4.2 G/DL (ref 3.5–5.2)
ALP SERPL-CCNC: 82 U/L (ref 55–135)
ALT SERPL W/O P-5'-P-CCNC: 32 U/L (ref 10–44)
ANION GAP SERPL CALC-SCNC: 10 MMOL/L (ref 8–16)
AST SERPL-CCNC: 29 U/L (ref 10–40)
BILIRUB SERPL-MCNC: 0.7 MG/DL (ref 0.1–1)
BUN SERPL-MCNC: 14 MG/DL (ref 8–23)
CALCIUM SERPL-MCNC: 10.2 MG/DL (ref 8.7–10.5)
CHLORIDE SERPL-SCNC: 103 MMOL/L (ref 95–110)
CHOLEST SERPL-MCNC: 124 MG/DL (ref 120–199)
CHOLEST/HDLC SERPL: 3 {RATIO} (ref 2–5)
CO2 SERPL-SCNC: 29 MMOL/L (ref 23–29)
CREAT SERPL-MCNC: 0.8 MG/DL (ref 0.5–1.4)
EST. GFR  (NO RACE VARIABLE): >60 ML/MIN/1.73 M^2
GLUCOSE SERPL-MCNC: 103 MG/DL (ref 70–110)
HDLC SERPL-MCNC: 41 MG/DL (ref 40–75)
HDLC SERPL: 33.1 % (ref 20–50)
LDLC SERPL CALC-MCNC: 66.4 MG/DL (ref 63–159)
NONHDLC SERPL-MCNC: 83 MG/DL
POTASSIUM SERPL-SCNC: 3.8 MMOL/L (ref 3.5–5.1)
PROT SERPL-MCNC: 6.9 G/DL (ref 6–8.4)
SODIUM SERPL-SCNC: 142 MMOL/L (ref 136–145)
TRIGL SERPL-MCNC: 83 MG/DL (ref 30–150)

## 2022-09-19 PROCEDURE — 84443 ASSAY THYROID STIM HORMONE: CPT | Performed by: INTERNAL MEDICINE

## 2022-09-19 PROCEDURE — 97803 PR MED NUTR THER, SUBSQ, INDIV, EA 15 MIN: ICD-10-PCS | Mod: S$GLB,,, | Performed by: DIETITIAN, REGISTERED

## 2022-09-19 PROCEDURE — 36415 COLL VENOUS BLD VENIPUNCTURE: CPT | Mod: PO | Performed by: INTERNAL MEDICINE

## 2022-09-19 PROCEDURE — 97803 MED NUTRITION INDIV SUBSEQ: CPT | Mod: S$GLB,,, | Performed by: DIETITIAN, REGISTERED

## 2022-09-19 PROCEDURE — 80053 COMPREHEN METABOLIC PANEL: CPT | Performed by: INTERNAL MEDICINE

## 2022-09-19 PROCEDURE — 80061 LIPID PANEL: CPT | Performed by: INTERNAL MEDICINE

## 2022-09-19 NOTE — PROGRESS NOTES
The patient location is: [place]  The chief complaint leading to consultation is: nutrition follow-up post gastric sleeve surgery    Visit type: audio only    Face to Face time with patient: 30 minutes  35 minutes of total time spent on the encounter, which includes face to face time and non-face to face time preparing to see the patient (eg, review of tests), Obtaining and/or reviewing separately obtained history, Documenting clinical information in the electronic or other health record, Independently interpreting results (not separately reported) and communicating results to the patient/family/caregiver, or Care coordination (not separately reported).         Each patient to whom he or she provides medical services by telemedicine is:  (1) informed of the relationship between the physician and patient and the respective role of any other health care provider with respect to management of the patient; and (2) notified that he or she may decline to receive medical services by telemedicine and may withdraw from such care at any time.    Notes:   NUTRITION NOTE    Referring Physician: Dr. Hampton  Reason for MNT Referral: Follow-up 2 Weeks s/p Gastric Sleeve, 8-    PAST MEDICAL HISTORY:  Denies nausea, vomiting, and diarrhea.  Reports problems, including : Hemorrhoids, stomach bloating, constipation .    Questions what she can do to ease constipation and if this is normal.      24 hour recall:  2 premier protein shakes  1 egg  1 ounce meat (deli chicken)  Navy beans 2-2.5 Tbsp pureed  Fluids: 3 bottles (16.9 oz each) of water    No exercise yet. Reports movement in house.       Past Medical History:   Diagnosis Date    Breast cancer 1996    right    Chronic pain syndrome     Generalized osteoarthrosis, involving multiple sites     s/p THR bilateral    History of breast cancer 2007    lumpectomy/XRT    HTN (hypertension)     Hyperlipidemia     Morbid obesity with BMI of 40.0-44.9, adult        CLINICAL DATA:  67  y.o. female.    There were no vitals filed for this visit.    Current Weight: 239 lbs (9-)   Surgery weight: 247 lbs   MSD 3, 10-: 261.36 lbs                   MSD 2, 9-: 260 lbs (weight obtained on 9-2-2021)              MSD 1, 8-: 263 lbs (weight obtained on 8-)  BMI: 35.36  Total Weight Loss: -8 lbs since surgery      LABS:      CURRENT DIET:  Bariatric Liquid Diet    Diet Recall: approximately 80 grams of protein/day; approximately 70 oz of fluids/day    Diet Includes: protein shakes, eggs, meats, beans, water, crystal light  Protein Supplements: premier protein    EXERCISE:  None.    Restrictions to Exercise: None.    VITAMINS/MINERALS:  Multivitamins: [employer] womens one a day  B-Complex: Included with multivitamin.  Calcium Citrate + Vitamin D: 500 mg included in multivitamin  Vitamin B12:  6 mcg included in multivitamin        ASSESSMENT:  Doing fairly well overall.  Adequate protein intake.  Adequate fluid intake.    BARIATRIC DIET DISCUSSION:  Instructed and provided written materials on bariatric soft diet plan.  Reinforced post-op nutrition guidelines.    PLAN/RECOMMONDATIONS:  Continue bariatric soft diet.  Maintain protein intake.  Maintain fluid intake.  Begin light exercise.  Begin appropriate vitamins & minerals.  Adjust vitamins & minerals by: continue with current selection plus add B-Complex with 15 mg Thiamine, Calcium + Vitamin D 1000 mg, B12 500 mcg or switch to [id number] + [employer] Liquid Calcium    Return to clinic in 3 months    SESSION TIME: 30 minutes

## 2022-09-20 LAB — TSH SERPL DL<=0.005 MIU/L-ACNC: 2.8 UIU/ML (ref 0.4–4)

## 2022-09-21 ENCOUNTER — PATIENT MESSAGE (OUTPATIENT)
Dept: INTERNAL MEDICINE | Facility: CLINIC | Age: 67
End: 2022-09-21

## 2022-09-21 ENCOUNTER — OFFICE VISIT (OUTPATIENT)
Dept: INTERNAL MEDICINE | Facility: CLINIC | Age: 67
End: 2022-09-21
Payer: MEDICARE

## 2022-09-21 VITALS
OXYGEN SATURATION: 100 % | BODY MASS INDEX: 37.61 KG/M2 | DIASTOLIC BLOOD PRESSURE: 62 MMHG | HEART RATE: 62 BPM | SYSTOLIC BLOOD PRESSURE: 118 MMHG | HEIGHT: 67 IN | WEIGHT: 239.63 LBS

## 2022-09-21 DIAGNOSIS — E78.00 PURE HYPERCHOLESTEROLEMIA: ICD-10-CM

## 2022-09-21 DIAGNOSIS — Z85.3 HISTORY OF BREAST CANCER: ICD-10-CM

## 2022-09-21 DIAGNOSIS — G89.4 CHRONIC PAIN SYNDROME: ICD-10-CM

## 2022-09-21 DIAGNOSIS — M54.16 LUMBAR RADICULOPATHY: ICD-10-CM

## 2022-09-21 DIAGNOSIS — E66.01 MORBID OBESITY WITH BMI OF 40.0-44.9, ADULT: ICD-10-CM

## 2022-09-21 DIAGNOSIS — K64.4 EXTERNAL HEMORRHOID, BLEEDING: ICD-10-CM

## 2022-09-21 DIAGNOSIS — M54.12 CERVICAL RADICULOPATHY: Chronic | ICD-10-CM

## 2022-09-21 DIAGNOSIS — I10 PRIMARY HYPERTENSION: Primary | ICD-10-CM

## 2022-09-21 PROCEDURE — 99215 PR OFFICE/OUTPT VISIT, EST, LEVL V, 40-54 MIN: ICD-10-PCS | Mod: S$PBB,,, | Performed by: INTERNAL MEDICINE

## 2022-09-21 PROCEDURE — 99215 OFFICE O/P EST HI 40 MIN: CPT | Mod: S$PBB,,, | Performed by: INTERNAL MEDICINE

## 2022-09-21 PROCEDURE — 99999 PR PBB SHADOW E&M-EST. PATIENT-LVL V: CPT | Mod: PBBFAC,,, | Performed by: INTERNAL MEDICINE

## 2022-09-21 PROCEDURE — 99215 OFFICE O/P EST HI 40 MIN: CPT | Mod: PBBFAC,PO | Performed by: INTERNAL MEDICINE

## 2022-09-21 PROCEDURE — 99999 PR PBB SHADOW E&M-EST. PATIENT-LVL V: ICD-10-PCS | Mod: PBBFAC,,, | Performed by: INTERNAL MEDICINE

## 2022-09-21 RX ORDER — HYDROCORTISONE ACETATE 25 MG/1
25 SUPPOSITORY RECTAL 2 TIMES DAILY
Qty: 20 SUPPOSITORY | Refills: 0 | Status: SHIPPED | OUTPATIENT
Start: 2022-09-21 | End: 2022-10-01

## 2022-09-21 RX ORDER — HYDROCODONE BITARTRATE AND ACETAMINOPHEN 10; 325 MG/1; MG/1
1 TABLET ORAL 2 TIMES DAILY PRN
Qty: 180 TABLET | Refills: 0 | Status: SHIPPED | OUTPATIENT
Start: 2022-10-03 | End: 2022-12-28 | Stop reason: SDUPTHER

## 2022-09-21 NOTE — PROGRESS NOTES
HPI:  Patient is a 67-year-old female comes today for follow-up of her hypertension, lipids, and for her annual physical.  Patient is 3 weeks status post gastric sleeve procedure.  Her only complaint at this time is severe problems with hemorrhoids.  She states that everything in flames him.  With the diet changes from the gastric sleeve procedure she has loose liquidy stools which she states in flames her hemorrhoids.  She has been able to lose 21 lb in the last 21 days after the procedure.  She has no other problems or complaints.  Current MEDS: medcard review, verified and update  Allergies: Per the electronic medical record    Past Medical History:   Diagnosis Date    Breast cancer 1996    right    Chronic pain syndrome     Generalized osteoarthrosis, involving multiple sites     s/p THR bilateral    History of breast cancer 2007    lumpectomy/XRT    HTN (hypertension)     Hyperlipidemia     Morbid obesity with BMI of 40.0-44.9, adult        Past Surgical History:   Procedure Laterality Date    ANTERIOR CERVICAL DISCECTOMY W/ FUSION Left 01/03/2022    Procedure: DISCECTOMY, SPINE, CERVICAL, ANTERIOR APPROACH, WITH FUSION;  Surgeon: Pradip Fernando MD;  Location: [facility] OR;  Service: Neurosurgery;  Laterality: Left;  Three Level ACDF  C4/5, 5/6, 6/7      BREAST LUMPECTOMY Right 2006    XRT    CATARACT EXTRACTION W/  INTRAOCULAR LENS IMPLANT Left 01/15/2020    CATARACT EXTRACTION W/  INTRAOCULAR LENS IMPLANT Right 01/29/2020    COLONOSCOPY N/A 10/15/2015    Procedure: COLONOSCOPY;  Surgeon: Maylin Shipley MD;  Location: [facility] ENDO;  Service: Endoscopy;  Laterality: N/A;    EPIDURAL STEROID INJECTION N/A 11/03/2020    Procedure: Lumbar L5/S1 IL KATYA;  Surgeon: Paul Lobato MD;  Location: [facility] PAIN MGT;  Service: Pain Management;  Laterality: N/A;    EPIDURAL STEROID INJECTION INTO CERVICAL SPINE N/A 09/25/2020    Procedure: C7/T1 IL KATYA;  Surgeon: Paul Lobato MD;  Location: [facility] PAIN MGT;  Service:  "Pain Management;  Laterality: N/A;    EPIDURAL STEROID INJECTION INTO CERVICAL SPINE N/A 10/05/2021    Procedure: C7/T1 IL KATYA with RN IV sedation;  Surgeon: Cynthia Soares MD;  Location: [facility] PAIN MGT;  Service: Pain Management;  Laterality: N/A;    ESOPHAGOGASTRODUODENOSCOPY N/A 11/04/2021    Procedure: ESOPHAGOGASTRODUODENOSCOPY (EGD);  Surgeon: Blas Hampton MD;  Location: [facility] ENDO;  Service: Endoscopy;  Laterality: N/A;    HYSTERECTOMY      LAPAROSCOPIC LYSIS OF ADHESIONS N/A 8/30/2022    Procedure: LYSIS, ADHESIONS, LAPAROSCOPIC;  Surgeon: Blas Hampton MD;  Location: [facility] OR;  Service: General;  Laterality: N/A;    PLACEMENT OF ACELLULAR HUMAN DERMAL ALLOGRAFT Left 01/03/2022    Procedure: APPLICATION, ACELLULAR HUMAN DERMAL ALLOGRAFT;  Surgeon: Pradip Fernando MD;  Location: [facility] OR;  Service: Neurosurgery;  Laterality: Left;    ROBOT-ASSISTED LAPAROSCOPIC SLEEVE GASTRECTOMY USING DA FLORENTINO XI N/A 8/30/2022    Procedure: XI ROBOTIC SLEEVE GASTRECTOMY;  Surgeon: Blas Hampton MD;  Location: [facility] OR;  Service: General;  Laterality: N/A;    TRANSFORAMINAL EPIDURAL INJECTION OF STEROID Left 09/17/2019    Procedure: Left C5/6 TF KATYA w/ RN IV sedation;  Surgeon: Paul Lobato MD;  Location: [facility] PAIN MGT;  Service: Pain Management;  Laterality: Left;       SHx: per the electronic medical record    FHx: recorded in the electronic medical record    ROS:    denies any chest pains or shortness of breath. Denies any nausea, vomiting or diarrhea. Denies any fever, chills or sweats. Denies any change in weight, voice, stool, skin or hair. Denies any dysuria, dyspepsia or dysphagia. Denies any change in vision, hearing or headaches. Denies any swollen lymph nodes or loss of memory.    PE:  /62 (BP Location: Right arm)   Pulse 62   Ht 5' 7" (1.702 m)   Wt 108.7 kg (239 lb 10.2 oz)   SpO2 100%   BMI 37.53 kg/m²   Gen: Well-developed, well-nourished, female, in no acute distress, oriented x3  HEENT: neck is " supple, no adenopathy, carotids 2+ equal without bruits, thyroid exam normal size without nodules.  CHEST: clear to auscultation and percussion  CVS: regular rate and rhythm without significant murmur, gallop, or rubs  ABD: soft, benign, no rebound no guarding, no distention. Bowel sounds are normal.     Nontender,  no palpable masses, no organomegaly and no audible bruits.  BREAST: no masses.  No nipple inversion, retraction, or deviation.  EXT: no clubbing, cyanosis, or edema  LYMPH: no cervical, inguinal, or axillary adenopathy  FEET: no loss of sensation.  No ulcers or pressure sores.  NEURO: gait normal.  Cranial nerves II- XII intact. No nystagmus.  Speech normal.   Gross motor and sensory unremarkable.  PELVIC: deferred  Rectal:  She has some mild external hemorrhoid at the 7 8 o'clock position.  No signs of bleeding at this time.  Internal exam reveals questionable internal hemorrhoids as well.    Lab Results   Component Value Date    WBC 11.02 08/31/2022    HGB 13.1 08/31/2022    HCT 41.3 08/31/2022     08/31/2022    CHOL 124 09/19/2022    TRIG 83 09/19/2022    HDL 41 09/19/2022    ALT 32 09/19/2022    AST 29 09/19/2022     09/19/2022    K 3.8 09/19/2022     09/19/2022    CREATININE 0.8 09/19/2022    BUN 14 09/19/2022    CO2 29 09/19/2022    TSH 2.801 09/19/2022    INR 1.0 12/15/2021    HGBA1C 6.3 (H) 08/23/2021       Impression:  Hypertension and lipids both well controlled  Status post gastric sleeve procedure, doing well  Symptomatic external hemorrhoids, patient requests seeing a surgeon about having them removed  Patient Active Problem List   Diagnosis    HTN (hypertension)    Generalized osteoarthrosis, involving multiple sites    History of breast cancer    Hyperlipidemia    Myofascial pain    Bilateral carpal tunnel syndrome    Lumbar radiculopathy    Chronic pain syndrome    Cervical radiculopathy    Morbid obesity with BMI of 40.0-44.9, adult    DDD (degenerative disc disease),  cervical       Plan:   Orders Placed This Encounter    Ambulatory referral/consult to Colorectal Surgery    HYDROcodone-acetaminophen (NORCO)  mg per tablet    hydrocortisone (ANUSOL-HC) 25 mg suppository     For now we will use Anusol hydrocortisone suppositories.  She was referred her request to see a colorectal surgeon.  Patient was given refills of the hydrocodone she takes chronically for her back.  She will see me again in 3 months.  Her other medications remain the same    This note is generated with speech recognition software and is subject to transcription error and sound alike phrases that may be missed by proofreading.

## 2022-09-23 ENCOUNTER — TELEPHONE (OUTPATIENT)
Dept: INTERNAL MEDICINE | Facility: CLINIC | Age: 67
End: 2022-09-23
Payer: MEDICARE

## 2022-09-23 ENCOUNTER — PATIENT MESSAGE (OUTPATIENT)
Dept: INTERNAL MEDICINE | Facility: CLINIC | Age: 67
End: 2022-09-23
Payer: MEDICARE

## 2022-09-26 ENCOUNTER — PATIENT MESSAGE (OUTPATIENT)
Dept: INTERNAL MEDICINE | Facility: CLINIC | Age: 67
End: 2022-09-26
Payer: MEDICARE

## 2022-09-26 RX ORDER — ZOLPIDEM TARTRATE 10 MG/1
TABLET ORAL
Qty: 20 TABLET | Refills: 5 | Status: SHIPPED | OUTPATIENT
Start: 2022-09-26 | End: 2023-04-26 | Stop reason: SDUPTHER

## 2022-09-26 NOTE — TELEPHONE ENCOUNTER
No new care gaps identified.  [facility] Embedded Care Gaps. Reference number: [id number]. 9/26/2022   12:13:56 PM CDT

## 2022-09-26 NOTE — TELEPHONE ENCOUNTER
Pt asking for a refill of ambien and wants to know if she can have a quantity of 30 not 20? Please advise

## 2022-10-03 ENCOUNTER — PATIENT MESSAGE (OUTPATIENT)
Dept: INTERNAL MEDICINE | Facility: CLINIC | Age: 67
End: 2022-10-03
Payer: MEDICARE

## 2022-10-03 DIAGNOSIS — Z12.31 SCREENING MAMMOGRAM FOR BREAST CANCER: Primary | ICD-10-CM

## 2022-10-11 ENCOUNTER — OFFICE VISIT (OUTPATIENT)
Dept: NEUROSURGERY | Facility: CLINIC | Age: 67
End: 2022-10-11
Payer: MEDICARE

## 2022-10-11 VITALS
HEIGHT: 67 IN | WEIGHT: 228.19 LBS | SYSTOLIC BLOOD PRESSURE: 99 MMHG | HEART RATE: 59 BPM | DIASTOLIC BLOOD PRESSURE: 54 MMHG | BODY MASS INDEX: 35.82 KG/M2 | RESPIRATION RATE: 18 BRPM

## 2022-10-11 DIAGNOSIS — Z98.1 S/P CERVICAL SPINAL FUSION: ICD-10-CM

## 2022-10-11 DIAGNOSIS — M48.062 LUMBAR STENOSIS WITH NEUROGENIC CLAUDICATION: Primary | ICD-10-CM

## 2022-10-11 DIAGNOSIS — M50.30 DEGENERATIVE DISC DISEASE, CERVICAL: ICD-10-CM

## 2022-10-11 DIAGNOSIS — M51.36 DEGENERATIVE DISC DISEASE, LUMBAR: ICD-10-CM

## 2022-10-11 DIAGNOSIS — M54.9 DORSALGIA, UNSPECIFIED: ICD-10-CM

## 2022-10-11 DIAGNOSIS — M54.16 LUMBAR RADICULOPATHY: ICD-10-CM

## 2022-10-11 PROCEDURE — 99214 OFFICE O/P EST MOD 30 MIN: CPT | Mod: S$PBB,,, | Performed by: NEUROLOGICAL SURGERY

## 2022-10-11 PROCEDURE — 99999 PR PBB SHADOW E&M-EST. PATIENT-LVL IV: ICD-10-PCS | Mod: PBBFAC,,, | Performed by: NEUROLOGICAL SURGERY

## 2022-10-11 PROCEDURE — 99999 PR PBB SHADOW E&M-EST. PATIENT-LVL IV: CPT | Mod: PBBFAC,,, | Performed by: NEUROLOGICAL SURGERY

## 2022-10-11 PROCEDURE — 99214 PR OFFICE/OUTPT VISIT, EST, LEVL IV, 30-39 MIN: ICD-10-PCS | Mod: S$PBB,,, | Performed by: NEUROLOGICAL SURGERY

## 2022-10-11 PROCEDURE — 99214 OFFICE O/P EST MOD 30 MIN: CPT | Mod: PBBFAC | Performed by: NEUROLOGICAL SURGERY

## 2022-10-11 RX ORDER — COVID-19 ANTIGEN TEST
KIT MISCELLANEOUS
COMMUNITY
Start: 2022-10-03 | End: 2023-04-03

## 2022-10-11 NOTE — PROGRESS NOTES
Subjective:      Patient ID: Maryviji Vo is a 67 y.o. female.    Chief Complaint: Pre-op Exam (Pt is here today possible pre op appointment pt is doing well 1/10. Pt denies PT & currently takes Norco & Gabapentin PRN for pain. )    Here today to discuss her back pain   2 mo ago had gastric sleeve   She has lost 20 pounds since then   Her back pain is 1/10   Takes baclofen/norco/gabapentin   Ambulates unassisted   She gets intermittent bouts of lumbar spasms where she cant get out of bed  Symptoms radiate through the legs with the L greater than the R to the ankles       No problems from her cervical spine     MR lumbar   Severe multilevel multifactorial degenerative changes are present greatest at L4-L5 and L3-L4 followed by L5-S1.  Findings are progressed overall.          DEXA ordered     Review of Systems   Constitutional:  Negative for activity change, appetite change and chills.   HENT:  Negative for hearing loss, sore throat and tinnitus.    Eyes:  Negative for pain, discharge and itching.   Cardiovascular:  Negative for chest pain.   Gastrointestinal:  Negative for abdominal pain.   Endocrine: Negative for cold intolerance and heat intolerance.   Genitourinary:  Negative for difficulty urinating and dysuria.   Musculoskeletal:  Positive for back pain and gait problem.   Allergic/Immunologic: Negative for environmental allergies.   Neurological:  Negative for dizziness, tremors, light-headedness and headaches.   Hematological:  Negative for adenopathy.   Psychiatric/Behavioral:  Negative for agitation, behavioral problems and confusion.        Objective:       Neurosurgery Physical Exam  Ortho/SPM Exam  Ortho Exam    Nursing note and vitals reviewed  Gen:Oriented to person, place, and time.             Appears stated age   Skin: no rashes or lesions identified   Head:Normocephalic and atraumatic.  Nose: Nose normal.    Eyes: EOM are normal. Pupils are equal, round, and reactive to light.   Neck: Neck  supple. No masses or lesions palpated  Cardiovascular: Intact distal pulses.    Abdominal: Soft.   Neurological: Alert and oriented to person, place, and time.  No cranial nerve deficit.  Coordination normal. Normal muscle tone  Psychiatric: Normal mood and affect. Behavior is normal.      Back:       None  Paraspinal muscle spasms   None  Pain with flexion and extention   WNL Limited secondary to pain  Range of motion    Neg  Straight leg raise     Motor   Right Right Left Left  Level Group   5  5  L2 Hip flexor (Psoas)   5  5  L3 Leg extension (Quads)   5  5  L4 Dorsiflexion & foot inversion (Tibialis Anterior)   5  5  L5 Great toe extension ( EHL)   5  5  S1 Foot eversion (Gastroc, PL & PB)     Sensation  NL Decreased (R/L/BL) Level Sensation    X  L2 Anterio-medial thigh   X  L3 Medial thigh around knee   X  L4 Medial foot   X  L5 Dorsum foot   X  S1 Lateral foot     Reflex  2+  Patellar tendon (L4)   2+  Achilles tendon (S1)           MRI Lumbar Spine Without Contrast        L2-L3: Disc bulge and facet arthropathy contributes to moderate left and mild right neural foraminal narrowing and mild spinal canal stenosis.    L3-L4: Disc bulge and facet arthropathy contributes to severe right and moderate left neural foraminal narrowing and severe spinal canal stenosis with clumping/compression of the cauda martina nerve roots, progressed in the interval.    L4-L5: Disc bulge and facet arthropathy and redundancy of ligamentum flavum contributes to severe spinal canal stenosis with clumping/compression of the cauda martina nerve roots, similar to prior study severe bilateral neural foraminal narrowing.    L5-S1: Disc bulge and facet arthropathy a with mild bilateral neural foraminal narrowing and mild spinal canal stenosis with narrowing of the left lateral recess with compression of the traversing left S1 nerve root    Paraspinal muscles & soft tissues: Unremarkable.    Impression  Severe multilevel multifactorial  degenerative changes are present greatest at L4-L5 and L3-L4 followed by L5-S1.  Findings are progressed overall.          Assessment:     1. Lumbar stenosis with neurogenic claudication    2. Degenerative disc disease, lumbar    3. Lumbar radiculopathy    4. S/P cervical spinal fusion    5. Dorsalgia, unspecified    6. Degenerative disc disease, cervical      Plan:     Lumbar stenosis with neurogenic claudication    Degenerative disc disease, lumbar    Lumbar radiculopathy    S/P cervical spinal fusion    Dorsalgia, unspecified    Degenerative disc disease, cervical    Patient with severe DDD stenosis and radiculopathy     She has made good progress with weight loss after her gastric sleeve procedure and continues to lose weight     Worse L3-4/4-5 but also L 5-S1 with l foraminal steosis at this time I have recommended at TLIF L3-4/4-5 possibly L5-S1     She understands and wished to consider this procedure after the first of of the year   Will order a CT lumbar for pre operative planning   Standing XR CTL spine for global alignment       Thank you for the referral   Please call with any questions    Pradip Fernando MD  Neurosurgery     Disclaimer: This note was prepared using a voice recognition system and is likely to have sound alike errors within the text.

## 2022-10-28 ENCOUNTER — HOSPITAL ENCOUNTER (OUTPATIENT)
Dept: RADIOLOGY | Facility: HOSPITAL | Age: 67
Discharge: HOME | End: 2022-10-28
Attending: INTERNAL MEDICINE
Payer: MEDICARE

## 2022-10-28 ENCOUNTER — PATIENT MESSAGE (OUTPATIENT)
Dept: INTERNAL MEDICINE | Facility: CLINIC | Age: 67
End: 2022-10-28
Payer: MEDICARE

## 2022-10-28 VITALS — BODY MASS INDEX: 35.74 KG/M2 | HEIGHT: 67 IN

## 2022-10-28 DIAGNOSIS — Z12.31 SCREENING MAMMOGRAM FOR BREAST CANCER: ICD-10-CM

## 2022-10-28 PROCEDURE — 77063 BREAST TOMOSYNTHESIS BI: CPT | Mod: TC

## 2022-10-28 PROCEDURE — 77063 BREAST TOMOSYNTHESIS BI: CPT | Mod: 26,,, | Performed by: RADIOLOGY

## 2022-10-28 PROCEDURE — 77067 SCR MAMMO BI INCL CAD: CPT | Mod: TC

## 2022-10-28 PROCEDURE — 77067 SCR MAMMO BI INCL CAD: CPT | Mod: 26,,, | Performed by: RADIOLOGY

## 2022-10-28 PROCEDURE — 77063 MAMMO DIGITAL SCREENING BILAT WITH TOMO: ICD-10-PCS | Mod: 26,,, | Performed by: RADIOLOGY

## 2022-10-28 PROCEDURE — 77067 MAMMO DIGITAL SCREENING BILAT WITH TOMO: ICD-10-PCS | Mod: 26,,, | Performed by: RADIOLOGY

## 2022-11-07 ENCOUNTER — PATIENT MESSAGE (OUTPATIENT)
Dept: INTERNAL MEDICINE | Facility: CLINIC | Age: 67
End: 2022-11-07
Payer: MEDICARE

## 2022-11-07 ENCOUNTER — OFFICE VISIT (OUTPATIENT)
Dept: SURGERY | Facility: CLINIC | Age: 67
End: 2022-11-07
Payer: MEDICARE

## 2022-11-07 VITALS
HEART RATE: 57 BPM | SYSTOLIC BLOOD PRESSURE: 125 MMHG | TEMPERATURE: 97 F | DIASTOLIC BLOOD PRESSURE: 70 MMHG | WEIGHT: 220.69 LBS | BODY MASS INDEX: 34.56 KG/M2

## 2022-11-07 DIAGNOSIS — Z12.11 ENCOUNTER FOR SCREENING FOR COLORECTAL CANCER IN HIGH RISK PATIENT: Primary | ICD-10-CM

## 2022-11-07 DIAGNOSIS — Z12.12 ENCOUNTER FOR SCREENING FOR COLORECTAL CANCER IN HIGH RISK PATIENT: Primary | ICD-10-CM

## 2022-11-07 DIAGNOSIS — Z91.89 ENCOUNTER FOR SCREENING FOR COLORECTAL CANCER IN HIGH RISK PATIENT: Primary | ICD-10-CM

## 2022-11-07 DIAGNOSIS — K64.4 EXTERNAL HEMORRHOID, BLEEDING: ICD-10-CM

## 2022-11-07 PROCEDURE — 99204 PR OFFICE/OUTPT VISIT, NEW, LEVL IV, 45-59 MIN: ICD-10-PCS | Mod: S$PBB,,, | Performed by: COLON & RECTAL SURGERY

## 2022-11-07 PROCEDURE — 99999 PR PBB SHADOW E&M-EST. PATIENT-LVL IV: ICD-10-PCS | Mod: PBBFAC,,, | Performed by: COLON & RECTAL SURGERY

## 2022-11-07 PROCEDURE — 99204 OFFICE O/P NEW MOD 45 MIN: CPT | Mod: S$PBB,,, | Performed by: COLON & RECTAL SURGERY

## 2022-11-07 PROCEDURE — 99214 OFFICE O/P EST MOD 30 MIN: CPT | Mod: PBBFAC | Performed by: COLON & RECTAL SURGERY

## 2022-11-07 PROCEDURE — 99999 PR PBB SHADOW E&M-EST. PATIENT-LVL IV: CPT | Mod: PBBFAC,,, | Performed by: COLON & RECTAL SURGERY

## 2022-11-07 RX ORDER — SODIUM, POTASSIUM,MAG SULFATES 17.5-3.13G
1 SOLUTION, RECONSTITUTED, ORAL ORAL DAILY
Qty: 1 KIT | Refills: 0 | Status: SHIPPED | OUTPATIENT
Start: 2022-11-07 | End: 2022-11-09

## 2022-11-07 NOTE — PROGRESS NOTES
History & Physical    SUBJECTIVE:     History of Present Illness:  Patient is a 67 y.o. female presents for rectal pain with constipation. She had rectal pain with bowel movements which improved with sitz baths. She had a gastric sleeve with Dr Hampton in August 2022. Reports constipation and anal pain. Denies blood in stools. Last c-scope in 2015 without polyps. Brother has a history of polyps. No family hx of CRC or IBD. Has a bowel movement every 4-5 days, denies straining or hard stools, sits on toilet for <5mins, drinks less than 64 oz water daily, uses toilet paper, does not use fiber, takes a stool softener daily.     Chief Complaint   Patient presents with    Hemorrhoids     hemorrhoids    Constipation       Review of patient's allergies indicates:  No Known Allergies    Current Outpatient Medications   Medication Sig Dispense Refill    ALPRAZolam (XANAX) 1 MG tablet TAKE 1 TABLET BY MOUTH NIGHTLY AS NEEDED FOR ANXIETY 30 tablet 5    baclofen (LIORESAL) 10 MG tablet Take 1 tablet (10 mg total) by mouth 3 (three) times daily. 90 tablet 0    citalopram (CELEXA) 40 MG tablet TAKE ONE TABLET BY MOUTH DAILY 90 tablet 3    cloNIDine (CATAPRES) 0.2 MG tablet TAKE 1 TABLET BY MOUTH EVERY EVENING (Patient taking differently: Take 0.2 mg by mouth every evening.) 90 tablet 4    ERGOCALCIFEROL, VITAMIN D2, (VITAMIN D ORAL) Take 1,000 Units by mouth once daily.      FLOWFLEX COVID-19 AG HOME TEST Kit AS DIRECTED      fluticasone propionate (FLONASE) 50 mcg/actuation nasal spray 2 sprays (100 mcg total) by Each Nostril route once daily. 48 g 3    furosemide (LASIX) 40 MG tablet TAKE ONE TABLET BY MOUTH ONCE A DAY AS NEEDED 19 tablet 11    HYDROcodone-acetaminophen (NORCO)  mg per tablet Take 1 tablet by mouth 2 (two) times daily as needed. 180 tablet 0    losartan-hydrochlorothiazide 100-25 mg (HYZAAR) 100-25 mg per tablet TAKE ONE TABLET BY MOUTH ONCE A DAY 90 tablet 3    multivitamin capsule Take 1 capsule by mouth  once daily.      potassium chloride SA (K-DUR,KLOR-CON, K-TAB) 20 MEQ tablet Take 1 tablet (20 mEq total) by mouth once daily. 90 tablet 3    pravastatin (PRAVACHOL) 40 MG tablet TAKE ONE TABLET BY MOUTH DAILY 90 tablet 1    senna-docusate 8.6-50 mg (PERICOLACE) 8.6-50 mg per tablet Take 1 tablet by mouth 2 (two) times daily as needed for Constipation. 20 tablet 0    zolpidem (AMBIEN) 10 mg Tab TAKE ONE TABLET BY MOUTH AT BEDTIME AS NEEDED Strength: 10 mg 20 tablet 5    gabapentin (NEURONTIN) 300 MG capsule Take 1 capsule (300 mg total) by mouth once daily AND 1 capsule (300 mg total) after lunch AND 2 capsules (600 mg total) every evening. (Patient taking differently: Take 1 capsule (300 mg total) by mouth once daily AND 1 capsule (300 mg total) after lunch AND 2 capsules (600 mg total) every evening.    8/17/22: takes in evening only) 360 capsule 3    pantoprazole (PROTONIX) 40 MG tablet Take 1 tablet (40 mg total) by mouth 2 (two) times daily. for 14 days 28 tablet 0    sodium,potassium,mag sulfates (SUPREP BOWEL PREP KIT) 17.5-3.13-1.6 gram SolR Take 177 mLs by mouth once daily. for 2 days 1 kit 0     No current facility-administered medications for this visit.       Past Medical History:   Diagnosis Date    Breast cancer 1996    right    Chronic pain syndrome     Generalized osteoarthrosis, involving multiple sites     s/p THR bilateral    History of breast cancer 2007    lumpectomy/XRT    HTN (hypertension)     Hyperlipidemia     Morbid obesity with BMI of 40.0-44.9, adult      Past Surgical History:   Procedure Laterality Date    ANTERIOR CERVICAL DISCECTOMY W/ FUSION Left 01/03/2022    Procedure: DISCECTOMY, SPINE, CERVICAL, ANTERIOR APPROACH, WITH FUSION;  Surgeon: Pradip Fernando MD;  Location: [facility];  Service: Neurosurgery;  Laterality: Left;  Three Level ACDF  C4/5, 5/6, 6/7      BREAST LUMPECTOMY Right 2006    XRT    CATARACT EXTRACTION W/  INTRAOCULAR LENS IMPLANT Left 01/15/2020    CATARACT  EXTRACTION W/  INTRAOCULAR LENS IMPLANT Right 01/29/2020    COLONOSCOPY N/A 10/15/2015    Procedure: COLONOSCOPY;  Surgeon: Maylin Shipley MD;  Location: [facility] ENDO;  Service: Endoscopy;  Laterality: N/A;    EPIDURAL STEROID INJECTION N/A 11/03/2020    Procedure: Lumbar L5/S1 IL KATYA;  Surgeon: Paul Lobato MD;  Location: [facility] PAIN MGT;  Service: Pain Management;  Laterality: N/A;    EPIDURAL STEROID INJECTION INTO CERVICAL SPINE N/A 09/25/2020    Procedure: C7/T1 IL KATYA;  Surgeon: Paul Lobato MD;  Location: [facility] PAIN MGT;  Service: Pain Management;  Laterality: N/A;    EPIDURAL STEROID INJECTION INTO CERVICAL SPINE N/A 10/05/2021    Procedure: C7/T1 IL KATYA with RN IV sedation;  Surgeon: Cynthia Soares MD;  Location: [facility] PAIN MGT;  Service: Pain Management;  Laterality: N/A;    ESOPHAGOGASTRODUODENOSCOPY N/A 11/04/2021    Procedure: ESOPHAGOGASTRODUODENOSCOPY (EGD);  Surgeon: Blas Hampton MD;  Location: [facility] ENDO;  Service: Endoscopy;  Laterality: N/A;    HYSTERECTOMY      LAPAROSCOPIC LYSIS OF ADHESIONS N/A 8/30/2022    Procedure: LYSIS, ADHESIONS, LAPAROSCOPIC;  Surgeon: Blas Hampton MD;  Location: [facility] OR;  Service: General;  Laterality: N/A;    PLACEMENT OF ACELLULAR HUMAN DERMAL ALLOGRAFT Left 01/03/2022    Procedure: APPLICATION, ACELLULAR HUMAN DERMAL ALLOGRAFT;  Surgeon: Pradip Fernando MD;  Location: [facility] OR;  Service: Neurosurgery;  Laterality: Left;    ROBOT-ASSISTED LAPAROSCOPIC SLEEVE GASTRECTOMY USING DA FLORENTINO XI N/A 8/30/2022    Procedure: XI ROBOTIC SLEEVE GASTRECTOMY;  Surgeon: Blas Hampton MD;  Location: [facility] OR;  Service: General;  Laterality: N/A;    TRANSFORAMINAL EPIDURAL INJECTION OF STEROID Left 09/17/2019    Procedure: Left C5/6 TF KATYA w/ RN IV sedation;  Surgeon: Paul Lobato MD;  Location: [facility] PAIN MGT;  Service: Pain Management;  Laterality: Left;     Family History   Problem Relation Age of Onset    Cancer Mother     Diabetes Mother     Hyperlipidemia Father      Hypertension Father     Breast cancer Sister     Breast cancer Sister     Breast cancer Sister     Breast cancer Sister     Eczema Neg Hx     Lupus Neg Hx     Psoriasis Neg Hx     Melanoma Neg Hx      Social History     Tobacco Use    Smoking status: Former    Smokeless tobacco: Never   Substance Use Topics    Alcohol use: Never    Drug use: No        Review of Systems:  Review of Systems   Constitutional: Negative.  Negative for activity change, appetite change, chills, fatigue, fever and unexpected weight change.   HENT: Negative.  Negative for congestion, ear pain, sore throat and trouble swallowing.    Eyes: Negative.  Negative for pain, redness and itching.   Respiratory: Negative.  Negative for cough, shortness of breath and wheezing.    Cardiovascular: Negative.  Negative for chest pain, palpitations and leg swelling.   Gastrointestinal:  Positive for constipation and rectal pain. Negative for abdominal distention, abdominal pain, anal bleeding, blood in stool, diarrhea, nausea and vomiting.   Endocrine: Negative.  Negative for cold intolerance, heat intolerance and polyuria.   Genitourinary: Negative.  Negative for dysuria, flank pain, frequency and hematuria.   Musculoskeletal: Negative.  Negative for gait problem, joint swelling and neck pain.   Skin: Negative.  Negative for color change, rash and wound.   Allergic/Immunologic: Negative.  Negative for environmental allergies and immunocompromised state.   Neurological: Negative.  Negative for dizziness, speech difficulty, weakness and numbness.   Hematological: Negative.    Psychiatric/Behavioral: Negative.  Negative for agitation, confusion and hallucinations.    All other systems reviewed and are negative.    OBJECTIVE:     Vital Signs (Most Recent)  Temp: 97.2 °F (36.2 °C) (11/07/22 1449)  Pulse: (!) 57 (11/07/22 1449)  BP: 125/70 (11/07/22 1449)     100.1 kg (220 lb 10.9 oz)     Physical Exam:  Physical Exam  Vitals and nursing note reviewed.    Constitutional:       Appearance: Normal appearance. She is well-developed.   HENT:      Head: Normocephalic and atraumatic.      Nose: Nose normal.   Eyes:      Conjunctiva/sclera: Conjunctivae normal.   Neck:      Thyroid: No thyromegaly.   Cardiovascular:      Rate and Rhythm: Regular rhythm. Bradycardia present.   Pulmonary:      Effort: Pulmonary effort is normal. No respiratory distress.   Abdominal:      General: Abdomen is flat. There is no distension.      Palpations: Abdomen is soft. There is no mass.      Tenderness: There is no abdominal tenderness.   Genitourinary:     Comments: Anorectal: offered but deferred  Musculoskeletal:         General: No tenderness. Normal range of motion.      Cervical back: Normal range of motion and neck supple.   Skin:     General: Skin is warm and dry.      Capillary Refill: Capillary refill takes less than 2 seconds.      Findings: No rash.   Neurological:      Mental Status: She is alert and oriented to person, place, and time.       Laboratory  CBC: Reviewed  CMP: Reviewed      Diagnostic Results:  - Colonoscopy 2015 reviewed    ASSESSMENT/PLAN:     68yo female with rectal pain with bowel movements, constipation, and due for screening colonoscopy.    - Colonoscopy scheduled for 11/30/22  - Suprep sent to pharmacy  -Discussed that a minimum I would recommend behavioral, lifestyle and medication modifications to improve bowel habits. Usual bowel management handout given to patient. This includes a stool softener twice per day, fiber powder supplementation daily, drinking at least 64oz of water/day, avoiding straining with bowel movements, spending less than 5 min on toilet per bowel movement, eating a high fiber diet, using miralax as needed to achieve a bowel movement daily and using wet wipes to wipe after bowel movements when irritated.   - RTC 2 weeks following colonoscopy    Gary Toussaint MD  Colon and Rectal Surgery  [facility]

## 2022-11-07 NOTE — H&P (VIEW-ONLY)
History & Physical    SUBJECTIVE:     History of Present Illness:  Patient is a 67 y.o. female presents for rectal pain with constipation. She had rectal pain with bowel movements which improved with sitz baths. She had a gastric sleeve with Dr Hampton in August 2022. Reports constipation and anal pain. Denies blood in stools. Last c-scope in 2015 without polyps. Brother has a history of polyps. No family hx of CRC or IBD. Has a bowel movement every 4-5 days, denies straining or hard stools, sits on toilet for <5mins, drinks less than 64 oz water daily, uses toilet paper, does not use fiber, takes a stool softener daily.     Chief Complaint   Patient presents with    Hemorrhoids     hemorrhoids    Constipation       Review of patient's allergies indicates:  No Known Allergies    Current Outpatient Medications   Medication Sig Dispense Refill    ALPRAZolam (XANAX) 1 MG tablet TAKE 1 TABLET BY MOUTH NIGHTLY AS NEEDED FOR ANXIETY 30 tablet 5    baclofen (LIORESAL) 10 MG tablet Take 1 tablet (10 mg total) by mouth 3 (three) times daily. 90 tablet 0    citalopram (CELEXA) 40 MG tablet TAKE ONE TABLET BY MOUTH DAILY 90 tablet 3    cloNIDine (CATAPRES) 0.2 MG tablet TAKE 1 TABLET BY MOUTH EVERY EVENING (Patient taking differently: Take 0.2 mg by mouth every evening.) 90 tablet 4    ERGOCALCIFEROL, VITAMIN D2, (VITAMIN D ORAL) Take 1,000 Units by mouth once daily.      FLOWFLEX COVID-19 AG HOME TEST Kit AS DIRECTED      fluticasone propionate (FLONASE) 50 mcg/actuation nasal spray 2 sprays (100 mcg total) by Each Nostril route once daily. 48 g 3    furosemide (LASIX) 40 MG tablet TAKE ONE TABLET BY MOUTH ONCE A DAY AS NEEDED 19 tablet 11    HYDROcodone-acetaminophen (NORCO)  mg per tablet Take 1 tablet by mouth 2 (two) times daily as needed. 180 tablet 0    losartan-hydrochlorothiazide 100-25 mg (HYZAAR) 100-25 mg per tablet TAKE ONE TABLET BY MOUTH ONCE A DAY 90 tablet 3    multivitamin capsule Take 1 capsule by mouth  once daily.      potassium chloride SA (K-DUR,KLOR-CON, K-TAB) 20 MEQ tablet Take 1 tablet (20 mEq total) by mouth once daily. 90 tablet 3    pravastatin (PRAVACHOL) 40 MG tablet TAKE ONE TABLET BY MOUTH DAILY 90 tablet 1    senna-docusate 8.6-50 mg (PERICOLACE) 8.6-50 mg per tablet Take 1 tablet by mouth 2 (two) times daily as needed for Constipation. 20 tablet 0    zolpidem (AMBIEN) 10 mg Tab TAKE ONE TABLET BY MOUTH AT BEDTIME AS NEEDED Strength: 10 mg 20 tablet 5    gabapentin (NEURONTIN) 300 MG capsule Take 1 capsule (300 mg total) by mouth once daily AND 1 capsule (300 mg total) after lunch AND 2 capsules (600 mg total) every evening. (Patient taking differently: Take 1 capsule (300 mg total) by mouth once daily AND 1 capsule (300 mg total) after lunch AND 2 capsules (600 mg total) every evening.    8/17/22: takes in evening only) 360 capsule 3    pantoprazole (PROTONIX) 40 MG tablet Take 1 tablet (40 mg total) by mouth 2 (two) times daily. for 14 days 28 tablet 0    sodium,potassium,mag sulfates (SUPREP BOWEL PREP KIT) 17.5-3.13-1.6 gram SolR Take 177 mLs by mouth once daily. for 2 days 1 kit 0     No current facility-administered medications for this visit.       Past Medical History:   Diagnosis Date    Breast cancer 1996    right    Chronic pain syndrome     Generalized osteoarthrosis, involving multiple sites     s/p THR bilateral    History of breast cancer 2007    lumpectomy/XRT    HTN (hypertension)     Hyperlipidemia     Morbid obesity with BMI of 40.0-44.9, adult      Past Surgical History:   Procedure Laterality Date    ANTERIOR CERVICAL DISCECTOMY W/ FUSION Left 01/03/2022    Procedure: DISCECTOMY, SPINE, CERVICAL, ANTERIOR APPROACH, WITH FUSION;  Surgeon: Pradip Fernando MD;  Location: [facility];  Service: Neurosurgery;  Laterality: Left;  Three Level ACDF  C4/5, 5/6, 6/7      BREAST LUMPECTOMY Right 2006    XRT    CATARACT EXTRACTION W/  INTRAOCULAR LENS IMPLANT Left 01/15/2020    CATARACT  EXTRACTION W/  INTRAOCULAR LENS IMPLANT Right 01/29/2020    COLONOSCOPY N/A 10/15/2015    Procedure: COLONOSCOPY;  Surgeon: Maylin Shipley MD;  Location: [facility] ENDO;  Service: Endoscopy;  Laterality: N/A;    EPIDURAL STEROID INJECTION N/A 11/03/2020    Procedure: Lumbar L5/S1 IL KATYA;  Surgeon: Paul Lobato MD;  Location: [facility] PAIN MGT;  Service: Pain Management;  Laterality: N/A;    EPIDURAL STEROID INJECTION INTO CERVICAL SPINE N/A 09/25/2020    Procedure: C7/T1 IL KATYA;  Surgeon: Paul Loabto MD;  Location: [facility] PAIN MGT;  Service: Pain Management;  Laterality: N/A;    EPIDURAL STEROID INJECTION INTO CERVICAL SPINE N/A 10/05/2021    Procedure: C7/T1 IL KATYA with RN IV sedation;  Surgeon: Cynthia Soares MD;  Location: [facility] PAIN MGT;  Service: Pain Management;  Laterality: N/A;    ESOPHAGOGASTRODUODENOSCOPY N/A 11/04/2021    Procedure: ESOPHAGOGASTRODUODENOSCOPY (EGD);  Surgeon: Blas Hampton MD;  Location: [facility] ENDO;  Service: Endoscopy;  Laterality: N/A;    HYSTERECTOMY      LAPAROSCOPIC LYSIS OF ADHESIONS N/A 8/30/2022    Procedure: LYSIS, ADHESIONS, LAPAROSCOPIC;  Surgeon: Blas Hampton MD;  Location: [facility] OR;  Service: General;  Laterality: N/A;    PLACEMENT OF ACELLULAR HUMAN DERMAL ALLOGRAFT Left 01/03/2022    Procedure: APPLICATION, ACELLULAR HUMAN DERMAL ALLOGRAFT;  Surgeon: Pradip Fernando MD;  Location: [facility] OR;  Service: Neurosurgery;  Laterality: Left;    ROBOT-ASSISTED LAPAROSCOPIC SLEEVE GASTRECTOMY USING DA FLORENTINO XI N/A 8/30/2022    Procedure: XI ROBOTIC SLEEVE GASTRECTOMY;  Surgeon: Blas Hampton MD;  Location: [facility] OR;  Service: General;  Laterality: N/A;    TRANSFORAMINAL EPIDURAL INJECTION OF STEROID Left 09/17/2019    Procedure: Left C5/6 TF KATYA w/ RN IV sedation;  Surgeon: Paul Lobato MD;  Location: [facility] PAIN MGT;  Service: Pain Management;  Laterality: Left;     Family History   Problem Relation Age of Onset    Cancer Mother     Diabetes Mother     Hyperlipidemia Father      Hypertension Father     Breast cancer Sister     Breast cancer Sister     Breast cancer Sister     Breast cancer Sister     Eczema Neg Hx     Lupus Neg Hx     Psoriasis Neg Hx     Melanoma Neg Hx      Social History     Tobacco Use    Smoking status: Former    Smokeless tobacco: Never   Substance Use Topics    Alcohol use: Never    Drug use: No        Review of Systems:  Review of Systems   Constitutional: Negative.  Negative for activity change, appetite change, chills, fatigue, fever and unexpected weight change.   HENT: Negative.  Negative for congestion, ear pain, sore throat and trouble swallowing.    Eyes: Negative.  Negative for pain, redness and itching.   Respiratory: Negative.  Negative for cough, shortness of breath and wheezing.    Cardiovascular: Negative.  Negative for chest pain, palpitations and leg swelling.   Gastrointestinal:  Positive for constipation and rectal pain. Negative for abdominal distention, abdominal pain, anal bleeding, blood in stool, diarrhea, nausea and vomiting.   Endocrine: Negative.  Negative for cold intolerance, heat intolerance and polyuria.   Genitourinary: Negative.  Negative for dysuria, flank pain, frequency and hematuria.   Musculoskeletal: Negative.  Negative for gait problem, joint swelling and neck pain.   Skin: Negative.  Negative for color change, rash and wound.   Allergic/Immunologic: Negative.  Negative for environmental allergies and immunocompromised state.   Neurological: Negative.  Negative for dizziness, speech difficulty, weakness and numbness.   Hematological: Negative.    Psychiatric/Behavioral: Negative.  Negative for agitation, confusion and hallucinations.    All other systems reviewed and are negative.    OBJECTIVE:     Vital Signs (Most Recent)  Temp: 97.2 °F (36.2 °C) (11/07/22 1449)  Pulse: (!) 57 (11/07/22 1449)  BP: 125/70 (11/07/22 1449)     100.1 kg (220 lb 10.9 oz)     Physical Exam:  Physical Exam  Vitals and nursing note reviewed.    Constitutional:       Appearance: Normal appearance. She is well-developed.   HENT:      Head: Normocephalic and atraumatic.      Nose: Nose normal.   Eyes:      Conjunctiva/sclera: Conjunctivae normal.   Neck:      Thyroid: No thyromegaly.   Cardiovascular:      Rate and Rhythm: Regular rhythm. Bradycardia present.   Pulmonary:      Effort: Pulmonary effort is normal. No respiratory distress.   Abdominal:      General: Abdomen is flat. There is no distension.      Palpations: Abdomen is soft. There is no mass.      Tenderness: There is no abdominal tenderness.   Genitourinary:     Comments: Anorectal: offered but deferred  Musculoskeletal:         General: No tenderness. Normal range of motion.      Cervical back: Normal range of motion and neck supple.   Skin:     General: Skin is warm and dry.      Capillary Refill: Capillary refill takes less than 2 seconds.      Findings: No rash.   Neurological:      Mental Status: She is alert and oriented to person, place, and time.       Laboratory  CBC: Reviewed  CMP: Reviewed      Diagnostic Results:  - Colonoscopy 2015 reviewed    ASSESSMENT/PLAN:     66yo female with rectal pain with bowel movements, constipation, and due for screening colonoscopy.    - Colonoscopy scheduled for 11/30/22  - Suprep sent to pharmacy  -Discussed that a minimum I would recommend behavioral, lifestyle and medication modifications to improve bowel habits. Usual bowel management handout given to patient. This includes a stool softener twice per day, fiber powder supplementation daily, drinking at least 64oz of water/day, avoiding straining with bowel movements, spending less than 5 min on toilet per bowel movement, eating a high fiber diet, using miralax as needed to achieve a bowel movement daily and using wet wipes to wipe after bowel movements when irritated.   - RTC 2 weeks following colonoscopy    Gary Toussaint MD  Colon and Rectal Surgery  [facility]

## 2022-11-09 ENCOUNTER — CLINICAL SUPPORT (OUTPATIENT)
Dept: INTERNAL MEDICINE | Facility: CLINIC | Age: 67
End: 2022-11-09
Payer: MEDICARE

## 2022-11-09 DIAGNOSIS — Z98.84 STATUS POST BARIATRIC SURGERY: ICD-10-CM

## 2022-11-09 DIAGNOSIS — Z71.3 DIETARY COUNSELING: Primary | ICD-10-CM

## 2022-11-09 NOTE — PROGRESS NOTES
The patient location is: [place]  The chief complaint leading to consultation is: post op bariatric surgery    Visit type: audio only    Face to Face time with patient: 15 minutes  20 minutes of total time spent on the encounter, which includes face to face time and non-face to face time preparing to see the patient (eg, review of tests), Obtaining and/or reviewing separately obtained history, Documenting clinical information in the electronic or other health record, Independently interpreting results (not separately reported) and communicating results to the patient/family/caregiver, or Care coordination (not separately reported).         Each patient to whom he or she provides medical services by telemedicine is:  (1) informed of the relationship between the physician and patient and the respective role of any other health care provider with respect to management of the patient; and (2) notified that he or she may decline to receive medical services by telemedicine and may withdraw from such care at any time.    Notes:         Patient reports weight loss stall since having gastric sleeve surgery on 8-.  Reports weighing 220 lbs for the last 4 weeks.  Despite lack of weight loss on scale, reports clothes continue to fit better.  Reports being consistent with 80 grams protein daily, small portions, and twice weekly exercise consisting of ab exercises, upper body exercises, leg exercises, and walking.     Typical intake:  1st meal: protein shake or yogurt  2nd meal: small serving baked fish + sqaush  3rd meal: protein shake  And may have a few grapes before bed.  Water throughout the day.     Inconsistent bowel movement schedule. Scheduled for a colonoscopy soon.   Daily intake of Fiber supplement + stool softener.    Questions what else needs to be done in order to see weight loss results again.         BARIATRIC DIET DISCUSSION:  Reinforced post-op nutrition guidelines.    PLAN / RECOMMENDATIONS:  Track  daily caloric intake for 3 days, aim for 800-1000 calories daily. If currently over intake, then decrease to fit within range.  Increase walking to 3 days weekly.  Increase water intake.  Made patient aware a weight stall is typical at current stage post-op. Encouraged patient to push through and incorporate those few changes.     Return to clinic in 1 months or sooner if needed.    SESSION TIME: 15 minutes

## 2022-11-18 ENCOUNTER — PATIENT MESSAGE (OUTPATIENT)
Dept: RESEARCH | Facility: HOSPITAL | Age: 67
End: 2022-11-18
Payer: MEDICARE

## 2022-11-30 ENCOUNTER — HOSPITAL ENCOUNTER (OUTPATIENT)
Facility: HOSPITAL | Age: 67
Discharge: HOME | End: 2022-11-30
Attending: COLON & RECTAL SURGERY | Admitting: COLON & RECTAL SURGERY
Payer: MEDICARE

## 2022-11-30 ENCOUNTER — ANESTHESIA EVENT (OUTPATIENT)
Dept: ENDOSCOPY | Facility: HOSPITAL | Age: 67
End: 2022-11-30
Payer: MEDICARE

## 2022-11-30 ENCOUNTER — ANESTHESIA (OUTPATIENT)
Dept: ENDOSCOPY | Facility: HOSPITAL | Age: 67
End: 2022-11-30
Payer: MEDICARE

## 2022-11-30 DIAGNOSIS — Z12.11 SCREENING FOR COLON CANCER: ICD-10-CM

## 2022-11-30 PROCEDURE — 37000008 HC ANESTHESIA 1ST 15 MINUTES: Performed by: COLON & RECTAL SURGERY

## 2022-11-30 PROCEDURE — G0105 COLORECTAL SCRN; HI RISK IND: HCPCS | Performed by: COLON & RECTAL SURGERY

## 2022-11-30 PROCEDURE — G0105 COLORECTAL SCRN; HI RISK IND: ICD-10-PCS | Mod: ,,, | Performed by: COLON & RECTAL SURGERY

## 2022-11-30 PROCEDURE — G0105 COLORECTAL SCRN; HI RISK IND: HCPCS | Mod: ,,, | Performed by: COLON & RECTAL SURGERY

## 2022-11-30 PROCEDURE — 37000009 HC ANESTHESIA EA ADD 15 MINS: Performed by: COLON & RECTAL SURGERY

## 2022-11-30 PROCEDURE — 25000003 PHARM REV CODE 250: Performed by: NURSE ANESTHETIST, CERTIFIED REGISTERED

## 2022-11-30 PROCEDURE — 63600175 PHARM REV CODE 636 W HCPCS: Performed by: NURSE ANESTHETIST, CERTIFIED REGISTERED

## 2022-11-30 RX ORDER — SODIUM CHLORIDE, SODIUM LACTATE, POTASSIUM CHLORIDE, CALCIUM CHLORIDE 600; 310; 30; 20 MG/100ML; MG/100ML; MG/100ML; MG/100ML
INJECTION, SOLUTION INTRAVENOUS CONTINUOUS PRN
Status: DISCONTINUED | OUTPATIENT
Start: 2022-11-30 | End: 2022-11-30

## 2022-11-30 RX ORDER — LIDOCAINE HYDROCHLORIDE 10 MG/ML
INJECTION, SOLUTION EPIDURAL; INFILTRATION; INTRACAUDAL; PERINEURAL
Status: DISCONTINUED | OUTPATIENT
Start: 2022-11-30 | End: 2022-11-30

## 2022-11-30 RX ORDER — PROPOFOL 10 MG/ML
VIAL (ML) INTRAVENOUS
Status: DISCONTINUED | OUTPATIENT
Start: 2022-11-30 | End: 2022-11-30

## 2022-11-30 RX ADMIN — PROPOFOL 20 MG: 10 INJECTION, EMULSION INTRAVENOUS at 07:11

## 2022-11-30 RX ADMIN — LIDOCAINE HYDROCHLORIDE 50 MG: 10 INJECTION, SOLUTION EPIDURAL; INFILTRATION; INTRACAUDAL; PERINEURAL at 07:11

## 2022-11-30 RX ADMIN — PROPOFOL 30 MG: 10 INJECTION, EMULSION INTRAVENOUS at 07:11

## 2022-11-30 RX ADMIN — PROPOFOL 80 MG: 10 INJECTION, EMULSION INTRAVENOUS at 07:11

## 2022-11-30 RX ADMIN — SODIUM CHLORIDE, SODIUM LACTATE, POTASSIUM CHLORIDE, AND CALCIUM CHLORIDE: .6; .31; .03; .02 INJECTION, SOLUTION INTRAVENOUS at 07:11

## 2022-11-30 NOTE — PROVATION PATIENT INSTRUCTIONS
Discharge Summary/Instructions after an Endoscopic Procedure  Patient Name: Mary Vo  Patient MRN: [id number]  Patient YOB: 1955 Wednesday, November 30, 2022 Gary Toussaint MD  Dear patient,  As a result of recent federal legislation (The Federal Cures Act), you may   receive lab or pathology results from your procedure in your [facility]   account before your physician is able to contact you. Your physician or   their representative will relay the results to you with their   recommendations at their soonest availability.  Thank you,  RESTRICTIONS:  During your procedure today, you received medications for sedation.  These   medications may affect your judgment, balance and coordination.  Therefore,   for 24 hours, you have the following restrictions:   - DO NOT drive a car, operate machinery, make legal/financial decisions,   sign important papers or drink alcohol.    ACTIVITY:  Today: no heavy lifting, straining or running due to procedural   sedation/anesthesia.  The following day: return to full activity including work.  DIET:  Eat and drink normally unless instructed otherwise.     TREATMENT FOR COMMON SIDE EFFECTS:  - Mild abdominal pain, nausea, belching, bloating or excessive gas:  rest,   eat lightly and use a heating pad.  - Sore Throat: treat with throat lozenges and/or gargle with warm salt   water.  - Because air was used during the procedure, expelling large amounts of air   from your rectum or belching is normal.  - If a bowel prep was taken, you may not have a bowel movement for 1-3 days.    This is normal.  SYMPTOMS TO WATCH FOR AND REPORT TO YOUR PHYSICIAN:  1. Abdominal pain or bloating, other than gas cramps.  2. Chest pain.  3. Back pain.  4. Signs of infection such as: chills or fever occurring within 24 hours   after the procedure.  5. Rectal bleeding, which would show as bright red, maroon, or black stools.   (A tablespoon of blood from the rectum is not serious,  especially if   hemorrhoids are present.)  6. Vomiting.  7. Weakness or dizziness.  GO DIRECTLY TO THE NEAREST EMERGENCY ROOM IF YOU HAVE ANY OF THE FOLLOWING:      Difficulty breathing              Chills and/or fever over 101 F   Persistent vomiting and/or vomiting blood   Severe abdominal pain   Severe chest pain   Black, tarry stools   Bleeding- more than one tablespoon   Any other symptom or condition that you feel may need urgent attention  Your doctor recommends these additional instructions:  If any biopsies were taken, your doctors clinic will contact you in 1 to 2   weeks with any results.  - Discharge patient to home.   - High fiber diet.   - Continue present medications.   - Repeat colonoscopy in 5 years for screening purposes.   - Return to primary care physician PRN.  For questions, problems or results please call your physician Gary Toussaint MD at Work:  [phone]  If you have any questions about the above instructions, call the GI   department at [phone] or call the endoscopy unit at [phone]   from 7am until 3 pm.  [facility], EMERGENCY ROOM PHONE NUMBER:   [phone]  IF A COMPLICATION OR EMERGENCY SITUATION ARISES AND YOU ARE UNABLE TO REACH   YOUR PHYSICIAN - GO DIRECTLY TO THE EMERGENCY ROOM.  I have read or have had read to me these discharge instructions for my   procedure and have received a written copy.  I understand these   instructions and will follow-up with my physician if I have any questions.     __________________________________       _____________________________________  Nurse Signature                                          Patient/Designated   Responsible Party Signature  MD Gary Garner MD  11/30/2022 7:38:39 AM  This report has been verified and signed electronically.  Dear patient,  As a result of recent federal legislation (The Federal Cures Act), you may   receive lab or pathology results from  your procedure in your [facility]   account before your physician is able to contact you. Your physician or   their representative will relay the results to you with their   recommendations at their soonest availability.  Thank you,  [employer]ATION

## 2022-11-30 NOTE — ANESTHESIA PREPROCEDURE EVALUATION
11/30/2022  Mary Vo is a 67 y.o., female.      Pre-op Assessment    I have reviewed the Patient Summary Reports.     I have reviewed the Nursing Notes. I have reviewed the NPO Status.   I have reviewed the Medications.     Review of Systems  Anesthesia Hx:  No problems with previous Anesthesia  Denies Family Hx of Anesthesia complications.   Denies Personal Hx of Anesthesia complications.   Social:  Non-Smoker    Hematology/Oncology:  Hematology Normal        Cardiovascular:   Hypertension hyperlipidemia ECG has been reviewed.    Pulmonary:  Pulmonary Normal    Renal/:  Renal/ Normal     Hepatic/GI:  Hepatic/GI Normal    Musculoskeletal:   Arthritis   Spine Disorders: Degenerative disease and Disc disease    Neurological:   Lumbar radiculopathy  Cervical radiculopathy  Chronic Pain Syndrome   Endocrine:  Endocrine Normal  Obesity / BMI > 30      Physical Exam  General: Well nourished    Airway:  Mallampati: II   Mouth Opening: Normal  TM Distance: Normal  Neck ROM: Normal ROM    Dental:  Edentulous        Anesthesia Plan  Type of Anesthesia, risks & benefits discussed:    Anesthesia Type: MAC  Intra-op Monitoring Plan: Standard ASA Monitors  Post Op Pain Control Plan: IV/PO Opioids PRN  Induction:  IV  Airway Plan: , Post-Induction  Informed Consent: Informed consent signed with the Patient and all parties understand the risks and agree with anesthesia plan.  All questions answered.   ASA Score: 2  Day of Surgery Review of History & Physical: H&P Update referred to the surgeon/provider.    Ready For Surgery From Anesthesia Perspective.     .

## 2022-11-30 NOTE — TRANSFER OF CARE
"Anesthesia Transfer of Care Note    Patient: Mary Vo    Procedure(s) Performed: Procedure(s) (LRB):  COLONOSCOPY (N/A)    Patient location: GI    Anesthesia Type: MAC    Transport from OR: Transported from OR on room air with adequate spontaneous ventilation    Post pain: adequate analgesia    Post assessment: no apparent anesthetic complications    Post vital signs: stable    Level of consciousness: awake    Nausea/Vomiting: no nausea/vomiting    Complications: none    Transfer of care protocol was followed      Last vitals:   Visit Vitals  /62 (BP Location: Left arm, Patient Position: Lying)   Pulse (!) 53   Temp 36.7 °C (98.1 °F) (Temporal)   Resp 17   Ht 5' 7" (1.702 m)   Wt 97.5 kg (215 lb)   SpO2 98%   Breastfeeding No   BMI 33.67 kg/m²     "

## 2022-11-30 NOTE — BRIEF OP NOTE
[facility] - Endoscopy (Hospital)  Brief Operative Note     SUMMARY     Surgery Date: 11/30/2022     Surgeon(s) and Role:     * Gary Toussaint MD - Primary    Assisting Surgeon: None    Pre-op Diagnosis:  Encounter for screening for colorectal cancer in high risk patient [Z12.11, Z91.89, Z12.12]    Post-op Diagnosis:  Post-Op Diagnosis Codes:     * Encounter for screening for colorectal cancer in high risk patient [Z12.11, Z91.89, Z12.12]    Procedure(s) (LRB):  COLONOSCOPY (N/A)    Anesthesia: Choice    Description of the findings of the procedure: No polyps; hemorrhoids and diverticulosis present    Estimated Blood Loss: * No values recorded between 11/30/2022  7:00 AM and 11/30/2022  7:38 AM *         Specimens:   Specimen (24h ago, onward)      None            Discharge Note    SUMMARY     Admit Date: 11/30/2022    Discharge Date and Time: 11/30/2022 7:39 AM    Hospital Course Patient was seen in the preoperative area by both myself and anesthesia. All consents were verified and all questions appropriately answered. All risks, benefits and alternatives explained to patient. Patient proceeded to endoscopy suite for colonoscopy and was discharged home postoperative once cleared by anesthesia.    Final Diagnosis: Post-Op Diagnosis Codes:     * Encounter for screening for colorectal cancer in high risk patient [Z12.11, Z91.89, Z12.12]    Disposition: Home or Self Care    Follow Up/Patient Instructions: See Provation report    Medications:  Reconciled Home Medications:      Medication List        CHANGE how you take these medications      gabapentin 300 MG capsule  Commonly known as: NEURONTIN  Take 1 capsule (300 mg total) by mouth once daily AND 1 capsule (300 mg total) after lunch AND 2 capsules (600 mg total) every evening.  What changed: See the new instructions.            CONTINUE taking these medications      ALPRAZolam 1 MG tablet  Commonly known as: XANAX  TAKE 1 TABLET BY MOUTH NIGHTLY AS NEEDED FOR  ANXIETY     baclofen 10 MG tablet  Commonly known as: LIORESAL  Take 1 tablet (10 mg total) by mouth 3 (three) times daily.     citalopram 40 MG tablet  Commonly known as: CeleXA  TAKE ONE TABLET BY MOUTH DAILY     cloNIDine 0.2 MG tablet  Commonly known as: CATAPRES  TAKE 1 TABLET BY MOUTH EVERY EVENING     FLOWFLEX COVID-19 AG HOME TEST Kit  Generic drug: COVID-19 antigen test  AS DIRECTED     fluticasone propionate 50 mcg/actuation nasal spray  Commonly known as: FLONASE  2 sprays (100 mcg total) by Each Nostril route once daily.     furosemide 40 MG tablet  Commonly known as: LASIX  TAKE ONE TABLET BY MOUTH ONCE A DAY AS NEEDED     HYDROcodone-acetaminophen  mg per tablet  Commonly known as: NORCO  Take 1 tablet by mouth 2 (two) times daily as needed.     losartan-hydrochlorothiazide 100-25 mg 100-25 mg per tablet  Commonly known as: HYZAAR  TAKE ONE TABLET BY MOUTH ONCE A DAY     multivitamin capsule  Take 1 capsule by mouth once daily.     pantoprazole 40 MG tablet  Commonly known as: PROTONIX  Take 1 tablet (40 mg total) by mouth 2 (two) times daily. for 14 days     potassium chloride SA 20 MEQ tablet  Commonly known as: K-DUR,KLOR-CON, K-TAB  Take 1 tablet (20 mEq total) by mouth once daily.     pravastatin 40 MG tablet  Commonly known as: PRAVACHOL  TAKE ONE TABLET BY MOUTH DAILY     STOOL SOFTENER-LAXATIVE 8.6-50 mg per tablet  Generic drug: senna-docusate 8.6-50 mg  Take 1 tablet by mouth 2 (two) times daily as needed for Constipation.     VITAMIN D ORAL  Take 1,000 Units by mouth once daily.     zolpidem 10 mg Tab  Commonly known as: AMBIEN  TAKE ONE TABLET BY MOUTH AT BEDTIME AS NEEDED Strength: 10 mg            Discharge Procedure Orders   Diet general     Call MD for:  temperature >100.4     Call MD for:  persistent nausea and vomiting     Call MD for:  severe uncontrolled pain     Call MD for:  difficulty breathing, headache or visual disturbances     Call MD for:  redness, tenderness, or signs  of infection (pain, swelling, redness, odor or green/yellow discharge around incision site)     Call MD for:  hives     Call MD for:  persistent dizziness or light-headedness     Call MD for:  extreme fatigue     Activity as tolerated      Follow-up Information       Elias Cannon MD Follow up.    Specialty: Internal Medicine  Why: As needed  Contact information:  [address]  [address]  [facility] [address]  [phone]

## 2022-11-30 NOTE — ANESTHESIA POSTPROCEDURE EVALUATION
Anesthesia Post Evaluation    Patient: Mary Vo    Procedure(s) Performed: Procedure(s) (LRB):  COLONOSCOPY (N/A)    Final Anesthesia Type: MAC      Patient location during evaluation: GI PACU  Patient participation: Yes- Able to Participate  Level of consciousness: awake and alert  Post-procedure vital signs: reviewed and stable  Pain management: adequate  Airway patency: patent    PONV status at discharge: No PONV  Anesthetic complications: no      Cardiovascular status: blood pressure returned to baseline and hemodynamically stable  Respiratory status: unassisted, spontaneous ventilation and room air  Hydration status: euvolemic  Follow-up not needed.            No case tracking events are documented in the log.      Pain/Macho Score: No data recorded

## 2022-11-30 NOTE — ANESTHESIA POSTPROCEDURE EVALUATION
Anesthesia Post Evaluation    Patient: Mary Vo    Procedure(s) Performed: Procedure(s) (LRB):  COLONOSCOPY (N/A)    Final Anesthesia Type: MAC      Patient location during evaluation: GI PACU  Patient participation: Yes- Able to Participate  Level of consciousness: awake and alert  Post-procedure vital signs: reviewed and stable  Pain management: adequate  Airway patency: patent    PONV status at discharge: No PONV  Anesthetic complications: no      Cardiovascular status: blood pressure returned to baseline and hemodynamically stable  Respiratory status: unassisted, spontaneous ventilation and room air  Hydration status: euvolemic  Follow-up not needed.          Vitals Value Taken Time   /57 11/30/22 0756   Temp 36.4 °C (97.5 °F) 11/30/22 0740   Pulse 48 11/30/22 0756   Resp 16 11/30/22 0756   SpO2 100 % 11/30/22 0756         No case tracking events are documented in the log.      Pain/Macho Score: Macho Score: 10 (11/30/2022  7:56 AM)

## 2022-12-01 VITALS
HEART RATE: 48 BPM | DIASTOLIC BLOOD PRESSURE: 57 MMHG | HEIGHT: 67 IN | OXYGEN SATURATION: 100 % | TEMPERATURE: 98 F | BODY MASS INDEX: 33.74 KG/M2 | RESPIRATION RATE: 16 BRPM | WEIGHT: 215 LBS | SYSTOLIC BLOOD PRESSURE: 123 MMHG

## 2022-12-02 ENCOUNTER — OFFICE VISIT (OUTPATIENT)
Dept: PODIATRY | Facility: CLINIC | Age: 67
End: 2022-12-02
Payer: MEDICARE

## 2022-12-02 DIAGNOSIS — L60.0 INGROWING RIGHT GREAT TOENAIL: Primary | ICD-10-CM

## 2022-12-02 DIAGNOSIS — M79.674 PAIN OF RIGHT GREAT TOE: ICD-10-CM

## 2022-12-02 DIAGNOSIS — L60.0 INGROWING LEFT GREAT TOENAIL: ICD-10-CM

## 2022-12-02 DIAGNOSIS — M79.675 PAIN OF LEFT GREAT TOE: ICD-10-CM

## 2022-12-02 PROCEDURE — 99213 OFFICE O/P EST LOW 20 MIN: CPT | Mod: PBBFAC | Performed by: PODIATRIST

## 2022-12-02 PROCEDURE — 11750 EXCISION NAIL&NAIL MATRIX: CPT | Mod: T5,S$PBB,, | Performed by: PODIATRIST

## 2022-12-02 PROCEDURE — 99203 PR OFFICE/OUTPT VISIT, NEW, LEVL III, 30-44 MIN: ICD-10-PCS | Mod: 25,S$PBB,, | Performed by: PODIATRIST

## 2022-12-02 PROCEDURE — 11750 PR REMOVAL OF NAIL BED: ICD-10-PCS | Mod: T5,S$PBB,, | Performed by: PODIATRIST

## 2022-12-02 PROCEDURE — 99999 PR PBB SHADOW E&M-EST. PATIENT-LVL III: ICD-10-PCS | Mod: PBBFAC,,, | Performed by: PODIATRIST

## 2022-12-02 PROCEDURE — 11750 EXCISION NAIL&NAIL MATRIX: CPT | Mod: T5,59,PBBFAC | Performed by: PODIATRIST

## 2022-12-02 PROCEDURE — 99999 PR PBB SHADOW E&M-EST. PATIENT-LVL III: CPT | Mod: PBBFAC,,, | Performed by: PODIATRIST

## 2022-12-02 PROCEDURE — 99203 OFFICE O/P NEW LOW 30 MIN: CPT | Mod: 25,S$PBB,, | Performed by: PODIATRIST

## 2022-12-02 NOTE — PROGRESS NOTES
Subjective:       Patient ID: Mary Vo is a 67 y.o. female.    Chief Complaint: Ingrown Toenail (Mild pain to right lateral aspect and left hallux. Non diabetic PCP: Dr. Cannon last seen 09/21/22)      HPI: Mary Vo presents to the office with complaints of pains to the left great toe, due to ingrowing. Patient also complains of pains to the right great toe, due to ingrowing as well. States no drainage. States swelling, redness and moderate to severe pains. Symptoms have been on going for several days and are worsening. States difficulties with walking as a result of pains. Walking and standing, particularly with shoe gear, exacerbates the ailment. Pains are sharp in nature and are rated at approx. 8/10. Patient's Primary Care Provider is Elias Cannon MD.     Review of patient's allergies indicates:  No Known Allergies    Past Medical History:   Diagnosis Date    Breast cancer 1996    right    Chronic pain syndrome     Generalized osteoarthrosis, involving multiple sites     s/p THR bilateral    History of breast cancer 2007    lumpectomy/XRT    HTN (hypertension)     Hyperlipidemia     Morbid obesity with BMI of 40.0-44.9, adult        Family History   Problem Relation Age of Onset    Cancer Mother     Diabetes Mother     Hyperlipidemia Father     Hypertension Father     Breast cancer Sister     Breast cancer Sister     Breast cancer Sister     Breast cancer Sister     Eczema Neg Hx     Lupus Neg Hx     Psoriasis Neg Hx     Melanoma Neg Hx        Social History     Socioeconomic History    Marital status: Single   Occupational History    Occupation: Disabled   Tobacco Use    Smoking status: Former    Smokeless tobacco: Never   Substance and Sexual Activity    Alcohol use: Never    Drug use: No    Sexual activity: Never       Past Surgical History:   Procedure Laterality Date    ANTERIOR CERVICAL DISCECTOMY W/ FUSION Left 01/03/2022    Procedure: DISCECTOMY, SPINE, CERVICAL, ANTERIOR APPROACH,  WITH FUSION;  Surgeon: Pradip Fernando MD;  Location: [facility] ER;  Service: Neurosurgery;  Laterality: Left;  Three Level ACDF  C4/5, 5/6, 6/7      BREAST LUMPECTOMY Right 2006    XRT    CATARACT EXTRACTION W/  INTRAOCULAR LENS IMPLANT Left 01/15/2020    CATARACT EXTRACTION W/  INTRAOCULAR LENS IMPLANT Right 01/29/2020    COLONOSCOPY N/A 10/15/2015    Procedure: COLONOSCOPY;  Surgeon: Maylin Shipley MD;  Location: [facility];  Service: Endoscopy;  Laterality: N/A;    COLONOSCOPY N/A 11/30/2022    Procedure: COLONOSCOPY;  Surgeon: Gary Toussaint MD;  Location: [facility];  Service: General;  Laterality: N/A;    EPIDURAL STEROID INJECTION N/A 11/03/2020    Procedure: Lumbar L5/S1 IL KATYA;  Surgeon: Paul Lobato MD;  Location: [facility];  Service: Pain Management;  Laterality: N/A;    EPIDURAL STEROID INJECTION INTO CERVICAL SPINE N/A 09/25/2020    Procedure: C7/T1 IL KATYA;  Surgeon: Paul Lobato MD;  Location: [facility];  Service: Pain Management;  Laterality: N/A;    EPIDURAL STEROID INJECTION INTO CERVICAL SPINE N/A 10/05/2021    Procedure: C7/T1 IL KATYA with RN IV sedation;  Surgeon: Cynthia Soares MD;  Location: [facility];  Service: Pain Management;  Laterality: N/A;    ESOPHAGOGASTRODUODENOSCOPY N/A 11/04/2021    Procedure: ESOPHAGOGASTRODUODENOSCOPY (EGD);  Surgeon: Bals Hampton MD;  Location: [facility];  Service: Endoscopy;  Laterality: N/A;    HYSTERECTOMY      LAPAROSCOPIC LYSIS OF ADHESIONS N/A 8/30/2022    Procedure: LYSIS, ADHESIONS, LAPAROSCOPIC;  Surgeon: Blas Hampton MD;  Location: [facility] OR;  Service: General;  Laterality: N/A;    PLACEMENT OF ACELLULAR HUMAN DERMAL ALLOGRAFT Left 01/03/2022    Procedure: APPLICATION, ACELLULAR HUMAN DERMAL ALLOGRAFT;  Surgeon: Pradip Fernando MD;  Location: [facility] OR;  Service: Neurosurgery;  Laterality: Left;    ROBOT-ASSISTED LAPAROSCOPIC SLEEVE GASTRECTOMY USING DA FLORENTINO XI N/A 8/30/2022    Procedure: XI ROBOTIC SLEEVE GASTRECTOMY;  Surgeon:  Blas Hampton MD;  Location: [facility] OR;  Service: General;  Laterality: N/A;    TRANSFORAMINAL EPIDURAL INJECTION OF STEROID Left 09/17/2019    Procedure: Left C5/6 TF KATYA w/ RN IV sedation;  Surgeon: Paul Lobato MD;  [facility];  Service: Pain Management;  Laterality: Left;       Review of Systems      Objective:   There were no vitals taken for this visit.    Physical Exam  LOWER EXTREMITY PHYSICAL EXAMINATION    VASCULAR: On the left and right foot, the dorsalis pedis pulse is 2/4 and the posterior tibial pulse is 2/4. Capillary refill time is less than 3 seconds. Hair growth is present on the dorsum of the foot and at the digits. Proximal to distal temperature is warm to warm.    DERMATOLOGY: On the RLE, there is ingrowing of the right foot, medial and lateral nail border of the great toe. The nail is incurvated into the skin of the affected border, causing pains, which are moderate in nature. There is moderate to severe edema. There is no cellulitis noted. There is no drainage. No fluctuance. Granuloma formation is absent. On the LLE, there is ingrowing of the left foot medial and lateral nail border of the great toe. The nail is incurvated into the skin of the affected border, causing pains, which are moderate in nature. There is moderate to severe edema. There is no cellulitis noted. There is no drainage. No fluctuance. Granuloma formation is absent.    ORTHOPEDIC: Manual Muscle Testing is 5/5 in all planes on the left and right, without pains, with and without resistance. Gait pattern is slightly antalgic.    Assessment:     1. Ingrowing right great toenail    2. Ingrowing left great toenail    3. Pain of left great toe    4. Pain of right great toe        Plan:     Ingrowing right great toenail    Ingrowing left great toenail    Pain of left great toe    Pain of right great toe    A TIME-OUT was completed. Oral, written and verbal consent were obtained from patient, prior to procedure being  performed as discussed below.    The left and right 1st toes were each anesthetized with a total of 3cc of 0.50% Marcaine w/ Epinephrine.  The area was then prepped appropriately with betadine paint. Following this, a [place] Elevator was utilized to free up the offending nail border, from the surrounding soft tissues.  Next, an English Anvil was used to split the nail from distal to proximal. Once freed from the soft tissue structures at the of the offending nail fold, a Curved Hemostat was used to remove the offending portion of nail.  A curette was then utilized to remove and and all debris from the border of the nail.    Phenol and Alcohol were then utilized to perform the matrixectomy. Capillary refill to the digit was normal. Antibiotic cream and a sterile bandage with Coban was placed on the digit.      Patient was informed of the possibility of recurrence of an ingrowing nail. Patient was given written and oral instructions for care including the office phone number if any questions or concerns arise.  Patient will follow up if needed in approx. 2 weeks. Patient will start topical ABx. ointment BID            Future Appointments   Date Time Provider Department Center   12/6/2022 11:20 AM Nancy Flannery PA-C [facility] ENT [facility]   12/6/2022  1:00 PM Pradip Fernando MD [facility] NEUSUR [facility]   12/14/2022 11:45 AM Joyce Farris PA-C [facility] GENSUR [facility]   12/14/2022 12:00 PM Triny Mtz RD [facility]   12/19/2022 11:00 AM [facility]

## 2022-12-06 ENCOUNTER — OFFICE VISIT (OUTPATIENT)
Dept: OTOLARYNGOLOGY | Facility: CLINIC | Age: 67
End: 2022-12-06
Payer: MEDICARE

## 2022-12-06 ENCOUNTER — OFFICE VISIT (OUTPATIENT)
Dept: NEUROSURGERY | Facility: CLINIC | Age: 67
End: 2022-12-06
Payer: MEDICARE

## 2022-12-06 VITALS
DIASTOLIC BLOOD PRESSURE: 84 MMHG | HEIGHT: 67 IN | RESPIRATION RATE: 18 BRPM | HEART RATE: 54 BPM | BODY MASS INDEX: 33.57 KG/M2 | SYSTOLIC BLOOD PRESSURE: 135 MMHG | WEIGHT: 213.88 LBS

## 2022-12-06 DIAGNOSIS — M51.36 DEGENERATIVE DISC DISEASE, LUMBAR: ICD-10-CM

## 2022-12-06 DIAGNOSIS — M54.9 MECHANICAL BACK PAIN: ICD-10-CM

## 2022-12-06 DIAGNOSIS — M54.16 LUMBAR RADICULOPATHY: ICD-10-CM

## 2022-12-06 DIAGNOSIS — H61.20 CERUMEN DEBRIS ON TYMPANIC MEMBRANE, UNSPECIFIED LATERALITY: Primary | ICD-10-CM

## 2022-12-06 DIAGNOSIS — M48.062 LUMBAR STENOSIS WITH NEUROGENIC CLAUDICATION: Primary | ICD-10-CM

## 2022-12-06 PROCEDURE — 69210 REMOVE IMPACTED EAR WAX UNI: CPT | Mod: S$PBB,,, | Performed by: PHYSICIAN ASSISTANT

## 2022-12-06 PROCEDURE — 99212 OFFICE O/P EST SF 10 MIN: CPT | Mod: 27,PBBFAC | Performed by: PHYSICIAN ASSISTANT

## 2022-12-06 PROCEDURE — 99213 OFFICE O/P EST LOW 20 MIN: CPT | Mod: S$PBB,,, | Performed by: NEUROLOGICAL SURGERY

## 2022-12-06 PROCEDURE — 99213 OFFICE O/P EST LOW 20 MIN: CPT | Mod: PBBFAC,27 | Performed by: NEUROLOGICAL SURGERY

## 2022-12-06 PROCEDURE — 99999 PR PBB SHADOW E&M-EST. PATIENT-LVL III: CPT | Mod: PBBFAC,,, | Performed by: NEUROLOGICAL SURGERY

## 2022-12-06 PROCEDURE — 99499 UNLISTED E&M SERVICE: CPT | Mod: S$PBB,,, | Performed by: PHYSICIAN ASSISTANT

## 2022-12-06 PROCEDURE — 69210 REMOVE IMPACTED EAR WAX UNI: CPT | Mod: PBBFAC | Performed by: PHYSICIAN ASSISTANT

## 2022-12-06 PROCEDURE — 99213 PR OFFICE/OUTPT VISIT, EST, LEVL III, 20-29 MIN: ICD-10-PCS | Mod: S$PBB,,, | Performed by: NEUROLOGICAL SURGERY

## 2022-12-06 PROCEDURE — 69210 PR REMOVAL IMPACTED CERUMEN REQUIRING INSTRUMENTATION, UNILATERAL: ICD-10-PCS | Mod: S$PBB,,, | Performed by: PHYSICIAN ASSISTANT

## 2022-12-06 PROCEDURE — 99999 PR PBB SHADOW E&M-EST. PATIENT-LVL II: ICD-10-PCS | Mod: PBBFAC,,, | Performed by: PHYSICIAN ASSISTANT

## 2022-12-06 PROCEDURE — 99499 NO LOS: ICD-10-PCS | Mod: S$PBB,,, | Performed by: PHYSICIAN ASSISTANT

## 2022-12-06 PROCEDURE — 99999 PR PBB SHADOW E&M-EST. PATIENT-LVL III: ICD-10-PCS | Mod: PBBFAC,,, | Performed by: NEUROLOGICAL SURGERY

## 2022-12-06 PROCEDURE — 99999 PR PBB SHADOW E&M-EST. PATIENT-LVL II: CPT | Mod: PBBFAC,,, | Performed by: PHYSICIAN ASSISTANT

## 2022-12-07 ENCOUNTER — TELEPHONE (OUTPATIENT)
Dept: NEUROSURGERY | Facility: CLINIC | Age: 67
End: 2022-12-07
Payer: MEDICARE

## 2022-12-07 ENCOUNTER — PATIENT MESSAGE (OUTPATIENT)
Dept: NEUROSURGERY | Facility: CLINIC | Age: 67
End: 2022-12-07
Payer: MEDICARE

## 2022-12-07 DIAGNOSIS — D69.9 BLEEDING DISORDER: ICD-10-CM

## 2022-12-07 DIAGNOSIS — Z01.818 PRE-OP TESTING: Primary | ICD-10-CM

## 2022-12-07 NOTE — PROGRESS NOTES
Subjective:   Patient ID: Maryviji Vo is a 67 y.o. female.    Chief Complaint: Cerumen Impaction (bilateral)    HPI  Review of patient's allergies indicates:  No Known Allergies        Review of Systems   Constitutional:  Negative for chills, fatigue, fever and unexpected weight change.   HENT:  Negative for congestion, dental problem, ear discharge, ear pain, facial swelling, hearing loss, nosebleeds, postnasal drip, rhinorrhea, sinus pressure, sneezing, sore throat, tinnitus, trouble swallowing and voice change.    Eyes:  Negative for redness, itching and visual disturbance.   Respiratory:  Negative for cough, choking, shortness of breath and wheezing.    Cardiovascular:  Negative for chest pain and palpitations.   Gastrointestinal:  Negative for abdominal pain.        No reflux.   Musculoskeletal:  Negative for gait problem.   Skin:  Negative for rash.   Neurological:  Negative for dizziness, light-headedness and headaches.       Objective:   There were no vitals taken for this visit.    Physical Exam  HENT:      Right Ear: There is impacted cerumen.        Procedure Note    CHIEF COMPLAINT:  Cerumen Impaction    Description:  The patient was seated in an exam chair.  An ear speculum was placed in the right EAC and was examined under the microscope.  Suction and/or loop curettes were used to remove a large cerumen impaction.  The tympanic membrane was visualized and was normal in appearance.  The procedure was repeated on the left side in a similar fashion.  The TM was intact and normal on this side as well.  The patient tolerated the procedure well.     Assessment:     1. Cerumen debris on tympanic membrane, unspecified laterality        Plan:     Cerumen debris on tympanic membrane, unspecified laterality       Cerumen impaction:  Removed today without difficulty.  I would recommend the use of a wax softening drop, either over the counter Debrox or mineral oil, on a weekly basis.  I also instructed the  patient to avoid Qtips.

## 2022-12-07 NOTE — TELEPHONE ENCOUNTER
----- Message from Joanne Jiang MA sent at 12/6/2022  2:29 PM CST -----  Setup all labs for sx in Jan for pt

## 2022-12-13 NOTE — PROGRESS NOTES
Subjective:      Patient ID: Mary Vo is a 67 y.o. female.    Chief Complaint: Pre-op Exam (The pt presents today for pre-op exam.)      Patient is here today for follow-up of her lower back pain  She had a gastric sleeve several months ago and has lost a significant amount of weight  She continues to have intermittent back pain going into the lower extremities  Back = leg pain   Pain through the lower extremities with left side worse than the right  She is tried physical therapy, pain management, pain medication, epidural steroid injections as well as conservative treatments without any lasting relief  DEXA and CT ordered   Ambulates unassisted   Denies BB symptoms   MR shows severe DDD worse L3-4/4-5 and L5-S1     MR report   Severe multilevel multifactorial degenerative changes are present greatest at L4-L5 and L3-L4 followed by L5-S1.  Findings are progressed overall.                       Review of Systems   Constitutional:  Negative for activity change, appetite change and chills.   HENT:  Negative for hearing loss, sore throat and tinnitus.    Eyes:  Negative for pain, discharge and itching.   Cardiovascular:  Negative for chest pain.   Gastrointestinal:  Negative for abdominal pain.   Endocrine: Negative for cold intolerance and heat intolerance.   Genitourinary:  Negative for difficulty urinating and dysuria.   Musculoskeletal:  Positive for back pain and gait problem.   Allergic/Immunologic: Negative for environmental allergies.   Neurological:  Negative for dizziness, tremors, light-headedness and headaches.   Hematological:  Negative for adenopathy.   Psychiatric/Behavioral:  Negative for agitation, behavioral problems and confusion.        Objective:       Neurosurgery Physical Exam  Ortho/SPM Exam  Ortho Exam    Nursing note and vitals reviewed  Gen:Oriented to person, place, and time.             Appears stated age   Skin: no rashes or lesions identified   Head:Normocephalic and  atraumatic.  Nose: Nose normal.    Eyes: EOM are normal. Pupils are equal, round, and reactive to light.   Neck: Neck supple. No masses or lesions palpated  Cardiovascular: Intact distal pulses.    Abdominal: Soft.   Neurological: Alert and oriented to person, place, and time.  No cranial nerve deficit.  Coordination normal. Normal muscle tone  Psychiatric: Normal mood and affect. Behavior is normal.      Back:       None  Paraspinal muscle spasms   None  Pain with flexion and extention   WNL Limited secondary to pain  Range of motion    Neg  Straight leg raise     Motor   Right Right Left Left  Level Group   5  5  L2 Hip flexor (Psoas)   5  5  L3 Leg extension (Quads)   5  5  L4 Dorsiflexion & foot inversion (Tibialis Anterior)   5  5  L5 Great toe extension ( EHL)   5  5  S1 Foot eversion (Gastroc, PL & PB)     Sensation  NL Decreased (R/L/BL) Level Sensation    X  L2 Anterio-medial thigh   X  L3 Medial thigh around knee   X  L4 Medial foot   X  L5 Dorsum foot   X  S1 Lateral foot     Reflex  2+  Patellar tendon (L4)   2+  Achilles tendon (S1)         Please see above for image interpretation      Assessment:     1. Lumbar stenosis with neurogenic claudication    2. Lumbar radiculopathy    3. Degenerative disc disease, lumbar    4. Mechanical back pain      Plan:     Lumbar stenosis with neurogenic claudication    Lumbar radiculopathy    Degenerative disc disease, lumbar    Mechanical back pain      Patient with severe DDD stenosis and radiculopathy     She has made good progress with weight loss after her gastric sleeve procedure and continues to lose weight     Worse L3-4/4-5 but also L 5-S1 with l foraminal steosis at this time I have recommended at TLIF L3-4/4-5 possibly L5-S1     She understands and wished to consider this procedure after the first of of the year   Will order a CT lumbar for pre operative planning   Standing XR CTL spine for global alignment       Thank you for the referral   Please call with  any questions    Pradip Fernando MD  Neurosurgery     Disclaimer: This note was prepared using a voice recognition system and is likely to have sound alike errors within the text.

## 2022-12-14 ENCOUNTER — NUTRITION (OUTPATIENT)
Dept: INTERNAL MEDICINE | Facility: CLINIC | Age: 67
End: 2022-12-14
Payer: MEDICARE

## 2022-12-14 ENCOUNTER — PATIENT MESSAGE (OUTPATIENT)
Dept: SURGERY | Facility: CLINIC | Age: 67
End: 2022-12-14

## 2022-12-14 ENCOUNTER — LAB VISIT (OUTPATIENT)
Dept: LAB | Facility: HOSPITAL | Age: 67
End: 2022-12-14
Payer: MEDICARE

## 2022-12-14 ENCOUNTER — PATIENT MESSAGE (OUTPATIENT)
Dept: INTERNAL MEDICINE | Facility: CLINIC | Age: 67
End: 2022-12-14

## 2022-12-14 ENCOUNTER — OFFICE VISIT (OUTPATIENT)
Dept: SURGERY | Facility: CLINIC | Age: 67
End: 2022-12-14
Payer: MEDICARE

## 2022-12-14 VITALS
DIASTOLIC BLOOD PRESSURE: 74 MMHG | HEART RATE: 52 BPM | WEIGHT: 217.63 LBS | BODY MASS INDEX: 34.16 KG/M2 | HEIGHT: 67 IN | SYSTOLIC BLOOD PRESSURE: 128 MMHG

## 2022-12-14 DIAGNOSIS — E66.9 OBESITY (BMI 30-39.9): Primary | ICD-10-CM

## 2022-12-14 DIAGNOSIS — Z98.84 STATUS POST BARIATRIC SURGERY: Primary | ICD-10-CM

## 2022-12-14 DIAGNOSIS — E66.01 MORBID OBESITY WITH BMI OF 40.0-44.9, ADULT: ICD-10-CM

## 2022-12-14 DIAGNOSIS — Z71.3 DIETARY COUNSELING: ICD-10-CM

## 2022-12-14 LAB
ALBUMIN SERPL BCP-MCNC: 4.3 G/DL (ref 3.5–5.2)
ALP SERPL-CCNC: 93 U/L (ref 55–135)
ALT SERPL W/O P-5'-P-CCNC: 19 U/L (ref 10–44)
ANION GAP SERPL CALC-SCNC: 13 MMOL/L (ref 8–16)
AST SERPL-CCNC: 20 U/L (ref 10–40)
BASOPHILS # BLD AUTO: 0.06 K/UL (ref 0–0.2)
BASOPHILS NFR BLD: 0.9 % (ref 0–1.9)
BILIRUB SERPL-MCNC: 0.6 MG/DL (ref 0.1–1)
BUN SERPL-MCNC: 18 MG/DL (ref 8–23)
CALCIUM SERPL-MCNC: 10.5 MG/DL (ref 8.7–10.5)
CHLORIDE SERPL-SCNC: 104 MMOL/L (ref 95–110)
CO2 SERPL-SCNC: 25 MMOL/L (ref 23–29)
CREAT SERPL-MCNC: 0.8 MG/DL (ref 0.5–1.4)
DIFFERENTIAL METHOD: NORMAL
EOSINOPHIL # BLD AUTO: 0.1 K/UL (ref 0–0.5)
EOSINOPHIL NFR BLD: 1.7 % (ref 0–8)
ERYTHROCYTE [DISTWIDTH] IN BLOOD BY AUTOMATED COUNT: 13.2 % (ref 11.5–14.5)
EST. GFR  (NO RACE VARIABLE): >60 ML/MIN/1.73 M^2
ESTIMATED AVG GLUCOSE: 108 MG/DL (ref 68–131)
GLUCOSE SERPL-MCNC: 86 MG/DL (ref 70–110)
HBA1C MFR BLD: 5.4 % (ref 4–5.6)
HCT VFR BLD AUTO: 46.2 % (ref 37–48.5)
HGB BLD-MCNC: 14.8 G/DL (ref 12–16)
IMM GRANULOCYTES # BLD AUTO: 0.01 K/UL (ref 0–0.04)
IMM GRANULOCYTES NFR BLD AUTO: 0.2 % (ref 0–0.5)
IRON SERPL-MCNC: 66 UG/DL (ref 30–160)
LYMPHOCYTES # BLD AUTO: 2.6 K/UL (ref 1–4.8)
LYMPHOCYTES NFR BLD: 39.5 % (ref 18–48)
MCH RBC QN AUTO: 30.5 PG (ref 27–31)
MCHC RBC AUTO-ENTMCNC: 32 G/DL (ref 32–36)
MCV RBC AUTO: 95 FL (ref 82–98)
MONOCYTES # BLD AUTO: 0.5 K/UL (ref 0.3–1)
MONOCYTES NFR BLD: 7.9 % (ref 4–15)
NEUTROPHILS # BLD AUTO: 3.2 K/UL (ref 1.8–7.7)
NEUTROPHILS NFR BLD: 49.8 % (ref 38–73)
NRBC BLD-RTO: 0 /100 WBC
PLATELET # BLD AUTO: 235 K/UL (ref 150–450)
PMV BLD AUTO: 11.5 FL (ref 9.2–12.9)
POTASSIUM SERPL-SCNC: 3.8 MMOL/L (ref 3.5–5.1)
PROT SERPL-MCNC: 7.7 G/DL (ref 6–8.4)
RBC # BLD AUTO: 4.86 M/UL (ref 4–5.4)
SATURATED IRON: 19 % (ref 20–50)
SODIUM SERPL-SCNC: 142 MMOL/L (ref 136–145)
T4 FREE SERPL-MCNC: 0.78 NG/DL (ref 0.71–1.51)
TOTAL IRON BINDING CAPACITY: 351 UG/DL (ref 250–450)
TRANSFERRIN SERPL-MCNC: 237 MG/DL (ref 200–375)
TSH SERPL DL<=0.005 MIU/L-ACNC: 2.89 UIU/ML (ref 0.4–4)
VIT B12 SERPL-MCNC: 459 PG/ML (ref 210–950)
WBC # BLD AUTO: 6.45 K/UL (ref 3.9–12.7)

## 2022-12-14 PROCEDURE — 84466 ASSAY OF TRANSFERRIN: CPT

## 2022-12-14 PROCEDURE — 82607 VITAMIN B-12: CPT

## 2022-12-14 PROCEDURE — 99213 OFFICE O/P EST LOW 20 MIN: CPT | Mod: S$PBB,,,

## 2022-12-14 PROCEDURE — 97803 MED NUTRITION INDIV SUBSEQ: CPT | Mod: S$GLB,,, | Performed by: DIETITIAN, REGISTERED

## 2022-12-14 PROCEDURE — 85025 COMPLETE CBC W/AUTO DIFF WBC: CPT

## 2022-12-14 PROCEDURE — 84439 ASSAY OF FREE THYROXINE: CPT

## 2022-12-14 PROCEDURE — 80053 COMPREHEN METABOLIC PANEL: CPT

## 2022-12-14 PROCEDURE — 99999 PR PBB SHADOW E&M-EST. PATIENT-LVL IV: CPT | Mod: PBBFAC,,,

## 2022-12-14 PROCEDURE — 99214 OFFICE O/P EST MOD 30 MIN: CPT | Mod: PBBFAC

## 2022-12-14 PROCEDURE — 97803 PR MED NUTR THER, SUBSQ, INDIV, EA 15 MIN: ICD-10-PCS | Mod: S$GLB,,, | Performed by: DIETITIAN, REGISTERED

## 2022-12-14 PROCEDURE — 36415 COLL VENOUS BLD VENIPUNCTURE: CPT

## 2022-12-14 PROCEDURE — 99213 PR OFFICE/OUTPT VISIT, EST, LEVL III, 20-29 MIN: ICD-10-PCS | Mod: S$PBB,,,

## 2022-12-14 PROCEDURE — 82652 VIT D 1 25-DIHYDROXY: CPT

## 2022-12-14 PROCEDURE — 84443 ASSAY THYROID STIM HORMONE: CPT

## 2022-12-14 PROCEDURE — 83036 HEMOGLOBIN GLYCOSYLATED A1C: CPT

## 2022-12-14 PROCEDURE — 84134 ASSAY OF PREALBUMIN: CPT

## 2022-12-14 PROCEDURE — 99999 PR PBB SHADOW E&M-EST. PATIENT-LVL IV: ICD-10-PCS | Mod: PBBFAC,,,

## 2022-12-14 PROCEDURE — 84425 ASSAY OF VITAMIN B-1: CPT

## 2022-12-14 NOTE — PROGRESS NOTES
BARIATRIC PATIENT EVALUATION    CHIEF COMPLAINT:   morbid obesity with a BMI of 42 and inability to lose weight.    HISTORY OF PRESENT ILLNESS:  Mary Vo is a 67 y.o.-year-old female presents for bariatric follow post-op s/p sleeve gastrectomy on 08/22/2022. She is doing well today with no complaints. She feels her weight loss has stalled lately, and Triny gave her some advice on things to cut out of her diet to aid in progression of her weight loss. She is feeling well today and denies fevers, chills, nausea, and vomiting.     Initially presenting for morbid obesity with a BMI of 42 and inability to lose weight. The patient has tried exercising which she enjoys as well as different diets but struggling to lose any further weight.  She has severe arthritis that is started to limit her mobility..    Height 5 ft 6 in  Weight 263 lb --> 239 --> 217 lbs  BMI 42 --> 35 --> 34      CO-MORBIDITIES:  dyslipidemia, hypertension and osteoarthritis    PAST MEDICAL HISTORY:  Past Medical History:   Diagnosis Date    Breast cancer 1996    right    Chronic pain syndrome     Generalized osteoarthrosis, involving multiple sites     s/p THR bilateral    History of breast cancer 2007    lumpectomy/XRT    HTN (hypertension)     Hyperlipidemia     Morbid obesity with BMI of 40.0-44.9, adult         PAST SURGICAL HISTORY:  Past Surgical History:   Procedure Laterality Date    ANTERIOR CERVICAL DISCECTOMY W/ FUSION Left 01/03/2022    Procedure: DISCECTOMY, SPINE, CERVICAL, ANTERIOR APPROACH, WITH FUSION;  Surgeon: Pradip Fernando MD;  Location: [facility] OR;  Service: Neurosurgery;  Laterality: Left;  Three Level ACDF  C4/5, 5/6, 6/7      BREAST LUMPECTOMY Right 2006    XRT    CATARACT EXTRACTION W/  INTRAOCULAR LENS IMPLANT Left 01/15/2020    CATARACT EXTRACTION W/  INTRAOCULAR LENS IMPLANT Right 01/29/2020    COLONOSCOPY N/A 10/15/2015    Procedure: COLONOSCOPY;  Surgeon: Maylin Shipley MD;  Location: [facility] ENDO;  Service:  Endoscopy;  Laterality: N/A;    COLONOSCOPY N/A 11/30/2022    Procedure: COLONOSCOPY;  Surgeon: Gary Toussaint MD;  Location: [facility] ENDO;  Service: General;  Laterality: N/A;    EPIDURAL STEROID INJECTION N/A 11/03/2020    Procedure: Lumbar L5/S1 IL KATYA;  Surgeon: Paul Lobato MD;  Location: [facility] PAIN MGT;  Service: Pain Management;  Laterality: N/A;    EPIDURAL STEROID INJECTION INTO CERVICAL SPINE N/A 09/25/2020    Procedure: C7/T1 IL KATYA;  Surgeon: Paul Lobato MD;  Location: [facility] PAIN MGT;  Service: Pain Management;  Laterality: N/A;    EPIDURAL STEROID INJECTION INTO CERVICAL SPINE N/A 10/05/2021    Procedure: C7/T1 IL KATYA with RN IV sedation;  Surgeon: Cynthia Soares MD;  Location: [facility] PAIN MGT;  Service: Pain Management;  Laterality: N/A;    ESOPHAGOGASTRODUODENOSCOPY N/A 11/04/2021    Procedure: ESOPHAGOGASTRODUODENOSCOPY (EGD);  Surgeon: Blas Hampton MD;  Location: [facility] ENDO;  Service: Endoscopy;  Laterality: N/A;    HYSTERECTOMY      LAPAROSCOPIC LYSIS OF ADHESIONS N/A 8/30/2022    Procedure: LYSIS, ADHESIONS, LAPAROSCOPIC;  Surgeon: Blas Hampton MD;  Location: [facility] OR;  Service: General;  Laterality: N/A;    PLACEMENT OF ACELLULAR HUMAN DERMAL ALLOGRAFT Left 01/03/2022    Procedure: APPLICATION, ACELLULAR HUMAN DERMAL ALLOGRAFT;  Surgeon: Pradip Fernando MD;  Location: [facility] OR;  Service: Neurosurgery;  Laterality: Left;    ROBOT-ASSISTED LAPAROSCOPIC SLEEVE GASTRECTOMY USING DA FLORENTINO XI N/A 8/30/2022    Procedure: XI ROBOTIC SLEEVE GASTRECTOMY;  Surgeon: Blas Hampton MD;  Location: [facility] OR;  Service: General;  Laterality: N/A;    TRANSFORAMINAL EPIDURAL INJECTION OF STEROID Left 09/17/2019    Procedure: Left C5/6 TF KATYA w/ RN IV sedation;  Surgeon: Paul Lobato MD;  Location: [facility] PAIN MGT;  Service: Pain Management;  Laterality: Left;       FAMILY HISTORY:  Family History   Problem Relation Age of Onset    Cancer Mother     Diabetes Mother     Hyperlipidemia Father     Hypertension  Father     Breast cancer Sister     Breast cancer Sister     Breast cancer Sister     Breast cancer Sister     Eczema Neg Hx     Lupus Neg Hx     Psoriasis Neg Hx     Melanoma Neg Hx         SOCIAL HISTORY:   reports that she has quit smoking. She has never used smokeless tobacco. She reports that she does not drink alcohol and does not use drugs.     MEDICATIONS:  Current Outpatient Medications on File Prior to Visit   Medication Sig Dispense Refill    ALPRAZolam (XANAX) 1 MG tablet TAKE 1 TABLET BY MOUTH NIGHTLY AS NEEDED FOR ANXIETY 30 tablet 5    baclofen (LIORESAL) 10 MG tablet Take 1 tablet (10 mg total) by mouth 3 (three) times daily. 90 tablet 0    citalopram (CELEXA) 40 MG tablet TAKE ONE TABLET BY MOUTH DAILY 90 tablet 3    cloNIDine (CATAPRES) 0.2 MG tablet TAKE 1 TABLET BY MOUTH EVERY EVENING 90 tablet 4    ERGOCALCIFEROL, VITAMIN D2, (VITAMIN D ORAL) Take 1,000 Units by mouth once daily.      FLOWFLEX COVID-19 AG HOME TEST Kit AS DIRECTED      fluticasone propionate (FLONASE) 50 mcg/actuation nasal spray 2 sprays (100 mcg total) by Each Nostril route once daily. 48 g 3    furosemide (LASIX) 40 MG tablet TAKE ONE TABLET BY MOUTH ONCE A DAY AS NEEDED 19 tablet 11    HYDROcodone-acetaminophen (NORCO)  mg per tablet Take 1 tablet by mouth 2 (two) times daily as needed. 180 tablet 0    losartan-hydrochlorothiazide 100-25 mg (HYZAAR) 100-25 mg per tablet TAKE ONE TABLET BY MOUTH ONCE A DAY 90 tablet 3    multivitamin capsule Take 1 capsule by mouth once daily.      potassium chloride SA (K-DUR,KLOR-CON, K-TAB) 20 MEQ tablet Take 1 tablet (20 mEq total) by mouth once daily. 90 tablet 3    pravastatin (PRAVACHOL) 40 MG tablet TAKE ONE TABLET BY MOUTH DAILY 90 tablet 3    senna-docusate 8.6-50 mg (PERICOLACE) 8.6-50 mg per tablet Take 1 tablet by mouth 2 (two) times daily as needed for Constipation. 20 tablet 0    zolpidem (AMBIEN) 10 mg Tab TAKE ONE TABLET BY MOUTH AT BEDTIME AS NEEDED Strength: 10 mg 20  tablet 5    gabapentin (NEURONTIN) 300 MG capsule Take 1 capsule (300 mg total) by mouth once daily AND 1 capsule (300 mg total) after lunch AND 2 capsules (600 mg total) every evening. (Patient taking differently: Take 1 capsule (300 mg total) by mouth once daily AND 1 capsule (300 mg total) after lunch AND 2 capsules (600 mg total) every evening.    8/17/22: takes in evening only) 360 capsule 3    pantoprazole (PROTONIX) 40 MG tablet Take 1 tablet (40 mg total) by mouth 2 (two) times daily. for 14 days 28 tablet 0     No current facility-administered medications on file prior to visit.       Medications have been reviewed.    ALLERGIES:  Review of patient's allergies indicates:  No Known Allergies    Allergies have been reviewed.    ROS:  Review of Systems   Constitutional:  Negative for chills, fatigue, fever and unexpected weight change.   Respiratory:  Negative for cough, shortness of breath, wheezing and stridor.    Cardiovascular:  Negative for chest pain, palpitations and leg swelling.   Gastrointestinal:  Negative for abdominal distention, abdominal pain, constipation, diarrhea, nausea and vomiting.   Genitourinary:  Negative for difficulty urinating, dysuria, frequency, hematuria and urgency.   Skin:  Negative for color change, pallor, rash and wound.   Hematological:  Does not bruise/bleed easily.     PE:  Vitals:    12/14/22 1200   BP: 128/74   Pulse: (!) 52       Physical Exam  Vitals reviewed.   Constitutional:       General: She is not in acute distress.     Appearance: She is well-developed and normal weight. She is not ill-appearing.   HENT:      Head: Normocephalic and atraumatic.      Right Ear: External ear normal.      Left Ear: External ear normal.   Eyes:      Extraocular Movements: Extraocular movements intact.      Conjunctiva/sclera: Conjunctivae normal.   Cardiovascular:      Rate and Rhythm: Bradycardia present.   Pulmonary:      Effort: Pulmonary effort is normal. No respiratory  distress.   Abdominal:      General: There is no distension.      Palpations: Abdomen is soft.      Tenderness: There is no abdominal tenderness.      Comments: Incisions well healed   Musculoskeletal:      Cervical back: Neck supple.   Skin:     General: Skin is warm and dry.   Neurological:      Mental Status: She is alert and oriented to person, place, and time.   Psychiatric:         Behavior: Behavior normal.     EGD:  Impression:            - Normal esophagus.                          - Z-line regular, 38 cm from the incisors.                          - A few gastric polyps. Biopsied.                          - Normal antrum. Biopsied.                          - Normal second portion of the duodenum.    Pathology:  Final Pathologic Diagnosis 1. Stomach, sleeve gastrectomy:   Segment of stomach, lined by gastric oxyntic mucosa, with mild chronic   inactive gastritis.   Helicobacter pylori immunostain is pending and will be reported in an   addendum.  VC      Comment: Interp By Chapincito Whitten M.D., Signed on 09/02/2022 at 13:38   Supplemental Diagnosis Helicobacter pylori immunostain is negative.   IHC controls were reviewed and were adequate.          DIAGNOSIS:  1. Morbid obesity with a BMI of 42 and inability to lose weight.  2. Co-morbidities: dyslipidemia, hypertension and osteoarthritis    PLAN:  - Reinforced bariatric diet/dietician.  - Ease back into normal activity/exercise.  - Advance diet as previously discussed.  - RTC 3 months, will get bariatric labs at that time.     HTN - stable/monitor/antihypertensives  HLD - statin therapy/dietary modifications  Osteoarthritis - weight loss    Referring Physician: No ref. provider found  RTC: As scheduled.    Joyce Farris PA-C  [facility]

## 2022-12-14 NOTE — PROGRESS NOTES
NUTRITION NOTE    Referring Physician: Dr. Hampton  Reason for MNT Referral: Follow-up 4 months s/p Gastric Sleeve (8-)    PAST MEDICAL HISTORY:    Denies nausea, vomiting, and diarrhea.  Reports doing well.    Reports maintaining 211 lbs on home scale for 1 week.  Increased exercise to include walking 3-4x/week for 30 minute sessions. Also does exercise videos consisting of ab and upper body exercise.     211 lbs on home scale. For 1 week.  Walking 3-4x/week. 30 minutes. Exercise videos for abs and upper body.     Not tracking food intake.     Current intake:  Breakfast: protein shake or yogurt (oikos pro)// eggs + cheese + tortilla wrap  Snack: protein shake  Lunch: protein + vegetable  Dinner: protein    Before bed: Clean Protein Bar// walnuts// cashews  Drinks: water (4 bottles per day), coffee (1 equal)    Starches: twice weekly.     Takes Dulcolax once weekly for constipation.     Reports she will be having Back surgery in February 2023.     Past Medical History:   Diagnosis Date    Breast cancer 1996    right    Chronic pain syndrome     Generalized osteoarthrosis, involving multiple sites     s/p THR bilateral    History of breast cancer 2007    lumpectomy/XRT    HTN (hypertension)     Hyperlipidemia     Morbid obesity with BMI of 40.0-44.9, adult        CLINICAL DATA:  67 y.o. female.    There were no vitals filed for this visit.    Current Weight: 215.1 lbs   11-9-2022: 220 lbs   9-: 239 lbs              Surgery weight: 247 lbs              MSD 3, 10-: 261.36 lbs                   [facility] 2, 9-: 260 lbs (weight obtained on 9-2-2021)              MSD 1, 8-: 263 lbs (weight obtained on 8-)  BMI: 33.8  Total Weight Loss: -31.9 lbs since surgery      LABS:  None available     CURRENT DIET:  Bariatric Diet.  Diet Recall: adequate amounts of protein and fluid intake    Meal Pattern: 3 meal(s) + 2 snack(s)//  1-2 protein supplement(s) as meal replacement   Adequate protein  supplement intake.  Adequate dairy intake.  Adequate vegetable intake. Tolerates raw vegetables and lettuce.  Adequate fruit intake.  Starchy CHO: twice weekly (rice, crackers)  Other: walnuts, cashews    EXERCISE:  Walking 3-4x/week. 30 minutes. Exercise videos for abs and upper body.     Restrictions to Exercise: None.    VITAMINS / MINERALS:  Not discussed    ASSESSMENT:  Doing well overall.  Weight loss.  Adequate calorie intake.  Adequate protein intake.  Adequate fluid intake.  Following diet appropriately.  Exercising.      BARIATRIC DIET DISCUSSION:  Instructed and provided written materials on bariatric diet plan.  Reinforced post-op nutrition guidelines.    PLAN / RECOMMENDATIONS:  May begin to incorporate raw vegetables, lettuce, unsalted nuts, and light popcorn as tolerated.  May begin to swallow whole pills as tolerated.  Continue excellent diet plan.  Adjust diet by: decrease intake of nuts in diet and replace with lower calorie protein choice as discussed// choose lower sugar protein bar option such as [employer].  Maintain protein intake.  Maintain fluid intake.  Increase exercise.      Return to clinic in 3 months.    SESSION TIME: 45 minutes

## 2022-12-15 ENCOUNTER — PATIENT MESSAGE (OUTPATIENT)
Dept: NEUROSURGERY | Facility: CLINIC | Age: 67
End: 2022-12-15
Payer: MEDICARE

## 2022-12-15 LAB — PREALB SERPL-MCNC: 18 MG/DL (ref 20–43)

## 2022-12-19 LAB — 1,25(OH)2D3 SERPL-MCNC: 74 PG/ML (ref 20–79)

## 2022-12-20 LAB — VIT B1 BLD-MCNC: 76 UG/L (ref 38–122)

## 2022-12-28 ENCOUNTER — OFFICE VISIT (OUTPATIENT)
Dept: INTERNAL MEDICINE | Facility: CLINIC | Age: 67
End: 2022-12-28
Payer: MEDICARE

## 2022-12-28 VITALS
WEIGHT: 212 LBS | DIASTOLIC BLOOD PRESSURE: 60 MMHG | SYSTOLIC BLOOD PRESSURE: 110 MMHG | OXYGEN SATURATION: 98 % | TEMPERATURE: 98 F | HEIGHT: 67 IN | BODY MASS INDEX: 33.27 KG/M2 | HEART RATE: 60 BPM

## 2022-12-28 DIAGNOSIS — I10 PRIMARY HYPERTENSION: Primary | ICD-10-CM

## 2022-12-28 PROCEDURE — 99213 PR OFFICE/OUTPT VISIT, EST, LEVL III, 20-29 MIN: ICD-10-PCS | Mod: S$PBB,,, | Performed by: INTERNAL MEDICINE

## 2022-12-28 PROCEDURE — 99213 OFFICE O/P EST LOW 20 MIN: CPT | Mod: S$PBB,,, | Performed by: INTERNAL MEDICINE

## 2022-12-28 PROCEDURE — 99213 OFFICE O/P EST LOW 20 MIN: CPT | Mod: PBBFAC,PO | Performed by: INTERNAL MEDICINE

## 2022-12-28 PROCEDURE — 99999 PR PBB SHADOW E&M-EST. PATIENT-LVL III: CPT | Mod: PBBFAC,,, | Performed by: INTERNAL MEDICINE

## 2022-12-28 PROCEDURE — 99999 PR PBB SHADOW E&M-EST. PATIENT-LVL III: ICD-10-PCS | Mod: PBBFAC,,, | Performed by: INTERNAL MEDICINE

## 2022-12-28 RX ORDER — HYDROCODONE BITARTRATE AND ACETAMINOPHEN 10; 325 MG/1; MG/1
1 TABLET ORAL 2 TIMES DAILY PRN
Qty: 180 TABLET | Refills: 0 | Status: SHIPPED | OUTPATIENT
Start: 2022-12-28 | End: 2023-01-03 | Stop reason: SDUPTHER

## 2022-12-28 NOTE — PROGRESS NOTES
"HPI:  Patient is 67-year-old female comes today for follow-up of her hypertension refills of her hydrocodone.  She is been doing well.  She has no complaints at this time.  She will be having back surgery in February.    Current meds have been verified and updated per the EMR  Exam:/60   Pulse 60   Temp 97.9 °F (36.6 °C) (Temporal)   Ht 5' 7" (1.702 m)   Wt 96.2 kg (212 lb)   SpO2 98%   BMI 33.20 kg/m²   Exam deferred    Lab Results   Component Value Date    WBC 6.45 12/14/2022    HGB 14.8 12/14/2022    HCT 46.2 12/14/2022     12/14/2022    CHOL 124 09/19/2022    TRIG 83 09/19/2022    HDL 41 09/19/2022    ALT 19 12/14/2022    AST 20 12/14/2022     12/14/2022    K 3.8 12/14/2022     12/14/2022    CREATININE 0.8 12/14/2022    BUN 18 12/14/2022    CO2 25 12/14/2022    TSH 2.886 12/14/2022    INR 1.0 12/15/2021    HGBA1C 5.4 12/14/2022       Impression:  Stable problems below  Patient Active Problem List   Diagnosis    HTN (hypertension)    Generalized osteoarthrosis, involving multiple sites    History of breast cancer    Hyperlipidemia    Myofascial pain    Bilateral carpal tunnel syndrome    Lumbar radiculopathy    Chronic pain syndrome    Cervical radiculopathy    Morbid obesity with BMI of 40.0-44.9, adult    DDD (degenerative disc disease), cervical       Plan:  Orders Placed This Encounter    Basic Metabolic Panel    Lipid Panel    TSH    HYDROcodone-acetaminophen (NORCO)  mg per tablet   She was given refills of her hydrocodone.  She will see me again in 3 months with above lab work.      This note is generated with speech recognition software and is subject to transcription error and sound alike phrases that may be missed by proofreading.      "

## 2023-01-03 ENCOUNTER — PATIENT MESSAGE (OUTPATIENT)
Dept: INTERNAL MEDICINE | Facility: CLINIC | Age: 68
End: 2023-01-03
Payer: MEDICARE

## 2023-01-03 RX ORDER — HYDROCODONE BITARTRATE AND ACETAMINOPHEN 10; 325 MG/1; MG/1
1 TABLET ORAL 2 TIMES DAILY PRN
Qty: 180 TABLET | Refills: 0 | Status: CANCELLED | OUTPATIENT
Start: 2023-01-03

## 2023-01-03 RX ORDER — HYDROCODONE BITARTRATE AND ACETAMINOPHEN 10; 325 MG/1; MG/1
1 TABLET ORAL 2 TIMES DAILY PRN
Qty: 180 TABLET | Refills: 0 | Status: SHIPPED | OUTPATIENT
Start: 2023-01-03 | End: 2023-01-09 | Stop reason: SDUPTHER

## 2023-01-03 NOTE — TELEPHONE ENCOUNTER
No new care gaps identified.  [facility] Embedded Care Gaps. Reference number: [id number]. 1/03/2023   3:11:36 PM CST

## 2023-01-04 ENCOUNTER — PATIENT MESSAGE (OUTPATIENT)
Dept: INTERNAL MEDICINE | Facility: CLINIC | Age: 68
End: 2023-01-04
Payer: MEDICARE

## 2023-01-04 RX ORDER — HYDROCODONE BITARTRATE AND ACETAMINOPHEN 10; 325 MG/1; MG/1
1 TABLET ORAL 2 TIMES DAILY PRN
Qty: 180 TABLET | Refills: 0 | OUTPATIENT
Start: 2023-01-04

## 2023-01-04 NOTE — TELEPHONE ENCOUNTER
No new care gaps identified.  [facility] Embedded Care Gaps. Reference number: [id number]. 1/04/2023   2:49:15 PM CST

## 2023-01-04 NOTE — TELEPHONE ENCOUNTER
Patient stated that [facility] is out of the hydrocodone.  Requesting rx to be sent to [facility] in [place].

## 2023-01-09 ENCOUNTER — PATIENT MESSAGE (OUTPATIENT)
Dept: INTERNAL MEDICINE | Facility: CLINIC | Age: 68
End: 2023-01-09
Payer: MEDICARE

## 2023-01-09 RX ORDER — HYDROCODONE BITARTRATE AND ACETAMINOPHEN 10; 325 MG/1; MG/1
1 TABLET ORAL 2 TIMES DAILY PRN
Qty: 180 TABLET | Refills: 0 | Status: ON HOLD | OUTPATIENT
Start: 2023-01-09 | End: 2023-02-03 | Stop reason: HOSPADM

## 2023-01-09 NOTE — TELEPHONE ENCOUNTER
No new care gaps identified.  [facility] Embedded Care Gaps. Reference number: [id number]. 1/09/2023   4:22:05 PM CST

## 2023-01-12 ENCOUNTER — APPOINTMENT (OUTPATIENT)
Dept: RADIOLOGY | Facility: HOSPITAL | Age: 68
End: 2023-01-12
Attending: INTERNAL MEDICINE
Payer: MEDICARE

## 2023-01-12 DIAGNOSIS — M54.16 LUMBAR RADICULOPATHY: ICD-10-CM

## 2023-01-12 DIAGNOSIS — M51.36 DEGENERATIVE DISC DISEASE, LUMBAR: ICD-10-CM

## 2023-01-12 DIAGNOSIS — M48.062 LUMBAR STENOSIS WITH NEUROGENIC CLAUDICATION: Primary | ICD-10-CM

## 2023-01-12 DIAGNOSIS — Z78.0 MENOPAUSE: ICD-10-CM

## 2023-01-12 PROCEDURE — 77080 DXA BONE DENSITY AXIAL: CPT | Mod: TC

## 2023-01-12 PROCEDURE — 77080 DXA BONE DENSITY AXIAL: CPT | Mod: 26,,, | Performed by: RADIOLOGY

## 2023-01-12 PROCEDURE — 77080 DEXA BONE DENSITY SPINE HIP: ICD-10-PCS | Mod: 26,,, | Performed by: RADIOLOGY

## 2023-01-18 ENCOUNTER — PATIENT MESSAGE (OUTPATIENT)
Dept: NEUROSURGERY | Facility: CLINIC | Age: 68
End: 2023-01-18
Payer: MEDICARE

## 2023-01-18 ENCOUNTER — TELEPHONE (OUTPATIENT)
Dept: NEUROSURGERY | Facility: CLINIC | Age: 68
End: 2023-01-18
Payer: MEDICARE

## 2023-01-18 NOTE — TELEPHONE ENCOUNTER
Sent the pt a portal message responding.      ----- Message from Toribio Hitchcock sent at 1/18/2023  2:16 PM CST -----  Regarding: Surgery Questions  Contact: Mary  Patient is calling in regards to her surgery and pre-op requirements. Please callback at [phone]      Thanks,  AK

## 2023-01-23 ENCOUNTER — CLINICAL SUPPORT (OUTPATIENT)
Dept: CARDIOLOGY | Facility: CLINIC | Age: 68
End: 2023-01-23
Payer: MEDICARE

## 2023-01-23 ENCOUNTER — TELEPHONE (OUTPATIENT)
Dept: PREADMISSION TESTING | Facility: HOSPITAL | Age: 68
End: 2023-01-23
Payer: MEDICARE

## 2023-01-23 DIAGNOSIS — Z01.818 PRE-OP TESTING: ICD-10-CM

## 2023-01-23 PROCEDURE — 93010 ELECTROCARDIOGRAM REPORT: CPT | Mod: ,,, | Performed by: INTERNAL MEDICINE

## 2023-01-23 PROCEDURE — 93010 EKG 12-LEAD: ICD-10-PCS | Mod: ,,, | Performed by: INTERNAL MEDICINE

## 2023-01-23 PROCEDURE — 93005 ELECTROCARDIOGRAM TRACING: CPT

## 2023-01-23 NOTE — TELEPHONE ENCOUNTER
Pre op instructions reviewed with Pt per phone: Spoke about pre op process and surgery instructions, verbalized understanding.    To confirm, Surgery is scheduled on 2/01/23. We will call you late afternoon the business day prior to surgery with your arrival time.    *Please report to the [facility] Lobby (1st Floor) located off of [facility] (2nd Entrance/Building on the left, in front of the flag pole).  Address: [address] [place] [address] [place] [id number]    INSTRUCTIONS IMPORTANT!!!  Do Not Eat, Drink, or Smoke after 12 midnight unless instructed otherwise by your Surgeon. OK to brush teeth, no gum, candy or mints!      *Take Only these medications with a small sip of water Morning of Surgery:  Citalopram  Gabapentin  Pravastatin    ____  HOLD all vitamins, herbal supplements, aspirin products & NSAIDS 7 days prior to surgery, as these can thin the blood.  ____  NO Acrylic/fake nails or nail polish worn day of surgery (specifically hand/arm & foot surgeries).  ____  NO powder, lotions, deodorants, oils or cream on body.  ____  Remove all jewelry & piercings prior to surgery.  ____  Remove Dentures, Hearing Aids & Contact Lens prior to surgery.  ____  Bring photo ID and insurance information to hospital (Leave Valuables at Home).  ____  If going home the same day, arrange for a ride home. You will not be able to drive for 24 hrs if Anesthesia was used.   ____  Females (ages 11-60): may need to give a urine sample the morning of surgery; please see Pre op Nurse prior to using the restroom.  ____  Males: Stop ED medications (Viagra, Cialis) 24 hrs prior to surgery.  ____  Wear clean, loose fitting clothing to allow for dressings/ bandages.            Diabetic Patients: If you take diabetic medication, do NOT take morning of surgery unless instructed by Doctor. Metformin to be stopped 24 hrs prior to surgery time. DO NOT take long-acting insulin the evening before surgery. Blood sugars will be checked in  pre-op by Nurse.    Bathing Instructions:    -Shower with anti-bacterial Soap (Hibiclens or Dial) the night before surgery and the morning of.   -Do not use Hibiclens on your face or genitals.   -Apply clean clothes after shower.  -Do not shave your face or body 2 days prior to surgery unless instructed otherwise by your Surgeon.  -Do not shave pubic hair 7 days prior to surgery (gyn pt's).    [facility] Visitor/Ride Policy:  Only 2 adults allowed (over the age of 18) to accompany you to the Hospital. You Must have a ride home from a responsible adult that you know and trust. Medical Transport, Uber or Lyft can only be used if patient has a responsible adult to accompany them during ride home.    Discharge Instructions: You will receive Post-op/Discharge instructions by your Discharge Nurse prior to going home. Please call your Surgeon's office with any post-surgery questions/concerns @ [phone].    *If you are running late or have questions the morning of surgery, please call the Surgery Dept @ [phone]  *Insurance/ Financial Questions, please call [phone]    Thank you,  -[facility] Pre Admit Testing Nurse  [phone] or [phone]  M-F 7:30 am-4 pm (Closed Major Holidays)

## 2023-01-30 ENCOUNTER — HOSPITAL ENCOUNTER (OUTPATIENT)
Dept: RADIOLOGY | Facility: HOSPITAL | Age: 68
Discharge: HOME | End: 2023-01-30
Attending: NEUROLOGICAL SURGERY
Payer: MEDICARE

## 2023-01-30 DIAGNOSIS — Z01.818 PRE-OP TESTING: ICD-10-CM

## 2023-01-30 PROCEDURE — 71045 X-RAY EXAM CHEST 1 VIEW: CPT | Mod: 26,,, | Performed by: RADIOLOGY

## 2023-01-30 PROCEDURE — 71045 XR CHEST 1 VIEW PRE-OP: ICD-10-PCS | Mod: 26,,, | Performed by: RADIOLOGY

## 2023-01-30 PROCEDURE — 71045 X-RAY EXAM CHEST 1 VIEW: CPT | Mod: TC

## 2023-01-31 ENCOUNTER — TELEPHONE (OUTPATIENT)
Dept: PREADMISSION TESTING | Facility: HOSPITAL | Age: 68
End: 2023-01-31
Payer: MEDICARE

## 2023-01-31 ENCOUNTER — ANESTHESIA EVENT (OUTPATIENT)
Dept: SURGERY | Facility: HOSPITAL | Age: 68
End: 2023-01-31
Payer: MEDICARE

## 2023-01-31 NOTE — TELEPHONE ENCOUNTER
Called and spoke with Pt about the following:     Please arrive to [facility] ([facility] at 5:30 am on 2/01/23 for your scheduled procedure.  Address: [address] [place] [address] [place]. [address] (2nd Building on left, 1st Floor Lobby)  >>>NO eating or drinking after midnight unless instructed otherwise by your Surgeon<<<

## 2023-02-01 ENCOUNTER — ANESTHESIA (OUTPATIENT)
Dept: SURGERY | Facility: HOSPITAL | Age: 68
End: 2023-02-01
Payer: MEDICARE

## 2023-02-01 ENCOUNTER — HOSPITAL ENCOUNTER (OUTPATIENT)
Facility: HOSPITAL | Age: 68
LOS: 1 days | Discharge: HOME HEALTH | End: 2023-02-03
Attending: NEUROLOGICAL SURGERY | Admitting: NEUROLOGICAL SURGERY
Payer: MEDICARE

## 2023-02-01 DIAGNOSIS — M51.36 DDD (DEGENERATIVE DISC DISEASE), LUMBAR: ICD-10-CM

## 2023-02-01 DIAGNOSIS — M54.16 LUMBAR RADICULOPATHY: ICD-10-CM

## 2023-02-01 DIAGNOSIS — Z98.1 STATUS POST LUMBAR SPINAL FUSION: Primary | ICD-10-CM

## 2023-02-01 PROCEDURE — 22614 PR ARTHRODESIS, POST/POSTLAT, SNGL INTERSPACE, EA ADDTL: ICD-10-PCS | Mod: AS,,, | Performed by: PHYSICIAN ASSISTANT

## 2023-02-01 PROCEDURE — 27800903 OPTIME MED/SURG SUP & DEVICES OTHER IMPLANTS: Performed by: NEUROLOGICAL SURGERY

## 2023-02-01 PROCEDURE — 22853 INSJ BIOMECHANICAL DEVICE: CPT | Mod: ,,, | Performed by: NEUROLOGICAL SURGERY

## 2023-02-01 PROCEDURE — 22842 PR POSTERIOR SEGMENTAL INSTRUMENTATION 3-6 VRT SEG: ICD-10-PCS | Mod: ,,, | Performed by: NEUROLOGICAL SURGERY

## 2023-02-01 PROCEDURE — 25000003 PHARM REV CODE 250: Performed by: HOSPITALIST

## 2023-02-01 PROCEDURE — 94761 N-INVAS EAR/PLS OXIMETRY MLT: CPT

## 2023-02-01 PROCEDURE — 36000713 HC OR TIME LEV V EA ADD 15 MIN: Performed by: NEUROLOGICAL SURGERY

## 2023-02-01 PROCEDURE — 22634 PR ARTHRODESIS, COMBINED TECHN, SNGL INTERSPACE, EA ADDTL: ICD-10-PCS | Mod: AS,,, | Performed by: PHYSICIAN ASSISTANT

## 2023-02-01 PROCEDURE — 63052 LAM FACETC/FRMT ARTHRD LUM 1: CPT | Mod: ,,, | Performed by: NEUROLOGICAL SURGERY

## 2023-02-01 PROCEDURE — 27201423 OPTIME MED/SURG SUP & DEVICES STERILE SUPPLY: Performed by: NEUROLOGICAL SURGERY

## 2023-02-01 PROCEDURE — 22842 INSERT SPINE FIXATION DEVICE: CPT | Mod: ,,, | Performed by: NEUROLOGICAL SURGERY

## 2023-02-01 PROCEDURE — 20936 SP BONE AGRFT LOCAL ADD-ON: CPT | Mod: ,,, | Performed by: NEUROLOGICAL SURGERY

## 2023-02-01 PROCEDURE — 22633 PR ARTHRODESIS, COMBINED TECHN, SNGL INTERSPACE, LUMBAR: ICD-10-PCS | Mod: ,,, | Performed by: NEUROLOGICAL SURGERY

## 2023-02-01 PROCEDURE — 63053 PR LAMINECT/FACETECT/FORAMINOT, ARTHRODESIS, EA ADDTL VERTEBR SEGM: ICD-10-PCS | Mod: ,,, | Performed by: NEUROLOGICAL SURGERY

## 2023-02-01 PROCEDURE — 22634 ARTHRD CMBN 1NTRSPC EA ADDL: CPT | Mod: ,,, | Performed by: NEUROLOGICAL SURGERY

## 2023-02-01 PROCEDURE — 63053 PR LAMINECT/FACETECT/FORAMINOT, ARTHRODESIS, EA ADDTL VERTEBR SEGM: ICD-10-PCS | Mod: AS,,, | Performed by: PHYSICIAN ASSISTANT

## 2023-02-01 PROCEDURE — 63600175 PHARM REV CODE 636 W HCPCS: Performed by: NURSE ANESTHETIST, CERTIFIED REGISTERED

## 2023-02-01 PROCEDURE — 63052 PR LAMINECT/FACETECT/FORAMINOT, ARTHRODESIS, 1 VERTEBR SEGM: ICD-10-PCS | Mod: ,,, | Performed by: NEUROLOGICAL SURGERY

## 2023-02-01 PROCEDURE — 36000712 HC OR TIME LEV V 1ST 15 MIN: Performed by: NEUROLOGICAL SURGERY

## 2023-02-01 PROCEDURE — 63600175 PHARM REV CODE 636 W HCPCS: Performed by: NEUROLOGICAL SURGERY

## 2023-02-01 PROCEDURE — 22842 PR POSTERIOR SEGMENTAL INSTRUMENTATION 3-6 VRT SEG: ICD-10-PCS | Mod: AS,,, | Performed by: PHYSICIAN ASSISTANT

## 2023-02-01 PROCEDURE — 25000003 PHARM REV CODE 250: Performed by: NURSE ANESTHETIST, CERTIFIED REGISTERED

## 2023-02-01 PROCEDURE — 22614 ARTHRD PST TQ 1NTRSPC EA ADD: CPT | Mod: AS,,, | Performed by: PHYSICIAN ASSISTANT

## 2023-02-01 PROCEDURE — 63053 LAM FACTC/FRMT ARTHRD LUM EA: CPT | Mod: AS,,, | Performed by: PHYSICIAN ASSISTANT

## 2023-02-01 PROCEDURE — 22853 PR INSERT BIOMECH DEV W/INTERBODY ARTHRODESIS, EA CONTIGUOUS DEFECT: ICD-10-PCS | Mod: ,,, | Performed by: NEUROLOGICAL SURGERY

## 2023-02-01 PROCEDURE — 22853 PR INSERT BIOMECH DEV W/INTERBODY ARTHRODESIS, EA CONTIGUOUS DEFECT: ICD-10-PCS | Mod: AS,,, | Performed by: PHYSICIAN ASSISTANT

## 2023-02-01 PROCEDURE — 25000003 PHARM REV CODE 250: Performed by: STUDENT IN AN ORGANIZED HEALTH CARE EDUCATION/TRAINING PROGRAM

## 2023-02-01 PROCEDURE — 94799 UNLISTED PULMONARY SVC/PX: CPT

## 2023-02-01 PROCEDURE — 63053 LAM FACTC/FRMT ARTHRD LUM EA: CPT | Mod: ,,, | Performed by: NEUROLOGICAL SURGERY

## 2023-02-01 PROCEDURE — 22853 INSJ BIOMECHANICAL DEVICE: CPT | Mod: AS,,, | Performed by: PHYSICIAN ASSISTANT

## 2023-02-01 PROCEDURE — 20936 PR AUTOGRAFT SPINE SURGERY LOCAL FROM SAME INCISION: ICD-10-PCS | Mod: ,,, | Performed by: NEUROLOGICAL SURGERY

## 2023-02-01 PROCEDURE — 25000003 PHARM REV CODE 250: Performed by: ANESTHESIOLOGY

## 2023-02-01 PROCEDURE — C9290 INJ, BUPIVACAINE LIPOSOME: HCPCS | Performed by: NEUROLOGICAL SURGERY

## 2023-02-01 PROCEDURE — 22634 ARTHRD CMBN 1NTRSPC EA ADDL: CPT | Mod: AS,,, | Performed by: PHYSICIAN ASSISTANT

## 2023-02-01 PROCEDURE — 20930 SP BONE ALGRFT MORSEL ADD-ON: CPT | Mod: ,,, | Performed by: NEUROLOGICAL SURGERY

## 2023-02-01 PROCEDURE — 22633 ARTHRD CMBN 1NTRSPC LUMBAR: CPT | Mod: AS,,, | Performed by: PHYSICIAN ASSISTANT

## 2023-02-01 PROCEDURE — 25000003 PHARM REV CODE 250: Performed by: NEUROLOGICAL SURGERY

## 2023-02-01 PROCEDURE — 22842 INSERT SPINE FIXATION DEVICE: CPT | Mod: AS,,, | Performed by: PHYSICIAN ASSISTANT

## 2023-02-01 PROCEDURE — 20930 PR ALLOGRAFT FOR SPINE SURGERY ONLY MORSELIZED: ICD-10-PCS | Mod: ,,, | Performed by: NEUROLOGICAL SURGERY

## 2023-02-01 PROCEDURE — 71000033 HC RECOVERY, INTIAL HOUR: Performed by: NEUROLOGICAL SURGERY

## 2023-02-01 PROCEDURE — 22614 ARTHRD PST TQ 1NTRSPC EA ADD: CPT | Mod: ,,, | Performed by: NEUROLOGICAL SURGERY

## 2023-02-01 PROCEDURE — 99900035 HC TECH TIME PER 15 MIN (STAT)

## 2023-02-01 PROCEDURE — 71000039 HC RECOVERY, EACH ADD'L HOUR: Performed by: NEUROLOGICAL SURGERY

## 2023-02-01 PROCEDURE — 37000009 HC ANESTHESIA EA ADD 15 MINS: Performed by: NEUROLOGICAL SURGERY

## 2023-02-01 PROCEDURE — 22634 PR ARTHRODESIS, COMBINED TECHN, SNGL INTERSPACE, EA ADDTL: ICD-10-PCS | Mod: ,,, | Performed by: NEUROLOGICAL SURGERY

## 2023-02-01 PROCEDURE — 61783 PR STEREOTACTIC COMP ASSIST PROC,SPINAL: ICD-10-PCS | Mod: 59,,, | Performed by: NEUROLOGICAL SURGERY

## 2023-02-01 PROCEDURE — 37000008 HC ANESTHESIA 1ST 15 MINUTES: Performed by: NEUROLOGICAL SURGERY

## 2023-02-01 PROCEDURE — 22633 PR ARTHRODESIS, COMBINED TECHN, SNGL INTERSPACE, LUMBAR: ICD-10-PCS | Mod: AS,,, | Performed by: PHYSICIAN ASSISTANT

## 2023-02-01 PROCEDURE — 63052 PR LAMINECT/FACETECT/FORAMINOT, ARTHRODESIS, 1 VERTEBR SEGM: ICD-10-PCS | Mod: AS,,, | Performed by: PHYSICIAN ASSISTANT

## 2023-02-01 PROCEDURE — 63600175 PHARM REV CODE 636 W HCPCS: Performed by: PHYSICIAN ASSISTANT

## 2023-02-01 PROCEDURE — 63600175 PHARM REV CODE 636 W HCPCS

## 2023-02-01 PROCEDURE — 25000003 PHARM REV CODE 250

## 2023-02-01 PROCEDURE — 61783 SCAN PROC SPINAL: CPT | Mod: 59,,, | Performed by: NEUROLOGICAL SURGERY

## 2023-02-01 PROCEDURE — 63600175 PHARM REV CODE 636 W HCPCS: Performed by: STUDENT IN AN ORGANIZED HEALTH CARE EDUCATION/TRAINING PROGRAM

## 2023-02-01 PROCEDURE — 25000003 PHARM REV CODE 250: Performed by: PHYSICIAN ASSISTANT

## 2023-02-01 PROCEDURE — 63052 LAM FACETC/FRMT ARTHRD LUM 1: CPT | Mod: AS,,, | Performed by: PHYSICIAN ASSISTANT

## 2023-02-01 PROCEDURE — C1713 ANCHOR/SCREW BN/BN,TIS/BN: HCPCS | Performed by: NEUROLOGICAL SURGERY

## 2023-02-01 PROCEDURE — 22614 PR ARTHRODESIS, POST/POSTLAT, SNGL INTERSPACE, EA ADDTL: ICD-10-PCS | Mod: ,,, | Performed by: NEUROLOGICAL SURGERY

## 2023-02-01 PROCEDURE — 22633 ARTHRD CMBN 1NTRSPC LUMBAR: CPT | Mod: ,,, | Performed by: NEUROLOGICAL SURGERY

## 2023-02-01 DEVICE — KIT BONE GRFT BMP SM: Type: IMPLANTABLE DEVICE | Site: BACK | Status: FUNCTIONAL

## 2023-02-01 DEVICE — ROD HORIZON CURV 5.5CCM 90MM: Type: IMPLANTABLE DEVICE | Site: BACK | Status: FUNCTIONAL

## 2023-02-01 DEVICE — IMPLANTABLE DEVICE: Type: IMPLANTABLE DEVICE | Site: BACK | Status: FUNCTIONAL

## 2023-02-01 DEVICE — SET SCREW HORIZON SOLERA 5.5-6: Type: IMPLANTABLE DEVICE | Site: BACK | Status: FUNCTIONAL

## 2023-02-01 DEVICE — SPACER SHORT CATALYFT PL 7MM: Type: IMPLANTABLE DEVICE | Site: BACK | Status: FUNCTIONAL

## 2023-02-01 RX ORDER — CHOLECALCIFEROL (VITAMIN D3) 25 MCG
1000 TABLET ORAL DAILY
Status: DISCONTINUED | OUTPATIENT
Start: 2023-02-01 | End: 2023-02-03 | Stop reason: HOSPADM

## 2023-02-01 RX ORDER — ONDANSETRON 2 MG/ML
INJECTION INTRAMUSCULAR; INTRAVENOUS
Status: DISCONTINUED | OUTPATIENT
Start: 2023-02-01 | End: 2023-02-01

## 2023-02-01 RX ORDER — CLONIDINE HYDROCHLORIDE 0.2 MG/1
0.2 TABLET ORAL NIGHTLY
Status: DISCONTINUED | OUTPATIENT
Start: 2023-02-01 | End: 2023-02-01

## 2023-02-01 RX ORDER — PROCHLORPERAZINE EDISYLATE 5 MG/ML
5 INJECTION INTRAMUSCULAR; INTRAVENOUS EVERY 6 HOURS PRN
Status: DISCONTINUED | OUTPATIENT
Start: 2023-02-01 | End: 2023-02-03 | Stop reason: HOSPADM

## 2023-02-01 RX ORDER — CITALOPRAM 20 MG/1
20 TABLET, FILM COATED ORAL DAILY
Status: DISCONTINUED | OUTPATIENT
Start: 2023-02-02 | End: 2023-02-03 | Stop reason: HOSPADM

## 2023-02-01 RX ORDER — SODIUM CHLORIDE 9 MG/ML
INJECTION, SOLUTION INTRAVENOUS CONTINUOUS
Status: DISCONTINUED | OUTPATIENT
Start: 2023-02-01 | End: 2023-02-03

## 2023-02-01 RX ORDER — LIDOCAINE HYDROCHLORIDE 20 MG/ML
INJECTION INTRAVENOUS
Status: DISCONTINUED | OUTPATIENT
Start: 2023-02-01 | End: 2023-02-01

## 2023-02-01 RX ORDER — ONDANSETRON 2 MG/ML
4 INJECTION INTRAMUSCULAR; INTRAVENOUS DAILY PRN
Status: DISCONTINUED | OUTPATIENT
Start: 2023-02-01 | End: 2023-02-01 | Stop reason: HOSPADM

## 2023-02-01 RX ORDER — AMOXICILLIN 250 MG
2 CAPSULE ORAL NIGHTLY PRN
Status: DISCONTINUED | OUTPATIENT
Start: 2023-02-01 | End: 2023-02-03 | Stop reason: HOSPADM

## 2023-02-01 RX ORDER — FENTANYL CITRATE 50 UG/ML
INJECTION, SOLUTION INTRAMUSCULAR; INTRAVENOUS
Status: DISCONTINUED | OUTPATIENT
Start: 2023-02-01 | End: 2023-02-01

## 2023-02-01 RX ORDER — SUCCINYLCHOLINE CHLORIDE 20 MG/ML
INJECTION INTRAMUSCULAR; INTRAVENOUS
Status: DISCONTINUED | OUTPATIENT
Start: 2023-02-01 | End: 2023-02-01

## 2023-02-01 RX ORDER — OXYCODONE HCL 10 MG/1
10 TABLET, FILM COATED, EXTENDED RELEASE ORAL EVERY 12 HOURS
Status: DISCONTINUED | OUTPATIENT
Start: 2023-02-01 | End: 2023-02-03 | Stop reason: HOSPADM

## 2023-02-01 RX ORDER — OXYCODONE HYDROCHLORIDE 5 MG/1
5 TABLET ORAL EVERY 4 HOURS PRN
Status: DISCONTINUED | OUTPATIENT
Start: 2023-02-01 | End: 2023-02-03 | Stop reason: HOSPADM

## 2023-02-01 RX ORDER — MIDAZOLAM HYDROCHLORIDE 1 MG/ML
INJECTION, SOLUTION INTRAMUSCULAR; INTRAVENOUS
Status: DISCONTINUED | OUTPATIENT
Start: 2023-02-01 | End: 2023-02-01

## 2023-02-01 RX ORDER — BUPIVACAINE HYDROCHLORIDE 2.5 MG/ML
INJECTION, SOLUTION EPIDURAL; INFILTRATION; INTRACAUDAL
Status: DISCONTINUED | OUTPATIENT
Start: 2023-02-01 | End: 2023-02-01 | Stop reason: HOSPADM

## 2023-02-01 RX ORDER — HYDROCHLOROTHIAZIDE 25 MG/1
25 TABLET ORAL DAILY
Status: DISCONTINUED | OUTPATIENT
Start: 2023-02-02 | End: 2023-02-01

## 2023-02-01 RX ORDER — DEXTROSE MONOHYDRATE AND SODIUM CHLORIDE 5; .9 G/100ML; G/100ML
INJECTION, SOLUTION INTRAVENOUS CONTINUOUS
Status: DISCONTINUED | OUTPATIENT
Start: 2023-02-01 | End: 2023-02-02

## 2023-02-01 RX ORDER — PRAVASTATIN SODIUM 20 MG/1
40 TABLET ORAL DAILY
Status: DISCONTINUED | OUTPATIENT
Start: 2023-02-02 | End: 2023-02-03 | Stop reason: HOSPADM

## 2023-02-01 RX ORDER — NALOXONE HCL 0.4 MG/ML
0.4 VIAL (ML) INJECTION
Status: DISCONTINUED | OUTPATIENT
Start: 2023-02-01 | End: 2023-02-03 | Stop reason: HOSPADM

## 2023-02-01 RX ORDER — LOSARTAN POTASSIUM 50 MG/1
100 TABLET ORAL DAILY
Status: DISCONTINUED | OUTPATIENT
Start: 2023-02-02 | End: 2023-02-01

## 2023-02-01 RX ORDER — FUROSEMIDE 40 MG/1
40 TABLET ORAL DAILY
Status: DISCONTINUED | OUTPATIENT
Start: 2023-02-01 | End: 2023-02-01

## 2023-02-01 RX ORDER — ZOLPIDEM TARTRATE 5 MG/1
5 TABLET ORAL NIGHTLY PRN
Status: DISCONTINUED | OUTPATIENT
Start: 2023-02-01 | End: 2023-02-03 | Stop reason: HOSPADM

## 2023-02-01 RX ORDER — SCOLOPAMINE TRANSDERMAL SYSTEM 1 MG/1
1 PATCH, EXTENDED RELEASE TRANSDERMAL
Status: DISCONTINUED | OUTPATIENT
Start: 2023-02-01 | End: 2023-02-03 | Stop reason: HOSPADM

## 2023-02-01 RX ORDER — ALPRAZOLAM 1 MG/1
1 TABLET ORAL NIGHTLY PRN
Status: DISCONTINUED | OUTPATIENT
Start: 2023-02-01 | End: 2023-02-03 | Stop reason: HOSPADM

## 2023-02-01 RX ORDER — OXYCODONE AND ACETAMINOPHEN 5; 325 MG/1; MG/1
1 TABLET ORAL
Status: DISCONTINUED | OUTPATIENT
Start: 2023-02-01 | End: 2023-02-01 | Stop reason: HOSPADM

## 2023-02-01 RX ORDER — MORPHINE SULFATE 4 MG/ML
1 INJECTION, SOLUTION INTRAMUSCULAR; INTRAVENOUS
Status: DISCONTINUED | OUTPATIENT
Start: 2023-02-01 | End: 2023-02-03 | Stop reason: HOSPADM

## 2023-02-01 RX ORDER — BISACODYL 10 MG
10 SUPPOSITORY, RECTAL RECTAL DAILY
Status: DISCONTINUED | OUTPATIENT
Start: 2023-02-01 | End: 2023-02-03 | Stop reason: HOSPADM

## 2023-02-01 RX ORDER — OXYCODONE HYDROCHLORIDE 5 MG/1
10 TABLET ORAL EVERY 4 HOURS PRN
Status: DISCONTINUED | OUTPATIENT
Start: 2023-02-01 | End: 2023-02-03 | Stop reason: HOSPADM

## 2023-02-01 RX ORDER — ONDANSETRON 8 MG/1
8 TABLET, ORALLY DISINTEGRATING ORAL EVERY 6 HOURS PRN
Status: DISCONTINUED | OUTPATIENT
Start: 2023-02-01 | End: 2023-02-03 | Stop reason: HOSPADM

## 2023-02-01 RX ORDER — EPHEDRINE SULFATE 50 MG/ML
INJECTION, SOLUTION INTRAVENOUS
Status: DISCONTINUED | OUTPATIENT
Start: 2023-02-01 | End: 2023-02-01

## 2023-02-01 RX ORDER — PROPOFOL 10 MG/ML
VIAL (ML) INTRAVENOUS
Status: DISCONTINUED | OUTPATIENT
Start: 2023-02-01 | End: 2023-02-01

## 2023-02-01 RX ORDER — HYDROMORPHONE HYDROCHLORIDE 2 MG/ML
0.2 INJECTION, SOLUTION INTRAMUSCULAR; INTRAVENOUS; SUBCUTANEOUS EVERY 5 MIN PRN
Status: DISCONTINUED | OUTPATIENT
Start: 2023-02-01 | End: 2023-02-01 | Stop reason: HOSPADM

## 2023-02-01 RX ORDER — VANCOMYCIN HYDROCHLORIDE 1 G/20ML
INJECTION, POWDER, LYOPHILIZED, FOR SOLUTION INTRAVENOUS
Status: DISCONTINUED | OUTPATIENT
Start: 2023-02-01 | End: 2023-02-01 | Stop reason: ALTCHOICE

## 2023-02-01 RX ORDER — DEXAMETHASONE SODIUM PHOSPHATE 4 MG/ML
INJECTION, SOLUTION INTRA-ARTICULAR; INTRALESIONAL; INTRAMUSCULAR; INTRAVENOUS; SOFT TISSUE
Status: DISCONTINUED | OUTPATIENT
Start: 2023-02-01 | End: 2023-02-01

## 2023-02-01 RX ORDER — FLUTICASONE PROPIONATE 50 MCG
2 SPRAY, SUSPENSION (ML) NASAL DAILY
Status: DISCONTINUED | OUTPATIENT
Start: 2023-02-01 | End: 2023-02-03 | Stop reason: HOSPADM

## 2023-02-01 RX ORDER — MAG HYDROX/ALUMINUM HYD/SIMETH 200-200-20
30 SUSPENSION, ORAL (FINAL DOSE FORM) ORAL EVERY 4 HOURS PRN
Status: DISCONTINUED | OUTPATIENT
Start: 2023-02-01 | End: 2023-02-03 | Stop reason: HOSPADM

## 2023-02-01 RX ORDER — MORPHINE SULFATE 4 MG/ML
0.5 INJECTION, SOLUTION INTRAMUSCULAR; INTRAVENOUS
Status: DISCONTINUED | OUTPATIENT
Start: 2023-02-01 | End: 2023-02-03 | Stop reason: HOSPADM

## 2023-02-01 RX ORDER — MORPHINE SULFATE 4 MG/ML
2 INJECTION, SOLUTION INTRAMUSCULAR; INTRAVENOUS
Status: DISCONTINUED | OUTPATIENT
Start: 2023-02-01 | End: 2023-02-03 | Stop reason: HOSPADM

## 2023-02-01 RX ORDER — ROCURONIUM BROMIDE 10 MG/ML
INJECTION, SOLUTION INTRAVENOUS
Status: DISCONTINUED | OUTPATIENT
Start: 2023-02-01 | End: 2023-02-01

## 2023-02-01 RX ORDER — LOSARTAN POTASSIUM AND HYDROCHLOROTHIAZIDE 25; 100 MG/1; MG/1
1 TABLET ORAL DAILY
Status: DISCONTINUED | OUTPATIENT
Start: 2023-02-02 | End: 2023-02-01

## 2023-02-01 RX ADMIN — OXYCODONE HYDROCHLORIDE 10 MG: 5 TABLET ORAL at 03:02

## 2023-02-01 RX ADMIN — FENTANYL CITRATE 50 MCG: 50 INJECTION, SOLUTION INTRAMUSCULAR; INTRAVENOUS at 07:02

## 2023-02-01 RX ADMIN — ONDANSETRON 4 MG: 2 INJECTION, SOLUTION INTRAMUSCULAR; INTRAVENOUS at 08:02

## 2023-02-01 RX ADMIN — SCOPOLAMINE 1 PATCH: 1 SYSTEM TRANSDERMAL at 06:02

## 2023-02-01 RX ADMIN — EPHEDRINE SULFATE 10 MG: 50 INJECTION INTRAVENOUS at 07:02

## 2023-02-01 RX ADMIN — DEXAMETHASONE SODIUM PHOSPHATE 4 MG: 4 INJECTION, SOLUTION INTRAMUSCULAR; INTRAVENOUS at 08:02

## 2023-02-01 RX ADMIN — EPHEDRINE SULFATE 10 MG: 50 INJECTION INTRAVENOUS at 08:02

## 2023-02-01 RX ADMIN — FENTANYL CITRATE 25 MCG: 50 INJECTION, SOLUTION INTRAMUSCULAR; INTRAVENOUS at 10:02

## 2023-02-01 RX ADMIN — LIDOCAINE HYDROCHLORIDE 50 MG: 20 INJECTION, SOLUTION INTRAVENOUS at 07:02

## 2023-02-01 RX ADMIN — HYDROMORPHONE HYDROCHLORIDE 0.2 MG: 2 INJECTION INTRAMUSCULAR; INTRAVENOUS; SUBCUTANEOUS at 01:02

## 2023-02-01 RX ADMIN — PROPOFOL 140 MG: 10 INJECTION, EMULSION INTRAVENOUS at 07:02

## 2023-02-01 RX ADMIN — ALPRAZOLAM 1 MG: 1 TABLET ORAL at 10:02

## 2023-02-01 RX ADMIN — OXYCODONE HYDROCHLORIDE AND ACETAMINOPHEN 1 TABLET: 5; 325 TABLET ORAL at 12:02

## 2023-02-01 RX ADMIN — SUCCINYLCHOLINE CHLORIDE 120 MG: 20 INJECTION, SOLUTION INTRAMUSCULAR; INTRAVENOUS at 07:02

## 2023-02-01 RX ADMIN — OXYCODONE HYDROCHLORIDE 10 MG: 10 TABLET, FILM COATED, EXTENDED RELEASE ORAL at 08:02

## 2023-02-01 RX ADMIN — HYDROMORPHONE HYDROCHLORIDE 0.2 MG: 2 INJECTION INTRAMUSCULAR; INTRAVENOUS; SUBCUTANEOUS at 12:02

## 2023-02-01 RX ADMIN — VANCOMYCIN HYDROCHLORIDE 1500 MG: 1.5 INJECTION, POWDER, LYOPHILIZED, FOR SOLUTION INTRAVENOUS at 06:02

## 2023-02-01 RX ADMIN — EPHEDRINE SULFATE 10 MG: 50 INJECTION INTRAVENOUS at 09:02

## 2023-02-01 RX ADMIN — MIDAZOLAM 2 MG: 1 INJECTION INTRAMUSCULAR; INTRAVENOUS at 07:02

## 2023-02-01 RX ADMIN — ZOLPIDEM TARTRATE 5 MG: 5 TABLET ORAL at 10:02

## 2023-02-01 RX ADMIN — FENTANYL CITRATE 25 MCG: 50 INJECTION, SOLUTION INTRAMUSCULAR; INTRAVENOUS at 11:02

## 2023-02-01 RX ADMIN — SODIUM CHLORIDE, SODIUM LACTATE, POTASSIUM CHLORIDE, AND CALCIUM CHLORIDE: .6; .31; .03; .02 INJECTION, SOLUTION INTRAVENOUS at 09:02

## 2023-02-01 RX ADMIN — SODIUM CHLORIDE: 9 INJECTION, SOLUTION INTRAVENOUS at 10:02

## 2023-02-01 RX ADMIN — SODIUM CHLORIDE, SODIUM LACTATE, POTASSIUM CHLORIDE, AND CALCIUM CHLORIDE: .6; .31; .03; .02 INJECTION, SOLUTION INTRAVENOUS at 07:02

## 2023-02-01 RX ADMIN — VANCOMYCIN HYDROCHLORIDE 1500 MG: 1.5 INJECTION, POWDER, LYOPHILIZED, FOR SOLUTION INTRAVENOUS at 10:02

## 2023-02-01 RX ADMIN — GLYCOPYRROLATE 0.2 MG: 0.2 INJECTION, SOLUTION INTRAMUSCULAR; INTRAVENOUS at 08:02

## 2023-02-01 RX ADMIN — ROCURONIUM BROMIDE 5 MG: 10 INJECTION, SOLUTION INTRAVENOUS at 07:02

## 2023-02-01 NOTE — PLAN OF CARE
Patient downgraded to o/p extended recovery.  I will advise patient about the Code 44, once she is roomed and near a phone.

## 2023-02-01 NOTE — BRIEF OP NOTE
[facility] - Surgery (Hospital)  Brief Operative Note    SUMMARY     Surgery Date: 2/1/2023     Surgeon(s) and Role:     * Pradip Fernando MD - Primary    Assistant: Miesha Linn PA-C    Pre-op Diagnosis:  Lumbar stenosis with neurogenic claudication [M48.062]  Degenerative disc disease, lumbar [M51.36]  Lumbar radiculopathy [M54.16]    Post-op Diagnosis:  Post-Op Diagnosis Codes:     * Lumbar stenosis with neurogenic claudication [M48.062]     * Degenerative disc disease, lumbar [M51.36]     * Lumbar radiculopathy [M54.16]    Procedure(s) (LRB):  FUSION, SPINE, LUMBAR, TLIF, USING COMPUTER-ASSISTED NAVIGATION (Bilateral)  APPLICATION, ACELLULAR HUMAN DERMAL ALLOGRAFT (N/A)    Anesthesia: General    Operative Findings: DDD herniated disc and stenosis L3-S1     Estimated Blood Loss: * No values recorded between 2/1/2023  7:50 AM and 2/1/2023 12:22 PM *    Estimated Blood Loss has been documented.         Specimens:   Specimen (24h ago, onward)      None            [id number]

## 2023-02-01 NOTE — CODE 44
"    MEDICARE CONDITION 44     (Reference: Transmittal 200 of the Medicare Manual)     A Medicare "Inpatient Admission" may be changed to an "Outpatient" (includes  Outpatient Observation) status, if the following conditions exist:  The change in patient status from inpatient to outpatient is made prior to        discharge or release, while the patient is still a patient in the hospital.   The hospital has not submitted a claim for the inpatient admission.  A physician concurs with the Utilization Committee decision.   Physician concurrence with the Utilization Committee's decision is documented         in the patient's records.      The entire case has been reviewed with Pradip Fernando MD, attending physician and me, physician advisor/member of the Utilization Management Committee. We are both in agreement that this should be an Outpatient/Observation encounter because the patient did not meet medical necessity for inpatient admission. The patient and/or patient representative have been notified of the change in billing status.        Olena Camarillo MD/ Utilization Review Committee   "

## 2023-02-01 NOTE — PLAN OF CARE
Phone call placed to patient's room.  No answer.  Phone call placed to patient's mobile phone number.  Left a message explaining Code 44 and requested a return call.  Code 44 form will be mailed to patient's home address.

## 2023-02-01 NOTE — TRANSFER OF CARE
"Anesthesia Transfer of Care Note    Patient: Mary Vo    Procedure(s) Performed: Procedure(s) (LRB):  FUSION, SPINE, LUMBAR, TLIF, USING COMPUTER-ASSISTED NAVIGATION (Bilateral)  APPLICATION, ACELLULAR HUMAN DERMAL ALLOGRAFT (N/A)    Patient location: PACU    Anesthesia Type: general    Transport from OR: Transported from OR on room air with adequate spontaneous ventilation    Post pain: adequate analgesia    Post assessment: no apparent anesthetic complications    Post vital signs: stable    Level of consciousness: responds to stimulation    Nausea/Vomiting: no nausea/vomiting    Complications: none    Transfer of care protocol was followed      Last vitals:   Visit Vitals  BP (!) 119/56 (BP Location: Left arm, Patient Position: Sitting)   Pulse (!) 51   Temp 36.6 °C (97.8 °F) (Temporal)   Resp 20   Ht 5' 7" (1.702 m)   Wt 94.8 kg (209 lb 1.7 oz)   SpO2 96%   Breastfeeding No   BMI 32.75 kg/m²     "

## 2023-02-01 NOTE — SUBJECTIVE & OBJECTIVE
Past Medical History:   Diagnosis Date    Breast cancer 1996    right    Chronic pain syndrome     Generalized osteoarthrosis, involving multiple sites     s/p THR bilateral    History of breast cancer 2007    lumpectomy/XRT    HTN (hypertension)     Hyperlipidemia     Morbid obesity with BMI of 40.0-44.9, adult        Past Surgical History:   Procedure Laterality Date    ANTERIOR CERVICAL DISCECTOMY W/ FUSION Left 01/03/2022    Procedure: DISCECTOMY, SPINE, CERVICAL, ANTERIOR APPROACH, WITH FUSION;  Surgeon: Pradip Fernando MD;  Location: [facility] OR;  Service: Neurosurgery;  Laterality: Left;  Three Level ACDF  C4/5, 5/6, 6/7      BREAST LUMPECTOMY Right 2006    XRT    CATARACT EXTRACTION W/  INTRAOCULAR LENS IMPLANT Left 01/15/2020    CATARACT EXTRACTION W/  INTRAOCULAR LENS IMPLANT Right 01/29/2020    COLONOSCOPY N/A 10/15/2015    Procedure: COLONOSCOPY;  Surgeon: Maylin Shipley MD;  Location: [facility] ENDO;  Service: Endoscopy;  Laterality: N/A;    COLONOSCOPY N/A 11/30/2022    Procedure: COLONOSCOPY;  Surgeon: Gary Toussaint MD;  Location: [facility];  Service: General;  Laterality: N/A;    EPIDURAL STEROID INJECTION N/A 11/03/2020    Procedure: Lumbar L5/S1 IL KATYA;  Surgeon: Paul Lobato MD;  Location: [facility] PAIN MGT;  Service: Pain Management;  Laterality: N/A;    EPIDURAL STEROID INJECTION INTO CERVICAL SPINE N/A 09/25/2020    Procedure: C7/T1 IL KATYA;  Surgeon: Paul Lobato MD;  Location: [facility] PAIN MGT;  Service: Pain Management;  Laterality: N/A;    EPIDURAL STEROID INJECTION INTO CERVICAL SPINE N/A 10/05/2021    Procedure: C7/T1 IL KATYA with RN IV sedation;  Surgeon: Cynthia Soares MD;  Location: [facility] PAIN MGT;  Service: Pain Management;  Laterality: N/A;    ESOPHAGOGASTRODUODENOSCOPY N/A 11/04/2021    Procedure: ESOPHAGOGASTRODUODENOSCOPY (EGD);  Surgeon: Blas Hampton MD;  Location: [facility] ENDO;  Service: Endoscopy;  Laterality: N/A;    HYSTERECTOMY      LAPAROSCOPIC LYSIS OF ADHESIONS N/A  8/30/2022    Procedure: LYSIS, ADHESIONS, LAPAROSCOPIC;  Surgeon: Blas Hampton MD;  Location: [facility] OR;  Service: General;  Laterality: N/A;    PLACEMENT OF ACELLULAR HUMAN DERMAL ALLOGRAFT Left 01/03/2022    Procedure: APPLICATION, ACELLULAR HUMAN DERMAL ALLOGRAFT;  Surgeon: Pradip Fernando MD;  Location: [facility] OR;  Service: Neurosurgery;  Laterality: Left;    ROBOT-ASSISTED LAPAROSCOPIC SLEEVE GASTRECTOMY USING DA FLORENTINO XI N/A 8/30/2022    Procedure: XI ROBOTIC SLEEVE GASTRECTOMY;  Surgeon: Blas Hampton MD;  Location: [facility] OR;  Service: General;  Laterality: N/A;    TRANSFORAMINAL EPIDURAL INJECTION OF STEROID Left 09/17/2019    Procedure: Left C5/6 TF KATYA w/ RN IV sedation;  Surgeon: Paul Lobato MD;  Location: [facility];  Service: Pain Management;  Laterality: Left;       Review of patient's allergies indicates:  No Known Allergies    No current facility-administered medications on file prior to encounter.     Current Outpatient Medications on File Prior to Encounter   Medication Sig    ALPRAZolam (XANAX) 1 MG tablet TAKE 1 TABLET BY MOUTH NIGHTLY AS NEEDED FOR ANXIETY    citalopram (CELEXA) 40 MG tablet TAKE ONE TABLET BY MOUTH DAILY    cloNIDine (CATAPRES) 0.2 MG tablet TAKE 1 TABLET BY MOUTH EVERY EVENING    gabapentin (NEURONTIN) 300 MG capsule Take 1 capsule (300 mg total) by mouth once daily AND 1 capsule (300 mg total) after lunch AND 2 capsules (600 mg total) every evening. (Patient taking differently: Take 1 capsule (300 mg total) by mouth once daily AND 1 capsule (300 mg total) after lunch AND 2 capsules (600 mg total) every evening.    8/17/22: takes in evening only)    HYDROcodone-acetaminophen (NORCO)  mg per tablet Take 1 tablet by mouth 2 (two) times daily as needed.    losartan-hydrochlorothiazide 100-25 mg (HYZAAR) 100-25 mg per tablet TAKE ONE TABLET BY MOUTH ONCE A DAY    multivitamin capsule Take 1 capsule by mouth once daily.    pravastatin (PRAVACHOL) 40 MG tablet TAKE ONE  TABLET BY MOUTH DAILY    zolpidem (AMBIEN) 10 mg Tab TAKE ONE TABLET BY MOUTH AT BEDTIME AS NEEDED Strength: 10 mg    ERGOCALCIFEROL, VITAMIN D2, (VITAMIN D ORAL) Take 1,000 Units by mouth once daily.    FLOWFLEX COVID-19 AG HOME TEST Kit AS DIRECTED    fluticasone propionate (FLONASE) 50 mcg/actuation nasal spray 2 sprays (100 mcg total) by Each Nostril route once daily.    furosemide (LASIX) 40 MG tablet TAKE ONE TABLET BY MOUTH ONCE A DAY AS NEEDED    pantoprazole (PROTONIX) 40 MG tablet Take 1 tablet (40 mg total) by mouth 2 (two) times daily. for 14 days (Patient not taking: Reported on 12/28/2022)    [DISCONTINUED] baclofen (LIORESAL) 10 MG tablet Take 1 tablet (10 mg total) by mouth 3 (three) times daily. (Patient not taking: Reported on 12/28/2022)    [DISCONTINUED] potassium chloride SA (K-DUR,KLOR-CON, K-TAB) 20 MEQ tablet Take 1 tablet (20 mEq total) by mouth once daily.    [DISCONTINUED] senna-docusate 8.6-50 mg (PERICOLACE) 8.6-50 mg per tablet Take 1 tablet by mouth 2 (two) times daily as needed for Constipation.     Family History       Problem Relation (Age of Onset)    Breast cancer Sister, Sister, Sister, Sister    Cancer Mother    Diabetes Mother    Hyperlipidemia Father    Hypertension Father          Tobacco Use    Smoking status: Former    Smokeless tobacco: Never   Substance and Sexual Activity    Alcohol use: Never    Drug use: No    Sexual activity: Never     Review of Systems   All other systems reviewed and are negative.  Objective:     Vital Signs (Most Recent):  Temp: 98.1 °F (36.7 °C) (02/01/23 1401)  Pulse: 93 (02/01/23 1401)  Resp: 18 (02/01/23 1545)  BP: (!) 109/55 (02/01/23 1401)  SpO2: (!) 94 % (02/01/23 1401)   Vital Signs (24h Range):  Temp:  [97.8 °F (36.6 °C)-99.7 °F (37.6 °C)] 98.1 °F (36.7 °C)  Pulse:  [] 93  Resp:  [12-22] 18  SpO2:  [94 %-100 %] 94 %  BP: (109-158)/(55-73) 109/55     Weight: 94.8 kg (209 lb 1.7 oz)  Body mass index is 32.75 kg/m².    Physical  Exam  Vitals and nursing note reviewed.   Constitutional:       General: She is not in acute distress.     Appearance: Normal appearance. She is obese.   HENT:      Head: Normocephalic and atraumatic.      Nose: Nose normal.      Mouth/Throat:      Pharynx: Oropharynx is clear.   Eyes:      Extraocular Movements: Extraocular movements intact.      Pupils: Pupils are equal, round, and reactive to light.   Cardiovascular:      Pulses: Normal pulses.      Heart sounds: Normal heart sounds.   Pulmonary:      Effort: Pulmonary effort is normal. No respiratory distress.      Breath sounds: Normal breath sounds. No wheezing, rhonchi or rales.   Abdominal:      General: Abdomen is flat. Bowel sounds are normal. There is no distension.      Palpations: Abdomen is soft.      Tenderness: There is no abdominal tenderness. There is no guarding.   Musculoskeletal:      Comments: Surgical drain in place to low back   Neurological:      General: No focal deficit present.      Mental Status: She is alert and oriented to person, place, and time.   Psychiatric:         Mood and Affect: Mood normal.         Behavior: Behavior normal.       Significant Labs: All pertinent labs within the past 24 hours have been reviewed.    Significant Imaging: I have reviewed all pertinent imaging results/findings within the past 24 hours.    SURG FL Surgery Fluoro Usage   Final Result      X-Ray Lumbar Spine Ap And Lateral   Final Result   FINDINGS/   Intraoperative fluoroscopic images were obtained.    Total fluoro time was 0.3 minutes, 16 mGy and 2 fluoroscopic images were obtained.  See operative report.         Electronically signed by: Jasiel Quiroz MD   Date:    02/01/2023   Time:    13:49

## 2023-02-01 NOTE — ASSESSMENT & PLAN NOTE
Body mass index is 32.75 kg/m². Morbid obesity complicates all aspects of disease management from diagnostic modalities to treatment. Weight loss encouraged and health benefits explained to patient.

## 2023-02-01 NOTE — HPI
68 y/o female with PMHx of Hx of breast cancer, HTN, HLD, chronic pain symdrome, lumbar and cervical DDD who is here today s/p lumbar spine fusion TLIF by Dr. Fernando. Patient reported uncontrolled low back pain with radiation down both legs (left worse than right), failed conservative measures, underwent pre-operative evaluation, and elected to proceed with surgical intervention for lumbar spinal stenosis with neurogenic claudication. Patient seen post-operatively today, she denies any current complaints of pain, numbness/tingling, fevers, abdominal pain, nausea/vomiting, chest pain, SOB. Hospital medicine consulted for to assist with post-op medical management.

## 2023-02-01 NOTE — ASSESSMENT & PLAN NOTE
S/p TLIF by Dr. Fernando (2/1/23)  Multimodal pain control, PT/OT, DVT PPx, perioperative antibiotics  Management per Neurosurgery

## 2023-02-01 NOTE — H&P
To OR for decompression and fusion L4-S1   Severe stenosis with DDD worse L4-5   Then L3-4   Left sided disc L5-S1    DEXA scan osteopenia      To or for tlif l4-s1 with decompression     The patient was informed of all benefits and potential risk of the operation including but not limited to:  Pain, infection, bleeding, coma, paralysis, death.  Cerebrospinal fluid leak, failure of any instrumentation, the need for additional procedures in the future. No guarantee was given that this procedure would alleviate all of the symptoms.      Pradip Fernando MD  Neurosurgery     Disclaimer: This note was prepared using a voice recognition system and is likely to have sound alike errors within the text.        History:  Patient here for preoperative evaluation with a long history of lower back pain  Previous history of cervical spine pain with cervical fusion several years ago    Pain begins in the back and radiates down the bilateral lower extremities with the left side being worse than the right  She had an extensive workup including pain management, physical therapy, multiple injections and medications without any relief of the symptoms.    Patient had increased BMI and was referred to General surgery and ultimately had gastric sleeve surgery for which she was able to decrease BMI    Persistent back pain with lower extremity symptoms and she was consented and offered posterior lumbar fusion with decompression multiple levels    MR   L3-L4: Disc bulge and facet arthropathy contributes to severe right and moderate left neural foraminal narrowing and severe spinal canal stenosis with clumping/compression of the cauda martina nerve roots, progressed in the interval.     L4-L5: Disc bulge and facet arthropathy and redundancy of ligamentum flavum contributes to severe spinal canal stenosis with clumping/compression of the cauda martina nerve roots, similar to prior study severe bilateral neural foraminal narrowing.    L5-S1: Disc  bulge and facet arthropathy a with mild bilateral neural foraminal narrowing and mild spinal canal stenosis with narrowing of the left lateral recess with compression of the traversing left S1 nerve root        Medications:  Continuous Infusions:   dextrose 5 % and 0.9 % NaCl       Scheduled Meds:   scopolamine  1 patch Transdermal Once Pre-Op     PRN Meds:vancomycin (VANCOCIN) IVPB     Review of Systems   Constitutional:  Negative for activity change, appetite change and chills.   HENT:  Negative for hearing loss, sore throat and tinnitus.    Eyes:  Negative for pain, discharge and itching.   Cardiovascular:  Negative for chest pain.   Gastrointestinal:  Negative for abdominal pain.   Endocrine: Negative for cold intolerance and heat intolerance.   Genitourinary:  Negative for difficulty urinating and dysuria.   Musculoskeletal:  Positive for back pain and gait problem.   Allergic/Immunologic: Negative for environmental allergies.   Neurological:  Negative for dizziness, tremors, light-headedness and headaches.   Hematological:  Negative for adenopathy.   Psychiatric/Behavioral:  Negative for agitation, behavioral problems and confusion.    Objective:     Weight: 94.8 kg (209 lb 1.7 oz)  Body mass index is 32.75 kg/m².  Vital Signs (Most Recent):  Temp: 97.8 °F (36.6 °C) (02/01/23 0552)  Pulse: (!) 51 (02/01/23 0552)  Resp: 20 (02/01/23 0552)  BP: (!) 119/56 (02/01/23 0552)  SpO2: 96 % (02/01/23 0552)   Vital Signs (24h Range):  Temp:  [97.8 °F (36.6 °C)] 97.8 °F (36.6 °C)  Pulse:  [51] 51  Resp:  [20] 20  SpO2:  [96 %] 96 %  BP: (119)/(56) 119/56                          Physical Exam  Nursing note and vitals reviewed  Gen:Oriented to person, place, and time.             Appears stated age   Skin: no rashes or lesions identified   Head:Normocephalic and atraumatic.  Nose: Nose normal.    Eyes: EOM are normal. Pupils are equal, round, and reactive to light.   Neck: Neck supple. No masses or lesions  palpated  Cardiovascular: Intact distal pulses.    Abdominal: Soft.   Neurological: Alert and oriented to person, place, and time.  No cranial nerve deficit.  Coordination normal. Normal muscle tone  Psychiatric: Normal mood and affect. Behavior is normal.      Back:       None  Paraspinal muscle spasms   None  Pain with flexion and extention   WNL  Range of motion    Neg  Straight leg raise     Motor   Right Right Left Left  Level Group   5  5  L2 Hip flexor (Psoas)   5  5  L3 Leg extension (Quads)   5  5 4+ L4 Dorsiflexion & foot inversion (Tibialis Anterior)   5  5 4+ L5 Great toe extension ( EHL)   5  5  S1 Foot eversion (Gastroc, PL & PB)     Sensation  NL Decreased (R/L/BL) Level Sensation    X  L2 Anterio-medial thigh   X  L3 Medial thigh around knee   X Dim to light touch BL L4 Medial foot   X Dim to light touch B/L L5 Dorsum foot   X  S1 Lateral foot     Reflex  2+  Patellar tendon (L4)   2+  Achilles tendon (S1)     Thank you for the referral   Please call with any questions    Pradip Fernando MD  Neurosurgery     Disclaimer: This note was prepared using a voice recognition system and is likely to have sound alike errors within the text.      Neurosurgery Physical Exam    Significant Labs:  No results for input(s): GLU, NA, K, CL, CO2, BUN, CREATININE, CALCIUM, MG in the last 48 hours.  No results for input(s): WBC, HGB, HCT, PLT in the last 48 hours.  No results for input(s): LABPT, INR, APTT in the last 48 hours.  Microbiology Results (last 7 days)       ** No results found for the last 168 hours. **

## 2023-02-01 NOTE — PLAN OF CARE
[facility]  Discharge Assessment    Primary Care Provider: Elias Cannon MD     Discharge Assessment (most recent)       BRIEF DISCHARGE ASSESSMENT - 02/01/23 1430          Discharge Planning    Assessment Type Discharge Planning Brief Assessment     Resource/Environmental Concerns none     Support Systems Family members;Children     Assistance Needed Independent PTA     Equipment Currently Used at Home walker, rolling;bedside commode     Current Living Arrangements home     Patient/Family Anticipates Transition to home     Patient/Family Anticipated Services at Transition none     DME Needed Upon Discharge  none     Discharge Plan A Home Health     Discharge Plan B Rehab                     [facility] - [facility] [address]  [address] [facility] [address]  Phone: [phone] Fax: [phone]    [facility] - [facility] [address]  [address] [facility] [address]  Phone: [phone] Fax: [phone]    [facility] [address]  [facility] [address]  Phone: [phone] Fax: [phone]    [id number] - [place], [place] - [address] AT [facility] [address]  [address]  [facility] [address]  Phone: [phone] Fax: [phone]    Sw met with pt this afternoon to complete assessment. Pt stated her plan is d/c home upon d/c. PT/OT recs pending d/c needs at this time.

## 2023-02-01 NOTE — ANESTHESIA POSTPROCEDURE EVALUATION
Anesthesia Post Evaluation    Patient: Mary Vo    Procedure(s) Performed: Procedure(s) (LRB):  FUSION, SPINE, LUMBAR, TLIF, USING COMPUTER-ASSISTED NAVIGATION (Bilateral)  APPLICATION, ACELLULAR HUMAN DERMAL ALLOGRAFT (N/A)    Final Anesthesia Type: general      Patient location during evaluation: PACU  Patient participation: Yes- Able to Participate  Level of consciousness: awake  Post-procedure vital signs: reviewed and stable  Pain management: adequate  Airway patency: patent    PONV status at discharge: No PONV  Anesthetic complications: no      Cardiovascular status: hemodynamically stable  Respiratory status: unassisted  Hydration status: euvolemic  Follow-up not needed.          Vitals Value Taken Time   /60 02/01/23 1331   Temp 37.6 °C (99.7 °F) 02/01/23 1232   Pulse 85 02/01/23 1334   Resp 14 02/01/23 1334   SpO2 100 % 02/01/23 1334   Vitals shown include unvalidated device data.      No case tracking events are documented in the log.      Pain/Macho Score: Pain Rating Prior to Med Admin: 5 (2/1/2023  1:34 PM)  Macho Score: 8 (2/1/2023  1:00 PM)

## 2023-02-01 NOTE — ANESTHESIA PREPROCEDURE EVALUATION
01/31/2023  Mary Vo is a 67 y.o., female.    Patient Active Problem List   Diagnosis    HTN (hypertension)    Generalized osteoarthrosis, involving multiple sites    History of breast cancer    Hyperlipidemia    Myofascial pain    Bilateral carpal tunnel syndrome    Lumbar radiculopathy    Chronic pain syndrome    Cervical radiculopathy    Morbid obesity with BMI of 40.0-44.9, adult    DDD (degenerative disc disease), cervical     Past Medical History:   Diagnosis Date    Breast cancer 1996    right    Chronic pain syndrome     Generalized osteoarthrosis, involving multiple sites     s/p THR bilateral    History of breast cancer 2007    lumpectomy/XRT    HTN (hypertension)     Hyperlipidemia     Morbid obesity with BMI of 40.0-44.9, adult      Past Surgical History:   Procedure Laterality Date    ANTERIOR CERVICAL DISCECTOMY W/ FUSION Left 01/03/2022    Procedure: DISCECTOMY, SPINE, CERVICAL, ANTERIOR APPROACH, WITH FUSION;  Surgeon: Pradip Fernando MD;  Location: [facility] OR;  Service: Neurosurgery;  Laterality: Left;  Three Level ACDF  C4/5, 5/6, 6/7      BREAST LUMPECTOMY Right 2006    XRT    CATARACT EXTRACTION W/  INTRAOCULAR LENS IMPLANT Left 01/15/2020    CATARACT EXTRACTION W/  INTRAOCULAR LENS IMPLANT Right 01/29/2020    COLONOSCOPY N/A 10/15/2015    Procedure: COLONOSCOPY;  Surgeon: Maylin Shipley MD;  Location: [facility] ENDO;  Service: Endoscopy;  Laterality: N/A;    COLONOSCOPY N/A 11/30/2022    Procedure: COLONOSCOPY;  Surgeon: Gary Toussaint MD;  Location: [facility] ENDO;  Service: General;  Laterality: N/A;    EPIDURAL STEROID INJECTION N/A 11/03/2020    Procedure: Lumbar L5/S1 IL KATYA;  Surgeon: Paul Lobato MD;  Location: [facility] PAIN MGT;  Service: Pain Management;  Laterality: N/A;    EPIDURAL STEROID INJECTION INTO CERVICAL SPINE N/A 09/25/2020     Procedure: C7/T1 IL KATYA;  Surgeon: Paul Lobato MD;  Location: [facility] PAIN MGT;  Service: Pain Management;  Laterality: N/A;    EPIDURAL STEROID INJECTION INTO CERVICAL SPINE N/A 10/05/2021    Procedure: C7/T1 IL KTAYA with RN IV sedation;  Surgeon: Cynthia Soaers MD;  Location: [facility] PAIN MGT;  Service: Pain Management;  Laterality: N/A;    ESOPHAGOGASTRODUODENOSCOPY N/A 11/04/2021    Procedure: ESOPHAGOGASTRODUODENOSCOPY (EGD);  Surgeon: Blas Hampton MD;  Location: [facility] ENDO;  Service: Endoscopy;  Laterality: N/A;    HYSTERECTOMY      LAPAROSCOPIC LYSIS OF ADHESIONS N/A 8/30/2022    Procedure: LYSIS, ADHESIONS, LAPAROSCOPIC;  Surgeon: Blas Hampton MD;  Location: [facility] OR;  Service: General;  Laterality: N/A;    PLACEMENT OF ACELLULAR HUMAN DERMAL ALLOGRAFT Left 01/03/2022    Procedure: APPLICATION, ACELLULAR HUMAN DERMAL ALLOGRAFT;  Surgeon: Pradip Fernando MD;  Location: [facility] OR;  Service: Neurosurgery;  Laterality: Left;    ROBOT-ASSISTED LAPAROSCOPIC SLEEVE GASTRECTOMY USING DA FLORENTINO XI N/A 8/30/2022    Procedure: XI ROBOTIC SLEEVE GASTRECTOMY;  Surgeon: Blas Hampton MD;  Location: [facility] OR;  Service: General;  Laterality: N/A;    TRANSFORAMINAL EPIDURAL INJECTION OF STEROID Left 09/17/2019    Procedure: Left C5/6 TF KATYA w/ RN IV sedation;  Surgeon: Paul Lobato MD;  Location: [facility] PAIN MGT;  Service: Pain Management;  Laterality: Left;       Chemistry        Component Value Date/Time     01/23/2023 0955    K 3.5 01/23/2023 0955     01/23/2023 0955    CO2 27 01/23/2023 0955    BUN 19 01/23/2023 0955    CREATININE 0.8 01/23/2023 0955    GLU 63 (L) 01/23/2023 0955        Component Value Date/Time    CALCIUM 10.5 01/23/2023 0955    ALKPHOS 85 01/23/2023 0955    AST 18 01/23/2023 0955    ALT 16 01/23/2023 0955    BILITOT 0.6 01/23/2023 0955    ESTGFRAFRICA >60.0 12/15/2021 1035    ESTGFRAFRICA >60.0 12/15/2021 1035    EGFRNONAA >60.0 12/15/2021 1035    EGFRNONAA >60.0 12/15/2021 1035         Lab Results   Component Value Date    WBC 4.77 01/23/2023    HGB 13.9 01/23/2023    HCT 42.5 01/23/2023    MCV 95 01/23/2023     01/23/2023           Pre-op Assessment    I have reviewed the Patient Summary Reports.    I have reviewed the NPO Status.   I have reviewed the Medications.     Review of Systems  Anesthesia Hx:  No problems with previous Anesthesia  History of prior surgery of interest to airway management or planning:  Denies Personal Hx of Anesthesia complications.   Social:  Former Smoker    Hematology/Oncology:  Hematology Normal        Cardiovascular:   Hypertension hyperlipidemia ECG has been reviewed.    Pulmonary:  Pulmonary Normal    Renal/:  Renal/ Normal     Hepatic/GI:  Hepatic/GI Normal    Musculoskeletal:   Arthritis     Neurological:   Neuromuscular Disease, Lumbar radiculopathy  Cervical radiculopathy   Endocrine:  Endocrine Normal  Morbid Obesity / BMI > 40      Physical Exam  General: Well nourished, Cooperative, Alert and Oriented  bmi 33  Airway:  Mallampati: III   Mouth Opening: Normal  TM Distance: Normal  Tongue: Normal  Neck ROM: Normal ROM    Dental:  Partial Dentures  Partials removed; denies any loose teeth  Chest/Lungs:  Clear to auscultation, Normal Respiratory Rate    Heart:  Rate: Normal        Anesthesia Plan  Type of Anesthesia, risks & benefits discussed:    Anesthesia Type: Gen ETT  Intra-op Monitoring Plan: Standard ASA Monitors  Post Op Pain Control Plan: multimodal analgesia and IV/PO Opioids PRN  Induction:  IV  Airway Plan: , Post-Induction  Informed Consent: Informed consent signed with the Patient and all parties understand the risks and agree with anesthesia plan.  All questions answered.   ASA Score: 2  Day of Surgery Review of History & Physical: H&P Update referred to the surgeon/provider.  Anesthesia Plan Notes: Intubation:     Induction:  Intravenous    Intubated:  Postinduction    Mask Ventilation:  Easy mask    Attempts:  1    Attempted By:   CRNA and student    Method of Intubation:  Video laryngoscopy    Blade:  Hebert 3    Laryngeal View Grade: Grade I - full view of cords      Difficult Airway Encountered?: No      Complications:  None    Airway Device:  Oral endotracheal tube    Airway Device Size:  7.5    Style/Cuff Inflation:  Cuffed (inflated to minimal occlusive pressure)    Tube secured:  21    Secured at:  The lips    Placement Verified By:  Capnometry    Complicating Factors:  None    Findings Post-Intubation:  Atraumatic/condition of teeth unchanged and BS equal bilateral    Ready For Surgery From Anesthesia Perspective.     .        Chemistry        Component Value Date/Time     01/23/2023 0955    K 3.5 01/23/2023 0955     01/23/2023 0955    CO2 27 01/23/2023 0955    BUN 19 01/23/2023 0955    CREATININE 0.8 01/23/2023 0955    GLU 63 (L) 01/23/2023 0955        Component Value Date/Time    CALCIUM 10.5 01/23/2023 0955    ALKPHOS 85 01/23/2023 0955    AST 18 01/23/2023 0955    ALT 16 01/23/2023 0955    BILITOT 0.6 01/23/2023 0955    ESTGFRAFRICA >60.0 12/15/2021 1035    ESTGFRAFRICA >60.0 12/15/2021 1035    EGFRNONAA >60.0 12/15/2021 1035    EGFRNONAA >60.0 12/15/2021 1035        Lab Results   Component Value Date    WBC 4.77 01/23/2023    HGB 13.9 01/23/2023    HCT 42.5 01/23/2023    MCV 95 01/23/2023     01/23/2023     Normal sinus rhythm   T wave abnormality, consider anterolateral ischemia   Abnormal ECG   When compared with ECG of 23-AUG-2021 10:27,   No significant change was found   Confirmed by MEHRAN TAYLOR MD ([id number]) on 8/24/2021 1:53:01 PM

## 2023-02-02 LAB
ANION GAP SERPL CALC-SCNC: 8 MMOL/L (ref 8–16)
BASOPHILS # BLD AUTO: 0.04 K/UL (ref 0–0.2)
BASOPHILS NFR BLD: 0.3 % (ref 0–1.9)
BUN SERPL-MCNC: 14 MG/DL (ref 8–23)
CALCIUM SERPL-MCNC: 9.1 MG/DL (ref 8.7–10.5)
CHLORIDE SERPL-SCNC: 105 MMOL/L (ref 95–110)
CO2 SERPL-SCNC: 27 MMOL/L (ref 23–29)
CREAT SERPL-MCNC: 0.7 MG/DL (ref 0.5–1.4)
DIFFERENTIAL METHOD: ABNORMAL
EOSINOPHIL # BLD AUTO: 0 K/UL (ref 0–0.5)
EOSINOPHIL NFR BLD: 0.2 % (ref 0–8)
ERYTHROCYTE [DISTWIDTH] IN BLOOD BY AUTOMATED COUNT: 12.8 % (ref 11.5–14.5)
EST. GFR  (NO RACE VARIABLE): >60 ML/MIN/1.73 M^2
GLUCOSE SERPL-MCNC: 109 MG/DL (ref 70–110)
HCT VFR BLD AUTO: 32.7 % (ref 37–48.5)
HGB BLD-MCNC: 10.6 G/DL (ref 12–16)
IMM GRANULOCYTES # BLD AUTO: 0.03 K/UL (ref 0–0.04)
IMM GRANULOCYTES NFR BLD AUTO: 0.3 % (ref 0–0.5)
LYMPHOCYTES # BLD AUTO: 2.1 K/UL (ref 1–4.8)
LYMPHOCYTES NFR BLD: 18.1 % (ref 18–48)
MCH RBC QN AUTO: 30.6 PG (ref 27–31)
MCHC RBC AUTO-ENTMCNC: 32.4 G/DL (ref 32–36)
MCV RBC AUTO: 95 FL (ref 82–98)
MONOCYTES # BLD AUTO: 1.2 K/UL (ref 0.3–1)
MONOCYTES NFR BLD: 10.7 % (ref 4–15)
NEUTROPHILS # BLD AUTO: 8.1 K/UL (ref 1.8–7.7)
NEUTROPHILS NFR BLD: 70.4 % (ref 38–73)
NRBC BLD-RTO: 0 /100 WBC
PLATELET # BLD AUTO: 159 K/UL (ref 150–450)
PMV BLD AUTO: 11.2 FL (ref 9.2–12.9)
POTASSIUM SERPL-SCNC: 3.9 MMOL/L (ref 3.5–5.1)
RBC # BLD AUTO: 3.46 M/UL (ref 4–5.4)
SODIUM SERPL-SCNC: 140 MMOL/L (ref 136–145)
WBC # BLD AUTO: 11.47 K/UL (ref 3.9–12.7)

## 2023-02-02 PROCEDURE — 85025 COMPLETE CBC W/AUTO DIFF WBC: CPT

## 2023-02-02 PROCEDURE — 97530 THERAPEUTIC ACTIVITIES: CPT

## 2023-02-02 PROCEDURE — 97165 OT EVAL LOW COMPLEX 30 MIN: CPT

## 2023-02-02 PROCEDURE — 25000003 PHARM REV CODE 250: Performed by: HOSPITALIST

## 2023-02-02 PROCEDURE — 25000003 PHARM REV CODE 250

## 2023-02-02 PROCEDURE — 94761 N-INVAS EAR/PLS OXIMETRY MLT: CPT

## 2023-02-02 PROCEDURE — 99900035 HC TECH TIME PER 15 MIN (STAT)

## 2023-02-02 PROCEDURE — 36415 COLL VENOUS BLD VENIPUNCTURE: CPT

## 2023-02-02 PROCEDURE — 97161 PT EVAL LOW COMPLEX 20 MIN: CPT

## 2023-02-02 PROCEDURE — 94799 UNLISTED PULMONARY SVC/PX: CPT

## 2023-02-02 PROCEDURE — 25000242 PHARM REV CODE 250 ALT 637 W/ HCPCS

## 2023-02-02 PROCEDURE — 80048 BASIC METABOLIC PNL TOTAL CA: CPT

## 2023-02-02 RX ORDER — LOSARTAN POTASSIUM AND HYDROCHLOROTHIAZIDE 25; 100 MG/1; MG/1
1 TABLET ORAL DAILY
Status: DISCONTINUED | OUTPATIENT
Start: 2023-02-03 | End: 2023-02-02

## 2023-02-02 RX ORDER — LOSARTAN POTASSIUM 50 MG/1
100 TABLET ORAL DAILY
Status: DISCONTINUED | OUTPATIENT
Start: 2023-02-03 | End: 2023-02-03 | Stop reason: HOSPADM

## 2023-02-02 RX ORDER — CLONIDINE HYDROCHLORIDE 0.2 MG/1
0.2 TABLET ORAL NIGHTLY
Status: DISCONTINUED | OUTPATIENT
Start: 2023-02-02 | End: 2023-02-03 | Stop reason: HOSPADM

## 2023-02-02 RX ORDER — GABAPENTIN 300 MG/1
300 CAPSULE ORAL 3 TIMES DAILY
Status: DISCONTINUED | OUTPATIENT
Start: 2023-02-02 | End: 2023-02-03 | Stop reason: HOSPADM

## 2023-02-02 RX ORDER — FUROSEMIDE 40 MG/1
40 TABLET ORAL DAILY
Status: DISCONTINUED | OUTPATIENT
Start: 2023-02-03 | End: 2023-02-03 | Stop reason: HOSPADM

## 2023-02-02 RX ORDER — HYDROCHLOROTHIAZIDE 25 MG/1
25 TABLET ORAL DAILY
Status: DISCONTINUED | OUTPATIENT
Start: 2023-02-03 | End: 2023-02-03 | Stop reason: HOSPADM

## 2023-02-02 RX ADMIN — CLONIDINE HYDROCHLORIDE 0.2 MG: 0.2 TABLET ORAL at 08:02

## 2023-02-02 RX ADMIN — ALUMINUM HYDROXIDE, MAGNESIUM HYDROXIDE, AND DIMETHICONE 30 ML: 200; 20; 200 SUSPENSION ORAL at 06:02

## 2023-02-02 RX ADMIN — PRAVASTATIN SODIUM 40 MG: 20 TABLET ORAL at 09:02

## 2023-02-02 RX ADMIN — OXYCODONE HYDROCHLORIDE 10 MG: 10 TABLET, FILM COATED, EXTENDED RELEASE ORAL at 08:02

## 2023-02-02 RX ADMIN — ZOLPIDEM TARTRATE 5 MG: 5 TABLET ORAL at 09:02

## 2023-02-02 RX ADMIN — OXYCODONE HYDROCHLORIDE 10 MG: 5 TABLET ORAL at 12:02

## 2023-02-02 RX ADMIN — GABAPENTIN 300 MG: 300 CAPSULE ORAL at 03:02

## 2023-02-02 RX ADMIN — FLUTICASONE PROPIONATE 100 MCG: 50 SPRAY, METERED NASAL at 09:02

## 2023-02-02 RX ADMIN — CITALOPRAM HYDROBROMIDE 20 MG: 20 TABLET ORAL at 09:02

## 2023-02-02 RX ADMIN — ALPRAZOLAM 1 MG: 1 TABLET ORAL at 09:02

## 2023-02-02 RX ADMIN — OXYCODONE HYDROCHLORIDE 10 MG: 5 TABLET ORAL at 06:02

## 2023-02-02 RX ADMIN — OXYCODONE HYDROCHLORIDE 10 MG: 10 TABLET, FILM COATED, EXTENDED RELEASE ORAL at 09:02

## 2023-02-02 RX ADMIN — GABAPENTIN 300 MG: 300 CAPSULE ORAL at 08:02

## 2023-02-02 RX ADMIN — OXYCODONE HYDROCHLORIDE 10 MG: 5 TABLET ORAL at 01:02

## 2023-02-02 RX ADMIN — SODIUM CHLORIDE: 9 INJECTION, SOLUTION INTRAVENOUS at 01:02

## 2023-02-02 NOTE — CONSULTS
[facility] Medicine  Consult Note    Patient Name: Mary Vo  MRN: [id number]  Admission Date: 2/1/2023  Hospital Length of Stay: 1 days  Attending Physician: Pradip Fernando MD   Primary Care Provider: Elias Cannon MD           Patient information was obtained from patient, past medical records and primary team.     Inpatient consult to Hospitalist  Consult performed by: Joey Rodriguez MD  Consult ordered by: Annelise Cage PA-C        Subjective:     Principal Problem: Lumbar radiculopathy    Chief Complaint: No chief complaint on file.       HPI: 66 y/o female with PMHx of Hx of breast cancer, HTN, HLD, chronic pain symdrome, lumbar and cervical DDD who is here today s/p lumbar spine fusion TLIF by Dr. Fernando. Patient reported uncontrolled low back pain with radiation down both legs (left worse than right), failed conservative measures, underwent pre-operative evaluation, and elected to proceed with surgical intervention for lumbar spinal stenosis with neurogenic claudication. Patient seen post-operatively today, she denies any current complaints of pain, numbness/tingling, fevers, abdominal pain, nausea/vomiting, chest pain, SOB. Hospital medicine consulted for to assist with post-op medical management.      Past Medical History:   Diagnosis Date    Breast cancer 1996    right    Chronic pain syndrome     Generalized osteoarthrosis, involving multiple sites     s/p THR bilateral    History of breast cancer 2007    lumpectomy/XRT    HTN (hypertension)     Hyperlipidemia     Morbid obesity with BMI of 40.0-44.9, adult        Past Surgical History:   Procedure Laterality Date    ANTERIOR CERVICAL DISCECTOMY W/ FUSION Left 01/03/2022    Procedure: DISCECTOMY, SPINE, CERVICAL, ANTERIOR APPROACH, WITH FUSION;  Surgeon: Pradip Fernando MD;  [facility];  Service: Neurosurgery;  Laterality: Left;  Three Level ACDF  C4/5, 5/6, 6/7      BREAST LUMPECTOMY Right 2006    XRT     CATARACT EXTRACTION W/  INTRAOCULAR LENS IMPLANT Left 01/15/2020    CATARACT EXTRACTION W/  INTRAOCULAR LENS IMPLANT Right 01/29/2020    COLONOSCOPY N/A 10/15/2015    Procedure: COLONOSCOPY;  Surgeon: Maylin Shipley MD;  Location: [facility] ENDO;  Service: Endoscopy;  Laterality: N/A;    COLONOSCOPY N/A 11/30/2022    Procedure: COLONOSCOPY;  Surgeon: Gary Toussaint MD;  Location: [facility] ENDO;  Service: General;  Laterality: N/A;    EPIDURAL STEROID INJECTION N/A 11/03/2020    Procedure: Lumbar L5/S1 IL KATYA;  Surgeon: Paul Lobato MD;  Location: [facility] PAIN MGT;  Service: Pain Management;  Laterality: N/A;    EPIDURAL STEROID INJECTION INTO CERVICAL SPINE N/A 09/25/2020    Procedure: C7/T1 IL KATYA;  Surgeon: Paul Lobato MD;  Location: [facility] PAIN MGT;  Service: Pain Management;  Laterality: N/A;    EPIDURAL STEROID INJECTION INTO CERVICAL SPINE N/A 10/05/2021    Procedure: C7/T1 IL KATYA with RN IV sedation;  Surgeon: Cynthia Soares MD;  Location: [facility] PAIN MGT;  Service: Pain Management;  Laterality: N/A;    ESOPHAGOGASTRODUODENOSCOPY N/A 11/04/2021    Procedure: ESOPHAGOGASTRODUODENOSCOPY (EGD);  Surgeon: Blas Hampton MD;  Location: [facility] ENDO;  Service: Endoscopy;  Laterality: N/A;    HYSTERECTOMY      LAPAROSCOPIC LYSIS OF ADHESIONS N/A 8/30/2022    Procedure: LYSIS, ADHESIONS, LAPAROSCOPIC;  Surgeon: Blas Hampton MD;  Location: [facility] OR;  Service: General;  Laterality: N/A;    PLACEMENT OF ACELLULAR HUMAN DERMAL ALLOGRAFT Left 01/03/2022    Procedure: APPLICATION, ACELLULAR HUMAN DERMAL ALLOGRAFT;  Surgeon: Pradip Fernando MD;  Location: [facility] OR;  Service: Neurosurgery;  Laterality: Left;    ROBOT-ASSISTED LAPAROSCOPIC SLEEVE GASTRECTOMY USING DA FLORENTINO XI N/A 8/30/2022    Procedure: XI ROBOTIC SLEEVE GASTRECTOMY;  Surgeon: Blas Hampton MD;  Location: [facility] OR;  Service: General;  Laterality: N/A;    TRANSFORAMINAL EPIDURAL INJECTION OF STEROID Left 09/17/2019    Procedure: Left C5/6 TF KATYA w/  RN IV sedation;  Surgeon: Paul Lobato MD;  Location: [facility];  Service: Pain Management;  Laterality: Left;       Review of patient's allergies indicates:  No Known Allergies    No current facility-administered medications on file prior to encounter.     Current Outpatient Medications on File Prior to Encounter   Medication Sig    ALPRAZolam (XANAX) 1 MG tablet TAKE 1 TABLET BY MOUTH NIGHTLY AS NEEDED FOR ANXIETY    citalopram (CELEXA) 40 MG tablet TAKE ONE TABLET BY MOUTH DAILY    cloNIDine (CATAPRES) 0.2 MG tablet TAKE 1 TABLET BY MOUTH EVERY EVENING    gabapentin (NEURONTIN) 300 MG capsule Take 1 capsule (300 mg total) by mouth once daily AND 1 capsule (300 mg total) after lunch AND 2 capsules (600 mg total) every evening. (Patient taking differently: Take 1 capsule (300 mg total) by mouth once daily AND 1 capsule (300 mg total) after lunch AND 2 capsules (600 mg total) every evening.    8/17/22: takes in evening only)    HYDROcodone-acetaminophen (NORCO)  mg per tablet Take 1 tablet by mouth 2 (two) times daily as needed.    losartan-hydrochlorothiazide 100-25 mg (HYZAAR) 100-25 mg per tablet TAKE ONE TABLET BY MOUTH ONCE A DAY    multivitamin capsule Take 1 capsule by mouth once daily.    pravastatin (PRAVACHOL) 40 MG tablet TAKE ONE TABLET BY MOUTH DAILY    zolpidem (AMBIEN) 10 mg Tab TAKE ONE TABLET BY MOUTH AT BEDTIME AS NEEDED Strength: 10 mg    ERGOCALCIFEROL, VITAMIN D2, (VITAMIN D ORAL) Take 1,000 Units by mouth once daily.    FLOWFLEX COVID-19 AG HOME TEST Kit AS DIRECTED    fluticasone propionate (FLONASE) 50 mcg/actuation nasal spray 2 sprays (100 mcg total) by Each Nostril route once daily.    furosemide (LASIX) 40 MG tablet TAKE ONE TABLET BY MOUTH ONCE A DAY AS NEEDED    pantoprazole (PROTONIX) 40 MG tablet Take 1 tablet (40 mg total) by mouth 2 (two) times daily. for 14 days (Patient not taking: Reported on 12/28/2022)    [DISCONTINUED] baclofen (LIORESAL) 10 MG  tablet Take 1 tablet (10 mg total) by mouth 3 (three) times daily. (Patient not taking: Reported on 12/28/2022)    [DISCONTINUED] potassium chloride SA (K-DUR,KLOR-CON, K-TAB) 20 MEQ tablet Take 1 tablet (20 mEq total) by mouth once daily.    [DISCONTINUED] senna-docusate 8.6-50 mg (PERICOLACE) 8.6-50 mg per tablet Take 1 tablet by mouth 2 (two) times daily as needed for Constipation.     Family History       Problem Relation (Age of Onset)    Breast cancer Sister, Sister, Sister, Sister    Cancer Mother    Diabetes Mother    Hyperlipidemia Father    Hypertension Father          Tobacco Use    Smoking status: Former    Smokeless tobacco: Never   Substance and Sexual Activity    Alcohol use: Never    Drug use: No    Sexual activity: Never     Review of Systems   All other systems reviewed and are negative.  Objective:     Vital Signs (Most Recent):  Temp: 98.1 °F (36.7 °C) (02/01/23 1401)  Pulse: 93 (02/01/23 1401)  Resp: 18 (02/01/23 1545)  BP: (!) 109/55 (02/01/23 1401)  SpO2: (!) 94 % (02/01/23 1401)   Vital Signs (24h Range):  Temp:  [97.8 °F (36.6 °C)-99.7 °F (37.6 °C)] 98.1 °F (36.7 °C)  Pulse:  [] 93  Resp:  [12-22] 18  SpO2:  [94 %-100 %] 94 %  BP: (109-158)/(55-73) 109/55     Weight: 94.8 kg (209 lb 1.7 oz)  Body mass index is 32.75 kg/m².    Physical Exam  Vitals and nursing note reviewed.   Constitutional:       General: She is not in acute distress.     Appearance: Normal appearance. She is obese.   HENT:      Head: Normocephalic and atraumatic.      Nose: Nose normal.      Mouth/Throat:      Pharynx: Oropharynx is clear.   Eyes:      Extraocular Movements: Extraocular movements intact.      Pupils: Pupils are equal, round, and reactive to light.   Cardiovascular:      Pulses: Normal pulses.      Heart sounds: Normal heart sounds.   Pulmonary:      Effort: Pulmonary effort is normal. No respiratory distress.      Breath sounds: Normal breath sounds. No wheezing, rhonchi or rales.   Abdominal:       General: Abdomen is flat. Bowel sounds are normal. There is no distension.      Palpations: Abdomen is soft.      Tenderness: There is no abdominal tenderness. There is no guarding.   Musculoskeletal:      Comments: Surgical drain in place to low back   Neurological:      General: No focal deficit present.      Mental Status: She is alert and oriented to person, place, and time.   Psychiatric:         Mood and Affect: Mood normal.         Behavior: Behavior normal.       Significant Labs: All pertinent labs within the past 24 hours have been reviewed.    Significant Imaging: I have reviewed all pertinent imaging results/findings within the past 24 hours.    SURG FL Surgery Fluoro Usage   Final Result      X-Ray Lumbar Spine Ap And Lateral   Final Result   FINDINGS/   Intraoperative fluoroscopic images were obtained.    Total fluoro time was 0.3 minutes, 16 mGy and 2 fluoroscopic images were obtained.  See operative report.         Electronically signed by: Jasiel Quiroz MD   Date:    02/01/2023   Time:    13:49            Assessment/Plan:     * Lumbar radiculopathy  S/p TLIF by Dr. Fernando (2/1/23)  Multimodal pain control, PT/OT, DVT PPx, perioperative antibiotics  Management per Neurosurgery    Chronic pain syndrome  [place]-PDMP reviewed  Prescribed norco, xanax, ambien, gabapentin  Home meds resumed, monitor for respiratory depression  Narcan PRN    HTN (hypertension)  BP marginal post-op  Continue IV fluids, strict I/O's, AM labs    Home meds include lasix, losartan/hctz, clonidine  Hold hold meds for now, resume when appropriate    Hyperlipidemia  Resume statin    Class 1 obesity due to excess calories with serious comorbidity and body mass index (BMI) of 32.0 to 32.9 in adult  Body mass index is 32.75 kg/m². Morbid obesity complicates all aspects of disease management from diagnostic modalities to treatment. Weight loss encouraged and health benefits explained to patient.           VTE Risk Mitigation (From  admission, onward)         Ordered     Place sequential compression device  Until discontinued         02/01/23 0544                    Thank you for your consult. I will follow-up with patient. Please contact us if you have any additional questions.    Joey Rodriguez MD  Department of Hospital Medicine   '[place] - Med Surg

## 2023-02-02 NOTE — PLAN OF CARE
Patient remained free from injury. BSC in use. Merlin vac maintained. PRN meds managed pain. IV abx. 12hr chart check complete.

## 2023-02-02 NOTE — ASSESSMENT & PLAN NOTE
Body mass index is 31.52 kg/m². Morbid obesity complicates all aspects of disease management from diagnostic modalities to treatment. Weight loss encouraged and health benefits explained to patient

## 2023-02-02 NOTE — PROGRESS NOTES
[facility] Medicine  Progress Note    Patient Name: Mary Vo  MRN: [id number]  Patient Class: OP- Outpatient Recovery   Admission Date: 2/1/2023  Length of Stay: 1 days  Attending Physician: Pradip Fernando MD  Primary Care Provider: Elias Cannon MD        Subjective:     Principal Problem:Lumbar radiculopathy        HPI:  66 y/o female with PMHx of Hx of breast cancer, HTN, HLD, chronic pain symdrome, lumbar and cervical DDD who is here today s/p lumbar spine fusion TLIF by Dr. Fernando. Patient reported uncontrolled low back pain with radiation down both legs (left worse than right), failed conservative measures, underwent pre-operative evaluation, and elected to proceed with surgical intervention for lumbar spinal stenosis with neurogenic claudication. Patient seen post-operatively today, she denies any current complaints of pain, numbness/tingling, fevers, abdominal pain, nausea/vomiting, chest pain, SOB. Hospital medicine consulted for to assist with post-op medical management.      Overview/Hospital Course:  2/2/23  NAEON. Patient reports back pain with movement, some relief with pain mediations. Denies numbness/tingling, chest pain, SOB, fevers, nausea.      Interval History: NAEON. Patient reports back pain with movement, some relief with pain mediations. Denies numbness/tingling, chest pain, SOB, fevers, nausea.    Review of Systems   All other systems reviewed and are negative.  Objective:     Vital Signs (Most Recent):  Temp: 96.2 °F (35.7 °C) (02/02/23 1115)  Pulse: 81 (02/02/23 1115)  Resp: 17 (02/02/23 1222)  BP: 131/63 (02/02/23 1115)  SpO2: 95 % (02/02/23 1115) Vital Signs (24h Range):  Temp:  [96.2 °F (35.7 °C)-98.8 °F (37.1 °C)] 96.2 °F (35.7 °C)  Pulse:  [56-93] 81  Resp:  [14-22] 17  SpO2:  [94 %-100 %] 95 %  BP: (107-137)/(52-63) 131/63     Weight: 91.3 kg (201 lb 4.5 oz)  Body mass index is 31.52 kg/m².    Intake/Output Summary (Last 24 hours) at 2/2/2023 1342  Last  data filed at 2/2/2023 1333  Gross per 24 hour   Intake --   Output 480 ml   Net -480 ml      Physical Exam  Vitals and nursing note reviewed.   Constitutional:       General: She is not in acute distress.     Appearance: Normal appearance. She is obese.   Cardiovascular:      Rate and Rhythm: Normal rate and regular rhythm.      Heart sounds: No murmur heard.  Pulmonary:      Effort: Pulmonary effort is normal. No respiratory distress.      Breath sounds: No wheezing.   Musculoskeletal:      Comments: Surgical drain in place to low back   Neurological:      General: No focal deficit present.      Mental Status: She is alert and oriented to person, place, and time.   Psychiatric:         Mood and Affect: Mood normal.         Behavior: Behavior normal.       Significant Labs: All pertinent labs within the past 24 hours have been reviewed.    CBC:   Recent Labs   Lab 02/02/23 0527   WBC 11.47   HGB 10.6*   HCT 32.7*        CMP:   Recent Labs   Lab 02/02/23 0527      K 3.9      CO2 27      BUN 14   CREATININE 0.7   CALCIUM 9.1   ANIONGAP 8       Significant Imaging: I have reviewed all pertinent imaging results/findings within the past 24 hours.      Assessment/Plan:      * Lumbar radiculopathy  S/p TLIF by Dr. Fernando (2/1/23)  Multimodal pain control, PT/OT, DVT PPx, perioperative antibiotics  Management per Neurosurgery    Chronic pain syndrome  [place]-PDMP reviewed  Prescribed norco, xanax, ambien, gabapentin  Home meds resumed, monitor for respiratory depression  Narcan PRN    HTN (hypertension)  BP rising today  On lasix, losartan/hctz, clonidine  Restart home medications    Hyperlipidemia  Resume statin    Class 1 obesity due to excess calories with serious comorbidity and body mass index (BMI) of 32.0 to 32.9 in adult  Body mass index is 31.52 kg/m². Morbid obesity complicates all aspects of disease management from diagnostic modalities to treatment. Weight loss encouraged and health  benefits explained to patient      VTE Risk Mitigation (From admission, onward)         Ordered     Place sequential compression device  Until discontinued         02/01/23 0544                Discharge Planning   CASSIUS:      Code Status: Prior   Is the patient medically ready for discharge?:     Reason for patient still in hospital (select all that apply): Patient trending condition and Treatment  Discharge Plan A: Home Health   Discharge Delays: None known at this time      Joey Rodriguez MD  Department of Hospital Medicine   '[facility] Surg

## 2023-02-02 NOTE — PLAN OF CARE
02/02/23 0905   Post-Acute Status   Post-Acute Authorization HME   HME Status (!) Pending Delivery   Discharge Delays None known at this time   Discharge Plan   Discharge Plan A Home Health   Discharge Plan B Rehab     Arthur attempted to contact for LSO Brace to be delivered to pt's room; Sw left a message requesting delivery today. Sw provided her name and contact information for a return call if further information is needed.     1325 Arthur spoke with [facility] [employer] company in regards to LSO brace. Arthur notified that [facility] [employer] will notify the  and deliver pt's LSO brace to her room.

## 2023-02-02 NOTE — PT/OT/SLP EVAL
Occupational Therapy Evaluation and Treatment    Name: Mary Vo  MRN: [id number]  Admitting Diagnosis: Lumbar radiculopathy  Recent Surgery: Procedure(s) (LRB):  FUSION, SPINE, LUMBAR, TLIF, USING COMPUTER-ASSISTED NAVIGATION (Bilateral)  APPLICATION, ACELLULAR HUMAN DERMAL ALLOGRAFT (N/A) 1 Day Post-Op    Recommendations:     Discharge Recommendations: home health OT  Level of Assistance Recommended: 24 hours significant assistance  Discharge Equipment Recommendations: none  Barriers to discharge:      Assessment:     Mary Vo is a 67 y.o. female with a medical diagnosis of Lumbar radiculopathy. She presents with performance deficits affecting function including weakness, impaired functional mobility, decreased safety awareness, gait instability, impaired balance, impaired self care skills.     Rehab Prognosis: Fair; patient would benefit from acute skilled OT services to address these deficits and reach maximum level of function.    Plan:     Patient to be seen 2 x/week to address the above listed problems via self-care/home management, therapeutic activities, therapeutic exercises  Plan of Care Expires:    Plan of Care Reviewed with: patient, family    Subjective     Chief Complaint: DEBILITY AND GENERALIZED WEAKNESS  Patient Comments/Goals:   Pain/Comfort:  Pain Rating 1: 5/10  Location - Orientation 1: generalized  Location 1: back    Patients cultural, spiritual, Religion conflicts given the current situation:      Social History:  Living Environment: Patient lives alone in a mobile home, number of outside stairs: 4 WITH 1 SIDE RAIL  AND RAMP   Prior Level of Function: Prior to admission, patient was independent with ADLs.  Roles and Routines: OCCUPATIONAL THERAPY  Equipment Used at Home:  none  DME owned (not currently used): rolling walker  Assistance Upon Discharge: friend.    Objective:     Communicated with NURSE AND Epic CHART REVIEW prior to session. Patient found HOB elevated with  telemetry, peripheral IV upon OT entry to room.    General Precautions: Standard, fall   Orthopedic Precautions:    Braces: LSO  Respiratory Status: Room air    Occupational Performance    Bed Mobility:  Rolling/Turning to Left with minimum assistance  Rolling/Turning to Right with minimum assistance  Scooting anteriorly to EOB to have both feet planted on floor: minimum assistance  Supine to sit from right side of bed with minimum assistance  Supine to sit from left side of bed with minimum assistance    Functional Mobility/Transfers:  Sit <> Stand Transfer with minimum assistance with hand-held assist  Functional Mobility: MIN A  X 50 FEET X 2 NO AE    Activities of Daily Living:  Upper Body Dressing: minimum assistance .  Lower Body Dressing: maximal assistance .    Cognitive/Visual Perceptual:  Cognitive/Psychosocial Skills:     -       Oriented to: Person, Place, Time, and Situation   -       Follows Commands/attention:Follows multistep  commands  -       Communication: clear/fluent  -       Memory: No Deficits noted  -       Safety awareness/insight to disability: impaired     Physical Exam:  Upper Extremity Range of Motion:     -       Right Upper Extremity: WFL  -       Left Upper Extremity: WFL    AMPAC 6 Click ADL:  AMPAC Total Score: 18    Treatment & Education:  Therapist provided facilitation and instruction of proper body mechanics, energy conservation, and fall prevention strategies during tasks listed above.  Patient educated on role of OT, POC and goals for therapy  Patient educated on importance of OOB activities with staff member assistance and sitting OOB majority of the day.   PT EDUCATED ON HEP. VERBALIZED UNDERSTANDING AND RETURNED DEMONSTRATION    Patient clear to stand pivot transfer with RN/PCT, assist Shin SHARMA  .    Patient left HOB elevated with all lines intact, call button in reach, and RN notified.    GOALS:   Multidisciplinary Problems       Occupational Therapy Goals       Not on file                     History:     Past Medical History:   Diagnosis Date    Breast cancer 1996    right    Chronic pain syndrome     Generalized osteoarthrosis, involving multiple sites     s/p THR bilateral    History of breast cancer 2007    lumpectomy/XRT    HTN (hypertension)     Hyperlipidemia     Morbid obesity with BMI of 40.0-44.9, adult          Past Surgical History:   Procedure Laterality Date    ANTERIOR CERVICAL DISCECTOMY W/ FUSION Left 01/03/2022    Procedure: DISCECTOMY, SPINE, CERVICAL, ANTERIOR APPROACH, WITH FUSION;  Surgeon: Pradip Fernando MD;  Location: [facility] OR;  Service: Neurosurgery;  Laterality: Left;  Three Level ACDF  C4/5, 5/6, 6/7      BREAST LUMPECTOMY Right 2006    XRT    CATARACT EXTRACTION W/  INTRAOCULAR LENS IMPLANT Left 01/15/2020    CATARACT EXTRACTION W/  INTRAOCULAR LENS IMPLANT Right 01/29/2020    COLONOSCOPY N/A 10/15/2015    Procedure: COLONOSCOPY;  Surgeon: Maylin Shipley MD;  Location: [facility] ENDO;  Service: Endoscopy;  Laterality: N/A;    COLONOSCOPY N/A 11/30/2022    Procedure: COLONOSCOPY;  Surgeon: Gary Toussaint MD;  Location: [facility];  Service: General;  Laterality: N/A;    EPIDURAL STEROID INJECTION N/A 11/03/2020    Procedure: Lumbar L5/S1 IL KATYA;  Surgeon: Paul Lobato MD;  Location: [facility] PAIN MGT;  Service: Pain Management;  Laterality: N/A;    EPIDURAL STEROID INJECTION INTO CERVICAL SPINE N/A 09/25/2020    Procedure: C7/T1 IL KATYA;  Surgeon: Paul Lobato MD;  Location: [facility] PAIN MGT;  Service: Pain Management;  Laterality: N/A;    EPIDURAL STEROID INJECTION INTO CERVICAL SPINE N/A 10/05/2021    Procedure: C7/T1 IL KATYA with RN IV sedation;  Surgeon: Cynthia Soares MD;  Location: [facility] PAIN MGT;  Service: Pain Management;  Laterality: N/A;    ESOPHAGOGASTRODUODENOSCOPY N/A 11/04/2021    Procedure: ESOPHAGOGASTRODUODENOSCOPY (EGD);  Surgeon: Blas Hampton MD;  Location: [facility] ENDO;  Service: Endoscopy;  Laterality: N/A;    HYSTERECTOMY       LAPAROSCOPIC LYSIS OF ADHESIONS N/A 8/30/2022    Procedure: LYSIS, ADHESIONS, LAPAROSCOPIC;  Surgeon: Blas Hampton MD;  Location: [facility] OR;  Service: General;  Laterality: N/A;    PLACEMENT OF ACELLULAR HUMAN DERMAL ALLOGRAFT Left 01/03/2022    Procedure: APPLICATION, ACELLULAR HUMAN DERMAL ALLOGRAFT;  Surgeon: Pradip Fernando MD;  Location: [facility] OR;  Service: Neurosurgery;  Laterality: Left;    PLACEMENT OF ACELLULAR HUMAN DERMAL ALLOGRAFT N/A 2/1/2023    Procedure: APPLICATION, ACELLULAR HUMAN DERMAL ALLOGRAFT;  Surgeon: Pradip Fernando MD;  Location: [facility] OR;  Service: Neurosurgery;  Laterality: N/A;    ROBOT-ASSISTED LAPAROSCOPIC SLEEVE GASTRECTOMY USING DA FLORENTINO XI N/A 8/30/2022    Procedure: XI ROBOTIC SLEEVE GASTRECTOMY;  Surgeon: Blas Hampton MD;  Location: [facility] OR;  Service: General;  Laterality: N/A;    TRANSFORAMINAL EPIDURAL INJECTION OF STEROID Left 09/17/2019    Procedure: Left C5/6 TF KATYA w/ RN IV sedation;  Surgeon: Paul Lobato MD;  Location: [facility] PAIN MGT;  Service: Pain Management;  Laterality: Left;    TRANSFORAMINAL LUMBAR INTERBODY FUSION (TLIF) USING COMPUTER-ASSISTED NAVIGATION Bilateral 2/1/2023    Procedure: FUSION, SPINE, LUMBAR, TLIF, USING COMPUTER-ASSISTED NAVIGATION;  Surgeon: Pradip Fernando MD;  Location: [facility];  Service: Neurosurgery;  Laterality: Bilateral;  TLIF L3-4/4-5 possibly L5-S1       Time Tracking:     OT Date of Treatment: 02/02/23  OT Start Time: 1100  OT Stop Time: 1125  OT Total Time (min): 25 min    Billable Minutes: Evaluation 15 minutes and Therapeutic Activity 10 MINUTES    2/2/2023

## 2023-02-02 NOTE — PROGRESS NOTES
[facility] - Med Surg  Neurosurgery  Progress Note    Subjective:     History of Present Illness: No notes on file    Post-Op Info:  Procedure(s) (LRB):  FUSION, SPINE, LUMBAR, TLIF, USING COMPUTER-ASSISTED NAVIGATION (Bilateral)  APPLICATION, ACELLULAR HUMAN DERMAL ALLOGRAFT (N/A)   1 Day Post-Op   Doing well sitting in chair   Ambulated with PT   Waiting on LSO   Shannon diet   Back pain   No leg pain     Incision cdi  HV drain 360  Assessment/Plan:     Leave drain in one more day   PT/OT   Likely to home tomorrow      Pradip Fernando MD  Neurosurgery  [facility] - Med Surg

## 2023-02-02 NOTE — PLAN OF CARE
SEE EVAL FOR DETAILS. PT DISPLAYED DEFICITS WITH FUNCTIONAL MOBILITY/ TRANSFER AND DECREASE ADL'S SKILLS. RECOMMEND: DONTE O.T.

## 2023-02-02 NOTE — ASSESSMENT & PLAN NOTE
BP marginal post-op  Continue IV fluids, strict I/O's, AM labs    Home meds include lasix, losartan/hctz, clonidine  Hold hold meds for now, resume when appropriate

## 2023-02-02 NOTE — ASSESSMENT & PLAN NOTE
[place]-PDMP reviewed  Prescribed norco, xanax, ambien, gabapentin  Home meds resumed, monitor for respiratory depression  Narcan PRN

## 2023-02-02 NOTE — PT/OT/SLP EVAL
Physical Therapy Evaluation    Patient Name:  Mary Vo   MRN:  [id number]    Recommendations:     Discharge Recommendations: home health PT   Discharge Equipment Recommendations: none   Barriers to discharge: None    Assessment:     Mary Vo is a 67 y.o. female admitted with a medical diagnosis of Lumbar radiculopathy.  She presents with the following impairments/functional limitations: weakness, impaired endurance, impaired balance, gait instability, decreased lower extremity function, impaired self care skills, impaired functional mobility, pain, decreased ROM .    Rehab Prognosis: Good; patient would benefit from acute skilled PT services to address these deficits and reach maximum level of function.    Recent Surgery: Procedure(s) (LRB):  FUSION, SPINE, LUMBAR, TLIF, USING COMPUTER-ASSISTED NAVIGATION (Bilateral)  APPLICATION, ACELLULAR HUMAN DERMAL ALLOGRAFT (N/A) 1 Day Post-Op    Plan:     During this hospitalization, patient to be seen 3 x/week to address the identified rehab impairments via gait training, therapeutic activities, therapeutic exercises and progress toward the following goals:    Plan of Care Expires:  02/16/23    Subjective     Chief Complaint: BACK PAIN   Patient/Family Comments/goals: INC MOBILITY AND DEC PAIN   Pain/Comfort:  Pain Rating 1: 5/10  Location 1: back  Pain Rating Post-Intervention 1: 5/10    Patients cultural, spiritual, Denominational conflicts given the current situation:      Living Environment:   PT LIVES AT HOME ALONE IN A ONE STORY HOME WITH 4 STEPS AND ONE RAILING TO ENTER   Prior to admission, patients level of function was IND AND DRIVES .  Equipment used at home: none.  DME owned (not currently used): rolling walker and transfer tub bench.  Upon discharge, patient will have assistance from SISTER .    Objective:     Communicated with NURSE RYAN AND [facility] CHART REVIEW  prior to session.  Patient found supine with peripheral IV, hemovac  upon PT entry to  room.    General Precautions: Standard, fall  Orthopedic Precautions:spinal precautions   Braces: LSO  Respiratory Status: Room air    Exams:  Cognitive Exam:  Patient is oriented to Person, Place, Time, and Situation  Sensation:    -       Intact  RLE ROM: WNL  RLE Strength: WFL  LLE ROM: WNL  LLE Strength: WFL    Functional Mobility:  Bed Mobility:     Rolling Right: minimum assistance  Supine to Sit: contact guard assistance  Sit to Supine: contact guard assistance  Transfers:     Sit to Stand:  contact guard assistance with no AD  Bed to Chair: contact guard assistance with  no AD  using  Stand Pivot  Gait: PT GT TRAINED X 200' WITH NO AD AND CGA WITH SLOW PACED GT AND PT ON HIGH GUARD.       AM-PAC 6 CLICK MOBILITY  Total Score:16       Treatment & Education:  PT RETURNED TO ROOM FOR BED MOBILITY PRACTICE SIT>SUP>SIT WITH LOG ROLLING FOR PROPER BODY MECHANICS PROGRESSING TO SBA. PT T/F TO CHAIR WITH SBA. PT LEFT SEATED AND EDUCATED ON TE TO COMPLETE GLUT SETS, TKE AND AP. PT ALSO EDUCATED ON NO BENDING, LIFTING AND TWISTING.    Patient left up in chair with call button in reach.    GOALS:   Multidisciplinary Problems       Physical Therapy Goals          Problem: Physical Therapy    Goal Priority Disciplines Outcome Goal Variances Interventions   Physical Therapy Goal     PT, PT/OT      Description: LT23  1. PT WILL COMPLETE LOG ROLL AND SIT EOB WITH S  2. PT WILL STAND PIVOT T/F TO CHAIR  WITH S  3. PT WILL GT TRAIN X 250' WITH SBA  4. PT WILL RECALL NO BENDING, LIFTING AND TWISTING. ( SPINAL PRECAUTIONS ) IND                         History:     Past Medical History:   Diagnosis Date    Breast cancer     right    Chronic pain syndrome     Generalized osteoarthrosis, involving multiple sites     s/p THR bilateral    History of breast cancer 2007    lumpectomy/XRT    HTN (hypertension)     Hyperlipidemia     Morbid obesity with BMI of 40.0-44.9, adult        Past Surgical History:   Procedure  Laterality Date    ANTERIOR CERVICAL DISCECTOMY W/ FUSION Left 01/03/2022    Procedure: DISCECTOMY, SPINE, CERVICAL, ANTERIOR APPROACH, WITH FUSION;  Surgeon: Pradip Fernando MD;  Location: [facility];  Service: Neurosurgery;  Laterality: Left;  Three Level ACDF  C4/5, 5/6, 6/7      BREAST LUMPECTOMY Right 2006    XRT    CATARACT EXTRACTION W/  INTRAOCULAR LENS IMPLANT Left 01/15/2020    CATARACT EXTRACTION W/  INTRAOCULAR LENS IMPLANT Right 01/29/2020    COLONOSCOPY N/A 10/15/2015    Procedure: COLONOSCOPY;  Surgeon: Maylin Shipley MD;  Location: [facility] ENDO;  Service: Endoscopy;  Laterality: N/A;    COLONOSCOPY N/A 11/30/2022    Procedure: COLONOSCOPY;  Surgeon: Gary Toussaint MD;  Location: [facility];  Service: General;  Laterality: N/A;    EPIDURAL STEROID INJECTION N/A 11/03/2020    Procedure: Lumbar L5/S1 IL KATYA;  Surgeon: Paul Lobato MD;  Location: [facility] PAIN MGT;  Service: Pain Management;  Laterality: N/A;    EPIDURAL STEROID INJECTION INTO CERVICAL SPINE N/A 09/25/2020    Procedure: C7/T1 IL KATYA;  Surgeon: Paul Lobato MD;  Location: [facility] PAIN MGT;  Service: Pain Management;  Laterality: N/A;    EPIDURAL STEROID INJECTION INTO CERVICAL SPINE N/A 10/05/2021    Procedure: C7/T1 IL KATYA with RN IV sedation;  Surgeon: Cynthia Soares MD;  Location: [facility] PAIN MGT;  Service: Pain Management;  Laterality: N/A;    ESOPHAGOGASTRODUODENOSCOPY N/A 11/04/2021    Procedure: ESOPHAGOGASTRODUODENOSCOPY (EGD);  Surgeon: Blas Hampton MD;  Location: [facility] ENDO;  Service: Endoscopy;  Laterality: N/A;    HYSTERECTOMY      LAPAROSCOPIC LYSIS OF ADHESIONS N/A 8/30/2022    Procedure: LYSIS, ADHESIONS, LAPAROSCOPIC;  Surgeon: Blas Hampton MD;  Location: [facility] OR;  Service: General;  Laterality: N/A;    PLACEMENT OF ACELLULAR HUMAN DERMAL ALLOGRAFT Left 01/03/2022    Procedure: APPLICATION, ACELLULAR HUMAN DERMAL ALLOGRAFT;  Surgeon: Pradip Fernando MD;  Location: [facility];  Service: Neurosurgery;  Laterality: Left;     PLACEMENT OF ACELLULAR HUMAN DERMAL ALLOGRAFT N/A 2/1/2023    Procedure: APPLICATION, ACELLULAR HUMAN DERMAL ALLOGRAFT;  Surgeon: Pradip Fernando MD;  Location: [facility] OR;  Service: Neurosurgery;  Laterality: N/A;    ROBOT-ASSISTED LAPAROSCOPIC SLEEVE GASTRECTOMY USING DA FLORENTINO XI N/A 8/30/2022    Procedure: XI ROBOTIC SLEEVE GASTRECTOMY;  Surgeon: Blas Hampton MD;  Location: [facility] OR;  Service: General;  Laterality: N/A;    TRANSFORAMINAL EPIDURAL INJECTION OF STEROID Left 09/17/2019    Procedure: Left C5/6 TF KATYA w/ RN IV sedation;  Surgeon: Paul Lobato MD;  Location: [facility] MGT;  Service: Pain Management;  Laterality: Left;    TRANSFORAMINAL LUMBAR INTERBODY FUSION (TLIF) USING COMPUTER-ASSISTED NAVIGATION Bilateral 2/1/2023    Procedure: FUSION, SPINE, LUMBAR, TLIF, USING COMPUTER-ASSISTED NAVIGATION;  Surgeon: Pradip Fernando MD;  Location: [facility] OR;  Service: Neurosurgery;  Laterality: Bilateral;  TLIF L3-4/4-5 possibly L5-S1       Time Tracking:     PT Received On: 02/02/23  PT Start Time: 1100     PT Stop Time: 1125  PT Total Time (min): 25 min     Billable Minutes: Evaluation 15 and Therapeutic Activity 10      02/02/2023

## 2023-02-02 NOTE — PLAN OF CARE
Received return call from patient regarding Code 44.  Code 44 explained to patient and all questions answered.

## 2023-02-02 NOTE — HOSPITAL COURSE
2/2/23  JOURDAN. Patient reports back pain with movement, some relief with pain mediations. Denies numbness/tingling, chest pain, SOB, fevers, nausea.

## 2023-02-02 NOTE — PLAN OF CARE
Pt remains free from falls/injuries this shift. Safety precautions maintained. Pain managed with pain medication and lying down. Patient received LSO brace. Worked with PT/OT today. No s/s of acute distress noted. Will continue to monitor. Chart check completed.

## 2023-02-03 ENCOUNTER — TELEPHONE (OUTPATIENT)
Dept: INTERNAL MEDICINE | Facility: CLINIC | Age: 68
End: 2023-02-03
Payer: MEDICARE

## 2023-02-03 VITALS
OXYGEN SATURATION: 95 % | TEMPERATURE: 98 F | WEIGHT: 196.88 LBS | BODY MASS INDEX: 30.9 KG/M2 | SYSTOLIC BLOOD PRESSURE: 94 MMHG | RESPIRATION RATE: 18 BRPM | HEART RATE: 81 BPM | DIASTOLIC BLOOD PRESSURE: 53 MMHG | HEIGHT: 67 IN

## 2023-02-03 PROCEDURE — 97116 GAIT TRAINING THERAPY: CPT

## 2023-02-03 PROCEDURE — 99900035 HC TECH TIME PER 15 MIN (STAT)

## 2023-02-03 PROCEDURE — 97530 THERAPEUTIC ACTIVITIES: CPT

## 2023-02-03 PROCEDURE — 94761 N-INVAS EAR/PLS OXIMETRY MLT: CPT

## 2023-02-03 PROCEDURE — 94799 UNLISTED PULMONARY SVC/PX: CPT

## 2023-02-03 PROCEDURE — 25000242 PHARM REV CODE 250 ALT 637 W/ HCPCS

## 2023-02-03 PROCEDURE — 25000003 PHARM REV CODE 250

## 2023-02-03 PROCEDURE — 25000003 PHARM REV CODE 250: Performed by: HOSPITALIST

## 2023-02-03 PROCEDURE — 25000003 PHARM REV CODE 250: Performed by: NEUROLOGICAL SURGERY

## 2023-02-03 RX ORDER — OXYCODONE AND ACETAMINOPHEN 10; 325 MG/1; MG/1
1 TABLET ORAL EVERY 4 HOURS PRN
Qty: 40 TABLET | Refills: 0 | Status: SHIPPED | OUTPATIENT
Start: 2023-02-03 | End: 2023-02-14 | Stop reason: SDUPTHER

## 2023-02-03 RX ORDER — MULTIVIT,CALC,MINS/IRON/FOLIC 9MG-400MCG
TABLET ORAL DAILY
Qty: 30 TABLET | Refills: 1 | Status: SHIPPED | OUTPATIENT
Start: 2023-02-03 | End: 2024-01-03

## 2023-02-03 RX ORDER — METHOCARBAMOL 750 MG/1
750 TABLET, FILM COATED ORAL 3 TIMES DAILY PRN
Qty: 60 TABLET | Refills: 0 | Status: SHIPPED | OUTPATIENT
Start: 2023-02-03 | End: 2023-06-19 | Stop reason: ALTCHOICE

## 2023-02-03 RX ADMIN — GABAPENTIN 300 MG: 300 CAPSULE ORAL at 09:02

## 2023-02-03 RX ADMIN — Medication 1000 UNITS: at 10:02

## 2023-02-03 RX ADMIN — OXYCODONE HYDROCHLORIDE 10 MG: 10 TABLET, FILM COATED, EXTENDED RELEASE ORAL at 10:02

## 2023-02-03 RX ADMIN — GABAPENTIN 300 MG: 300 CAPSULE ORAL at 02:02

## 2023-02-03 RX ADMIN — FLUTICASONE PROPIONATE 100 MCG: 50 SPRAY, METERED NASAL at 09:02

## 2023-02-03 RX ADMIN — OXYCODONE HYDROCHLORIDE 10 MG: 5 TABLET ORAL at 07:02

## 2023-02-03 RX ADMIN — CITALOPRAM HYDROBROMIDE 20 MG: 20 TABLET ORAL at 09:02

## 2023-02-03 RX ADMIN — OXYCODONE HYDROCHLORIDE 10 MG: 5 TABLET ORAL at 02:02

## 2023-02-03 RX ADMIN — LOSARTAN POTASSIUM 100 MG: 50 TABLET, FILM COATED ORAL at 10:02

## 2023-02-03 RX ADMIN — HYDROCHLOROTHIAZIDE 25 MG: 25 TABLET ORAL at 10:02

## 2023-02-03 RX ADMIN — PRAVASTATIN SODIUM 40 MG: 20 TABLET ORAL at 10:02

## 2023-02-03 RX ADMIN — BISACODYL 10 MG: 10 SUPPOSITORY RECTAL at 10:02

## 2023-02-03 RX ADMIN — FUROSEMIDE 40 MG: 40 TABLET ORAL at 10:02

## 2023-02-03 NOTE — PT/OT/SLP PROGRESS
Physical Therapy  Treatment    Mary Vo   MRN: [id number]   Admitting Diagnosis: Lumbar radiculopathy    PT Received On: 02/03/23  PT Start Time: 0821     PT Stop Time: 0845    PT Total Time (min): 24 min       Billable Minutes:  Gait Training 14 and Therapeutic Activity 10    Treatment Type: Treatment  PT/PTA: PT     PTA Visit Number: 0       General Precautions: Standard, fall  Orthopedic Precautions: spinal precautions  Braces: LSO  Respiratory Status: Room air         Subjective:  Communicated with NURSE SONAM AND Epic CHART REVIEW  prior to session.  PT AGREED TO TX     Pain/Comfort  Pain Rating 1: 8/10  Location 1: back  Pain Rating Post-Intervention 1: 8/10    Objective:   Patient found with: telemetry, peripheral IV    Functional Mobility:  PT MET IN RM LOG ROLLED TO RIGHT AND SEATED EOB WITH SBA AND USE OF RAILING. P.T. ASSISTED PT IN DONNING LSO WITH MIN A. PT STOOD WITH NO AD AND CGA HOWEVER REACHING FOR WALLS AND OBJECTS FOR BALANCE AND RW GIVEN TO PT TO CONT GT TRAINING FOR INC SAFETY. PT GT TRAINED X 250' WITH SBA AND UNSTEADY GT. PT RE-EDUCATED ON NO BENDING, LIFTING AND TWISTING. PT RETURNED TO  T/F TO CHAIR WITH SBA AND SET UP FOR BREAKFAST. PT LEFT SEATED WITH ALL NEEDS MET.       AM-PAC 6 CLICK MOBILITY  How much help from another person does this patient currently need?   1 = Unable, Total/Dependent Assistance  2 = A lot, Maximum/Moderate Assistance  3 = A little, Minimum/Contact Guard/Supervision  4 = None, Modified [place]/Independent    Turning over in bed (including adjusting bedclothes, sheets and blankets)?: 4  Sitting down on and standing up from a chair with arms (e.g., wheelchair, bedside commode, etc.): 4  Moving from lying on back to sitting on the side of the bed?: 4  Moving to and from a bed to a chair (including a wheelchair)?: 4  Need to walk in hospital room?: 4  Climbing 3-5 steps with a railing?: 1  Basic Mobility Total Score: 21    AM-PAC Raw Score CMS G-Code  Modifier Level of Impairment Assistance   6 % Total / Unable   7 - 9 CM 80 - 100% Maximal Assist   10 - 14 CL 60 - 80% Moderate Assist   15 - 19 CK 40 - 60% Moderate Assist   20 - 22 CJ 20 - 40% Minimal Assist   23 CI 1-20% SBA / CGA   24 CH 0% Independent/ Mod I     Patient left up in chair with call button in reach.    Assessment:  PT JAISON GT TRAINING WITH INC PAIN     Rehab identified problem list/impairments: weakness, gait instability, impaired balance, impaired endurance, pain, impaired self care skills, impaired functional mobility, decreased ROM, decreased lower extremity function    Rehab potential is good.    Activity tolerance: Good    Discharge recommendations: home health PT      Barriers to discharge:      Equipment recommendations: none     GOALS:   Multidisciplinary Problems       Physical Therapy Goals          Problem: Physical Therapy    Goal Priority Disciplines Outcome Goal Variances Interventions   Physical Therapy Goal     PT, PT/OT Ongoing, Progressing     Description: LT23  1. PT WILL COMPLETE LOG ROLL AND SIT EOB WITH S  2. PT WILL STAND PIVOT T/F TO CHAIR  WITH S  3. PT WILL GT TRAIN X 250' WITH SBA  4. PT WILL RECALL NO BENDING, LIFTING AND TWISTING. ( SPINAL PRECAUTIONS ) IND                         PLAN:    Patient to be seen 3 x/week to address the above listed problems via gait training, therapeutic activities, therapeutic exercises  Plan of Care expires: 23  Plan of Care reviewed with: patient         2023

## 2023-02-03 NOTE — DISCHARGE INSTRUCTIONS
DISCHARGE INSTRUCTIONS-- PLEASE READ!!    No NSAIDS (Aleve, Advil, Voltaren, etc. ) for 3 months.    Minimize BLTs- bending, lifting and twisting. No lifting anything greater than 5-10 for first two weeks.    Dressing change:   Patient may leave current Aquacel dressing in place for the next 3-4 days. After this, okay to change as :  Every other day using Mepilex or similar dressing. With each dressing change, apply thin layer of Bacitracin ointment over staples.    Okay to shower. Don't submerge incision in water (no baths, use of hot tubs or swimming pool).    Wear LSO (brace) when out of bed, standing, walking, riding in vehicle.    No driving for 2 weeks or while taking pain medication.

## 2023-02-03 NOTE — PROGRESS NOTES
[facility]  Neurosurgery  Progress Note    Subjective:     Interval History:   Pt seen earlier this morning.  No acute events overnight.  HV drain output 380 mL recorded.  Patient participated with PT/OT yesterday. Amb 200' with no AD.  She does c/o back pain but this is expected given her surgery.  Has LSO in room.  + flatus  No issues urinating    History of Present Illness: See H&P.    Post-Op Info:  Procedure(s) (LRB):  FUSION, SPINE, LUMBAR, TLIF, USING COMPUTER-ASSISTED NAVIGATION (Bilateral)  APPLICATION, ACELLULAR HUMAN DERMAL ALLOGRAFT (N/A)   2 Days Post-Op      Medications:  Continuous Infusions:   sodium chloride 0.9% 75 mL/hr at 02/02/23 1813     Scheduled Meds:   bisacodyL  10 mg Rectal Daily    citalopram  20 mg Oral Daily    cloNIDine  0.2 mg Oral QHS    fluticasone propionate  2 spray Each Nostril Daily    furosemide  40 mg Oral Daily    gabapentin  300 mg Oral TID    hydroCHLOROthiazide  25 mg Oral Daily    losartan  100 mg Oral Daily    oxyCODONE  10 mg Oral Q12H    pravastatin  40 mg Oral Daily    scopolamine  1 patch Transdermal Once Pre-Op    vitamin D  1,000 Units Oral Daily     PRN Meds:ALPRAZolam, aluminum-magnesium hydroxide-simethicone, morphine, morphine, morphine, naloxone, ondansetron, oxyCODONE, oxyCODONE, prochlorperazine, senna-docusate 8.6-50 mg, zolpidem     Review of Systems  Objective:     Weight: 89.3 kg (196 lb 13.9 oz)  Body mass index is 30.83 kg/m².  Vital Signs (Most Recent):  Temp: 99.5 °F (37.5 °C) (02/03/23 0823)  Pulse: 79 (02/03/23 0823)  Resp: 18 (02/03/23 1003)  BP: 118/80 (02/03/23 0823)  SpO2: 95 % (02/03/23 0823) Vital Signs (24h Range):  Temp:  [96.2 °F (35.7 °C)-99.5 °F (37.5 °C)] 99.5 °F (37.5 °C)  Pulse:  [76-91] 79  Resp:  [16-20] 18  SpO2:  [90 %-96 %] 95 %  BP: (110-149)/(53-80) 118/80                          Closed/Suction Drain 02/01/23 1210 Left Back Accordion 10 Fr. (Active)   Site Description Unable to view 02/02/23 2130   Dressing Type  Biopatch in place 02/02/23 2130   Dressing Status Clean 02/02/23 2130   Dressing Intervention Integrity maintained 02/02/23 2130   Drainage Serosanguineous 02/02/23 2130   Status To bulb suction 02/02/23 2130   Output (mL) 180 mL 02/02/23 2130       Neurosurgery Physical Exam  Vitals reviewed  MAEW  Incision CDI, Healing well  Staples intact  HV drain removed  Incision cleaned and new dressing placed    Significant Labs:  Recent Labs   Lab 02/02/23 0527         K 3.9      CO2 27   BUN 14   CREATININE 0.7   CALCIUM 9.1     Recent Labs   Lab 02/02/23 0527   WBC 11.47   HGB 10.6*   HCT 32.7*        No results for input(s): LABPT, INR, APTT in the last 48 hours.  Microbiology Results (last 7 days)       ** No results found for the last 168 hours. **          All pertinent labs from the last 24 hours have been reviewed.  Significant Diagnostics:  I have reviewed all pertinent imaging results/findings within the past 24 hours.    Assessment/Plan:     Active Diagnoses:    Diagnosis Date Noted POA    PRINCIPAL PROBLEM:  Lumbar radiculopathy [M54.16] 11/06/2018 Yes    Class 1 obesity due to excess calories with serious comorbidity and body mass index (BMI) of 32.0 to 32.9 in adult [E66.09, Z68.32]  Not Applicable    Chronic pain syndrome [G89.4]  Yes    Hyperlipidemia [E78.5]  Yes    HTN (hypertension) [I10]  Yes      Problems Resolved During this Admission:     POD #2  - Ambulate several times daily  - Work with PT/OT  - LSO when walking, working with therapy    Patient will work with therapy and see how she feels throughout the morning, possibly d/c to home later this afternoon.   Will place HH referral.    Please call with any changes.    Miesha Linn PA-C  Neurosurgery  [facility]

## 2023-02-03 NOTE — PLAN OF CARE
02/03/23 0958   Post-Acute Status   Post-Acute Authorization Home Health   Home Health Status   (List provided)   Discharge Delays None known at this time   Discharge Plan   Discharge Plan A Home Health     Sw met with pt and pt's family member to discuss d/c plans for HH; pt is agreeable. Sw provided pt with a list of HH agencies in her area. Sw provided her name and contact information for a return call once they have made a HH selection.

## 2023-02-03 NOTE — PLAN OF CARE
"[facility]  Discharge Final Note    Primary Care Provider: Elias Cannon MD    Expected Discharge Date: 2/3/2023    Final Discharge Note (most recent)       Final Note - 02/03/23 1541          Final Note    Assessment Type Final Discharge Note     Anticipated Discharge Disposition Home-Health Care [facility]     Hospital Resources/Appts/Education Provided Appointments scheduled by Navigator/Coordinator;Appointments scheduled and added to AVS        Post-Acute Status    Post-Acute Authorization Home Health     Home Health Status Set-up Complete/Auth obtained     Discharge Delays None known at this time                     Important Message from Medicare      N/A        Contact Info       Miesha Linn PA-C   Specialty: Neurosurgery    [address] [place] [address] [address] [address]   Phone: [phone]       Next Steps: Go in 2 week(s)    Instructions: For wound re-check, For staple removal, X-rays        Sw met with pt to discuss d/c plans and need for HH; pt selected [facility] since "it is the closest to [place]."     Sw confirmed with Lavelle at [facility] that they can accept pt at this time.     Hospital follow up appointments scheduled and added to AVS.     No d/c needs at this time.     "

## 2023-02-03 NOTE — OP NOTE
[facility]  Neurosurgery  Operative Note    SUMMARY      Date of Procedure: 2/1/2023     Procedure: Procedure(s) (LRB):  FUSION, SPINE, LUMBAR, TLIF, USING COMPUTER-ASSISTED NAVIGATION (Bilateral)  APPLICATION, ACELLULAR HUMAN DERMAL ALLOGRAFT (N/A)     Surgeon(s)  Pradip Fernando MD - Primary    Assistant : Miesha Linn PA-C    Pre-Operative Diagnosis: Lumbar stenosis with neurogenic claudication [M48.062]  Degenerative disc disease, lumbar [M51.36]  Lumbar radiculopathy [M54.16]    Post-Operative Diagnosis: Post-Op Diagnosis Codes:     * Lumbar stenosis with neurogenic claudication [M48.062]     * Degenerative disc disease, lumbar [M51.36]     * Lumbar radiculopathy [M54.16]    Anesthesia: General    Operative Findings (including complications, if any):   DDD with stenosis L3-5/ DDD with herniated disc L5-S1   Description of Technical Procedures:   TLIF L3-S1    Significant Surgical Tasks Conducted by the Assistant(s), if Applicable: thecal sac retraction during interbody cage placement       Estimated Blood Loss (EBL): * No values recorded between 2/1/2023  7:50 AM and 2/1/2023 12:32 PM *           Specimens:   Specimen (24h ago, onward)      None             Implants:   Implant Name Type Inv. Item Serial No.  Lot No. LRB No. Used Action   [id number] Bone  [id number] [facility]  N/A 1 Implanted   KIT BONE GRFT BMP SM - [id number]  KIT BONE GRFT BMP SM  [employer] [id number] N/A 1 Implanted   SPACER SHORT CATALYFT PL 7MM - [id number]  SPACER SHORT CATALYFT PL 7MM  [employer] [id number] N/A 1 Implanted   SET SCREW HORIZON SOLERA 5.5-6 - [id number]  SET SCREW HORIZON SOLERA 5.5-6  [employer]  N/A 8 Implanted   6.5 x 45 Screw      N/A 8 Implanted   NELLI CHROMALLOY CRVD 5.5X80MM - [id number]  NELLI CHROMALLOY CRVD 5.5X80MM  [employer]  N/A 1 Implanted   NELLI HORIZON CURV 5.5CCM 90MM - [id number]  NELLI HORIZON CURV 5.5CCM 90MM  [employer]  N/A 1 Implanted              Condition:  Good    Disposition: PACU - hemodynamically stable.    Attestation: I was present and scrubbed for the entire procedure.    History:  Patient is a 68 yo female who presented to me with severe back pain as well as numbness and tingling down the lower extremities     MRI revealed severe degenerative disc disease at from L3-S1 there was a posterior herniated disc with DDD causing lateral recess stenosis as well as foraminal stenosis bilaterally.      Her symptoms were worse with activity   She had a trial of conservative therapy including physical therapy as well as epidural steroid injections  Prior to considering lumbar surgery she was referred to general surgery for gastric sleeve secondary to BMI which she successfully completed.     She was offered a posterior lumbar fusion with interbody cage from L3-S1 secondary to the severe degenerative disc disease and foraminal stenosis bilaterally     Surgical Risks:  The patient was well apprised of all objectives, benefits, risks and potential complications of the procedure, including but not limited to:  Worsening of current status, the possible need for further procedures, the risk of infection, headaches, CSF leak, possible spinal nerve injury resulting in paralysis, infection, injury to major vessels causing hemorrhage, stroke, loss of language function, coma and even death.  No assurance was given whether the symptoms would improve following the procedure.  Informed consent was obtained and secured to the chart after the patient voiced understanding of these risks and decided to proceed with the operation.     Description of procedure  The patient was transferred to the operating room and was given preoperative prophylactic IV antibiotics.  The Anesthesia Service sedated and intubated the patient.  The eyes were taped shut after ointment was applied to prevent corneal abrasion.  A Christopher Hugger was placed over the upper body to maintain control of the core body  temperature.  Benton catheter was inserted.  The patient was turned prone on the Pranav table.  All pressure points were carefully padded.  The electrophysiology monitoring team inserted needles in their proper locations.     Operative Technique:    The patient was prepped and draped in the standard sterile fashion.  The C-arm fluoroscopy was draped and brought into the operative field and the L3-S1 levels were identified.  Local anesthetic was infiltrated midline.  The skin was subsequently opened sharply with a #10 blade.  Dissection was carried down superiorly and inferiorly in the midline to expose the supraspinous ligament and the lamina.  The musculature was elevated subperiosteally to expose the facets on the bilaterally with the Bovie electrocautery as well as the Lo elevator.  Hemostasis was achieved.  Self retraining retractors were then inserted.     Next the stereotactic reference frame was fastened to the spinous process at the level above the planned procedure. Sterile drapes were then prior placed around the operative site. The O-arm navigation system was then brought into the operative field and intraoperative spin was done.  The O-arm was then removed.  Using stereotactic navigation a 5.5mm tap was used to create the trajectory for the pedicle screws at the above-mentioned levels bilaterally.  A ball-tip probe was used to feel down the pedicle tracts to make sure there was bone all the way around.  Next the lumbar pedicle screws were placed bilaterally using stereotactic navigation from L3-S1.          Once all the screws were in place each screw was stimulated to ensure that greater than 12 Volts require to produce EMG activity in the associated muscle group.     Next the medial facets were removed to expose the disc space  The disc space was then cleaned out using curved curettes and Gonzalez and the endplates were prepped bilaterally at L3-4 and L4-5.  The posterior ligament was removed and the  dura and thecal sac was exposed with the use of the Kerrisons to remove any bone and ligament causing compression. Once the neural foramen was felt to be free and sufficiently decompressed the interbody portion of the surgery was commenced.  A 15 blade was used to incise the disc space and then disc Gonzalez of progressive height was used to progressively expand to this space and prepped the disc space endplates with removal of any remaining disc material.  Prior to placing the interbody cage an extra small BMP was placed to the anterior interbody space at L3-4 and L4-5 secondary to the patient's history of having osteopenia.   A trial interbody was placed to the interbody space at L3-4 and L4-5 then a expandable interbody cage filled with allograft bone  as well as the patient's own bone chips which were stripped of any remaining muscle and processed in the bone mill was placed into the interbody space .  It was then expanded to its final position and locked under fluoroscopy. Proper placement and trajectory was confirmed with intraoperative fluoroscopy.      At this point, a high-speed drill was used to remove the the remaining spinous process, lamina, and medial facets up through the pars interarticularis bilaterally . The thecal sac was completely decompressed and the nerve roots were visualized and the foramen remained appeared free bilaterally.     At the L5-S1 level the medial facet on the left side was removed  The disc height remained intact.  The patient had a posterior herniated disc going into the left foramen which was incised using a 15. Blade and disc fragment was removed with a pituitary disc rongeur to free up the foramen as well as the thecal sac medially of any remaining stenosis.    Next the inter screw distance was measured and a bj was placed bilaterally into the poly axial screws.  Inner threaded caps were secured to the polyaxial screws to ensure fixation of the rods.   I secured the inner  threaded caps to 80 feet/pounds with the final torquing wrench.      Next using a Lo elevator the transverse processes bilaterally were exposed and decorticated using my assistance help.  The remaining bone chips from the patient's saved laminectomy bone were placed laterally for the posterolateral fusion with allograft bone chips from L3-S1.       The wound was copiously irrigated with antibiotic saline solution.  A 1/8 Hemovac drain was placed and brought out through a separate stab incision. 1 g of vancomycin powder was placed to the surgical cavity.  The fascia was subsequently closed utilizing 0 Vicryl sutures.  Exparel was injected into the muscle for postoperative pain control.  The subcuticular layer was closed with a 2 0 Vicryl in an inverted fashion.  The skin was then approximated with staples.  The incision was dressed in a clean and dry dressing.     All sponge counts, needle counts and instrument counts were correct at the end of the case x 2.  The patient tolerated the procedure well without any complication and was transferred in a stable condition to the recovery room.        Pradip Fernando MD  Neurosurgery      Disclaimer: This note was prepared using a voice recognition system and is likely to have sound alike errors

## 2023-02-03 NOTE — CHAPLAIN
Initial visit with patient and family.  Visited with patient and family to assess for spiritual and emotional needs.  Pt and her family were doing well at the time of my visit but did ask for prayer.  I took time to do this for them and spiritual care remains available as needed.    Chaplain Mihir Zhou M.Div., [facility]

## 2023-02-03 NOTE — TELEPHONE ENCOUNTER
----- Message from Guera Alexandre LMSW sent at 2/3/2023  3:39 PM CST -----  Please follow up to schedule patient for a hospital follow up appointment within 1-2 weeks of discharge. Thanks!

## 2023-02-04 NOTE — PLAN OF CARE
Patient remains injury free.  No signs or symptoms of distress noted.  Patient denies any pain or discomfort at this time and will be discharged to home to self care.  All active orders reviewed and followed as prescribed.  12 hour chart check completed.

## 2023-02-09 NOTE — DISCHARGE SUMMARY
[facility]  Neurosurgery  Discharge Summary      Patient Name: Mary Vo  MRN: [id number]  Admission Date: 2/1/2023  Hospital Length of Stay: 1 days  Discharge Date and Time: 2/3/2023  5:26 PM  Attending Physician: No att. providers found   Discharging Provider: Miesha Linn PA-C  Primary Care Provider: Elias Cannon MD     HPI: See H&P.    Procedure(s) (LRB):  FUSION, SPINE, LUMBAR, TLIF, USING COMPUTER-ASSISTED NAVIGATION (Bilateral)  APPLICATION, ACELLULAR HUMAN DERMAL ALLOGRAFT (N/A)     Hospital Course:   The patient underwent a TLIF. There were no complications.   She was awakened, taken to recovery and later admitted for postop care, including pain control, drain care and therapy.    Consults:   Consults (From admission, onward)          Status Ordering Provider     Inpatient consult to Hospitalist  Once        Provider:  (Not yet assigned)    Completed JORDEN MEANS     Inpatient consult to Social Work  Once        Provider:  (Not yet assigned)    Completed JORDEN MEANS            Significant Diagnostic Studies: See Epic.    Pending Diagnostic Studies:       None          Final Active Diagnoses:    Diagnosis Date Noted POA    PRINCIPAL PROBLEM:  Lumbar radiculopathy [M54.16] 11/06/2018 Yes    Class 1 obesity due to excess calories with serious comorbidity and body mass index (BMI) of 32.0 to 32.9 in adult [E66.09, Z68.32]  Not Applicable    Chronic pain syndrome [G89.4]  Yes    Hyperlipidemia [E78.5]  Yes    HTN (hypertension) [I10]  Yes      Problems Resolved During this Admission:      Discharged Condition: good    Disposition: Home-Health Care [facility]    Follow Up:   Follow-up Information       Miesha Linn PA-C. Go in 2 week(s).    Specialty: Neurosurgery  Why: For wound re-check, For staple removal, X-rays  Contact information:  [address] [place] [address] [address] [address] [phone]                           Patient Instructions:      Diet general     Call MD for:   temperature >100.4     Call MD for:  persistent nausea and vomiting     Call MD for:  severe uncontrolled pain     Call MD for:  persistent dizziness or light-headedness     Call MD for:  hives     Call MD for:  redness, tenderness, or signs of infection (pain, swelling, redness, odor or green/yellow discharge around incision site)     Call MD for:  difficulty breathing, headache or visual disturbances     Change dressing (specify)   Order Comments: Dressing change:    It is okay to leave current dressing in place for the next 3 days. After this change dressing every other day using Mepilex or similar dressing. With each dressing change, apply thin layer of antibiotic ointment over incision.     Medications:  Reconciled Home Medications:      Medication List        START taking these medications      HIGH POTENCY MULTIVIT (W-IRON) 9 mg iron-400 mcg Tab tablet  Generic drug: multivit-iron-FA-calcium-mins  Take 1 tablet by mouth once daily.  Replaces: multivitamin capsule     methocarbamoL 750 MG Tab  Commonly known as: ROBAXIN  Take 1 tablet (750 mg total) by mouth 3 (three) times daily as needed (muscle spasms).     oxyCODONE-acetaminophen  mg per tablet  Commonly known as: PERCOCET  Take 1 tablet by mouth every 4 (four) hours as needed for Pain.            CHANGE how you take these medications      gabapentin 300 MG capsule  Commonly known as: NEURONTIN  Take 1 capsule (300 mg total) by mouth once daily AND 1 capsule (300 mg total) after lunch AND 2 capsules (600 mg total) every evening.  What changed: See the new instructions.            CONTINUE taking these medications      ALPRAZolam 1 MG tablet  Commonly known as: XANAX  TAKE 1 TABLET BY MOUTH NIGHTLY AS NEEDED FOR ANXIETY     citalopram 40 MG tablet  Commonly known as: CeleXA  TAKE ONE TABLET BY MOUTH DAILY     cloNIDine 0.2 MG tablet  Commonly known as: CATAPRES  TAKE 1 TABLET BY MOUTH EVERY EVENING     FLOWFLEX COVID-19 AG HOME TEST Kit  Generic drug:  COVID-19 antigen test  AS DIRECTED     fluticasone propionate 50 mcg/actuation nasal spray  Commonly known as: FLONASE  2 sprays (100 mcg total) by Each Nostril route once daily.     furosemide 40 MG tablet  Commonly known as: LASIX  TAKE ONE TABLET BY MOUTH ONCE A DAY AS NEEDED     losartan-hydrochlorothiazide 100-25 mg 100-25 mg per tablet  Commonly known as: HYZAAR  TAKE ONE TABLET BY MOUTH ONCE A DAY     pravastatin 40 MG tablet  Commonly known as: PRAVACHOL  TAKE ONE TABLET BY MOUTH DAILY     VITAMIN D ORAL  Take 1,000 Units by mouth once daily.     zolpidem 10 mg Tab  Commonly known as: AMBIEN  TAKE ONE TABLET BY MOUTH AT BEDTIME AS NEEDED Strength: 10 mg            STOP taking these medications      HYDROcodone-acetaminophen  mg per tablet  Commonly known as: NORCO     multivitamin capsule  Replaced by: HIGH POTENCY MULTIVIT (W-IRON) 9 mg iron-400 mcg Tab tablet     pantoprazole 40 MG tablet  Commonly known as: PROTONIX              Miesha Linn PA-C  Neurosurgery  [facility]

## 2023-02-13 ENCOUNTER — PATIENT MESSAGE (OUTPATIENT)
Dept: INTERNAL MEDICINE | Facility: CLINIC | Age: 68
End: 2023-02-13
Payer: MEDICARE

## 2023-02-13 DIAGNOSIS — F41.1 GAD (GENERALIZED ANXIETY DISORDER): ICD-10-CM

## 2023-02-13 RX ORDER — ALPRAZOLAM 1 MG/1
1 TABLET ORAL NIGHTLY PRN
Qty: 30 TABLET | Refills: 5 | Status: SHIPPED | OUTPATIENT
Start: 2023-02-13 | End: 2023-09-05 | Stop reason: SDUPTHER

## 2023-02-13 NOTE — TELEPHONE ENCOUNTER
No new care gaps identified.  [facility] Embedded Care Gaps. Reference number: [id number]. 2/13/2023   1:08:08 PM CST

## 2023-02-14 ENCOUNTER — OFFICE VISIT (OUTPATIENT)
Dept: NEUROSURGERY | Facility: CLINIC | Age: 68
End: 2023-02-14
Payer: MEDICARE

## 2023-02-14 ENCOUNTER — HOSPITAL ENCOUNTER (OUTPATIENT)
Dept: RADIOLOGY | Facility: HOSPITAL | Age: 68
Discharge: HOME | End: 2023-02-14
Attending: PHYSICIAN ASSISTANT
Payer: MEDICARE

## 2023-02-14 VITALS
SYSTOLIC BLOOD PRESSURE: 137 MMHG | RESPIRATION RATE: 18 BRPM | HEART RATE: 80 BPM | BODY MASS INDEX: 30.79 KG/M2 | WEIGHT: 196.19 LBS | HEIGHT: 67 IN | DIASTOLIC BLOOD PRESSURE: 78 MMHG

## 2023-02-14 DIAGNOSIS — Z48.89 ENCOUNTER FOR POSTOPERATIVE WOUND CHECK: ICD-10-CM

## 2023-02-14 DIAGNOSIS — M54.9 DORSALGIA, UNSPECIFIED: ICD-10-CM

## 2023-02-14 DIAGNOSIS — Z48.02: ICD-10-CM

## 2023-02-14 DIAGNOSIS — Z98.1 S/P LUMBAR FUSION: Primary | ICD-10-CM

## 2023-02-14 PROCEDURE — 99213 OFFICE O/P EST LOW 20 MIN: CPT | Mod: PBBFAC,PO | Performed by: PHYSICIAN ASSISTANT

## 2023-02-14 PROCEDURE — 72100 XR LUMBAR SPINE AP AND LATERAL: ICD-10-PCS | Mod: 26,,, | Performed by: RADIOLOGY

## 2023-02-14 PROCEDURE — 99024 POSTOP FOLLOW-UP VISIT: CPT | Mod: POP,,, | Performed by: PHYSICIAN ASSISTANT

## 2023-02-14 PROCEDURE — 99999 PR PBB SHADOW E&M-EST. PATIENT-LVL III: CPT | Mod: PBBFAC,,, | Performed by: PHYSICIAN ASSISTANT

## 2023-02-14 PROCEDURE — 99999 PR PBB SHADOW E&M-EST. PATIENT-LVL III: ICD-10-PCS | Mod: PBBFAC,,, | Performed by: PHYSICIAN ASSISTANT

## 2023-02-14 PROCEDURE — 99024 PR POST-OP FOLLOW-UP VISIT: ICD-10-PCS | Mod: POP,,, | Performed by: PHYSICIAN ASSISTANT

## 2023-02-14 PROCEDURE — 72100 X-RAY EXAM L-S SPINE 2/3 VWS: CPT | Mod: 26,,, | Performed by: RADIOLOGY

## 2023-02-14 PROCEDURE — 72100 X-RAY EXAM L-S SPINE 2/3 VWS: CPT | Mod: TC

## 2023-02-14 RX ORDER — OXYCODONE AND ACETAMINOPHEN 10; 325 MG/1; MG/1
1 TABLET ORAL EVERY 8 HOURS PRN
Qty: 60 TABLET | Refills: 0 | Status: SHIPPED | OUTPATIENT
Start: 2023-02-14 | End: 2023-03-14 | Stop reason: SDUPTHER

## 2023-02-14 NOTE — PROGRESS NOTES
Subjective:      Patient ID: Mary Vo is a 67 y.o. female.    Chief Complaint: Post-op Evaluation    HPI  The patient presents to clinic for postop evaluation #1.  She does not report back pain.   The only thing she c/o is numbness and slight discomfort radiating from L hip to calf. This only started 3 days ago.  She denies HA, dizziness, chest pain and shortness of breath.  NO issues urinating.    Date of Procedure: 2/1/2023    Procedure: Procedure(s) (LRB):  FUSION, SPINE, LUMBAR, TLIF, USING COMPUTER-ASSISTED NAVIGATION (Bilateral)  APPLICATION, ACELLULAR HUMAN DERMAL ALLOGRAFT (N/A)     Operative Findings (including complications, if any):   DDD with stenosis L3-5/ DDD with herniated disc L5-S1   Description of Technical Procedures:   TLIF L3-S1      Objective:            General    Constitutional: She is oriented to person, place, and time. She appears well-developed and well-nourished.   Cardiovascular:  Normal rate and regular rhythm.            Pulmonary/Chest: Effort normal.   Abdominal: Soft.   Neurological: She is alert and oriented to person, place, and time.   Psychiatric: She has a normal mood and affect. Her behavior is normal.           Neurosurgery exam:  Vitals reviewed  Moves all 4 ext well  Incision CDI  No erythema, swelling or fluctuance  STaples removed w/out issue                Assessment:       Encounter Diagnoses   Name Primary?    Encounter for postoperative wound check     Encounter for removal of staples     S/P lumbar fusion Yes    Dorsalgia, unspecified           Plan:       Mary was seen today for post-op evaluation.    Diagnoses and all orders for this visit:    S/P lumbar fusion    Encounter for postoperative wound check    Encounter for removal of staples    Dorsalgia, unspecified  -     X-Ray Lumbar Spine Ap And Lateral; Future  -     X-Ray Lumbar Spine Ap And Lateral; Future    Other orders  -     oxyCODONE-acetaminophen (PERCOCET)  mg per tablet; Take 1  tablet by mouth every 8 (eight) hours as needed for Pain.          Continue LSO.  X-rays ordered on the way out and again in 5 weeks whenever she sees Dr. Fernando for PO Evaluation 2.  Rx given today for Percocet.  Please reach out with any changes or concerns.        Miesha Linn PA-C

## 2023-02-16 ENCOUNTER — PATIENT MESSAGE (OUTPATIENT)
Dept: NEUROSURGERY | Facility: CLINIC | Age: 68
End: 2023-02-16
Payer: MEDICARE

## 2023-02-17 ENCOUNTER — TELEPHONE (OUTPATIENT)
Dept: SURGERY | Facility: CLINIC | Age: 68
End: 2023-02-17
Payer: MEDICARE

## 2023-02-17 ENCOUNTER — PATIENT MESSAGE (OUTPATIENT)
Dept: NEUROSURGERY | Facility: CLINIC | Age: 68
End: 2023-02-17
Payer: MEDICARE

## 2023-02-17 ENCOUNTER — NURSE TRIAGE (OUTPATIENT)
Dept: ADMINISTRATIVE | Facility: CLINIC | Age: 68
End: 2023-02-17
Payer: MEDICARE

## 2023-02-17 ENCOUNTER — PATIENT MESSAGE (OUTPATIENT)
Dept: SURGERY | Facility: CLINIC | Age: 68
End: 2023-02-17
Payer: MEDICARE

## 2023-02-17 ENCOUNTER — TELEPHONE (OUTPATIENT)
Dept: NEUROSURGERY | Facility: CLINIC | Age: 68
End: 2023-02-17
Payer: MEDICARE

## 2023-02-17 ENCOUNTER — TELEPHONE (OUTPATIENT)
Dept: INTERNAL MEDICINE | Facility: CLINIC | Age: 68
End: 2023-02-17
Payer: MEDICARE

## 2023-02-17 DIAGNOSIS — Z98.1 S/P LUMBAR FUSION: Primary | ICD-10-CM

## 2023-02-17 DIAGNOSIS — M79.652 PAIN IN LEFT THIGH: ICD-10-CM

## 2023-02-17 DIAGNOSIS — R53.83 FATIGUE, UNSPECIFIED TYPE: ICD-10-CM

## 2023-02-17 RX ORDER — METHYLPREDNISOLONE 4 MG/1
TABLET ORAL
Qty: 21 TABLET | Refills: 0 | Status: SHIPPED | OUTPATIENT
Start: 2023-02-17 | End: 2023-04-03

## 2023-02-17 RX ORDER — PREGABALIN 100 MG/1
100 CAPSULE ORAL 2 TIMES DAILY
Qty: 60 CAPSULE | Refills: 0 | Status: SHIPPED | OUTPATIENT
Start: 2023-02-17 | End: 2023-03-14 | Stop reason: SDUPTHER

## 2023-02-17 NOTE — TELEPHONE ENCOUNTER
Mrs Hernandez stated that she has been having leg pain for 3 days and feel  she may have a blood clot.  Patient advised to seek medical attention for concerns of blood clot and leg pain.  She has recently had surgery and was waiting for the doctor office to call back.

## 2023-02-17 NOTE — TELEPHONE ENCOUNTER
----- Message from Cornel Briceño sent at 2/17/2023  7:19 AM CST -----  Contact: [phone]  Type:  Sooner Apoointment Request    Caller is requesting a sooner appointment.  Caller declined first available appointment listed below.  Caller will not accept being placed on the waitlist and is requesting a message be sent to doctor.  Name of Caller:Mary   When is the first available appointment?2/21   Symptoms:hospital f/u   Would the patient rather a call back or a response via [facility]? Call back   Best Call Back Number:[phone]  Additional Information:

## 2023-02-17 NOTE — TELEPHONE ENCOUNTER
Called pt dicussed symptoms.   She feels worn out- fatigued.  Denies shortness of breath, chest pain, leg swelling, erythema and warmth.  Back pain is controlled.  Does c/o pain and nerve irritation with LLE.    Offered US today but she declined and wants to wait until Monday.   Informed pt we will order testing and Rx will be sent to pharmacy for Lyrica.  If symptoms worsen, she will report to ED.

## 2023-02-18 NOTE — TELEPHONE ENCOUNTER
Patient is calling with regarding needing steroids that she thought was going to be called in for her.   I reviewed the chart and RAUDEL Linn mentioned that steroids were declined by patient in office and Lyrica was going to be called in. However with patient c/o 9/10 pain scale discomfort she will call in the steroids but questioned her starting them at night. Also if patient pain is excruciating and not being relieved and/or if she has SOB or chest pain she should go to the ED. I called back to patient and related the message. Patient has voiced understanding.  Reason for Disposition   [1] Caller has URGENT medicine question about med that PCP or specialist prescribed AND [2] triager unable to answer question    Additional Information   Negative: [1] Intentional drug overdose AND [2] suicidal thoughts or ideas   Negative: Drug overdose and triager unable to answer question   Negative: Caller requesting a renewal or refill of a medicine patient is currently taking   Negative: Caller requesting information unrelated to medicine   Negative: Caller requesting information about COVID-19 Vaccine   Negative: Caller requesting information about Emergency Contraception   Negative: Caller requesting information about Combined Birth Control Pills   Negative: Caller requesting information about Progestin Birth Control Pills   Negative: Caller requesting information about Post-Op pain or medicines   Negative: Caller requesting a prescription antibiotic (such as Penicillin) for Strep throat and has a positive culture result   Negative: Caller requesting a prescription anti-viral med (such as Tamiflu) and has influenza (flu) symptoms   Negative: Immunization reaction suspected   Negative: Rash while taking a medicine or within 3 days of stopping it   Negative: [1] Asthma and [2] having symptoms of asthma (cough, wheezing, etc.)   Negative: [1] Symptom of illness (e.g., headache, abdominal pain, earache, vomiting) AND [2] more  than mild   Negative: Breastfeeding questions about mother's medicines and diet   Negative: MORE THAN A DOUBLE DOSE of a prescription or over-the-counter (OTC) drug   Negative: [1] DOUBLE DOSE (an extra dose or lesser amount) of prescription drug AND [2] any symptoms (e.g., dizziness, nausea, pain, sleepiness)   Negative: [1] DOUBLE DOSE (an extra dose or lesser amount) of over-the-counter (OTC) drug AND [2] any symptoms (e.g., dizziness, nausea, pain, sleepiness)   Negative: Took another person's prescription drug   Negative: [1] DOUBLE DOSE (an extra dose or lesser amount) of prescription drug AND [2] NO symptoms (Exception: a double dose of antibiotics)   Negative: Diabetes drug error or overdose (e.g., took wrong type of insulin or took extra dose)   Negative: [1] Prescription not at pharmacy AND [2] was prescribed by PCP recently (Exception: triager has access to EMR and prescription is recorded there. Go to Home Care and confirm for pharmacy.)   Negative: [1] Pharmacy calling with prescription question AND [2] triager unable to answer question    Protocols used: Medication Question Call-A-

## 2023-02-18 NOTE — TELEPHONE ENCOUNTER
Received phone call from on-call nurse ([facility]) regarding patient's request for steroids.   I discussed steroids with her earlier this week but she declined and now wants Rx sent tonight.     I informed triage nurse that I will send in the medication tonight but if pt feels worse to please report to ED.   I offered pt to have testing today but she declined. She insisted on waiting until Monday to do everything at [place].    Rx sent today just after 9:00 PM.    Miesha Linn PA-C

## 2023-02-22 ENCOUNTER — PATIENT MESSAGE (OUTPATIENT)
Dept: NEUROSURGERY | Facility: CLINIC | Age: 68
End: 2023-02-22
Payer: MEDICARE

## 2023-02-28 ENCOUNTER — PATIENT MESSAGE (OUTPATIENT)
Dept: INTERNAL MEDICINE | Facility: CLINIC | Age: 68
End: 2023-02-28
Payer: MEDICARE

## 2023-02-28 DIAGNOSIS — E78.5 HYPERLIPIDEMIA, UNSPECIFIED HYPERLIPIDEMIA TYPE: ICD-10-CM

## 2023-02-28 RX ORDER — LOSARTAN POTASSIUM AND HYDROCHLOROTHIAZIDE 25; 100 MG/1; MG/1
1 TABLET ORAL DAILY
Qty: 90 TABLET | Refills: 3 | Status: SHIPPED | OUTPATIENT
Start: 2023-02-28 | End: 2023-04-03 | Stop reason: SDUPTHER

## 2023-02-28 RX ORDER — CLONIDINE HYDROCHLORIDE 0.2 MG/1
0.2 TABLET ORAL NIGHTLY
Qty: 90 TABLET | Refills: 3 | Status: SHIPPED | OUTPATIENT
Start: 2023-02-28 | End: 2024-02-21

## 2023-02-28 RX ORDER — PRAVASTATIN SODIUM 40 MG/1
40 TABLET ORAL DAILY
Qty: 90 TABLET | Refills: 3 | Status: SHIPPED | OUTPATIENT
Start: 2023-02-28 | End: 2023-04-03 | Stop reason: SDUPTHER

## 2023-02-28 NOTE — TELEPHONE ENCOUNTER
Last Office Visit: 12/28/2022   Next Office Visit: 04/3/2023      Pt also requesting refill on Citalopram, it's not allowing me to pend request.

## 2023-02-28 NOTE — TELEPHONE ENCOUNTER
No new care gaps identified.  [facility] Embedded Care Gaps. Reference number: [id number]. 2/28/2023   1:57:04 PM CST

## 2023-03-02 ENCOUNTER — PATIENT MESSAGE (OUTPATIENT)
Dept: INTERNAL MEDICINE | Facility: CLINIC | Age: 68
End: 2023-03-02
Payer: MEDICARE

## 2023-03-02 RX ORDER — CITALOPRAM 40 MG/1
40 TABLET, FILM COATED ORAL DAILY
Qty: 90 TABLET | Refills: 3 | Status: SHIPPED | OUTPATIENT
Start: 2023-03-02 | End: 2024-02-21

## 2023-03-02 NOTE — TELEPHONE ENCOUNTER
No new care gaps identified.  [facility] Embedded Care Gaps. Reference number: [id number]. 3/02/2023   11:00:48 AM CST

## 2023-03-10 ENCOUNTER — EXTERNAL HOME HEALTH (OUTPATIENT)
Dept: HOME HEALTH SERVICES | Facility: HOSPITAL | Age: 68
End: 2023-03-10
Payer: MEDICARE

## 2023-03-14 ENCOUNTER — PATIENT MESSAGE (OUTPATIENT)
Dept: NEUROSURGERY | Facility: CLINIC | Age: 68
End: 2023-03-14

## 2023-03-14 ENCOUNTER — OFFICE VISIT (OUTPATIENT)
Dept: NEUROSURGERY | Facility: CLINIC | Age: 68
End: 2023-03-14
Payer: MEDICARE

## 2023-03-14 ENCOUNTER — HOSPITAL ENCOUNTER (OUTPATIENT)
Dept: RADIOLOGY | Facility: HOSPITAL | Age: 68
Discharge: HOME | End: 2023-03-14
Attending: PHYSICIAN ASSISTANT
Payer: MEDICARE

## 2023-03-14 VITALS
BODY MASS INDEX: 32.18 KG/M2 | SYSTOLIC BLOOD PRESSURE: 117 MMHG | WEIGHT: 205 LBS | HEIGHT: 67 IN | DIASTOLIC BLOOD PRESSURE: 69 MMHG | RESPIRATION RATE: 18 BRPM | HEART RATE: 49 BPM

## 2023-03-14 DIAGNOSIS — Z98.1 S/P LUMBAR FUSION: Primary | ICD-10-CM

## 2023-03-14 DIAGNOSIS — M54.9 DORSALGIA, UNSPECIFIED: ICD-10-CM

## 2023-03-14 PROCEDURE — 72100 X-RAY EXAM L-S SPINE 2/3 VWS: CPT | Mod: 26,,, | Performed by: RADIOLOGY

## 2023-03-14 PROCEDURE — 99024 PR POST-OP FOLLOW-UP VISIT: ICD-10-PCS | Mod: S$PBB,,,

## 2023-03-14 PROCEDURE — 99999 PR PBB SHADOW E&M-EST. PATIENT-LVL III: CPT | Mod: PBBFAC,,,

## 2023-03-14 PROCEDURE — 99024 POSTOP FOLLOW-UP VISIT: CPT | Mod: S$PBB,,,

## 2023-03-14 PROCEDURE — 72100 X-RAY EXAM L-S SPINE 2/3 VWS: CPT | Mod: TC

## 2023-03-14 PROCEDURE — 99213 OFFICE O/P EST LOW 20 MIN: CPT | Mod: PBBFAC

## 2023-03-14 PROCEDURE — 99999 PR PBB SHADOW E&M-EST. PATIENT-LVL III: ICD-10-PCS | Mod: PBBFAC,,,

## 2023-03-14 PROCEDURE — 72100 XR LUMBAR SPINE AP AND LATERAL: ICD-10-PCS | Mod: 26,,, | Performed by: RADIOLOGY

## 2023-03-14 RX ORDER — PREGABALIN 100 MG/1
100 CAPSULE ORAL 2 TIMES DAILY
Qty: 60 CAPSULE | Refills: 0 | Status: SHIPPED | OUTPATIENT
Start: 2023-03-14 | End: 2023-05-16 | Stop reason: SDUPTHER

## 2023-03-14 RX ORDER — OXYCODONE AND ACETAMINOPHEN 10; 325 MG/1; MG/1
1 TABLET ORAL EVERY 8 HOURS PRN
Qty: 60 TABLET | Refills: 0 | Status: SHIPPED | OUTPATIENT
Start: 2023-03-14 | End: 2023-04-14 | Stop reason: SDUPTHER

## 2023-03-14 NOTE — PROGRESS NOTES
Subjective:      Patient ID: Mary Vo is a 67 y.o. female.    HPI  Patient presents today for second post op visit.  She states the 3/10 pain in her back is only noticeable towards the end of the day after lots of movement.  Pt not wearing lso brace at this apointment but says she does  wear the brace at home sometimes and feels it helps. She states she was not sure when she needed to wear the brace.  She reports a new tingling and numbness on the left leg when she does not take her lyrica. Took a steroid pack prescribed at last visit but it did not help the pain.  She reports a history of overall weakness in her left leg prior to surgery that she contributes to her knee.     She is currently taking lyrica and percocet. Would like refills.  She denies HA, dizziness, chest pain and shortness of breath.  She states her home health PT recently ended and she is going to do the exercises on her own at home.  She denies any falls post op.  She denies any issues with her incision site healing.      Postop Visit #1:  The patient presents to clinic for postop evaluation #1.  She does not report back pain.   The only thing she c/o is numbness and slight discomfort radiating from L hip to calf. This only started 3 days ago.  She denies HA, dizziness, chest pain and shortness of breath.  NO issues urinating.     Date of Procedure: 2/1/2023    Procedure: Procedure(s) (LRB):  FUSION, SPINE, LUMBAR, TLIF, USING COMPUTER-ASSISTED NAVIGATION (Bilateral)  APPLICATION, ACELLULAR HUMAN DERMAL ALLOGRAFT (N/A)     Operative Findings (including complications, if any):   DDD with stenosis L3-5/ DDD with herniated disc L5-S1   Description of Technical Procedures:   TLIF L3-S1    Objective:     Body mass index is 32.11 kg/m².  Vitals:    03/14/23 1140   BP: 117/69   Pulse: (!) 49   Resp: 18        Neurosurgery Physical Exam  Vitals and labs reviewed  Moves all 4 ext well  Wound/Incision: clean, dry, intact, no drainage.   There is  no swelling, erythema or fluctuance.        Motor   Right Right Left Left  Level Group   5  5  L2 Hip flexor (Psoas)   5  5  L3 Leg extension (Quads)   5  5  L4 Dorsiflexion & foot inversion (Tibialis Anterior)   5  5  L5 Great toe extension ( EHL)   5  5  S1 Foot eversion (Gastroc, PL & PB)     Sensation  NL Decreased (R/L/BL) Level Sensation    X  L2 Anterio-medial thigh   X  L3 Medial thigh around knee   X  L4 Medial foot   X  L5 Dorsum foot   X  S1 Lateral foot           Results for orders placed during the hospital encounter of 08/03/22    MRI Lumbar Spine Without Contrast    Narrative  EXAMINATION:  MRI LUMBAR SPINE WITHOUT CONTRAST    CLINICAL HISTORY:  Low back pain, progressive neurologic deficit; Dorsalgia, unspecified    TECHNIQUE:  Multiplanar, multisequence MR images were acquired from the thoracolumbar junction to the sacrum without the administration of contrast.    COMPARISON:  10/13/2010    FINDINGS:  Alignment: Vertebral body heights are well maintained.    Vertebrae: Normal marrow signal. No fracture.    Discs: Multilevel disc desiccation is present.    Cord: No abnormal signal within the cord.  Conus terminates at L1    Degenerative findings:    T12-L1: Disc bulge and facet arthropathy with mild spinal canal stenosis without neural foraminal narrowing.    L1-L2: Disc bulge and facet arthropathy with mild left neural foraminal narrowing without right neural foraminal narrowing or spinal canal stenosis.    L2-L3: Disc bulge and facet arthropathy contributes to moderate left and mild right neural foraminal narrowing and mild spinal canal stenosis.    L3-L4: Disc bulge and facet arthropathy contributes to severe right and moderate left neural foraminal narrowing and severe spinal canal stenosis with clumping/compression of the cauda martina nerve roots, progressed in the interval.    L4-L5: Disc bulge and facet arthropathy and redundancy of ligamentum flavum contributes to severe spinal canal stenosis  with clumping/compression of the cauda martina nerve roots, similar to prior study severe bilateral neural foraminal narrowing.    L5-S1: Disc bulge and facet arthropathy a with mild bilateral neural foraminal narrowing and mild spinal canal stenosis with narrowing of the left lateral recess with compression of the traversing left S1 nerve root    Paraspinal muscles & soft tissues: Unremarkable.    Impression  Severe multilevel multifactorial degenerative changes are present greatest at L4-L5 and L3-L4 followed by L5-S1.  Findings are progressed overall.      Electronically signed by: Jasiel Marques  Date:    08/03/2022  Time:    17:18     No results found for this or any previous visit.    Results for orders placed during the hospital encounter of 03/14/23    X-Ray Lumbar Spine Ap And Lateral    Narrative  EXAMINATION:  XR LUMBAR SPINE AP AND LATERAL    CLINICAL HISTORY:  Low back pain, no red flags, no prior management;Low back pain, progressive neurologic deficit;Dorsalgia, unspecified    TECHNIQUE:  AP, lateral and spot images were performed of the lumbar spine.    COMPARISON:  02/14/2023    FINDINGS:  The vertebral bodies demonstrate a normal height and alignment.  There are postoperative changes noted at the L3 through S1 levels.  No hardware failure or loosening noted.  Mild disc space narrowing seen at the L2-3 level.  No significant facet arthropathy suggested.  Surgical clips noted in the right upper quadrant of the abdomen.    Impression  1.  As above      Electronically signed by: Gaetano Slater DO  Date:    03/14/2023  Time:    11:32            Lab Results   Component Value Date    WBC 11.47 02/02/2023    HCT 32.7 (L) 02/02/2023           INDEPENDENT INTERPRETATION OF TEST:    Relevant imaging results reviewed and interpreted by me, discussed with the patient and / or family today.  Assessment:     1. S/P lumbar fusion      Plan:     S/P lumbar fusion  -     X-Ray Lumbar Spine AP And Lateral; Future;  Expected date: 03/14/2023    Other orders  -     pregabalin (LYRICA) 100 MG capsule; Take 1 capsule (100 mg total) by mouth 2 (two) times daily.  Dispense: 60 capsule; Refill: 0  -     oxyCODONE-acetaminophen (PERCOCET)  mg per tablet; Take 1 tablet by mouth every 8 (eight) hours as needed for Pain.  Dispense: 60 tablet; Refill: 0        -reviewed xrays taken today with the patient. will get repeat xrays at 3 mo follow up.  -refilled lyrica and percocet.  -discussed with patient her activity level and that is ok to walk down her block. Also discussed that she can wear her lso brace as much as she wants at home especially with light exercise and driving.  -will follow up with patient at 3 mo follow up.  -let patient know to reach out with any concerns between now and then        Annelise Cage PA-C  Neuro[facility]

## 2023-03-14 NOTE — PROGRESS NOTES
NUTRITION NOTE    Referring Physician: Dr. Hampton  Reason for MNT Referral: Follow-up 7 months s/p Gastric Sleeve (8-)    PAST MEDICAL HISTORY:    Denies nausea, vomiting, constipation, and diarrhea.  Reports doing well.    Overall satisfied with weight loss progress but questions if she is losing enough weight.  Weight loss currently not stalled.     Reports having back surgery approximately 6 weeks ago (February 2023).     Current intake:  9am Breakfast: yogurt  11am: protein shake   2:30pm: greens + baked chicken  Shake  Bar    Questions if she can have in another meal.     Goal: 175 lbs       Past Medical History:   Diagnosis Date    Breast cancer 1996    right    Chronic pain syndrome     Generalized osteoarthrosis, involving multiple sites     s/p THR bilateral    History of breast cancer 2007    lumpectomy/XRT    HTN (hypertension)     Hyperlipidemia     Morbid obesity with BMI of 40.0-44.9, adult        CLINICAL DATA:  67 y.o. female.    There were no vitals filed for this visit.    Current Weight: 202 lbs   12-: 215.1 lbs              11-9-2022: 220 lbs              9-: 239 lbs              Surgery weight: 247 lbs              MSD 3, 10-: 261.36 lbs                   MSD 2, 9-: 260 lbs (weight obtained on 9-2-2021)              MSD 1, 8-: 263 lbs (weight obtained on 8-)  BMI: 31.66  Total Weight Loss: -45 lbs since surgery; -61 lbs since August 2021      LABS:  Not available    CURRENT DIET:  Bariatric Diet.  Diet Recall: adequate fluids and protein intake    Meal Pattern: 5 small meals daily with 2 options being a protein supplement (1 bar, 1 shake)  Adequate protein supplement intake.  Adequate dairy intake.  Adequate vegetable intake. Tolerates raw vegetables and lettuce.  Adequate fruit intake.      EXERCISE:  None.    Restrictions to Exercise:  recent back surgery    VITAMINS / MINERALS:  Reports adequate intake following guidelines in nutrition  booklet    ASSESSMENT:  Doing well overall.  Weight loss.  Adequate calorie intake.  Adequate protein intake.  Adequate fluid intake.  Following diet appropriately.  Not exercising. Due to back surgery.   Adequate vitamins & minerals.    BARIATRIC DIET DISCUSSION:  Instructed and provided written materials on bariatric diet plan.  Reinforced post-op nutrition guidelines.    PLAN / RECOMMENDATIONS:  May begin to incorporate raw vegetables, lettuce, unsalted nuts, and light popcorn as tolerated.  May begin to swallow whole pills as tolerated.  Continue excellent diet plan.  Adjust diet by: including nighttime meal, discussed choices.  Maintain protein intake.  Maintain fluid intake.  Increase exercise once cleared from surgery   Continue appropriate vitamins & minerals.      Return to clinic in 3 months.    SESSION TIME: 45 minutes

## 2023-03-15 ENCOUNTER — OFFICE VISIT (OUTPATIENT)
Dept: SURGERY | Facility: CLINIC | Age: 68
End: 2023-03-15
Payer: MEDICARE

## 2023-03-15 ENCOUNTER — NUTRITION (OUTPATIENT)
Dept: INTERNAL MEDICINE | Facility: CLINIC | Age: 68
End: 2023-03-15
Payer: MEDICARE

## 2023-03-15 ENCOUNTER — LAB VISIT (OUTPATIENT)
Dept: LAB | Facility: HOSPITAL | Age: 68
End: 2023-03-15
Payer: MEDICARE

## 2023-03-15 VITALS
WEIGHT: 202.19 LBS | BODY MASS INDEX: 31.73 KG/M2 | HEIGHT: 67 IN | HEART RATE: 48 BPM | SYSTOLIC BLOOD PRESSURE: 111 MMHG | DIASTOLIC BLOOD PRESSURE: 69 MMHG

## 2023-03-15 DIAGNOSIS — Z98.84 STATUS POST BARIATRIC SURGERY: Primary | ICD-10-CM

## 2023-03-15 DIAGNOSIS — Z71.3 DIETARY COUNSELING: ICD-10-CM

## 2023-03-15 DIAGNOSIS — E66.9 OBESITY (BMI 30-39.9): Primary | ICD-10-CM

## 2023-03-15 DIAGNOSIS — E66.9 OBESITY (BMI 30-39.9): ICD-10-CM

## 2023-03-15 LAB
BASOPHILS # BLD AUTO: 0.07 K/UL (ref 0–0.2)
BASOPHILS NFR BLD: 1.2 % (ref 0–1.9)
DIFFERENTIAL METHOD: ABNORMAL
EOSINOPHIL # BLD AUTO: 0.1 K/UL (ref 0–0.5)
EOSINOPHIL NFR BLD: 2.2 % (ref 0–8)
ERYTHROCYTE [DISTWIDTH] IN BLOOD BY AUTOMATED COUNT: 13.8 % (ref 11.5–14.5)
HCT VFR BLD AUTO: 40.7 % (ref 37–48.5)
HGB BLD-MCNC: 12.3 G/DL (ref 12–16)
IMM GRANULOCYTES # BLD AUTO: 0.01 K/UL (ref 0–0.04)
IMM GRANULOCYTES NFR BLD AUTO: 0.2 % (ref 0–0.5)
LYMPHOCYTES # BLD AUTO: 2.2 K/UL (ref 1–4.8)
LYMPHOCYTES NFR BLD: 37.5 % (ref 18–48)
MCH RBC QN AUTO: 28.6 PG (ref 27–31)
MCHC RBC AUTO-ENTMCNC: 30.2 G/DL (ref 32–36)
MCV RBC AUTO: 95 FL (ref 82–98)
MONOCYTES # BLD AUTO: 0.5 K/UL (ref 0.3–1)
MONOCYTES NFR BLD: 8.1 % (ref 4–15)
NEUTROPHILS # BLD AUTO: 3 K/UL (ref 1.8–7.7)
NEUTROPHILS NFR BLD: 50.8 % (ref 38–73)
NRBC BLD-RTO: 0 /100 WBC
PLATELET # BLD AUTO: 249 K/UL (ref 150–450)
PMV BLD AUTO: 11.8 FL (ref 9.2–12.9)
RBC # BLD AUTO: 4.3 M/UL (ref 4–5.4)
WBC # BLD AUTO: 5.9 K/UL (ref 3.9–12.7)

## 2023-03-15 PROCEDURE — 97803 MED NUTRITION INDIV SUBSEQ: CPT | Mod: S$GLB,,, | Performed by: DIETITIAN, REGISTERED

## 2023-03-15 PROCEDURE — 97803 PR MED NUTR THER, SUBSQ, INDIV, EA 15 MIN: ICD-10-PCS | Mod: S$GLB,,, | Performed by: DIETITIAN, REGISTERED

## 2023-03-15 PROCEDURE — 99999 PR PBB SHADOW E&M-EST. PATIENT-LVL III: CPT | Mod: PBBFAC,,,

## 2023-03-15 PROCEDURE — 80053 COMPREHEN METABOLIC PANEL: CPT

## 2023-03-15 PROCEDURE — 83036 HEMOGLOBIN GLYCOSYLATED A1C: CPT

## 2023-03-15 PROCEDURE — 36415 COLL VENOUS BLD VENIPUNCTURE: CPT

## 2023-03-15 PROCEDURE — 99213 OFFICE O/P EST LOW 20 MIN: CPT | Mod: S$PBB,,,

## 2023-03-15 PROCEDURE — 99213 PR OFFICE/OUTPT VISIT, EST, LEVL III, 20-29 MIN: ICD-10-PCS | Mod: S$PBB,,,

## 2023-03-15 PROCEDURE — 99999 PR PBB SHADOW E&M-EST. PATIENT-LVL III: ICD-10-PCS | Mod: PBBFAC,,,

## 2023-03-15 PROCEDURE — 84439 ASSAY OF FREE THYROXINE: CPT

## 2023-03-15 PROCEDURE — 84425 ASSAY OF VITAMIN B-1: CPT

## 2023-03-15 PROCEDURE — 85025 COMPLETE CBC W/AUTO DIFF WBC: CPT

## 2023-03-15 PROCEDURE — 99213 OFFICE O/P EST LOW 20 MIN: CPT | Mod: PBBFAC

## 2023-03-15 PROCEDURE — 84443 ASSAY THYROID STIM HORMONE: CPT

## 2023-03-15 PROCEDURE — 84466 ASSAY OF TRANSFERRIN: CPT

## 2023-03-15 PROCEDURE — 82652 VIT D 1 25-DIHYDROXY: CPT

## 2023-03-15 PROCEDURE — 82607 VITAMIN B-12: CPT

## 2023-03-15 PROCEDURE — 84134 ASSAY OF PREALBUMIN: CPT

## 2023-03-15 NOTE — PROGRESS NOTES
BARIATRIC PATIENT EVALUATION    CHIEF COMPLAINT:   morbid obesity with a BMI of 42 and inability to lose weight.    HISTORY OF PRESENT ILLNESS:  Mary Vo is a 67 y.o.-year-old female presents for bariatric follow post-op s/p sleeve gastrectomy on 08/22/2022.  She recently underwent back surgery with Dr. Fernando.  She is 6 weeks out from that.  She is continued to lose weight since the last visit and feels great.  She seemed Triny later today.  She is feeling well today and denies fevers, chills, nausea, and vomiting.     Initially presenting for morbid obesity with a BMI of 42 and inability to lose weight. The patient has tried exercising which she enjoys as well as different diets but struggling to lose any further weight.  She has severe arthritis that is started to limit her mobility..    Height 5 ft 6 in  Weight 263 lb --> 239 --> 217--> 202 lbs  BMI 42 --> 35 --> 34--> 31      CO-MORBIDITIES:  dyslipidemia, hypertension and osteoarthritis    PAST MEDICAL HISTORY:  Past Medical History:   Diagnosis Date    Breast cancer 1996    right    Chronic pain syndrome     Generalized osteoarthrosis, involving multiple sites     s/p THR bilateral    History of breast cancer 2007    lumpectomy/XRT    HTN (hypertension)     Hyperlipidemia     Morbid obesity with BMI of 40.0-44.9, adult         PAST SURGICAL HISTORY:  Past Surgical History:   Procedure Laterality Date    ANTERIOR CERVICAL DISCECTOMY W/ FUSION Left 01/03/2022    Procedure: DISCECTOMY, SPINE, CERVICAL, ANTERIOR APPROACH, WITH FUSION;  Surgeon: Pradip Fernando MD;  Location: [facility] OR;  Service: Neurosurgery;  Laterality: Left;  Three Level ACDF  C4/5, 5/6, 6/7      BREAST LUMPECTOMY Right 2006    XRT    CATARACT EXTRACTION W/  INTRAOCULAR LENS IMPLANT Left 01/15/2020    CATARACT EXTRACTION W/  INTRAOCULAR LENS IMPLANT Right 01/29/2020    COLONOSCOPY N/A 10/15/2015    Procedure: COLONOSCOPY;  Surgeon: Maylin Shipley MD;  Location: [facility];   Service: Endoscopy;  Laterality: N/A;    COLONOSCOPY N/A 11/30/2022    Procedure: COLONOSCOPY;  Surgeon: Gary Toussaint MD;  Location: [facility] ENDO;  Service: General;  Laterality: N/A;    EPIDURAL STEROID INJECTION N/A 11/03/2020    Procedure: Lumbar L5/S1 IL KATYA;  Surgeon: Paul Lobato MD;  Location: [facility] PAIN MGT;  Service: Pain Management;  Laterality: N/A;    EPIDURAL STEROID INJECTION INTO CERVICAL SPINE N/A 09/25/2020    Procedure: C7/T1 IL KATYA;  Surgeon: Paul Lobato MD;  Location: [facility] PAIN MGT;  Service: Pain Management;  Laterality: N/A;    EPIDURAL STEROID INJECTION INTO CERVICAL SPINE N/A 10/05/2021    Procedure: C7/T1 IL KATYA with RN IV sedation;  Surgeon: Cynthia Soares MD;  Location: [facility] PAIN MGT;  Service: Pain Management;  Laterality: N/A;    ESOPHAGOGASTRODUODENOSCOPY N/A 11/04/2021    Procedure: ESOPHAGOGASTRODUODENOSCOPY (EGD);  Surgeon: Blas Hampton MD;  Location: [facility];  Service: Endoscopy;  Laterality: N/A;    HYSTERECTOMY      LAPAROSCOPIC LYSIS OF ADHESIONS N/A 8/30/2022    Procedure: LYSIS, ADHESIONS, LAPAROSCOPIC;  Surgeon: Blas Hampton MD;  Location: [facility] OR;  Service: General;  Laterality: N/A;    PLACEMENT OF ACELLULAR HUMAN DERMAL ALLOGRAFT Left 01/03/2022    Procedure: APPLICATION, ACELLULAR HUMAN DERMAL ALLOGRAFT;  Surgeon: Pradip Fernando MD;  Location: [facility] OR;  Service: Neurosurgery;  Laterality: Left;    PLACEMENT OF ACELLULAR HUMAN DERMAL ALLOGRAFT N/A 2/1/2023    Procedure: APPLICATION, ACELLULAR HUMAN DERMAL ALLOGRAFT;  Surgeon: Pradip Fernando MD;  Location: [facility] OR;  Service: Neurosurgery;  Laterality: N/A;    ROBOT-ASSISTED LAPAROSCOPIC SLEEVE GASTRECTOMY USING DA FLORENTINO XI N/A 8/30/2022    Procedure: XI ROBOTIC SLEEVE GASTRECTOMY;  Surgeon: Blas Hampton MD;  Location: [facility] OR;  Service: General;  Laterality: N/A;    TRANSFORAMINAL EPIDURAL INJECTION OF STEROID Left 09/17/2019    Procedure: Left C5/6 TF KATYA w/ RN IV sedation;  Surgeon: Paul Lobato,  MD;  Location: [facility] PAIN MGT;  Service: Pain Management;  Laterality: Left;    TRANSFORAMINAL LUMBAR INTERBODY FUSION (TLIF) USING COMPUTER-ASSISTED NAVIGATION Bilateral 2/1/2023    Procedure: FUSION, SPINE, LUMBAR, TLIF, USING COMPUTER-ASSISTED NAVIGATION;  Surgeon: Pradip Fernando MD;  Location: [facility] OR;  Service: Neurosurgery;  Laterality: Bilateral;  TLIF L3-4/4-5 possibly L5-S1       FAMILY HISTORY:  Family History   Problem Relation Age of Onset    Cancer Mother     Diabetes Mother     Hyperlipidemia Father     Hypertension Father     Breast cancer Sister     Breast cancer Sister     Breast cancer Sister     Breast cancer Sister     Eczema Neg Hx     Lupus Neg Hx     Psoriasis Neg Hx     Melanoma Neg Hx         SOCIAL HISTORY:   reports that she has quit smoking. She has never used smokeless tobacco. She reports that she does not drink alcohol and does not use drugs.     MEDICATIONS:  Current Outpatient Medications on File Prior to Visit   Medication Sig Dispense Refill    ALPRAZolam (XANAX) 1 MG tablet Take 1 tablet (1 mg total) by mouth nightly as needed. 30 tablet 5    citalopram (CELEXA) 40 MG tablet Take 1 tablet (40 mg total) by mouth once daily. 90 tablet 3    cloNIDine (CATAPRES) 0.2 MG tablet Take 1 tablet (0.2 mg total) by mouth every evening. 90 tablet 3    ERGOCALCIFEROL, VITAMIN D2, (VITAMIN D ORAL) Take 1,000 Units by mouth once daily.      FLOWFLEX COVID-19 AG HOME TEST Kit AS DIRECTED      fluticasone propionate (FLONASE) 50 mcg/actuation nasal spray 2 sprays (100 mcg total) by Each Nostril route once daily. 48 g 3    furosemide (LASIX) 40 MG tablet TAKE ONE TABLET BY MOUTH ONCE A DAY AS NEEDED 19 tablet 11    losartan-hydrochlorothiazide 100-25 mg (HYZAAR) 100-25 mg per tablet Take 1 tablet by mouth once daily. 90 tablet 3    methocarbamoL (ROBAXIN) 750 MG Tab Take 1 tablet (750 mg total) by mouth 3 (three) times daily as needed (muscle spasms). 60 tablet 0    multivit-iron-FA-calcium-mins  (HIGH POTENCY MULTIVIT, W-IRON,) 9 mg iron-400 mcg Tab tablet Take 1 tablet by mouth once daily. 30 tablet 1    oxyCODONE-acetaminophen (PERCOCET)  mg per tablet Take 1 tablet by mouth every 8 (eight) hours as needed for Pain. 60 tablet 0    pravastatin (PRAVACHOL) 40 MG tablet Take 1 tablet (40 mg total) by mouth once daily. 90 tablet 3    pregabalin (LYRICA) 100 MG capsule Take 1 capsule (100 mg total) by mouth 2 (two) times daily. 60 capsule 0    zolpidem (AMBIEN) 10 mg Tab TAKE ONE TABLET BY MOUTH AT BEDTIME AS NEEDED Strength: 10 mg 20 tablet 5    gabapentin (NEURONTIN) 300 MG capsule Take 1 capsule (300 mg total) by mouth once daily AND 1 capsule (300 mg total) after lunch AND 2 capsules (600 mg total) every evening. (Patient taking differently: Take 1 capsule (300 mg total) by mouth once daily AND 1 capsule (300 mg total) after lunch AND 2 capsules (600 mg total) every evening.    8/17/22: takes in evening only) 360 capsule 3     No current facility-administered medications on file prior to visit.       Medications have been reviewed.    ALLERGIES:  Review of patient's allergies indicates:  No Known Allergies    Allergies have been reviewed.    ROS:  Review of Systems   Constitutional:  Negative for chills, fatigue, fever and unexpected weight change.   Respiratory:  Negative for cough, shortness of breath, wheezing and stridor.    Cardiovascular:  Negative for chest pain, palpitations and leg swelling.   Gastrointestinal:  Negative for abdominal distention, abdominal pain, constipation, diarrhea, nausea and vomiting.   Genitourinary:  Negative for difficulty urinating, dysuria, frequency, hematuria and urgency.   Skin:  Negative for color change, pallor, rash and wound.   Hematological:  Does not bruise/bleed easily.     PE:  Vitals:    03/15/23 1043   BP: 111/69   Pulse: (!) 48       Physical Exam  Vitals reviewed.   Constitutional:       General: She is not in acute distress.     Appearance: She is  well-developed and normal weight. She is not ill-appearing.   HENT:      Head: Normocephalic and atraumatic.      Right Ear: External ear normal.      Left Ear: External ear normal.   Eyes:      Extraocular Movements: Extraocular movements intact.      Conjunctiva/sclera: Conjunctivae normal.   Cardiovascular:      Rate and Rhythm: Bradycardia present.   Pulmonary:      Effort: Pulmonary effort is normal. No respiratory distress.   Abdominal:      General: There is no distension.      Palpations: Abdomen is soft.      Tenderness: There is no abdominal tenderness.      Comments: Incisions well healed   Musculoskeletal:      Cervical back: Neck supple.   Skin:     General: Skin is warm and dry.   Neurological:      Mental Status: She is alert and oriented to person, place, and time.   Psychiatric:         Behavior: Behavior normal.     EGD:  Impression:            - Normal esophagus.                          - Z-line regular, 38 cm from the incisors.                          - A few gastric polyps. Biopsied.                          - Normal antrum. Biopsied.                          - Normal second portion of the duodenum.    Pathology:  Final Pathologic Diagnosis 1. Stomach, sleeve gastrectomy:   Segment of stomach, lined by gastric oxyntic mucosa, with mild chronic   inactive gastritis.   Helicobacter pylori immunostain is pending and will be reported in an   addendum.  VC      Comment: Interp By Chapincito Whitten M.D., Signed on 09/02/2022 at 13:38   Supplemental Diagnosis Helicobacter pylori immunostain is negative.   IHC controls were reviewed and were adequate.          DIAGNOSIS:  1. Morbid obesity with a BMI of 42 and inability to lose weight.  2. Co-morbidities: dyslipidemia, hypertension and osteoarthritis    PLAN:  - Reinforced bariatric diet/dietician.  - increase activity as able given back surgery.  - nutritional labs today will call with results.  - RTC 6 months, will repeat bariatric labs at that time.      HTN - stable/monitor/antihypertensives  HLD - statin therapy/dietary modifications  Osteoarthritis - weight loss      Joyce Farris PA-C  [facility]    I spent a total of 20 minutes on the day of the visit.This includes face to face time and non-face to face time preparing to see the patient (eg, review of tests), obtaining and/or reviewing separately obtained history, documenting clinical information in the electronic or other health record, independently interpreting results and communicating results to the patient/family/caregiver, or care coordinator.

## 2023-03-16 LAB
ALBUMIN SERPL BCP-MCNC: 4 G/DL (ref 3.5–5.2)
ALP SERPL-CCNC: 124 U/L (ref 55–135)
ALT SERPL W/O P-5'-P-CCNC: 14 U/L (ref 10–44)
ANION GAP SERPL CALC-SCNC: 11 MMOL/L (ref 8–16)
AST SERPL-CCNC: 20 U/L (ref 10–40)
BILIRUB SERPL-MCNC: 0.5 MG/DL (ref 0.1–1)
BUN SERPL-MCNC: 19 MG/DL (ref 8–23)
CALCIUM SERPL-MCNC: 10.1 MG/DL (ref 8.7–10.5)
CHLORIDE SERPL-SCNC: 101 MMOL/L (ref 95–110)
CO2 SERPL-SCNC: 28 MMOL/L (ref 23–29)
CREAT SERPL-MCNC: 0.8 MG/DL (ref 0.5–1.4)
EST. GFR  (NO RACE VARIABLE): >60 ML/MIN/1.73 M^2
ESTIMATED AVG GLUCOSE: 100 MG/DL (ref 68–131)
GLUCOSE SERPL-MCNC: 76 MG/DL (ref 70–110)
HBA1C MFR BLD: 5.1 % (ref 4–5.6)
IRON SERPL-MCNC: 59 UG/DL (ref 30–160)
POTASSIUM SERPL-SCNC: 4.1 MMOL/L (ref 3.5–5.1)
PREALB SERPL-MCNC: 20 MG/DL (ref 20–43)
PROT SERPL-MCNC: 7 G/DL (ref 6–8.4)
SATURATED IRON: 16 % (ref 20–50)
SODIUM SERPL-SCNC: 140 MMOL/L (ref 136–145)
T4 FREE SERPL-MCNC: 0.82 NG/DL (ref 0.71–1.51)
TOTAL IRON BINDING CAPACITY: 371 UG/DL (ref 250–450)
TRANSFERRIN SERPL-MCNC: 251 MG/DL (ref 200–375)
TSH SERPL DL<=0.005 MIU/L-ACNC: 2.51 UIU/ML (ref 0.4–4)
VIT B12 SERPL-MCNC: 599 PG/ML (ref 210–950)

## 2023-03-20 LAB — 1,25(OH)2D3 SERPL-MCNC: 50 PG/ML (ref 20–79)

## 2023-03-22 ENCOUNTER — TELEPHONE (OUTPATIENT)
Dept: NEUROSURGERY | Facility: CLINIC | Age: 68
End: 2023-03-22
Payer: MEDICARE

## 2023-03-22 ENCOUNTER — PATIENT MESSAGE (OUTPATIENT)
Dept: NEUROSURGERY | Facility: CLINIC | Age: 68
End: 2023-03-22
Payer: MEDICARE

## 2023-03-22 LAB — VIT B1 BLD-MCNC: 79 UG/L (ref 38–122)

## 2023-03-22 NOTE — TELEPHONE ENCOUNTER
I spoke with the Pharmacy to clarify Rx..       ----- Message from Tamara Warner sent at 3/22/2023 12:25 PM CDT -----  Contact: Isrraeldolores)-[phone]  Type:  Pharmacy Calling to Clarify an RX    Name of Caller:elvira  Pharmacy Name:  [facility] #[id number] [address] AT [facility] [address]  [address] [facility] [address]  Phone: [phone] Fax: [phone]  Prescription Name:pregabalin (LYRICA) 100 MG capsule  What do they need to clarify?:Needs approval due to patient getting controls from another doctor  Best Call Back Number:[phone]  Additional Information:

## 2023-03-23 ENCOUNTER — PATIENT MESSAGE (OUTPATIENT)
Dept: NEUROSURGERY | Facility: CLINIC | Age: 68
End: 2023-03-23
Payer: MEDICARE

## 2023-03-27 ENCOUNTER — LAB VISIT (OUTPATIENT)
Dept: LAB | Facility: HOSPITAL | Age: 68
End: 2023-03-27
Attending: INTERNAL MEDICINE
Payer: MEDICARE

## 2023-03-27 DIAGNOSIS — I10 PRIMARY HYPERTENSION: ICD-10-CM

## 2023-03-27 LAB
ANION GAP SERPL CALC-SCNC: 7 MMOL/L (ref 8–16)
BUN SERPL-MCNC: 14 MG/DL (ref 8–23)
CALCIUM SERPL-MCNC: 10 MG/DL (ref 8.7–10.5)
CHLORIDE SERPL-SCNC: 104 MMOL/L (ref 95–110)
CHOLEST SERPL-MCNC: 131 MG/DL (ref 120–199)
CHOLEST/HDLC SERPL: 2.6 {RATIO} (ref 2–5)
CO2 SERPL-SCNC: 32 MMOL/L (ref 23–29)
CREAT SERPL-MCNC: 0.8 MG/DL (ref 0.5–1.4)
EST. GFR  (NO RACE VARIABLE): >60 ML/MIN/1.73 M^2
GLUCOSE SERPL-MCNC: 88 MG/DL (ref 70–110)
HDLC SERPL-MCNC: 50 MG/DL (ref 40–75)
HDLC SERPL: 38.2 % (ref 20–50)
LDLC SERPL CALC-MCNC: 62 MG/DL (ref 63–159)
NONHDLC SERPL-MCNC: 81 MG/DL
POTASSIUM SERPL-SCNC: 3.6 MMOL/L (ref 3.5–5.1)
SODIUM SERPL-SCNC: 143 MMOL/L (ref 136–145)
TRIGL SERPL-MCNC: 95 MG/DL (ref 30–150)
TSH SERPL DL<=0.005 MIU/L-ACNC: 2.2 UIU/ML (ref 0.4–4)

## 2023-03-27 PROCEDURE — 84443 ASSAY THYROID STIM HORMONE: CPT | Performed by: INTERNAL MEDICINE

## 2023-03-27 PROCEDURE — 36415 COLL VENOUS BLD VENIPUNCTURE: CPT | Mod: PO | Performed by: INTERNAL MEDICINE

## 2023-03-27 PROCEDURE — 80048 BASIC METABOLIC PNL TOTAL CA: CPT | Performed by: INTERNAL MEDICINE

## 2023-03-27 PROCEDURE — 80061 LIPID PANEL: CPT | Performed by: INTERNAL MEDICINE

## 2023-04-03 ENCOUNTER — OFFICE VISIT (OUTPATIENT)
Dept: INTERNAL MEDICINE | Facility: CLINIC | Age: 68
End: 2023-04-03
Payer: MEDICARE

## 2023-04-03 VITALS
WEIGHT: 195.56 LBS | SYSTOLIC BLOOD PRESSURE: 110 MMHG | OXYGEN SATURATION: 98 % | DIASTOLIC BLOOD PRESSURE: 60 MMHG | HEIGHT: 67 IN | BODY MASS INDEX: 30.69 KG/M2 | HEART RATE: 57 BPM

## 2023-04-03 DIAGNOSIS — I10 PRIMARY HYPERTENSION: Primary | ICD-10-CM

## 2023-04-03 DIAGNOSIS — Z85.3 HISTORY OF BREAST CANCER: ICD-10-CM

## 2023-04-03 DIAGNOSIS — G89.4 CHRONIC PAIN SYNDROME: ICD-10-CM

## 2023-04-03 DIAGNOSIS — M15.9 GENERALIZED OSTEOARTHROSIS, INVOLVING MULTIPLE SITES: ICD-10-CM

## 2023-04-03 DIAGNOSIS — E78.00 PURE HYPERCHOLESTEROLEMIA: ICD-10-CM

## 2023-04-03 DIAGNOSIS — E78.5 HYPERLIPIDEMIA, UNSPECIFIED HYPERLIPIDEMIA TYPE: ICD-10-CM

## 2023-04-03 PROCEDURE — 99999 PR PBB SHADOW E&M-EST. PATIENT-LVL IV: ICD-10-PCS | Mod: PBBFAC,,, | Performed by: INTERNAL MEDICINE

## 2023-04-03 PROCEDURE — 99999 PR PBB SHADOW E&M-EST. PATIENT-LVL IV: CPT | Mod: PBBFAC,,, | Performed by: INTERNAL MEDICINE

## 2023-04-03 PROCEDURE — 99213 PR OFFICE/OUTPT VISIT, EST, LEVL III, 20-29 MIN: ICD-10-PCS | Mod: S$PBB,,, | Performed by: INTERNAL MEDICINE

## 2023-04-03 PROCEDURE — 99214 OFFICE O/P EST MOD 30 MIN: CPT | Mod: PBBFAC,PO | Performed by: INTERNAL MEDICINE

## 2023-04-03 PROCEDURE — 99213 OFFICE O/P EST LOW 20 MIN: CPT | Mod: S$PBB,,, | Performed by: INTERNAL MEDICINE

## 2023-04-03 RX ORDER — LOSARTAN POTASSIUM AND HYDROCHLOROTHIAZIDE 25; 100 MG/1; MG/1
TABLET ORAL
Qty: 45 TABLET | Refills: 3
Start: 2023-04-03 | End: 2023-07-11

## 2023-04-03 RX ORDER — PRAVASTATIN SODIUM 40 MG/1
20 TABLET ORAL DAILY
Qty: 45 TABLET | Refills: 3
Start: 2023-04-03 | End: 2023-07-11

## 2023-04-03 NOTE — PROGRESS NOTES
"HPI:  Patient is 67-year-old female comes today for follow-up of her hypertension, lipids, and for refills of her pain medications.  Her back doctor switched her from hydrocodone to oxycodone.  She is actually would prefer to go back to taking the hydrocodone.  She takes the oxycodone is too much for her.  She just had it filled 3 weeks ago.  I told her she needs to wait until 1 month after that fill date before we restart the hydrocodone.  She continues to lose weight from having had her bypass surgery.  She has lost a total 68 lb so far.  Her blood pressures been excellent to even on the low side.  Current meds have been verified and updated per the EMR  Exam:/60 (BP Location: Right arm)   Pulse (!) 57   Ht 5' 7" (1.702 m)   Wt 88.7 kg (195 lb 8.8 oz)   SpO2 98%   BMI 30.63 kg/m²   Carotids 2+ equal without bruits  Chest clear  Cardiovascular regular rate and rhythm without murmur gallop or rub    Lab Results   Component Value Date    WBC 5.90 03/15/2023    HGB 12.3 03/15/2023    HCT 40.7 03/15/2023     03/15/2023    CHOL 131 03/27/2023    TRIG 95 03/27/2023    HDL 50 03/27/2023    ALT 14 03/15/2023    AST 20 03/15/2023     03/27/2023    K 3.6 03/27/2023     03/27/2023    CREATININE 0.8 03/27/2023    BUN 14 03/27/2023    CO2 32 (H) 03/27/2023    TSH 2.200 03/27/2023    INR 1.1 01/23/2023    HGBA1C 5.1 03/15/2023       Impression:  Hypertension and lipids extremely well controlled  Obesity, markedly improved  Continued problems with lumbar disc disease and radiculopathy  Patient Active Problem List   Diagnosis    HTN (hypertension)    Generalized osteoarthrosis, involving multiple sites    History of breast cancer    Hyperlipidemia    Myofascial pain    Bilateral carpal tunnel syndrome    Lumbar radiculopathy    Chronic pain syndrome    Cervical radiculopathy    Class 1 obesity due to excess calories with serious comorbidity and body mass index (BMI) of 32.0 to 32.9 in adult    DDD " (degenerative disc disease), cervical       Plan:  Orders Placed This Encounter    Lipid Panel    Basic Metabolic Panel    pravastatin (PRAVACHOL) 40 MG tablet    losartan-hydrochlorothiazide 100-25 mg (HYZAAR) 100-25 mg per tablet     She will decrease the pravastatin to take 20 mg today in the losartan HCT to take 50/12.5 mg per day.  She will see me back in 3 months with above lab work.  We will continue to try to taper her off as tolerable.  She will send me a message in 10 days regarding refills of her hydrocodone    This note is generated with speech recognition software and is subject to transcription error and sound alike phrases that may be missed by proofreading.

## 2023-04-14 ENCOUNTER — PATIENT MESSAGE (OUTPATIENT)
Dept: NEUROSURGERY | Facility: CLINIC | Age: 68
End: 2023-04-14
Payer: MEDICARE

## 2023-04-14 RX ORDER — OXYCODONE AND ACETAMINOPHEN 10; 325 MG/1; MG/1
1 TABLET ORAL EVERY 8 HOURS PRN
Qty: 60 TABLET | Refills: 0 | Status: SHIPPED | OUTPATIENT
Start: 2023-04-14 | End: 2023-05-16 | Stop reason: SDUPTHER

## 2023-04-26 ENCOUNTER — PATIENT MESSAGE (OUTPATIENT)
Dept: INTERNAL MEDICINE | Facility: CLINIC | Age: 68
End: 2023-04-26
Payer: MEDICARE

## 2023-04-26 RX ORDER — ZOLPIDEM TARTRATE 10 MG/1
TABLET ORAL
Qty: 20 TABLET | Refills: 5 | Status: SHIPPED | OUTPATIENT
Start: 2023-04-26 | End: 2023-11-07 | Stop reason: SDUPTHER

## 2023-04-26 NOTE — TELEPHONE ENCOUNTER
No new care gaps identified.  [facility] Embedded Care Gaps. Reference number: [id number]. 4/26/2023   10:06:11 AM FANIT

## 2023-05-01 ENCOUNTER — PATIENT MESSAGE (OUTPATIENT)
Dept: INTERNAL MEDICINE | Facility: CLINIC | Age: 68
End: 2023-05-01
Payer: MEDICARE

## 2023-05-15 ENCOUNTER — PATIENT MESSAGE (OUTPATIENT)
Dept: NEUROSURGERY | Facility: CLINIC | Age: 68
End: 2023-05-15
Payer: MEDICARE

## 2023-05-16 ENCOUNTER — PATIENT MESSAGE (OUTPATIENT)
Dept: NEUROSURGERY | Facility: CLINIC | Age: 68
End: 2023-05-16
Payer: MEDICARE

## 2023-05-16 DIAGNOSIS — Z98.1 S/P LUMBAR FUSION: Primary | ICD-10-CM

## 2023-05-16 RX ORDER — PREGABALIN 100 MG/1
100 CAPSULE ORAL 2 TIMES DAILY
Qty: 60 CAPSULE | Refills: 0 | Status: SHIPPED | OUTPATIENT
Start: 2023-05-16 | End: 2023-06-19 | Stop reason: SDUPTHER

## 2023-05-16 RX ORDER — OXYCODONE AND ACETAMINOPHEN 10; 325 MG/1; MG/1
1 TABLET ORAL EVERY 8 HOURS PRN
Qty: 60 TABLET | Refills: 0 | Status: SHIPPED | OUTPATIENT
Start: 2023-05-16 | End: 2023-06-19 | Stop reason: SDUPTHER

## 2023-05-23 ENCOUNTER — PATIENT MESSAGE (OUTPATIENT)
Dept: INTERNAL MEDICINE | Facility: CLINIC | Age: 68
End: 2023-05-23
Payer: MEDICARE

## 2023-05-23 ENCOUNTER — HOSPITAL ENCOUNTER (OUTPATIENT)
Dept: RADIOLOGY | Facility: HOSPITAL | Age: 68
Discharge: HOME | End: 2023-05-23
Attending: FAMILY MEDICINE
Payer: MEDICARE

## 2023-05-23 ENCOUNTER — OFFICE VISIT (OUTPATIENT)
Dept: SPORTS MEDICINE | Facility: CLINIC | Age: 68
End: 2023-05-23
Payer: MEDICARE

## 2023-05-23 VITALS — BODY MASS INDEX: 30.61 KG/M2 | WEIGHT: 195 LBS | HEIGHT: 67 IN

## 2023-05-23 DIAGNOSIS — M25.511 RIGHT SHOULDER PAIN, UNSPECIFIED CHRONICITY: Primary | ICD-10-CM

## 2023-05-23 DIAGNOSIS — M75.31 CALCIFIC TENDINITIS OF RIGHT SHOULDER: ICD-10-CM

## 2023-05-23 DIAGNOSIS — M25.811 IMPINGEMENT OF RIGHT SHOULDER: ICD-10-CM

## 2023-05-23 DIAGNOSIS — M25.511 RIGHT SHOULDER PAIN, UNSPECIFIED CHRONICITY: ICD-10-CM

## 2023-05-23 PROCEDURE — 20610 LARGE JOINT ASPIRATION/INJECTION: R SUBACROMIAL BURSA: ICD-10-PCS | Mod: S$PBB,RT,, | Performed by: FAMILY MEDICINE

## 2023-05-23 PROCEDURE — 99999 PR PBB SHADOW E&M-EST. PATIENT-LVL III: CPT | Mod: PBBFAC,,, | Performed by: FAMILY MEDICINE

## 2023-05-23 PROCEDURE — 73030 X-RAY EXAM OF SHOULDER: CPT | Mod: TC,RT

## 2023-05-23 PROCEDURE — 73030 X-RAY EXAM OF SHOULDER: CPT | Mod: 26,RT,, | Performed by: RADIOLOGY

## 2023-05-23 PROCEDURE — 99999 PR PBB SHADOW E&M-EST. PATIENT-LVL III: ICD-10-PCS | Mod: PBBFAC,,, | Performed by: FAMILY MEDICINE

## 2023-05-23 PROCEDURE — 99213 OFFICE O/P EST LOW 20 MIN: CPT | Mod: PBBFAC,25 | Performed by: FAMILY MEDICINE

## 2023-05-23 PROCEDURE — 73030 XR SHOULDER COMPLETE 2 OR MORE VIEWS RIGHT: ICD-10-PCS | Mod: 26,RT,, | Performed by: RADIOLOGY

## 2023-05-23 PROCEDURE — 99214 PR OFFICE/OUTPT VISIT, EST, LEVL IV, 30-39 MIN: ICD-10-PCS | Mod: S$PBB,25,, | Performed by: FAMILY MEDICINE

## 2023-05-23 PROCEDURE — 99214 OFFICE O/P EST MOD 30 MIN: CPT | Mod: S$PBB,25,, | Performed by: FAMILY MEDICINE

## 2023-05-23 PROCEDURE — 20610 DRAIN/INJ JOINT/BURSA W/O US: CPT | Mod: PBBFAC | Performed by: FAMILY MEDICINE

## 2023-05-23 RX ORDER — BETAMETHASONE SODIUM PHOSPHATE AND BETAMETHASONE ACETATE 3; 3 MG/ML; MG/ML
6 INJECTION, SUSPENSION INTRA-ARTICULAR; INTRALESIONAL; INTRAMUSCULAR; SOFT TISSUE
Status: DISCONTINUED | OUTPATIENT
Start: 2023-05-23 | End: 2023-05-23 | Stop reason: HOSPADM

## 2023-05-23 RX ORDER — BETAMETHASONE SODIUM PHOSPHATE AND BETAMETHASONE ACETATE 3; 3 MG/ML; MG/ML
3 INJECTION, SUSPENSION INTRA-ARTICULAR; INTRALESIONAL; INTRAMUSCULAR; SOFT TISSUE
Status: DISCONTINUED | OUTPATIENT
Start: 2023-05-23 | End: 2023-05-23 | Stop reason: HOSPADM

## 2023-05-23 RX ADMIN — BETAMETHASONE SODIUM PHOSPHATE AND BETAMETHASONE ACETATE 3 MG: 3; 3 INJECTION, SUSPENSION INTRA-ARTICULAR; INTRALESIONAL; INTRAMUSCULAR at 03:05

## 2023-05-23 RX ADMIN — BETAMETHASONE SODIUM PHOSPHATE AND BETAMETHASONE ACETATE 6 MG: 3; 3 INJECTION, SUSPENSION INTRA-ARTICULAR; INTRALESIONAL; INTRAMUSCULAR at 03:05

## 2023-05-23 NOTE — PROGRESS NOTES
Subjective:     Patient ID: Mary Vo is a 68 y.o. female.    Chief Complaint: Pain of the Right Shoulder      HPI: Patient is being seen for right shoulder pain that has been present for 1 month with no known cause. Denies any falls. Rating pain 8/10 at today's office visit. Takes ibuprofen 800 and Percocet that she received from Dr. Cage of Neurology once daily to help with pain relief. Has not participated in physical therapy.  Has woke her up at night a few times with pain.      Had low back surgery about 4 months ago which seems to be doing fine.    Past Medical History:   Diagnosis Date    Breast cancer 1996    right    Chronic pain syndrome     Generalized osteoarthrosis, involving multiple sites     s/p THR bilateral    History of breast cancer 2007    lumpectomy/XRT    HTN (hypertension)     Hyperlipidemia     Morbid obesity with BMI of 40.0-44.9, adult      Past Surgical History:   Procedure Laterality Date    ANTERIOR CERVICAL DISCECTOMY W/ FUSION Left 01/03/2022    Procedure: DISCECTOMY, SPINE, CERVICAL, ANTERIOR APPROACH, WITH FUSION;  Surgeon: Pradip Fernando MD;  Location: [facility] OR;  Service: Neurosurgery;  Laterality: Left;  Three Level ACDF  C4/5, 5/6, 6/7      BREAST LUMPECTOMY Right 2006    XRT    CATARACT EXTRACTION W/  INTRAOCULAR LENS IMPLANT Left 01/15/2020    CATARACT EXTRACTION W/  INTRAOCULAR LENS IMPLANT Right 01/29/2020    COLONOSCOPY N/A 10/15/2015    Procedure: COLONOSCOPY;  Surgeon: Maylin Shipley MD;  Location: [facility];  Service: Endoscopy;  Laterality: N/A;    COLONOSCOPY N/A 11/30/2022    Procedure: COLONOSCOPY;  Surgeon: Gary Toussaint MD;  Location: [facility] ENDO;  Service: General;  Laterality: N/A;    EPIDURAL STEROID INJECTION N/A 11/03/2020    Procedure: Lumbar L5/S1 IL KATYA;  Surgeon: Paul Lobato MD;  Location: [facility];  Service: Pain Management;  Laterality: N/A;    EPIDURAL STEROID INJECTION INTO CERVICAL SPINE N/A 09/25/2020    Procedure: C7/T1  IL KATYA;  Surgeon: Paul Lobato MD;  Location: [facility] PAIN MGT;  Service: Pain Management;  Laterality: N/A;    EPIDURAL STEROID INJECTION INTO CERVICAL SPINE N/A 10/05/2021    Procedure: C7/T1 IL KATYA with RN IV sedation;  Surgeon: Cynthia Soares MD;  Location: [facility] PAIN MGT;  Service: Pain Management;  Laterality: N/A;    ESOPHAGOGASTRODUODENOSCOPY N/A 11/04/2021    Procedure: ESOPHAGOGASTRODUODENOSCOPY (EGD);  Surgeon: Blas Hampton MD;  Location: [facility] ENDO;  Service: Endoscopy;  Laterality: N/A;    HYSTERECTOMY      LAPAROSCOPIC LYSIS OF ADHESIONS N/A 8/30/2022    Procedure: LYSIS, ADHESIONS, LAPAROSCOPIC;  Surgeon: Blas Hampton MD;  Location: [facility] OR;  Service: General;  Laterality: N/A;    PLACEMENT OF ACELLULAR HUMAN DERMAL ALLOGRAFT Left 01/03/2022    Procedure: APPLICATION, ACELLULAR HUMAN DERMAL ALLOGRAFT;  Surgeon: Pradip Fernando MD;  Location: [facility] OR;  Service: Neurosurgery;  Laterality: Left;    PLACEMENT OF ACELLULAR HUMAN DERMAL ALLOGRAFT N/A 2/1/2023    Procedure: APPLICATION, ACELLULAR HUMAN DERMAL ALLOGRAFT;  Surgeon: Pradip Fernando MD;  Location: [facility] OR;  Service: Neurosurgery;  Laterality: N/A;    ROBOT-ASSISTED LAPAROSCOPIC SLEEVE GASTRECTOMY USING DA FLORENTINO XI N/A 8/30/2022    Procedure: XI ROBOTIC SLEEVE GASTRECTOMY;  Surgeon: Blas Hampton MD;  Location: [facility] OR;  Service: General;  Laterality: N/A;    TRANSFORAMINAL EPIDURAL INJECTION OF STEROID Left 09/17/2019    Procedure: Left C5/6 TF KATYA w/ RN IV sedation;  Surgeon: Paul Lobato MD;  Location: [facility] PAIN MGT;  Service: Pain Management;  Laterality: Left;    TRANSFORAMINAL LUMBAR INTERBODY FUSION (TLIF) USING COMPUTER-ASSISTED NAVIGATION Bilateral 2/1/2023    Procedure: FUSION, SPINE, LUMBAR, TLIF, USING COMPUTER-ASSISTED NAVIGATION;  Surgeon: Pradip Fernando MD;  Location: [facility] OR;  Service: Neurosurgery;  Laterality: Bilateral;  TLIF L3-4/4-5 possibly L5-S1     Family History   Problem Relation Age of Onset    Cancer Mother      Diabetes Mother     Hyperlipidemia Father     Hypertension Father     Breast cancer Sister     Breast cancer Sister     Breast cancer Sister     Breast cancer Sister     Eczema Neg Hx     Lupus Neg Hx     Psoriasis Neg Hx     Melanoma Neg Hx      Social History     Socioeconomic History    Marital status: Single   Occupational History    Occupation: Disabled   Tobacco Use    Smoking status: Former    Smokeless tobacco: Never   Substance and Sexual Activity    Alcohol use: Never    Drug use: No    Sexual activity: Never       Current Outpatient Medications:     ALPRAZolam (XANAX) 1 MG tablet, Take 1 tablet (1 mg total) by mouth nightly as needed., Disp: 30 tablet, Rfl: 5    citalopram (CELEXA) 40 MG tablet, Take 1 tablet (40 mg total) by mouth once daily., Disp: 90 tablet, Rfl: 3    cloNIDine (CATAPRES) 0.2 MG tablet, Take 1 tablet (0.2 mg total) by mouth every evening., Disp: 90 tablet, Rfl: 3    ERGOCALCIFEROL, VITAMIN D2, (VITAMIN D ORAL), Take 1,000 Units by mouth once daily., Disp: , Rfl:     fluticasone propionate (FLONASE) 50 mcg/actuation nasal spray, 2 sprays (100 mcg total) by Each Nostril route once daily., Disp: 48 g, Rfl: 3    furosemide (LASIX) 40 MG tablet, TAKE ONE TABLET BY MOUTH ONCE A DAY AS NEEDED, Disp: 19 tablet, Rfl: 11    losartan-hydrochlorothiazide 100-25 mg (HYZAAR) 100-25 mg per tablet, Take half tablet once per day, Disp: 45 tablet, Rfl: 3    multivit-iron-FA-calcium-mins (HIGH POTENCY MULTIVIT, W-IRON,) 9 mg iron-400 mcg Tab tablet, Take 1 tablet by mouth once daily., Disp: 30 tablet, Rfl: 1    oxyCODONE-acetaminophen (PERCOCET)  mg per tablet, Take 1 tablet by mouth every 8 (eight) hours as needed for Pain., Disp: 60 tablet, Rfl: 0    pravastatin (PRAVACHOL) 40 MG tablet, Take 0.5 tablets (20 mg total) by mouth once daily., Disp: 45 tablet, Rfl: 3    pregabalin (LYRICA) 100 MG capsule, Take 1 capsule (100 mg total) by mouth 2 (two) times daily., Disp: 60 capsule, Rfl: 0     zolpidem (AMBIEN) 10 mg Tab, TAKE ONE TABLET BY MOUTH AT BEDTIME AS NEEDED Strength: 10 mg, Disp: 20 tablet, Rfl: 5    methocarbamoL (ROBAXIN) 750 MG Tab, Take 1 tablet (750 mg total) by mouth 3 (three) times daily as needed (muscle spasms). (Patient not taking: Reported on 5/23/2023), Disp: 60 tablet, Rfl: 0  Review of patient's allergies indicates:  No Known Allergies  Review of Systems   Constitutional:  Negative for chills, fever and weight loss.   Respiratory:  Negative for shortness of breath.    Cardiovascular:  Negative for chest pain and palpitations.     Objective:   Body mass index is 30.54 kg/m².  There were no vitals filed for this visit.        Ortho/SPM Exam-alert and oriented well-nourished well-developed ambulatory no acute distress     Respiratory effort appears normal     Neck-supple nontender   Right shoulder-no acute deformity   Mild diffuse tenderness to palpation anterior lateral posterior shoulder   Greatly reduced range of motion throughout rotator cuff vectors   Empty can position painful to maintain and weak   Positive Mendez impingement  Strength 3/5 in most positions   can reach to L5 region but painful but can reach behind her neck  With elbow at her side has external and internal rotation within normal limits    Neurovascular intact     Psychiatric good affect cognition    Plan-cortisone injection for probable subdeltoid bursitis and impingement problems  Start physical therapy at [facility] since she lives in at the    There are no Patient Instructions on file for this visit.    IMAGING: X-ray Shoulder 2 or More Views Right  Narrative: EXAM: XR SHOULDER COMPLETE 2 OR MORE VIEWS RIGHT    HISTORY: Pain    FINDINGS:   4 views were obtained of the right shoulder.    Calcification adjacent to the greater tuberosity which can be seen with calcific tendinitis.  Mild degenerative changes involving the AC joint.  No acute fracture or dislocation.  Impression:  Mild degenerative changes about  the AC joint.    Findings which can be seen with calcific tendinitis.      No acute findings are identified.    Finalized on: 5/23/2023 2:55 PM By:  David Rogers MD  [facility]# [id number]      2023-05-23 14:57:[id number]    [facility]       Radiographs / Imaging : Relevant imaging results reviewed by me and interpreted by me, discussed with the patient and / or family -agree with x-ray report in viewed and discussed with patient      Assessment:     Encounter Diagnoses   Name Primary?    Right shoulder pain, unspecified chronicity Yes    Calcific tendinitis of right shoulder     Impingement of right shoulder         Plan:   Right shoulder pain, unspecified chronicity  -     Large Joint Aspiration/Injection: R subacromial bursa    Calcific tendinitis of right shoulder    Impingement of right shoulder        The patient verbalized good understanding of the medical issues discussed today and expressed appreciation for the care provided.  Patient was given the opportunity to ask questions and be an active participant in their medical care. Patient had no further questions or concerns at this time.     The patient was encouraged to participate in appropriate physical activity.      Napoleon Weir M.D.  [facility]        This note was dictated using voice recognition software and may have sound a like errors.

## 2023-05-23 NOTE — PROCEDURES
Large Joint Aspiration/Injection: R subacromial bursa    Date/Time: 5/23/2023 3:00 PM  Performed by: Napoleon Weir MD  Authorized by: Napoleon Weir MD     Consent Done?:  Yes (Verbal)  Indications:  Pain  Site marked: the procedure site was marked    Timeout: prior to procedure the correct patient, procedure, and site was verified    Prep: patient was prepped and draped in usual sterile fashion    Local anesthetic:  Bupivacaine 0.25% without epinephrine and lidocaine 1% without epinephrine  Approach:  Posterior  Location:  Shoulder  Site:  R subacromial bursa  Medications:  6 mg betamethasone acetate-betamethasone sodium phosphate 6 mg/mL; 3 mg betamethasone acetate-betamethasone sodium phosphate 6 mg/mL  Patient tolerance:  Patient tolerated the procedure well with no immediate complications

## 2023-06-02 DIAGNOSIS — M25.511 RIGHT SHOULDER PAIN, UNSPECIFIED CHRONICITY: Primary | ICD-10-CM

## 2023-06-02 DIAGNOSIS — M75.31 CALCIFIC TENDINITIS OF RIGHT SHOULDER: ICD-10-CM

## 2023-06-02 DIAGNOSIS — M25.811 IMPINGEMENT OF RIGHT SHOULDER: ICD-10-CM

## 2023-06-12 ENCOUNTER — PATIENT MESSAGE (OUTPATIENT)
Dept: SURGERY | Facility: CLINIC | Age: 68
End: 2023-06-12
Payer: MEDICARE

## 2023-06-19 ENCOUNTER — PATIENT MESSAGE (OUTPATIENT)
Dept: NEUROSURGERY | Facility: CLINIC | Age: 68
End: 2023-06-19
Payer: MEDICARE

## 2023-06-19 DIAGNOSIS — Z98.1 S/P LUMBAR FUSION: ICD-10-CM

## 2023-06-19 RX ORDER — OXYCODONE AND ACETAMINOPHEN 10; 325 MG/1; MG/1
1 TABLET ORAL EVERY 8 HOURS PRN
Qty: 60 TABLET | Refills: 0 | Status: SHIPPED | OUTPATIENT
Start: 2023-06-19 | End: 2023-07-18 | Stop reason: SDUPTHER

## 2023-06-19 RX ORDER — PREGABALIN 100 MG/1
100 CAPSULE ORAL 2 TIMES DAILY
Qty: 60 CAPSULE | Refills: 0 | Status: SHIPPED | OUTPATIENT
Start: 2023-06-19 | End: 2023-07-18 | Stop reason: SDUPTHER

## 2023-06-20 ENCOUNTER — HOSPITAL ENCOUNTER (OUTPATIENT)
Dept: RADIOLOGY | Facility: HOSPITAL | Age: 68
Discharge: HOME | End: 2023-06-20
Payer: MEDICARE

## 2023-06-20 ENCOUNTER — OFFICE VISIT (OUTPATIENT)
Dept: NEUROSURGERY | Facility: CLINIC | Age: 68
End: 2023-06-20
Payer: MEDICARE

## 2023-06-20 VITALS
SYSTOLIC BLOOD PRESSURE: 158 MMHG | RESPIRATION RATE: 18 BRPM | HEART RATE: 53 BPM | DIASTOLIC BLOOD PRESSURE: 76 MMHG | BODY MASS INDEX: 30.61 KG/M2 | HEIGHT: 67 IN | WEIGHT: 195 LBS

## 2023-06-20 DIAGNOSIS — Z98.1 S/P LUMBAR FUSION: Primary | ICD-10-CM

## 2023-06-20 DIAGNOSIS — Z98.1 S/P LUMBAR FUSION: ICD-10-CM

## 2023-06-20 DIAGNOSIS — R29.6 MULTIPLE FALLS: ICD-10-CM

## 2023-06-20 DIAGNOSIS — M54.16 LUMBAR RADICULOPATHY: ICD-10-CM

## 2023-06-20 PROCEDURE — 72100 XR LUMBAR SPINE AP AND LATERAL: ICD-10-PCS | Mod: 26,,, | Performed by: RADIOLOGY

## 2023-06-20 PROCEDURE — 99999 PR PBB SHADOW E&M-EST. PATIENT-LVL III: CPT | Mod: PBBFAC,,, | Performed by: PHYSICIAN ASSISTANT

## 2023-06-20 PROCEDURE — 72100 X-RAY EXAM L-S SPINE 2/3 VWS: CPT | Mod: TC

## 2023-06-20 PROCEDURE — 99213 OFFICE O/P EST LOW 20 MIN: CPT | Mod: S$PBB,,, | Performed by: PHYSICIAN ASSISTANT

## 2023-06-20 PROCEDURE — 99999 PR PBB SHADOW E&M-EST. PATIENT-LVL III: ICD-10-PCS | Mod: PBBFAC,,, | Performed by: PHYSICIAN ASSISTANT

## 2023-06-20 PROCEDURE — 99213 OFFICE O/P EST LOW 20 MIN: CPT | Mod: PBBFAC | Performed by: PHYSICIAN ASSISTANT

## 2023-06-20 PROCEDURE — 99213 PR OFFICE/OUTPT VISIT, EST, LEVL III, 20-29 MIN: ICD-10-PCS | Mod: S$PBB,,, | Performed by: PHYSICIAN ASSISTANT

## 2023-06-20 PROCEDURE — 72100 X-RAY EXAM L-S SPINE 2/3 VWS: CPT | Mod: 26,,, | Performed by: RADIOLOGY

## 2023-06-20 NOTE — PROGRESS NOTES
Subjective:      Patient ID: Mary Vo is a 68 y.o. female.    HPI  The patient is here today for postop evaluation.  Although she does not have leg pain, she has tingling and weakness in the left leg.  - States she gets tingling in her knee and it radiates down the leg.  She also reports 3 falls over the past month.   Denies issues with RLE.  Lyrica does help. She tried to get off a day or two but could not.  X-rays were taken today.      Date of Procedure: 2/1/2023    Procedure: Procedure(s) (LRB):  FUSION, SPINE, LUMBAR, TLIF, USING COMPUTER-ASSISTED NAVIGATION (Bilateral)  APPLICATION, ACELLULAR HUMAN DERMAL ALLOGRAFT (N/A)     Operative Findings (including complications, if any):   DDD with stenosis L3-5/ DDD with herniated disc L5-S1   Description of Technical Procedures:   TLIF L3-S1   Objective:     Body mass index is 30.54 kg/m².  Vitals:    06/20/23 1200   BP: (!) 158/76   Pulse: (!) 53   Resp: 18      Back Exam     Comments:  Incision - CDI healing well  There is no fluctuance, erythema or swelling    Pt moves all 4 ext well          Results for orders placed during the hospital encounter of 03/14/23    X-Ray Lumbar Spine Ap And Lateral    Narrative  EXAMINATION:  XR LUMBAR SPINE AP AND LATERAL    CLINICAL HISTORY:  Low back pain, no red flags, no prior management;Low back pain, progressive neurologic deficit;Dorsalgia, unspecified    TECHNIQUE:  AP, lateral and spot images were performed of the lumbar spine.    COMPARISON:  02/14/2023    FINDINGS:  The vertebral bodies demonstrate a normal height and alignment.  There are postoperative changes noted at the L3 through S1 levels.  No hardware failure or loosening noted.  Mild disc space narrowing seen at the L2-3 level.  No significant facet arthropathy suggested.  Surgical clips noted in the right upper quadrant of the abdomen.      6/20/23 Lumbar spine x-rays:  FINDINGS:  Pedicle screws and fixation rods are seen bilaterally at the L3 through S1  levels with intradiscal spacers present L3-4 and L4-5.  No hardware failure or loosening.  Mild disc space narrowing seen at the L2-3 level.  Surgical clips noted in the right upper quadrant of the abdomen.          Lab Results   Component Value Date    WBC 5.72 07/03/2023    HCT 44.1 07/03/2023     INDEPENDENT INTERPRETATION OF TEST:  Relevant imaging results reviewed and interpreted by me, discussed with the patient and / or family today.  Assessment:     1. S/P lumbar fusion    2. Lumbar radiculopathy    3. Multiple falls      Plan:     S/P lumbar fusion  -     Ambulatory referral/consult to Physical/Occupational Therapy; Future; Expected date: 06/27/2023    Lumbar radiculopathy  -     Ambulatory referral/consult to Physical/Occupational Therapy; Future; Expected date: 06/27/2023    Multiple falls  -     Ambulatory referral/consult to Physical/Occupational Therapy; Future; Expected date: 06/27/2023      Discussed x-rays with pt today.  Recommended add'l imaging but she declined at this time.  Advised pt that if she continues falling, we will order a MRI of her lower back for further evaluation.  Outpatient referral placed with PT. Patient will follow up after therapy.  Please reach out with any changes.    Miesha Linn PA-C  [facility]

## 2023-06-20 NOTE — SUBJECTIVE & OBJECTIVE
Interval History: NAEON. Patient reports back pain with movement, some relief with pain mediations. Denies numbness/tingling, chest pain, SOB, fevers, nausea.    Review of Systems   All other systems reviewed and are negative.  Objective:     Vital Signs (Most Recent):  Temp: 96.2 °F (35.7 °C) (02/02/23 1115)  Pulse: 81 (02/02/23 1115)  Resp: 17 (02/02/23 1222)  BP: 131/63 (02/02/23 1115)  SpO2: 95 % (02/02/23 1115) Vital Signs (24h Range):  Temp:  [96.2 °F (35.7 °C)-98.8 °F (37.1 °C)] 96.2 °F (35.7 °C)  Pulse:  [56-93] 81  Resp:  [14-22] 17  SpO2:  [94 %-100 %] 95 %  BP: (107-137)/(52-63) 131/63     Weight: 91.3 kg (201 lb 4.5 oz)  Body mass index is 31.52 kg/m².    Intake/Output Summary (Last 24 hours) at 2/2/2023 1342  Last data filed at 2/2/2023 1333  Gross per 24 hour   Intake --   Output 480 ml   Net -480 ml      Physical Exam  Vitals and nursing note reviewed.   Constitutional:       General: She is not in acute distress.     Appearance: Normal appearance. She is obese.   Cardiovascular:      Rate and Rhythm: Normal rate and regular rhythm.      Heart sounds: No murmur heard.  Pulmonary:      Effort: Pulmonary effort is normal. No respiratory distress.      Breath sounds: No wheezing.   Musculoskeletal:      Comments: Surgical drain in place to low back   Neurological:      General: No focal deficit present.      Mental Status: She is alert and oriented to person, place, and time.   Psychiatric:         Mood and Affect: Mood normal.         Behavior: Behavior normal.       Significant Labs: All pertinent labs within the past 24 hours have been reviewed.    CBC:   Recent Labs   Lab 02/02/23 0527   WBC 11.47   HGB 10.6*   HCT 32.7*        CMP:   Recent Labs   Lab 02/02/23 0527      K 3.9      CO2 27      BUN 14   CREATININE 0.7   CALCIUM 9.1   ANIONGAP 8       Significant Imaging: I have reviewed all pertinent imaging results/findings within the past 24 hours.   No

## 2023-06-21 ENCOUNTER — PATIENT MESSAGE (OUTPATIENT)
Dept: NEUROSURGERY | Facility: CLINIC | Age: 68
End: 2023-06-21
Payer: MEDICARE

## 2023-07-03 ENCOUNTER — LAB VISIT (OUTPATIENT)
Dept: LAB | Facility: HOSPITAL | Age: 68
End: 2023-07-03
Payer: MEDICARE

## 2023-07-03 ENCOUNTER — OFFICE VISIT (OUTPATIENT)
Dept: SURGERY | Facility: CLINIC | Age: 68
End: 2023-07-03
Payer: MEDICARE

## 2023-07-03 ENCOUNTER — PATIENT MESSAGE (OUTPATIENT)
Dept: SURGERY | Facility: CLINIC | Age: 68
End: 2023-07-03
Payer: MEDICARE

## 2023-07-03 VITALS
BODY MASS INDEX: 31.87 KG/M2 | TEMPERATURE: 98 F | DIASTOLIC BLOOD PRESSURE: 64 MMHG | WEIGHT: 203.06 LBS | HEART RATE: 51 BPM | HEIGHT: 67 IN | SYSTOLIC BLOOD PRESSURE: 133 MMHG

## 2023-07-03 DIAGNOSIS — E66.9 OBESITY (BMI 30-39.9): ICD-10-CM

## 2023-07-03 DIAGNOSIS — E66.9 OBESITY (BMI 30-39.9): Primary | ICD-10-CM

## 2023-07-03 LAB
ALBUMIN SERPL BCP-MCNC: 3.8 G/DL (ref 3.5–5.2)
ALP SERPL-CCNC: 105 U/L (ref 55–135)
ALT SERPL W/O P-5'-P-CCNC: 14 U/L (ref 10–44)
ANION GAP SERPL CALC-SCNC: 10 MMOL/L (ref 8–16)
AST SERPL-CCNC: 20 U/L (ref 10–40)
BASOPHILS # BLD AUTO: 0.07 K/UL (ref 0–0.2)
BASOPHILS NFR BLD: 1.2 % (ref 0–1.9)
BILIRUB SERPL-MCNC: 0.4 MG/DL (ref 0.1–1)
BUN SERPL-MCNC: 14 MG/DL (ref 8–23)
CALCIUM SERPL-MCNC: 9.6 MG/DL (ref 8.7–10.5)
CHLORIDE SERPL-SCNC: 107 MMOL/L (ref 95–110)
CO2 SERPL-SCNC: 27 MMOL/L (ref 23–29)
CREAT SERPL-MCNC: 0.8 MG/DL (ref 0.5–1.4)
DIFFERENTIAL METHOD: ABNORMAL
EOSINOPHIL # BLD AUTO: 0.2 K/UL (ref 0–0.5)
EOSINOPHIL NFR BLD: 2.6 % (ref 0–8)
ERYTHROCYTE [DISTWIDTH] IN BLOOD BY AUTOMATED COUNT: 15.8 % (ref 11.5–14.5)
EST. GFR  (NO RACE VARIABLE): >60 ML/MIN/1.73 M^2
ESTIMATED AVG GLUCOSE: 111 MG/DL (ref 68–131)
GLUCOSE SERPL-MCNC: 71 MG/DL (ref 70–110)
HBA1C MFR BLD: 5.5 % (ref 4–5.6)
HCT VFR BLD AUTO: 44.1 % (ref 37–48.5)
HGB BLD-MCNC: 13.8 G/DL (ref 12–16)
IMM GRANULOCYTES # BLD AUTO: 0.01 K/UL (ref 0–0.04)
IMM GRANULOCYTES NFR BLD AUTO: 0.2 % (ref 0–0.5)
IRON SERPL-MCNC: 92 UG/DL (ref 30–160)
LYMPHOCYTES # BLD AUTO: 2.2 K/UL (ref 1–4.8)
LYMPHOCYTES NFR BLD: 39.2 % (ref 18–48)
MCH RBC QN AUTO: 28.9 PG (ref 27–31)
MCHC RBC AUTO-ENTMCNC: 31.3 G/DL (ref 32–36)
MCV RBC AUTO: 92 FL (ref 82–98)
MONOCYTES # BLD AUTO: 0.6 K/UL (ref 0.3–1)
MONOCYTES NFR BLD: 9.6 % (ref 4–15)
NEUTROPHILS # BLD AUTO: 2.7 K/UL (ref 1.8–7.7)
NEUTROPHILS NFR BLD: 47.2 % (ref 38–73)
NRBC BLD-RTO: 0 /100 WBC
PLATELET # BLD AUTO: 202 K/UL (ref 150–450)
PMV BLD AUTO: 12.1 FL (ref 9.2–12.9)
POTASSIUM SERPL-SCNC: 4.1 MMOL/L (ref 3.5–5.1)
PROT SERPL-MCNC: 7 G/DL (ref 6–8.4)
RBC # BLD AUTO: 4.78 M/UL (ref 4–5.4)
SATURATED IRON: 24 % (ref 20–50)
SODIUM SERPL-SCNC: 144 MMOL/L (ref 136–145)
T4 FREE SERPL-MCNC: 0.72 NG/DL (ref 0.71–1.51)
TOTAL IRON BINDING CAPACITY: 388 UG/DL (ref 250–450)
TRANSFERRIN SERPL-MCNC: 262 MG/DL (ref 200–375)
TSH SERPL DL<=0.005 MIU/L-ACNC: 2.06 UIU/ML (ref 0.4–4)
VIT B12 SERPL-MCNC: 500 PG/ML (ref 210–950)
WBC # BLD AUTO: 5.72 K/UL (ref 3.9–12.7)

## 2023-07-03 PROCEDURE — 3075F SYST BP GE 130 - 139MM HG: CPT | Mod: CPTII,S$GLB,,

## 2023-07-03 PROCEDURE — 1126F AMNT PAIN NOTED NONE PRSNT: CPT | Mod: CPTII,S$GLB,,

## 2023-07-03 PROCEDURE — 99999 PR PBB SHADOW E&M-EST. PATIENT-LVL IV: CPT | Mod: PBBFAC,,,

## 2023-07-03 PROCEDURE — 3008F PR BODY MASS INDEX (BMI) DOCUMENTED: ICD-10-PCS | Mod: CPTII,S$GLB,,

## 2023-07-03 PROCEDURE — 80053 COMPREHEN METABOLIC PANEL: CPT

## 2023-07-03 PROCEDURE — 99999 PR PBB SHADOW E&M-EST. PATIENT-LVL IV: ICD-10-PCS | Mod: PBBFAC,,,

## 2023-07-03 PROCEDURE — 99213 PR OFFICE/OUTPT VISIT, EST, LEVL III, 20-29 MIN: ICD-10-PCS | Mod: S$GLB,,,

## 2023-07-03 PROCEDURE — 3078F PR MOST RECENT DIASTOLIC BLOOD PRESSURE < 80 MM HG: ICD-10-PCS | Mod: CPTII,S$GLB,,

## 2023-07-03 PROCEDURE — 84425 ASSAY OF VITAMIN B-1: CPT

## 2023-07-03 PROCEDURE — 84134 ASSAY OF PREALBUMIN: CPT

## 2023-07-03 PROCEDURE — 82652 VIT D 1 25-DIHYDROXY: CPT

## 2023-07-03 PROCEDURE — 82607 VITAMIN B-12: CPT

## 2023-07-03 PROCEDURE — 1159F PR MEDICATION LIST DOCUMENTED IN MEDICAL RECORD: ICD-10-PCS | Mod: CPTII,S$GLB,,

## 2023-07-03 PROCEDURE — 3008F BODY MASS INDEX DOCD: CPT | Mod: CPTII,S$GLB,,

## 2023-07-03 PROCEDURE — 85025 COMPLETE CBC W/AUTO DIFF WBC: CPT

## 2023-07-03 PROCEDURE — 84443 ASSAY THYROID STIM HORMONE: CPT

## 2023-07-03 PROCEDURE — 3044F HG A1C LEVEL LT 7.0%: CPT | Mod: CPTII,S$GLB,,

## 2023-07-03 PROCEDURE — 3075F PR MOST RECENT SYSTOLIC BLOOD PRESS GE 130-139MM HG: ICD-10-PCS | Mod: CPTII,S$GLB,,

## 2023-07-03 PROCEDURE — 1126F PR PAIN SEVERITY QUANTIFIED, NO PAIN PRESENT: ICD-10-PCS | Mod: CPTII,S$GLB,,

## 2023-07-03 PROCEDURE — 84439 ASSAY OF FREE THYROXINE: CPT

## 2023-07-03 PROCEDURE — 1160F PR REVIEW ALL MEDS BY PRESCRIBER/CLIN PHARMACIST DOCUMENTED: ICD-10-PCS | Mod: CPTII,S$GLB,,

## 2023-07-03 PROCEDURE — 84466 ASSAY OF TRANSFERRIN: CPT

## 2023-07-03 PROCEDURE — 1160F RVW MEDS BY RX/DR IN RCRD: CPT | Mod: CPTII,S$GLB,,

## 2023-07-03 PROCEDURE — 99213 OFFICE O/P EST LOW 20 MIN: CPT | Mod: S$GLB,,,

## 2023-07-03 PROCEDURE — 3044F PR MOST RECENT HEMOGLOBIN A1C LEVEL <7.0%: ICD-10-PCS | Mod: CPTII,S$GLB,,

## 2023-07-03 PROCEDURE — 3078F DIAST BP <80 MM HG: CPT | Mod: CPTII,S$GLB,,

## 2023-07-03 PROCEDURE — 83036 HEMOGLOBIN GLYCOSYLATED A1C: CPT

## 2023-07-03 PROCEDURE — 1159F MED LIST DOCD IN RCRD: CPT | Mod: CPTII,S$GLB,,

## 2023-07-03 NOTE — PROGRESS NOTES
BARIATRIC PATIENT EVALUATION    CHIEF COMPLAINT:   morbid obesity with a BMI of 42 and inability to lose weight.    HISTORY OF PRESENT ILLNESS:  Mary Vo is a 68 y.o.-year-old female presents for bariatric follow post-op s/p sleeve gastrectomy on 08/22/2022.  She is doing well today, but she is upset with herself she knows she is not been eating right.  She has been on several vacations this month and has not been seeing her diet very well.  She reports eating and increased amount of sweets lately.  She is scheduled to see Triny soon.  She is still exercising some, but not as much she was last visit.  Overall she is feeling well today.    Initially presenting for morbid obesity with a BMI of 42 and inability to lose weight. The patient has tried exercising which she enjoys as well as different diets but struggling to lose any further weight.  She has severe arthritis that is started to limit her mobility..    Height 5 ft 6 in  Weight 263 lb --> 239 --> 217--> 202 --> 203 lbs  BMI 42 --> 35 --> 34--> 31 --> 31      CO-MORBIDITIES:  dyslipidemia, hypertension and osteoarthritis    PAST MEDICAL HISTORY:  Past Medical History:   Diagnosis Date    Breast cancer 1996    right    Chronic pain syndrome     Generalized osteoarthrosis, involving multiple sites     s/p THR bilateral    History of breast cancer 2007    lumpectomy/XRT    HTN (hypertension)     Hyperlipidemia     Morbid obesity with BMI of 40.0-44.9, adult         PAST SURGICAL HISTORY:  Past Surgical History:   Procedure Laterality Date    ANTERIOR CERVICAL DISCECTOMY W/ FUSION Left 01/03/2022    Procedure: DISCECTOMY, SPINE, CERVICAL, ANTERIOR APPROACH, WITH FUSION;  Surgeon: Pradip Fernando MD;  Location: [facility];  Service: Neurosurgery;  Laterality: Left;  Three Level ACDF  C4/5, 5/6, 6/7      BREAST LUMPECTOMY Right 2006    XRT    CATARACT EXTRACTION W/  INTRAOCULAR LENS IMPLANT Left 01/15/2020    CATARACT EXTRACTION W/  INTRAOCULAR LENS IMPLANT  Right 01/29/2020    COLONOSCOPY N/A 10/15/2015    Procedure: COLONOSCOPY;  Surgeon: Maylin Shipley MD;  Location: [facility] ENDO;  Service: Endoscopy;  Laterality: N/A;    COLONOSCOPY N/A 11/30/2022    Procedure: COLONOSCOPY;  Surgeon: Gary Toussaint MD;  Location: [facility] ENDO;  Service: General;  Laterality: N/A;    EPIDURAL STEROID INJECTION N/A 11/03/2020    Procedure: Lumbar L5/S1 IL KATYA;  Surgeon: Paul Lobato MD;  Location: [facility] PAIN MGT;  Service: Pain Management;  Laterality: N/A;    EPIDURAL STEROID INJECTION INTO CERVICAL SPINE N/A 09/25/2020    Procedure: C7/T1 IL KATYA;  Surgeon: Paul Lobato MD;  Location: [facility] PAIN MGT;  Service: Pain Management;  Laterality: N/A;    EPIDURAL STEROID INJECTION INTO CERVICAL SPINE N/A 10/05/2021    Procedure: C7/T1 IL KATYA with RN IV sedation;  Surgeon: Cynthia Soares MD;  Location: [facility] PAIN MGT;  Service: Pain Management;  Laterality: N/A;    ESOPHAGOGASTRODUODENOSCOPY N/A 11/04/2021    Procedure: ESOPHAGOGASTRODUODENOSCOPY (EGD);  Surgeon: Blas Hampton MD;  Location: [facility] ENDO;  Service: Endoscopy;  Laterality: N/A;    HYSTERECTOMY      LAPAROSCOPIC LYSIS OF ADHESIONS N/A 8/30/2022    Procedure: LYSIS, ADHESIONS, LAPAROSCOPIC;  Surgeon: Blas Hampton MD;  Location: [facility] OR;  Service: General;  Laterality: N/A;    PLACEMENT OF ACELLULAR HUMAN DERMAL ALLOGRAFT Left 01/03/2022    Procedure: APPLICATION, ACELLULAR HUMAN DERMAL ALLOGRAFT;  Surgeon: Pradip Fernando MD;  Location: [facility] OR;  Service: Neurosurgery;  Laterality: Left;    PLACEMENT OF ACELLULAR HUMAN DERMAL ALLOGRAFT N/A 2/1/2023    Procedure: APPLICATION, ACELLULAR HUMAN DERMAL ALLOGRAFT;  Surgeon: Pradip Fernando MD;  Location: [facility] OR;  Service: Neurosurgery;  Laterality: N/A;    ROBOT-ASSISTED LAPAROSCOPIC SLEEVE GASTRECTOMY USING DA FLORENTINO XI N/A 8/30/2022    Procedure: XI ROBOTIC SLEEVE GASTRECTOMY;  Surgeon: Blas Hampton MD;  Location: [facility] OR;  Service: General;  Laterality: N/A;     TRANSFORAMINAL EPIDURAL INJECTION OF STEROID Left 09/17/2019    Procedure: Left C5/6 TF KATYA w/ RN IV sedation;  Surgeon: Paul Lobato MD;  [facility];  Service: Pain Management;  Laterality: Left;    TRANSFORAMINAL LUMBAR INTERBODY FUSION (TLIF) USING COMPUTER-ASSISTED NAVIGATION Bilateral 2/1/2023    Procedure: FUSION, SPINE, LUMBAR, TLIF, USING COMPUTER-ASSISTED NAVIGATION;  Surgeon: Pradip Fernando MD;  Location: [facility] OR;  Service: Neurosurgery;  Laterality: Bilateral;  TLIF L3-4/4-5 possibly L5-S1       FAMILY HISTORY:  Family History   Problem Relation Age of Onset    Cancer Mother     Diabetes Mother     Hyperlipidemia Father     Hypertension Father     Breast cancer Sister     Breast cancer Sister     Breast cancer Sister     Breast cancer Sister     Eczema Neg Hx     Lupus Neg Hx     Psoriasis Neg Hx     Melanoma Neg Hx         SOCIAL HISTORY:   reports that she has quit smoking. She has never used smokeless tobacco. She reports that she does not drink alcohol and does not use drugs.     MEDICATIONS:  Current Outpatient Medications on File Prior to Visit   Medication Sig Dispense Refill    ALPRAZolam (XANAX) 1 MG tablet Take 1 tablet (1 mg total) by mouth nightly as needed. 30 tablet 5    citalopram (CELEXA) 40 MG tablet Take 1 tablet (40 mg total) by mouth once daily. 90 tablet 3    cloNIDine (CATAPRES) 0.2 MG tablet Take 1 tablet (0.2 mg total) by mouth every evening. 90 tablet 3    ERGOCALCIFEROL, VITAMIN D2, (VITAMIN D ORAL) Take 1,000 Units by mouth once daily.      fluticasone propionate (FLONASE) 50 mcg/actuation nasal spray 2 sprays (100 mcg total) by Each Nostril route once daily. 48 g 3    furosemide (LASIX) 40 MG tablet TAKE ONE TABLET BY MOUTH ONCE A DAY AS NEEDED 19 tablet 11    losartan-hydrochlorothiazide 100-25 mg (HYZAAR) 100-25 mg per tablet Take half tablet once per day 45 tablet 3    multivit-iron-FA-calcium-mins (HIGH POTENCY MULTIVIT, W-IRON,) 9 mg iron-400 mcg Tab  tablet Take 1 tablet by mouth once daily. 30 tablet 1    oxyCODONE-acetaminophen (PERCOCET)  mg per tablet Take 1 tablet by mouth every 8 (eight) hours as needed for Pain. 60 tablet 0    pravastatin (PRAVACHOL) 40 MG tablet Take 0.5 tablets (20 mg total) by mouth once daily. 45 tablet 3    pregabalin (LYRICA) 100 MG capsule Take 1 capsule (100 mg total) by mouth 2 (two) times daily. 60 capsule 0    zolpidem (AMBIEN) 10 mg Tab TAKE ONE TABLET BY MOUTH AT BEDTIME AS NEEDED Strength: 10 mg 20 tablet 5     No current facility-administered medications on file prior to visit.       Medications have been reviewed.    ALLERGIES:  Review of patient's allergies indicates:  No Known Allergies    Allergies have been reviewed.    ROS:  Review of Systems   Constitutional:  Negative for chills, fatigue, fever and unexpected weight change.   Respiratory:  Negative for cough, shortness of breath, wheezing and stridor.    Cardiovascular:  Negative for chest pain, palpitations and leg swelling.   Gastrointestinal:  Negative for abdominal distention, abdominal pain, constipation, diarrhea, nausea and vomiting.   Genitourinary:  Negative for difficulty urinating, dysuria, frequency, hematuria and urgency.   Skin:  Negative for color change, pallor, rash and wound.   Hematological:  Does not bruise/bleed easily.     PE:  Vitals:    07/03/23 1145   BP: 133/64   Pulse: (!) 51   Temp: 97.6 °F (36.4 °C)         Physical Exam  Vitals reviewed.   Constitutional:       General: She is not in acute distress.     Appearance: She is well-developed and normal weight. She is not ill-appearing.   HENT:      Head: Normocephalic and atraumatic.      Right Ear: External ear normal.      Left Ear: External ear normal.   Eyes:      Extraocular Movements: Extraocular movements intact.      Conjunctiva/sclera: Conjunctivae normal.   Cardiovascular:      Rate and Rhythm: Bradycardia present.   Pulmonary:      Effort: Pulmonary effort is normal. No  respiratory distress.   Abdominal:      General: There is no distension.      Palpations: Abdomen is soft.      Tenderness: There is no abdominal tenderness.      Comments: Incisions well healed   Musculoskeletal:      Cervical back: Neck supple.   Skin:     General: Skin is warm and dry.   Neurological:      Mental Status: She is alert and oriented to person, place, and time.   Psychiatric:         Behavior: Behavior normal.     EGD:  Impression:            - Normal esophagus.                          - Z-line regular, 38 cm from the incisors.                          - A few gastric polyps. Biopsied.                          - Normal antrum. Biopsied.                          - Normal second portion of the duodenum.    Pathology:  Final Pathologic Diagnosis 1. Stomach, sleeve gastrectomy:   Segment of stomach, lined by gastric oxyntic mucosa, with mild chronic   inactive gastritis.   Helicobacter pylori immunostain is pending and will be reported in an   addendum.  VC      Comment: Interp By Chapincito Whitten M.D., Signed on 09/02/2022 at 13:38   Supplemental Diagnosis Helicobacter pylori immunostain is negative.   IHC controls were reviewed and were adequate.          DIAGNOSIS:  1. Morbid obesity with a BMI of 42 and inability to lose weight.  2. Co-morbidities: dyslipidemia, hypertension and osteoarthritis    PLAN:  - Reinforced bariatric diet/dietician.  - increase exercise.  - nutritional labs today will call with results.  - RTC 3 months per patient request, will repeat bariatric labs at that time.     HTN - stable/monitor/antihypertensives  HLD - statin therapy/dietary modifications  Osteoarthritis - weight loss      Joyce Farris PA-C  [facility]    I spent a total of 20 minutes on the day of the visit.This includes face to face time and non-face to face time preparing to see the patient (eg, review of tests), obtaining and/or reviewing separately obtained history, documenting clinical  information in the electronic or other health record, independently interpreting results and communicating results to the patient/family/caregiver, or care coordinator.

## 2023-07-05 ENCOUNTER — LAB VISIT (OUTPATIENT)
Dept: LAB | Facility: HOSPITAL | Age: 68
End: 2023-07-05
Attending: INTERNAL MEDICINE
Payer: MEDICARE

## 2023-07-05 DIAGNOSIS — I10 PRIMARY HYPERTENSION: ICD-10-CM

## 2023-07-05 LAB
ANION GAP SERPL CALC-SCNC: 8 MMOL/L (ref 8–16)
BUN SERPL-MCNC: 12 MG/DL (ref 8–23)
CALCIUM SERPL-MCNC: 9.6 MG/DL (ref 8.7–10.5)
CHLORIDE SERPL-SCNC: 106 MMOL/L (ref 95–110)
CHOLEST SERPL-MCNC: 186 MG/DL (ref 120–199)
CHOLEST/HDLC SERPL: 3.3 {RATIO} (ref 2–5)
CO2 SERPL-SCNC: 30 MMOL/L (ref 23–29)
CREAT SERPL-MCNC: 0.8 MG/DL (ref 0.5–1.4)
EST. GFR  (NO RACE VARIABLE): >60 ML/MIN/1.73 M^2
GLUCOSE SERPL-MCNC: 89 MG/DL (ref 70–110)
HDLC SERPL-MCNC: 56 MG/DL (ref 40–75)
HDLC SERPL: 30.1 % (ref 20–50)
LDLC SERPL CALC-MCNC: 111.4 MG/DL (ref 63–159)
NONHDLC SERPL-MCNC: 130 MG/DL
POTASSIUM SERPL-SCNC: 3.9 MMOL/L (ref 3.5–5.1)
PREALB SERPL-MCNC: 15 MG/DL (ref 20–43)
SODIUM SERPL-SCNC: 144 MMOL/L (ref 136–145)
TRIGL SERPL-MCNC: 93 MG/DL (ref 30–150)

## 2023-07-05 PROCEDURE — 80048 BASIC METABOLIC PNL TOTAL CA: CPT | Performed by: INTERNAL MEDICINE

## 2023-07-05 PROCEDURE — 80061 LIPID PANEL: CPT | Performed by: INTERNAL MEDICINE

## 2023-07-05 PROCEDURE — 36415 COLL VENOUS BLD VENIPUNCTURE: CPT | Mod: PO | Performed by: INTERNAL MEDICINE

## 2023-07-06 ENCOUNTER — CLINICAL SUPPORT (OUTPATIENT)
Dept: INTERNAL MEDICINE | Facility: CLINIC | Age: 68
End: 2023-07-06
Payer: MEDICARE

## 2023-07-06 DIAGNOSIS — Z98.84 STATUS POST BARIATRIC SURGERY: ICD-10-CM

## 2023-07-06 DIAGNOSIS — Z71.3 DIETARY COUNSELING: ICD-10-CM

## 2023-07-06 DIAGNOSIS — I10 ESSENTIAL HYPERTENSION: Primary | ICD-10-CM

## 2023-07-06 DIAGNOSIS — E78.00 PURE HYPERCHOLESTEROLEMIA: ICD-10-CM

## 2023-07-06 PROCEDURE — 97803 PR MED NUTR THER, SUBSQ, INDIV, EA 15 MIN: ICD-10-PCS | Mod: 95,,, | Performed by: DIETITIAN, REGISTERED

## 2023-07-06 PROCEDURE — 97803 MED NUTRITION INDIV SUBSEQ: CPT | Mod: 95,,, | Performed by: DIETITIAN, REGISTERED

## 2023-07-06 NOTE — PROGRESS NOTES
The patient location is: [place]  The chief complaint leading to consultation is: nutrition follow-up post gastric sleeve    Visit type: audio only    Face to Face time with patient: 30 minutes  35 minutes of total time spent on the encounter, which includes face to face time and non-face to face time preparing to see the patient (eg, review of tests), Obtaining and/or reviewing separately obtained history, Documenting clinical information in the electronic or other health record, Independently interpreting results (not separately reported) and communicating results to the patient/family/caregiver, or Care coordination (not separately reported).         Each patient to whom he or she provides medical services by telemedicine is:  (1) informed of the relationship between the physician and patient and the respective role of any other health care provider with respect to management of the patient; and (2) notified that he or she may decline to receive medical services by telemedicine and may withdraw from such care at any time.    Notes:   NUTRITION NOTE    Referring Physician: Dr. Hampton  Reason for MNT Referral: Follow-up 11 months s/p Gastric Sleeve 8-  PAST MEDICAL HISTORY:    Denies nausea, vomiting, constipation, and diarrhea.  Reports weight gain.     Reports being off track with nutrition for a 2 month time period.  Reports attending various events/parties where she was tempted by sweets + sodas.    Reports being back on track now and is focusing on increased protein intake and including protein shakes (premier protein) in her diet.     Questions how to improve hair loss that has been ongoing since shortly after surgery.     Meals lately: green salad (270 calories and 14 grams of protein; chicken + ham); broccoli and grilled chicken with 25 grams of protein    Exercise: 1 mile walk 5 days/week    Past Medical History:   Diagnosis Date    Breast cancer 1996    right    Chronic pain syndrome      Generalized osteoarthrosis, involving multiple sites     s/p THR bilateral    History of breast cancer 2007    lumpectomy/XRT    HTN (hypertension)     Hyperlipidemia     Morbid obesity with BMI of 40.0-44.9, adult        CLINICAL DATA:  68 y.o. female.    There were no vitals filed for this visit.    Current Weight: 203 lbs (7-3-2023)   3-: 202 lbs              12-: 215.1 lbs              11-9-2022: 220 lbs              9-: 239 lbs              Surgery weight: 247 lbs              MSD 3, 10-: 261.36 lbs                   MSD 2, 9-: 260 lbs (weight obtained on 9-2-2021)              MSD 1, 8-: 263 lbs (weight obtained on 8-)  BMI: 31.80  Total Weight Loss: -44 lbs since surgery      LABS:  Reviewed.    CURRENT DIET:  Bariatric Diet.      EXERCISE:  adequate        VITAMINS / MINERALS:  Reports adequate intake following guidelines in nutrition booklet    ASSESSMENT:  Doing poorly overall.  Weight gain.  Excess calorie intake for a period of time, back on track recently.  Inadequate protein intake.  Adequate fluid intake.  Following diet appropriately as of recently.  Exercising.  Adequate vitamins & minerals.    BARIATRIC DIET DISCUSSION:  Instructed and provided written materials on bariatric diet plan.  Reinforced post-op nutrition guidelines.    PLAN / RECOMMENDATIONS:  May begin to incorporate raw vegetables, lettuce, unsalted nuts, and light popcorn as tolerated.  May begin to swallow whole pills as tolerated.  Back on track with diet plan.    Increase protein intake.  Maintain fluid intake.  Increase exercise.  Continue appropriate vitamins & minerals.  Adjust vitamins & minerals by, consider taking daily biotin supplement..    Return to clinic in 4 months.

## 2023-07-10 LAB
1,25(OH)2D3 SERPL-MCNC: 51 PG/ML (ref 20–79)
VIT B1 BLD-MCNC: 63 UG/L (ref 38–122)

## 2023-07-11 ENCOUNTER — OFFICE VISIT (OUTPATIENT)
Dept: INTERNAL MEDICINE | Facility: CLINIC | Age: 68
End: 2023-07-11
Payer: MEDICARE

## 2023-07-11 VITALS
BODY MASS INDEX: 32.18 KG/M2 | DIASTOLIC BLOOD PRESSURE: 62 MMHG | WEIGHT: 205 LBS | HEIGHT: 67 IN | TEMPERATURE: 96 F | HEART RATE: 57 BPM | SYSTOLIC BLOOD PRESSURE: 128 MMHG | OXYGEN SATURATION: 98 %

## 2023-07-11 DIAGNOSIS — E78.00 PURE HYPERCHOLESTEROLEMIA: Primary | ICD-10-CM

## 2023-07-11 DIAGNOSIS — Z12.31 SCREENING MAMMOGRAM FOR BREAST CANCER: ICD-10-CM

## 2023-07-11 DIAGNOSIS — I10 PRIMARY HYPERTENSION: ICD-10-CM

## 2023-07-11 PROCEDURE — 3044F PR MOST RECENT HEMOGLOBIN A1C LEVEL <7.0%: ICD-10-PCS | Mod: CPTII,S$GLB,, | Performed by: INTERNAL MEDICINE

## 2023-07-11 PROCEDURE — 3008F PR BODY MASS INDEX (BMI) DOCUMENTED: ICD-10-PCS | Mod: CPTII,S$GLB,, | Performed by: INTERNAL MEDICINE

## 2023-07-11 PROCEDURE — 3044F HG A1C LEVEL LT 7.0%: CPT | Mod: CPTII,S$GLB,, | Performed by: INTERNAL MEDICINE

## 2023-07-11 PROCEDURE — 1101F PT FALLS ASSESS-DOCD LE1/YR: CPT | Mod: CPTII,S$GLB,, | Performed by: INTERNAL MEDICINE

## 2023-07-11 PROCEDURE — 3078F PR MOST RECENT DIASTOLIC BLOOD PRESSURE < 80 MM HG: ICD-10-PCS | Mod: CPTII,S$GLB,, | Performed by: INTERNAL MEDICINE

## 2023-07-11 PROCEDURE — 1159F PR MEDICATION LIST DOCUMENTED IN MEDICAL RECORD: ICD-10-PCS | Mod: CPTII,S$GLB,, | Performed by: INTERNAL MEDICINE

## 2023-07-11 PROCEDURE — 99999 PR PBB SHADOW E&M-EST. PATIENT-LVL III: ICD-10-PCS | Mod: PBBFAC,,, | Performed by: INTERNAL MEDICINE

## 2023-07-11 PROCEDURE — 3074F PR MOST RECENT SYSTOLIC BLOOD PRESSURE < 130 MM HG: ICD-10-PCS | Mod: CPTII,S$GLB,, | Performed by: INTERNAL MEDICINE

## 2023-07-11 PROCEDURE — 99214 PR OFFICE/OUTPT VISIT, EST, LEVL IV, 30-39 MIN: ICD-10-PCS | Mod: S$GLB,,, | Performed by: INTERNAL MEDICINE

## 2023-07-11 PROCEDURE — 1159F MED LIST DOCD IN RCRD: CPT | Mod: CPTII,S$GLB,, | Performed by: INTERNAL MEDICINE

## 2023-07-11 PROCEDURE — 3074F SYST BP LT 130 MM HG: CPT | Mod: CPTII,S$GLB,, | Performed by: INTERNAL MEDICINE

## 2023-07-11 PROCEDURE — 3288F FALL RISK ASSESSMENT DOCD: CPT | Mod: CPTII,S$GLB,, | Performed by: INTERNAL MEDICINE

## 2023-07-11 PROCEDURE — 3078F DIAST BP <80 MM HG: CPT | Mod: CPTII,S$GLB,, | Performed by: INTERNAL MEDICINE

## 2023-07-11 PROCEDURE — 1160F PR REVIEW ALL MEDS BY PRESCRIBER/CLIN PHARMACIST DOCUMENTED: ICD-10-PCS | Mod: CPTII,S$GLB,, | Performed by: INTERNAL MEDICINE

## 2023-07-11 PROCEDURE — 1160F RVW MEDS BY RX/DR IN RCRD: CPT | Mod: CPTII,S$GLB,, | Performed by: INTERNAL MEDICINE

## 2023-07-11 PROCEDURE — 3288F PR FALLS RISK ASSESSMENT DOCUMENTED: ICD-10-PCS | Mod: CPTII,S$GLB,, | Performed by: INTERNAL MEDICINE

## 2023-07-11 PROCEDURE — 99999 PR PBB SHADOW E&M-EST. PATIENT-LVL III: CPT | Mod: PBBFAC,,, | Performed by: INTERNAL MEDICINE

## 2023-07-11 PROCEDURE — 3008F BODY MASS INDEX DOCD: CPT | Mod: CPTII,S$GLB,, | Performed by: INTERNAL MEDICINE

## 2023-07-11 PROCEDURE — 1101F PR PT FALLS ASSESS DOC 0-1 FALLS W/OUT INJ PAST YR: ICD-10-PCS | Mod: CPTII,S$GLB,, | Performed by: INTERNAL MEDICINE

## 2023-07-11 PROCEDURE — 99214 OFFICE O/P EST MOD 30 MIN: CPT | Mod: S$GLB,,, | Performed by: INTERNAL MEDICINE

## 2023-07-11 NOTE — PROGRESS NOTES
"HPI:  Patient is a 68-year-old female who comes today for follow-up of her and history of breast cancer.  She has been doing fairly well.  There has been no major problems or complaints.  Her blood pressure home has been well controlled.    Current meds have been verified and updated per the EMR  Exam:/62 (BP Location: Left arm, Patient Position: Sitting, BP Method: Large (Manual))   Pulse (!) 57   Temp 96 °F (35.6 °C) (Tympanic)   Ht 5' 7" (1.702 m)   Wt 93 kg (205 lb 0.4 oz)   SpO2 98%   BMI 32.11 kg/m²   Carotids 2+ equal without bruits  Chest clear  Cardiovascular regular rate and rhythm without murmur gallop or rub    Lab Results   Component Value Date    WBC 5.72 07/03/2023    HGB 13.8 07/03/2023    HCT 44.1 07/03/2023     07/03/2023    CHOL 186 07/05/2023    TRIG 93 07/05/2023    HDL 56 07/05/2023    ALT 14 07/03/2023    AST 20 07/03/2023     07/05/2023    K 3.9 07/05/2023     07/05/2023    CREATININE 0.8 07/05/2023    BUN 12 07/05/2023    CO2 30 (H) 07/05/2023    TSH 2.056 07/03/2023    INR 1.1 01/23/2023    HGBA1C 5.5 07/03/2023       Impression:  Multiple medical problems below, stable  Patient Active Problem List   Diagnosis    HTN (hypertension)    Generalized osteoarthrosis, involving multiple sites    History of breast cancer    Hyperlipidemia    Myofascial pain    Bilateral carpal tunnel syndrome    Lumbar radiculopathy    Chronic pain syndrome    Cervical radiculopathy    Class 1 obesity due to excess calories with serious comorbidity and body mass index (BMI) of 32.0 to 32.9 in adult    DDD (degenerative disc disease), cervical    Right shoulder pain    Calcific tendinitis of right shoulder    Impingement of right shoulder       Plan:  Orders Placed This Encounter    Mammo Digital Screening Bilat w/ Kenny    Comprehensive Metabolic Panel    Lipid Panel    TSH     Patient will see me again in 6 months with above lab work and mammogram.  Her medications remain the same.  " She has been encouraged to try to lose some weight.    This note is generated with speech recognition software and is subject to transcription error and sound alike phrases that may be missed by proofreading.

## 2023-07-18 ENCOUNTER — PATIENT MESSAGE (OUTPATIENT)
Dept: NEUROSURGERY | Facility: CLINIC | Age: 68
End: 2023-07-18
Payer: MEDICARE

## 2023-07-18 DIAGNOSIS — Z98.1 S/P LUMBAR FUSION: ICD-10-CM

## 2023-07-18 RX ORDER — OXYCODONE AND ACETAMINOPHEN 10; 325 MG/1; MG/1
1 TABLET ORAL EVERY 8 HOURS PRN
Qty: 60 TABLET | Refills: 0 | Status: SHIPPED | OUTPATIENT
Start: 2023-07-18 | End: 2023-07-19

## 2023-07-18 RX ORDER — PREGABALIN 100 MG/1
100 CAPSULE ORAL 2 TIMES DAILY
Qty: 60 CAPSULE | Refills: 0 | Status: SHIPPED | OUTPATIENT
Start: 2023-07-18 | End: 2023-08-24 | Stop reason: SDUPTHER

## 2023-07-19 ENCOUNTER — PATIENT MESSAGE (OUTPATIENT)
Dept: NEUROSURGERY | Facility: CLINIC | Age: 68
End: 2023-07-19
Payer: MEDICARE

## 2023-07-19 ENCOUNTER — PATIENT MESSAGE (OUTPATIENT)
Dept: INTERNAL MEDICINE | Facility: CLINIC | Age: 68
End: 2023-07-19
Payer: MEDICARE

## 2023-07-19 ENCOUNTER — TELEPHONE (OUTPATIENT)
Dept: INTERNAL MEDICINE | Facility: CLINIC | Age: 68
End: 2023-07-19
Payer: MEDICARE

## 2023-07-19 RX ORDER — HYDROCODONE BITARTRATE AND ACETAMINOPHEN 10; 325 MG/1; MG/1
1 TABLET ORAL EVERY 12 HOURS PRN
Qty: 180 TABLET | Refills: 0 | Status: SHIPPED | OUTPATIENT
Start: 2023-07-19 | End: 2023-08-24 | Stop reason: SDUPTHER

## 2023-07-19 NOTE — TELEPHONE ENCOUNTER
Mark did not have Percocets for pt from her back doctor she said when she was here last week she and  discussed [place]/ pt is asking for a refill/ please advise

## 2023-07-19 NOTE — TELEPHONE ENCOUNTER
Call patient and tell her that I do not prescribe oxycodone.  She needs to address this with the provider who has been prescribing the oxycodone.

## 2023-07-20 ENCOUNTER — TELEPHONE (OUTPATIENT)
Dept: INTERNAL MEDICINE | Facility: CLINIC | Age: 68
End: 2023-07-20
Payer: MEDICARE

## 2023-07-20 ENCOUNTER — PATIENT MESSAGE (OUTPATIENT)
Dept: INTERNAL MEDICINE | Facility: CLINIC | Age: 68
End: 2023-07-20
Payer: MEDICARE

## 2023-07-24 ENCOUNTER — TELEPHONE (OUTPATIENT)
Dept: INTERNAL MEDICINE | Facility: CLINIC | Age: 68
End: 2023-07-24
Payer: MEDICARE

## 2023-08-01 ENCOUNTER — PATIENT MESSAGE (OUTPATIENT)
Dept: INTERNAL MEDICINE | Facility: CLINIC | Age: 68
End: 2023-08-01
Payer: MEDICARE

## 2023-08-01 RX ORDER — FLUTICASONE PROPIONATE 50 MCG
2 SPRAY, SUSPENSION (ML) NASAL DAILY
Qty: 48 G | Refills: 3 | Status: SHIPPED | OUTPATIENT
Start: 2023-08-01

## 2023-08-01 NOTE — TELEPHONE ENCOUNTER
No care due was identified.  [facility] Embedded Care Due Messages. Reference number: [id number].   8/01/2023 1:22:09 PM CDT

## 2023-08-24 ENCOUNTER — PATIENT MESSAGE (OUTPATIENT)
Dept: INTERNAL MEDICINE | Facility: CLINIC | Age: 68
End: 2023-08-24
Payer: MEDICARE

## 2023-08-24 DIAGNOSIS — Z98.1 S/P LUMBAR FUSION: ICD-10-CM

## 2023-08-24 RX ORDER — HYDROCODONE BITARTRATE AND ACETAMINOPHEN 10; 325 MG/1; MG/1
1 TABLET ORAL EVERY 12 HOURS PRN
Qty: 180 TABLET | Refills: 0 | Status: SHIPPED | OUTPATIENT
Start: 2023-08-24 | End: 2023-09-28 | Stop reason: SDUPTHER

## 2023-08-24 RX ORDER — PREGABALIN 100 MG/1
100 CAPSULE ORAL 2 TIMES DAILY
Qty: 60 CAPSULE | Refills: 5 | Status: SHIPPED | OUTPATIENT
Start: 2023-08-24 | End: 2024-03-07

## 2023-08-24 NOTE — TELEPHONE ENCOUNTER
No care due was identified.  [facility] Embedded Care Due Messages. Reference number: [id number].   8/24/2023 8:33:19 AM CDT

## 2023-08-24 NOTE — TELEPHONE ENCOUNTER
[facility] only approved one month of pts Norco, refills/ she will have to get one at a time.   And requests the refill a lyrica also.   Last visit 7/23

## 2023-09-04 ENCOUNTER — PATIENT MESSAGE (OUTPATIENT)
Dept: INTERNAL MEDICINE | Facility: CLINIC | Age: 68
End: 2023-09-04
Payer: MEDICARE

## 2023-09-04 DIAGNOSIS — F41.1 GAD (GENERALIZED ANXIETY DISORDER): ICD-10-CM

## 2023-09-05 RX ORDER — ALPRAZOLAM 1 MG/1
1 TABLET ORAL NIGHTLY PRN
Qty: 30 TABLET | Refills: 5 | Status: SHIPPED | OUTPATIENT
Start: 2023-09-05 | End: 2024-03-07

## 2023-09-05 NOTE — TELEPHONE ENCOUNTER
No care due was identified.  [facility] Embedded Care Due Messages. Reference number: [id number].   9/05/2023 8:07:53 AM CDT

## 2023-09-28 ENCOUNTER — PATIENT MESSAGE (OUTPATIENT)
Dept: INTERNAL MEDICINE | Facility: CLINIC | Age: 68
End: 2023-09-28
Payer: MEDICARE

## 2023-09-28 RX ORDER — HYDROCODONE BITARTRATE AND ACETAMINOPHEN 10; 325 MG/1; MG/1
1 TABLET ORAL EVERY 12 HOURS PRN
Qty: 180 TABLET | Refills: 0 | Status: SHIPPED | OUTPATIENT
Start: 2023-09-28 | End: 2023-09-28 | Stop reason: SDUPTHER

## 2023-09-28 RX ORDER — HYDROCODONE BITARTRATE AND ACETAMINOPHEN 10; 325 MG/1; MG/1
1 TABLET ORAL EVERY 12 HOURS PRN
Qty: 60 TABLET | Refills: 0 | Status: SHIPPED | OUTPATIENT
Start: 2023-09-28 | End: 2023-12-18 | Stop reason: SDUPTHER

## 2023-09-28 NOTE — TELEPHONE ENCOUNTER
No care due was identified.  [facility] Embedded Care Due Messages. Reference number: [id number].   9/28/2023 12:19:40 PM CDT

## 2023-09-28 NOTE — TELEPHONE ENCOUNTER
Patient stated the pharmacy is denying her the 90 day supply of Norco.  She need 30 day supply called in instead.

## 2023-10-11 ENCOUNTER — PATIENT MESSAGE (OUTPATIENT)
Dept: NEUROSURGERY | Facility: CLINIC | Age: 68
End: 2023-10-11
Payer: MEDICARE

## 2023-10-11 ENCOUNTER — PATIENT MESSAGE (OUTPATIENT)
Dept: SURGERY | Facility: CLINIC | Age: 68
End: 2023-10-11
Payer: MEDICARE

## 2023-10-11 ENCOUNTER — PATIENT MESSAGE (OUTPATIENT)
Dept: INTERNAL MEDICINE | Facility: CLINIC | Age: 68
End: 2023-10-11
Payer: MEDICARE

## 2023-10-19 ENCOUNTER — PATIENT MESSAGE (OUTPATIENT)
Dept: INTERNAL MEDICINE | Facility: CLINIC | Age: 68
End: 2023-10-19
Payer: MEDICARE

## 2023-10-19 RX ORDER — HYDROCODONE BITARTRATE AND ACETAMINOPHEN 7.5; 325 MG/1; MG/1
1 TABLET ORAL EVERY 6 HOURS PRN
Qty: 60 TABLET | Refills: 0 | Status: SHIPPED | OUTPATIENT
Start: 2023-10-19 | End: 2023-11-15 | Stop reason: SDUPTHER

## 2023-11-02 ENCOUNTER — PATIENT MESSAGE (OUTPATIENT)
Dept: INTERNAL MEDICINE | Facility: CLINIC | Age: 68
End: 2023-11-02
Payer: MEDICARE

## 2023-11-07 ENCOUNTER — PATIENT MESSAGE (OUTPATIENT)
Dept: INTERNAL MEDICINE | Facility: CLINIC | Age: 68
End: 2023-11-07
Payer: MEDICARE

## 2023-11-07 RX ORDER — ZOLPIDEM TARTRATE 10 MG/1
TABLET ORAL
Qty: 20 TABLET | Refills: 5 | Status: SHIPPED | OUTPATIENT
Start: 2023-11-07

## 2023-11-07 NOTE — TELEPHONE ENCOUNTER
No care due was identified.  [facility] Embedded Care Due Messages. Reference number: [id number].   11/07/2023 8:21:44 AM CST

## 2023-11-14 ENCOUNTER — HOSPITAL ENCOUNTER (OUTPATIENT)
Dept: RADIOLOGY | Facility: HOSPITAL | Age: 68
Discharge: HOME | End: 2023-11-14
Attending: INTERNAL MEDICINE
Payer: MEDICARE

## 2023-11-14 DIAGNOSIS — Z12.31 SCREENING MAMMOGRAM FOR BREAST CANCER: ICD-10-CM

## 2023-11-14 PROCEDURE — 77067 SCR MAMMO BI INCL CAD: CPT | Mod: 26,,, | Performed by: RADIOLOGY

## 2023-11-14 PROCEDURE — 77063 BREAST TOMOSYNTHESIS BI: CPT | Mod: 26,,, | Performed by: RADIOLOGY

## 2023-11-14 PROCEDURE — 77067 SCR MAMMO BI INCL CAD: CPT | Mod: TC

## 2023-11-14 PROCEDURE — 77063 MAMMO DIGITAL SCREENING BILAT WITH TOMO: ICD-10-PCS | Mod: 26,,, | Performed by: RADIOLOGY

## 2023-11-14 PROCEDURE — 77067 MAMMO DIGITAL SCREENING BILAT WITH TOMO: ICD-10-PCS | Mod: 26,,, | Performed by: RADIOLOGY

## 2023-11-15 ENCOUNTER — PATIENT MESSAGE (OUTPATIENT)
Dept: INTERNAL MEDICINE | Facility: CLINIC | Age: 68
End: 2023-11-15
Payer: MEDICARE

## 2023-11-15 NOTE — TELEPHONE ENCOUNTER
No care due was identified.  [facility] Embedded Care Due Messages. Reference number: [id number].   11/15/2023 2:10:32 PM CST

## 2023-11-16 ENCOUNTER — PATIENT MESSAGE (OUTPATIENT)
Dept: INTERNAL MEDICINE | Facility: CLINIC | Age: 68
End: 2023-11-16
Payer: MEDICARE

## 2023-11-16 NOTE — TELEPHONE ENCOUNTER
No care due was identified.  [facility] Embedded Care Due Messages. Reference number: [id number].   11/16/2023 8:46:09 AM CST

## 2023-11-17 RX ORDER — HYDROCODONE BITARTRATE AND ACETAMINOPHEN 7.5; 325 MG/1; MG/1
1 TABLET ORAL EVERY 6 HOURS PRN
Qty: 60 TABLET | Refills: 0 | Status: SHIPPED | OUTPATIENT
Start: 2023-11-17 | End: 2024-01-03 | Stop reason: DRUGHIGH

## 2023-11-17 RX ORDER — HYDROCODONE BITARTRATE AND ACETAMINOPHEN 10; 325 MG/1; MG/1
1 TABLET ORAL EVERY 12 HOURS PRN
Qty: 60 TABLET | Refills: 0 | OUTPATIENT
Start: 2023-11-17

## 2023-12-13 ENCOUNTER — PATIENT MESSAGE (OUTPATIENT)
Dept: INTERNAL MEDICINE | Facility: CLINIC | Age: 68
End: 2023-12-13
Payer: MEDICARE

## 2023-12-18 ENCOUNTER — PATIENT MESSAGE (OUTPATIENT)
Dept: INTERNAL MEDICINE | Facility: CLINIC | Age: 68
End: 2023-12-18
Payer: MEDICARE

## 2023-12-18 RX ORDER — HYDROCODONE BITARTRATE AND ACETAMINOPHEN 10; 325 MG/1; MG/1
1 TABLET ORAL EVERY 12 HOURS PRN
Qty: 60 TABLET | Refills: 0 | Status: SHIPPED | OUTPATIENT
Start: 2023-12-18 | End: 2024-01-10 | Stop reason: SDUPTHER

## 2024-01-03 ENCOUNTER — OFFICE VISIT (OUTPATIENT)
Dept: ORTHOPEDICS | Facility: CLINIC | Age: 69
End: 2024-01-03
Payer: MEDICARE

## 2024-01-03 VITALS — HEIGHT: 67 IN | BODY MASS INDEX: 32.18 KG/M2 | WEIGHT: 205 LBS

## 2024-01-03 DIAGNOSIS — M75.01 ADHESIVE CAPSULITIS OF RIGHT SHOULDER: Primary | ICD-10-CM

## 2024-01-03 PROCEDURE — 20610 DRAIN/INJ JOINT/BURSA W/O US: CPT | Mod: RT,S$GLB,, | Performed by: STUDENT IN AN ORGANIZED HEALTH CARE EDUCATION/TRAINING PROGRAM

## 2024-01-03 PROCEDURE — 99214 OFFICE O/P EST MOD 30 MIN: CPT | Mod: 25,S$GLB,, | Performed by: STUDENT IN AN ORGANIZED HEALTH CARE EDUCATION/TRAINING PROGRAM

## 2024-01-03 PROCEDURE — 99999 PR PBB SHADOW E&M-EST. PATIENT-LVL III: CPT | Mod: PBBFAC,,, | Performed by: STUDENT IN AN ORGANIZED HEALTH CARE EDUCATION/TRAINING PROGRAM

## 2024-01-03 RX ORDER — MELOXICAM 7.5 MG/1
7.5 TABLET ORAL DAILY
Qty: 30 TABLET | Refills: 2 | Status: SHIPPED | OUTPATIENT
Start: 2024-01-03

## 2024-01-03 RX ORDER — TRIAMCINOLONE ACETONIDE 40 MG/ML
40 INJECTION, SUSPENSION INTRA-ARTICULAR; INTRAMUSCULAR
Status: DISCONTINUED | OUTPATIENT
Start: 2024-01-03 | End: 2024-01-03 | Stop reason: HOSPADM

## 2024-01-03 RX ADMIN — TRIAMCINOLONE ACETONIDE 40 MG: 40 INJECTION, SUSPENSION INTRA-ARTICULAR; INTRAMUSCULAR at 03:01

## 2024-01-03 NOTE — PROCEDURES
Right Glenohumeral Injection: R glenohumeral    Date/Time: 1/3/2024 3:40 PM    Performed by: Luisa Gonzalez MD  Authorized by: Luisa Gonzalez MD    Consent Done?:  Yes (Verbal)  Indications:  Pain (Adhesive capsulitis)  Timeout: prior to procedure the correct patient, procedure, and site was verified      Local anesthesia used?: Yes    Local anesthetic:  Lidocaine 1% without epinephrine  Anesthetic total (ml):  5      Details:  Needle Size:  22 G  Ultrasonic Guidance for needle placement?: No    Approach:  Posterior  Location:  Shoulder  Site:  R glenohumeral  Medications:  40 mg triamcinolone acetonide 40 mg/mL

## 2024-01-03 NOTE — PROGRESS NOTES
Hand Surgery Clinic Note    Chief Complaint  Chief Complaint   Patient presents with    Right Shoulder - Pain       History of Present Illness  68-year-old right-hand dominant female who is retired presents for evaluation of right shoulder pain for 8 months.  Of note, she does have a history of previous ACDF surgery in January of 2022 by Dr. Fernando.  She did not have pain in her right shoulder prior to the surgery nor shortly thereafter.  Her pain level is an 8/10.  She previously saw Dr. Napoleon Weir in May of 2023 where she was diagnosed with impingement and calcific tendonitis.  She received a steroid injection.  Patient states that the injection did not help at all.  She did not come back just because she felt like she had a bad experience from the incident and was frustrated that she had to wait 3 hours to be seen.  She has been numbness or tingling.  She finds that the pain occurs both at baseline and with activity.  She has pain with overhead activity as well.    Review of Systems  Review of systems negative for chest pain, shortness of breath, fevers, chills, nausea/vomiting.    Past Medical History  Past Medical History:   Diagnosis Date    Breast cancer 1996    right    Chronic pain syndrome     Generalized osteoarthrosis, involving multiple sites     s/p THR bilateral    History of breast cancer 2007    lumpectomy/XRT    HTN (hypertension)     Hyperlipidemia     Morbid obesity with BMI of 40.0-44.9, adult        Past Surgical History  Past Surgical History:   Procedure Laterality Date    ANTERIOR CERVICAL DISCECTOMY W/ FUSION Left 01/03/2022    Procedure: DISCECTOMY, SPINE, CERVICAL, ANTERIOR APPROACH, WITH FUSION;  Surgeon: Pradip Fernando MD;  Location: [facility];  Service: Neurosurgery;  Laterality: Left;  Three Level ACDF  C4/5, 5/6, 6/7      BREAST LUMPECTOMY Right 2006    XRT    CATARACT EXTRACTION W/  INTRAOCULAR LENS IMPLANT Left 01/15/2020    CATARACT EXTRACTION W/  INTRAOCULAR LENS IMPLANT  Right 01/29/2020    COLONOSCOPY N/A 10/15/2015    Procedure: COLONOSCOPY;  Surgeon: Maylin Shipley MD;  Location: [facility] ENDO;  Service: Endoscopy;  Laterality: N/A;    COLONOSCOPY N/A 11/30/2022    Procedure: COLONOSCOPY;  Surgeon: Gary Toussaint MD;  Location: [facility] ENDO;  Service: General;  Laterality: N/A;    EPIDURAL STEROID INJECTION N/A 11/03/2020    Procedure: Lumbar L5/S1 IL KATYA;  Surgeon: Paul Lobato MD;  Location: [facility] PAIN MGT;  Service: Pain Management;  Laterality: N/A;    EPIDURAL STEROID INJECTION INTO CERVICAL SPINE N/A 09/25/2020    Procedure: C7/T1 IL KATYA;  Surgeon: Paul Lobato MD;  Location: [facility] PAIN MGT;  Service: Pain Management;  Laterality: N/A;    EPIDURAL STEROID INJECTION INTO CERVICAL SPINE N/A 10/05/2021    Procedure: C7/T1 IL KATYA with RN IV sedation;  Surgeon: Cynthia Soares MD;  Location: [facility] PAIN MGT;  Service: Pain Management;  Laterality: N/A;    ESOPHAGOGASTRODUODENOSCOPY N/A 11/04/2021    Procedure: ESOPHAGOGASTRODUODENOSCOPY (EGD);  Surgeon: Blas Hampton MD;  Location: [facility] ENDO;  Service: Endoscopy;  Laterality: N/A;    HYSTERECTOMY      LAPAROSCOPIC LYSIS OF ADHESIONS N/A 08/30/2022    Procedure: LYSIS, ADHESIONS, LAPAROSCOPIC;  Surgeon: Blas Hampton MD;  Location: [facility] OR;  Service: General;  Laterality: N/A;    PLACEMENT OF ACELLULAR HUMAN DERMAL ALLOGRAFT Left 01/03/2022    Procedure: APPLICATION, ACELLULAR HUMAN DERMAL ALLOGRAFT;  Surgeon: Pradip Fernando MD;  Location: [facility] OR;  Service: Neurosurgery;  Laterality: Left;    PLACEMENT OF ACELLULAR HUMAN DERMAL ALLOGRAFT N/A 02/01/2023    Procedure: APPLICATION, ACELLULAR HUMAN DERMAL ALLOGRAFT;  Surgeon: Pradip Fernando MD;  Location: [facility] OR;  Service: Neurosurgery;  Laterality: N/A;    ROBOT-ASSISTED LAPAROSCOPIC SLEEVE GASTRECTOMY USING DA FLORENTINO XI N/A 08/30/2022    Procedure: XI ROBOTIC SLEEVE GASTRECTOMY;  Surgeon: Blas Hampton MD;  Location: [facility] OR;  Service: General;  Laterality: N/A;     TRANSFORAMINAL EPIDURAL INJECTION OF STEROID Left 09/17/2019    Procedure: Left C5/6 TF KATYA w/ RN IV sedation;  Surgeon: Paul Lobato MD;  Location: [facility];  Service: Pain Management;  Laterality: Left;    TRANSFORAMINAL LUMBAR INTERBODY FUSION (TLIF) USING COMPUTER-ASSISTED NAVIGATION Bilateral 02/01/2023    Procedure: FUSION, SPINE, LUMBAR, TLIF, USING COMPUTER-ASSISTED NAVIGATION;  Surgeon: Pradip Fernando MD;  Location: [facility];  Service: Neurosurgery;  Laterality: Bilateral;  TLIF L3-4/4-5 possibly L5-S1       Allergies  Review of patient's allergies indicates:  No Known Allergies    Family History  Family History   Problem Relation Age of Onset    Cancer Mother     Diabetes Mother     Hyperlipidemia Father     Hypertension Father     Breast cancer Sister     Breast cancer Sister     Breast cancer Sister     Breast cancer Sister     Eczema Neg Hx     Lupus Neg Hx     Psoriasis Neg Hx     Melanoma Neg Hx        Social History  Social History     Socioeconomic History    Marital status: Single   Occupational History    Occupation: Disabled   Tobacco Use    Smoking status: Former     Types: Cigarettes    Smokeless tobacco: Never   Substance and Sexual Activity    Alcohol use: Never    Drug use: No    Sexual activity: Never       Vital Signs  There were no vitals filed for this visit.    Physical Exam  Constitutional: Appears well-developed and well-nourished. No distress.   HENT:   Head: Normocephalic.   Eyes: EOM are normal.   Pulmonary/Chest: Effort normal.   Neurological: Oriented to person, place, and time.   Psychiatric: Normal mood and affect.     Right Upper Extremity:  No abrasions, lacerations, wounds.  No swelling.  No erythema.  Shoulder abduction to 100°.  Shoulder flexion to 100°.  Internal rotation to L5.  External rotation to 20°.  Positive Neer.  Positive Mendez.  No tenderness over the AC joint.  No pain with cross-body adduction test.  Pain with Padmini's testing but 5/5 strength.  5/5  Pregnancy external rotation strength but with associated pain.  5/5 internal rotation strength but with associated pain.  Sensation is intact in the median, radial, and ulnar nerve distributions.  Palpable radial pulse.    Imaging  Right shoulder x-rays four views were obtained today and independently reviewed by myself.  There is mild arthritic changes at the AC joint.  Mild arthritic changes at the glenohumeral joint with associated osteophyte formation.  Small calcific density is visualized at the insertion of the rotator cuff.    Assessment and Plan  68-year-old right-hand dominant female presents with adhesive capsulitis of the right shoulder which has been symptomatic for 8 months.  I discussed the nature of adhesive capsulitis with the patient, also called frozen shoulder.  I provided her with a handout to read about this.  I discussed that in the vast majority of patients, this resolves with time but it can linger for over a year.  I suggested that we proceed with a steroid injection today.  I know that the last 1 did not help but I think that it would be worth a 2nd try.  Patient agreed to proceed.  See procedure note.  Additionally I discussed that I would like patient to work with physical therapy.  I placed a referral for her to be seen by [facility].  Follow up in 3 months for re-evaluation.    Luisa Gonzalez MD  Orthopaedic Hand Surgery

## 2024-01-05 ENCOUNTER — LAB VISIT (OUTPATIENT)
Dept: LAB | Facility: HOSPITAL | Age: 69
End: 2024-01-05
Attending: INTERNAL MEDICINE
Payer: MEDICARE

## 2024-01-05 DIAGNOSIS — I10 PRIMARY HYPERTENSION: ICD-10-CM

## 2024-01-05 LAB
ALBUMIN SERPL BCP-MCNC: 3.8 G/DL (ref 3.5–5.2)
ALP SERPL-CCNC: 112 U/L (ref 55–135)
ALT SERPL W/O P-5'-P-CCNC: 10 U/L (ref 10–44)
ANION GAP SERPL CALC-SCNC: 11 MMOL/L (ref 8–16)
AST SERPL-CCNC: 16 U/L (ref 10–40)
BILIRUB SERPL-MCNC: 0.4 MG/DL (ref 0.1–1)
BUN SERPL-MCNC: 16 MG/DL (ref 8–23)
CALCIUM SERPL-MCNC: 9.5 MG/DL (ref 8.7–10.5)
CHLORIDE SERPL-SCNC: 110 MMOL/L (ref 95–110)
CHOLEST SERPL-MCNC: 183 MG/DL (ref 120–199)
CHOLEST/HDLC SERPL: 3.3 {RATIO} (ref 2–5)
CO2 SERPL-SCNC: 23 MMOL/L (ref 23–29)
CREAT SERPL-MCNC: 0.9 MG/DL (ref 0.5–1.4)
EST. GFR  (NO RACE VARIABLE): >60 ML/MIN/1.73 M^2
GLUCOSE SERPL-MCNC: 96 MG/DL (ref 70–110)
HDLC SERPL-MCNC: 56 MG/DL (ref 40–75)
HDLC SERPL: 30.6 % (ref 20–50)
LDLC SERPL CALC-MCNC: 115 MG/DL (ref 63–159)
NONHDLC SERPL-MCNC: 127 MG/DL
POTASSIUM SERPL-SCNC: 4.3 MMOL/L (ref 3.5–5.1)
PROT SERPL-MCNC: 7.2 G/DL (ref 6–8.4)
SODIUM SERPL-SCNC: 144 MMOL/L (ref 136–145)
TRIGL SERPL-MCNC: 60 MG/DL (ref 30–150)
TSH SERPL DL<=0.005 MIU/L-ACNC: 2.38 UIU/ML (ref 0.4–4)

## 2024-01-05 PROCEDURE — 80061 LIPID PANEL: CPT | Performed by: INTERNAL MEDICINE

## 2024-01-05 PROCEDURE — 80053 COMPREHEN METABOLIC PANEL: CPT | Performed by: INTERNAL MEDICINE

## 2024-01-05 PROCEDURE — 84443 ASSAY THYROID STIM HORMONE: CPT | Performed by: INTERNAL MEDICINE

## 2024-01-05 PROCEDURE — 36415 COLL VENOUS BLD VENIPUNCTURE: CPT | Mod: PN | Performed by: INTERNAL MEDICINE

## 2024-01-10 ENCOUNTER — OFFICE VISIT (OUTPATIENT)
Dept: INTERNAL MEDICINE | Facility: CLINIC | Age: 69
End: 2024-01-10
Payer: MEDICARE

## 2024-01-10 VITALS
SYSTOLIC BLOOD PRESSURE: 118 MMHG | HEIGHT: 66 IN | RESPIRATION RATE: 20 BRPM | HEART RATE: 56 BPM | TEMPERATURE: 97 F | BODY MASS INDEX: 33.73 KG/M2 | OXYGEN SATURATION: 98 % | DIASTOLIC BLOOD PRESSURE: 70 MMHG | WEIGHT: 209.88 LBS

## 2024-01-10 DIAGNOSIS — I70.0 AORTIC ATHEROSCLEROSIS: ICD-10-CM

## 2024-01-10 PROCEDURE — 99999 PR PBB SHADOW E&M-EST. PATIENT-LVL III: CPT | Mod: PBBFAC,,, | Performed by: INTERNAL MEDICINE

## 2024-01-10 PROCEDURE — 99215 OFFICE O/P EST HI 40 MIN: CPT | Mod: S$GLB,,, | Performed by: INTERNAL MEDICINE

## 2024-01-10 RX ORDER — HYDROCODONE BITARTRATE AND ACETAMINOPHEN 10; 325 MG/1; MG/1
1 TABLET ORAL EVERY 12 HOURS PRN
Qty: 60 TABLET | Refills: 0 | Status: SHIPPED | OUTPATIENT
Start: 2024-01-18

## 2024-01-10 RX ORDER — HYDROCODONE BITARTRATE AND ACETAMINOPHEN 10; 325 MG/1; MG/1
1 TABLET ORAL EVERY 12 HOURS PRN
Qty: 60 TABLET | Refills: 0 | Status: SHIPPED | OUTPATIENT
Start: 2024-02-17

## 2024-01-10 RX ORDER — HYDROCODONE BITARTRATE AND ACETAMINOPHEN 10; 325 MG/1; MG/1
1 TABLET ORAL EVERY 12 HOURS PRN
Qty: 60 TABLET | Refills: 0 | Status: SHIPPED | OUTPATIENT
Start: 2024-03-18

## 2024-01-10 NOTE — PROGRESS NOTES
HPI:  Patient is a 68-year-old female who comes in today for follow-up of her hypertension, lipids, history of breast cancer, obesity, and for her annual physical.  Patient has been doing relatively well.  Her blood pressures been controlled.  She has been having problems with her right shoulder for which she has been seen by Orthopedics.  She recently had a steroid injection done was referred to see physical therapy.    Current MEDS: medcard review, verified and update  Allergies: Per the electronic medical record    Past Medical History:   Diagnosis Date    Breast cancer 1996    right    Chronic pain syndrome     Generalized osteoarthrosis, involving multiple sites     s/p THR bilateral    History of breast cancer 2007    lumpectomy/XRT    HTN (hypertension)     Hyperlipidemia     Morbid obesity with BMI of 40.0-44.9, adult        Past Surgical History:   Procedure Laterality Date    ANTERIOR CERVICAL DISCECTOMY W/ FUSION Left 01/03/2022    Procedure: DISCECTOMY, SPINE, CERVICAL, ANTERIOR APPROACH, WITH FUSION;  Surgeon: Pradip Fernando MD;  Location: [facility] [place] OR;  Service: Neurosurgery;  Laterality: Left;  Three Level ACDF  C4/5, 5/6, 6/7      BREAST LUMPECTOMY Right 2006    XRT    CATARACT EXTRACTION W/  INTRAOCULAR LENS IMPLANT Left 01/15/2020    CATARACT EXTRACTION W/  INTRAOCULAR LENS IMPLANT Right 01/29/2020    COLONOSCOPY N/A 10/15/2015    Procedure: COLONOSCOPY;  Surgeon: Maylin Shipley MD;  Location: [facility] [place] ENDO;  Service: Endoscopy;  Laterality: N/A;    COLONOSCOPY N/A 11/30/2022    Procedure: COLONOSCOPY;  Surgeon: Gary Toussaint MD;  Location: [facility] [place] ENDO;  Service: General;  Laterality: N/A;    EPIDURAL STEROID INJECTION N/A 11/03/2020    Procedure: Lumbar L5/S1 IL KATYA;  Surgeon: Paul Lobato MD;  Location: [facility] PAIN MGT;  Service: Pain Management;  Laterality: N/A;    EPIDURAL STEROID INJECTION INTO CERVICAL SPINE N/A 09/25/2020    Procedure: C7/T1 IL KATYA;  Surgeon: Paul Lobato MD;   Location: [facility] PAIN MGT;  Service: Pain Management;  Laterality: N/A;    EPIDURAL STEROID INJECTION INTO CERVICAL SPINE N/A 10/05/2021    Procedure: C7/T1 IL KATYA with RN IV sedation;  Surgeon: Cynthia Soares MD;  Location: [facility] PAIN MGT;  Service: Pain Management;  Laterality: N/A;    ESOPHAGOGASTRODUODENOSCOPY N/A 11/04/2021    Procedure: ESOPHAGOGASTRODUODENOSCOPY (EGD);  Surgeon: Blas Hampton MD;  Location: [facility] ENDO;  Service: Endoscopy;  Laterality: N/A;    HYSTERECTOMY      LAPAROSCOPIC LYSIS OF ADHESIONS N/A 08/30/2022    Procedure: LYSIS, ADHESIONS, LAPAROSCOPIC;  Surgeon: Blas Hampton MD;  Location: [facility] OR;  Service: General;  Laterality: N/A;    PLACEMENT OF ACELLULAR HUMAN DERMAL ALLOGRAFT Left 01/03/2022    Procedure: APPLICATION, ACELLULAR HUMAN DERMAL ALLOGRAFT;  Surgeon: Pradip Fernando MD;  Location: [facility] OR;  Service: Neurosurgery;  Laterality: Left;    PLACEMENT OF ACELLULAR HUMAN DERMAL ALLOGRAFT N/A 02/01/2023    Procedure: APPLICATION, ACELLULAR HUMAN DERMAL ALLOGRAFT;  Surgeon: Pradip Fernando MD;  Location: [facility] OR;  Service: Neurosurgery;  Laterality: N/A;    ROBOT-ASSISTED LAPAROSCOPIC SLEEVE GASTRECTOMY USING DA FLORENTINO XI N/A 08/30/2022    Procedure: XI ROBOTIC SLEEVE GASTRECTOMY;  Surgeon: Blas Hampton MD;  Location: [facility] OR;  Service: General;  Laterality: N/A;    TRANSFORAMINAL EPIDURAL INJECTION OF STEROID Left 09/17/2019    Procedure: Left C5/6 TF KATYA w/ RN IV sedation;  Surgeon: Paul Lobato MD;  Location: [facility] PAIN MGT;  Service: Pain Management;  Laterality: Left;    TRANSFORAMINAL LUMBAR INTERBODY FUSION (TLIF) USING COMPUTER-ASSISTED NAVIGATION Bilateral 02/01/2023    Procedure: FUSION, SPINE, LUMBAR, TLIF, USING COMPUTER-ASSISTED NAVIGATION;  Surgeon: Pradip Fernando MD;  Location: [facility] OR;  Service: Neurosurgery;  Laterality: Bilateral;  TLIF L3-4/4-5 possibly L5-S1       SHx: per the electronic medical record    FHx: recorded in the electronic medical  "record    ROS:    denies any chest pains or shortness of breath. Denies any nausea, vomiting or diarrhea. Denies any fever, chills or sweats. Denies any change in weight, voice, stool, skin or hair. Denies any dysuria, dyspepsia or dysphagia. Denies any change in vision, hearing or headaches. Denies any swollen lymph nodes or loss of memory.    PE:  /70 (BP Location: Left arm, Patient Position: Sitting, BP Method: Large (Manual))   Pulse (!) 56   Temp 96.9 °F (36.1 °C) (Tympanic)   Resp 20   Ht 5' 6.25" (1.683 m)   Wt 95.2 kg (209 lb 14.1 oz)   SpO2 98%   BMI 33.62 kg/m²   Gen: Well-developed, well-nourished, female, in no acute distress, oriented x3  HEENT: neck is supple, no adenopathy, carotids 2+ equal without bruits, thyroid exam normal size without nodules.  CHEST: clear to auscultation and percussion  CVS: regular rate and rhythm without significant murmur, gallop, or rubs  ABD: soft, benign, no rebound no guarding, no distention. Bowel sounds are normal.     Nontender,  no palpable masses, no organomegaly and no audible bruits.  BREAST: no masses.  No nipple inversion, retraction, or deviation.  She has scar in the right breast consistent with her lumpectomy and radiation.  EXT: no clubbing, cyanosis, or edema  LYMPH: no cervical, inguinal, or axillary adenopathy  FEET: no loss of sensation.  No ulcers or pressure sores.  NEURO: gait normal.  Cranial nerves II- XII intact. No nystagmus.  Speech normal.   Gross motor and sensory unremarkable.  PELVIC: deferred    Lab Results   Component Value Date    WBC 5.72 07/03/2023    HGB 13.8 07/03/2023    HCT 44.1 07/03/2023     07/03/2023    CHOL 183 01/05/2024    TRIG 60 01/05/2024    HDL 56 01/05/2024    ALT 10 01/05/2024    AST 16 01/05/2024     01/05/2024    K 4.3 01/05/2024     01/05/2024    CREATININE 0.9 01/05/2024    BUN 16 01/05/2024    CO2 23 01/05/2024    TSH 2.381 01/05/2024    INR 1.1 01/23/2023    HGBA1C 5.5 07/03/2023 "       Impression:  Hypertension, lipids both very well controlled on current medical therapy  Chronic pain syndrome, on stable doses of hydrocodone  History of breast cancer, follow-up is up-to-date.  Patient Active Problem List   Diagnosis    HTN (hypertension)    Generalized osteoarthrosis, involving multiple sites    History of breast cancer    Hyperlipidemia    Myofascial pain    Bilateral carpal tunnel syndrome    Lumbar radiculopathy    Chronic pain syndrome    Cervical radiculopathy    Class 1 obesity due to excess calories with serious comorbidity and body mass index (BMI) of 32.0 to 32.9 in adult    DDD (degenerative disc disease), cervical    Right shoulder pain    Calcific tendinitis of right shoulder    Impingement of right shoulder       Plan:   Orders Placed This Encounter    HYDROcodone-acetaminophen (NORCO)  mg per tablet    HYDROcodone-acetaminophen (NORCO)  mg per tablet    HYDROcodone-acetaminophen (NORCO)  mg per tablet     She was given refills of the hydrocodone.  She will be seen every 3 months or otherwise as needed.    This note is generated with speech recognition software and is subject to transcription error and sound alike phrases that may be missed by proofreading.

## 2024-01-24 ENCOUNTER — PATIENT MESSAGE (OUTPATIENT)
Dept: INTERNAL MEDICINE | Facility: CLINIC | Age: 69
End: 2024-01-24
Payer: MEDICARE

## 2024-01-24 RX ORDER — POTASSIUM CHLORIDE 20 MEQ/1
20 TABLET, EXTENDED RELEASE ORAL 2 TIMES DAILY
Qty: 60 TABLET | Refills: 11 | Status: SHIPPED | OUTPATIENT
Start: 2024-01-24

## 2024-01-24 NOTE — TELEPHONE ENCOUNTER
Pt called and stated she was on potassium onece before for  cramping/and she has been cramping up every night/wants a new Rx sent to her pharmacy/ please advise  Located the RX in her history/

## 2024-02-21 RX ORDER — CITALOPRAM 40 MG/1
40 TABLET, FILM COATED ORAL
Qty: 90 TABLET | Refills: 3 | Status: SHIPPED | OUTPATIENT
Start: 2024-02-21

## 2024-02-21 RX ORDER — CLONIDINE HYDROCHLORIDE 0.2 MG/1
TABLET ORAL
Qty: 90 TABLET | Refills: 3 | Status: SHIPPED | OUTPATIENT
Start: 2024-02-21

## 2024-03-06 DIAGNOSIS — Z98.1 S/P LUMBAR FUSION: ICD-10-CM

## 2024-03-06 DIAGNOSIS — F41.1 GAD (GENERALIZED ANXIETY DISORDER): ICD-10-CM

## 2024-03-07 RX ORDER — PREGABALIN 100 MG/1
100 CAPSULE ORAL 2 TIMES DAILY
Qty: 60 CAPSULE | Refills: 5 | Status: SHIPPED | OUTPATIENT
Start: 2024-03-07

## 2024-03-07 RX ORDER — ALPRAZOLAM 1 MG/1
TABLET ORAL
Qty: 30 TABLET | Refills: 5 | Status: SHIPPED | OUTPATIENT
Start: 2024-03-07

## 2024-03-07 NOTE — TELEPHONE ENCOUNTER
Requested Prescriptions     Pending Prescriptions Disp Refills    pregabalin (LYRICA) 100 MG capsule [Pharmacy Med Name: PREGABALIN 100MG CAPSULES] 60 capsule      Sig: TAKE 1 CAPSULE(100 MG) BY MOUTH TWICE DAILY    ALPRAZolam (XANAX) 1 MG tablet [Pharmacy Med Name: ALPRAZOLAM 1MG TABLETS] 30 tablet      Sig: TAKE 1 TABLET(1 MG) BY MOUTH EVERY NIGHT AS NEEDED     LV 01/10/2024   NV 04/10/2024  LF 08/24/2023 lyrica  09/05/2023 xanax

## 2024-04-08 ENCOUNTER — OFFICE VISIT (OUTPATIENT)
Dept: ORTHOPEDICS | Facility: CLINIC | Age: 69
End: 2024-04-08
Payer: MEDICARE

## 2024-04-08 VITALS — WEIGHT: 209.88 LBS | BODY MASS INDEX: 33.73 KG/M2 | HEIGHT: 66 IN

## 2024-04-08 DIAGNOSIS — M75.01 ADHESIVE CAPSULITIS OF RIGHT SHOULDER: Primary | ICD-10-CM

## 2024-04-08 PROCEDURE — 1160F RVW MEDS BY RX/DR IN RCRD: CPT | Mod: CPTII,S$GLB,, | Performed by: STUDENT IN AN ORGANIZED HEALTH CARE EDUCATION/TRAINING PROGRAM

## 2024-04-08 PROCEDURE — 99214 OFFICE O/P EST MOD 30 MIN: CPT | Mod: 25,S$GLB,, | Performed by: STUDENT IN AN ORGANIZED HEALTH CARE EDUCATION/TRAINING PROGRAM

## 2024-04-08 PROCEDURE — 3008F BODY MASS INDEX DOCD: CPT | Mod: CPTII,S$GLB,, | Performed by: STUDENT IN AN ORGANIZED HEALTH CARE EDUCATION/TRAINING PROGRAM

## 2024-04-08 PROCEDURE — 1101F PT FALLS ASSESS-DOCD LE1/YR: CPT | Mod: CPTII,S$GLB,, | Performed by: STUDENT IN AN ORGANIZED HEALTH CARE EDUCATION/TRAINING PROGRAM

## 2024-04-08 PROCEDURE — 1159F MED LIST DOCD IN RCRD: CPT | Mod: CPTII,S$GLB,, | Performed by: STUDENT IN AN ORGANIZED HEALTH CARE EDUCATION/TRAINING PROGRAM

## 2024-04-08 PROCEDURE — 3288F FALL RISK ASSESSMENT DOCD: CPT | Mod: CPTII,S$GLB,, | Performed by: STUDENT IN AN ORGANIZED HEALTH CARE EDUCATION/TRAINING PROGRAM

## 2024-04-08 PROCEDURE — 1125F AMNT PAIN NOTED PAIN PRSNT: CPT | Mod: CPTII,S$GLB,, | Performed by: STUDENT IN AN ORGANIZED HEALTH CARE EDUCATION/TRAINING PROGRAM

## 2024-04-08 PROCEDURE — 99999 PR PBB SHADOW E&M-EST. PATIENT-LVL III: CPT | Mod: PBBFAC,,, | Performed by: STUDENT IN AN ORGANIZED HEALTH CARE EDUCATION/TRAINING PROGRAM

## 2024-04-08 PROCEDURE — 20610 DRAIN/INJ JOINT/BURSA W/O US: CPT | Mod: RT,S$GLB,, | Performed by: STUDENT IN AN ORGANIZED HEALTH CARE EDUCATION/TRAINING PROGRAM

## 2024-04-08 RX ORDER — TRIAMCINOLONE ACETONIDE 40 MG/ML
40 INJECTION, SUSPENSION INTRA-ARTICULAR; INTRAMUSCULAR
Status: DISCONTINUED | OUTPATIENT
Start: 2024-04-08 | End: 2024-04-08 | Stop reason: HOSPADM

## 2024-04-08 RX ORDER — MELOXICAM 15 MG/1
15 TABLET ORAL DAILY
Qty: 30 TABLET | Refills: 2 | Status: SHIPPED | OUTPATIENT
Start: 2024-04-08

## 2024-04-08 RX ADMIN — TRIAMCINOLONE ACETONIDE 40 MG: 40 INJECTION, SUSPENSION INTRA-ARTICULAR; INTRAMUSCULAR at 03:04

## 2024-04-08 NOTE — PROCEDURES
Right Shoulder Subacromial Injection: R subacromial bursa    Date/Time: 4/8/2024 3:00 PM    Performed by: Luisa Gonzalez MD  Authorized by: Luisa Gonzalez MD    Consent Done?:  Yes (Verbal)  Indications:  Pain  Timeout: prior to procedure the correct patient, procedure, and site was verified      Local anesthesia used?: Yes    Anesthesia:  Local infiltration  Local anesthetic:  Lidocaine 1% without epinephrine  Anesthetic total (ml):  5      Details:  Needle Size:  20 G  Ultrasonic Guidance for needle placement?: No    Approach:  Posterior  Location:  Shoulder  Site:  R subacromial bursa  Medications:  40 mg triamcinolone acetonide 40 mg/mL  Patient tolerance:  Patient tolerated the procedure well with no immediate complications

## 2024-04-08 NOTE — PROGRESS NOTES
Hand Surgery Clinic Follow Up Note    Chief Complaint  Chief Complaint   Patient presents with    Right Shoulder - Pain       History of Present Illness  68-year-old right-hand dominant female who is retired presents for follow up.  She was previously seen in January for adhesive capsulitis.  She underwent a steroid injection and was prescribed meloxicam for pain.  Patient states the injection worked great until about 3 weeks ago when the symptoms returned.  She would like another injection today.  She was also referred to therapy at her last visit but did not feel that it was necessary because she was not having any pain.  She additionally felt that the meloxicam helped but it was hoping that I can prescribe a stronger dose because she has been supplementing with ibuprofen.    Review of Systems  Review of systems negative for chest pain, shortness of breath, fevers, chills, nausea/vomiting.    Vital Signs  There were no vitals filed for this visit.    Physical Exam  Constitutional: Appears well-developed and well-nourished. No distress.   HENT:   Head: Normocephalic.   Eyes: EOM are normal.   Pulmonary/Chest: Effort normal.   Neurological: Oriented to person, place, and time.   Psychiatric: Normal mood and affect.     Right Upper Extremity:  No abrasions, lacerations, wounds.  No swelling.  No erythema.  Shoulder abduction to 100°.  Shoulder flexion to 100°.  Internal rotation to L5.  External rotation to 20°.  Positive Neer.  Positive Mendez.  No tenderness over the AC joint.  No pain with cross-body adduction test.  Pain with Padmini's testing but 5/5 strength.  5/5 external rotation strength but with associated pain.  5/5 internal rotation strength but with associated pain.  Sensation is intact in the median, radial, and ulnar nerve distributions.  Palpable radial pulse.    Imaging  No new imaging obtained today.    Assessment and Plan  68-year-old right-hand dominant female presents with a adhesive capsulitis of  the right shoulder which has been symptomatic for 11 months.  I discussed potential treatment options.  Patient would like to proceed with another steroid injection today.  Patient tolerated procedure well.  See procedure note.  Additionally, I increased patient's dose of meloxicam to 15 mg.  I discussed that she should take this with food and she should not supplement with ibuprofen to ensure she does not develop issues with stomach ulcers.  We will hold off on therapy for now as it was not needed last time.  Follow up in clinic in 3 months for re-evaluation.    Luisa Gonzalez MD  Orthopaedic Hand Surgery

## 2024-04-10 ENCOUNTER — OFFICE VISIT (OUTPATIENT)
Dept: INTERNAL MEDICINE | Facility: CLINIC | Age: 69
End: 2024-04-10
Payer: MEDICARE

## 2024-04-10 VITALS
BODY MASS INDEX: 34.4 KG/M2 | HEART RATE: 52 BPM | DIASTOLIC BLOOD PRESSURE: 82 MMHG | SYSTOLIC BLOOD PRESSURE: 150 MMHG | OXYGEN SATURATION: 99 % | WEIGHT: 214.06 LBS | HEIGHT: 66 IN | TEMPERATURE: 97 F

## 2024-04-10 DIAGNOSIS — I10 PRIMARY HYPERTENSION: ICD-10-CM

## 2024-04-10 DIAGNOSIS — F33.1 MODERATE EPISODE OF RECURRENT MAJOR DEPRESSIVE DISORDER: ICD-10-CM

## 2024-04-10 DIAGNOSIS — G89.4 CHRONIC PAIN SYNDROME: ICD-10-CM

## 2024-04-10 DIAGNOSIS — E78.00 PURE HYPERCHOLESTEROLEMIA: Primary | ICD-10-CM

## 2024-04-10 DIAGNOSIS — M50.30 DDD (DEGENERATIVE DISC DISEASE), CERVICAL: Chronic | ICD-10-CM

## 2024-04-10 DIAGNOSIS — Z85.3 HISTORY OF BREAST CANCER: ICD-10-CM

## 2024-04-10 DIAGNOSIS — M54.12 CERVICAL RADICULOPATHY: Chronic | ICD-10-CM

## 2024-04-10 PROCEDURE — 1125F AMNT PAIN NOTED PAIN PRSNT: CPT | Mod: CPTII,S$GLB,, | Performed by: INTERNAL MEDICINE

## 2024-04-10 PROCEDURE — 3288F FALL RISK ASSESSMENT DOCD: CPT | Mod: CPTII,S$GLB,, | Performed by: INTERNAL MEDICINE

## 2024-04-10 PROCEDURE — 99999 PR PBB SHADOW E&M-EST. PATIENT-LVL III: CPT | Mod: PBBFAC,,, | Performed by: INTERNAL MEDICINE

## 2024-04-10 PROCEDURE — 1160F RVW MEDS BY RX/DR IN RCRD: CPT | Mod: CPTII,S$GLB,, | Performed by: INTERNAL MEDICINE

## 2024-04-10 PROCEDURE — 1101F PT FALLS ASSESS-DOCD LE1/YR: CPT | Mod: CPTII,S$GLB,, | Performed by: INTERNAL MEDICINE

## 2024-04-10 PROCEDURE — 3079F DIAST BP 80-89 MM HG: CPT | Mod: CPTII,S$GLB,, | Performed by: INTERNAL MEDICINE

## 2024-04-10 PROCEDURE — 1159F MED LIST DOCD IN RCRD: CPT | Mod: CPTII,S$GLB,, | Performed by: INTERNAL MEDICINE

## 2024-04-10 PROCEDURE — 99213 OFFICE O/P EST LOW 20 MIN: CPT | Mod: S$GLB,,, | Performed by: INTERNAL MEDICINE

## 2024-04-10 PROCEDURE — 3077F SYST BP >= 140 MM HG: CPT | Mod: CPTII,S$GLB,, | Performed by: INTERNAL MEDICINE

## 2024-04-10 PROCEDURE — 3008F BODY MASS INDEX DOCD: CPT | Mod: CPTII,S$GLB,, | Performed by: INTERNAL MEDICINE

## 2024-04-10 RX ORDER — HYDROCODONE BITARTRATE AND ACETAMINOPHEN 10; 325 MG/1; MG/1
1 TABLET ORAL EVERY 12 HOURS PRN
Qty: 60 TABLET | Refills: 0 | Status: SHIPPED | OUTPATIENT
Start: 2024-05-10

## 2024-04-10 RX ORDER — HYDROCODONE BITARTRATE AND ACETAMINOPHEN 10; 325 MG/1; MG/1
1 TABLET ORAL EVERY 12 HOURS PRN
Qty: 60 TABLET | Refills: 0 | Status: SHIPPED | OUTPATIENT
Start: 2024-04-10

## 2024-04-10 RX ORDER — HYDROCODONE BITARTRATE AND ACETAMINOPHEN 10; 325 MG/1; MG/1
1 TABLET ORAL EVERY 12 HOURS PRN
Qty: 60 TABLET | Refills: 0 | Status: SHIPPED | OUTPATIENT
Start: 2024-06-10

## 2024-04-10 RX ORDER — HYDROCODONE BITARTRATE AND ACETAMINOPHEN 10; 325 MG/1; MG/1
1 TABLET ORAL EVERY 12 HOURS PRN
Qty: 60 TABLET | Refills: 0 | Status: SHIPPED | OUTPATIENT
Start: 2024-04-10 | End: 2024-04-10 | Stop reason: SDUPTHER

## 2024-04-10 NOTE — TELEPHONE ENCOUNTER
LOV 01/10/2024  Scheduled 05/08/2024    Patient states that she needs this medication refilled. She was suppose to be seen in clinic today to get refill (clinic closed due to power outage)

## 2024-04-10 NOTE — PROGRESS NOTES
"HPI:  Pt here to get refills of her Norco. She is doing the same. No change in status. Pain controlled with current dose of Norco. No new problems    Current meds have been verified and updated per the EMR  Exam:BP (!) 150/82 (BP Location: Left arm, Patient Position: Sitting, BP Method: Large (Manual))   Pulse (!) 52   Temp 97 °F (36.1 °C) (Tympanic)   Ht 5' 6.25" (1.683 m)   Wt 97.1 kg (214 lb 1.1 oz)   SpO2 99%   BMI 34.29 kg/m²   Exam deferred    Lab Results   Component Value Date    WBC 5.72 07/03/2023    HGB 13.8 07/03/2023    HCT 44.1 07/03/2023     07/03/2023    CHOL 183 01/05/2024    TRIG 60 01/05/2024    HDL 56 01/05/2024    ALT 10 01/05/2024    AST 16 01/05/2024     01/05/2024    K 4.3 01/05/2024     01/05/2024    CREATININE 0.9 01/05/2024    BUN 16 01/05/2024    CO2 23 01/05/2024    TSH 2.381 01/05/2024    INR 1.1 01/23/2023    HGBA1C 5.5 07/03/2023       Impression:  Chronic pain syndrome due to OA of spine  Patient Active Problem List   Diagnosis    HTN (hypertension)    Generalized osteoarthrosis, involving multiple sites    History of breast cancer    Hyperlipidemia    Myofascial pain    Bilateral carpal tunnel syndrome    Lumbar radiculopathy    Chronic pain syndrome    Cervical radiculopathy    Class 1 obesity due to excess calories with serious comorbidity and body mass index (BMI) of 32.0 to 32.9 in adult    DDD (degenerative disc disease), cervical    Right shoulder pain    Calcific tendinitis of right shoulder    Impingement of right shoulder    Moderate episode of recurrent major depressive disorder       Plan:  Orders Placed This Encounter    HYDROcodone-acetaminophen (NORCO)  mg per tablet    HYDROcodone-acetaminophen (NORCO)  mg per tablet    HYDROcodone-acetaminophen (NORCO)  mg per tablet     Meds refilled for three mths    This note is generated with speech recognition software and is subject to transcription error and sound alike phrases that " may be missed by proofreading.

## 2024-04-30 ENCOUNTER — TELEPHONE (OUTPATIENT)
Dept: INTERNAL MEDICINE | Facility: CLINIC | Age: 69
End: 2024-04-30
Payer: MEDICARE

## 2024-05-13 ENCOUNTER — PATIENT MESSAGE (OUTPATIENT)
Dept: INTERNAL MEDICINE | Facility: CLINIC | Age: 69
End: 2024-05-13
Payer: MEDICARE

## 2024-05-13 RX ORDER — ZOLPIDEM TARTRATE 10 MG/1
TABLET ORAL
Qty: 20 TABLET | Refills: 5 | Status: SHIPPED | OUTPATIENT
Start: 2024-05-13 | End: 2024-05-15 | Stop reason: SDUPTHER

## 2024-05-13 NOTE — TELEPHONE ENCOUNTER
Requested Prescriptions     Pending Prescriptions Disp Refills    zolpidem (AMBIEN) 10 mg Tab 20 tablet 5     Sig: TAKE ONE TABLET BY MOUTH AT BEDTIME AS NEEDED Strength: 10 mg     LV 04/10/2024  NV none scheduled.   LF 11/07/2023

## 2024-05-15 ENCOUNTER — PATIENT MESSAGE (OUTPATIENT)
Dept: INTERNAL MEDICINE | Facility: CLINIC | Age: 69
End: 2024-05-15
Payer: MEDICARE

## 2024-05-15 RX ORDER — ZOLPIDEM TARTRATE 10 MG/1
TABLET ORAL
Qty: 20 TABLET | Refills: 5 | Status: SHIPPED | OUTPATIENT
Start: 2024-05-15

## 2024-05-17 ENCOUNTER — PATIENT MESSAGE (OUTPATIENT)
Dept: INTERNAL MEDICINE | Facility: CLINIC | Age: 69
End: 2024-05-17
Payer: MEDICARE

## 2024-06-03 ENCOUNTER — TELEPHONE (OUTPATIENT)
Dept: INTERNAL MEDICINE | Facility: CLINIC | Age: 69
End: 2024-06-03
Payer: MEDICARE

## 2024-06-03 NOTE — TELEPHONE ENCOUNTER
Spoke with pt, she stated she is at work right now but will send a BP reading tonight sometime when she gets off.

## 2024-06-04 ENCOUNTER — PATIENT MESSAGE (OUTPATIENT)
Dept: INTERNAL MEDICINE | Facility: CLINIC | Age: 69
End: 2024-06-04
Payer: MEDICARE

## 2024-06-04 VITALS — SYSTOLIC BLOOD PRESSURE: 140 MMHG | DIASTOLIC BLOOD PRESSURE: 80 MMHG

## 2024-07-08 ENCOUNTER — OFFICE VISIT (OUTPATIENT)
Dept: INTERNAL MEDICINE | Facility: CLINIC | Age: 69
End: 2024-07-08
Payer: MEDICARE

## 2024-07-08 VITALS
OXYGEN SATURATION: 97 % | HEART RATE: 70 BPM | BODY MASS INDEX: 35.81 KG/M2 | WEIGHT: 223.56 LBS | DIASTOLIC BLOOD PRESSURE: 82 MMHG | TEMPERATURE: 98 F | SYSTOLIC BLOOD PRESSURE: 138 MMHG

## 2024-07-08 DIAGNOSIS — Z85.3 HISTORY OF BREAST CANCER: ICD-10-CM

## 2024-07-08 DIAGNOSIS — E66.09 CLASS 1 OBESITY DUE TO EXCESS CALORIES WITH SERIOUS COMORBIDITY AND BODY MASS INDEX (BMI) OF 32.0 TO 32.9 IN ADULT: ICD-10-CM

## 2024-07-08 DIAGNOSIS — I10 PRIMARY HYPERTENSION: ICD-10-CM

## 2024-07-08 DIAGNOSIS — M54.16 LUMBAR RADICULOPATHY: ICD-10-CM

## 2024-07-08 DIAGNOSIS — E78.00 PURE HYPERCHOLESTEROLEMIA: Primary | ICD-10-CM

## 2024-07-08 PROCEDURE — 1159F MED LIST DOCD IN RCRD: CPT | Mod: CPTII,S$GLB,, | Performed by: INTERNAL MEDICINE

## 2024-07-08 PROCEDURE — 1100F PTFALLS ASSESS-DOCD GE2>/YR: CPT | Mod: CPTII,S$GLB,, | Performed by: INTERNAL MEDICINE

## 2024-07-08 PROCEDURE — 1160F RVW MEDS BY RX/DR IN RCRD: CPT | Mod: CPTII,S$GLB,, | Performed by: INTERNAL MEDICINE

## 2024-07-08 PROCEDURE — 3288F FALL RISK ASSESSMENT DOCD: CPT | Mod: CPTII,S$GLB,, | Performed by: INTERNAL MEDICINE

## 2024-07-08 PROCEDURE — 99999 PR PBB SHADOW E&M-EST. PATIENT-LVL IV: CPT | Mod: PBBFAC,,, | Performed by: INTERNAL MEDICINE

## 2024-07-08 PROCEDURE — 99214 OFFICE O/P EST MOD 30 MIN: CPT | Mod: S$GLB,,, | Performed by: INTERNAL MEDICINE

## 2024-07-08 PROCEDURE — 3079F DIAST BP 80-89 MM HG: CPT | Mod: CPTII,S$GLB,, | Performed by: INTERNAL MEDICINE

## 2024-07-08 PROCEDURE — 1126F AMNT PAIN NOTED NONE PRSNT: CPT | Mod: CPTII,S$GLB,, | Performed by: INTERNAL MEDICINE

## 2024-07-08 PROCEDURE — 3008F BODY MASS INDEX DOCD: CPT | Mod: CPTII,S$GLB,, | Performed by: INTERNAL MEDICINE

## 2024-07-08 PROCEDURE — 3075F SYST BP GE 130 - 139MM HG: CPT | Mod: CPTII,S$GLB,, | Performed by: INTERNAL MEDICINE

## 2024-07-08 RX ORDER — HYDROCODONE BITARTRATE AND ACETAMINOPHEN 10; 325 MG/1; MG/1
1 TABLET ORAL EVERY 12 HOURS PRN
Qty: 60 TABLET | Refills: 0 | Status: SHIPPED | OUTPATIENT
Start: 2024-08-17

## 2024-07-08 RX ORDER — HYDROCODONE BITARTRATE AND ACETAMINOPHEN 10; 325 MG/1; MG/1
1 TABLET ORAL EVERY 12 HOURS PRN
Qty: 60 TABLET | Refills: 0 | Status: SHIPPED | OUTPATIENT
Start: 2024-09-16

## 2024-07-08 RX ORDER — HYDROCODONE BITARTRATE AND ACETAMINOPHEN 10; 325 MG/1; MG/1
1 TABLET ORAL EVERY 12 HOURS PRN
Qty: 60 TABLET | Refills: 0 | Status: SHIPPED | OUTPATIENT
Start: 2024-07-18

## 2024-07-08 NOTE — PROGRESS NOTES
HPI:  Patient is a 69-year-old female who comes in today for follow-up of her hypertension, lipids, depression and for refills of her hydrocodone that she uses for her arthritis.  Patient currently is having most of her problems with her right shoulder.  She has been seen by orthopedics who recommended to therapy for frozen shoulder.  She has also been receiving steroid shots.  She has a follow-up with them next week.  Her blood pressures been doing well.  She has no other significant problems or complaints at this time  Current meds have been verified and updated per the EMR  Exam:/[id number] (BP Location: Left arm, Patient Position: Sitting, BP Method: Large (Manual))   Pulse 70   Temp 97.6 °F (36.4 °C) (Tympanic)   Wt 101.4 kg (223 lb 8.7 oz)   SpO2 97%   BMI 35.81 kg/m²   Carotids 2+ equal without bruits, neck is supple without adenopathy  Chest clear  Cardiovascular regular rate and rhythm without murmur gallop or rub    Lab Results   Component Value Date    WBC 5.72 07/03/2023    HGB 13.8 07/03/2023    HCT 44.1 07/03/2023     07/03/2023    CHOL 183 01/05/2024    TRIG 60 01/05/2024    HDL 56 01/05/2024    ALT 10 01/05/2024    AST 16 01/05/2024     01/05/2024    K 4.3 01/05/2024     01/05/2024    CREATININE 0.9 01/05/2024    BUN 16 01/05/2024    CO2 23 01/05/2024    TSH 2.381 01/05/2024    INR 1.1 01/23/2023    HGBA1C 5.5 07/03/2023       Impression:  Hypertension lipids, both were all controlled on current therapy  Chronic lumbar and cervical disc disease with radiculopathy  History of breast cancer  Frozen shoulder right upper extremity, followed by Orthopedics  Patient Active Problem List   Diagnosis    HTN (hypertension)    Generalized osteoarthrosis, involving multiple sites    History of breast cancer    Hyperlipidemia    Myofascial pain    Bilateral carpal tunnel syndrome    Lumbar radiculopathy    Chronic pain syndrome    Cervical radiculopathy    Class 1 obesity due to excess  calories with serious comorbidity and body mass index (BMI) of 32.0 to 32.9 in adult    DDD (degenerative disc disease), cervical    Right shoulder pain    Calcific tendinitis of right shoulder    Impingement of right shoulder    Moderate episode of recurrent major depressive disorder       Plan:  Orders Placed This Encounter    Lipid Panel    Comprehensive Metabolic Panel    TSH    HYDROcodone-acetaminophen (NORCO)  mg per tablet    HYDROcodone-acetaminophen (NORCO)  mg per tablet    HYDROcodone-acetaminophen (NORCO)  mg per tablet     Patient was given refills of her hydrocodone for the next 3 months.  She will see me in 3 months with above lab work.    This note is generated with speech recognition software and is subject to transcription error and sound alike phrases that may be missed by proofreading.

## 2024-07-15 ENCOUNTER — OFFICE VISIT (OUTPATIENT)
Dept: ORTHOPEDICS | Facility: CLINIC | Age: 69
End: 2024-07-15
Payer: MEDICARE

## 2024-07-15 VITALS — HEIGHT: 66 IN | WEIGHT: 223.56 LBS | BODY MASS INDEX: 35.93 KG/M2

## 2024-07-15 DIAGNOSIS — M75.01 ADHESIVE CAPSULITIS OF RIGHT SHOULDER: Primary | ICD-10-CM

## 2024-07-15 PROCEDURE — 3288F FALL RISK ASSESSMENT DOCD: CPT | Mod: CPTII,S$GLB,, | Performed by: STUDENT IN AN ORGANIZED HEALTH CARE EDUCATION/TRAINING PROGRAM

## 2024-07-15 PROCEDURE — 99999 PR PBB SHADOW E&M-EST. PATIENT-LVL IV: CPT | Mod: PBBFAC,,, | Performed by: STUDENT IN AN ORGANIZED HEALTH CARE EDUCATION/TRAINING PROGRAM

## 2024-07-15 PROCEDURE — 20610 DRAIN/INJ JOINT/BURSA W/O US: CPT | Mod: RT,S$GLB,, | Performed by: STUDENT IN AN ORGANIZED HEALTH CARE EDUCATION/TRAINING PROGRAM

## 2024-07-15 PROCEDURE — 99214 OFFICE O/P EST MOD 30 MIN: CPT | Mod: 25,S$GLB,, | Performed by: STUDENT IN AN ORGANIZED HEALTH CARE EDUCATION/TRAINING PROGRAM

## 2024-07-15 PROCEDURE — 1160F RVW MEDS BY RX/DR IN RCRD: CPT | Mod: CPTII,S$GLB,, | Performed by: STUDENT IN AN ORGANIZED HEALTH CARE EDUCATION/TRAINING PROGRAM

## 2024-07-15 PROCEDURE — 3008F BODY MASS INDEX DOCD: CPT | Mod: CPTII,S$GLB,, | Performed by: STUDENT IN AN ORGANIZED HEALTH CARE EDUCATION/TRAINING PROGRAM

## 2024-07-15 PROCEDURE — 1101F PT FALLS ASSESS-DOCD LE1/YR: CPT | Mod: CPTII,S$GLB,, | Performed by: STUDENT IN AN ORGANIZED HEALTH CARE EDUCATION/TRAINING PROGRAM

## 2024-07-15 PROCEDURE — 1125F AMNT PAIN NOTED PAIN PRSNT: CPT | Mod: CPTII,S$GLB,, | Performed by: STUDENT IN AN ORGANIZED HEALTH CARE EDUCATION/TRAINING PROGRAM

## 2024-07-15 PROCEDURE — 1159F MED LIST DOCD IN RCRD: CPT | Mod: CPTII,S$GLB,, | Performed by: STUDENT IN AN ORGANIZED HEALTH CARE EDUCATION/TRAINING PROGRAM

## 2024-07-15 RX ORDER — TRIAMCINOLONE ACETONIDE 40 MG/ML
40 INJECTION, SUSPENSION INTRA-ARTICULAR; INTRAMUSCULAR
Status: DISCONTINUED | OUTPATIENT
Start: 2024-07-15 | End: 2024-07-15 | Stop reason: HOSPADM

## 2024-07-15 RX ADMIN — TRIAMCINOLONE ACETONIDE 40 MG: 40 INJECTION, SUSPENSION INTRA-ARTICULAR; INTRAMUSCULAR at 03:07

## 2024-07-15 NOTE — PROCEDURES
Right Shoulder Glenohumeral Injection: R glenohumeral    Date/Time: 7/15/2024 3:00 PM    Performed by: Luisa Gonzalez MD  Authorized by: Luisa Gonzalez MD    Consent Done?:  Yes (Verbal)  Indications:  Pain    Local anesthesia used?: Yes    Anesthesia:  Local infiltration  Local anesthetic:  Lidocaine 1% without epinephrine  Anesthetic total (ml):  4      Details:  Needle Size:  22 G  Ultrasonic Guidance for needle placement?: No    Approach:  Posterior  Location:  Shoulder  Site:  R glenohumeral  Medications:  40 mg triamcinolone acetonide 40 mg/mL  Patient tolerance:  Patient tolerated the procedure well with no immediate complications

## 2024-07-15 NOTE — PROGRESS NOTES
Hand Surgery Clinic Follow Up Note    Chief Complaint  Chief Complaint   Patient presents with    Right Shoulder - Pain       History of Present Illness  69 year old right hand dominant female presented for follow up. She has previously been diagnosed with right adhesive capsulitis. She underwent steroid injections in Jan of 2024 as well as 4/8/24. Most recent injection lasted about a month. Patient has been self treating with lidocaine patches. Pain level is an 8 out of 10. Of note, she has had issues with falls lately. She says she has started falling about once per month. She does not know why this is happening. She does have a history of cervical and lumbar surgery in the last couple weeks.    Review of Systems  Review of systems negative for chest pain, shortness of breath, fevers, chills, nausea/vomiting.    Vital Signs  There were no vitals filed for this visit.    Physical Exam  Constitutional: Appears well-developed and well-nourished. No distress.   HENT:   Head: Normocephalic.   Eyes: EOM are normal.   Pulmonary/Chest: Effort normal.   Neurological: Oriented to person, place, and time.   Psychiatric: Normal mood and affect.     Right Upper Extremity:  No abrasions, lacerations, wounds.  No swelling.  No erythema.  Shoulder abduction to 100°.  Shoulder flexion to 100°.  Internal rotation to L5.  External rotation to 20°.  Positive Neer.  Positive Mendez.  No tenderness over the AC joint.  No pain with cross-body adduction test.  Pain with Padmini's testing but 5/5 strength.  5/5 external rotation strength but with associated pain.  5/5 internal rotation strength but with associated pain.  Sensation is intact in the median, radial, and ulnar nerve distributions.  Palpable radial pulse.    Imaging  No new imaging obtained today.    Assessment and Plan  69 year old right hand dominant female presents for follow up. She has adhesive capsulitis of the right shoulder which has been symptomatic for 14 months. She  is interested in another injection today. See procedure note. Patient tolerated procedure well. Additionally, I placed a referral to [facility] physical therapy for this as well.    Patient is already established with the spine clinic here. She has had falls recently. I reached out to the spine team and they are going to get her scheduled to be reevaluated.    Follow up in clinic as needed for adhesive capsulitis management.    Luisa Gonzalez MD  Orthopaedic Hand Surgery

## 2024-07-16 ENCOUNTER — TELEPHONE (OUTPATIENT)
Dept: PAIN MEDICINE | Facility: CLINIC | Age: 69
End: 2024-07-16
Payer: MEDICARE

## 2024-07-16 NOTE — TELEPHONE ENCOUNTER
Patient stated she had a lot of falls the past 6 months and when she do fall its always on her right side and she always go to the right. Patient said she was diagnosed with frozen shoulder. Patient stated she fell a week and a half ago and fell on her head. In the process she injured her neck back and shoulders. Per Ms. Temitope, got her scheduled for an appt..    Usha REVELES

## 2024-07-17 ENCOUNTER — OFFICE VISIT (OUTPATIENT)
Dept: PAIN MEDICINE | Facility: CLINIC | Age: 69
End: 2024-07-17
Payer: MEDICARE

## 2024-07-17 DIAGNOSIS — M54.2 CERVICALGIA: ICD-10-CM

## 2024-07-17 DIAGNOSIS — M54.16 LUMBAR RADICULOPATHY: ICD-10-CM

## 2024-07-17 DIAGNOSIS — R29.6 FALLS FREQUENTLY: Primary | ICD-10-CM

## 2024-07-17 PROCEDURE — 99213 OFFICE O/P EST LOW 20 MIN: CPT | Mod: 95,,, | Performed by: PHYSICIAN ASSISTANT

## 2024-07-17 NOTE — PROGRESS NOTES
"EST Patient Chronic Pain Note (Follow up Visit)    Referring Physician: No ref. provider found    PCP: Elias Cannon MD    The patient location is: home  The chief complaint leading to consultation is: falls    Visit type: audiovisual    Face to Face time with patient: 15-20 minutes of total time spent on the encounter, which includes face to face time and non-face to face time preparing to see the patient (eg, review of tests), Obtaining and/or reviewing separately obtained history, Documenting clinical information in the electronic or other health record, Independently interpreting results (not separately reported) and communicating results to the patient/family/caregiver, or Care coordination (not separately reported).   Each patient to whom he or she provides medical services by telemedicine is:  (1) informed of the relationship between the physician and patient and the respective role of any other health care provider with respect to management of the patient; and (2) notified that he or she may decline to receive medical services by telemedicine and may withdraw from such care at any time.       SUBJECTIVE:    Interval History (7/17/2024): Mary Vo presents today for follow-up visit.   Patient reports pain as 6/10 today.  Patient is concerned due to recent multiple falls. Recalls 5-6 falls in the past 3 months.   Patient reports hitting her head last week while getting out of the bath tub. She denies LOC but reports her pain was severe at the time.     Patient has bilateral shoulder pain and lower back pain. She has been utilizing pain patches on the right side of her lower. About 6 months ago, she was diagnosed with frozen shoulder.   She has noticed that the pain radiates from both shoulders down into her arms.   Has noticed "lightheadedness" but denies HA, shortness of breath, chest pain, confusion or memory changes.   Patient takes 1 capsule 100 mg   Lyrica nightly.       Since her last office " visit with our department, patient underwent an ACDF C4-7 with Dr. Fernando on 1/3/22, Robotic sleeve with Dr. Hampton on 8/30/22 and TLIF L3-S1 with Dr. Fernando on 2/1/2023.      Interval HPI: 9/29/2021-  Mary Vo is a 69 y.o. female with past medical history significant for bilateral carpal tunnel syndrome, hypertension, hyperlipidemia, history of breast carcinoma, morbid obesity, bilateral total hip arthroplasty who presents to the clinic for the evaluation of neck pain of 2-3 years duration.  Of note patient followed with Dr. Lobato with frequent KATYA procedures and symptomatic relief.  Today patient reports flare of her neck pain over the last 3 months without inciting accident or injury.  Pain is constant and described as an 8/10.  Patient reports pain along bilateral cervical paraspinous muscles which radiates down the left upper extremity in C5-6 distribution with termination at the cubital fossa.  Pain is described as stabbing and aching in nature.  Patient denies any right upper extremity radicular symptoms.  Pain is exacerbated with cervical lateral rotation as well as left upper extremity use.  Patient does report compromise in left hand  strength.    Of note patient saw Neurosurgery September 2, 2021 with no current recommendation for surgical intervention.    Of note patient last saw Dr. Lobato December 2020 for lumbar spondylosis, degenerative disc disease, cervical radiculopathy with scheduling of transforaminal procedure, continuation of gabapentin, Mobic and Norco.    Patient reports significant motor weakness and loss of sensations.  Patient denies night fever/night sweats, urinary incontinence, bowel incontinence and significant weight loss.    Pain Disability Index Review:         9/29/2021    11:47 AM 10/11/2019    10:00 AM 8/13/2019    11:00 AM   Last 3 PDI Scores   Pain Disability Index (PDI) 55 29 27       Non-Pharmacologic Treatments:  Physical Therapy/Home Exercise:  yes  Ice/Heat:yes  TENS: no  Acupuncture: no  Massage: no  Chiropractic: no    Other: no      Pain Medications:  - Opioids: Vicodin ( Hydrocodone/Acetaminophen)  - Adjuvant Medications: Ambien (Zolpidem), Celexa (Citalopram), Neurontin (Gabapentin) and Xanax (Alprazolam)    Pain Procedures:   -September 25, 2020:  C7-T1 interlaminar epidural steroid injection; Dr. Lobato with 100% relief  -September 17, 2019:  Left-sided C5-6 transforaminal epidural steroid injection:  Dr. Lobato with 100% symptomatic relief  -November 3, 2020:  L5-S1 interlaminar epidural steroid injection; Dr. Lobato  -November 6, 2018:  left L3-4 transforaminal epidural steroid injection  -September 17, 2019:  Right C5-6 transforaminal epidural steroid injection with 100% relief  -November 6, 2018:  Left L3 transforaminal epidural steroid injection with 80% relief    Past Medical History:   Diagnosis Date    Breast cancer 1996    right    Chronic pain syndrome     Generalized osteoarthrosis, involving multiple sites     s/p THR bilateral    History of breast cancer 2007    lumpectomy/XRT    HTN (hypertension)     Hyperlipidemia     Morbid obesity with BMI of 40.0-44.9, adult      Past Surgical History:   Procedure Laterality Date    ANTERIOR CERVICAL DISCECTOMY W/ FUSION Left 01/03/2022    Procedure: DISCECTOMY, SPINE, CERVICAL, ANTERIOR APPROACH, WITH FUSION;  Surgeon: Pradip Fernando MD;  Location: [facility];  Service: Neurosurgery;  Laterality: Left;  Three Level ACDF  C4/5, 5/6, 6/7      BREAST LUMPECTOMY Right 2006    XRT    CATARACT EXTRACTION W/  INTRAOCULAR LENS IMPLANT Left 01/15/2020    CATARACT EXTRACTION W/  INTRAOCULAR LENS IMPLANT Right 01/29/2020    COLONOSCOPY N/A 10/15/2015    Procedure: COLONOSCOPY;  Surgeon: Maylin Shipley MD;  Location: [facility] ENDO;  Service: Endoscopy;  Laterality: N/A;    COLONOSCOPY N/A 11/30/2022    Procedure: COLONOSCOPY;  Surgeon: Gary Toussaint MD;  Location: [facility] ENDO;  Service: General;   Laterality: N/A;    EPIDURAL STEROID INJECTION N/A 11/03/2020    Procedure: Lumbar L5/S1 IL KATYA;  Surgeon: Paul Lobato MD;  Location: [facility] PAIN MGT;  Service: Pain Management;  Laterality: N/A;    EPIDURAL STEROID INJECTION INTO CERVICAL SPINE N/A 09/25/2020    Procedure: C7/T1 IL KATYA;  Surgeon: Paul Lobato MD;  Location: [facility] PAIN MGT;  Service: Pain Management;  Laterality: N/A;    EPIDURAL STEROID INJECTION INTO CERVICAL SPINE N/A 10/05/2021    Procedure: C7/T1 IL KATYA with RN IV sedation;  Surgeon: Cynthia Soares MD;  Location: [facility] PAIN MGT;  Service: Pain Management;  Laterality: N/A;    ESOPHAGOGASTRODUODENOSCOPY N/A 11/04/2021    Procedure: ESOPHAGOGASTRODUODENOSCOPY (EGD);  Surgeon: Blas Hampton MD;  Location: [facility];  Service: Endoscopy;  Laterality: N/A;    HYSTERECTOMY      LAPAROSCOPIC LYSIS OF ADHESIONS N/A 08/30/2022    Procedure: LYSIS, ADHESIONS, LAPAROSCOPIC;  Surgeon: Blas Hampton MD;  Location: [facility] OR;  Service: General;  Laterality: N/A;    PLACEMENT OF ACELLULAR HUMAN DERMAL ALLOGRAFT Left 01/03/2022    Procedure: APPLICATION, ACELLULAR HUMAN DERMAL ALLOGRAFT;  Surgeon: Pradip Fernando MD;  Location: [facility] OR;  Service: Neurosurgery;  Laterality: Left;    PLACEMENT OF ACELLULAR HUMAN DERMAL ALLOGRAFT N/A 02/01/2023    Procedure: APPLICATION, ACELLULAR HUMAN DERMAL ALLOGRAFT;  Surgeon: Pradip Fernando MD;  Location: [facility] OR;  Service: Neurosurgery;  Laterality: N/A;    ROBOT-ASSISTED LAPAROSCOPIC SLEEVE GASTRECTOMY USING DA FLORENTINO XI N/A 08/30/2022    Procedure: XI ROBOTIC SLEEVE GASTRECTOMY;  Surgeon: Blas Hampton MD;  Location: [facility] OR;  Service: General;  Laterality: N/A;    TRANSFORAMINAL EPIDURAL INJECTION OF STEROID Left 09/17/2019    Procedure: Left C5/6 TF KATYA w/ RN IV sedation;  Surgeon: Paul Lobato MD;  Location: [facility] PAIN MGT;  Service: Pain Management;  Laterality: Left;    TRANSFORAMINAL LUMBAR INTERBODY FUSION (TLIF) USING COMPUTER-ASSISTED NAVIGATION  Bilateral 02/01/2023    Procedure: FUSION, SPINE, LUMBAR, TLIF, USING COMPUTER-ASSISTED NAVIGATION;  Surgeon: Pradip Fernando MD;  Location: [facility];  Service: Neurosurgery;  Laterality: Bilateral;  TLIF L3-4/4-5 possibly L5-S1     Review of patient's allergies indicates:  No Known Allergies    Current Outpatient Medications   Medication Sig    ALPRAZolam (XANAX) 1 MG tablet TAKE 1 TABLET(1 MG) BY MOUTH EVERY NIGHT AS NEEDED    citalopram (CELEXA) 40 MG tablet TAKE 1 TABLET(40 MG) BY MOUTH EVERY DAY    cloNIDine (CATAPRES) 0.2 MG tablet TAKE 1 TABLET(0.2 MG) BY MOUTH EVERY EVENING    ERGOCALCIFEROL, VITAMIN D2, (VITAMIN D ORAL) Take 1,000 Units by mouth once daily.    fluticasone propionate (FLONASE) 50 mcg/actuation nasal spray 2 sprays (100 mcg total) by Each Nostril route once daily.    [START ON 9/16/2024] HYDROcodone-acetaminophen (NORCO)  mg per tablet Take 1 tablet by mouth every 12 (twelve) hours as needed for Pain.    [START ON 8/17/2024] HYDROcodone-acetaminophen (NORCO)  mg per tablet Take 1 tablet by mouth every 12 (twelve) hours as needed for Pain.    [START ON 7/18/2024] HYDROcodone-acetaminophen (NORCO)  mg per tablet Take 1 tablet by mouth every 12 (twelve) hours as needed for Pain.    meloxicam (MOBIC) 15 MG tablet Take 1 tablet (15 mg total) by mouth once daily. (Patient not taking: Reported on 7/15/2024)    meloxicam (MOBIC) 7.5 MG tablet Take 1 tablet (7.5 mg total) by mouth once daily. (Patient not taking: Reported on 7/15/2024)    potassium chloride SA (KLOR-CON M20) 20 MEQ tablet Take 1 tablet (20 mEq total) by mouth 2 (two) times daily.    pregabalin (LYRICA) 100 MG capsule TAKE 1 CAPSULE(100 MG) BY MOUTH TWICE DAILY    zolpidem (AMBIEN) 10 mg Tab TAKE ONE TABLET BY MOUTH AT BEDTIME AS NEEDED Strength: 10 mg     No current facility-administered medications for this visit.       Review of Systems     GENERAL:  No weight loss, malaise or fevers.  HEENT:   No recent changes  in vision or hearing  NECK:  Negative for lumps, no difficulty with swallowing.  RESPIRATORY:  Negative for cough, wheezing or shortness of breath, patient denies any recent URI.  CARDIOVASCULAR:  Negative for chest pain, leg swelling or palpitations.  GI:  Negative for abdominal discomfort, blood in stools or black stools or change in bowel habits.  MUSCULOSKELETAL:  See HPI.  SKIN:  Negative for lesions, rash, and itching.  PSYCH:  No mood disorder or recent psychosocial stressors.   HEMATOLOGY/LYMPHOLOGY:  Negative for prolonged bleeding, bruising easily or swollen nodes.    NEURO:   No history of headaches, syncope, paralysis, seizures or tremors.  All other reviewed and negative other than HPI.    OBJECTIVE:    Telemedicine Exam  There were no vitals filed for this visit.  There is no height or weight on file to calculate BMI.   (reviewed on 8/19/2024)     GENERAL: Well appearing, in no acute distress, alert and oriented x3.  Cooperative.  PSYCH:  Mood and affect appropriate.  SKIN: Skin color & texture with no obvious abnormalities.    HEAD/FACE:  Normocephalic, atraumatic.    PULM:  No difficulty breathing. No nasal flaring. No obvious wheezing.  EXTREMITIES: No obvious deformities. Moving all extremities well, appears to have symmetric strength throughout.  MUSCULOSKELETAL: No obvious atrophy abnormalities are noted.   NEURO: No obvious neurologic deficit.   GAIT: sitting.     Physical Exam: last in clinic visit:  Physical Exam    GENERAL: Well appearing, in no acute distress, alert and oriented x3.  PSYCH:  Mood and affect appropriate.  SKIN: Skin color, texture, turgor normal, no rashes or lesions.  HEAD/FACE:  Normocephalic, atraumatic. Cranial nerves grossly intact.    NECK: pain to palpation over the cervical paraspinous muscles. Spurling Negative.  pain with neck flexion, extension, or lateral flexion.   Normaltesting biceps, triceps and brachioradialis bilaterally.    NegativeHoffmann's bilaterally.     5/5 strength testing deltoid, biceps, triceps, wrist extensor, wrist flexor and ulnar intrinsics bilaterally.    Normal  strength bilaterally    CV: RRR with palpation of the radial artery.  PULM: No evidence of respiratory difficulty, symmetric chest rise.  GI:  Soft and non-tender.    NEURO: Bilateral upper and lower extremity coordination and muscle stretch reflexes are physiologic and symmetric. No loss of sensation is noted.  GAIT: normal.    Imaging:     No results found for this or any previous visit.    Results for orders placed during the hospital encounter of 06/20/23    X-Ray Lumbar Spine AP And Lateral    Narrative  EXAMINATION:  XR LUMBAR SPINE AP AND LATERAL    CLINICAL HISTORY:  Arthrodesis status    TECHNIQUE:  AP, lateral and spot images were performed of the lumbar spine.    COMPARISON:  03/14/2023    FINDINGS:  Pedicle screws and fixation rods are seen bilaterally at the L3 through S1 levels with intradiscal spacers present L3-4 and L4-5.  No hardware failure or loosening.  Mild disc space narrowing seen at the L2-3 level.  Surgical clips noted in the right upper quadrant of the abdomen.    Impression  1.  As above      Electronically signed by: Gaetano Slater DO  Date:    06/20/2023  Time:    12:09    Cervical Spine x-rays 7/29/22  XR CERVICAL SPINE AP LATERAL     CLINICAL HISTORY:  Arthrodesis status     TECHNIQUE:  AP, lateral and open mouth views of the cervical spine were performed.     COMPARISON:  The radiographs from 04/19/2022     FINDINGS:  Prior ACDF changes spanning C4-C6 calcification about with disc spacers in place.  Discogenic degenerative changes at C3-C4 and at C6-C7 are noted unchanged compared to prior exam.  No acute fracture.  No new abnormality or detrimental change.     Impression:     See above        Electronically signed by:Papito Delcid MD  Date:                                            07/29/2022  Time:                                           13:24  EMG  September 22, 2021  IMPRESSION  1. ABNORMAL study  2. There is electrodiagnostic evidence of an acute on chronic radiculopathy of the LEFT C6 nerve root and a subacute on chronic radic on the right C6 nerve root.  There is a chronic radiculopathy of the other nerve roots.  There is a mild demyelinating median neuropathy (Carpal tunnel syndrome) across BILATERAL wrists.       10/13/20    MRI Lumbar Spine Without Contrast    Narrative  EXAMINATION:  MRI LUMBAR SPINE WITHOUT CONTRAST    CLINICAL HISTORY:  Back pain or radiculopathy, > 6 wks; Dorsalgia, unspecified    TECHNIQUE:  Multiplanar, multisequence MR images were acquired from the thoracolumbar junction to the sacrum without the administration of contrast.    COMPARISON:  MRI 09/27/2018    FINDINGS:  Vertebral body heights maintained without spondylolisthesis.  Similar multilevel disc desiccation present with height loss at L4-5.  L4-5 mixed Modic endplate changes noted.  Schmorl node findings noted at L2-3 with faint rim of STIR signal noted within the superior endplate of L3.    Conus terminates normally.  Visualized intra-abdominal/pelvic structures unremarkable.    L1-L2: No spinal canal stenosis or neural foraminal narrowing.    L2-L3: Mild circumferential disc bulging without significant spinal canal stenosis or neural foraminal narrowing.  Facet degenerative hypertrophy findings.    L3-L4: Circumferential disc bulging which in conjunction with facet/ligamentum flavum hypertrophy causes moderate spinal canal stenosis.  Small central annular fissure.  Right mild neural foraminal narrowing present.    L4-L5: Circumferential disc bulging present with small posterior osteophytes.  Larger anterior osteophyte changes noted.  Facet degenerative hypertrophy findings.  Moderate spinal canal stenosis present.  Moderate bilateral neural foraminal narrowing with some minimal effacement of the exited nerve roots greater on the right.    L5-S1: No significant posterior  disc bulge.  Prominent facet degenerative hypertrophy findings minimal bilateral neural foraminal narrowing.    Impression  Similar multilevel degenerative findings most prevalent at L4-5.    L3-4 Schmorl node findings, acute at the superior endplate of L3.      109/29/2020 radiograph; MRI cervical spine 08/22/2019    FINDINGS:  Vertebral body heights and alignment are unchanged with minimal retrolisthesis of C3 on C4.  No concerning marrow signal present.  Similar multilevel disc desiccation and height loss most prevalent at C5-6 and C6-7.    Posterior fossa is unremarkable.  Spinal cord is unremarkable.    Neck soft tissue structures unremarkable.    C2-C3: No significant spinal canal stenosis or neural foraminal narrowing.    C3-C4: Similar posterior disc osteophyte complex present effacing the anterior thecal sac without significant spinal canal stenosis.  Bilateral uncovertebral degenerative changes resulting in mild-to-moderate neural foraminal stenosis.    C4-C5: Posterior disc osteophyte complex present with right paracentral disc protrusion which contacts the anterior right aspect of the cervical cord.  Right greater than left facet/uncovertebral hypertrophy resulting mild right neural foraminal narrowing.    C5-C6: Posterior disc osteophyte complex slightly asymmetric to the right paracentral location effacing the anterior thecal sac and causing mild spinal cord flattening.  No intrinsic cord signal abnormality.  Spinal canal measures 7.6 mm in midline AP dimension.  Bilateral uncovertebral/facet degenerative findings causes severe bilateral neural foraminal narrowing.    C6-C7: Posterior disc osteophyte complex present with more focal central disc protrusion causing similar effacement and mild indention of the thecal sac and spinal cord without intrinsic cord signal abnormality.  Spinal canal measures 8.3 mm in AP midline dimension.  Bilateral moderate to severe neural foraminal narrowing  present.    C7-T1: Mild posterior disc osteophyte complex present mildly effacing the anterior thecal sac without significant spinal stenosis.  Mild-to-moderate bilateral neural foraminal narrowing.    T1-2: Posterior disc osteophyte changes present causing at least mild spinal canal stenosis with severe right and moderate left neural foraminal narrowing.    Impression  Similar multilevel degenerative findings as above     09/01/21    CT Cervical Spine Without Contrast    Narrative  EXAMINATION:  CT CERVICAL SPINE WITHOUT CONTRAST    CLINICAL HISTORY:  Presurgical eval, C-spine;Encounter for other preprocedural examination    TECHNIQUE:  Low dose axial images, sagittal and coronal reformations were performed though the cervical spine.  Contrast was not administered.    COMPARISON:  MRI from September 1, 2021 multiple prior radiographs dating back to December 2018.    FINDINGS:  There is minimal grade 1 retrolisthesis of C3 on C4 which is unchanged.  Discogenic degenerative changes throughout the cervical spine are unchanged since the MRI from October 2020 with findings extending most prominently from C3 to C7 levels with varying degrees of disc space narrowing and marginal spurring.  Disc space narrowing is most severe at C5-C6 and C6-C7.  There is hypertrophy of the lower cervical facets.  No acute fracture.  Bones are demineralized.    Visualized posterior fossa is intact.  Visualized brain parenchyma appears normal.  Lung apices are clear.  Small scattered bilateral reactive cervical lymph nodes are noted.    C2-C3: There is mild facet arthropathy.  There is asymmetric left uncovertebral hypertrophy resulting in asymmetric moderate to pronounced left neural foraminal narrowing.    C3-C4: Posterior disc osteophyte complex with uncovertebral and facet arthropathy.  No significant spinal canal stenosis.  There is moderate to pronounced bilateral neural foraminal narrowing, unchanged.    C4-C5: Bilateral right  greater than left facet arthropathy with mild uncovertebral spurring.  Central and right paracentral disc osteophyte complex better demonstrated on the MRI.  There is either small focus of calcification of the posterior longitudinal ligament versus calcification of extruded disc material at this level on series 3, image 136.  No spinal canal stenosis.  There is mild right greater than left neural foraminal narrowing.    C5-C6: Posterior disc osteophyte complex with uncovertebral and facet arthropathy.  There is relative narrowing of the spinal canal at this level, unchanged since the MRI.  There is marked bilateral neural foraminal narrowing, also unchanged.    C6-C7: Posterior disc osteophyte complex with facet and uncovertebral hypertrophy.  There is mild relative narrowing of the spinal canal, unchanged.  There is marked bilateral neural foraminal narrowing.    C7-T1: Posterior disc osteophyte complex which indents the ventral thecal sac.  Mild facet and uncovertebral hypertrophy.  Spinal canal is maintained.  Mild bilateral neural foraminal narrowing.    Impression  Discogenic degenerative changes as highlighted above.  Overall, findings do not appear significantly changed when compared to the MRI from 10/13/2020.      12/13/18    X-Ray Cervical Spine AP And Lateral    Narrative  EXAMINATION:  XR CERVICAL SPINE AP LATERAL    CLINICAL HISTORY:  Radiculopathy, cervical region    TECHNIQUE:  AP, lateral and open mouth views of the cervical spine were performed.    COMPARISON:  None.    FINDINGS:  Straightening of normal cervical lordosis.  This may be positional or secondary to muscle spasm.  No fracture or listhesis.  Multilevel degenerative changes are seen with findings most pronounced at C3-C4 and C5-C6 with intervertebral disc space narrowing and marginal spurring with facet arthropathy.  Odontoid process remains intact.  Lateral masses are symmetric.  Prevertebral soft tissues are maintained.  Calcification  about the left carotid vasculature is noted      09/02/21    X-Ray Cervical Spine Complete 5 view    Narrative  EXAMINATION:  XR CERVICAL SPINE COMPLETE 5 VIEW    CLINICAL HISTORY:  preop planning;. Other cervical disc degeneration, unspecified cervical region    TECHNIQUE:  AP, Lateral, bilateral oblique and open mouth views of the cervical spine were performed.    COMPARISON:  09/01/2021    FINDINGS:  Moderate disc space narrowing spondylosis present at the C3-4 and C5-6 levels with more mild disc space narrowing and spondylosis present at C4-5 and C6-7.  The vertebral bodies demonstrate a normal height and alignment.  There is moderate osseous encroachment noted upon the C4-5 and C5-6 neural foraminal canals on the right secondary to uncovertebral joint hypertrophy.  On the left, there is mild osseous encroachment noted upon the C2-3 C4-5 6 and C6-7 level secondary to uncovertebral joint hypertrophy and severe osseous encroachment noted upon the C3-4 neural foraminal canal on the left secondary to uncovertebral joint hypertrophy.    09/01/21    X-Ray Cervical Spine Flexion And Extension Only    Narrative  EXAMINATION:  XR CERVICAL SPINE FLEXION  AND EXTENSION ONLY    CLINICAL HISTORY:  Encounter for other preprocedural examination    TECHNIQUE:  Flexion-extension views of the cervical spine    COMPARISON:  09/29/2020    FINDINGS:  The vertebral bodies demonstrate a normal height.  There is at least moderate disc space narrowing and spondylosis present at the C3-4 and C5-6 levels with more mild disc space narrowing and spondylosis present at C4-5 and C6-7.  There may be the slightest bit of anterolisthesis measuring on the order of may be 1-2 mm at the C4-5 level on the extension view with neutral positioning seen on the extension view suggesting mild motion/instability at this level.      ASSESSMENT: 69 y.o. year old female with neck and left upper extremity pain, consistent with     1. Falls frequently  MRI  Cervical Spine Without Contrast    CT Head Without Contrast    MRI Lumbar Spine W WO Contrast    CANCELED: MRI Lumbar Spine Without Contrast      2. Cervicalgia  MRI Cervical Spine Without Contrast    CT Head Without Contrast    MRI Lumbar Spine W WO Contrast    CANCELED: MRI Lumbar Spine Without Contrast      3. Lumbar radiculopathy  MRI Cervical Spine Without Contrast    CT Head Without Contrast    MRI Lumbar Spine W WO Contrast    CANCELED: MRI Lumbar Spine Without Contrast              PLAN:   - Interventions: None at this time. Will order updated imaging studies for further evaluation give her recent falls and symptoms.     - Anticoagulation use: no no anticoagulation       report:  Reviewed and consistent with medication use as prescribed.    - Medications:  -patient may continue gabapentin and Norco per her primary care provider.  We reviewed potential side effects of these medications.    - Therapy:   We discussed initiating physical therapy to help manage the patient/s painful condition. The patient was counseled that muscle strengthening will improve the long term prognosis in regards to pain and may also help increase range of motion and mobility. They were told that one of the goals of physical therapy is that they learn how to do the exercises so that they can do them independently at home daily upon completion. The patient's questions were answered and they were agreeable to this course. Patient states she was supposed to start physical therapy but has not yet ([facility] physical therapy ).      - Imaging: Reviewed available imaging with patient and answered any questions they had regarding study. MRI C/L spine, CT Head ordered.      - Follow up visit: return to clinic after MRI, CT to determine future plan of care .      The above plan and management options were discussed at length with patient. Patient is in agreement with the above and verbalized understanding.    - I discussed the goals of  interventional chronic pain management with the patient on today's visit. We discussed a multimodal and systematic approach to pain.  This includes diagnostic and therapeutic injections, adjuvant pharmacologic treatment, physical therapy, and at times psychiatry.  I emphasized the importance of regular exercise, core strengthening and stretching, diet and weight loss as a cornerstone of long-term pain management.    - This condition does not require this patient to take time off of work, and the primary goal of our Pain Management services is to improve the patient's functional capacity.  - Patient Questions: Answered all of the patient's questions regarding diagnoses, therapy, treatment and next steps        Miesha Linn PA-C  Interventional Pain Management  [facility]

## 2024-08-06 ENCOUNTER — TELEPHONE (OUTPATIENT)
Dept: PAIN MEDICINE | Facility: CLINIC | Age: 69
End: 2024-08-06
Payer: MEDICARE

## 2024-08-06 DIAGNOSIS — M54.2 CERVICALGIA: Primary | ICD-10-CM

## 2024-08-06 DIAGNOSIS — M54.16 LUMBAR RADICULOPATHY: ICD-10-CM

## 2024-08-13 ENCOUNTER — TELEPHONE (OUTPATIENT)
Dept: PAIN MEDICINE | Facility: CLINIC | Age: 69
End: 2024-08-13
Payer: MEDICARE

## 2024-08-13 NOTE — TELEPHONE ENCOUNTER
Miesha Linn PA-C [place], [place], [place]  Caller: Kell GONZALEZ/Aimee [phone] (Today, 10:07 AM)  I was supposed to see her back for imaging follow up and then have an updated plan of care. As of now, no changes.          Previous Messages       ----- Message -----  From: Marnie Hankins MA  Sent: 8/13/2024  10:22 AM CDT  To: Miesha NEVAREZ. RAUDEL Linn      ----- Message -----  From: Renee Lozano  Sent: 8/13/2024  10:10 AM CDT  To: [facility] Staff    Calling to get update on plan of care that Aimee states was faxed over 8/02/2024. She is re faxing to [phone]. Thanks

## 2024-08-14 ENCOUNTER — HOSPITAL ENCOUNTER (OUTPATIENT)
Dept: RADIOLOGY | Facility: HOSPITAL | Age: 69
Discharge: HOME | End: 2024-08-14
Attending: PHYSICIAN ASSISTANT
Payer: MEDICARE

## 2024-08-14 DIAGNOSIS — M54.2 CERVICALGIA: ICD-10-CM

## 2024-08-14 DIAGNOSIS — R29.6 FALLS FREQUENTLY: ICD-10-CM

## 2024-08-14 DIAGNOSIS — M54.16 LUMBAR RADICULOPATHY: ICD-10-CM

## 2024-08-14 PROCEDURE — 70450 CT HEAD/BRAIN W/O DYE: CPT | Mod: 26,,, | Performed by: RADIOLOGY

## 2024-08-14 PROCEDURE — 70450 CT HEAD/BRAIN W/O DYE: CPT | Mod: TC

## 2024-08-21 ENCOUNTER — TELEPHONE (OUTPATIENT)
Dept: PAIN MEDICINE | Facility: CLINIC | Age: 69
End: 2024-08-21
Payer: MEDICARE

## 2024-08-21 DIAGNOSIS — M54.2 CERVICALGIA: Primary | ICD-10-CM

## 2024-08-21 DIAGNOSIS — M54.16 LUMBAR RADICULOPATHY: ICD-10-CM

## 2024-08-21 RX ORDER — DIAZEPAM 10 MG/1
10 TABLET ORAL ONCE
Qty: 1 TABLET | Refills: 0 | Status: SHIPPED | OUTPATIENT
Start: 2024-08-21 | End: 2024-08-21

## 2024-08-21 NOTE — TELEPHONE ENCOUNTER
Called patient in regards to letting patient know that the provider has sent Valium 10MG to the pharmacy on file to take before her MRI. Patient did not answer, LVM in regards to give a call back.    Marnie Hankins MA

## 2024-08-21 NOTE — TELEPHONE ENCOUNTER
----- Message from Miesha Linn PA-C sent at 8/21/2024 12:36 PM CDT -----  Regarding: RE: MRI appointment.  Yes, I can send something in today to take prior to MRI.  ----- Message -----  From: Marnie Hankins MA  Sent: 8/21/2024  11:10 AM CDT  To: Miesha Linn PA-C  Subject: FW: MRI appointment.                             Is there anything you can prescribe patient?  ----- Message -----  From: Nanette Wadsworth  Sent: 8/21/2024  11:09 AM CDT  To: [facility] Staff  Subject: MRI appointment.                                 Good morning,      I have Ms. Vo who is doing an MRI tomorrow and would need something to calm her down.  Can someone put in an order for her to get a prescription and give her a call when the order has been placed?    Thanks,  Nanette  Radiology Scheduler  [phone]

## 2024-08-21 NOTE — TELEPHONE ENCOUNTER
----- Message from Marnie Hankins MA sent at 8/21/2024 11:10 AM CDT -----  Regarding: FW: MRI appointment.  Is there anything you can prescribe patient?  ----- Message -----  From: Nanette Wadsworth  Sent: 8/21/2024  11:09 AM CDT  To: [facility] Staff  Subject: MRI appointment.                                 Good morning,      I have Ms. Vo who is doing an MRI tomorrow and would need something to calm her down.  Can someone put in an order for her to get a prescription and give her a call when the order has been placed?    Thanks,  Nanette  Radiology Scheduler  [phone]

## 2024-08-22 ENCOUNTER — HOSPITAL ENCOUNTER (OUTPATIENT)
Dept: RADIOLOGY | Facility: HOSPITAL | Age: 69
Discharge: HOME | End: 2024-08-22
Attending: PHYSICIAN ASSISTANT
Payer: MEDICARE

## 2024-08-22 DIAGNOSIS — M54.2 CERVICALGIA: ICD-10-CM

## 2024-08-22 DIAGNOSIS — M54.16 LUMBAR RADICULOPATHY: ICD-10-CM

## 2024-08-22 DIAGNOSIS — R29.6 FALLS FREQUENTLY: ICD-10-CM

## 2024-08-22 PROCEDURE — 25500020 PHARM REV CODE 255: Performed by: PHYSICIAN ASSISTANT

## 2024-08-22 PROCEDURE — 72158 MRI LUMBAR SPINE W/O & W/DYE: CPT | Mod: 26,,, | Performed by: RADIOLOGY

## 2024-08-22 PROCEDURE — 72141 MRI NECK SPINE W/O DYE: CPT | Mod: 26,,, | Performed by: RADIOLOGY

## 2024-08-22 PROCEDURE — 72158 MRI LUMBAR SPINE W/O & W/DYE: CPT | Mod: TC

## 2024-08-22 PROCEDURE — 72141 MRI NECK SPINE W/O DYE: CPT | Mod: TC

## 2024-08-22 PROCEDURE — A9585 GADOBUTROL INJECTION: HCPCS | Performed by: PHYSICIAN ASSISTANT

## 2024-08-22 RX ORDER — GADOBUTROL 604.72 MG/ML
10 INJECTION INTRAVENOUS
Status: COMPLETED | OUTPATIENT
Start: 2024-08-22 | End: 2024-08-22

## 2024-08-22 RX ADMIN — GADOBUTROL 10 ML: 604.72 INJECTION INTRAVENOUS at 02:08

## 2024-08-23 ENCOUNTER — HOSPITAL ENCOUNTER (OUTPATIENT)
Dept: RADIOLOGY | Facility: HOSPITAL | Age: 69
Discharge: HOME | End: 2024-08-23
Attending: PHYSICIAN ASSISTANT
Payer: MEDICARE

## 2024-08-23 ENCOUNTER — OFFICE VISIT (OUTPATIENT)
Dept: PAIN MEDICINE | Facility: CLINIC | Age: 69
End: 2024-08-23
Payer: MEDICARE

## 2024-08-23 VITALS
SYSTOLIC BLOOD PRESSURE: 145 MMHG | DIASTOLIC BLOOD PRESSURE: 73 MMHG | WEIGHT: 208.75 LBS | BODY MASS INDEX: 33.55 KG/M2 | HEART RATE: 47 BPM | HEIGHT: 66 IN

## 2024-08-23 DIAGNOSIS — M54.16 LUMBAR RADICULOPATHY: ICD-10-CM

## 2024-08-23 DIAGNOSIS — Z96.642 CHRONIC HIP PAIN AFTER TOTAL REPLACEMENT OF LEFT HIP JOINT: ICD-10-CM

## 2024-08-23 DIAGNOSIS — M54.2 CERVICALGIA: Primary | ICD-10-CM

## 2024-08-23 DIAGNOSIS — M25.552 CHRONIC HIP PAIN AFTER TOTAL REPLACEMENT OF LEFT HIP JOINT: ICD-10-CM

## 2024-08-23 DIAGNOSIS — M50.30 DDD (DEGENERATIVE DISC DISEASE), CERVICAL: ICD-10-CM

## 2024-08-23 DIAGNOSIS — M51.36 DDD (DEGENERATIVE DISC DISEASE), LUMBAR: ICD-10-CM

## 2024-08-23 DIAGNOSIS — G89.29 CHRONIC HIP PAIN AFTER TOTAL REPLACEMENT OF LEFT HIP JOINT: ICD-10-CM

## 2024-08-23 PROCEDURE — 73552 X-RAY EXAM OF FEMUR 2/>: CPT | Mod: TC,LT

## 2024-08-23 PROCEDURE — 73552 X-RAY EXAM OF FEMUR 2/>: CPT | Mod: 26,LT,, | Performed by: RADIOLOGY

## 2024-08-23 PROCEDURE — 99999 PR PBB SHADOW E&M-EST. PATIENT-LVL III: CPT | Mod: PBBFAC,,, | Performed by: PHYSICIAN ASSISTANT

## 2024-08-23 NOTE — H&P (VIEW-ONLY)
"Established Patient Chronic Pain Note (Follow up Visit)    Referring Physician: No ref. provider found    PCP: Elias Cannon MD    Chief Complaint: Imaging review     SUBJECTIVE:    Interval History (8/23/2024):   Mary Vo presents today for follow-up visit.  Patient was last seen on 7/17/2024.  Patient reports pain as 5/10 today.  The patient currently c/o lower back, left hip and lower extremity pain. She also is concerned with bilateral neck pain.   The patient has been taking Lyrica from Orthopedics. Lyrica helps somewhat but she started having tingling this week.     Interval History (7/17/2024): Mary Vo presents today for follow-up visit.   Patient reports pain as 6/10 today.  Patient is concerned due to recent multiple falls. Recalls 5-6 falls in the past 3 months.   Patient reports hitting her head last week while getting out of the bath tub. She denies LOC but reports her pain was severe at the time.   Patient has bilateral shoulder pain and lower back pain. She has been utilizing pain patches on the right side of her lower. About 6 months ago, she was diagnosed with frozen shoulder.   She has noticed that the pain radiates from both shoulders down into her arms.   Has noticed "lightheadedness" but denies HA, shortness of breath, chest pain, confusion or memory changes.   Patient takes 1 capsule 100 mg   Lyrica nightly.     Since her last office visit with our department, patient underwent an ACDF C4-7 with Dr. Fernando on 1/3/22, Robotic sleeve with Dr. Hampton on 8/30/22 and TLIF L3-S1 with Dr. Fernando on 2/1/2023.      Interval HPI: 9/29/2021-  Mary Vo is a 69 y.o. female with past medical history significant for bilateral carpal tunnel syndrome, hypertension, hyperlipidemia, history of breast carcinoma, morbid obesity, bilateral total hip arthroplasty who presents to the clinic for the evaluation of neck pain of 2-3 years duration.  Of note patient followed with Dr. Lobato " with frequent KATYA procedures and symptomatic relief.  Today patient reports flare of her neck pain over the last 3 months without inciting accident or injury.  Pain is constant and described as an 8/10.  Patient reports pain along bilateral cervical paraspinous muscles which radiates down the left upper extremity in C5-6 distribution with termination at the cubital fossa.  Pain is described as stabbing and aching in nature.  Patient denies any right upper extremity radicular symptoms.  Pain is exacerbated with cervical lateral rotation as well as left upper extremity use.  Patient does report compromise in left hand  strength.    Of note patient saw Neurosurgery September 2, 2021 with no current recommendation for surgical intervention.    Of note patient last saw Dr. Lobato December 2020 for lumbar spondylosis, degenerative disc disease, cervical radiculopathy with scheduling of transforaminal procedure, continuation of gabapentin, Mobic and Norco.    Patient reports significant motor weakness and loss of sensations.  Patient denies night fever/night sweats, urinary incontinence, bowel incontinence and significant weight loss.    Pain Disability Index Review:         9/29/2021    11:47 AM 10/11/2019    10:00 AM 8/13/2019    11:00 AM   Last 3 PDI Scores   Pain Disability Index (PDI) 55 29 27       Non-Pharmacologic Treatments:  Physical Therapy/Home Exercise: yes  Ice/Heat:yes  TENS: no  Acupuncture: no  Massage: no  Chiropractic: no    Other: no      Pain Medications:  - Opioids: Vicodin ( Hydrocodone/Acetaminophen)  - Adjuvant Medications: Ambien (Zolpidem), Celexa (Citalopram), Neurontin (Gabapentin) and Xanax (Alprazolam)    Pain Procedures:   -September 25, 2020:  C7-T1 interlaminar epidural steroid injection; Dr. Lobato with 100% relief  -September 17, 2019:  Left-sided C5-6 transforaminal epidural steroid injection:  Dr. Lobato with 100% symptomatic relief  -November 3, 2020:  L5-S1 interlaminar epidural  steroid injection; Dr. Lobato  -November 6, 2018:  left L3-4 transforaminal epidural steroid injection  -September 17, 2019:  Right C5-6 transforaminal epidural steroid injection with 100% relief  -November 6, 2018:  Left L3 transforaminal epidural steroid injection with 80% relief    Past Medical History:   Diagnosis Date    Breast cancer 1996    right    Chronic pain syndrome     Generalized osteoarthrosis, involving multiple sites     s/p THR bilateral    History of breast cancer 2007    lumpectomy/XRT    HTN (hypertension)     Hyperlipidemia     Morbid obesity with BMI of 40.0-44.9, adult      Past Surgical History:   Procedure Laterality Date    ANTERIOR CERVICAL DISCECTOMY W/ FUSION Left 01/03/2022    Procedure: DISCECTOMY, SPINE, CERVICAL, ANTERIOR APPROACH, WITH FUSION;  Surgeon: Pradip Fernando MD;  Location: [facility] OR;  Service: Neurosurgery;  Laterality: Left;  Three Level ACDF  C4/5, 5/6, 6/7      BREAST LUMPECTOMY Right 2006    XRT    CATARACT EXTRACTION W/  INTRAOCULAR LENS IMPLANT Left 01/15/2020    CATARACT EXTRACTION W/  INTRAOCULAR LENS IMPLANT Right 01/29/2020    COLONOSCOPY N/A 10/15/2015    Procedure: COLONOSCOPY;  Surgeon: Maylin Shipley MD;  Location: [facility] ENDO;  Service: Endoscopy;  Laterality: N/A;    COLONOSCOPY N/A 11/30/2022    Procedure: COLONOSCOPY;  Surgeon: Gary Toussaint MD;  Location: [facility] ENDO;  Service: General;  Laterality: N/A;    EPIDURAL STEROID INJECTION N/A 11/03/2020    Procedure: Lumbar L5/S1 IL KATYA;  Surgeon: Paul Lobato MD;  Location: [facility] PAIN MGT;  Service: Pain Management;  Laterality: N/A;    EPIDURAL STEROID INJECTION INTO CERVICAL SPINE N/A 09/25/2020    Procedure: C7/T1 IL KATYA;  Surgeon: Paul Lobato MD;  Location: [facility] PAIN MGT;  Service: Pain Management;  Laterality: N/A;    EPIDURAL STEROID INJECTION INTO CERVICAL SPINE N/A 10/05/2021    Procedure: C7/T1 IL KATYA with RN IV sedation;  Surgeon: Cynthia Soares MD;  Location: [facility] PAIN MGT;   Service: Pain Management;  Laterality: N/A;    ESOPHAGOGASTRODUODENOSCOPY N/A 11/04/2021    Procedure: ESOPHAGOGASTRODUODENOSCOPY (EGD);  Surgeon: Blas Hampton MD;  Location: [facility] ENDO;  Service: Endoscopy;  Laterality: N/A;    HYSTERECTOMY      LAPAROSCOPIC LYSIS OF ADHESIONS N/A 08/30/2022    Procedure: LYSIS, ADHESIONS, LAPAROSCOPIC;  Surgeon: Blas Hampton MD;  Location: [facility] OR;  Service: General;  Laterality: N/A;    PLACEMENT OF ACELLULAR HUMAN DERMAL ALLOGRAFT Left 01/03/2022    Procedure: APPLICATION, ACELLULAR HUMAN DERMAL ALLOGRAFT;  Surgeon: Pradip Fernando MD;  Location: [facility] OR;  Service: Neurosurgery;  Laterality: Left;    PLACEMENT OF ACELLULAR HUMAN DERMAL ALLOGRAFT N/A 02/01/2023    Procedure: APPLICATION, ACELLULAR HUMAN DERMAL ALLOGRAFT;  Surgeon: Pradip Fernando MD;  Location: [facility] OR;  Service: Neurosurgery;  Laterality: N/A;    ROBOT-ASSISTED LAPAROSCOPIC SLEEVE GASTRECTOMY USING DA FLORENTINO XI N/A 08/30/2022    Procedure: XI ROBOTIC SLEEVE GASTRECTOMY;  Surgeon: Blas Hampton MD;  Location: [facility] OR;  Service: General;  Laterality: N/A;    TRANSFORAMINAL EPIDURAL INJECTION OF STEROID Left 09/17/2019    Procedure: Left C5/6 TF KATYA w/ RN IV sedation;  Surgeon: Paul Lobato MD;  Location: [facility] PAIN MGT;  Service: Pain Management;  Laterality: Left;    TRANSFORAMINAL LUMBAR INTERBODY FUSION (TLIF) USING COMPUTER-ASSISTED NAVIGATION Bilateral 02/01/2023    Procedure: FUSION, SPINE, LUMBAR, TLIF, USING COMPUTER-ASSISTED NAVIGATION;  Surgeon: Pradip Fernando MD;  Location: [facility] OR;  Service: Neurosurgery;  Laterality: Bilateral;  TLIF L3-4/4-5 possibly L5-S1     Review of patient's allergies indicates:  No Known Allergies    Current Outpatient Medications   Medication Sig    ALPRAZolam (XANAX) 1 MG tablet TAKE 1 TABLET(1 MG) BY MOUTH EVERY NIGHT AS NEEDED    citalopram (CELEXA) 40 MG tablet TAKE 1 TABLET(40 MG) BY MOUTH EVERY DAY    cloNIDine (CATAPRES) 0.2 MG tablet TAKE 1 TABLET(0.2 MG) BY  MOUTH EVERY EVENING    diazePAM (VALIUM) 10 MG Tab Take 1 tablet (10 mg total) by mouth once. 45 minutes to 1 hour prior to MRI for procedural anxiety for 1 dose    ERGOCALCIFEROL, VITAMIN D2, (VITAMIN D ORAL) Take 1,000 Units by mouth once daily.    fluticasone propionate (FLONASE) 50 mcg/actuation nasal spray 2 sprays (100 mcg total) by Each Nostril route once daily.    [START ON 9/16/2024] HYDROcodone-acetaminophen (NORCO)  mg per tablet Take 1 tablet by mouth every 12 (twelve) hours as needed for Pain.    HYDROcodone-acetaminophen (NORCO)  mg per tablet Take 1 tablet by mouth every 12 (twelve) hours as needed for Pain.    HYDROcodone-acetaminophen (NORCO)  mg per tablet Take 1 tablet by mouth every 12 (twelve) hours as needed for Pain.    meloxicam (MOBIC) 15 MG tablet Take 1 tablet (15 mg total) by mouth once daily. (Patient not taking: Reported on 7/15/2024)    meloxicam (MOBIC) 7.5 MG tablet Take 1 tablet (7.5 mg total) by mouth once daily. (Patient not taking: Reported on 7/15/2024)    potassium chloride SA (KLOR-CON M20) 20 MEQ tablet Take 1 tablet (20 mEq total) by mouth 2 (two) times daily.    pregabalin (LYRICA) 100 MG capsule TAKE 1 CAPSULE(100 MG) BY MOUTH TWICE DAILY    zolpidem (AMBIEN) 10 mg Tab TAKE ONE TABLET BY MOUTH AT BEDTIME AS NEEDED Strength: 10 mg     No current facility-administered medications for this visit.       Review of Systems     GENERAL:  No weight loss, malaise or fevers.  HEENT:   No recent changes in vision or hearing  NECK:  Negative for lumps, no difficulty with swallowing.  RESPIRATORY:  Negative for cough, wheezing or shortness of breath, patient denies any recent URI.  CARDIOVASCULAR:  Negative for chest pain, leg swelling or palpitations.  GI:  Negative for abdominal discomfort, blood in stools or black stools or change in bowel habits.  MUSCULOSKELETAL:  See HPI.  SKIN:  Negative for lesions, rash, and itching.  PSYCH:  No mood disorder or recent  "psychosocial stressors.   HEMATOLOGY/LYMPHOLOGY:  Negative for prolonged bleeding, bruising easily or swollen nodes.    NEURO:   No history of headaches, syncope, paralysis, seizures or tremors.  All other reviewed and negative other than HPI.    OBJECTIVE:  Vitals:    08/23/24 1246   BP: (!) 145/73   Pulse: (!) 47   Weight: 94.7 kg (208 lb 12.4 oz)   Height: 5' 6.25" (1.683 m)     Body mass index is 33.44 kg/m².   (reviewed on 8/23/2024)     Physical Exam    GENERAL: Well appearing, in no acute distress, alert and oriented x3.  PSYCH:  Mood and affect appropriate.  SKIN: Skin color, texture, turgor normal, no rashes or lesions.  HEAD/FACE:  Normocephalic, atraumatic. Cranial nerves grossly intact.    NECK: pain to palpation over the cervical paraspinous muscles. Spurling Negative.  pain with neck flexion, extension, or lateral flexion.   Normaltesting biceps, triceps and brachioradialis bilaterally.    NegativeHoffmann's bilaterally.    5/5 strength testing deltoid, biceps, triceps, wrist extensor, wrist flexor and ulnar intrinsics bilaterally.    Normal  strength bilaterally    Musculoskeletal - Cervical Spine:  - Pain on flexion of cervical spine: Present   - Pain on extension of cervical spine: Present   - Cervical facet loading: Present   - TTP over the cervical facet joints: Present  - TTP over the cervical paraspinals: Present  - Spurling's: Negative    Musculoskeletal - Lumbar Spine:  - Spinal Sensation: Normal   - Pain on flexion of lumbar spine: Absent  - Pain on extension of lumbar spine: Present   -  - TTP over the lumbar facet joints: Present   - TTP over the lumbar paraspinals: Present   - TTP over the SI joints: Absent  - TTP over GT bursa: Absent  - TTP over piriformis:    - Straight Leg Raise: Positive on the left  - MICHAELA/ Edin's: Positive  - Pain with internal/ external rotation of hip:  Present, ROM is limited      Neuro - Upper Extremities:  - BUE Strength:R/L: D: 5/5; B: 5/5; T: 5/5; WF: " 5/5; WE: 5/5; IO: 5/5  - Extremity Reflexes: Brisk and symmetric throughout  - Sensory: Sensation to light touch intact bilaterally    PULM: No evidence of respiratory difficulty, symmetric chest rise.  GI:  Soft and non-tender.    NEURO: Bilateral upper and lower extremity coordination and muscle stretch reflexes are physiologic and symmetric. No loss of sensation is noted.  GAIT: normal.    Imaging/ Diagnostic Studies/ Labs (Reviewed on 8/23/2024):    Results for orders placed during the hospital encounter of 08/22/24    MRI Lumbar Spine W WO Contrast    Narrative  EXAMINATION:  MRI LUMBAR SPINE W WO CONTRAST    CLINICAL HISTORY:  CervicalgiaLumbar radiculopathy, no red flags, no prior management;frequent falls;    TECHNIQUE:  Standard multiplanar without and with contrast MRI sequences of the lumbar spine performed with 10cc Gadavist.    COMPARISON:  Plain x-ray 06/20/2023    FINDINGS:  There are postop changes of the lumbar spine consistent with posterior lumbar fusion at L3-4, L4-5 and L5-S1.  Postop changes in the dorsal paraspinal soft tissues as well.  No definite fluid collection.  Slight exaggeration of the lumbar lordosis is noted.    The distal cord and conus appear normal.    T12-L1: Mild disc degeneration with disc desiccation, narrowing and disc bulge.  Mild facet arthrosis.    L1-2:    Minor disc degeneration with disc desiccation and disc bulge.  However mild to moderate hypertrophic facet arthrosis.    L2-3:    Mild to moderate disc degeneration with moderate disc bulge.  Mild to moderate hypertrophic facet arthrosis with increased epidural adipose tissue with at least moderate central canal stenosis.  AP diameter canal is estimated at 6 mm.  There is mild right and moderate to severe left foraminal stenosis.    L3-4:    Status post posterior decompression with posterior lumbar interbody fusion.  Patent central canal.    L4-5:    Status post posterior decompression with posterior lumbar interbody  fusion.  Patent central canal however mild right L4-5 foraminal stenosis.    L5-S1:   Status post posterior decompression with posterior lumbar fusion.  Patent central canal.    Impression  C3 level L3-4 through L5-S1 posterior lumbar fusion as above.    Adjacent segment L2-3 disc degeneration with moderate central canal stenosis and high-grade left foraminal stenosis.    No abnormal enhancement, discitis or abscess.      Electronically signed by: Ajay Weiner MD  Date:    08/22/2024  Time:    14:41       Results for orders placed during the hospital encounter of 08/22/24    MRI Cervical Spine Without Contrast    Narrative  EXAMINATION:  MRI CERVICAL SPINE WITHOUT CONTRAST    CLINICAL HISTORY:  CervicalgiaNeck pain, chronic;    TECHNIQUE:  Standard multiplanar noncontrast MRI sequences of the cervical spine.    COMPARISON:  X-ray 07/29/2022.    FINDINGS:  The cervical cord reveals normal signal and morphology.    There are postop changes consistent with a 2 level C4-5 and C5-6 ACDF.    The prevertebral soft tissues appear normal.    C2-C3: Mild broad-based disc bulge.  Moderate right and mild left facet arthrosis with moderate foraminal stenosis.    C3-C4: Mild disc degeneration with mild broad-based disc bulge.  Mild bilateral facet arthrosis with moderate right and mild left foraminal stenosis.    C4-C5: Status post ACDF.    C5-C6: Status post ACDF.  Uncovertebral joint spurring with moderate right and mild left foraminal stenosis.    C6-C7: Mild disc degeneration with disc bulge and osteophyte.  Uncovertebral joint spurring with moderate to severe right and moderate left foraminal stenosis.    C7-T1: Mild disc degeneration with disc bulge and osteophyte.  Relatively minor facet arthrosis with uncovertebral joint spurring and mild to moderate right and mild left foraminal stenosis.    Impression  No acute abnormality.  C4-5 and C5-6 ACDF with C5-6 foraminal stenosis.    Degenerative disc disease C2-3, C3-4,  C6-7 and C7-T1 as well as facet arthrosis with foraminal stenosis as above.      Electronically signed by: Ajay Weiner MD  Date:    08/22/2024  Time:    14:34        CT Head:8/14/24  Narrative & Impression  EXAM: CT HEAD WITHOUT CONTRAST     CLINICAL HISTORY: Trauma     TECHNIQUE: Contiguous axial images were obtained from the skull base through the vertex without intravenous contrast.     COMPARISON: None available.     FINDINGS: No intracranial hemorrhage.  No mass effect or midline shift.  No extra axial fluid collections.  No areas of abnormal parenchymal attenuation.  The ventricles and sulci are normal in size and configuration.  There is no evidence of hydrocephalus.  The pineal region is unremarkable.  The posterior fossa structures are grossly unremarkable within the limits of CT scan.  The paranasal sinuses and mastoid air cells are clear.  No fractures are identified.  No concerning osseous lesions.        Impression:     1.    No acute intracranial abnormalities        All CT scans at [this location] are performed using dose modulation techniques as appropriate to a performed exam including the following: automated exposure control; adjustment of the mA and/or kV according to patient size (this includes techniques or standardized protocols for targeted exams where dose is matched to indication / reason for exam; i.e. extremities or head); use of iterative reconstruction technique.        Finalized on: 8/14/2024 1:48 PM By:  Jasiel Quiroz MD  [facility]# [id number]      2024-08-14 13:50:[id number]    [facility]         Results for orders placed during the hospital encounter of 06/20/23    X-Ray Lumbar Spine AP And Lateral    Narrative  EXAMINATION:  XR LUMBAR SPINE AP AND LATERAL    CLINICAL HISTORY:  Arthrodesis status    TECHNIQUE:  AP, lateral and spot images were performed of the lumbar spine.    COMPARISON:  03/14/2023    FINDINGS:  Pedicle screws and fixation rods are seen bilaterally at the L3 through S1  levels with intradiscal spacers present L3-4 and L4-5.  No hardware failure or loosening.  Mild disc space narrowing seen at the L2-3 level.  Surgical clips noted in the right upper quadrant of the abdomen.    Impression  1.  As above      Electronically signed by: Gaetano Slater DO  Date:    06/20/2023  Time:    12:09    Cervical Spine x-rays 7/29/22  XR CERVICAL SPINE AP LATERAL     CLINICAL HISTORY:  Arthrodesis status     TECHNIQUE:  AP, lateral and open mouth views of the cervical spine were performed.     COMPARISON:  The radiographs from 04/19/2022     FINDINGS:  Prior ACDF changes spanning C4-C6 calcification about with disc spacers in place.  Discogenic degenerative changes at C3-C4 and at C6-C7 are noted unchanged compared to prior exam.  No acute fracture.  No new abnormality or detrimental change.     Impression:     See above        Electronically signed by:Papito Delcid MD  Date:                                            07/29/2022  Time:                                           13:24  EMG September 22, 2021  IMPRESSION  1. ABNORMAL study  2. There is electrodiagnostic evidence of an acute on chronic radiculopathy of the LEFT C6 nerve root and a subacute on chronic radic on the right C6 nerve root.  There is a chronic radiculopathy of the other nerve roots.  There is a mild demyelinating median neuropathy (Carpal tunnel syndrome) across BILATERAL wrists.       10/13/20    MRI Lumbar Spine Without Contrast    Narrative  EXAMINATION:  MRI LUMBAR SPINE WITHOUT CONTRAST    CLINICAL HISTORY:  Back pain or radiculopathy, > 6 wks; Dorsalgia, unspecified    TECHNIQUE:  Multiplanar, multisequence MR images were acquired from the thoracolumbar junction to the sacrum without the administration of contrast.    COMPARISON:  MRI 09/27/2018    FINDINGS:  Vertebral body heights maintained without spondylolisthesis.  Similar multilevel disc desiccation present with height loss at L4-5.  L4-5 mixed Modic endplate  changes noted.  Schmorl node findings noted at L2-3 with faint rim of STIR signal noted within the superior endplate of L3.    Conus terminates normally.  Visualized intra-abdominal/pelvic structures unremarkable.    L1-L2: No spinal canal stenosis or neural foraminal narrowing.    L2-L3: Mild circumferential disc bulging without significant spinal canal stenosis or neural foraminal narrowing.  Facet degenerative hypertrophy findings.    L3-L4: Circumferential disc bulging which in conjunction with facet/ligamentum flavum hypertrophy causes moderate spinal canal stenosis.  Small central annular fissure.  Right mild neural foraminal narrowing present.    L4-L5: Circumferential disc bulging present with small posterior osteophytes.  Larger anterior osteophyte changes noted.  Facet degenerative hypertrophy findings.  Moderate spinal canal stenosis present.  Moderate bilateral neural foraminal narrowing with some minimal effacement of the exited nerve roots greater on the right.    L5-S1: No significant posterior disc bulge.  Prominent facet degenerative hypertrophy findings minimal bilateral neural foraminal narrowing.    Impression  Similar multilevel degenerative findings most prevalent at L4-5.    L3-4 Schmorl node findings, acute at the superior endplate of L3.      109/29/2020 radiograph; MRI cervical spine 08/22/2019    FINDINGS:  Vertebral body heights and alignment are unchanged with minimal retrolisthesis of C3 on C4.  No concerning marrow signal present.  Similar multilevel disc desiccation and height loss most prevalent at C5-6 and C6-7.    Posterior fossa is unremarkable.  Spinal cord is unremarkable.    Neck soft tissue structures unremarkable.    C2-C3: No significant spinal canal stenosis or neural foraminal narrowing.    C3-C4: Similar posterior disc osteophyte complex present effacing the anterior thecal sac without significant spinal canal stenosis.  Bilateral uncovertebral degenerative changes  resulting in mild-to-moderate neural foraminal stenosis.    C4-C5: Posterior disc osteophyte complex present with right paracentral disc protrusion which contacts the anterior right aspect of the cervical cord.  Right greater than left facet/uncovertebral hypertrophy resulting mild right neural foraminal narrowing.    C5-C6: Posterior disc osteophyte complex slightly asymmetric to the right paracentral location effacing the anterior thecal sac and causing mild spinal cord flattening.  No intrinsic cord signal abnormality.  Spinal canal measures 7.6 mm in midline AP dimension.  Bilateral uncovertebral/facet degenerative findings causes severe bilateral neural foraminal narrowing.    C6-C7: Posterior disc osteophyte complex present with more focal central disc protrusion causing similar effacement and mild indention of the thecal sac and spinal cord without intrinsic cord signal abnormality.  Spinal canal measures 8.3 mm in AP midline dimension.  Bilateral moderate to severe neural foraminal narrowing present.    C7-T1: Mild posterior disc osteophyte complex present mildly effacing the anterior thecal sac without significant spinal stenosis.  Mild-to-moderate bilateral neural foraminal narrowing.    T1-2: Posterior disc osteophyte changes present causing at least mild spinal canal stenosis with severe right and moderate left neural foraminal narrowing.    Impression  Similar multilevel degenerative findings as above     09/01/21    CT Cervical Spine Without Contrast    Narrative  EXAMINATION:  CT CERVICAL SPINE WITHOUT CONTRAST    CLINICAL HISTORY:  Presurgical eval, C-spine;Encounter for other preprocedural examination    TECHNIQUE:  Low dose axial images, sagittal and coronal reformations were performed though the cervical spine.  Contrast was not administered.    COMPARISON:  MRI from September 1, 2021 multiple prior radiographs dating back to December 2018.    FINDINGS:  There is minimal grade 1 retrolisthesis of  C3 on C4 which is unchanged.  Discogenic degenerative changes throughout the cervical spine are unchanged since the MRI from October 2020 with findings extending most prominently from C3 to C7 levels with varying degrees of disc space narrowing and marginal spurring.  Disc space narrowing is most severe at C5-C6 and C6-C7.  There is hypertrophy of the lower cervical facets.  No acute fracture.  Bones are demineralized.    Visualized posterior fossa is intact.  Visualized brain parenchyma appears normal.  Lung apices are clear.  Small scattered bilateral reactive cervical lymph nodes are noted.    C2-C3: There is mild facet arthropathy.  There is asymmetric left uncovertebral hypertrophy resulting in asymmetric moderate to pronounced left neural foraminal narrowing.    C3-C4: Posterior disc osteophyte complex with uncovertebral and facet arthropathy.  No significant spinal canal stenosis.  There is moderate to pronounced bilateral neural foraminal narrowing, unchanged.    C4-C5: Bilateral right greater than left facet arthropathy with mild uncovertebral spurring.  Central and right paracentral disc osteophyte complex better demonstrated on the MRI.  There is either small focus of calcification of the posterior longitudinal ligament versus calcification of extruded disc material at this level on series 3, image 136.  No spinal canal stenosis.  There is mild right greater than left neural foraminal narrowing.    C5-C6: Posterior disc osteophyte complex with uncovertebral and facet arthropathy.  There is relative narrowing of the spinal canal at this level, unchanged since the MRI.  There is marked bilateral neural foraminal narrowing, also unchanged.    C6-C7: Posterior disc osteophyte complex with facet and uncovertebral hypertrophy.  There is mild relative narrowing of the spinal canal, unchanged.  There is marked bilateral neural foraminal narrowing.    C7-T1: Posterior disc osteophyte complex which indents the  ventral thecal sac.  Mild facet and uncovertebral hypertrophy.  Spinal canal is maintained.  Mild bilateral neural foraminal narrowing.    Impression  Discogenic degenerative changes as highlighted above.  Overall, findings do not appear significantly changed when compared to the MRI from 10/13/2020.      12/13/18    X-Ray Cervical Spine AP And Lateral    Narrative  EXAMINATION:  XR CERVICAL SPINE AP LATERAL    CLINICAL HISTORY:  Radiculopathy, cervical region    TECHNIQUE:  AP, lateral and open mouth views of the cervical spine were performed.    COMPARISON:  None.    FINDINGS:  Straightening of normal cervical lordosis.  This may be positional or secondary to muscle spasm.  No fracture or listhesis.  Multilevel degenerative changes are seen with findings most pronounced at C3-C4 and C5-C6 with intervertebral disc space narrowing and marginal spurring with facet arthropathy.  Odontoid process remains intact.  Lateral masses are symmetric.  Prevertebral soft tissues are maintained.  Calcification about the left carotid vasculature is noted      09/02/21    X-Ray Cervical Spine Complete 5 view    Narrative  EXAMINATION:  XR CERVICAL SPINE COMPLETE 5 VIEW    CLINICAL HISTORY:  preop planning;. Other cervical disc degeneration, unspecified cervical region    TECHNIQUE:  AP, Lateral, bilateral oblique and open mouth views of the cervical spine were performed.    COMPARISON:  09/01/2021    FINDINGS:  Moderate disc space narrowing spondylosis present at the C3-4 and C5-6 levels with more mild disc space narrowing and spondylosis present at C4-5 and C6-7.  The vertebral bodies demonstrate a normal height and alignment.  There is moderate osseous encroachment noted upon the C4-5 and C5-6 neural foraminal canals on the right secondary to uncovertebral joint hypertrophy.  On the left, there is mild osseous encroachment noted upon the C2-3 C4-5 6 and C6-7 level secondary to uncovertebral joint hypertrophy and severe osseous  encroachment noted upon the C3-4 neural foraminal canal on the left secondary to uncovertebral joint hypertrophy.    09/01/21    X-Ray Cervical Spine Flexion And Extension Only    Narrative  EXAMINATION:  XR CERVICAL SPINE FLEXION  AND EXTENSION ONLY    CLINICAL HISTORY:  Encounter for other preprocedural examination    TECHNIQUE:  Flexion-extension views of the cervical spine    COMPARISON:  09/29/2020    FINDINGS:  The vertebral bodies demonstrate a normal height.  There is at least moderate disc space narrowing and spondylosis present at the C3-4 and C5-6 levels with more mild disc space narrowing and spondylosis present at C4-5 and C6-7.  There may be the slightest bit of anterolisthesis measuring on the order of may be 1-2 mm at the C4-5 level on the extension view with neutral positioning seen on the extension view suggesting mild motion/instability at this level.      ASSESSMENT: 69 y.o. year old female with neck and left upper extremity pain, consistent with     1. Cervicalgia  Case Request-RAD/Other Procedure Area: Bilateral Cervical C5-7 MBB with RN Sedation      2. 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 Lumbar radiculopathy        4. DDD (degenerative disc disease), lumbar        5. DDD (degenerative disc disease), cervical            PLAN:   - Interventions: Schedule patient for bilateral cervical medial branch block (C5-7) to address axial neck pain , will call next day. If at least 80% relief, schedule for repeat diagnostic block to move towards RFA.   Explained the risks and benefits of the procedure in detail with the patient today in clinic along with alternative treatment options, and the patient elected to pursue the intervention.      Can consider Lumbar TFESI L2/3 in the near future.    - Anticoagulation use: no no anticoagulation       report:  Reviewed and consistent with medication use as prescribed.    - Medications:  - Patient may  continue Lyrica 100 mg per PCP.     - Therapy:   We discussed  physical therapy to help manage the patient/s painful condition. The patient was counseled that muscle strengthening will improve the long term prognosis in regards to pain and may also help increase range of motion and mobility. They were told that one of the goals of physical therapy is that they learn how to do the exercises so that they can do them independently at home daily upon completion. The patient's questions were answered and they were agreeable to this course. Patient states she was supposed to start physical therapy but has not yet ([facility] ).      - Imaging: Reviewed available imaging with patient and answered any questions they had regarding study. Left femur x-rays ordered today.   MRI C/L spine, CT Head findings reviewed and discussed today.  MRI Cervical spine significant for: Degenerative disc disease C2-3, C3-4, C6-7 and C7-T1 as well as facet arthrosis with foraminal stenosis as above.  MRI L spine significant for: L2-3:    Mild to moderate disc degeneration with moderate disc bulge.  Mild to moderate hypertrophic facet arthrosis with increased epidural adipose tissue with at least moderate central canal stenosis.  AP diameter canal is estimated at 6 mm.  There is mild right and moderate to severe left foraminal stenosis.      - Follow up visit: Will call after first cervical MBB to determine future intervention.       The above plan and management options were discussed at length with patient. Patient is in agreement with the above and verbalized understanding.    - I discussed the goals of interventional chronic pain management with the patient on today's visit. We discussed a multimodal and systematic approach to pain.  This includes diagnostic and therapeutic injections, adjuvant pharmacologic treatment, physical therapy, and at times psychiatry.  I emphasized the importance of regular exercise, core  strengthening and stretching, diet and weight loss as a cornerstone of long-term pain management.    - This condition does not require this patient to take time off of work, and the primary goal of our Pain Management services is to improve the patient's functional capacity.  - Patient Questions: Answered all of the patient's questions regarding diagnoses, therapy, treatment and next steps        Miesha Linn PA-C  Interventional Pain Management  [facility]

## 2024-08-23 NOTE — PROGRESS NOTES
"Established Patient Chronic Pain Note (Follow up Visit)    Referring Physician: No ref. provider found    PCP: Elias Cannon MD    Chief Complaint: Imaging review     SUBJECTIVE:    Interval History (8/23/2024):   Mary Vo presents today for follow-up visit.  Patient was last seen on 7/17/2024.  Patient reports pain as 5/10 today.  The patient currently c/o lower back, left hip and lower extremity pain. She also is concerned with bilateral neck pain.   The patient has been taking Lyrica from Orthopedics. Lyrica helps somewhat but she started having tingling this week.     Interval History (7/17/2024): Mary Vo presents today for follow-up visit.   Patient reports pain as 6/10 today.  Patient is concerned due to recent multiple falls. Recalls 5-6 falls in the past 3 months.   Patient reports hitting her head last week while getting out of the bath tub. She denies LOC but reports her pain was severe at the time.   Patient has bilateral shoulder pain and lower back pain. She has been utilizing pain patches on the right side of her lower. About 6 months ago, she was diagnosed with frozen shoulder.   She has noticed that the pain radiates from both shoulders down into her arms.   Has noticed "lightheadedness" but denies HA, shortness of breath, chest pain, confusion or memory changes.   Patient takes 1 capsule 100 mg   Lyrica nightly.     Since her last office visit with our department, patient underwent an ACDF C4-7 with Dr. Fernando on 1/3/22, Robotic sleeve with Dr. Hampton on 8/30/22 and TLIF L3-S1 with Dr. Fernando on 2/1/2023.      Interval HPI: 9/29/2021-  Mary Vo is a 69 y.o. female with past medical history significant for bilateral carpal tunnel syndrome, hypertension, hyperlipidemia, history of breast carcinoma, morbid obesity, bilateral total hip arthroplasty who presents to the clinic for the evaluation of neck pain of 2-3 years duration.  Of note patient followed with Dr. Lobato " with frequent KATYA procedures and symptomatic relief.  Today patient reports flare of her neck pain over the last 3 months without inciting accident or injury.  Pain is constant and described as an 8/10.  Patient reports pain along bilateral cervical paraspinous muscles which radiates down the left upper extremity in C5-6 distribution with termination at the cubital fossa.  Pain is described as stabbing and aching in nature.  Patient denies any right upper extremity radicular symptoms.  Pain is exacerbated with cervical lateral rotation as well as left upper extremity use.  Patient does report compromise in left hand  strength.    Of note patient saw Neurosurgery September 2, 2021 with no current recommendation for surgical intervention.    Of note patient last saw Dr. Lobato December 2020 for lumbar spondylosis, degenerative disc disease, cervical radiculopathy with scheduling of transforaminal procedure, continuation of gabapentin, Mobic and Norco.    Patient reports significant motor weakness and loss of sensations.  Patient denies night fever/night sweats, urinary incontinence, bowel incontinence and significant weight loss.    Pain Disability Index Review:         9/29/2021    11:47 AM 10/11/2019    10:00 AM 8/13/2019    11:00 AM   Last 3 PDI Scores   Pain Disability Index (PDI) 55 29 27       Non-Pharmacologic Treatments:  Physical Therapy/Home Exercise: yes  Ice/Heat:yes  TENS: no  Acupuncture: no  Massage: no  Chiropractic: no    Other: no      Pain Medications:  - Opioids: Vicodin ( Hydrocodone/Acetaminophen)  - Adjuvant Medications: Ambien (Zolpidem), Celexa (Citalopram), Neurontin (Gabapentin) and Xanax (Alprazolam)    Pain Procedures:   -September 25, 2020:  C7-T1 interlaminar epidural steroid injection; Dr. Lobato with 100% relief  -September 17, 2019:  Left-sided C5-6 transforaminal epidural steroid injection:  Dr. Lobato with 100% symptomatic relief  -November 3, 2020:  L5-S1 interlaminar epidural  steroid injection; Dr. Lobato  -November 6, 2018:  left L3-4 transforaminal epidural steroid injection  -September 17, 2019:  Right C5-6 transforaminal epidural steroid injection with 100% relief  -November 6, 2018:  Left L3 transforaminal epidural steroid injection with 80% relief    Past Medical History:   Diagnosis Date    Breast cancer 1996    right    Chronic pain syndrome     Generalized osteoarthrosis, involving multiple sites     s/p THR bilateral    History of breast cancer 2007    lumpectomy/XRT    HTN (hypertension)     Hyperlipidemia     Morbid obesity with BMI of 40.0-44.9, adult      Past Surgical History:   Procedure Laterality Date    ANTERIOR CERVICAL DISCECTOMY W/ FUSION Left 01/03/2022    Procedure: DISCECTOMY, SPINE, CERVICAL, ANTERIOR APPROACH, WITH FUSION;  Surgeon: Pradip Fernando MD;  Location: [facility] OR;  Service: Neurosurgery;  Laterality: Left;  Three Level ACDF  C4/5, 5/6, 6/7      BREAST LUMPECTOMY Right 2006    XRT    CATARACT EXTRACTION W/  INTRAOCULAR LENS IMPLANT Left 01/15/2020    CATARACT EXTRACTION W/  INTRAOCULAR LENS IMPLANT Right 01/29/2020    COLONOSCOPY N/A 10/15/2015    Procedure: COLONOSCOPY;  Surgeon: Maylin Shipley MD;  Location: [facility] ENDO;  Service: Endoscopy;  Laterality: N/A;    COLONOSCOPY N/A 11/30/2022    Procedure: COLONOSCOPY;  Surgeon: Gary Toussaint MD;  Location: [facility] ENDO;  Service: General;  Laterality: N/A;    EPIDURAL STEROID INJECTION N/A 11/03/2020    Procedure: Lumbar L5/S1 IL KATYA;  Surgeon: Paul Lobato MD;  Location: [facility] PAIN MGT;  Service: Pain Management;  Laterality: N/A;    EPIDURAL STEROID INJECTION INTO CERVICAL SPINE N/A 09/25/2020    Procedure: C7/T1 IL KATYA;  Surgeon: Paul Lobato MD;  Location: [facility] PAIN MGT;  Service: Pain Management;  Laterality: N/A;    EPIDURAL STEROID INJECTION INTO CERVICAL SPINE N/A 10/05/2021    Procedure: C7/T1 IL KATYA with RN IV sedation;  Surgeon: Cynthia Soares MD;  Location: [facility] PAIN MGT;   Service: Pain Management;  Laterality: N/A;    ESOPHAGOGASTRODUODENOSCOPY N/A 11/04/2021    Procedure: ESOPHAGOGASTRODUODENOSCOPY (EGD);  Surgeon: Blas Hampton MD;  Location: [facility] ENDO;  Service: Endoscopy;  Laterality: N/A;    HYSTERECTOMY      LAPAROSCOPIC LYSIS OF ADHESIONS N/A 08/30/2022    Procedure: LYSIS, ADHESIONS, LAPAROSCOPIC;  Surgeon: Blas Hampton MD;  Location: [facility] OR;  Service: General;  Laterality: N/A;    PLACEMENT OF ACELLULAR HUMAN DERMAL ALLOGRAFT Left 01/03/2022    Procedure: APPLICATION, ACELLULAR HUMAN DERMAL ALLOGRAFT;  Surgeon: Pradip Fernando MD;  Location: [facility] OR;  Service: Neurosurgery;  Laterality: Left;    PLACEMENT OF ACELLULAR HUMAN DERMAL ALLOGRAFT N/A 02/01/2023    Procedure: APPLICATION, ACELLULAR HUMAN DERMAL ALLOGRAFT;  Surgeon: rPadip Fernando MD;  Location: [facility] OR;  Service: Neurosurgery;  Laterality: N/A;    ROBOT-ASSISTED LAPAROSCOPIC SLEEVE GASTRECTOMY USING DA FLORENTINO XI N/A 08/30/2022    Procedure: XI ROBOTIC SLEEVE GASTRECTOMY;  Surgeon: Blas Hampton MD;  Location: [facility] OR;  Service: General;  Laterality: N/A;    TRANSFORAMINAL EPIDURAL INJECTION OF STEROID Left 09/17/2019    Procedure: Left C5/6 TF KATYA w/ RN IV sedation;  Surgeon: Paul Lobato MD;  Location: [facility] PAIN MGT;  Service: Pain Management;  Laterality: Left;    TRANSFORAMINAL LUMBAR INTERBODY FUSION (TLIF) USING COMPUTER-ASSISTED NAVIGATION Bilateral 02/01/2023    Procedure: FUSION, SPINE, LUMBAR, TLIF, USING COMPUTER-ASSISTED NAVIGATION;  Surgeon: Pradip Fernando MD;  Location: [facility] OR;  Service: Neurosurgery;  Laterality: Bilateral;  TLIF L3-4/4-5 possibly L5-S1     Review of patient's allergies indicates:  No Known Allergies    Current Outpatient Medications   Medication Sig    ALPRAZolam (XANAX) 1 MG tablet TAKE 1 TABLET(1 MG) BY MOUTH EVERY NIGHT AS NEEDED    citalopram (CELEXA) 40 MG tablet TAKE 1 TABLET(40 MG) BY MOUTH EVERY DAY    cloNIDine (CATAPRES) 0.2 MG tablet TAKE 1 TABLET(0.2 MG) BY  MOUTH EVERY EVENING    diazePAM (VALIUM) 10 MG Tab Take 1 tablet (10 mg total) by mouth once. 45 minutes to 1 hour prior to MRI for procedural anxiety for 1 dose    ERGOCALCIFEROL, VITAMIN D2, (VITAMIN D ORAL) Take 1,000 Units by mouth once daily.    fluticasone propionate (FLONASE) 50 mcg/actuation nasal spray 2 sprays (100 mcg total) by Each Nostril route once daily.    [START ON 9/16/2024] HYDROcodone-acetaminophen (NORCO)  mg per tablet Take 1 tablet by mouth every 12 (twelve) hours as needed for Pain.    HYDROcodone-acetaminophen (NORCO)  mg per tablet Take 1 tablet by mouth every 12 (twelve) hours as needed for Pain.    HYDROcodone-acetaminophen (NORCO)  mg per tablet Take 1 tablet by mouth every 12 (twelve) hours as needed for Pain.    meloxicam (MOBIC) 15 MG tablet Take 1 tablet (15 mg total) by mouth once daily. (Patient not taking: Reported on 7/15/2024)    meloxicam (MOBIC) 7.5 MG tablet Take 1 tablet (7.5 mg total) by mouth once daily. (Patient not taking: Reported on 7/15/2024)    potassium chloride SA (KLOR-CON M20) 20 MEQ tablet Take 1 tablet (20 mEq total) by mouth 2 (two) times daily.    pregabalin (LYRICA) 100 MG capsule TAKE 1 CAPSULE(100 MG) BY MOUTH TWICE DAILY    zolpidem (AMBIEN) 10 mg Tab TAKE ONE TABLET BY MOUTH AT BEDTIME AS NEEDED Strength: 10 mg     No current facility-administered medications for this visit.       Review of Systems     GENERAL:  No weight loss, malaise or fevers.  HEENT:   No recent changes in vision or hearing  NECK:  Negative for lumps, no difficulty with swallowing.  RESPIRATORY:  Negative for cough, wheezing or shortness of breath, patient denies any recent URI.  CARDIOVASCULAR:  Negative for chest pain, leg swelling or palpitations.  GI:  Negative for abdominal discomfort, blood in stools or black stools or change in bowel habits.  MUSCULOSKELETAL:  See HPI.  SKIN:  Negative for lesions, rash, and itching.  PSYCH:  No mood disorder or recent  "psychosocial stressors.   HEMATOLOGY/LYMPHOLOGY:  Negative for prolonged bleeding, bruising easily or swollen nodes.    NEURO:   No history of headaches, syncope, paralysis, seizures or tremors.  All other reviewed and negative other than HPI.    OBJECTIVE:  Vitals:    08/23/24 1246   BP: (!) 145/73   Pulse: (!) 47   Weight: 94.7 kg (208 lb 12.4 oz)   Height: 5' 6.25" (1.683 m)     Body mass index is 33.44 kg/m².   (reviewed on 8/23/2024)     Physical Exam    GENERAL: Well appearing, in no acute distress, alert and oriented x3.  PSYCH:  Mood and affect appropriate.  SKIN: Skin color, texture, turgor normal, no rashes or lesions.  HEAD/FACE:  Normocephalic, atraumatic. Cranial nerves grossly intact.    NECK: pain to palpation over the cervical paraspinous muscles. Spurling Negative.  pain with neck flexion, extension, or lateral flexion.   Normaltesting biceps, triceps and brachioradialis bilaterally.    NegativeHoffmann's bilaterally.    5/5 strength testing deltoid, biceps, triceps, wrist extensor, wrist flexor and ulnar intrinsics bilaterally.    Normal  strength bilaterally    Musculoskeletal - Cervical Spine:  - Pain on flexion of cervical spine: Present   - Pain on extension of cervical spine: Present   - Cervical facet loading: Present   - TTP over the cervical facet joints: Present  - TTP over the cervical paraspinals: Present  - Spurling's: Negative    Musculoskeletal - Lumbar Spine:  - Spinal Sensation: Normal   - Pain on flexion of lumbar spine: Absent  - Pain on extension of lumbar spine: Present   -  - TTP over the lumbar facet joints: Present   - TTP over the lumbar paraspinals: Present   - TTP over the SI joints: Absent  - TTP over GT bursa: Absent  - TTP over piriformis:    - Straight Leg Raise: Positive on the left  - MICHAELA/ Edin's: Positive  - Pain with internal/ external rotation of hip:  Present, ROM is limited      Neuro - Upper Extremities:  - BUE Strength:R/L: D: 5/5; B: 5/5; T: 5/5; WF: " 5/5; WE: 5/5; IO: 5/5  - Extremity Reflexes: Brisk and symmetric throughout  - Sensory: Sensation to light touch intact bilaterally    PULM: No evidence of respiratory difficulty, symmetric chest rise.  GI:  Soft and non-tender.    NEURO: Bilateral upper and lower extremity coordination and muscle stretch reflexes are physiologic and symmetric. No loss of sensation is noted.  GAIT: normal.    Imaging/ Diagnostic Studies/ Labs (Reviewed on 8/23/2024):    Results for orders placed during the hospital encounter of 08/22/24    MRI Lumbar Spine W WO Contrast    Narrative  EXAMINATION:  MRI LUMBAR SPINE W WO CONTRAST    CLINICAL HISTORY:  CervicalgiaLumbar radiculopathy, no red flags, no prior management;frequent falls;    TECHNIQUE:  Standard multiplanar without and with contrast MRI sequences of the lumbar spine performed with 10cc Gadavist.    COMPARISON:  Plain x-ray 06/20/2023    FINDINGS:  There are postop changes of the lumbar spine consistent with posterior lumbar fusion at L3-4, L4-5 and L5-S1.  Postop changes in the dorsal paraspinal soft tissues as well.  No definite fluid collection.  Slight exaggeration of the lumbar lordosis is noted.    The distal cord and conus appear normal.    T12-L1: Mild disc degeneration with disc desiccation, narrowing and disc bulge.  Mild facet arthrosis.    L1-2:    Minor disc degeneration with disc desiccation and disc bulge.  However mild to moderate hypertrophic facet arthrosis.    L2-3:    Mild to moderate disc degeneration with moderate disc bulge.  Mild to moderate hypertrophic facet arthrosis with increased epidural adipose tissue with at least moderate central canal stenosis.  AP diameter canal is estimated at 6 mm.  There is mild right and moderate to severe left foraminal stenosis.    L3-4:    Status post posterior decompression with posterior lumbar interbody fusion.  Patent central canal.    L4-5:    Status post posterior decompression with posterior lumbar interbody  fusion.  Patent central canal however mild right L4-5 foraminal stenosis.    L5-S1:   Status post posterior decompression with posterior lumbar fusion.  Patent central canal.    Impression  C3 level L3-4 through L5-S1 posterior lumbar fusion as above.    Adjacent segment L2-3 disc degeneration with moderate central canal stenosis and high-grade left foraminal stenosis.    No abnormal enhancement, discitis or abscess.      Electronically signed by: Ajay Weiner MD  Date:    08/22/2024  Time:    14:41       Results for orders placed during the hospital encounter of 08/22/24    MRI Cervical Spine Without Contrast    Narrative  EXAMINATION:  MRI CERVICAL SPINE WITHOUT CONTRAST    CLINICAL HISTORY:  CervicalgiaNeck pain, chronic;    TECHNIQUE:  Standard multiplanar noncontrast MRI sequences of the cervical spine.    COMPARISON:  X-ray 07/29/2022.    FINDINGS:  The cervical cord reveals normal signal and morphology.    There are postop changes consistent with a 2 level C4-5 and C5-6 ACDF.    The prevertebral soft tissues appear normal.    C2-C3: Mild broad-based disc bulge.  Moderate right and mild left facet arthrosis with moderate foraminal stenosis.    C3-C4: Mild disc degeneration with mild broad-based disc bulge.  Mild bilateral facet arthrosis with moderate right and mild left foraminal stenosis.    C4-C5: Status post ACDF.    C5-C6: Status post ACDF.  Uncovertebral joint spurring with moderate right and mild left foraminal stenosis.    C6-C7: Mild disc degeneration with disc bulge and osteophyte.  Uncovertebral joint spurring with moderate to severe right and moderate left foraminal stenosis.    C7-T1: Mild disc degeneration with disc bulge and osteophyte.  Relatively minor facet arthrosis with uncovertebral joint spurring and mild to moderate right and mild left foraminal stenosis.    Impression  No acute abnormality.  C4-5 and C5-6 ACDF with C5-6 foraminal stenosis.    Degenerative disc disease C2-3, C3-4,  C6-7 and C7-T1 as well as facet arthrosis with foraminal stenosis as above.      Electronically signed by: Ajay Weiner MD  Date:    08/22/2024  Time:    14:34        CT Head:8/14/24  Narrative & Impression  EXAM: CT HEAD WITHOUT CONTRAST     CLINICAL HISTORY: Trauma     TECHNIQUE: Contiguous axial images were obtained from the skull base through the vertex without intravenous contrast.     COMPARISON: None available.     FINDINGS: No intracranial hemorrhage.  No mass effect or midline shift.  No extra axial fluid collections.  No areas of abnormal parenchymal attenuation.  The ventricles and sulci are normal in size and configuration.  There is no evidence of hydrocephalus.  The pineal region is unremarkable.  The posterior fossa structures are grossly unremarkable within the limits of CT scan.  The paranasal sinuses and mastoid air cells are clear.  No fractures are identified.  No concerning osseous lesions.        Impression:     1.    No acute intracranial abnormalities        All CT scans at [this location] are performed using dose modulation techniques as appropriate to a performed exam including the following: automated exposure control; adjustment of the mA and/or kV according to patient size (this includes techniques or standardized protocols for targeted exams where dose is matched to indication / reason for exam; i.e. extremities or head); use of iterative reconstruction technique.        Finalized on: 8/14/2024 1:48 PM By:  Jasiel Quiroz MD  [facility]# [id number]      2024-08-14 13:50:[id number]    [facility]         Results for orders placed during the hospital encounter of 06/20/23    X-Ray Lumbar Spine AP And Lateral    Narrative  EXAMINATION:  XR LUMBAR SPINE AP AND LATERAL    CLINICAL HISTORY:  Arthrodesis status    TECHNIQUE:  AP, lateral and spot images were performed of the lumbar spine.    COMPARISON:  03/14/2023    FINDINGS:  Pedicle screws and fixation rods are seen bilaterally at the L3 through S1  levels with intradiscal spacers present L3-4 and L4-5.  No hardware failure or loosening.  Mild disc space narrowing seen at the L2-3 level.  Surgical clips noted in the right upper quadrant of the abdomen.    Impression  1.  As above      Electronically signed by: Gaetano Slater DO  Date:    06/20/2023  Time:    12:09    Cervical Spine x-rays 7/29/22  XR CERVICAL SPINE AP LATERAL     CLINICAL HISTORY:  Arthrodesis status     TECHNIQUE:  AP, lateral and open mouth views of the cervical spine were performed.     COMPARISON:  The radiographs from 04/19/2022     FINDINGS:  Prior ACDF changes spanning C4-C6 calcification about with disc spacers in place.  Discogenic degenerative changes at C3-C4 and at C6-C7 are noted unchanged compared to prior exam.  No acute fracture.  No new abnormality or detrimental change.     Impression:     See above        Electronically signed by:Papito Delcid MD  Date:                                            07/29/2022  Time:                                           13:24  EMG September 22, 2021  IMPRESSION  1. ABNORMAL study  2. There is electrodiagnostic evidence of an acute on chronic radiculopathy of the LEFT C6 nerve root and a subacute on chronic radic on the right C6 nerve root.  There is a chronic radiculopathy of the other nerve roots.  There is a mild demyelinating median neuropathy (Carpal tunnel syndrome) across BILATERAL wrists.       10/13/20    MRI Lumbar Spine Without Contrast    Narrative  EXAMINATION:  MRI LUMBAR SPINE WITHOUT CONTRAST    CLINICAL HISTORY:  Back pain or radiculopathy, > 6 wks; Dorsalgia, unspecified    TECHNIQUE:  Multiplanar, multisequence MR images were acquired from the thoracolumbar junction to the sacrum without the administration of contrast.    COMPARISON:  MRI 09/27/2018    FINDINGS:  Vertebral body heights maintained without spondylolisthesis.  Similar multilevel disc desiccation present with height loss at L4-5.  L4-5 mixed Modic endplate  changes noted.  Schmorl node findings noted at L2-3 with faint rim of STIR signal noted within the superior endplate of L3.    Conus terminates normally.  Visualized intra-abdominal/pelvic structures unremarkable.    L1-L2: No spinal canal stenosis or neural foraminal narrowing.    L2-L3: Mild circumferential disc bulging without significant spinal canal stenosis or neural foraminal narrowing.  Facet degenerative hypertrophy findings.    L3-L4: Circumferential disc bulging which in conjunction with facet/ligamentum flavum hypertrophy causes moderate spinal canal stenosis.  Small central annular fissure.  Right mild neural foraminal narrowing present.    L4-L5: Circumferential disc bulging present with small posterior osteophytes.  Larger anterior osteophyte changes noted.  Facet degenerative hypertrophy findings.  Moderate spinal canal stenosis present.  Moderate bilateral neural foraminal narrowing with some minimal effacement of the exited nerve roots greater on the right.    L5-S1: No significant posterior disc bulge.  Prominent facet degenerative hypertrophy findings minimal bilateral neural foraminal narrowing.    Impression  Similar multilevel degenerative findings most prevalent at L4-5.    L3-4 Schmorl node findings, acute at the superior endplate of L3.      109/29/2020 radiograph; MRI cervical spine 08/22/2019    FINDINGS:  Vertebral body heights and alignment are unchanged with minimal retrolisthesis of C3 on C4.  No concerning marrow signal present.  Similar multilevel disc desiccation and height loss most prevalent at C5-6 and C6-7.    Posterior fossa is unremarkable.  Spinal cord is unremarkable.    Neck soft tissue structures unremarkable.    C2-C3: No significant spinal canal stenosis or neural foraminal narrowing.    C3-C4: Similar posterior disc osteophyte complex present effacing the anterior thecal sac without significant spinal canal stenosis.  Bilateral uncovertebral degenerative changes  resulting in mild-to-moderate neural foraminal stenosis.    C4-C5: Posterior disc osteophyte complex present with right paracentral disc protrusion which contacts the anterior right aspect of the cervical cord.  Right greater than left facet/uncovertebral hypertrophy resulting mild right neural foraminal narrowing.    C5-C6: Posterior disc osteophyte complex slightly asymmetric to the right paracentral location effacing the anterior thecal sac and causing mild spinal cord flattening.  No intrinsic cord signal abnormality.  Spinal canal measures 7.6 mm in midline AP dimension.  Bilateral uncovertebral/facet degenerative findings causes severe bilateral neural foraminal narrowing.    C6-C7: Posterior disc osteophyte complex present with more focal central disc protrusion causing similar effacement and mild indention of the thecal sac and spinal cord without intrinsic cord signal abnormality.  Spinal canal measures 8.3 mm in AP midline dimension.  Bilateral moderate to severe neural foraminal narrowing present.    C7-T1: Mild posterior disc osteophyte complex present mildly effacing the anterior thecal sac without significant spinal stenosis.  Mild-to-moderate bilateral neural foraminal narrowing.    T1-2: Posterior disc osteophyte changes present causing at least mild spinal canal stenosis with severe right and moderate left neural foraminal narrowing.    Impression  Similar multilevel degenerative findings as above     09/01/21    CT Cervical Spine Without Contrast    Narrative  EXAMINATION:  CT CERVICAL SPINE WITHOUT CONTRAST    CLINICAL HISTORY:  Presurgical eval, C-spine;Encounter for other preprocedural examination    TECHNIQUE:  Low dose axial images, sagittal and coronal reformations were performed though the cervical spine.  Contrast was not administered.    COMPARISON:  MRI from September 1, 2021 multiple prior radiographs dating back to December 2018.    FINDINGS:  There is minimal grade 1 retrolisthesis of  C3 on C4 which is unchanged.  Discogenic degenerative changes throughout the cervical spine are unchanged since the MRI from October 2020 with findings extending most prominently from C3 to C7 levels with varying degrees of disc space narrowing and marginal spurring.  Disc space narrowing is most severe at C5-C6 and C6-C7.  There is hypertrophy of the lower cervical facets.  No acute fracture.  Bones are demineralized.    Visualized posterior fossa is intact.  Visualized brain parenchyma appears normal.  Lung apices are clear.  Small scattered bilateral reactive cervical lymph nodes are noted.    C2-C3: There is mild facet arthropathy.  There is asymmetric left uncovertebral hypertrophy resulting in asymmetric moderate to pronounced left neural foraminal narrowing.    C3-C4: Posterior disc osteophyte complex with uncovertebral and facet arthropathy.  No significant spinal canal stenosis.  There is moderate to pronounced bilateral neural foraminal narrowing, unchanged.    C4-C5: Bilateral right greater than left facet arthropathy with mild uncovertebral spurring.  Central and right paracentral disc osteophyte complex better demonstrated on the MRI.  There is either small focus of calcification of the posterior longitudinal ligament versus calcification of extruded disc material at this level on series 3, image 136.  No spinal canal stenosis.  There is mild right greater than left neural foraminal narrowing.    C5-C6: Posterior disc osteophyte complex with uncovertebral and facet arthropathy.  There is relative narrowing of the spinal canal at this level, unchanged since the MRI.  There is marked bilateral neural foraminal narrowing, also unchanged.    C6-C7: Posterior disc osteophyte complex with facet and uncovertebral hypertrophy.  There is mild relative narrowing of the spinal canal, unchanged.  There is marked bilateral neural foraminal narrowing.    C7-T1: Posterior disc osteophyte complex which indents the  ventral thecal sac.  Mild facet and uncovertebral hypertrophy.  Spinal canal is maintained.  Mild bilateral neural foraminal narrowing.    Impression  Discogenic degenerative changes as highlighted above.  Overall, findings do not appear significantly changed when compared to the MRI from 10/13/2020.      12/13/18    X-Ray Cervical Spine AP And Lateral    Narrative  EXAMINATION:  XR CERVICAL SPINE AP LATERAL    CLINICAL HISTORY:  Radiculopathy, cervical region    TECHNIQUE:  AP, lateral and open mouth views of the cervical spine were performed.    COMPARISON:  None.    FINDINGS:  Straightening of normal cervical lordosis.  This may be positional or secondary to muscle spasm.  No fracture or listhesis.  Multilevel degenerative changes are seen with findings most pronounced at C3-C4 and C5-C6 with intervertebral disc space narrowing and marginal spurring with facet arthropathy.  Odontoid process remains intact.  Lateral masses are symmetric.  Prevertebral soft tissues are maintained.  Calcification about the left carotid vasculature is noted      09/02/21    X-Ray Cervical Spine Complete 5 view    Narrative  EXAMINATION:  XR CERVICAL SPINE COMPLETE 5 VIEW    CLINICAL HISTORY:  preop planning;. Other cervical disc degeneration, unspecified cervical region    TECHNIQUE:  AP, Lateral, bilateral oblique and open mouth views of the cervical spine were performed.    COMPARISON:  09/01/2021    FINDINGS:  Moderate disc space narrowing spondylosis present at the C3-4 and C5-6 levels with more mild disc space narrowing and spondylosis present at C4-5 and C6-7.  The vertebral bodies demonstrate a normal height and alignment.  There is moderate osseous encroachment noted upon the C4-5 and C5-6 neural foraminal canals on the right secondary to uncovertebral joint hypertrophy.  On the left, there is mild osseous encroachment noted upon the C2-3 C4-5 6 and C6-7 level secondary to uncovertebral joint hypertrophy and severe osseous  encroachment noted upon the C3-4 neural foraminal canal on the left secondary to uncovertebral joint hypertrophy.    09/01/21    X-Ray Cervical Spine Flexion And Extension Only    Narrative  EXAMINATION:  XR CERVICAL SPINE FLEXION  AND EXTENSION ONLY    CLINICAL HISTORY:  Encounter for other preprocedural examination    TECHNIQUE:  Flexion-extension views of the cervical spine    COMPARISON:  09/29/2020    FINDINGS:  The vertebral bodies demonstrate a normal height.  There is at least moderate disc space narrowing and spondylosis present at the C3-4 and C5-6 levels with more mild disc space narrowing and spondylosis present at C4-5 and C6-7.  There may be the slightest bit of anterolisthesis measuring on the order of may be 1-2 mm at the C4-5 level on the extension view with neutral positioning seen on the extension view suggesting mild motion/instability at this level.      ASSESSMENT: 69 y.o. year old female with neck and left upper extremity pain, consistent with     1. Cervicalgia  Case Request-RAD/Other Procedure Area: Bilateral Cervical C5-7 MBB with RN Sedation      2. 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 Lumbar radiculopathy        4. DDD (degenerative disc disease), lumbar        5. DDD (degenerative disc disease), cervical            PLAN:   - Interventions: Schedule patient for bilateral cervical medial branch block (C5-7) to address axial neck pain , will call next day. If at least 80% relief, schedule for repeat diagnostic block to move towards RFA.   Explained the risks and benefits of the procedure in detail with the patient today in clinic along with alternative treatment options, and the patient elected to pursue the intervention.      Can consider Lumbar TFESI L2/3 in the near future.    - Anticoagulation use: no no anticoagulation       report:  Reviewed and consistent with medication use as prescribed.    - Medications:  - Patient may  continue Lyrica 100 mg per PCP.     - Therapy:   We discussed  physical therapy to help manage the patient/s painful condition. The patient was counseled that muscle strengthening will improve the long term prognosis in regards to pain and may also help increase range of motion and mobility. They were told that one of the goals of physical therapy is that they learn how to do the exercises so that they can do them independently at home daily upon completion. The patient's questions were answered and they were agreeable to this course. Patient states she was supposed to start physical therapy but has not yet ([facility] ).      - Imaging: Reviewed available imaging with patient and answered any questions they had regarding study. Left femur x-rays ordered today.   MRI C/L spine, CT Head findings reviewed and discussed today.  MRI Cervical spine significant for: Degenerative disc disease C2-3, C3-4, C6-7 and C7-T1 as well as facet arthrosis with foraminal stenosis as above.  MRI L spine significant for: L2-3:    Mild to moderate disc degeneration with moderate disc bulge.  Mild to moderate hypertrophic facet arthrosis with increased epidural adipose tissue with at least moderate central canal stenosis.  AP diameter canal is estimated at 6 mm.  There is mild right and moderate to severe left foraminal stenosis.      - Follow up visit: Will call after first cervical MBB to determine future intervention.       The above plan and management options were discussed at length with patient. Patient is in agreement with the above and verbalized understanding.    - I discussed the goals of interventional chronic pain management with the patient on today's visit. We discussed a multimodal and systematic approach to pain.  This includes diagnostic and therapeutic injections, adjuvant pharmacologic treatment, physical therapy, and at times psychiatry.  I emphasized the importance of regular exercise, core  strengthening and stretching, diet and weight loss as a cornerstone of long-term pain management.    - This condition does not require this patient to take time off of work, and the primary goal of our Pain Management services is to improve the patient's functional capacity.  - Patient Questions: Answered all of the patient's questions regarding diagnoses, therapy, treatment and next steps        Miesha Linn PA-C  Interventional Pain Management  [facility]

## 2024-09-05 NOTE — PRE-PROCEDURE INSTRUCTIONS
Spoke with patient regarding procedure scheduled on 9.20     Arrival time 0600     Has patient been sick with fever or on antibiotics within the last 7 days? No     Does the patient have any open wounds, sores or rashes? No     Does the patient have any recent fractures? no     Has patient received a vaccination within the last 7 days? No     Received the COVID vaccination?      Has the patient stopped all medications as directed? na     Does patient have a pacemaker, defibrillator, or implantable stimulator? No     Does the patient have a ride to and from procedure and someone reliable to remain with patient?  carleen     Is the patient diabetic? no     Does the patient have sleep apnea? Or use O2 at home? no     Is the patient receiving sedation?      Is the patient instructed to remain NPO beginning at midnight the night before their procedure? yes     Procedure location confirmed with patient? Yes     Covid- Denies signs/symptoms. Instructed to notify PAT/MD if any changes.

## 2024-09-16 DIAGNOSIS — F41.1 GAD (GENERALIZED ANXIETY DISORDER): ICD-10-CM

## 2024-09-16 DIAGNOSIS — Z98.1 S/P LUMBAR FUSION: ICD-10-CM

## 2024-09-17 ENCOUNTER — TELEPHONE (OUTPATIENT)
Dept: PAIN MEDICINE | Facility: CLINIC | Age: 69
End: 2024-09-17
Payer: MEDICARE

## 2024-09-17 DIAGNOSIS — F41.1 GAD (GENERALIZED ANXIETY DISORDER): ICD-10-CM

## 2024-09-17 RX ORDER — ALPRAZOLAM 1 MG/1
TABLET ORAL
Qty: 30 TABLET | Refills: 5 | Status: SHIPPED | OUTPATIENT
Start: 2024-09-17

## 2024-09-17 RX ORDER — ZOLPIDEM TARTRATE 10 MG/1
TABLET ORAL
Qty: 20 TABLET | Refills: 5 | Status: SHIPPED | OUTPATIENT
Start: 2024-09-17

## 2024-09-17 RX ORDER — PREGABALIN 100 MG/1
100 CAPSULE ORAL 2 TIMES DAILY
Qty: 60 CAPSULE | Refills: 5 | Status: SHIPPED | OUTPATIENT
Start: 2024-09-17

## 2024-09-17 RX ORDER — ALPRAZOLAM 1 MG/1
TABLET ORAL
Qty: 30 TABLET | Refills: 5 | Status: CANCELLED | OUTPATIENT
Start: 2024-09-17

## 2024-09-17 NOTE — TELEPHONE ENCOUNTER
[facility] Staff  Caller: Unspecified (Today,  3:38 PM)  Name of Who is Calling:   JEFFY MAR [[id number]]     What is the request in detail: Aimee calling from [facility] fax over plan of care on 9/6 and haven't receive it back and they need it fax back in order to  continue the care please advise        Can the clinic reply by [facility]: call back        What Number to Call Back if not [facility]: [phone]                                                                                   Fax: [phone]

## 2024-09-17 NOTE — TELEPHONE ENCOUNTER
Requested Prescriptions     Pending Prescriptions Disp Refills    zolpidem (AMBIEN) 10 mg Tab 20 tablet 5     Sig: TAKE ONE TABLET BY MOUTH AT BEDTIME AS NEEDED Strength: 10 mg     Please see patient's OfficeDropt message and advise.     LV 07/08/2024  NV 10/14/2024  LF 05/15/2024

## 2024-09-20 ENCOUNTER — HOSPITAL ENCOUNTER (OUTPATIENT)
Facility: HOSPITAL | Age: 69
Discharge: HOME | End: 2024-09-20
Attending: ANESTHESIOLOGY | Admitting: ANESTHESIOLOGY
Payer: MEDICARE

## 2024-09-20 VITALS
BODY MASS INDEX: 36.39 KG/M2 | OXYGEN SATURATION: 96 % | DIASTOLIC BLOOD PRESSURE: 61 MMHG | TEMPERATURE: 97 F | RESPIRATION RATE: 16 BRPM | SYSTOLIC BLOOD PRESSURE: 149 MMHG | HEIGHT: 66 IN | WEIGHT: 226.44 LBS | HEART RATE: 52 BPM

## 2024-09-20 DIAGNOSIS — M47.812 CERVICAL SPONDYLOSIS: ICD-10-CM

## 2024-09-20 PROCEDURE — 99152 MOD SED SAME PHYS/QHP 5/>YRS: CPT | Performed by: ANESTHESIOLOGY

## 2024-09-20 PROCEDURE — 64490 INJ PARAVERT F JNT C/T 1 LEV: CPT | Mod: 50,,, | Performed by: ANESTHESIOLOGY

## 2024-09-20 PROCEDURE — 64490 INJ PARAVERT F JNT C/T 1 LEV: CPT | Mod: 50 | Performed by: ANESTHESIOLOGY

## 2024-09-20 PROCEDURE — 64491 INJ PARAVERT F JNT C/T 2 LEV: CPT | Mod: 50,,, | Performed by: ANESTHESIOLOGY

## 2024-09-20 PROCEDURE — 64491 INJ PARAVERT F JNT C/T 2 LEV: CPT | Mod: 50 | Performed by: ANESTHESIOLOGY

## 2024-09-20 PROCEDURE — 25000003 PHARM REV CODE 250: Performed by: ANESTHESIOLOGY

## 2024-09-20 PROCEDURE — 63600175 PHARM REV CODE 636 W HCPCS: Mod: JZ,JG | Performed by: ANESTHESIOLOGY

## 2024-09-20 RX ORDER — MIDAZOLAM HYDROCHLORIDE 1 MG/ML
INJECTION, SOLUTION INTRAMUSCULAR; INTRAVENOUS
Status: DISCONTINUED | OUTPATIENT
Start: 2024-09-20 | End: 2024-09-20 | Stop reason: HOSPADM

## 2024-09-20 RX ORDER — BUPIVACAINE HYDROCHLORIDE 5 MG/ML
INJECTION, SOLUTION EPIDURAL; INTRACAUDAL
Status: DISCONTINUED | OUTPATIENT
Start: 2024-09-20 | End: 2024-09-20 | Stop reason: HOSPADM

## 2024-09-20 RX ORDER — FENTANYL CITRATE 50 UG/ML
INJECTION, SOLUTION INTRAMUSCULAR; INTRAVENOUS
Status: DISCONTINUED | OUTPATIENT
Start: 2024-09-20 | End: 2024-09-20 | Stop reason: HOSPADM

## 2024-09-20 RX ORDER — DEXAMETHASONE SODIUM PHOSPHATE 10 MG/ML
INJECTION INTRAMUSCULAR; INTRAVENOUS
Status: DISCONTINUED | OUTPATIENT
Start: 2024-09-20 | End: 2024-09-20 | Stop reason: HOSPADM

## 2024-09-20 RX ORDER — INDOMETHACIN 25 MG/1
CAPSULE ORAL
Status: DISCONTINUED | OUTPATIENT
Start: 2024-09-20 | End: 2024-09-20 | Stop reason: HOSPADM

## 2024-09-20 NOTE — DISCHARGE INSTRUCTIONS

## 2024-09-20 NOTE — OP NOTE
CERVICAL Medial Branch Block Under Fluoroscopy  Time-out taken to identify patient and procedure side prior to starting the procedure.        Mary Vo  [id number]      Date of Procedure: 09/20/2024                                                               PROCEDURE:  bilateral medial branch block at the transverse processes of C4, C5, C6    Pre Procedure Diagnosis:  Cervical spondylosis [M47.812]  Post Procedure Diagnosis: Same    PHYSICIAN: Cynthia Soares MD    ASSISTANTS: None    Anesthesia:   Conscious sedation provided by M.D    The patient was monitored with continuous pulse oximetry, EKG, and intermittent blood pressure monitors.  The patient was hemodynamically stable throughout the entire process was responsive to voice, and breathing spontaneously.  Supplemental O2 was provided at 2L/min via nasal cannula.  Patient was comfortable for the duration of the procedure. (See nurse documentation and case log for sedation time)    There was a total of 2mg IV Midazolam and 50mcg Fentanyl titrated for the procedure      MEDICATIONS INJECTED:  0.25% Bupivicaine total 1.5mL  LOCAL ANESTHETIC USED:   Xylocaine 1% 1mL / site    SEDATION MEDICATIONS: None    ESTIMATED BLOOD LOSS:  None.    COMPLICATIONS:  None.    TECHNIQUE:   Laying in a bilateral lateral decubitus  position, the patient was prepped and draped in the usual sterile fashion using ChloraPrep and fenestrated drape.  The level was determined under fluoroscopic guidance.  Local anesthetic was given by going down to the hub of the 27-gauge 1.25in needle and raising a wheel.  A 22-gauge 3.5inch needle was introduced to the anatomic local of the medial branch at each of the stated above levels using fluoroscopy. Then after negative aspiration, a total of 1.5 cc of .25% bupivacaine and 10 mg dexamethasone was injected in divided doses at the three levels. The patient tolerated the procedure well.     The patient was monitored after the procedure.   Patient was given post procedure and discharge instructions to follow at home.  We will see the patient back in two weeks or the patient may call to inform of status. The patient was discharged in a stable condition.    Cytnhia Soares

## 2024-09-20 NOTE — DISCHARGE SUMMARY
Discharge Note  Short Stay      SUMMARY     Admit Date: 9/20/2024    Attending Physician: Cynthia Soares MD        Discharge Physician: Cynthia Soares MD        Discharge Date: 9/20/2024 7:49 AM    Procedure(s) (LRB):  Bilateral Cervical C4-5-6 MBB with RN Sedation (Bilateral)    Final Diagnosis: Cervicalgia [M54.2]    Disposition: Home or self care    Patient Instructions:   Current Discharge Medication List        CONTINUE these medications which have NOT CHANGED    Details   ALPRAZolam (XANAX) 1 MG tablet TAKE 1 TABLET(1 MG) BY MOUTH EVERY NIGHT AS NEEDED  Qty: 30 tablet, Refills: 5    Associated Diagnoses: CURT (generalized anxiety disorder)      citalopram (CELEXA) 40 MG tablet TAKE 1 TABLET(40 MG) BY MOUTH EVERY DAY  Qty: 90 tablet, Refills: 3      cloNIDine (CATAPRES) 0.2 MG tablet TAKE 1 TABLET(0.2 MG) BY MOUTH EVERY EVENING  Qty: 90 tablet, Refills: 3      ERGOCALCIFEROL, VITAMIN D2, (VITAMIN D ORAL) Take 1,000 Units by mouth once daily.      fluticasone propionate (FLONASE) 50 mcg/actuation nasal spray 2 sprays (100 mcg total) by Each Nostril route once daily.  Qty: 48 g, Refills: 3      !! HYDROcodone-acetaminophen (NORCO)  mg per tablet Take 1 tablet by mouth every 12 (twelve) hours as needed for Pain.  Qty: 60 tablet, Refills: 0    Comments: Quantity prescribed more than 7 day supply? Yes, quantity medically necessary, Dx M54.5      potassium chloride SA (KLOR-CON M20) 20 MEQ tablet Take 1 tablet (20 mEq total) by mouth 2 (two) times daily.  Qty: 60 tablet, Refills: 11      pregabalin (LYRICA) 100 MG capsule TAKE 1 CAPSULE(100 MG) BY MOUTH TWICE DAILY  Qty: 60 capsule, Refills: 5    Associated Diagnoses: S/P lumbar fusion      zolpidem (AMBIEN) 10 mg Tab TAKE ONE TABLET BY MOUTH AT BEDTIME AS NEEDED Strength: 10 mg  Qty: 20 tablet, Refills: 5      diazePAM (VALIUM) 10 MG Tab Take 1 tablet (10 mg total) by mouth once. 45 minutes to 1 hour prior to MRI for procedural anxiety for 1 dose  Qty: 1 tablet,  Refills: 0    Associated Diagnoses: Cervicalgia; Lumbar radiculopathy      !! HYDROcodone-acetaminophen (NORCO)  mg per tablet Take 1 tablet by mouth every 12 (twelve) hours as needed for Pain.  Qty: 60 tablet, Refills: 0    Comments: Quantity prescribed more than 7 day supply? Yes, quantity medically necessary, Dx M54.5      !! HYDROcodone-acetaminophen (NORCO)  mg per tablet Take 1 tablet by mouth every 12 (twelve) hours as needed for Pain.  Qty: 60 tablet, Refills: 0    Comments: Quantity prescribed more than 7 day supply? Yes, quantity medically necessary, Dx M54.5      !! meloxicam (MOBIC) 15 MG tablet Take 1 tablet (15 mg total) by mouth once daily.  Qty: 30 tablet, Refills: 2      !! meloxicam (MOBIC) 7.5 MG tablet Take 1 tablet (7.5 mg total) by mouth once daily.  Qty: 30 tablet, Refills: 2       !! - Potential duplicate medications found. Please discuss with provider.              Discharge Diagnosis: Cervicalgia [M54.2]  Condition on Discharge: Stable with no complications to procedure   Diet on Discharge: Same as before.  Activity: as per instruction sheet.  Discharge to: Home with a responsible adult.  Follow up: 2-4 weeks       Please call the office at [phone] if you experience any weakness or loss of sensation, fever > 101.5, pain uncontrolled with oral medications, persistent nausea/vomiting/or diarrhea, redness or drainage from the incisions, or any other worrisome concerns. If physician on call was not reached or could not communicate with our office for any reason please go to the nearest emergency department

## 2024-09-23 ENCOUNTER — TELEPHONE (OUTPATIENT)
Dept: PAIN MEDICINE | Facility: CLINIC | Age: 69
End: 2024-09-23
Payer: MEDICARE

## 2024-09-23 NOTE — TELEPHONE ENCOUNTER
Reached out to pt to get their percent relief from the injection that they had and to ask the following questions. Pt did not answer left v.m and sent my chart messages.                1. What percentage of pain relief did you receive following the block, from 0-100%?     2. What was pain score the day before your procedure on a scale from 0-10?    3. What was pain score immediately after your procedure (up to 6 hours)  on a scale from 0-10?    4. When your pain returned, what was your pain score on a scale from 0-10?     5. How many hours did pain relief last following the block?       6. During this time, please describe in detail the activities you were able to do?          Thank you,  Reji Maravilla   Medical Assistant

## 2024-09-30 ENCOUNTER — TELEPHONE (OUTPATIENT)
Dept: PAIN MEDICINE | Facility: CLINIC | Age: 69
End: 2024-09-30
Payer: MEDICARE

## 2024-09-30 NOTE — TELEPHONE ENCOUNTER
----- Message from Dangelo sent at 9/30/2024  3:47 PM CDT -----  Contact: [phone]  Type:  Patient Returning Call    Who Called:JEFFY MAR [[id number]]  Who Left Message for Patient:  Does the patient know what this is regarding?:need you to sign the orders to her physical therapy  Would the patient rather a call back or a response via [facility]? Call back   Best Call Back Number: [phone]  Additional Information: [id number]

## 2024-09-30 NOTE — TELEPHONE ENCOUNTER
Called patient in regards to a patient call back. Patient stated that physical therapy stated that they needed a signiture for something on our end. Sttated to pateint I will call the facility in regards to see what is the hold up and will reach back out in regards. Patient thanked and verbalized understanding.    Marnie Hankins MA

## 2024-10-04 ENCOUNTER — LAB VISIT (OUTPATIENT)
Dept: LAB | Facility: HOSPITAL | Age: 69
End: 2024-10-04
Attending: INTERNAL MEDICINE
Payer: MEDICARE

## 2024-10-04 DIAGNOSIS — I10 PRIMARY HYPERTENSION: ICD-10-CM

## 2024-10-04 LAB
ALBUMIN SERPL BCP-MCNC: 3.8 G/DL (ref 3.5–5.2)
ALP SERPL-CCNC: 83 U/L (ref 55–135)
ALT SERPL W/O P-5'-P-CCNC: 12 U/L (ref 10–44)
ANION GAP SERPL CALC-SCNC: 11 MMOL/L (ref 8–16)
AST SERPL-CCNC: 17 U/L (ref 10–40)
BILIRUB SERPL-MCNC: 0.5 MG/DL (ref 0.1–1)
BUN SERPL-MCNC: 12 MG/DL (ref 8–23)
CALCIUM SERPL-MCNC: 9.6 MG/DL (ref 8.7–10.5)
CHLORIDE SERPL-SCNC: 108 MMOL/L (ref 95–110)
CHOLEST SERPL-MCNC: 183 MG/DL (ref 120–199)
CHOLEST/HDLC SERPL: 3.7 {RATIO} (ref 2–5)
CO2 SERPL-SCNC: 25 MMOL/L (ref 23–29)
CREAT SERPL-MCNC: 0.9 MG/DL (ref 0.5–1.4)
EST. GFR  (NO RACE VARIABLE): >60 ML/MIN/1.73 M^2
GLUCOSE SERPL-MCNC: 98 MG/DL (ref 70–110)
HDLC SERPL-MCNC: 49 MG/DL (ref 40–75)
HDLC SERPL: 26.8 % (ref 20–50)
LDLC SERPL CALC-MCNC: 115.4 MG/DL (ref 63–159)
NONHDLC SERPL-MCNC: 134 MG/DL
POTASSIUM SERPL-SCNC: 3.9 MMOL/L (ref 3.5–5.1)
PROT SERPL-MCNC: 6.7 G/DL (ref 6–8.4)
SODIUM SERPL-SCNC: 144 MMOL/L (ref 136–145)
TRIGL SERPL-MCNC: 93 MG/DL (ref 30–150)

## 2024-10-04 PROCEDURE — 80053 COMPREHEN METABOLIC PANEL: CPT | Performed by: INTERNAL MEDICINE

## 2024-10-04 PROCEDURE — 84443 ASSAY THYROID STIM HORMONE: CPT | Performed by: INTERNAL MEDICINE

## 2024-10-04 PROCEDURE — 36415 COLL VENOUS BLD VENIPUNCTURE: CPT | Mod: PN | Performed by: INTERNAL MEDICINE

## 2024-10-04 PROCEDURE — 80061 LIPID PANEL: CPT | Performed by: INTERNAL MEDICINE

## 2024-10-05 LAB — TSH SERPL DL<=0.005 MIU/L-ACNC: 2.57 UIU/ML (ref 0.4–4)

## 2024-10-07 ENCOUNTER — TELEPHONE (OUTPATIENT)
Dept: PAIN MEDICINE | Facility: CLINIC | Age: 69
End: 2024-10-07
Payer: MEDICARE

## 2024-10-07 ENCOUNTER — TELEPHONE (OUTPATIENT)
Dept: INTERNAL MEDICINE | Facility: CLINIC | Age: 69
End: 2024-10-07
Payer: MEDICARE

## 2024-10-07 NOTE — TELEPHONE ENCOUNTER
----- Message from Dayanara sent at 10/7/2024  2:14 PM CDT -----  Contact: [facility] [phone]  Patient would like for you to give her a call in regards to her wanting ask some questions about her Physical therapy paper work.

## 2024-10-07 NOTE — TELEPHONE ENCOUNTER
1. What percentage of pain relief did you receive following the block, from 0-100%?      80%    2. What was pain score the day before your procedure on a scale from 0-10?    9/10    3. What was pain score immediately after your procedure (up to 6 hours)  on a scale from 0-10?     3/10    4. When your pain returned, what was your pain score on a scale from 0-10?    9/10    5. How many hours did pain relief last following the block?       48 hours       6. During this time, please describe in detail the activities you were able to do?    Pt was able to move her neck

## 2024-10-07 NOTE — TELEPHONE ENCOUNTER
----- Message from Dayanara sent at 10/7/2024  2:14 PM CDT -----  Contact: [employer] [phone]  Patient would like for you to give her a call in regards to her wanting ask some questions about her Physical therapy paper work.

## 2024-10-07 NOTE — TELEPHONE ENCOUNTER
Called patient in regards to a patient call back. Patient stated that her physical therapy orders needed to be signed before she can start. Stated to patient that I did speak with Ms. Linn and she wants to do an audio call with her on tomorrow. Patient was scheduled on 10/08/24. Patient verbalized understanding.    Marnie Hankins MA

## 2024-10-08 ENCOUNTER — OFFICE VISIT (OUTPATIENT)
Dept: PAIN MEDICINE | Facility: CLINIC | Age: 69
End: 2024-10-08
Payer: MEDICARE

## 2024-10-08 DIAGNOSIS — M47.812 CERVICAL SPONDYLOSIS: ICD-10-CM

## 2024-10-08 DIAGNOSIS — M54.2 CERVICALGIA: Primary | ICD-10-CM

## 2024-10-08 PROCEDURE — 99441 PR PHYSICIAN TELEPHONE EVALUATION 5-10 MIN: CPT | Mod: 95,,, | Performed by: PHYSICIAN ASSISTANT

## 2024-10-08 NOTE — PROGRESS NOTES
Established Patient - Audio Only Telehealth Visit     The patient location is: home  The chief complaint leading to consultation is: discuss PHYSICAL THERAPY, cervical MBB #1  Visit type: Virtual visit with audio only (telephone)  Total time spent with patient:10-15 minutes       The reason for the audio only service rather than synchronous audio and video virtual visit was related to technical difficulties or patient preference/necessity.     Each patient to whom I provide medical services by telemedicine is:  (1) informed of the relationship between the physician and patient and the respective role of any other health care provider with respect to management of the patient; and (2) notified that they may decline to receive medical services by telemedicine and may withdraw from such care at any time. Patient verbally consented to receive this service via voice-only telephone call.    PCP: Elias Cannon MD         SUBJECTIVE:    Interval History (10/8/2024):  Mary Vo presents today for follow-up visit.  she underwent Bilateral  Cervical   C4-6 MBB on 9/20/24.  The patient reports that she is/was better following the procedure.  she reports  80% pain relief.  The changes lasted 8 -12 hours.  Patient reports pain as 6/10 today.She is currently in outpatient physical therapy at [facility]. Patient states she needs us to sign a form [facility]     Interval History (8/23/2024):   Mary Vo presents today for follow-up visit.  Patient was last seen on 7/17/2024.  Patient reports pain as 5/10 today.  The patient currently c/o lower back, left hip and lower extremity pain. She also is concerned with bilateral neck pain.   The patient has been taking Lyrica from Orthopedics. Lyrica helps somewhat but she started having tingling this week.     Interval History (7/17/2024): Mary Vo presents today for follow-up visit.   Patient reports pain as 6/10 today.  Patient  "is concerned due to recent multiple falls. Recalls 5-6 falls in the past 3 months.   Patient reports hitting her head last week while getting out of the bath tub. She denies LOC but reports her pain was severe at the time.   Patient has bilateral shoulder pain and lower back pain. She has been utilizing pain patches on the right side of her lower. About 6 months ago, she was diagnosed with frozen shoulder.   She has noticed that the pain radiates from both shoulders down into her arms.   Has noticed "lightheadedness" but denies HA, shortness of breath, chest pain, confusion or memory changes.   Patient takes 1 capsule 100 mg   Lyrica nightly.     Since her last office visit with our department, patient underwent an ACDF C4-7 with Dr. Fernando on 1/3/22, Robotic sleeve with Dr. Hampton on 8/30/22 and TLIF L3-S1 with Dr. Fernando on 2/1/2023.      Interval HPI: 9/29/2021-  Mary HERNÁN Vo is a 69 y.o. female with past medical history significant for bilateral carpal tunnel syndrome, hypertension, hyperlipidemia, history of breast carcinoma, morbid obesity, bilateral total hip arthroplasty who presents to the clinic for the evaluation of neck pain of 2-3 years duration.  Of note patient followed with Dr. Lobato with frequent KATYA procedures and symptomatic relief.  Today patient reports flare of her neck pain over the last 3 months without inciting accident or injury.  Pain is constant and described as an 8/10.  Patient reports pain along bilateral cervical paraspinous muscles which radiates down the left upper extremity in C5-6 distribution with termination at the cubital fossa.  Pain is described as stabbing and aching in nature.  Patient denies any right upper extremity radicular symptoms.  Pain is exacerbated with cervical lateral rotation as well as left upper extremity use.  Patient does report compromise in left hand  strength.    Of note patient saw Neurosurgery September 2, 2021 with no current recommendation " for surgical intervention.    Of note patient last saw Dr. Lobato December 2020 for lumbar spondylosis, degenerative disc disease, cervical radiculopathy with scheduling of transforaminal procedure, continuation of gabapentin, Mobic and Norco.    Patient reports significant motor weakness and loss of sensations.  Patient denies night fever/night sweats, urinary incontinence, bowel incontinence and significant weight loss.    Pain Disability Index Review:         9/29/2021    11:47 AM 10/11/2019    10:00 AM 8/13/2019    11:00 AM   Last 3 PDI Scores   Pain Disability Index (PDI) 55 29 27       Non-Pharmacologic Treatments:  Physical Therapy/Home Exercise: yes  Ice/Heat:yes  TENS: no  Acupuncture: no  Massage: no  Chiropractic: no    Other: no      Pain Medications:  - Opioids: Vicodin ( Hydrocodone/Acetaminophen)  - Adjuvant Medications: Ambien (Zolpidem), Celexa (Citalopram), Neurontin (Gabapentin) and Xanax (Alprazolam)    Pain Procedures:     Dr. Soares:  -9/20/2024: Bilateral Cervical C4-6 medial branch block , 80% relief for 8-12 hrs      -September 25, 2020:  C7-T1 interlaminar epidural steroid injection; Dr. Lobato with 100% relief  -September 17, 2019:  Left-sided C5-6 transforaminal epidural steroid injection:  Dr. Lobato with 100% symptomatic relief  -November 3, 2020:  L5-S1 interlaminar epidural steroid injection; Dr. Lobato  -November 6, 2018:  left L3-4 transforaminal epidural steroid injection  -September 17, 2019:  Right C5-6 transforaminal epidural steroid injection with 100% relief  -November 6, 2018:  Left L3 transforaminal epidural steroid injection with 80% relief    Past Medical History:   Diagnosis Date    Breast cancer 1996    right    Chronic pain syndrome     Generalized osteoarthrosis, involving multiple sites     s/p THR bilateral    History of breast cancer 2007    lumpectomy/XRT    HTN (hypertension)     Hyperlipidemia     Morbid obesity with BMI of 40.0-44.9, adult      Past Surgical History:    Procedure Laterality Date    ANTERIOR CERVICAL DISCECTOMY W/ FUSION Left 01/03/2022    Procedure: DISCECTOMY, SPINE, CERVICAL, ANTERIOR APPROACH, WITH FUSION;  Surgeon: Pradip Fernando MD;  Location: [facility] OR;  Service: Neurosurgery;  Laterality: Left;  Three Level ACDF  C4/5, 5/6, 6/7      BREAST LUMPECTOMY Right 2006    XRT    CATARACT EXTRACTION W/  INTRAOCULAR LENS IMPLANT Left 01/15/2020    CATARACT EXTRACTION W/  INTRAOCULAR LENS IMPLANT Right 01/29/2020    COLONOSCOPY N/A 10/15/2015    Procedure: COLONOSCOPY;  Surgeon: Maylin Shipley MD;  Location: [facility] ENDO;  Service: Endoscopy;  Laterality: N/A;    COLONOSCOPY N/A 11/30/2022    Procedure: COLONOSCOPY;  Surgeon: Gary Toussaint MD;  Location: [facility];  Service: General;  Laterality: N/A;    EPIDURAL STEROID INJECTION N/A 11/03/2020    Procedure: Lumbar L5/S1 IL KATYA;  Surgeon: Paul Lobato MD;  Location: [facility] PAIN MGT;  Service: Pain Management;  Laterality: N/A;    EPIDURAL STEROID INJECTION INTO CERVICAL SPINE N/A 09/25/2020    Procedure: C7/T1 IL KATYA;  Surgeon: Paul Lobato MD;  Location: [facility] PAIN MGT;  Service: Pain Management;  Laterality: N/A;    EPIDURAL STEROID INJECTION INTO CERVICAL SPINE N/A 10/05/2021    Procedure: C7/T1 IL KATYA with RN IV sedation;  Surgeon: Cynthia Soares MD;  Location: [facility] PAIN MGT;  Service: Pain Management;  Laterality: N/A;    ESOPHAGOGASTRODUODENOSCOPY N/A 11/04/2021    Procedure: ESOPHAGOGASTRODUODENOSCOPY (EGD);  Surgeon: Blas Hampton MD;  Location: [facility] ENDO;  Service: Endoscopy;  Laterality: N/A;    HYSTERECTOMY      INJECTION OF ANESTHETIC AGENT AROUND MEDIAL BRANCH NERVES INNERVATING CERVICAL FACET JOINT Bilateral 9/20/2024    Procedure: Bilateral Cervical C4-5-6 MBB with RN Sedation;  Surgeon: Cynthia Soares MD;  Location: [facility] PAIN MGT;  Service: Pain Management;  Laterality: Bilateral;    LAPAROSCOPIC LYSIS OF ADHESIONS N/A 08/30/2022    Procedure: LYSIS, ADHESIONS, LAPAROSCOPIC;  Surgeon:  Blas Hampton MD;  Location: [facility] OR;  Service: General;  Laterality: N/A;    PLACEMENT OF ACELLULAR HUMAN DERMAL ALLOGRAFT Left 01/03/2022    Procedure: APPLICATION, ACELLULAR HUMAN DERMAL ALLOGRAFT;  Surgeon: Pradip Fernando MD;  Location: [facility] OR;  Service: Neurosurgery;  Laterality: Left;    PLACEMENT OF ACELLULAR HUMAN DERMAL ALLOGRAFT N/A 02/01/2023    Procedure: APPLICATION, ACELLULAR HUMAN DERMAL ALLOGRAFT;  Surgeon: Pradip Fernando MD;  Location: [facility] OR;  Service: Neurosurgery;  Laterality: N/A;    ROBOT-ASSISTED LAPAROSCOPIC SLEEVE GASTRECTOMY USING DA FLORENTINO XI N/A 08/30/2022    Procedure: XI ROBOTIC SLEEVE GASTRECTOMY;  Surgeon: Blas Hampton MD;  Location: [facility] OR;  Service: General;  Laterality: N/A;    TRANSFORAMINAL EPIDURAL INJECTION OF STEROID Left 09/17/2019    Procedure: Left C5/6 TF KATYA w/ RN IV sedation;  Surgeon: Paul Lobato MD;  Location: [facility] [place];  Service: Pain Management;  Laterality: Left;    TRANSFORAMINAL LUMBAR INTERBODY FUSION (TLIF) USING COMPUTER-ASSISTED NAVIGATION Bilateral 02/01/2023    Procedure: FUSION, SPINE, LUMBAR, TLIF, USING COMPUTER-ASSISTED NAVIGATION;  Surgeon: Pradip Fernando MD;  Location: [facility] OR;  Service: Neurosurgery;  Laterality: Bilateral;  TLIF L3-4/4-5 possibly L5-S1     Review of patient's allergies indicates:  No Known Allergies    Current Outpatient Medications   Medication Sig    ALPRAZolam (XANAX) 1 MG tablet TAKE 1 TABLET(1 MG) BY MOUTH EVERY NIGHT AS NEEDED    citalopram (CELEXA) 40 MG tablet TAKE 1 TABLET(40 MG) BY MOUTH EVERY DAY    cloNIDine (CATAPRES) 0.2 MG tablet TAKE 1 TABLET(0.2 MG) BY MOUTH EVERY EVENING    diazePAM (VALIUM) 10 MG Tab Take 1 tablet (10 mg total) by mouth once. 45 minutes to 1 hour prior to MRI for procedural anxiety for 1 dose    ERGOCALCIFEROL, VITAMIN D2, (VITAMIN D ORAL) Take 1,000 Units by mouth once daily.    fluticasone propionate (FLONASE) 50 mcg/actuation nasal spray 2 sprays (100 mcg total) by Each  Nostril route once daily.    HYDROcodone-acetaminophen (NORCO)  mg per tablet Take 1 tablet by mouth every 12 (twelve) hours as needed for Pain.    HYDROcodone-acetaminophen (NORCO)  mg per tablet Take 1 tablet by mouth every 12 (twelve) hours as needed for Pain.    HYDROcodone-acetaminophen (NORCO)  mg per tablet Take 1 tablet by mouth every 12 (twelve) hours as needed for Pain.    meloxicam (MOBIC) 15 MG tablet Take 1 tablet (15 mg total) by mouth once daily. (Patient not taking: Reported on 7/15/2024)    meloxicam (MOBIC) 7.5 MG tablet Take 1 tablet (7.5 mg total) by mouth once daily. (Patient not taking: Reported on 7/15/2024)    potassium chloride SA (KLOR-CON M20) 20 MEQ tablet Take 1 tablet (20 mEq total) by mouth 2 (two) times daily.    pregabalin (LYRICA) 100 MG capsule TAKE 1 CAPSULE(100 MG) BY MOUTH TWICE DAILY    zolpidem (AMBIEN) 10 mg Tab TAKE ONE TABLET BY MOUTH AT BEDTIME AS NEEDED Strength: 10 mg     No current facility-administered medications for this visit.       Review of Systems     GENERAL:  No weight loss, malaise or fevers.  HEENT:   No recent changes in vision or hearing  NECK:  Negative for lumps, no difficulty with swallowing.  RESPIRATORY:  Negative for cough, wheezing or shortness of breath, patient denies any recent URI.  CARDIOVASCULAR:  Negative for chest pain, leg swelling or palpitations.  GI:  Negative for abdominal discomfort, blood in stools or black stools or change in bowel habits.  MUSCULOSKELETAL:  See HPI.  SKIN:  Negative for lesions, rash, and itching.  PSYCH:  No mood disorder or recent psychosocial stressors.   HEMATOLOGY/LYMPHOLOGY:  Negative for prolonged bleeding, bruising easily or swollen nodes.    NEURO:   No history of headaches, syncope, paralysis, seizures or tremors.  All other reviewed and negative other than HPI.    OBJECTIVE:    Telemedicine Physical Exam:   There were no vitals filed for this visit.  There is no height or weight on file  to calculate BMI.   (reviewed on 10/8/2024)     GENERAL: Well appearing, in no acute distress, alert and oriented x3.  Cooperative.  PSYCH:  Mood and affect appropriate.  SKIN: Skin color & texture with no obvious abnormalities.    HEAD/FACE:  Normocephalic, atraumatic.    PULM:  No difficulty breathing. No nasal flaring. No obvious wheezing.  EXTREMITIES: No obvious deformities. Moving all extremities well, appears to have symmetric strength throughout.  MUSCULOSKELETAL: No obvious atrophy abnormalities are noted.   NEURO: No obvious neurologic deficit.   GAIT: sitting.     Physical Exam: last in clinic visit:      Physical Exam    GENERAL: Well appearing, in no acute distress, alert and oriented x3.  PSYCH:  Mood and affect appropriate.  SKIN: Skin color, texture, turgor normal, no rashes or lesions.  HEAD/FACE:  Normocephalic, atraumatic. Cranial nerves grossly intact.    NECK: pain to palpation over the cervical paraspinous muscles. Spurling Negative.  pain with neck flexion, extension, or lateral flexion.   Normaltesting biceps, triceps and brachioradialis bilaterally.    NegativeHoffmann's bilaterally.    5/5 strength testing deltoid, biceps, triceps, wrist extensor, wrist flexor and ulnar intrinsics bilaterally.    Normal  strength bilaterally    Musculoskeletal - Cervical Spine:  - Pain on flexion of cervical spine: Present   - Pain on extension of cervical spine: Present   - Cervical facet loading: Present   - TTP over the cervical facet joints: Present  - TTP over the cervical paraspinals: Present  - Spurling's: Negative    Musculoskeletal - Lumbar Spine:  - Spinal Sensation: Normal   - Pain on flexion of lumbar spine: Absent  - Pain on extension of lumbar spine: Present   -  - TTP over the lumbar facet joints: Present   - TTP over the lumbar paraspinals: Present   - TTP over the SI joints: Absent  - TTP over GT bursa: Absent  - TTP over piriformis:    - Straight Leg Raise: Positive on the left  -  MICHAELA/ Edin's: Positive  - Pain with internal/ external rotation of hip:  Present, ROM is limited      Neuro - Upper Extremities:  - BUE Strength:R/L: D: 5/5; B: 5/5; T: 5/5; WF: 5/5; WE: 5/5; IO: 5/5  - Extremity Reflexes: Brisk and symmetric throughout  - Sensory: Sensation to light touch intact bilaterally    PULM: No evidence of respiratory difficulty, symmetric chest rise.  GI:  Soft and non-tender.    NEURO: Bilateral upper and lower extremity coordination and muscle stretch reflexes are physiologic and symmetric. No loss of sensation is noted.  GAIT: normal.    Imaging/ Diagnostic Studies/ Labs (Reviewed on 10/8/2024):    Results for orders placed during the hospital encounter of 08/22/24    MRI Lumbar Spine W WO Contrast    Narrative  EXAMINATION:  MRI LUMBAR SPINE W WO CONTRAST    CLINICAL HISTORY:  CervicalgiaLumbar radiculopathy, no red flags, no prior management;frequent falls;    TECHNIQUE:  Standard multiplanar without and with contrast MRI sequences of the lumbar spine performed with 10cc Gadavist.    COMPARISON:  Plain x-ray 06/20/2023    FINDINGS:  There are postop changes of the lumbar spine consistent with posterior lumbar fusion at L3-4, L4-5 and L5-S1.  Postop changes in the dorsal paraspinal soft tissues as well.  No definite fluid collection.  Slight exaggeration of the lumbar lordosis is noted.    The distal cord and conus appear normal.    T12-L1: Mild disc degeneration with disc desiccation, narrowing and disc bulge.  Mild facet arthrosis.    L1-2:    Minor disc degeneration with disc desiccation and disc bulge.  However mild to moderate hypertrophic facet arthrosis.    L2-3:    Mild to moderate disc degeneration with moderate disc bulge.  Mild to moderate hypertrophic facet arthrosis with increased epidural adipose tissue with at least moderate central canal stenosis.  AP diameter canal is estimated at 6 mm.  There is mild right and moderate to severe left foraminal stenosis.    L3-4:     Status post posterior decompression with posterior lumbar interbody fusion.  Patent central canal.    L4-5:    Status post posterior decompression with posterior lumbar interbody fusion.  Patent central canal however mild right L4-5 foraminal stenosis.    L5-S1:   Status post posterior decompression with posterior lumbar fusion.  Patent central canal.    Impression  C3 level L3-4 through L5-S1 posterior lumbar fusion as above.    Adjacent segment L2-3 disc degeneration with moderate central canal stenosis and high-grade left foraminal stenosis.    No abnormal enhancement, discitis or abscess.      Electronically signed by: Ajay Weiner MD  Date:    08/22/2024  Time:    14:41       Results for orders placed during the hospital encounter of 08/22/24    MRI Cervical Spine Without Contrast    Narrative  EXAMINATION:  MRI CERVICAL SPINE WITHOUT CONTRAST    CLINICAL HISTORY:  CervicalgiaNeck pain, chronic;    TECHNIQUE:  Standard multiplanar noncontrast MRI sequences of the cervical spine.    COMPARISON:  X-ray 07/29/2022.    FINDINGS:  The cervical cord reveals normal signal and morphology.    There are postop changes consistent with a 2 level C4-5 and C5-6 ACDF.    The prevertebral soft tissues appear normal.    C2-C3: Mild broad-based disc bulge.  Moderate right and mild left facet arthrosis with moderate foraminal stenosis.    C3-C4: Mild disc degeneration with mild broad-based disc bulge.  Mild bilateral facet arthrosis with moderate right and mild left foraminal stenosis.    C4-C5: Status post ACDF.    C5-C6: Status post ACDF.  Uncovertebral joint spurring with moderate right and mild left foraminal stenosis.    C6-C7: Mild disc degeneration with disc bulge and osteophyte.  Uncovertebral joint spurring with moderate to severe right and moderate left foraminal stenosis.    C7-T1: Mild disc degeneration with disc bulge and osteophyte.  Relatively minor facet arthrosis with uncovertebral joint spurring and mild  to moderate right and mild left foraminal stenosis.    Impression  No acute abnormality.  C4-5 and C5-6 ACDF with C5-6 foraminal stenosis.    Degenerative disc disease C2-3, C3-4, C6-7 and C7-T1 as well as facet arthrosis with foraminal stenosis as above.      Electronically signed by: Ajay Weiner MD  Date:    08/22/2024  Time:    14:34        CT Head:8/14/24  Narrative & Impression  EXAM: CT HEAD WITHOUT CONTRAST     CLINICAL HISTORY: Trauma     TECHNIQUE: Contiguous axial images were obtained from the skull base through the vertex without intravenous contrast.     COMPARISON: None available.     FINDINGS: No intracranial hemorrhage.  No mass effect or midline shift.  No extra axial fluid collections.  No areas of abnormal parenchymal attenuation.  The ventricles and sulci are normal in size and configuration.  There is no evidence of hydrocephalus.  The pineal region is unremarkable.  The posterior fossa structures are grossly unremarkable within the limits of CT scan.  The paranasal sinuses and mastoid air cells are clear.  No fractures are identified.  No concerning osseous lesions.        Impression:     1.    No acute intracranial abnormalities        All CT scans at [this location] are performed using dose modulation techniques as appropriate to a performed exam including the following: automated exposure control; adjustment of the mA and/or kV according to patient size (this includes techniques or standardized protocols for targeted exams where dose is matched to indication / reason for exam; i.e. extremities or head); use of iterative reconstruction technique.        Finalized on: 8/14/2024 1:48 PM By:  Jasiel Quiroz MD  [facility]# [id number]      2024-08-14 13:50:[id number]    [facility]         Results for orders placed during the hospital encounter of 06/20/23    X-Ray Lumbar Spine AP And Lateral    Narrative  EXAMINATION:  XR LUMBAR SPINE AP AND LATERAL    CLINICAL HISTORY:  Arthrodesis status    TECHNIQUE:  AP,  lateral and spot images were performed of the lumbar spine.    COMPARISON:  03/14/2023    FINDINGS:  Pedicle screws and fixation rods are seen bilaterally at the L3 through S1 levels with intradiscal spacers present L3-4 and L4-5.  No hardware failure or loosening.  Mild disc space narrowing seen at the L2-3 level.  Surgical clips noted in the right upper quadrant of the abdomen.    Impression  1.  As above      Electronically signed by: Gaetano Slater DO  Date:    06/20/2023  Time:    12:09    Cervical Spine x-rays 7/29/22  XR CERVICAL SPINE AP LATERAL     CLINICAL HISTORY:  Arthrodesis status     TECHNIQUE:  AP, lateral and open mouth views of the cervical spine were performed.     COMPARISON:  The radiographs from 04/19/2022     FINDINGS:  Prior ACDF changes spanning C4-C6 calcification about with disc spacers in place.  Discogenic degenerative changes at C3-C4 and at C6-C7 are noted unchanged compared to prior exam.  No acute fracture.  No new abnormality or detrimental change.     Impression:     See above        Electronically signed by:Papito Delcid MD  Date:                                            07/29/2022  Time:                                           13:24  [facility] September 22, 2021  IMPRESSION  1. ABNORMAL study  2. There is electrodiagnostic evidence of an acute on chronic radiculopathy of the LEFT C6 nerve root and a subacute on chronic radic on the right C6 nerve root.  There is a chronic radiculopathy of the other nerve roots.  There is a mild demyelinating median neuropathy (Carpal tunnel syndrome) across BILATERAL wrists.       10/13/20    MRI Lumbar Spine Without Contrast    Narrative  EXAMINATION:  MRI LUMBAR SPINE WITHOUT CONTRAST    CLINICAL HISTORY:  Back pain or radiculopathy, > 6 wks; Dorsalgia, unspecified    TECHNIQUE:  Multiplanar, multisequence MR images were acquired from the thoracolumbar junction to the sacrum without the administration of contrast.    COMPARISON:  MRI  09/27/2018    FINDINGS:  Vertebral body heights maintained without spondylolisthesis.  Similar multilevel disc desiccation present with height loss at L4-5.  L4-5 mixed Modic endplate changes noted.  Schmorl node findings noted at L2-3 with faint rim of STIR signal noted within the superior endplate of L3.    Conus terminates normally.  Visualized intra-abdominal/pelvic structures unremarkable.    L1-L2: No spinal canal stenosis or neural foraminal narrowing.    L2-L3: Mild circumferential disc bulging without significant spinal canal stenosis or neural foraminal narrowing.  Facet degenerative hypertrophy findings.    L3-L4: Circumferential disc bulging which in conjunction with facet/ligamentum flavum hypertrophy causes moderate spinal canal stenosis.  Small central annular fissure.  Right mild neural foraminal narrowing present.    L4-L5: Circumferential disc bulging present with small posterior osteophytes.  Larger anterior osteophyte changes noted.  Facet degenerative hypertrophy findings.  Moderate spinal canal stenosis present.  Moderate bilateral neural foraminal narrowing with some minimal effacement of the exited nerve roots greater on the right.    L5-S1: No significant posterior disc bulge.  Prominent facet degenerative hypertrophy findings minimal bilateral neural foraminal narrowing.    Impression  Similar multilevel degenerative findings most prevalent at L4-5.    L3-4 Schmorl node findings, acute at the superior endplate of L3.      109/29/2020 radiograph; MRI cervical spine 08/22/2019    FINDINGS:  Vertebral body heights and alignment are unchanged with minimal retrolisthesis of C3 on C4.  No concerning marrow signal present.  Similar multilevel disc desiccation and height loss most prevalent at C5-6 and C6-7.    Posterior fossa is unremarkable.  Spinal cord is unremarkable.    Neck soft tissue structures unremarkable.    C2-C3: No significant spinal canal stenosis or neural foraminal  narrowing.    C3-C4: Similar posterior disc osteophyte complex present effacing the anterior thecal sac without significant spinal canal stenosis.  Bilateral uncovertebral degenerative changes resulting in mild-to-moderate neural foraminal stenosis.    C4-C5: Posterior disc osteophyte complex present with right paracentral disc protrusion which contacts the anterior right aspect of the cervical cord.  Right greater than left facet/uncovertebral hypertrophy resulting mild right neural foraminal narrowing.    C5-C6: Posterior disc osteophyte complex slightly asymmetric to the right paracentral location effacing the anterior thecal sac and causing mild spinal cord flattening.  No intrinsic cord signal abnormality.  Spinal canal measures 7.6 mm in midline AP dimension.  Bilateral uncovertebral/facet degenerative findings causes severe bilateral neural foraminal narrowing.    C6-C7: Posterior disc osteophyte complex present with more focal central disc protrusion causing similar effacement and mild indention of the thecal sac and spinal cord without intrinsic cord signal abnormality.  Spinal canal measures 8.3 mm in AP midline dimension.  Bilateral moderate to severe neural foraminal narrowing present.    C7-T1: Mild posterior disc osteophyte complex present mildly effacing the anterior thecal sac without significant spinal stenosis.  Mild-to-moderate bilateral neural foraminal narrowing.    T1-2: Posterior disc osteophyte changes present causing at least mild spinal canal stenosis with severe right and moderate left neural foraminal narrowing.    Impression  Similar multilevel degenerative findings as above     09/01/21    CT Cervical Spine Without Contrast    Narrative  EXAMINATION:  CT CERVICAL SPINE WITHOUT CONTRAST    CLINICAL HISTORY:  Presurgical eval, C-spine;Encounter for other preprocedural examination    TECHNIQUE:  Low dose axial images, sagittal and coronal reformations were performed though the cervical  spine.  Contrast was not administered.    COMPARISON:  MRI from September 1, 2021 multiple prior radiographs dating back to December 2018.    FINDINGS:  There is minimal grade 1 retrolisthesis of C3 on C4 which is unchanged.  Discogenic degenerative changes throughout the cervical spine are unchanged since the MRI from October 2020 with findings extending most prominently from C3 to C7 levels with varying degrees of disc space narrowing and marginal spurring.  Disc space narrowing is most severe at C5-C6 and C6-C7.  There is hypertrophy of the lower cervical facets.  No acute fracture.  Bones are demineralized.    Visualized posterior fossa is intact.  Visualized brain parenchyma appears normal.  Lung apices are clear.  Small scattered bilateral reactive cervical lymph nodes are noted.    C2-C3: There is mild facet arthropathy.  There is asymmetric left uncovertebral hypertrophy resulting in asymmetric moderate to pronounced left neural foraminal narrowing.    C3-C4: Posterior disc osteophyte complex with uncovertebral and facet arthropathy.  No significant spinal canal stenosis.  There is moderate to pronounced bilateral neural foraminal narrowing, unchanged.    C4-C5: Bilateral right greater than left facet arthropathy with mild uncovertebral spurring.  Central and right paracentral disc osteophyte complex better demonstrated on the MRI.  There is either small focus of calcification of the posterior longitudinal ligament versus calcification of extruded disc material at this level on series 3, image 136.  No spinal canal stenosis.  There is mild right greater than left neural foraminal narrowing.    C5-C6: Posterior disc osteophyte complex with uncovertebral and facet arthropathy.  There is relative narrowing of the spinal canal at this level, unchanged since the MRI.  There is marked bilateral neural foraminal narrowing, also unchanged.    C6-C7: Posterior disc osteophyte complex with facet and uncovertebral  hypertrophy.  There is mild relative narrowing of the spinal canal, unchanged.  There is marked bilateral neural foraminal narrowing.    C7-T1: Posterior disc osteophyte complex which indents the ventral thecal sac.  Mild facet and uncovertebral hypertrophy.  Spinal canal is maintained.  Mild bilateral neural foraminal narrowing.    Impression  Discogenic degenerative changes as highlighted above.  Overall, findings do not appear significantly changed when compared to the MRI from 10/13/2020.      12/13/18    X-Ray Cervical Spine AP And Lateral    Narrative  EXAMINATION:  XR CERVICAL SPINE AP LATERAL    CLINICAL HISTORY:  Radiculopathy, cervical region    TECHNIQUE:  AP, lateral and open mouth views of the cervical spine were performed.    COMPARISON:  None.    FINDINGS:  Straightening of normal cervical lordosis.  This may be positional or secondary to muscle spasm.  No fracture or listhesis.  Multilevel degenerative changes are seen with findings most pronounced at C3-C4 and C5-C6 with intervertebral disc space narrowing and marginal spurring with facet arthropathy.  Odontoid process remains intact.  Lateral masses are symmetric.  Prevertebral soft tissues are maintained.  Calcification about the left carotid vasculature is noted      09/02/21    X-Ray Cervical Spine Complete 5 view    Narrative  EXAMINATION:  XR CERVICAL SPINE COMPLETE 5 VIEW    CLINICAL HISTORY:  preop planning;. Other cervical disc degeneration, unspecified cervical region    TECHNIQUE:  AP, Lateral, bilateral oblique and open mouth views of the cervical spine were performed.    COMPARISON:  09/01/2021    FINDINGS:  Moderate disc space narrowing spondylosis present at the C3-4 and C5-6 levels with more mild disc space narrowing and spondylosis present at C4-5 and C6-7.  The vertebral bodies demonstrate a normal height and alignment.  There is moderate osseous encroachment noted upon the C4-5 and C5-6 neural foraminal canals on the right  secondary to uncovertebral joint hypertrophy.  On the left, there is mild osseous encroachment noted upon the C2-3 C4-5 6 and C6-7 level secondary to uncovertebral joint hypertrophy and severe osseous encroachment noted upon the C3-4 neural foraminal canal on the left secondary to uncovertebral joint hypertrophy.    09/01/21    X-Ray Cervical Spine Flexion And Extension Only    Narrative  EXAMINATION:  XR CERVICAL SPINE FLEXION  AND EXTENSION ONLY    CLINICAL HISTORY:  Encounter for other preprocedural examination    TECHNIQUE:  Flexion-extension views of the cervical spine    COMPARISON:  09/29/2020    FINDINGS:  The vertebral bodies demonstrate a normal height.  There is at least moderate disc space narrowing and spondylosis present at the C3-4 and C5-6 levels with more mild disc space narrowing and spondylosis present at C4-5 and C6-7.  There may be the slightest bit of anterolisthesis measuring on the order of may be 1-2 mm at the C4-5 level on the extension view with neutral positioning seen on the extension view suggesting mild motion/instability at this level.      ASSESSMENT: 69 y.o. year old female with neck and left upper extremity pain, consistent with     1. Cervicalgia  Case Request-RAD/Other Procedure Area: Bilateral Cervical C4-5-6 MBB, MBB #2, with RN IV sedation      2. Cervical spondylosis  Case Request-RAD/Other Procedure Area: Bilateral Cervical C4-5-6 MBB, MBB #2, with RN IV sedation          PLAN:   - Interventions: Schedule patient for bilateral cervical medial branch block (C4-5-6, MBB #2) to address axial neck pain , will call next day. If at least 80% relief, schedule for Cervical RFA.   Explained the risks and benefits of the procedure in detail with the patient today in clinic along with alternative treatment options, and the patient elected to pursue the intervention.        - S/p bilateral cervical medial branch block  on 9/20/24 with 80% relief for 8-12 hrs.    - Anticoagulation use:  no no anticoagulation       report:  Reviewed and consistent with medication use as prescribed.    - Medications:  - Patient may continue Lyrica 100 mg per PCP.     - Therapy:   We discussed  resuming physical therapy to help manage the patient/s painful condition. The patient was counseled that muscle strengthening will improve the long term prognosis in regards to pain and may also help increase range of motion and mobility. They were told that one of the goals of physical therapy is that they learn how to do the exercises so that they can do them independently at home daily upon completion.  Continue outpatient physical therapy ([facility] physical therapy ).      - Imaging: Reviewed available imaging with patient and answered any questions they had regarding study. Left femur x-rays ordered today.   MRI C/L spine, CT Head findings reviewed and discussed today.  MRI Cervical spine significant for: Degenerative disc disease C2-3, C3-4, C6-7 and C7-T1 as well as facet arthrosis with foraminal stenosis as above.  MRI L spine significant for: L2-3:    Mild to moderate disc degeneration with moderate disc bulge.  Mild to moderate hypertrophic facet arthrosis with increased epidural adipose tissue with at least moderate central canal stenosis.  AP diameter canal is estimated at 6 mm.  There is mild right and moderate to severe left foraminal stenosis.      - Follow up visit: Will call after 24 hrs after 2nd cervical MBB to move forward with RFA with successful relief.      The above plan and management options were discussed at length with patient. Patient is in agreement with the above and verbalized understanding.    - I discussed the goals of interventional chronic pain management with the patient on today's visit. We discussed a multimodal and systematic approach to pain.  This includes diagnostic and therapeutic injections, adjuvant pharmacologic treatment, physical therapy, and at times psychiatry.  I emphasized  the importance of regular exercise, core strengthening and stretching, diet and weight loss as a cornerstone of long-term pain management.    - This condition does not require this patient to take time off of work, and the primary goal of our Pain Management services is to improve the patient's functional capacity.  - Patient Questions: Answered all of the patient's questions regarding diagnoses, therapy, treatment and next steps        Miesha Linn PA-C  Interventional Pain Management  [facility]                     This service was not originating from a related E/M service provided within the previous 7 days nor will  to an E/M service or procedure within the next 24 hours or my soonest available appointment.  Prevailing standard of care was able to be met in this audio-only visit.

## 2024-10-14 ENCOUNTER — OFFICE VISIT (OUTPATIENT)
Dept: INTERNAL MEDICINE | Facility: CLINIC | Age: 69
End: 2024-10-14
Payer: MEDICARE

## 2024-10-14 VITALS
OXYGEN SATURATION: 95 % | DIASTOLIC BLOOD PRESSURE: 82 MMHG | HEART RATE: 73 BPM | TEMPERATURE: 97 F | WEIGHT: 224.88 LBS | SYSTOLIC BLOOD PRESSURE: 132 MMHG | BODY MASS INDEX: 36.29 KG/M2

## 2024-10-14 DIAGNOSIS — E66.811 CLASS 1 OBESITY DUE TO EXCESS CALORIES WITH SERIOUS COMORBIDITY AND BODY MASS INDEX (BMI) OF 32.0 TO 32.9 IN ADULT: ICD-10-CM

## 2024-10-14 DIAGNOSIS — I10 PRIMARY HYPERTENSION: Primary | ICD-10-CM

## 2024-10-14 DIAGNOSIS — G89.4 CHRONIC PAIN SYNDROME: ICD-10-CM

## 2024-10-14 DIAGNOSIS — Z85.3 HISTORY OF BREAST CANCER: ICD-10-CM

## 2024-10-14 DIAGNOSIS — M15.9 GENERALIZED OSTEOARTHROSIS, INVOLVING MULTIPLE SITES: ICD-10-CM

## 2024-10-14 DIAGNOSIS — M54.16 LUMBAR RADICULOPATHY: ICD-10-CM

## 2024-10-14 DIAGNOSIS — E78.00 PURE HYPERCHOLESTEROLEMIA: ICD-10-CM

## 2024-10-14 DIAGNOSIS — E66.09 CLASS 1 OBESITY DUE TO EXCESS CALORIES WITH SERIOUS COMORBIDITY AND BODY MASS INDEX (BMI) OF 32.0 TO 32.9 IN ADULT: ICD-10-CM

## 2024-10-14 DIAGNOSIS — Z12.31 SCREENING MAMMOGRAM FOR BREAST CANCER: ICD-10-CM

## 2024-10-14 DIAGNOSIS — Z23 NEED FOR PROPHYLACTIC VACCINATION AND INOCULATION AGAINST INFLUENZA: ICD-10-CM

## 2024-10-14 PROCEDURE — 99999 PR PBB SHADOW E&M-EST. PATIENT-LVL IV: CPT | Mod: PBBFAC,,, | Performed by: INTERNAL MEDICINE

## 2024-10-14 RX ORDER — HYDROCODONE BITARTRATE AND ACETAMINOPHEN 10; 325 MG/1; MG/1
1 TABLET ORAL EVERY 12 HOURS PRN
Qty: 60 TABLET | Refills: 0 | Status: SHIPPED | OUTPATIENT
Start: 2024-10-17

## 2024-10-14 RX ORDER — HYDROCODONE BITARTRATE AND ACETAMINOPHEN 10; 325 MG/1; MG/1
1 TABLET ORAL EVERY 12 HOURS PRN
Qty: 60 TABLET | Refills: 0 | Status: SHIPPED | OUTPATIENT
Start: 2024-11-16

## 2024-10-14 RX ORDER — HYDROCODONE BITARTRATE AND ACETAMINOPHEN 10; 325 MG/1; MG/1
1 TABLET ORAL EVERY 12 HOURS PRN
Qty: 60 TABLET | Refills: 0 | Status: SHIPPED | OUTPATIENT
Start: 2024-12-16

## 2024-10-14 NOTE — PROGRESS NOTES
HPI:  Patient is a 69-year-old female who comes in today for follow-up of her hypertension, lipids, chronic pain syndrome secondary to cervical lumbar disc disease.  Her blood pressure home has been doing well.  She denies any other new problems or complaints.    Current meds have been verified and updated per the EMR  Exam:/82   Pulse 73   Temp 96.9 °F (36.1 °C)   Wt 102 kg (224 lb 13.9 oz)   SpO2 95%   BMI 36.29 kg/m²   Carotids 2+ equal without bruits, neck is supple without adenopathy  Chest clear  Cardiovascular regular rate and rhythm without murmur gallop or rub  Extremities without edema    Lab Results   Component Value Date    WBC 5.72 07/03/2023    HGB 13.8 07/03/2023    HCT 44.1 07/03/2023     07/03/2023    CHOL 183 10/04/2024    TRIG 93 10/04/2024    HDL 49 10/04/2024    ALT 12 10/04/2024    AST 17 10/04/2024     10/04/2024    K 3.9 10/04/2024     10/04/2024    CREATININE 0.9 10/04/2024    BUN 12 10/04/2024    CO2 25 10/04/2024    TSH 2.574 10/04/2024    INR 1.1 01/23/2023    HGBA1C 5.5 07/03/2023       Impression:  Hypertension and lipids, both well controlled on current meds  Chronic pain syndrome secondary to cervical lumbar disc disease  Patient Active Problem List   Diagnosis    HTN (hypertension)    Generalized osteoarthrosis, involving multiple sites    History of breast cancer    Hyperlipidemia    Myofascial pain    Bilateral carpal tunnel syndrome    Lumbar radiculopathy    Chronic pain syndrome    Cervical radiculopathy    Class 1 obesity due to excess calories with serious comorbidity and body mass index (BMI) of 32.0 to 32.9 in adult    Cervical spondylosis    Right shoulder pain    Calcific tendinitis of right shoulder    Impingement of right shoulder    Moderate episode of recurrent major depressive disorder       Plan:  Orders Placed This Encounter    Mammo Digital Screening Bilat w/ [facility]    HYDROcodone-acetaminophen (NORCO)  mg per tablet     HYDROcodone-acetaminophen (NORCO)  mg per tablet    HYDROcodone-acetaminophen (NORCO)  mg per tablet    (VFC) influenza (Flulaval, Fluzone, Fluarix) 45 mcg/0.5 mL IM vaccine (> or = 6 mo) 0.5 mL   Patient was given flu vaccine today.  Patient will be set up for mammogram.  She will see me again in 3 months.  She was given refills of her hydrocodone      This note is generated with speech recognition software and is subject to transcription error and sound alike phrases that may be missed by proofreading.

## 2024-10-16 ENCOUNTER — TELEPHONE (OUTPATIENT)
Dept: INTERNAL MEDICINE | Facility: CLINIC | Age: 69
End: 2024-10-16
Payer: MEDICARE

## 2024-10-16 NOTE — TELEPHONE ENCOUNTER
----- Message from Kenzie sent at 10/16/2024 10:45 AM CDT -----  Contact: Diana [phone] ext [phone]  [email or web address]     Patient is calling for Medical Advice regarding:Office call . Need plan of care fax to [phone] . Until received patient will not be seen. Offfice has been faxing sent Sept. With no reply         How long has patient had these symptoms:    Pharmacy name and phone#:    Patient wants a call back or thru [facility]:call     Comments:    Please advise patient replies from provider may take up to 48 hours.

## 2024-10-16 NOTE — TELEPHONE ENCOUNTER
Spoke with Dinaa, let them know they are faxing to the incorrect fax number, gave them the number for our fax. Diana stated they will refax now.

## 2024-11-13 ENCOUNTER — PATIENT MESSAGE (OUTPATIENT)
Dept: INTERNAL MEDICINE | Facility: CLINIC | Age: 69
End: 2024-11-13
Payer: MEDICARE

## 2024-11-21 ENCOUNTER — OFFICE VISIT (OUTPATIENT)
Dept: INTERNAL MEDICINE | Facility: CLINIC | Age: 69
End: 2024-11-21
Payer: MEDICARE

## 2024-11-21 VITALS
SYSTOLIC BLOOD PRESSURE: 142 MMHG | OXYGEN SATURATION: 95 % | DIASTOLIC BLOOD PRESSURE: 86 MMHG | HEART RATE: 61 BPM | TEMPERATURE: 98 F

## 2024-11-21 DIAGNOSIS — T07.XXXA MULTIPLE CONTUSIONS: Primary | ICD-10-CM

## 2024-11-21 DIAGNOSIS — S06.0X0A CONCUSSION WITHOUT LOSS OF CONSCIOUSNESS, INITIAL ENCOUNTER: ICD-10-CM

## 2024-11-21 PROCEDURE — 1100F PTFALLS ASSESS-DOCD GE2>/YR: CPT | Mod: CPTII,S$GLB,, | Performed by: INTERNAL MEDICINE

## 2024-11-21 PROCEDURE — 3079F DIAST BP 80-89 MM HG: CPT | Mod: CPTII,S$GLB,, | Performed by: INTERNAL MEDICINE

## 2024-11-21 PROCEDURE — 99999 PR PBB SHADOW E&M-EST. PATIENT-LVL III: CPT | Mod: PBBFAC,,, | Performed by: INTERNAL MEDICINE

## 2024-11-21 PROCEDURE — 99213 OFFICE O/P EST LOW 20 MIN: CPT | Mod: S$GLB,,, | Performed by: INTERNAL MEDICINE

## 2024-11-21 PROCEDURE — 1159F MED LIST DOCD IN RCRD: CPT | Mod: CPTII,S$GLB,, | Performed by: INTERNAL MEDICINE

## 2024-11-21 PROCEDURE — 1125F AMNT PAIN NOTED PAIN PRSNT: CPT | Mod: CPTII,S$GLB,, | Performed by: INTERNAL MEDICINE

## 2024-11-21 PROCEDURE — 3288F FALL RISK ASSESSMENT DOCD: CPT | Mod: CPTII,S$GLB,, | Performed by: INTERNAL MEDICINE

## 2024-11-21 PROCEDURE — 3077F SYST BP >= 140 MM HG: CPT | Mod: CPTII,S$GLB,, | Performed by: INTERNAL MEDICINE

## 2024-11-21 PROCEDURE — 1160F RVW MEDS BY RX/DR IN RCRD: CPT | Mod: CPTII,S$GLB,, | Performed by: INTERNAL MEDICINE

## 2024-11-21 RX ORDER — CITALOPRAM 40 MG/1
40 TABLET, FILM COATED ORAL DAILY
Qty: 90 TABLET | Refills: 3 | Status: SHIPPED | OUTPATIENT
Start: 2024-11-21
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
## 2024-11-26 ENCOUNTER — PATIENT OUTREACH (OUTPATIENT)
Dept: ADMINISTRATIVE | Facility: CLINIC | Age: 69
End: 2024-11-26
Payer: MEDICARE

## 2024-11-26 NOTE — PROGRESS NOTES
C3 nurse attempted to contact Mary Vo for a TCC post hospital discharge follow up call. No answer. Left voicemail with callback information. The patient does not have a scheduled HOSFU appointment. Message sent to PCP staff for assistance with scheduling visit with patient.

## 2024-11-26 NOTE — PROGRESS NOTES
C3 nurse spoke with Mary Vo for a TCC post hospital discharge follow up call. The patient had a scheduled Hospfu 11/21/24 1100am Dr Cannon

## 2024-12-09 ENCOUNTER — TELEPHONE (OUTPATIENT)
Dept: INTERNAL MEDICINE | Facility: CLINIC | Age: 69
End: 2024-12-09
Payer: MEDICARE

## 2024-12-09 ENCOUNTER — PATIENT MESSAGE (OUTPATIENT)
Dept: INTERNAL MEDICINE | Facility: CLINIC | Age: 69
End: 2024-12-09
Payer: MEDICARE

## 2024-12-09 RX ORDER — AMLODIPINE BESYLATE 5 MG/1
5 TABLET ORAL DAILY
Qty: 90 TABLET | Refills: 3 | Status: SHIPPED | OUTPATIENT
Start: 2024-12-09 | End: 2024-12-16

## 2024-12-09 NOTE — TELEPHONE ENCOUNTER
Contacted patient who stated was taken off her clonidine and her blood pressure has been elevated again. Blood pressure been running 150/90 daily. Patient requesting to be placed on a medication that doesn't make her sleepy. Pt declined appt stating on her last visit she was told to monitor her bp and call if there is an increase.

## 2024-12-16 ENCOUNTER — PATIENT MESSAGE (OUTPATIENT)
Dept: INTERNAL MEDICINE | Facility: CLINIC | Age: 69
End: 2024-12-16
Payer: MEDICARE

## 2024-12-16 DIAGNOSIS — F41.1 GAD (GENERALIZED ANXIETY DISORDER): Primary | ICD-10-CM

## 2024-12-16 RX ORDER — AMLODIPINE BESYLATE 10 MG/1
10 TABLET ORAL DAILY
Qty: 90 TABLET | Refills: 3 | Status: SHIPPED | OUTPATIENT
Start: 2024-12-16 | End: 2025-12-16

## 2024-12-16 RX ORDER — ALPRAZOLAM 1 MG/1
1 TABLET ORAL NIGHTLY PRN
Qty: 30 TABLET | Refills: 5 | Status: SHIPPED | OUTPATIENT
Start: 2024-12-16

## 2024-12-16 NOTE — TELEPHONE ENCOUNTER
Tell patient to increase the amlodipine to 10 mg today.  New prescription for Xanax sent to her pharmacy as well

## 2025-01-08 ENCOUNTER — OFFICE VISIT (OUTPATIENT)
Dept: INTERNAL MEDICINE | Facility: CLINIC | Age: 70
End: 2025-01-08
Payer: MEDICARE

## 2025-01-08 VITALS
HEIGHT: 66 IN | DIASTOLIC BLOOD PRESSURE: 78 MMHG | BODY MASS INDEX: 37.16 KG/M2 | SYSTOLIC BLOOD PRESSURE: 140 MMHG | OXYGEN SATURATION: 99 % | HEART RATE: 62 BPM | WEIGHT: 231.25 LBS | TEMPERATURE: 97 F

## 2025-01-08 DIAGNOSIS — E66.811 CLASS 1 OBESITY DUE TO EXCESS CALORIES WITH SERIOUS COMORBIDITY AND BODY MASS INDEX (BMI) OF 32.0 TO 32.9 IN ADULT: ICD-10-CM

## 2025-01-08 DIAGNOSIS — Z00.00 ROUTINE GENERAL MEDICAL EXAMINATION AT A HEALTH CARE FACILITY: Primary | ICD-10-CM

## 2025-01-08 DIAGNOSIS — G89.4 CHRONIC PAIN SYNDROME: ICD-10-CM

## 2025-01-08 DIAGNOSIS — E78.00 PURE HYPERCHOLESTEROLEMIA: ICD-10-CM

## 2025-01-08 DIAGNOSIS — Z98.1 S/P LUMBAR FUSION: ICD-10-CM

## 2025-01-08 DIAGNOSIS — M54.12 CERVICAL RADICULOPATHY: Chronic | ICD-10-CM

## 2025-01-08 DIAGNOSIS — I10 PRIMARY HYPERTENSION: ICD-10-CM

## 2025-01-08 DIAGNOSIS — Z85.3 HISTORY OF BREAST CANCER: ICD-10-CM

## 2025-01-08 DIAGNOSIS — E66.09 CLASS 1 OBESITY DUE TO EXCESS CALORIES WITH SERIOUS COMORBIDITY AND BODY MASS INDEX (BMI) OF 32.0 TO 32.9 IN ADULT: ICD-10-CM

## 2025-01-08 DIAGNOSIS — M54.16 LUMBAR RADICULOPATHY: ICD-10-CM

## 2025-01-08 PROCEDURE — 1160F RVW MEDS BY RX/DR IN RCRD: CPT | Mod: CPTII,S$GLB,, | Performed by: INTERNAL MEDICINE

## 2025-01-08 PROCEDURE — 3288F FALL RISK ASSESSMENT DOCD: CPT | Mod: CPTII,S$GLB,, | Performed by: INTERNAL MEDICINE

## 2025-01-08 PROCEDURE — 1159F MED LIST DOCD IN RCRD: CPT | Mod: CPTII,S$GLB,, | Performed by: INTERNAL MEDICINE

## 2025-01-08 PROCEDURE — 99215 OFFICE O/P EST HI 40 MIN: CPT | Mod: S$GLB,,, | Performed by: INTERNAL MEDICINE

## 2025-01-08 PROCEDURE — 3078F DIAST BP <80 MM HG: CPT | Mod: CPTII,S$GLB,, | Performed by: INTERNAL MEDICINE

## 2025-01-08 PROCEDURE — 1126F AMNT PAIN NOTED NONE PRSNT: CPT | Mod: CPTII,S$GLB,, | Performed by: INTERNAL MEDICINE

## 2025-01-08 PROCEDURE — 3008F BODY MASS INDEX DOCD: CPT | Mod: CPTII,S$GLB,, | Performed by: INTERNAL MEDICINE

## 2025-01-08 PROCEDURE — 99999 PR PBB SHADOW E&M-EST. PATIENT-LVL III: CPT | Mod: PBBFAC,,, | Performed by: INTERNAL MEDICINE

## 2025-01-08 PROCEDURE — G2211 COMPLEX E/M VISIT ADD ON: HCPCS | Mod: S$GLB,,, | Performed by: INTERNAL MEDICINE

## 2025-01-08 PROCEDURE — 1101F PT FALLS ASSESS-DOCD LE1/YR: CPT | Mod: CPTII,S$GLB,, | Performed by: INTERNAL MEDICINE

## 2025-01-08 PROCEDURE — 3077F SYST BP >= 140 MM HG: CPT | Mod: CPTII,S$GLB,, | Performed by: INTERNAL MEDICINE

## 2025-01-08 RX ORDER — HYDROCODONE BITARTRATE AND ACETAMINOPHEN 10; 325 MG/1; MG/1
1 TABLET ORAL EVERY 12 HOURS PRN
Qty: 60 TABLET | Refills: 0 | Status: SHIPPED | OUTPATIENT
Start: 2025-01-18

## 2025-01-08 RX ORDER — LISINOPRIL AND HYDROCHLOROTHIAZIDE 10; 12.5 MG/1; MG/1
1 TABLET ORAL DAILY
Qty: 90 TABLET | Refills: 3 | Status: SHIPPED | OUTPATIENT
Start: 2025-01-08 | End: 2026-01-08

## 2025-01-08 RX ORDER — CITALOPRAM 40 MG/1
40 TABLET, FILM COATED ORAL DAILY
Qty: 90 TABLET | Refills: 3 | Status: SHIPPED | OUTPATIENT
Start: 2025-01-08

## 2025-01-08 RX ORDER — HYDROCODONE BITARTRATE AND ACETAMINOPHEN 10; 325 MG/1; MG/1
1 TABLET ORAL EVERY 12 HOURS PRN
Qty: 60 TABLET | Refills: 0 | Status: SHIPPED | OUTPATIENT
Start: 2025-03-19

## 2025-01-08 RX ORDER — HYDROCODONE BITARTRATE AND ACETAMINOPHEN 10; 325 MG/1; MG/1
1 TABLET ORAL EVERY 12 HOURS PRN
Qty: 60 TABLET | Refills: 0 | Status: SHIPPED | OUTPATIENT
Start: 2025-02-17

## 2025-01-08 RX ORDER — PREGABALIN 100 MG/1
100 CAPSULE ORAL 2 TIMES DAILY
Qty: 60 CAPSULE | Refills: 5 | Status: SHIPPED | OUTPATIENT
Start: 2025-01-08

## 2025-01-08 NOTE — PROGRESS NOTES
HPI:  Patient is a 61-year-old female who comes in today for follow-up of her hypertension, lipids, chronic pain syndrome secondary to osteoarthritis of multiple joints and for her annual physical.  Patient continues do fairly well.  She has not had any significant problems.  Her only complaint is that she finds it difficult to lose weight.  We discussed ways to try to improve that.    Current MEDS: medcard review, verified and update  Allergies: Per the electronic medical record    Past Medical History:   Diagnosis Date    Breast cancer 1996    right    Chronic pain syndrome     Generalized osteoarthrosis, involving multiple sites     s/p THR bilateral    History of breast cancer 2007    lumpectomy/XRT    HTN (hypertension)     Hyperlipidemia     Morbid obesity with BMI of 40.0-44.9, adult        Past Surgical History:   Procedure Laterality Date    ANTERIOR CERVICAL DISCECTOMY W/ FUSION Left 01/03/2022    Procedure: DISCECTOMY, SPINE, CERVICAL, ANTERIOR APPROACH, WITH FUSION;  Surgeon: Pradip Fernando MD;  Location: [facility] OR;  Service: Neurosurgery;  Laterality: Left;  Three Level ACDF  C4/5, 5/6, 6/7      BREAST LUMPECTOMY Right 2006    XRT    CATARACT EXTRACTION W/  INTRAOCULAR LENS IMPLANT Left 01/15/2020    CATARACT EXTRACTION W/  INTRAOCULAR LENS IMPLANT Right 01/29/2020    COLONOSCOPY N/A 10/15/2015    Procedure: COLONOSCOPY;  Surgeon: Maylin Shipley MD;  Location: [facility] ENDO;  Service: Endoscopy;  Laterality: N/A;    COLONOSCOPY N/A 11/30/2022    Procedure: COLONOSCOPY;  Surgeon: Gary Toussaint MD;  Location: [facility] ENDO;  Service: General;  Laterality: N/A;    EPIDURAL STEROID INJECTION N/A 11/03/2020    Procedure: Lumbar L5/S1 IL KATYA;  Surgeon: Paul Lobato MD;  Location: [facility] PAIN MGT;  Service: Pain Management;  Laterality: N/A;    EPIDURAL STEROID INJECTION INTO CERVICAL SPINE N/A 09/25/2020    Procedure: C7/T1 IL KATYA;  Surgeon: Paul Lobato MD;  Location: [facility] PAIN MGT;  Service: Pain  Management;  Laterality: N/A;    EPIDURAL STEROID INJECTION INTO CERVICAL SPINE N/A 10/05/2021    Procedure: C7/T1 IL KATYA with RN IV sedation;  Surgeon: Cynthia Soares MD;  Location: [facility] PAIN MGT;  Service: Pain Management;  Laterality: N/A;    ESOPHAGOGASTRODUODENOSCOPY N/A 11/04/2021    Procedure: ESOPHAGOGASTRODUODENOSCOPY (EGD);  Surgeon: Blas Hampton MD;  Location: [facility] ENDO;  Service: Endoscopy;  Laterality: N/A;    HYSTERECTOMY      INJECTION OF ANESTHETIC AGENT AROUND MEDIAL BRANCH NERVES INNERVATING CERVICAL FACET JOINT Bilateral 9/20/2024    Procedure: Bilateral Cervical C4-5-6 MBB with RN Sedation;  Surgeon: Cynthia Soares MD;  Location: [facility] PAIN MGT;  Service: Pain Management;  Laterality: Bilateral;    LAPAROSCOPIC LYSIS OF ADHESIONS N/A 08/30/2022    Procedure: LYSIS, ADHESIONS, LAPAROSCOPIC;  Surgeon: Blas Hampton MD;  Location: [facility] OR;  Service: General;  Laterality: N/A;    PLACEMENT OF ACELLULAR HUMAN DERMAL ALLOGRAFT Left 01/03/2022    Procedure: APPLICATION, ACELLULAR HUMAN DERMAL ALLOGRAFT;  Surgeon: Pradip Fernando MD;  Location: [facility] OR;  Service: Neurosurgery;  Laterality: Left;    PLACEMENT OF ACELLULAR HUMAN DERMAL ALLOGRAFT N/A 02/01/2023    Procedure: APPLICATION, ACELLULAR HUMAN DERMAL ALLOGRAFT;  Surgeon: Pradip Fernando MD;  Location: [facility] OR;  Service: Neurosurgery;  Laterality: N/A;    ROBOT-ASSISTED LAPAROSCOPIC SLEEVE GASTRECTOMY USING DA FLORENTINO XI N/A 08/30/2022    Procedure: XI ROBOTIC SLEEVE GASTRECTOMY;  Surgeon: Blas Hampton MD;  Location: [facility] OR;  Service: General;  Laterality: N/A;    TRANSFORAMINAL EPIDURAL INJECTION OF STEROID Left 09/17/2019    Procedure: Left C5/6 TF KATYA w/ RN IV sedation;  Surgeon: Paul Lobato MD;  Location: [facility] PAIN MGT;  Service: Pain Management;  Laterality: Left;    TRANSFORAMINAL LUMBAR INTERBODY FUSION (TLIF) USING COMPUTER-ASSISTED NAVIGATION Bilateral 02/01/2023    Procedure: FUSION, SPINE, LUMBAR, TLIF, USING COMPUTER-ASSISTED  "NAVIGATION;  Surgeon: Pradip Fernando MD;  Location: [facility];  Service: Neurosurgery;  Laterality: Bilateral;  TLIF L3-4/4-5 possibly L5-S1       SHx: per the electronic medical record    FHx: recorded in the electronic medical record    ROS:    denies any chest pains or shortness of breath. Denies any nausea, vomiting or diarrhea. Denies any fever, chills or sweats. Denies any change in weight, voice, stool, skin or hair. Denies any dysuria, dyspepsia or dysphagia. Denies any change in vision, hearing or headaches. Denies any swollen lymph nodes or loss of memory.    PE:  BP (!) 140/78 (BP Location: Right arm, Patient Position: Sitting)   Pulse 62   Temp 96.5 °F (35.8 °C) (Tympanic)   Ht 5' 6" (1.676 m)   Wt 104.9 kg (231 lb 4.2 oz)   SpO2 99%   BMI 37.33 kg/m²   Gen: Well-developed, well-nourished, female, in no acute distress, oriented x3  HEENT: neck is supple, no adenopathy, carotids 2+ equal without bruits, thyroid exam normal size without nodules.  CHEST: clear to auscultation and percussion  CVS: regular rate and rhythm without significant murmur, gallop, or rubs  ABD: soft, benign, no rebound no guarding, no distention. Bowel sounds are normal.     Nontender,  no palpable masses, no organomegaly and no audible bruits.  BREAST:  Deferred.      Lab Results   Component Value Date    WBC 5.72 07/03/2023    HGB 13.8 07/03/2023    HCT 44.1 07/03/2023     07/03/2023    CHOL 183 10/04/2024    TRIG 93 10/04/2024    HDL 49 10/04/2024    ALT 12 10/04/2024    AST 17 10/04/2024     10/04/2024    K 3.9 10/04/2024     10/04/2024    CREATININE 0.9 10/04/2024    BUN 12 10/04/2024    CO2 25 10/04/2024    TSH 1.264 11/18/2024    INR 1.1 11/18/2024    HGBA1C 5.5 07/03/2023       Impression:  Hypertension and lipids, both adequately controlled  History of breast cancer, no evidence of disease  Obesity, status post bariatric surgery, discussed diet as well as exercise.  Chronic pain syndrome secondary " to osteoarthritis of the lumbar and cervical spine  Patient Active Problem List   Diagnosis    HTN (hypertension)    Generalized osteoarthrosis, involving multiple sites    History of breast cancer    Hyperlipidemia    Myofascial pain    Bilateral carpal tunnel syndrome    Lumbar radiculopathy    Chronic pain syndrome    Cervical radiculopathy    Class 1 obesity due to excess calories with serious comorbidity and body mass index (BMI) of 32.0 to 32.9 in adult    Cervical spondylosis    Right shoulder pain    Calcific tendinitis of right shoulder    Impingement of right shoulder    Moderate episode of recurrent major depressive disorder       Plan:   Orders Placed This Encounter    Lipid Panel    Comprehensive Metabolic Panel    TSH    HYDROcodone-acetaminophen (NORCO)  mg per tablet    HYDROcodone-acetaminophen (NORCO)  mg per tablet    HYDROcodone-acetaminophen (NORCO)  mg per tablet    lisinopriL-hydrochlorothiazide (PRINZIDE,ZESTORETIC) 10-12.5 mg per tablet    pregabalin (LYRICA) 100 MG capsule    citalopram (CELEXA) 40 MG tablet   Patient was given refills of her hydrocodone.  She was started on lisinopril in the morning.  She was given refills of the other meds.  She will be seen again in 3 months with the above lab work.      This note is generated with speech recognition software and is subject to transcription error and sound alike phrases that may be missed by proofreading.

## 2025-01-14 ENCOUNTER — HOSPITAL ENCOUNTER (OUTPATIENT)
Dept: RADIOLOGY | Facility: HOSPITAL | Age: 70
Discharge: HOME | End: 2025-01-14
Attending: INTERNAL MEDICINE
Payer: MEDICARE

## 2025-01-14 DIAGNOSIS — Z12.31 SCREENING MAMMOGRAM FOR BREAST CANCER: ICD-10-CM

## 2025-01-14 PROCEDURE — 77063 BREAST TOMOSYNTHESIS BI: CPT | Mod: 26,,, | Performed by: RADIOLOGY

## 2025-01-14 PROCEDURE — 77067 SCR MAMMO BI INCL CAD: CPT | Mod: 26,,, | Performed by: RADIOLOGY

## 2025-01-14 PROCEDURE — 77067 SCR MAMMO BI INCL CAD: CPT | Mod: TC

## 2025-02-13 RX ORDER — CLONIDINE HYDROCHLORIDE 0.2 MG/1
TABLET ORAL
Qty: 90 TABLET | Refills: 3 | Status: SHIPPED | OUTPATIENT
Start: 2025-02-13

## 2025-03-10 ENCOUNTER — OFFICE VISIT (OUTPATIENT)
Dept: PAIN MEDICINE | Facility: CLINIC | Age: 70
End: 2025-03-10
Payer: MEDICARE

## 2025-03-10 DIAGNOSIS — M25.552 CHRONIC HIP PAIN AFTER TOTAL REPLACEMENT OF LEFT HIP JOINT: Primary | ICD-10-CM

## 2025-03-10 DIAGNOSIS — Z96.642 CHRONIC HIP PAIN AFTER TOTAL REPLACEMENT OF LEFT HIP JOINT: Primary | ICD-10-CM

## 2025-03-10 DIAGNOSIS — G89.29 CHRONIC HIP PAIN AFTER TOTAL REPLACEMENT OF LEFT HIP JOINT: Primary | ICD-10-CM

## 2025-03-10 PROCEDURE — 4010F ACE/ARB THERAPY RXD/TAKEN: CPT | Mod: CPTII,95,, | Performed by: PHYSICIAN ASSISTANT

## 2025-03-10 PROCEDURE — 98005 SYNCH AUDIO-VIDEO EST LOW 20: CPT | Mod: 95,,, | Performed by: PHYSICIAN ASSISTANT

## 2025-03-10 NOTE — H&P (VIEW-ONLY)
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 circumferential disc bulging without significant spinal canal stenosis or neural foraminal narrowing.  Facet degenerative hypertrophy findings.    L3-L4: Circumferential disc bulging which in conjunction with facet/ligamentum flavum hypertrophy causes moderate spinal canal stenosis.  Small central annular fissure.  Right mild neural foraminal narrowing present.    L4-L5: Circumferential disc bulging present with small posterior osteophytes.  Larger anterior osteophyte changes noted.  Facet degenerative hypertrophy findings.  Moderate spinal canal stenosis present.  Moderate bilateral neural foraminal narrowing with some minimal effacement of the exited nerve roots greater on the right.    L5-S1: No significant posterior disc bulge.  Prominent facet degenerative hypertrophy findings minimal bilateral neural foraminal narrowing.    Impression  Similar multilevel degenerative findings most prevalent at L4-5.    L3-4 Schmorl node findings, acute at the superior endplate of L3.      109/29/2020 radiograph; MRI cervical spine 08/22/2019    FINDINGS:  Vertebral body heights and alignment are unchanged with minimal retrolisthesis of C3 on C4.  No concerning marrow signal present.  Similar multilevel disc desiccation and height loss most prevalent at C5-6 and  C6-7.    Posterior fossa is unremarkable.  Spinal cord is unremarkable.    Neck soft tissue structures unremarkable.    C2-C3: No significant spinal canal stenosis or neural foraminal narrowing.    C3-C4: Similar posterior disc osteophyte complex present effacing the anterior thecal sac without significant spinal canal stenosis.  Bilateral uncovertebral degenerative changes resulting in mild-to-moderate neural foraminal stenosis.    C4-C5: Posterior disc osteophyte complex present with right paracentral disc protrusion which contacts the anterior right aspect of the cervical cord.  Right greater than left facet/uncovertebral hypertrophy resulting mild right neural foraminal narrowing.    C5-C6: Posterior disc osteophyte complex slightly asymmetric to the right paracentral location effacing the anterior thecal sac and causing mild spinal cord flattening.  No intrinsic cord signal abnormality.  Spinal canal measures 7.6 mm in midline AP dimension.  Bilateral uncovertebral/facet degenerative findings causes severe bilateral neural foraminal narrowing.    C6-C7: Posterior disc osteophyte complex present with more focal central disc protrusion causing similar effacement and mild indention of the thecal sac and spinal cord without intrinsic cord signal abnormality.  Spinal canal measures 8.3 mm in AP midline dimension.  Bilateral moderate to severe neural foraminal narrowing present.    C7-T1: Mild posterior disc osteophyte complex present mildly effacing the anterior thecal sac without significant spinal stenosis.  Mild-to-moderate bilateral neural foraminal narrowing.    T1-2: Posterior disc osteophyte changes present causing at least mild spinal canal stenosis with severe right and moderate left neural foraminal narrowing.    Impression  Similar multilevel degenerative findings as above     09/01/21    CT Cervical Spine Without Contrast    Narrative  EXAMINATION:  CT CERVICAL SPINE WITHOUT CONTRAST    CLINICAL  HISTORY:  Presurgical eval, C-spine;Encounter for other preprocedural examination    TECHNIQUE:  Low dose axial images, sagittal and coronal reformations were performed though the cervical spine.  Contrast was not administered.    COMPARISON:  MRI from September 1, 2021 multiple prior radiographs dating back to December 2018.    FINDINGS:  There is minimal grade 1 retrolisthesis of C3 on C4 which is unchanged.  Discogenic degenerative changes throughout the cervical spine are unchanged since the MRI from October 2020 with findings extending most prominently from C3 to C7 levels with varying degrees of disc space narrowing and marginal spurring.  Disc space narrowing is most severe at C5-C6 and C6-C7.  There is hypertrophy of the lower cervical facets.  No acute fracture.  Bones are demineralized.    Visualized posterior fossa is intact.  Visualized brain parenchyma appears normal.  Lung apices are clear.  Small scattered bilateral reactive cervical lymph nodes are noted.    C2-C3: There is mild facet arthropathy.  There is asymmetric left uncovertebral hypertrophy resulting in asymmetric moderate to pronounced left neural foraminal narrowing.    C3-C4: Posterior disc osteophyte complex with uncovertebral and facet arthropathy.  No significant spinal canal stenosis.  There is moderate to pronounced bilateral neural foraminal narrowing, unchanged.    C4-C5: Bilateral right greater than left facet arthropathy with mild uncovertebral spurring.  Central and right paracentral disc osteophyte complex better demonstrated on the MRI.  There is either small focus of calcification of the posterior longitudinal ligament versus calcification of extruded disc material at this level on series 3, image 136.  No spinal canal stenosis.  There is mild right greater than left neural foraminal narrowing.    C5-C6: Posterior disc osteophyte complex with uncovertebral and facet arthropathy.  There is relative narrowing of the spinal canal  at this level, unchanged since the MRI.  There is marked bilateral neural foraminal narrowing, also unchanged.    C6-C7: Posterior disc osteophyte complex with facet and uncovertebral hypertrophy.  There is mild relative narrowing of the spinal canal, unchanged.  There is marked bilateral neural foraminal narrowing.    C7-T1: Posterior disc osteophyte complex which indents the ventral thecal sac.  Mild facet and uncovertebral hypertrophy.  Spinal canal is maintained.  Mild bilateral neural foraminal narrowing.    Impression  Discogenic degenerative changes as highlighted above.  Overall, findings do not appear significantly changed when compared to the MRI from 10/13/2020.      12/13/18    X-Ray Cervical Spine AP And Lateral    Narrative  EXAMINATION:  XR CERVICAL SPINE AP LATERAL    CLINICAL HISTORY:  Radiculopathy, cervical region    TECHNIQUE:  AP, lateral and open mouth views of the cervical spine were performed.    COMPARISON:  None.    FINDINGS:  Straightening of normal cervical lordosis.  This may be positional or secondary to muscle spasm.  No fracture or listhesis.  Multilevel degenerative changes are seen with findings most pronounced at C3-C4 and C5-C6 with intervertebral disc space narrowing and marginal spurring with facet arthropathy.  Odontoid process remains intact.  Lateral masses are symmetric.  Prevertebral soft tissues are maintained.  Calcification about the left carotid vasculature is noted      09/02/21    X-Ray Cervical Spine Complete 5 view    Narrative  EXAMINATION:  XR CERVICAL SPINE COMPLETE 5 VIEW    CLINICAL HISTORY:  preop planning;. Other cervical disc degeneration, unspecified cervical region    TECHNIQUE:  AP, Lateral, bilateral oblique and open mouth views of the cervical spine were performed.    COMPARISON:  09/01/2021    FINDINGS:  Moderate disc space narrowing spondylosis present at the C3-4 and C5-6 levels with more mild disc space narrowing and spondylosis present at C4-5 and  C6-7.  The vertebral bodies demonstrate a normal height and alignment.  There is moderate osseous encroachment noted upon the C4-5 and C5-6 neural foraminal canals on the right secondary to uncovertebral joint hypertrophy.  On the left, there is mild osseous encroachment noted upon the C2-3 C4-5 6 and C6-7 level secondary to uncovertebral joint hypertrophy and severe osseous encroachment noted upon the C3-4 neural foraminal canal on the left secondary to uncovertebral joint hypertrophy.    09/01/21    X-Ray Cervical Spine Flexion And Extension Only    Narrative  EXAMINATION:  XR CERVICAL SPINE FLEXION  AND EXTENSION ONLY    CLINICAL HISTORY:  Encounter for other preprocedural examination    TECHNIQUE:  Flexion-extension views of the cervical spine    COMPARISON:  09/29/2020    FINDINGS:  The vertebral bodies demonstrate a normal height.  There is at least moderate disc space narrowing and spondylosis present at the C3-4 and C5-6 levels with more mild disc space narrowing and spondylosis present at C4-5 and C6-7.  There may be the slightest bit of anterolisthesis measuring on the order of may be 1-2 mm at the C4-5 level on the extension view with neutral positioning seen on the extension view suggesting mild motion/instability at this level.      ASSESSMENT: 69 y.o. year old female with neck and left upper extremity pain, consistent with     1. 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   Explained the risks and benefits of the procedure in detail with the patient today in clinic along with alternative treatment options, and the patient elected to pursue the intervention.        - S/p bilateral cervical medial branch block  on 9/20/24 with 80% relief for 8-12 hrs.    - Anticoagulation use: no no anticoagulation       report:   Reviewed and consistent with medication use as prescribed.    - Medications:  - Patient may continue Lyrica 100 mg BID per PCP.   - Continue [place] 10/325 per PCP.    - Therapy:   We discussed  resuming physical therapy to help manage the patient/s painful condition. The patient was counseled that muscle strengthening will improve the long term prognosis in regards to pain and may also help increase range of motion and mobility. They were told that one of the goals of physical therapy is that they learn how to do the exercises so that they can do them independently at home daily upon completion.  Continue exercises, activities as tolerated (pain permitting).      - Imaging: Reviewed available imaging with patient and answered any questions they had regarding study. Consider CT Left femur for better evaluation.    MRI Cervical spine significant for: Degenerative disc disease C2-3, C3-4, C6-7 and C7-T1 as well as facet arthrosis with foraminal stenosis as above.  MRI L spine significant for: L2-3:    Mild to moderate disc degeneration with moderate disc bulge.  Mild to moderate hypertrophic facet arthrosis with increased epidural adipose tissue with at least moderate central canal stenosis.  AP diameter canal is estimated at 6 mm.  There is mild right and moderate to severe left foraminal stenosis.      - Follow up visit: return to clinic or follow up virtually 4 weeks after Left hip block.      The above plan and management options were discussed at length with patient. Patient is in agreement with the above and verbalized understanding.    - I discussed the goals of interventional chronic pain management with the patient on today's visit. We discussed a multimodal and systematic approach to pain.  This includes diagnostic and therapeutic injections, adjuvant pharmacologic treatment, physical therapy, and at times psychiatry.  I emphasized the importance of regular exercise, core strengthening and stretching, diet and  weight loss as a cornerstone of long-term pain management.    - This condition does not require this patient to take time off of work, and the primary goal of our Pain Management services is to improve the patient's functional capacity.  - Patient Questions: Answered all of the patient's questions regarding diagnoses, therapy, treatment and next steps        Miesha Linn PA-C  Interventional Pain Management  [facility]                     This service was not originating from a related E/M service provided within the previous 7 days nor will  to an E/M service or procedure within the next 24 hours or my soonest available appointment.  Prevailing standard of care was able to be met in this audio-only visit.

## 2025-03-10 NOTE — PROGRESS NOTES
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    L2-L3: Mild circumferential disc bulging without significant spinal canal stenosis or neural foraminal narrowing.  Facet degenerative hypertrophy findings.    L3-L4: Circumferential disc bulging which in conjunction with facet/ligamentum flavum hypertrophy causes moderate spinal canal stenosis.  Small central annular fissure.  Right mild neural foraminal narrowing present.    L4-L5: Circumferential disc bulging present with small posterior osteophytes.  Larger anterior osteophyte changes noted.  Facet degenerative hypertrophy findings.  Moderate spinal canal stenosis present.  Moderate bilateral neural foraminal narrowing with some minimal effacement of the exited nerve roots greater on the right.    L5-S1: No significant posterior disc bulge.  Prominent facet degenerative hypertrophy findings minimal bilateral neural foraminal narrowing.    Impression  Similar multilevel degenerative findings most prevalent at L4-5.    L3-4 Schmorl node findings, acute at the superior endplate of L3.      109/29/2020 radiograph; MRI cervical spine 08/22/2019    FINDINGS:  Vertebral body heights and alignment are unchanged with minimal retrolisthesis of C3 on C4.  No concerning marrow signal present.  Similar multilevel disc desiccation and height loss most prevalent at C5-6 and  C6-7.    Posterior fossa is unremarkable.  Spinal cord is unremarkable.    Neck soft tissue structures unremarkable.    C2-C3: No significant spinal canal stenosis or neural foraminal narrowing.    C3-C4: Similar posterior disc osteophyte complex present effacing the anterior thecal sac without significant spinal canal stenosis.  Bilateral uncovertebral degenerative changes resulting in mild-to-moderate neural foraminal stenosis.    C4-C5: Posterior disc osteophyte complex present with right paracentral disc protrusion which contacts the anterior right aspect of the cervical cord.  Right greater than left facet/uncovertebral hypertrophy resulting mild right neural foraminal narrowing.    C5-C6: Posterior disc osteophyte complex slightly asymmetric to the right paracentral location effacing the anterior thecal sac and causing mild spinal cord flattening.  No intrinsic cord signal abnormality.  Spinal canal measures 7.6 mm in midline AP dimension.  Bilateral uncovertebral/facet degenerative findings causes severe bilateral neural foraminal narrowing.    C6-C7: Posterior disc osteophyte complex present with more focal central disc protrusion causing similar effacement and mild indention of the thecal sac and spinal cord without intrinsic cord signal abnormality.  Spinal canal measures 8.3 mm in AP midline dimension.  Bilateral moderate to severe neural foraminal narrowing present.    C7-T1: Mild posterior disc osteophyte complex present mildly effacing the anterior thecal sac without significant spinal stenosis.  Mild-to-moderate bilateral neural foraminal narrowing.    T1-2: Posterior disc osteophyte changes present causing at least mild spinal canal stenosis with severe right and moderate left neural foraminal narrowing.    Impression  Similar multilevel degenerative findings as above     09/01/21    CT Cervical Spine Without Contrast    Narrative  EXAMINATION:  CT CERVICAL SPINE WITHOUT CONTRAST    CLINICAL  HISTORY:  Presurgical eval, C-spine;Encounter for other preprocedural examination    TECHNIQUE:  Low dose axial images, sagittal and coronal reformations were performed though the cervical spine.  Contrast was not administered.    COMPARISON:  MRI from September 1, 2021 multiple prior radiographs dating back to December 2018.    FINDINGS:  There is minimal grade 1 retrolisthesis of C3 on C4 which is unchanged.  Discogenic degenerative changes throughout the cervical spine are unchanged since the MRI from October 2020 with findings extending most prominently from C3 to C7 levels with varying degrees of disc space narrowing and marginal spurring.  Disc space narrowing is most severe at C5-C6 and C6-C7.  There is hypertrophy of the lower cervical facets.  No acute fracture.  Bones are demineralized.    Visualized posterior fossa is intact.  Visualized brain parenchyma appears normal.  Lung apices are clear.  Small scattered bilateral reactive cervical lymph nodes are noted.    C2-C3: There is mild facet arthropathy.  There is asymmetric left uncovertebral hypertrophy resulting in asymmetric moderate to pronounced left neural foraminal narrowing.    C3-C4: Posterior disc osteophyte complex with uncovertebral and facet arthropathy.  No significant spinal canal stenosis.  There is moderate to pronounced bilateral neural foraminal narrowing, unchanged.    C4-C5: Bilateral right greater than left facet arthropathy with mild uncovertebral spurring.  Central and right paracentral disc osteophyte complex better demonstrated on the MRI.  There is either small focus of calcification of the posterior longitudinal ligament versus calcification of extruded disc material at this level on series 3, image 136.  No spinal canal stenosis.  There is mild right greater than left neural foraminal narrowing.    C5-C6: Posterior disc osteophyte complex with uncovertebral and facet arthropathy.  There is relative narrowing of the spinal canal  at this level, unchanged since the MRI.  There is marked bilateral neural foraminal narrowing, also unchanged.    C6-C7: Posterior disc osteophyte complex with facet and uncovertebral hypertrophy.  There is mild relative narrowing of the spinal canal, unchanged.  There is marked bilateral neural foraminal narrowing.    C7-T1: Posterior disc osteophyte complex which indents the ventral thecal sac.  Mild facet and uncovertebral hypertrophy.  Spinal canal is maintained.  Mild bilateral neural foraminal narrowing.    Impression  Discogenic degenerative changes as highlighted above.  Overall, findings do not appear significantly changed when compared to the MRI from 10/13/2020.      12/13/18    X-Ray Cervical Spine AP And Lateral    Narrative  EXAMINATION:  XR CERVICAL SPINE AP LATERAL    CLINICAL HISTORY:  Radiculopathy, cervical region    TECHNIQUE:  AP, lateral and open mouth views of the cervical spine were performed.    COMPARISON:  None.    FINDINGS:  Straightening of normal cervical lordosis.  This may be positional or secondary to muscle spasm.  No fracture or listhesis.  Multilevel degenerative changes are seen with findings most pronounced at C3-C4 and C5-C6 with intervertebral disc space narrowing and marginal spurring with facet arthropathy.  Odontoid process remains intact.  Lateral masses are symmetric.  Prevertebral soft tissues are maintained.  Calcification about the left carotid vasculature is noted      09/02/21    X-Ray Cervical Spine Complete 5 view    Narrative  EXAMINATION:  XR CERVICAL SPINE COMPLETE 5 VIEW    CLINICAL HISTORY:  preop planning;. Other cervical disc degeneration, unspecified cervical region    TECHNIQUE:  AP, Lateral, bilateral oblique and open mouth views of the cervical spine were performed.    COMPARISON:  09/01/2021    FINDINGS:  Moderate disc space narrowing spondylosis present at the C3-4 and C5-6 levels with more mild disc space narrowing and spondylosis present at C4-5 and  C6-7.  The vertebral bodies demonstrate a normal height and alignment.  There is moderate osseous encroachment noted upon the C4-5 and C5-6 neural foraminal canals on the right secondary to uncovertebral joint hypertrophy.  On the left, there is mild osseous encroachment noted upon the C2-3 C4-5 6 and C6-7 level secondary to uncovertebral joint hypertrophy and severe osseous encroachment noted upon the C3-4 neural foraminal canal on the left secondary to uncovertebral joint hypertrophy.    09/01/21    X-Ray Cervical Spine Flexion And Extension Only    Narrative  EXAMINATION:  XR CERVICAL SPINE FLEXION  AND EXTENSION ONLY    CLINICAL HISTORY:  Encounter for other preprocedural examination    TECHNIQUE:  Flexion-extension views of the cervical spine    COMPARISON:  09/29/2020    FINDINGS:  The vertebral bodies demonstrate a normal height.  There is at least moderate disc space narrowing and spondylosis present at the C3-4 and C5-6 levels with more mild disc space narrowing and spondylosis present at C4-5 and C6-7.  There may be the slightest bit of anterolisthesis measuring on the order of may be 1-2 mm at the C4-5 level on the extension view with neutral positioning seen on the extension view suggesting mild motion/instability at this level.      ASSESSMENT: 69 y.o. year old female with neck and left upper extremity pain, consistent with     1. 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   Explained the risks and benefits of the procedure in detail with the patient today in clinic along with alternative treatment options, and the patient elected to pursue the intervention.        - S/p bilateral cervical medial branch block  on 9/20/24 with 80% relief for 8-12 hrs.    - Anticoagulation use: no no anticoagulation       report:   Reviewed and consistent with medication use as prescribed.    - Medications:  - Patient may continue Lyrica 100 mg BID per PCP.   - Continue Bryan 10/325 per PCP.    - Therapy:   We discussed  resuming physical therapy to help manage the patient/s painful condition. The patient was counseled that muscle strengthening will improve the long term prognosis in regards to pain and may also help increase range of motion and mobility. They were told that one of the goals of physical therapy is that they learn how to do the exercises so that they can do them independently at home daily upon completion.  Continue exercises, activities as tolerated (pain permitting).      - Imaging: Reviewed available imaging with patient and answered any questions they had regarding study. Consider CT Left femur for better evaluation.    MRI Cervical spine significant for: Degenerative disc disease C2-3, C3-4, C6-7 and C7-T1 as well as facet arthrosis with foraminal stenosis as above.  MRI L spine significant for: L2-3:    Mild to moderate disc degeneration with moderate disc bulge.  Mild to moderate hypertrophic facet arthrosis with increased epidural adipose tissue with at least moderate central canal stenosis.  AP diameter canal is estimated at 6 mm.  There is mild right and moderate to severe left foraminal stenosis.      - Follow up visit: return to clinic or follow up virtually 4 weeks after Left hip block.      The above plan and management options were discussed at length with patient. Patient is in agreement with the above and verbalized understanding.    - I discussed the goals of interventional chronic pain management with the patient on today's visit. We discussed a multimodal and systematic approach to pain.  This includes diagnostic and therapeutic injections, adjuvant pharmacologic treatment, physical therapy, and at times psychiatry.  I emphasized the importance of regular exercise, core strengthening and stretching, diet and  weight loss as a cornerstone of long-term pain management.    - This condition does not require this patient to take time off of work, and the primary goal of our Pain Management services is to improve the patient's functional capacity.  - Patient Questions: Answered all of the patient's questions regarding diagnoses, therapy, treatment and next steps        Miesha Linn PA-C  Interventional Pain Management  [facility]                     This service was not originating from a related E/M service provided within the previous 7 days nor will  to an E/M service or procedure within the next 24 hours or my soonest available appointment.  Prevailing standard of care was able to be met in this audio-only visit.

## 2025-03-11 ENCOUNTER — TELEPHONE (OUTPATIENT)
Dept: PAIN MEDICINE | Facility: CLINIC | Age: 70
End: 2025-03-11
Payer: MEDICARE

## 2025-03-11 NOTE — TELEPHONE ENCOUNTER
----- Message from [employer] sent at 3/11/2025  8:00 AM CDT -----  Contact: self  ..Type:  Patient Returning CallWho Called:..Mary Shafer Left Message for Patient:Does the patient know what this is regarding?:yes Would the patient rather a call back or a response via [facility]? Call back Best Call Back Number:.[phone] (home) Additional Information: pt states she missed a call.

## 2025-03-11 NOTE — TELEPHONE ENCOUNTER
I tried calling the patient to get her scheduled for her hip block with Dr. Soares but no answer. I left the patient a voicemail to give us a call back here at the office.    Valerie HOPE ([facility])

## 2025-03-12 NOTE — PRE-PROCEDURE INSTRUCTIONS
Spoke with patient regarding procedure scheduled on 3.19     Arrival time 1200     Has patient been sick with fever or on antibiotics within the last 7 days? No     Does the patient have any open wounds, sores or rashes? No     Does the patient have any recent fractures? no     Has patient received a vaccination within the last 7 days? No     Received the COVID vaccination?      Has the patient stopped all medications as directed? na     Does patient have a pacemaker, defibrillator, or implantable stimulator? No     Does the patient have a ride to and from procedure and someone reliable to remain with patient?  carleen     Is the patient diabetic? no      Does the patient have sleep apnea? Or use O2 at home? no     Is the patient receiving sedation? yes     Is the patient instructed to remain NPO beginning at midnight the night before their procedure? yes     Procedure location confirmed with patient? Yes     Covid- Denies signs/symptoms. Instructed to notify PAT/MD if any changes.

## 2025-03-19 ENCOUNTER — HOSPITAL ENCOUNTER (OUTPATIENT)
Facility: HOSPITAL | Age: 70
Discharge: HOME | End: 2025-03-19
Attending: ANESTHESIOLOGY | Admitting: ANESTHESIOLOGY
Payer: MEDICARE

## 2025-03-19 VITALS
BODY MASS INDEX: 37.33 KG/M2 | HEIGHT: 66 IN | WEIGHT: 232.31 LBS | SYSTOLIC BLOOD PRESSURE: 141 MMHG | OXYGEN SATURATION: 99 % | HEART RATE: 55 BPM | DIASTOLIC BLOOD PRESSURE: 69 MMHG | RESPIRATION RATE: 16 BRPM | TEMPERATURE: 98 F

## 2025-03-19 DIAGNOSIS — M16.12 OSTEOARTHRITIS OF LEFT HIP: ICD-10-CM

## 2025-03-19 PROBLEM — M25.552 CHRONIC HIP PAIN AFTER TOTAL REPLACEMENT OF LEFT HIP JOINT: Status: ACTIVE | Noted: 2025-03-19

## 2025-03-19 PROBLEM — Z96.642 CHRONIC HIP PAIN AFTER TOTAL REPLACEMENT OF LEFT HIP JOINT: Status: ACTIVE | Noted: 2025-03-19

## 2025-03-19 PROBLEM — G89.29 CHRONIC HIP PAIN AFTER TOTAL REPLACEMENT OF LEFT HIP JOINT: Status: ACTIVE | Noted: 2025-03-19

## 2025-03-19 PROCEDURE — 64450 NJX AA&/STRD OTHER PN/BRANCH: CPT | Mod: LT,,, | Performed by: ANESTHESIOLOGY

## 2025-03-19 PROCEDURE — 64447 NJX AA&/STRD FEMORAL NRV IMG: CPT | Mod: LT,,, | Performed by: ANESTHESIOLOGY

## 2025-03-19 PROCEDURE — 63600175 PHARM REV CODE 636 W HCPCS: Performed by: ANESTHESIOLOGY

## 2025-03-19 PROCEDURE — 64447 NJX AA&/STRD FEMORAL NRV IMG: CPT | Mod: LT | Performed by: ANESTHESIOLOGY

## 2025-03-19 PROCEDURE — 64450 NJX AA&/STRD OTHER PN/BRANCH: CPT | Mod: LT | Performed by: ANESTHESIOLOGY

## 2025-03-19 RX ORDER — FENTANYL CITRATE 50 UG/ML
INJECTION, SOLUTION INTRAMUSCULAR; INTRAVENOUS
Status: DISCONTINUED | OUTPATIENT
Start: 2025-03-19 | End: 2025-03-19 | Stop reason: HOSPADM

## 2025-03-19 RX ORDER — MIDAZOLAM HYDROCHLORIDE 1 MG/ML
INJECTION, SOLUTION INTRAMUSCULAR; INTRAVENOUS
Status: DISCONTINUED | OUTPATIENT
Start: 2025-03-19 | End: 2025-03-19 | Stop reason: HOSPADM

## 2025-03-19 NOTE — OP NOTE
LEFT SIDED Femoral and obturator sensory branch nerve block     Date of procedure: 03/19/2025     Time-out taken to identify patient and procedure side prior to starting the procedure.      PROCEDURE:   Femoral and Obturator sensory branch nerve block: diagnostic     REASON FOR PROCEDURE: Osteoarthritis of left hip, unspecified osteoarthritis type [M16.12]     PHYSICIAN: Cynthia Soares MD     MEDICATIONS INJECTED: 1.5 mL Bupivacaine 0.25% and 10mg decadron at each site     LOCAL ANESTHETIC USED: Xylocaine 1% 5mL     SEDATION MEDICATIONS: None     ESTIMATED BLOOD LOSS: None.     COMPLICATIONS: None.     TECHNIQUE: Laying in the supine position, the patient was prepped and draped in the usual sterile fashion using ChloraPrep and fenestrated drape. The area was determined under fluoroscopy. Local Xylocaine was injected by raising a wheel and going down to the periosteum using a 27-gauge hypodermic needle. The 3.5 inch spinal needle was introduced into the approximate areas of the sensory branch of the femoral nerve to the hip, superior medially to the femoral head and the sensory branch of the obturator nerves to the hip at the incisura. The incisura needle was approached from the medial aspect of the thigh. Once os was contacted and the final needle tip position confirmed on fluoroscopy, contrast was was applied to confirm no intravascular placement.. After negative aspiration and no paresthesias there was injection of 1.5 mL of 0.25% bupivacaine and 20 mg decadron into each of these areas for a total volume of 3 mL of 0.25% bupivacaine left. Needle was withdrawn and a sterile band-aid applied to the skin.     Anesthesia:   Conscious sedation provided by M.D    The patient was monitored with continuous pulse oximetry, EKG, and intermittent blood pressure monitors.  The patient was hemodynamically stable throughout the entire process was responsive to voice, and breathing spontaneously.  Supplemental O2 was provided at  2L/min via nasal cannula.  Patient was comfortable for the duration of the procedure. (See nurse documentation and case log for sedation time)    There was a total of 1mg IV Midazolam and 50mcg Fentanyl titrated for the procedure     Patient was then observed for hemodynamic stability in the recovery room for 30 minutes prior to discharge.

## 2025-03-19 NOTE — DISCHARGE INSTRUCTIONS

## 2025-03-19 NOTE — DISCHARGE SUMMARY
Discharge Note  Short Stay      SUMMARY     Admit Date: 3/19/2025    Attending Physician: Cynthia Soares MD        Discharge Physician: Cynthia Soares MD        Discharge Date: 3/19/2025 12:24 PM    Procedure(s) (LRB):  Left side Femoral and obturator nerve block (Left)    Final Diagnosis: Chronic hip pain after total replacement of left hip joint [M25.552, G89.29, Z96.642]    Disposition: Home or self care    Patient Instructions:   Current Discharge Medication List        CONTINUE these medications which have NOT CHANGED    Details   amLODIPine (NORVASC) 10 MG tablet Take 1 tablet (10 mg total) by mouth once daily.  Qty: 90 tablet, Refills: 3    Comments: .      lisinopriL-hydrochlorothiazide (PRINZIDE,ZESTORETIC) 10-12.5 mg per tablet Take 1 tablet by mouth once daily.  Qty: 90 tablet, Refills: 3    Comments: .      ALPRAZolam (XANAX) 1 MG tablet Take 1 tablet (1 mg total) by mouth nightly as needed for Anxiety.  Qty: 30 tablet, Refills: 5    Associated Diagnoses: CURT (generalized anxiety disorder)      citalopram (CELEXA) 40 MG tablet Take 1 tablet (40 mg total) by mouth once daily.  Qty: 90 tablet, Refills: 3      cloNIDine (CATAPRES) 0.2 MG tablet TAKE 1 TABLET(0.2 MG) BY MOUTH EVERY EVENING  Qty: 90 tablet, Refills: 3      ERGOCALCIFEROL, VITAMIN D2, (VITAMIN D ORAL) Take 1,000 Units by mouth once daily.      fluticasone propionate (FLONASE) 50 mcg/actuation nasal spray 2 sprays (100 mcg total) by Each Nostril route once daily.  Qty: 48 g, Refills: 3      !! HYDROcodone-acetaminophen (NORCO)  mg per tablet Take 1 tablet by mouth every 12 (twelve) hours as needed for Pain.  Qty: 60 tablet, Refills: 0    Comments: Quantity prescribed more than 7 day supply? Yes, quantity medically necessary, Dx M54.5  Associated Diagnoses: Chronic pain syndrome; Lumbar radiculopathy; Cervical radiculopathy      !! HYDROcodone-acetaminophen (NORCO)  mg per tablet Take 1 tablet by mouth every 12 (twelve) hours as needed  for Pain.  Qty: 60 tablet, Refills: 0    Comments: Quantity prescribed more than 7 day supply? Yes, quantity medically necessary, Dx M54.5  Associated Diagnoses: Chronic pain syndrome; Lumbar radiculopathy; Cervical radiculopathy      !! HYDROcodone-acetaminophen (NORCO)  mg per tablet Take 1 tablet by mouth every 12 (twelve) hours as needed for Pain.  Qty: 60 tablet, Refills: 0    Comments: Quantity prescribed more than 7 day supply? Yes, quantity medically necessary, Dx M54.5  Associated Diagnoses: Chronic pain syndrome; Lumbar radiculopathy; Cervical radiculopathy      potassium chloride SA (KLOR-CON M20) 20 MEQ tablet Take 1 tablet (20 mEq total) by mouth 2 (two) times daily.  Qty: 60 tablet, Refills: 11      pregabalin (LYRICA) 100 MG capsule Take 1 capsule (100 mg total) by mouth 2 (two) times daily.  Qty: 60 capsule, Refills: 5    Associated Diagnoses: S/P lumbar fusion      zolpidem (AMBIEN) 10 mg Tab TAKE ONE TABLET BY MOUTH AT BEDTIME AS NEEDED Strength: 10 mg  Qty: 20 tablet, Refills: 5       !! - Potential duplicate medications found. Please discuss with provider.              Discharge Diagnosis: Chronic hip pain after total replacement of left hip joint [M25.552, G89.29, Z96.642]  Condition on Discharge: Stable with no complications to procedure   Diet on Discharge: Same as before.  Activity: as per instruction sheet.  Discharge to: Home with a responsible adult.  Follow up: 2-4 weeks       Please call the office at [phone] if you experience any weakness or loss of sensation, fever > 101.5, pain uncontrolled with oral medications, persistent nausea/vomiting/or diarrhea, redness or drainage from the incisions, or any other worrisome concerns. If physician on call was not reached or could not communicate with our office for any reason please go to the nearest emergency department

## 2025-03-20 ENCOUNTER — OFFICE VISIT (OUTPATIENT)
Dept: INTERNAL MEDICINE | Facility: CLINIC | Age: 70
End: 2025-03-20
Payer: MEDICARE

## 2025-03-20 ENCOUNTER — LAB VISIT (OUTPATIENT)
Dept: LAB | Facility: HOSPITAL | Age: 70
End: 2025-03-20
Attending: INTERNAL MEDICINE
Payer: MEDICARE

## 2025-03-20 VITALS
HEART RATE: 69 BPM | DIASTOLIC BLOOD PRESSURE: 76 MMHG | SYSTOLIC BLOOD PRESSURE: 136 MMHG | TEMPERATURE: 97 F | RESPIRATION RATE: 18 BRPM | OXYGEN SATURATION: 97 % | WEIGHT: 233.13 LBS | HEIGHT: 66 IN | BODY MASS INDEX: 37.47 KG/M2

## 2025-03-20 DIAGNOSIS — I10 PRIMARY HYPERTENSION: ICD-10-CM

## 2025-03-20 DIAGNOSIS — M54.12 CERVICAL RADICULOPATHY: Chronic | ICD-10-CM

## 2025-03-20 DIAGNOSIS — M54.16 LUMBAR RADICULOPATHY: ICD-10-CM

## 2025-03-20 DIAGNOSIS — G47.00 INSOMNIA, UNSPECIFIED TYPE: Primary | ICD-10-CM

## 2025-03-20 DIAGNOSIS — F13.20 SEDATIVE DEPENDENCE: ICD-10-CM

## 2025-03-20 DIAGNOSIS — E78.00 PURE HYPERCHOLESTEROLEMIA: ICD-10-CM

## 2025-03-20 DIAGNOSIS — G89.4 CHRONIC PAIN SYNDROME: ICD-10-CM

## 2025-03-20 PROCEDURE — 3288F FALL RISK ASSESSMENT DOCD: CPT | Mod: CPTII,S$GLB,, | Performed by: FAMILY MEDICINE

## 2025-03-20 PROCEDURE — 84443 ASSAY THYROID STIM HORMONE: CPT | Performed by: INTERNAL MEDICINE

## 2025-03-20 PROCEDURE — 99999 PR PBB SHADOW E&M-EST. PATIENT-LVL III: CPT | Mod: PBBFAC,,, | Performed by: FAMILY MEDICINE

## 2025-03-20 PROCEDURE — 80053 COMPREHEN METABOLIC PANEL: CPT | Performed by: INTERNAL MEDICINE

## 2025-03-20 PROCEDURE — 36415 COLL VENOUS BLD VENIPUNCTURE: CPT | Mod: PN | Performed by: INTERNAL MEDICINE

## 2025-03-20 PROCEDURE — 3075F SYST BP GE 130 - 139MM HG: CPT | Mod: CPTII,S$GLB,, | Performed by: FAMILY MEDICINE

## 2025-03-20 PROCEDURE — 3078F DIAST BP <80 MM HG: CPT | Mod: CPTII,S$GLB,, | Performed by: FAMILY MEDICINE

## 2025-03-20 PROCEDURE — 4010F ACE/ARB THERAPY RXD/TAKEN: CPT | Mod: CPTII,S$GLB,, | Performed by: FAMILY MEDICINE

## 2025-03-20 PROCEDURE — 1125F AMNT PAIN NOTED PAIN PRSNT: CPT | Mod: CPTII,S$GLB,, | Performed by: FAMILY MEDICINE

## 2025-03-20 PROCEDURE — 99214 OFFICE O/P EST MOD 30 MIN: CPT | Mod: S$GLB,,, | Performed by: FAMILY MEDICINE

## 2025-03-20 PROCEDURE — G2211 COMPLEX E/M VISIT ADD ON: HCPCS | Mod: S$GLB,,, | Performed by: FAMILY MEDICINE

## 2025-03-20 PROCEDURE — 3008F BODY MASS INDEX DOCD: CPT | Mod: CPTII,S$GLB,, | Performed by: FAMILY MEDICINE

## 2025-03-20 PROCEDURE — 80061 LIPID PANEL: CPT | Performed by: INTERNAL MEDICINE

## 2025-03-20 PROCEDURE — 1101F PT FALLS ASSESS-DOCD LE1/YR: CPT | Mod: CPTII,S$GLB,, | Performed by: FAMILY MEDICINE

## 2025-03-20 RX ORDER — ZOLPIDEM TARTRATE 10 MG/1
TABLET ORAL
Qty: 20 TABLET | Refills: 5 | Status: CANCELLED | OUTPATIENT
Start: 2025-03-20

## 2025-03-20 RX ORDER — HYDROCODONE BITARTRATE AND ACETAMINOPHEN 10; 325 MG/1; MG/1
1 TABLET ORAL EVERY 12 HOURS PRN
Qty: 60 TABLET | Refills: 0 | Status: SHIPPED | OUTPATIENT
Start: 2025-03-20

## 2025-03-20 RX ORDER — ZOLPIDEM TARTRATE 10 MG/1
TABLET ORAL
Qty: 30 TABLET | Refills: 5 | Status: SHIPPED | OUTPATIENT
Start: 2025-03-20

## 2025-03-20 NOTE — PROGRESS NOTES
Subjective:       Patient ID: Mary M Hayes is a 69 y.o. female.    Chief Complaint: Medication Refill    History of Present Illness    CHIEF COMPLAINT:  Ms. Vo presents for medication refills and to discuss allergy symptoms.    HPI:  Ms. Vo reports allergy symptoms, specifically eye issues, which she believes are allergy-related. She requests refills for hydrocortisone and hemorrhoid medication. She uses sleeping pills but denies hallucinations or sleepwalking. She finds items like cooking utensils and food in her bed in the morning, attributing this to activities done before sleep. She has a history of swelling as a side effect from amlodipine, a blood pressure medication, which led to its discontinuation.    MEDICAL HISTORY:  Ms. Vo has a history of hemorrhoids.    MEDICATIONS:  Ms. Vo is on hydrocortisone and hemorrhoid medication. She is also taking sleeping pills and a blood pressure medication, likely amlodipine, which has caused swelling as a side effect. Ms. Vo discontinued a hydrochlorothiazide combination pill due to swelling side effects.        Review of Systems   Constitutional:  Negative for chills and fever.   HENT:  Negative for congestion, rhinorrhea and sore throat.    Respiratory:  Negative for cough, shortness of breath and wheezing.    Cardiovascular:  Negative for chest pain, palpitations and leg swelling.   Gastrointestinal:  Negative for abdominal pain, constipation, diarrhea, nausea and vomiting.   Genitourinary:  Negative for dysuria, frequency and urgency.   Neurological:  Negative for headaches.   Psychiatric/Behavioral:  Negative for dysphoric mood.        Objective:      Physical Exam  Vitals reviewed.   HENT:      Head: Normocephalic and atraumatic.      Nose: No congestion or rhinorrhea.   Eyes:      General: No scleral icterus.        Right eye: No discharge.         Left eye: No discharge.      Extraocular Movements: Extraocular movements intact.       Conjunctiva/sclera: Conjunctivae normal.      Pupils: Pupils are equal, round, and reactive to light.   Cardiovascular:      Rate and Rhythm: Normal rate and regular rhythm.      Heart sounds: No murmur heard.     No friction rub. No gallop.   Pulmonary:      Effort: Pulmonary effort is normal. No respiratory distress.      Breath sounds: Normal breath sounds. No stridor. No wheezing or rhonchi.   Abdominal:      General: Bowel sounds are normal.      Palpations: Abdomen is soft.   Musculoskeletal:      Right lower leg: No edema.      Left lower leg: No edema.   Skin:     General: Skin is warm.   Neurological:      General: No focal deficit present.      Mental Status: She is alert.   Psychiatric:         Mood and Affect: Mood normal.         Behavior: Behavior normal.         Assessment/Plan:       1. Insomnia, unspecified type    2. Chronic pain syndrome    3. Lumbar radiculopathy    4. Cervical radiculopathy    5. Sedative dependence    6. Primary hypertension      Assessment & Plan    F13.20 Sedative, hypnotic or anxiolytic dependence, uncomplicated  I10 Essential (primary) hypertension  G47.51 Confusional arousals  G47.9 Sleep disorder, unspecified  H10.44 Vernal conjunctivitis  K64.9 Unspecified hemorrhoids  Z88.8 Allergy status to other drugs, medicaments and biological substances    IMPRESSION:  - Considered reported allergy symptoms and recommended eye drops and allergy medication.  - Evaluated current blood pressure medication regimen, noting concerns about swelling side effects from amlodipine.  - Assessed use of sleep medication, inquiring about potential side effects such as hallucinations or sleepwalking.    SEDATIVE, HYPNOTIC OR ANXIOLYTIC DEPENDENCE, UNCOMPLICATED:  - Continued sleep medication and refilled prescription.  - Educated patient on potential side effects, including hallucinations and sleepwalking.  - Ms. Vo uses sleeping pills regularly but denies experiencing these side effects.  -  Instructed patient to monitor for any adverse reactions.  - Assessed current side effects of sleeping medication.    ESSENTIAL HYPERTENSION:  - Continued lisinopril for blood pressure management.  - Ms. Vo is not taking all prescribed blood pressure medications due to side effects, reporting swelling from amlodipine.  - Reviewed and confirmed current medication regimen.  - Plan to send a message to confirm and adjust medication regimen.    SLEEP DISORDER:  - Ms. Vo reports waking up with items in bed that weren't there before sleeping.  - Inquired about hallucinations or sleepwalking related to sleep medication.    ALLERGIES:  - Ms. Vo reports eye symptoms, possibly related to allergies.  - Recommend eye drops for allergy symptoms.  - Refilled hydrocortisone cream as requested by the patient, possibly related to allergy management.      FOLLOW-UP:  - Scheduled follow-up visit in 3 months.              Future Appointments   Date Time Provider Department Center   4/16/2025  1:00 PM Miesha Linn PA-C [facility]    6/23/2025  7:40 AM Suad Mahmood MD [facility]       Suad Mahmood MD  [facility] [address]  [place] [address]  [phone]      This note was generated with the assistance of ambient listening technology. Verbal consent was obtained by the patient and accompanying visitor(s) for the recording of patient appointment to facilitate this note. I attest to having reviewed and edited the generated note for accuracy, though some syntax or spelling errors may persist. Please contact the author of this note for any clarification.

## 2025-03-21 LAB
ALBUMIN SERPL BCP-MCNC: 4 G/DL (ref 3.5–5.2)
ALP SERPL-CCNC: 99 U/L (ref 40–150)
ALT SERPL W/O P-5'-P-CCNC: 15 U/L (ref 10–44)
ANION GAP SERPL CALC-SCNC: 11 MMOL/L (ref 8–16)
AST SERPL-CCNC: 18 U/L (ref 10–40)
BILIRUB SERPL-MCNC: 0.5 MG/DL (ref 0.1–1)
BUN SERPL-MCNC: 15 MG/DL (ref 8–23)
CALCIUM SERPL-MCNC: 10 MG/DL (ref 8.7–10.5)
CHLORIDE SERPL-SCNC: 103 MMOL/L (ref 95–110)
CHOLEST SERPL-MCNC: 215 MG/DL (ref 120–199)
CHOLEST/HDLC SERPL: 3.3 {RATIO} (ref 2–5)
CO2 SERPL-SCNC: 26 MMOL/L (ref 23–29)
CREAT SERPL-MCNC: 0.8 MG/DL (ref 0.5–1.4)
EST. GFR  (NO RACE VARIABLE): >60 ML/MIN/1.73 M^2
GLUCOSE SERPL-MCNC: 102 MG/DL (ref 70–110)
HDLC SERPL-MCNC: 65 MG/DL (ref 40–75)
HDLC SERPL: 30.2 % (ref 20–50)
LDLC SERPL CALC-MCNC: 131.2 MG/DL (ref 63–159)
NONHDLC SERPL-MCNC: 150 MG/DL
POTASSIUM SERPL-SCNC: 3.7 MMOL/L (ref 3.5–5.1)
PROT SERPL-MCNC: 7.2 G/DL (ref 6–8.4)
SODIUM SERPL-SCNC: 140 MMOL/L (ref 136–145)
TRIGL SERPL-MCNC: 94 MG/DL (ref 30–150)
TSH SERPL DL<=0.005 MIU/L-ACNC: 0.83 UIU/ML (ref 0.4–4)

## 2025-03-24 ENCOUNTER — PATIENT MESSAGE (OUTPATIENT)
Dept: INTERNAL MEDICINE | Facility: CLINIC | Age: 70
End: 2025-03-24
Payer: MEDICARE

## 2025-03-24 DIAGNOSIS — F41.1 GAD (GENERALIZED ANXIETY DISORDER): ICD-10-CM

## 2025-03-24 RX ORDER — LISINOPRIL AND HYDROCHLOROTHIAZIDE 10; 12.5 MG/1; MG/1
1 TABLET ORAL DAILY
Qty: 90 TABLET | Refills: 3 | Status: SHIPPED | OUTPATIENT
Start: 2025-03-24 | End: 2026-03-24

## 2025-03-24 RX ORDER — ALPRAZOLAM 1 MG/1
1 TABLET ORAL NIGHTLY PRN
Qty: 30 TABLET | Refills: 5 | Status: SHIPPED | OUTPATIENT
Start: 2025-03-24

## 2025-03-27 ENCOUNTER — TELEPHONE (OUTPATIENT)
Dept: PAIN MEDICINE | Facility: CLINIC | Age: 70
End: 2025-03-27
Payer: MEDICARE

## 2025-03-27 NOTE — TELEPHONE ENCOUNTER
Called patient and patient informed me that she was still experiencing pain from the procedure. I informed her that the provider would like to for her to wait at least 4-6 weeks before being seen to see any improvement. Pt verbalized understanding.    Usha REVELES

## 2025-03-27 NOTE — TELEPHONE ENCOUNTER
----- Message from Josefina sent at 3/27/2025  7:56 AM CDT -----  .Type:  Sooner Apoointment RequestCaller is requesting a sooner appointment.  Caller declined first available appointment listed below.  Caller will not accept being placed on the waitlist and is requesting a message be sent to doctor.Name of Caller:.Mary Vo When is the first available appointment?04/16/2025Symptoms:Would the patient rather a call back or a response via [facility]? Call [facility] Call Back Number:.[phone]  Additional Information: Patient is requesting a virtual appointment today or tomorrow but she would like a call back please. Thx. EL

## 2025-04-16 ENCOUNTER — OFFICE VISIT (OUTPATIENT)
Dept: PAIN MEDICINE | Facility: CLINIC | Age: 70
End: 2025-04-16
Payer: MEDICARE

## 2025-04-16 DIAGNOSIS — M25.552 CHRONIC HIP PAIN AFTER TOTAL REPLACEMENT OF LEFT HIP JOINT: Primary | ICD-10-CM

## 2025-04-16 DIAGNOSIS — G89.29 CHRONIC HIP PAIN AFTER TOTAL REPLACEMENT OF LEFT HIP JOINT: Primary | ICD-10-CM

## 2025-04-16 DIAGNOSIS — Z96.642 CHRONIC HIP PAIN AFTER TOTAL REPLACEMENT OF LEFT HIP JOINT: Primary | ICD-10-CM

## 2025-04-16 PROCEDURE — 98005 SYNCH AUDIO-VIDEO EST LOW 20: CPT | Mod: 95,,, | Performed by: PHYSICIAN ASSISTANT

## 2025-04-16 PROCEDURE — 4010F ACE/ARB THERAPY RXD/TAKEN: CPT | Mod: CPTII,95,, | Performed by: PHYSICIAN ASSISTANT

## 2025-04-16 NOTE — PROGRESS NOTES
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                                           13:24  EMG September 22, 2021  IMPRESSION  1. ABNORMAL study  2. There is electrodiagnostic evidence of an acute on chronic radiculopathy of the LEFT C6 nerve root and a subacute on chronic radic on the right C6 nerve root.  There is a chronic radiculopathy of the other nerve roots.  There is a mild demyelinating median neuropathy (Carpal tunnel syndrome) across BILATERAL wrists.       10/13/20    MRI Lumbar Spine Without Contrast    Narrative  EXAMINATION:  MRI LUMBAR SPINE WITHOUT CONTRAST    CLINICAL HISTORY:  Back pain or radiculopathy, > 6 wks; Dorsalgia, unspecified    TECHNIQUE:  Multiplanar, multisequence MR images were acquired from the thoracolumbar junction to the sacrum without the administration of contrast.    COMPARISON:  MRI 09/27/2018    FINDINGS:  Vertebral body heights maintained without spondylolisthesis.  Similar multilevel disc desiccation present with height loss at L4-5.  L4-5 mixed Modic endplate changes noted.  Schmorl node findings noted at L2-3 with faint rim of STIR signal noted within the superior endplate of L3.    Conus terminates normally.  Visualized intra-abdominal/pelvic structures unremarkable.    L1-L2: No spinal canal stenosis or neural foraminal  narrowing.    L2-L3: Mild circumferential disc bulging without significant spinal canal stenosis or neural foraminal narrowing.  Facet degenerative hypertrophy findings.    L3-L4: Circumferential disc bulging which in conjunction with facet/ligamentum flavum hypertrophy causes moderate spinal canal stenosis.  Small central annular fissure.  Right mild neural foraminal narrowing present.    L4-L5: Circumferential disc bulging present with small posterior osteophytes.  Larger anterior osteophyte changes noted.  Facet degenerative hypertrophy findings.  Moderate spinal canal stenosis present.  Moderate bilateral neural foraminal narrowing with some minimal effacement of the exited nerve roots greater on the right.    L5-S1: No significant posterior disc bulge.  Prominent facet degenerative hypertrophy findings minimal bilateral neural foraminal narrowing.    Impression  Similar multilevel degenerative findings most prevalent at L4-5.    L3-4 Schmorl node findings, acute at the superior endplate of L3.      109/29/2020 radiograph; MRI cervical spine 08/22/2019    FINDINGS:  Vertebral body heights and alignment are unchanged with minimal retrolisthesis of C3 on C4.  No concerning marrow signal present.  Similar multilevel disc desiccation and height loss most prevalent at C5-6 and C6-7.    Posterior fossa is unremarkable.  Spinal cord is unremarkable.    Neck soft tissue structures unremarkable.    C2-C3: No significant spinal canal stenosis or neural foraminal narrowing.    C3-C4: Similar posterior disc osteophyte complex present effacing the anterior thecal sac without significant spinal canal stenosis.  Bilateral uncovertebral degenerative changes resulting in mild-to-moderate neural foraminal stenosis.    C4-C5: Posterior disc osteophyte complex present with right paracentral disc protrusion which contacts the anterior right aspect of the cervical cord.  Right greater than left facet/uncovertebral hypertrophy  resulting mild right neural foraminal narrowing.    C5-C6: Posterior disc osteophyte complex slightly asymmetric to the right paracentral location effacing the anterior thecal sac and causing mild spinal cord flattening.  No intrinsic cord signal abnormality.  Spinal canal measures 7.6 mm in midline AP dimension.  Bilateral uncovertebral/facet degenerative findings causes severe bilateral neural foraminal narrowing.    C6-C7: Posterior disc osteophyte complex present with more focal central disc protrusion causing similar effacement and mild indention of the thecal sac and spinal cord without intrinsic cord signal abnormality.  Spinal canal measures 8.3 mm in AP midline dimension.  Bilateral moderate to severe neural foraminal narrowing present.    C7-T1: Mild posterior disc osteophyte complex present mildly effacing the anterior thecal sac without significant spinal stenosis.  Mild-to-moderate bilateral neural foraminal narrowing.    T1-2: Posterior disc osteophyte changes present causing at least mild spinal canal stenosis with severe right and moderate left neural foraminal narrowing.    Impression  Similar multilevel degenerative findings as above     09/01/21    CT Cervical Spine Without Contrast    Narrative  EXAMINATION:  CT CERVICAL SPINE WITHOUT CONTRAST    CLINICAL HISTORY:  Presurgical eval, C-spine;Encounter for other preprocedural examination    TECHNIQUE:  Low dose axial images, sagittal and coronal reformations were performed though the cervical spine.  Contrast was not administered.    COMPARISON:  MRI from September 1, 2021 multiple prior radiographs dating back to December 2018.    FINDINGS:  There is minimal grade 1 retrolisthesis of C3 on C4 which is unchanged.  Discogenic degenerative changes throughout the cervical spine are unchanged since the MRI from October 2020 with findings extending most prominently from C3 to C7 levels with varying degrees of disc space narrowing and marginal spurring.   Disc space narrowing is most severe at C5-C6 and C6-C7.  There is hypertrophy of the lower cervical facets.  No acute fracture.  Bones are demineralized.    Visualized posterior fossa is intact.  Visualized brain parenchyma appears normal.  Lung apices are clear.  Small scattered bilateral reactive cervical lymph nodes are noted.    C2-C3: There is mild facet arthropathy.  There is asymmetric left uncovertebral hypertrophy resulting in asymmetric moderate to pronounced left neural foraminal narrowing.    C3-C4: Posterior disc osteophyte complex with uncovertebral and facet arthropathy.  No significant spinal canal stenosis.  There is moderate to pronounced bilateral neural foraminal narrowing, unchanged.    C4-C5: Bilateral right greater than left facet arthropathy with mild uncovertebral spurring.  Central and right paracentral disc osteophyte complex better demonstrated on the MRI.  There is either small focus of calcification of the posterior longitudinal ligament versus calcification of extruded disc material at this level on series 3, image 136.  No spinal canal stenosis.  There is mild right greater than left neural foraminal narrowing.    C5-C6: Posterior disc osteophyte complex with uncovertebral and facet arthropathy.  There is relative narrowing of the spinal canal at this level, unchanged since the MRI.  There is marked bilateral neural foraminal narrowing, also unchanged.    C6-C7: Posterior disc osteophyte complex with facet and uncovertebral hypertrophy.  There is mild relative narrowing of the spinal canal, unchanged.  There is marked bilateral neural foraminal narrowing.    C7-T1: Posterior disc osteophyte complex which indents the ventral thecal sac.  Mild facet and uncovertebral hypertrophy.  Spinal canal is maintained.  Mild bilateral neural foraminal narrowing.    Impression  Discogenic degenerative changes as highlighted above.  Overall, findings do not appear significantly changed when compared  to the MRI from 10/13/2020.      12/13/18    X-Ray Cervical Spine AP And Lateral    Narrative  EXAMINATION:  XR CERVICAL SPINE AP LATERAL    CLINICAL HISTORY:  Radiculopathy, cervical region    TECHNIQUE:  AP, lateral and open mouth views of the cervical spine were performed.    COMPARISON:  None.    FINDINGS:  Straightening of normal cervical lordosis.  This may be positional or secondary to muscle spasm.  No fracture or listhesis.  Multilevel degenerative changes are seen with findings most pronounced at C3-C4 and C5-C6 with intervertebral disc space narrowing and marginal spurring with facet arthropathy.  Odontoid process remains intact.  Lateral masses are symmetric.  Prevertebral soft tissues are maintained.  Calcification about the left carotid vasculature is noted      09/02/21    X-Ray Cervical Spine Complete 5 view    Narrative  EXAMINATION:  XR CERVICAL SPINE COMPLETE 5 VIEW    CLINICAL HISTORY:  preop planning;. Other cervical disc degeneration, unspecified cervical region    TECHNIQUE:  AP, Lateral, bilateral oblique and open mouth views of the cervical spine were performed.    COMPARISON:  09/01/2021    FINDINGS:  Moderate disc space narrowing spondylosis present at the C3-4 and C5-6 levels with more mild disc space narrowing and spondylosis present at C4-5 and C6-7.  The vertebral bodies demonstrate a normal height and alignment.  There is moderate osseous encroachment noted upon the C4-5 and C5-6 neural foraminal canals on the right secondary to uncovertebral joint hypertrophy.  On the left, there is mild osseous encroachment noted upon the C2-3 C4-5 6 and C6-7 level secondary to uncovertebral joint hypertrophy and severe osseous encroachment noted upon the C3-4 neural foraminal canal on the left secondary to uncovertebral joint hypertrophy.    09/01/21    X-Ray Cervical Spine Flexion And Extension Only    Narrative  EXAMINATION:  XR CERVICAL SPINE FLEXION  AND EXTENSION ONLY    CLINICAL  HISTORY:  Encounter for other preprocedural examination    TECHNIQUE:  Flexion-extension views of the cervical spine    COMPARISON:  09/29/2020    FINDINGS:  The vertebral bodies demonstrate a normal height.  There is at least moderate disc space narrowing and spondylosis present at the C3-4 and C5-6 levels with more mild disc space narrowing and spondylosis present at C4-5 and C6-7.  There may be the slightest bit of anterolisthesis measuring on the order of may be 1-2 mm at the C4-5 level on the extension view with neutral positioning seen on the extension view suggesting mild motion/instability at this level.      ASSESSMENT: 69 y.o. year old female with neck and left upper extremity pain, consistent with     1. 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 potential radiofrequency ablation as a longer-lasting alternative.  - Dr. Soares agrees with proceeding directly to radiofrequency ablation of left obturator and femoral nerves.     NEURITIS:  - Recommend short course of oral prednisone (4-5 day t) to address potential neuritis causing tingling and numbness in the lower extremity.    FOLLOW-UP:  - Contact patient via patient portal with procedure details and instructions.         - Interventions:     - S/p left side femoral and obturator nerve block on 3/19/2025 with 80-90% relief initially  - S/p bilateral cervical medial branch block  on 9/20/24 with 80% relief for 8-12 hrs.    - Anticoagulation use: no no anticoagulation       report:  Reviewed and consistent with medication use as prescribed.    - Medications:  - Patient may continue Lyrica 100 mg BID per PCP.   - Continue [place] 10/325 per PCP.    - Therapy:   We discussed  resuming  physical therapy to help manage the patient/s painful condition. The patient was counseled that muscle strengthening will improve the long term prognosis in regards to pain and may also help increase range of motion and mobility. They were told that one of the goals of physical therapy is that they learn how to do the exercises so that they can do them independently at home daily upon completion.  Continue exercises, activities as tolerated (pain permitting).      - Imaging: Reviewed available imaging with patient and answered any questions they had regarding study. Consider CT Left femur for better evaluation.    MRI Cervical spine significant for: Degenerative disc disease C2-3, C3-4, C6-7 and C7-T1 as well as facet arthrosis with foraminal stenosis as above.  MRI L spine significant for: L2-3:    Mild to moderate disc degeneration with moderate disc bulge.  Mild to moderate hypertrophic facet arthrosis with increased epidural adipose tissue with at least moderate central canal stenosis.  AP diameter canal is estimated at 6 mm.  There is mild right and moderate to severe left foraminal stenosis.      - Follow up visit: return to clinic or follow up virtually 4 weeks after Left hip RFA.      The above plan and management options were discussed at length with patient. Patient is in agreement with the above and verbalized understanding.    - I discussed the goals of interventional chronic pain management with the patient on today's visit. We discussed a multimodal and systematic approach to pain.  This includes diagnostic and therapeutic injections, adjuvant pharmacologic treatment, physical therapy, and at times psychiatry.  I emphasized the importance of regular exercise, core strengthening and stretching, diet and weight loss as a cornerstone of long-term pain management.    - This condition does not require this patient to take time off of work, and the primary goal of our Pain Management services is to improve  the patient's functional capacity.  - Patient Questions: Answered all of the patient's questions regarding diagnoses, therapy, treatment and next steps        Miesha Linn PA-C  Interventional Pain Management  [facility]

## 2025-04-16 NOTE — H&P (VIEW-ONLY)
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                                           13:24  EMG September 22, 2021  IMPRESSION  1. ABNORMAL study  2. There is electrodiagnostic evidence of an acute on chronic radiculopathy of the LEFT C6 nerve root and a subacute on chronic radic on the right C6 nerve root.  There is a chronic radiculopathy of the other nerve roots.  There is a mild demyelinating median neuropathy (Carpal tunnel syndrome) across BILATERAL wrists.       10/13/20    MRI Lumbar Spine Without Contrast    Narrative  EXAMINATION:  MRI LUMBAR SPINE WITHOUT CONTRAST    CLINICAL HISTORY:  Back pain or radiculopathy, > 6 wks; Dorsalgia, unspecified    TECHNIQUE:  Multiplanar, multisequence MR images were acquired from the thoracolumbar junction to the sacrum without the administration of contrast.    COMPARISON:  MRI 09/27/2018    FINDINGS:  Vertebral body heights maintained without spondylolisthesis.  Similar multilevel disc desiccation present with height loss at L4-5.  L4-5 mixed Modic endplate changes noted.  Schmorl node findings noted at L2-3 with faint rim of STIR signal noted within the superior endplate of L3.    Conus terminates normally.  Visualized intra-abdominal/pelvic structures unremarkable.    L1-L2: No spinal canal stenosis or neural foraminal  narrowing.    L2-L3: Mild circumferential disc bulging without significant spinal canal stenosis or neural foraminal narrowing.  Facet degenerative hypertrophy findings.    L3-L4: Circumferential disc bulging which in conjunction with facet/ligamentum flavum hypertrophy causes moderate spinal canal stenosis.  Small central annular fissure.  Right mild neural foraminal narrowing present.    L4-L5: Circumferential disc bulging present with small posterior osteophytes.  Larger anterior osteophyte changes noted.  Facet degenerative hypertrophy findings.  Moderate spinal canal stenosis present.  Moderate bilateral neural foraminal narrowing with some minimal effacement of the exited nerve roots greater on the right.    L5-S1: No significant posterior disc bulge.  Prominent facet degenerative hypertrophy findings minimal bilateral neural foraminal narrowing.    Impression  Similar multilevel degenerative findings most prevalent at L4-5.    L3-4 Schmorl node findings, acute at the superior endplate of L3.      109/29/2020 radiograph; MRI cervical spine 08/22/2019    FINDINGS:  Vertebral body heights and alignment are unchanged with minimal retrolisthesis of C3 on C4.  No concerning marrow signal present.  Similar multilevel disc desiccation and height loss most prevalent at C5-6 and C6-7.    Posterior fossa is unremarkable.  Spinal cord is unremarkable.    Neck soft tissue structures unremarkable.    C2-C3: No significant spinal canal stenosis or neural foraminal narrowing.    C3-C4: Similar posterior disc osteophyte complex present effacing the anterior thecal sac without significant spinal canal stenosis.  Bilateral uncovertebral degenerative changes resulting in mild-to-moderate neural foraminal stenosis.    C4-C5: Posterior disc osteophyte complex present with right paracentral disc protrusion which contacts the anterior right aspect of the cervical cord.  Right greater than left facet/uncovertebral hypertrophy  resulting mild right neural foraminal narrowing.    C5-C6: Posterior disc osteophyte complex slightly asymmetric to the right paracentral location effacing the anterior thecal sac and causing mild spinal cord flattening.  No intrinsic cord signal abnormality.  Spinal canal measures 7.6 mm in midline AP dimension.  Bilateral uncovertebral/facet degenerative findings causes severe bilateral neural foraminal narrowing.    C6-C7: Posterior disc osteophyte complex present with more focal central disc protrusion causing similar effacement and mild indention of the thecal sac and spinal cord without intrinsic cord signal abnormality.  Spinal canal measures 8.3 mm in AP midline dimension.  Bilateral moderate to severe neural foraminal narrowing present.    C7-T1: Mild posterior disc osteophyte complex present mildly effacing the anterior thecal sac without significant spinal stenosis.  Mild-to-moderate bilateral neural foraminal narrowing.    T1-2: Posterior disc osteophyte changes present causing at least mild spinal canal stenosis with severe right and moderate left neural foraminal narrowing.    Impression  Similar multilevel degenerative findings as above     09/01/21    CT Cervical Spine Without Contrast    Narrative  EXAMINATION:  CT CERVICAL SPINE WITHOUT CONTRAST    CLINICAL HISTORY:  Presurgical eval, C-spine;Encounter for other preprocedural examination    TECHNIQUE:  Low dose axial images, sagittal and coronal reformations were performed though the cervical spine.  Contrast was not administered.    COMPARISON:  MRI from September 1, 2021 multiple prior radiographs dating back to December 2018.    FINDINGS:  There is minimal grade 1 retrolisthesis of C3 on C4 which is unchanged.  Discogenic degenerative changes throughout the cervical spine are unchanged since the MRI from October 2020 with findings extending most prominently from C3 to C7 levels with varying degrees of disc space narrowing and marginal spurring.   Disc space narrowing is most severe at C5-C6 and C6-C7.  There is hypertrophy of the lower cervical facets.  No acute fracture.  Bones are demineralized.    Visualized posterior fossa is intact.  Visualized brain parenchyma appears normal.  Lung apices are clear.  Small scattered bilateral reactive cervical lymph nodes are noted.    C2-C3: There is mild facet arthropathy.  There is asymmetric left uncovertebral hypertrophy resulting in asymmetric moderate to pronounced left neural foraminal narrowing.    C3-C4: Posterior disc osteophyte complex with uncovertebral and facet arthropathy.  No significant spinal canal stenosis.  There is moderate to pronounced bilateral neural foraminal narrowing, unchanged.    C4-C5: Bilateral right greater than left facet arthropathy with mild uncovertebral spurring.  Central and right paracentral disc osteophyte complex better demonstrated on the MRI.  There is either small focus of calcification of the posterior longitudinal ligament versus calcification of extruded disc material at this level on series 3, image 136.  No spinal canal stenosis.  There is mild right greater than left neural foraminal narrowing.    C5-C6: Posterior disc osteophyte complex with uncovertebral and facet arthropathy.  There is relative narrowing of the spinal canal at this level, unchanged since the MRI.  There is marked bilateral neural foraminal narrowing, also unchanged.    C6-C7: Posterior disc osteophyte complex with facet and uncovertebral hypertrophy.  There is mild relative narrowing of the spinal canal, unchanged.  There is marked bilateral neural foraminal narrowing.    C7-T1: Posterior disc osteophyte complex which indents the ventral thecal sac.  Mild facet and uncovertebral hypertrophy.  Spinal canal is maintained.  Mild bilateral neural foraminal narrowing.    Impression  Discogenic degenerative changes as highlighted above.  Overall, findings do not appear significantly changed when compared  to the MRI from 10/13/2020.      12/13/18    X-Ray Cervical Spine AP And Lateral    Narrative  EXAMINATION:  XR CERVICAL SPINE AP LATERAL    CLINICAL HISTORY:  Radiculopathy, cervical region    TECHNIQUE:  AP, lateral and open mouth views of the cervical spine were performed.    COMPARISON:  None.    FINDINGS:  Straightening of normal cervical lordosis.  This may be positional or secondary to muscle spasm.  No fracture or listhesis.  Multilevel degenerative changes are seen with findings most pronounced at C3-C4 and C5-C6 with intervertebral disc space narrowing and marginal spurring with facet arthropathy.  Odontoid process remains intact.  Lateral masses are symmetric.  Prevertebral soft tissues are maintained.  Calcification about the left carotid vasculature is noted      09/02/21    X-Ray Cervical Spine Complete 5 view    Narrative  EXAMINATION:  XR CERVICAL SPINE COMPLETE 5 VIEW    CLINICAL HISTORY:  preop planning;. Other cervical disc degeneration, unspecified cervical region    TECHNIQUE:  AP, Lateral, bilateral oblique and open mouth views of the cervical spine were performed.    COMPARISON:  09/01/2021    FINDINGS:  Moderate disc space narrowing spondylosis present at the C3-4 and C5-6 levels with more mild disc space narrowing and spondylosis present at C4-5 and C6-7.  The vertebral bodies demonstrate a normal height and alignment.  There is moderate osseous encroachment noted upon the C4-5 and C5-6 neural foraminal canals on the right secondary to uncovertebral joint hypertrophy.  On the left, there is mild osseous encroachment noted upon the C2-3 C4-5 6 and C6-7 level secondary to uncovertebral joint hypertrophy and severe osseous encroachment noted upon the C3-4 neural foraminal canal on the left secondary to uncovertebral joint hypertrophy.    09/01/21    X-Ray Cervical Spine Flexion And Extension Only    Narrative  EXAMINATION:  XR CERVICAL SPINE FLEXION  AND EXTENSION ONLY    CLINICAL  HISTORY:  Encounter for other preprocedural examination    TECHNIQUE:  Flexion-extension views of the cervical spine    COMPARISON:  09/29/2020    FINDINGS:  The vertebral bodies demonstrate a normal height.  There is at least moderate disc space narrowing and spondylosis present at the C3-4 and C5-6 levels with more mild disc space narrowing and spondylosis present at C4-5 and C6-7.  There may be the slightest bit of anterolisthesis measuring on the order of may be 1-2 mm at the C4-5 level on the extension view with neutral positioning seen on the extension view suggesting mild motion/instability at this level.      ASSESSMENT: 69 y.o. year old female with neck and left upper extremity pain, consistent with     1. 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 potential radiofrequency ablation as a longer-lasting alternative.  - Dr. Soares agrees with proceeding directly to radiofrequency ablation of left obturator and femoral nerves.     NEURITIS:  - Recommend short course of oral prednisone (4-5 day t) to address potential neuritis causing tingling and numbness in the lower extremity.    FOLLOW-UP:  - Contact patient via patient portal with procedure details and instructions.         - Interventions:     - S/p left side femoral and obturator nerve block on 3/19/2025 with 80-90% relief initially  - S/p bilateral cervical medial branch block  on 9/20/24 with 80% relief for 8-12 hrs.    - Anticoagulation use: no no anticoagulation       report:  Reviewed and consistent with medication use as prescribed.    - Medications:  - Patient may continue Lyrica 100 mg BID per PCP.   - Continue [place] 10/325 per PCP.    - Therapy:   We discussed  resuming  physical therapy to help manage the patient/s painful condition. The patient was counseled that muscle strengthening will improve the long term prognosis in regards to pain and may also help increase range of motion and mobility. They were told that one of the goals of physical therapy is that they learn how to do the exercises so that they can do them independently at home daily upon completion.  Continue exercises, activities as tolerated (pain permitting).      - Imaging: Reviewed available imaging with patient and answered any questions they had regarding study. Consider CT Left femur for better evaluation.    MRI Cervical spine significant for: Degenerative disc disease C2-3, C3-4, C6-7 and C7-T1 as well as facet arthrosis with foraminal stenosis as above.  MRI L spine significant for: L2-3:    Mild to moderate disc degeneration with moderate disc bulge.  Mild to moderate hypertrophic facet arthrosis with increased epidural adipose tissue with at least moderate central canal stenosis.  AP diameter canal is estimated at 6 mm.  There is mild right and moderate to severe left foraminal stenosis.      - Follow up visit: return to clinic or follow up virtually 4 weeks after Left hip RFA.      The above plan and management options were discussed at length with patient. Patient is in agreement with the above and verbalized understanding.    - I discussed the goals of interventional chronic pain management with the patient on today's visit. We discussed a multimodal and systematic approach to pain.  This includes diagnostic and therapeutic injections, adjuvant pharmacologic treatment, physical therapy, and at times psychiatry.  I emphasized the importance of regular exercise, core strengthening and stretching, diet and weight loss as a cornerstone of long-term pain management.    - This condition does not require this patient to take time off of work, and the primary goal of our Pain Management services is to improve  the patient's functional capacity.  - Patient Questions: Answered all of the patient's questions regarding diagnoses, therapy, treatment and next steps        Miesha Linn PA-C  Interventional Pain Management  [facility]

## 2025-04-17 ENCOUNTER — TELEPHONE (OUTPATIENT)
Dept: PAIN MEDICINE | Facility: CLINIC | Age: 70
End: 2025-04-17
Payer: MEDICARE

## 2025-04-17 RX ORDER — PREDNISONE 5 MG/1
5 TABLET ORAL 2 TIMES DAILY
Qty: 10 TABLET | Refills: 0 | Status: SHIPPED | OUTPATIENT
Start: 2025-04-17

## 2025-04-17 NOTE — TELEPHONE ENCOUNTER
Called patient and scheduled a procedure and follow up appt. Sent procedure instructions to her through the mychart.  Usha REVELES

## 2025-04-21 DIAGNOSIS — M54.16 LUMBAR RADICULOPATHY: ICD-10-CM

## 2025-04-21 DIAGNOSIS — M54.12 CERVICAL RADICULOPATHY: Chronic | ICD-10-CM

## 2025-04-21 DIAGNOSIS — G89.4 CHRONIC PAIN SYNDROME: ICD-10-CM

## 2025-04-21 RX ORDER — HYDROCODONE BITARTRATE AND ACETAMINOPHEN 10; 325 MG/1; MG/1
1 TABLET ORAL EVERY 12 HOURS PRN
Qty: 60 TABLET | Refills: 0 | Status: SHIPPED | OUTPATIENT
Start: 2025-04-21

## 2025-04-21 NOTE — TELEPHONE ENCOUNTER
----- Message from Tia sent at 4/21/2025  9:35 AM CDT -----  Who Called: PtWhat is the request in detail: Requesting call back to discuss New rxHYDROcodone-acetaminophen (NORCO)  mg per tablet 60 tablet 0 3/20/2025 - NoSig - Route: Take 1 tablet by mouth every 12 (twelve) hours as needed for Pain. - OralSent to pharmacy as: HYDROcodone-acetaminophen (NORCO)  mg per tabletClass: NormalEarliest Fill Date: 3/20/2025Notes to Pharmacy: Quantity prescribed more than 7 day supply? Yes, quantity medically necessary, Dx M54.5Order: [id number] - [place] - [address] AT [facility] [id number] [facility] [address]Cvksy: [phone] Fax: [phone] the clinic reply by [facility]? NoBest Call Back Number: [phone] Information:

## 2025-04-28 NOTE — PRE-PROCEDURE INSTRUCTIONS
Spoke with patient regarding procedure scheduled on 5.7     Arrival time 1000     Has patient been sick with fever or on antibiotics within the last 7 days? No     Does the patient have any open wounds, sores or rashes? No     Does the patient have any recent fractures? no     Has patient received a vaccination within the last 7 days? No     Received the COVID vaccination? yes     Has the patient stopped all medications as directed? na     Does patient have a pacemaker, defibrillator, or implantable stimulator? No     Does the patient have a ride to and from procedure and someone reliable to remain with patient?  JACQUI     Is the patient diabetic? no      Does the patient have sleep apnea? Or use O2 at home? no     Is the patient receiving sedation? Yes      Is the patient instructed to remain NPO beginning at midnight the night before their procedure? yes     Procedure location confirmed with patient? Yes     Covid- Denies signs/symptoms. Instructed to notify PAT/MD if any changes.

## 2025-05-07 ENCOUNTER — HOSPITAL ENCOUNTER (OUTPATIENT)
Facility: HOSPITAL | Age: 70
Discharge: HOME | End: 2025-05-07
Attending: ANESTHESIOLOGY | Admitting: ANESTHESIOLOGY
Payer: MEDICARE

## 2025-05-07 VITALS
HEIGHT: 66 IN | BODY MASS INDEX: 36.92 KG/M2 | WEIGHT: 229.75 LBS | TEMPERATURE: 98 F | HEART RATE: 52 BPM | SYSTOLIC BLOOD PRESSURE: 148 MMHG | RESPIRATION RATE: 16 BRPM | DIASTOLIC BLOOD PRESSURE: 72 MMHG | OXYGEN SATURATION: 98 %

## 2025-05-07 DIAGNOSIS — M16.9 HIP OSTEOARTHRITIS: ICD-10-CM

## 2025-05-07 PROCEDURE — 99152 MOD SED SAME PHYS/QHP 5/>YRS: CPT | Performed by: ANESTHESIOLOGY

## 2025-05-07 PROCEDURE — 63600175 PHARM REV CODE 636 W HCPCS: Performed by: ANESTHESIOLOGY

## 2025-05-07 PROCEDURE — 25000003 PHARM REV CODE 250: Performed by: ANESTHESIOLOGY

## 2025-05-07 PROCEDURE — 64640 INJECTION TREATMENT OF NERVE: CPT | Mod: 59,LT | Performed by: ANESTHESIOLOGY

## 2025-05-07 PROCEDURE — 64640 INJECTION TREATMENT OF NERVE: CPT | Mod: 59,LT,, | Performed by: ANESTHESIOLOGY

## 2025-05-07 RX ORDER — LIDOCAINE HYDROCHLORIDE 20 MG/ML
INJECTION, SOLUTION EPIDURAL; INFILTRATION; INTRACAUDAL; PERINEURAL
Status: DISCONTINUED | OUTPATIENT
Start: 2025-05-07 | End: 2025-05-07 | Stop reason: HOSPADM

## 2025-05-07 RX ORDER — BUPIVACAINE HYDROCHLORIDE 2.5 MG/ML
INJECTION, SOLUTION EPIDURAL; INFILTRATION; INTRACAUDAL; PERINEURAL
Status: DISCONTINUED | OUTPATIENT
Start: 2025-05-07 | End: 2025-05-07 | Stop reason: HOSPADM

## 2025-05-07 RX ORDER — MIDAZOLAM HYDROCHLORIDE 1 MG/ML
INJECTION, SOLUTION INTRAMUSCULAR; INTRAVENOUS
Status: DISCONTINUED | OUTPATIENT
Start: 2025-05-07 | End: 2025-05-07 | Stop reason: HOSPADM

## 2025-05-07 RX ORDER — DEXAMETHASONE SODIUM PHOSPHATE 10 MG/ML
INJECTION, SOLUTION INTRA-ARTICULAR; INTRALESIONAL; INTRAMUSCULAR; INTRAVENOUS; SOFT TISSUE
Status: DISCONTINUED | OUTPATIENT
Start: 2025-05-07 | End: 2025-05-07 | Stop reason: HOSPADM

## 2025-05-07 RX ORDER — INDOMETHACIN 25 MG/1
CAPSULE ORAL
Status: DISCONTINUED | OUTPATIENT
Start: 2025-05-07 | End: 2025-05-07 | Stop reason: HOSPADM

## 2025-05-07 RX ORDER — FENTANYL CITRATE 50 UG/ML
INJECTION, SOLUTION INTRAMUSCULAR; INTRAVENOUS
Status: DISCONTINUED | OUTPATIENT
Start: 2025-05-07 | End: 2025-05-07 | Stop reason: HOSPADM

## 2025-05-07 NOTE — DISCHARGE SUMMARY
Discharge Note  Short Stay      SUMMARY     Admit Date: 5/7/2025    Attending Physician: Cynthia Soares MD        Discharge Physician: Cynthia Soares MD        Discharge Date: 5/7/2025 11:19 AM    Procedure(s) (LRB):  left femoral and obturator nerve radiofrequency ablation (Cooled hip RFA) (Left)    Final Diagnosis: Chronic hip pain after total replacement of left hip joint [M25.552, G89.29, Z96.642]    Disposition: Home or self care    Patient Instructions:   Current Discharge Medication List        CONTINUE these medications which have NOT CHANGED    Details   ALPRAZolam (XANAX) 1 MG tablet Take 1 tablet (1 mg total) by mouth nightly as needed for Anxiety.  Qty: 30 tablet, Refills: 5    Associated Diagnoses: CURT (generalized anxiety disorder)      citalopram (CELEXA) 40 MG tablet Take 1 tablet (40 mg total) by mouth once daily.  Qty: 90 tablet, Refills: 3      HYDROcodone-acetaminophen (NORCO)  mg per tablet Take 1 tablet by mouth every 12 (twelve) hours as needed for Pain.  Qty: 60 tablet, Refills: 0    Comments: Quantity prescribed more than 7 day supply? Yes, quantity medically necessary, Dx M54.5  Associated Diagnoses: Chronic pain syndrome; Lumbar radiculopathy; Cervical radiculopathy      lisinopriL-hydrochlorothiazide (PRINZIDE,ZESTORETIC) 10-12.5 mg per tablet Take 1 tablet by mouth once daily.  Qty: 90 tablet, Refills: 3    Comments: .      pregabalin (LYRICA) 100 MG capsule Take 1 capsule (100 mg total) by mouth 2 (two) times daily.  Qty: 60 capsule, Refills: 5    Associated Diagnoses: S/P lumbar fusion      zolpidem (AMBIEN) 10 mg Tab TAKE ONE TABLET BY MOUTH AT BEDTIME AS NEEDED Strength: 10 mg  Qty: 30 tablet, Refills: 5    Associated Diagnoses: Insomnia, unspecified type      amLODIPine (NORVASC) 10 MG tablet Take 1 tablet (10 mg total) by mouth once daily.  Qty: 90 tablet, Refills: 3    Comments: .      fluticasone propionate (FLONASE) 50 mcg/actuation nasal spray 2 sprays (100 mcg total) by  Each Nostril route once daily.  Qty: 48 g, Refills: 3      predniSONE (DELTASONE) 5 MG tablet Take 1 tablet (5 mg total) by mouth 2 (two) times daily.  Qty: 10 tablet, Refills: 0    Associated Diagnoses: Chronic hip pain after total replacement of left hip joint                 Discharge Diagnosis: Chronic hip pain after total replacement of left hip joint [M25.552, G89.29, Z96.642]  Condition on Discharge: Stable with no complications to procedure   Diet on Discharge: Same as before.  Activity: as per instruction sheet.  Discharge to: Home with a responsible adult.  Follow up: 2-4 weeks       Please call the office at [phone] if you experience any weakness or loss of sensation, fever > 101.5, pain uncontrolled with oral medications, persistent nausea/vomiting/or diarrhea, redness or drainage from the incisions, or any other worrisome concerns. If physician on call was not reached or could not communicate with our office for any reason please go to the nearest emergency department

## 2025-05-07 NOTE — DISCHARGE INSTRUCTIONS

## 2025-05-07 NOTE — PLAN OF CARE
Pt and sister verbalized understanding of discharge instructions. Gauze dressings x 2 to left hip/groin area c/d/i. Patient stood at side of bed, walked steps with no new motor deficits. VSS on RA. Recovery care complete.

## 2025-05-07 NOTE — OP NOTE
LEFT SIDED Femoral and Obturator Sensory Branch Coolief Thermal Radiofrequency Ablation     Date of procedure 05/07/2025       Time-out taken to identify patient and procedure side prior to starting the procedure.      PROCEDURE:    1) Femoral and 2) Obturator sensory branch coolief thermal radiofrequency ablation     REASON FOR PROCEDURE: Primary osteoarthritis of both hips [M16.0]  Chronic hip pain, bilateral [M25.551, M25.552, G89.29]     PHYSICIAN: Cynthia Soares MD     ASSISTANTS: >None     MEDICATIONS INJECTED: 3mL Bupivacaine 0.5% at each site     LOCAL ANESTHETIC USED: Xylocaine 1% 5mL     SEDATION MEDICATIONS: None     ESTIMATED BLOOD LOSS: None.     COMPLICATIONS: None.     TECHNIQUE: Laying in the supine position, the patient was prepped and draped in the usual sterile fashion using ChloraPrep and fenestrated drape. US guidance was used to demarcate the femoral artery and vein and landmarks were marked on the skin. The area of incision was also determined under fluoroscopy. The femoral incision point was lateral to the femoral crease and the obturator incision was inferior to the femoral crease and lateral to the femoral vessels.  Local Xylocaine was injected by raising a wheel and going down to the periosteum using a 27-gauge hypodermic needle. The 5 inch 17-gauge introducer needle was introduced into the approximate areas of the sensory branch of the femoral nerve to the hip superior medially to the femoral head and the sensory branch of the obturator nerves to the hip at the incisura. Once os was contacted and the final needle tip position confirmed on fluoroscopy, US was applied to confirm no intravascular placement. This was followed by motor testing at each of the nerves to ensure that there was no motor involvement. After negative aspiration and no paresthesias there was injection of 2.5 mL of 2% lidocaine into each of these areas for a total volume of 5 mL of 0.25% bupivacaine. This was followed by  cooled thermal lesioning at 80 degrees celsius for 150 seconds at each site. The needles were moved slightly and this repeated a second time to increase the area treated. Then an additional 1mL 0.25% bupivacaine +20mg depomedrol was injected prior to removing the introducer needle. Needle was withdrawn and a sterile band-aid applied to the skin.     Conscious sedation provided by M.D     The patient was monitored with continuous pulse oximetry, EKG, and intermittent blood pressure monitors. The patient was hemodynamically stable throughout the entire process was responsive to voice, and breathing spontaneously. Supplemental O2 was provided at 2L/min via nasal cannula. Patient was comfortable for the duration of the procedure.     There was a total of 1 mg IV Midazolam and 100 mcg fentanyl was titrated for the procedure     Patient was then observed for hemodynamic stability in the recovery room for 30 minutes prior to discharge.

## 2025-05-12 ENCOUNTER — PATIENT MESSAGE (OUTPATIENT)
Dept: ORTHOPEDICS | Facility: CLINIC | Age: 70
End: 2025-05-12
Payer: MEDICARE

## 2025-05-12 DIAGNOSIS — M79.641 RIGHT HAND PAIN: Primary | ICD-10-CM

## 2025-05-13 ENCOUNTER — OFFICE VISIT (OUTPATIENT)
Dept: ORTHOPEDICS | Facility: CLINIC | Age: 70
End: 2025-05-13
Payer: MEDICARE

## 2025-05-13 ENCOUNTER — HOSPITAL ENCOUNTER (OUTPATIENT)
Dept: RADIOLOGY | Facility: HOSPITAL | Age: 70
Discharge: HOME | End: 2025-05-13
Attending: STUDENT IN AN ORGANIZED HEALTH CARE EDUCATION/TRAINING PROGRAM
Payer: MEDICARE

## 2025-05-13 VITALS — WEIGHT: 229.75 LBS | BODY MASS INDEX: 36.92 KG/M2 | HEIGHT: 66 IN

## 2025-05-13 DIAGNOSIS — M79.641 RIGHT HAND PAIN: ICD-10-CM

## 2025-05-13 DIAGNOSIS — M25.531 RIGHT WRIST PAIN: ICD-10-CM

## 2025-05-13 DIAGNOSIS — M25.531 RIGHT WRIST PAIN: Primary | ICD-10-CM

## 2025-05-13 DIAGNOSIS — S69.91XA INJURY OF RIGHT WRIST, INITIAL ENCOUNTER: Primary | ICD-10-CM

## 2025-05-13 PROCEDURE — 99999 PR PBB SHADOW E&M-EST. PATIENT-LVL III: CPT | Mod: PBBFAC,,, | Performed by: STUDENT IN AN ORGANIZED HEALTH CARE EDUCATION/TRAINING PROGRAM

## 2025-05-13 PROCEDURE — 73130 X-RAY EXAM OF HAND: CPT | Mod: TC,RT

## 2025-05-13 PROCEDURE — 73130 X-RAY EXAM OF HAND: CPT | Mod: 26,RT,, | Performed by: RADIOLOGY

## 2025-05-13 PROCEDURE — 73110 X-RAY EXAM OF WRIST: CPT | Mod: TC,RT

## 2025-05-13 PROCEDURE — 73110 X-RAY EXAM OF WRIST: CPT | Mod: 26,RT,, | Performed by: RADIOLOGY

## 2025-05-14 NOTE — PROGRESS NOTES
Hand Surgery Clinic Follow Up Note    Chief Complaint  Chief Complaint   Patient presents with    Right Hand - Pain, Swelling, Injury       History of Present Illness  70-year-old right-hand dominant female who is retired presents for evaluation of a right wrist injury.  This is a new issue.  Patient sustained a fall on 05/02/2025, 11 days ago.  She has never injured this wrist in the past.  Pain level is a 6/10.  She notes swelling in her hand and thumb.  She describes the pain as aching in nature.  She has no numbness or tingling.  No fevers or chills.  No other issues.  No history of diabetes.  Patient does not take blood thinners.    Review of Systems  Review of systems negative for chest pain, shortness of breath, fevers, chills, nausea/vomiting.    Vital Signs  There were no vitals filed for this visit.    Physical Exam  Constitutional: Appears well-developed and well-nourished. No distress.   HENT:   Head: Normocephalic.   Eyes: EOM are normal.   Pulmonary/Chest: Effort normal.   Neurological: Oriented to person, place, and time.   Psychiatric: Normal mood and affect.     Right Upper Extremity:  No abrasions, lacerations, wounds.  Mild-to-moderate swelling is noted throughout the right hand.  Compartments are soft and compressible throughout the hand and forearm.  Patient has no tenderness about the thumb 1st metacarpal, proximal phalanx, and distal phalanx.  No pain or laxity with radial and ulnar stress at the thumb MCP joint at 0 and 30° of flexion.  Patient has significant tenderness over the distal radius.  No tenderness over the dorsal radiocarpal articulation.  No snuffbox tenderness.  No tenderness over the TFCC.  No tenderness over the distal ulna.  Patient is able to make a fist and extend all her fingers.  Patient has pain with wrist range of motion; she actively flexes to 20° of flexion and 10° of extension.  Sensation is intact in the median, radial, ulnar nerve distributions.  Patient is able to  make a fist and extend all her fingers.  Palpable radial pulse.    Imaging  Right wrist x-rays five views and right-hand x-rays three views were obtained today and independently reviewed by myself.  There is a possible nondisplaced extra-articular fracture of the distal radius versus vascular channel.    Assessment and Plan  70-year-old female sustained a fall on 05/02/2025, 11 days ago.  She has a possible nondisplaced right distal radius fracture.  She has tenderness over the distal radius and swelling in her hand.  On x-ray, it is difficult to tell if she could have a nondisplaced fracture versus a prominent vascular channel in his area.  I have recommended an Exos wrist brace.  At least 10 minutes were spent sizing, fitting, and educating for durable medical equipment application today.  This service was performed under the direction of Luisa Gonzalez MD.  CPT 20971. Wear at all times except to shower and work on wrist range of motion 5-10 times per day.  Nonweightbearing to the right upper extremity.  Follow up in clinic in 2 weeks for re-evaluation.  Obtain repeat x-rays of the right-hand and wrist at that visit.    Luisa Gonzalez MD  Orthopaedic Hand Surgery

## 2025-05-19 ENCOUNTER — PATIENT MESSAGE (OUTPATIENT)
Dept: INTERNAL MEDICINE | Facility: CLINIC | Age: 70
End: 2025-05-19
Payer: MEDICARE

## 2025-05-19 DIAGNOSIS — M54.16 LUMBAR RADICULOPATHY: ICD-10-CM

## 2025-05-19 DIAGNOSIS — M54.12 CERVICAL RADICULOPATHY: Chronic | ICD-10-CM

## 2025-05-19 DIAGNOSIS — G89.4 CHRONIC PAIN SYNDROME: ICD-10-CM

## 2025-05-20 ENCOUNTER — PATIENT MESSAGE (OUTPATIENT)
Dept: INTERNAL MEDICINE | Facility: CLINIC | Age: 70
End: 2025-05-20
Payer: MEDICARE

## 2025-05-21 ENCOUNTER — PATIENT MESSAGE (OUTPATIENT)
Dept: INTERNAL MEDICINE | Facility: CLINIC | Age: 70
End: 2025-05-21
Payer: MEDICARE

## 2025-05-21 RX ORDER — HYDROCODONE BITARTRATE AND ACETAMINOPHEN 10; 325 MG/1; MG/1
1 TABLET ORAL EVERY 12 HOURS PRN
Qty: 60 TABLET | Refills: 0 | Status: SHIPPED | OUTPATIENT
Start: 2025-05-21

## 2025-05-26 ENCOUNTER — HOSPITAL ENCOUNTER (OUTPATIENT)
Dept: RADIOLOGY | Facility: HOSPITAL | Age: 70
Discharge: HOME | End: 2025-05-26
Attending: STUDENT IN AN ORGANIZED HEALTH CARE EDUCATION/TRAINING PROGRAM
Payer: MEDICARE

## 2025-05-26 ENCOUNTER — OFFICE VISIT (OUTPATIENT)
Dept: ORTHOPEDICS | Facility: CLINIC | Age: 70
End: 2025-05-26
Payer: MEDICARE

## 2025-05-26 DIAGNOSIS — M75.01 ADHESIVE CAPSULITIS OF RIGHT SHOULDER: ICD-10-CM

## 2025-05-26 DIAGNOSIS — M79.644 PAIN OF RIGHT THUMB: Primary | ICD-10-CM

## 2025-05-26 DIAGNOSIS — M79.641 RIGHT HAND PAIN: ICD-10-CM

## 2025-05-26 DIAGNOSIS — M25.531 RIGHT WRIST PAIN: ICD-10-CM

## 2025-05-26 PROCEDURE — 3288F FALL RISK ASSESSMENT DOCD: CPT | Mod: CPTII,S$GLB,, | Performed by: STUDENT IN AN ORGANIZED HEALTH CARE EDUCATION/TRAINING PROGRAM

## 2025-05-26 PROCEDURE — 4010F ACE/ARB THERAPY RXD/TAKEN: CPT | Mod: CPTII,S$GLB,, | Performed by: STUDENT IN AN ORGANIZED HEALTH CARE EDUCATION/TRAINING PROGRAM

## 2025-05-26 PROCEDURE — 73130 X-RAY EXAM OF HAND: CPT | Mod: 26,RT,, | Performed by: RADIOLOGY

## 2025-05-26 PROCEDURE — 73110 X-RAY EXAM OF WRIST: CPT | Mod: 26,RT,, | Performed by: RADIOLOGY

## 2025-05-26 PROCEDURE — 99214 OFFICE O/P EST MOD 30 MIN: CPT | Mod: 25,S$GLB,, | Performed by: STUDENT IN AN ORGANIZED HEALTH CARE EDUCATION/TRAINING PROGRAM

## 2025-05-26 PROCEDURE — 1159F MED LIST DOCD IN RCRD: CPT | Mod: CPTII,S$GLB,, | Performed by: STUDENT IN AN ORGANIZED HEALTH CARE EDUCATION/TRAINING PROGRAM

## 2025-05-26 PROCEDURE — 1160F RVW MEDS BY RX/DR IN RCRD: CPT | Mod: CPTII,S$GLB,, | Performed by: STUDENT IN AN ORGANIZED HEALTH CARE EDUCATION/TRAINING PROGRAM

## 2025-05-26 PROCEDURE — 73130 X-RAY EXAM OF HAND: CPT | Mod: TC,RT

## 2025-05-26 PROCEDURE — 99999 PR PBB SHADOW E&M-EST. PATIENT-LVL III: CPT | Mod: PBBFAC,,, | Performed by: STUDENT IN AN ORGANIZED HEALTH CARE EDUCATION/TRAINING PROGRAM

## 2025-05-26 PROCEDURE — 1101F PT FALLS ASSESS-DOCD LE1/YR: CPT | Mod: CPTII,S$GLB,, | Performed by: STUDENT IN AN ORGANIZED HEALTH CARE EDUCATION/TRAINING PROGRAM

## 2025-05-26 PROCEDURE — 73110 X-RAY EXAM OF WRIST: CPT | Mod: TC,RT

## 2025-05-26 PROCEDURE — 20610 DRAIN/INJ JOINT/BURSA W/O US: CPT | Mod: RT,S$GLB,, | Performed by: STUDENT IN AN ORGANIZED HEALTH CARE EDUCATION/TRAINING PROGRAM

## 2025-05-26 RX ORDER — MELOXICAM 7.5 MG/1
7.5 TABLET ORAL DAILY
Qty: 30 TABLET | Refills: 2 | Status: SHIPPED | OUTPATIENT
Start: 2025-05-26

## 2025-05-26 RX ADMIN — TRIAMCINOLONE ACETONIDE 40 MG: 40 INJECTION, SUSPENSION INTRA-ARTICULAR; INTRAMUSCULAR at 11:05

## 2025-05-28 DIAGNOSIS — M25.531 RIGHT WRIST PAIN: ICD-10-CM

## 2025-05-28 DIAGNOSIS — M79.644 FINGER PAIN, RIGHT: Primary | ICD-10-CM

## 2025-05-28 RX ORDER — TRIAMCINOLONE ACETONIDE 40 MG/ML
40 INJECTION, SUSPENSION INTRA-ARTICULAR; INTRAMUSCULAR
Status: DISCONTINUED | OUTPATIENT
Start: 2025-05-26 | End: 2025-05-28 | Stop reason: HOSPADM

## 2025-05-28 NOTE — PROGRESS NOTES
Hand Surgery Clinic Note    Chief Complaint  Chief Complaint   Patient presents with    Right Hand - Pain, Swelling    Right Wrist - Pain       History of Present Illness  70-year-old right-hand dominant female presents for follow up evaluation.  She sustained a fall on 05/02/2025, 3 weeks and 3 days ago.  When she was last seen in clinic, she had significant pain over her wrist.  That has concern that she may have a nondisplaced distal radius fracture so she was treated with an Exos wrist brace.  She states that the wrist pain has resolved.  She now has pain and swelling about the thumb.  She describes the pain as aching in nature.    She also has recurrence of pain in her right shoulder.  In the past, she was seen and treated for adhesive capsulitis.  She has been doing well up until the last few weeks.    Review of Systems  Review of systems negative for chest pain, shortness of breath, fevers, chills, nausea/vomiting.    Past Medical History  Past Medical History:   Diagnosis Date    Breast cancer 1996    right    Chronic pain syndrome     Generalized osteoarthrosis, involving multiple sites     s/p THR bilateral    History of breast cancer 2007    lumpectomy/XRT    HTN (hypertension)     Hyperlipidemia     Morbid obesity with BMI of 40.0-44.9, adult        Past Surgical History  Past Surgical History:   Procedure Laterality Date    ANTERIOR CERVICAL DISCECTOMY W/ FUSION Left 01/03/2022    Procedure: DISCECTOMY, SPINE, CERVICAL, ANTERIOR APPROACH, WITH FUSION;  Surgeon: Pradip Fernando MD;  Location: [facility] OR;  Service: Neurosurgery;  Laterality: Left;  Three Level ACDF  C4/5, 5/6, 6/7      BREAST LUMPECTOMY Right 2006    XRT    CATARACT EXTRACTION W/  INTRAOCULAR LENS IMPLANT Left 01/15/2020    CATARACT EXTRACTION W/  INTRAOCULAR LENS IMPLANT Right 01/29/2020    COLONOSCOPY N/A 10/15/2015    Procedure: COLONOSCOPY;  Surgeon: Maylin Shipley MD;  Location: [facility] ENDO;  Service: Endoscopy;  Laterality: N/A;     COLONOSCOPY N/A 11/30/2022    Procedure: COLONOSCOPY;  Surgeon: Gary Toussaint MD;  Location: [facility] ENDO;  Service: General;  Laterality: N/A;    EPIDURAL STEROID INJECTION N/A 11/03/2020    Procedure: Lumbar L5/S1 IL KATYA;  Surgeon: Paul Lobato MD;  Location: [facility] PAIN MGT;  Service: Pain Management;  Laterality: N/A;    EPIDURAL STEROID INJECTION INTO CERVICAL SPINE N/A 09/25/2020    Procedure: C7/T1 IL KATYA;  Surgeon: Paul Lobato MD;  Location: [facility] PAIN MGT;  Service: Pain Management;  Laterality: N/A;    EPIDURAL STEROID INJECTION INTO CERVICAL SPINE N/A 10/05/2021    Procedure: C7/T1 IL KATYA with RN IV sedation;  Surgeon: Cynthia Soares MD;  Location: [facility] PAIN MGT;  Service: Pain Management;  Laterality: N/A;    ESOPHAGOGASTRODUODENOSCOPY N/A 11/04/2021    Procedure: ESOPHAGOGASTRODUODENOSCOPY (EGD);  Surgeon: Blas Hampton MD;  Location: [facility];  Service: Endoscopy;  Laterality: N/A;    HYSTERECTOMY      INJECTION OF ANESTHETIC AGENT AROUND MEDIAL BRANCH NERVES INNERVATING CERVICAL FACET JOINT Bilateral 9/20/2024    Procedure: Bilateral Cervical C4-5-6 MBB with RN Sedation;  Surgeon: Cynthia Soares MD;  Location: [facility] PAIN MGT;  Service: Pain Management;  Laterality: Bilateral;    INJECTION OF ANESTHETIC AGENT AROUND NERVE Left 3/19/2025    Procedure: Left side Femoral and obturator nerve block;  Surgeon: Cynthia Soares MD;  Location: [facility] PAIN MGT;  Service: Pain Management;  Laterality: Left;    LAPAROSCOPIC LYSIS OF ADHESIONS N/A 08/30/2022    Procedure: LYSIS, ADHESIONS, LAPAROSCOPIC;  Surgeon: Blas Hampton MD;  Location: [facility] OR;  Service: General;  Laterality: N/A;    PLACEMENT OF ACELLULAR HUMAN DERMAL ALLOGRAFT Left 01/03/2022    Procedure: APPLICATION, ACELLULAR HUMAN DERMAL ALLOGRAFT;  Surgeon: Pradip Fernando MD;  Location: [facility] OR;  Service: Neurosurgery;  Laterality: Left;    PLACEMENT OF ACELLULAR HUMAN DERMAL ALLOGRAFT N/A 02/01/2023    Procedure: APPLICATION, ACELLULAR  HUMAN DERMAL ALLOGRAFT;  Surgeon: Pradip Fernando MD;  Location: [facility] OR;  Service: Neurosurgery;  Laterality: N/A;    RADIOFREQUENCY ABLATION, NERVE, PERIPHERAL Left 5/7/2025    Procedure: left femoral and obturator nerve radiofrequency ablation (Cooled hip RFA);  Surgeon: Cynthia Soares MD;  Location: [facility] PAIN MGT;  Service: Pain Management;  Laterality: Left;    ROBOT-ASSISTED LAPAROSCOPIC SLEEVE GASTRECTOMY USING DA FLORENTINO XI N/A 08/30/2022    Procedure: XI ROBOTIC SLEEVE GASTRECTOMY;  Surgeon: Blas Hampton MD;  Location: [facility] OR;  Service: General;  Laterality: N/A;    TRANSFORAMINAL EPIDURAL INJECTION OF STEROID Left 09/17/2019    Procedure: Left C5/6 TF KATYA w/ RN IV sedation;  Surgeon: Paul Lobato MD;  Location: [facility] PAIN MGT;  Service: Pain Management;  Laterality: Left;    TRANSFORAMINAL LUMBAR INTERBODY FUSION (TLIF) USING COMPUTER-ASSISTED NAVIGATION Bilateral 02/01/2023    Procedure: FUSION, SPINE, LUMBAR, TLIF, USING COMPUTER-ASSISTED NAVIGATION;  Surgeon: Pradip Fernando MD;  Location: [facility] OR;  Service: Neurosurgery;  Laterality: Bilateral;  TLIF L3-4/4-5 possibly L5-S1       Allergies  Review of patient's allergies indicates:  No Known Allergies    Family History  Family History   Problem Relation Name Age of Onset    Cancer Mother      Diabetes Mother      Hyperlipidemia Father      Hypertension Father      Breast cancer Sister      Breast cancer Sister      Breast cancer Sister      Breast cancer Sister      Eczema Neg Hx      Lupus Neg Hx      Psoriasis Neg Hx      Melanoma Neg Hx         Social History  Social History[1]    Vital Signs  There were no vitals filed for this visit.    Physical Exam  Constitutional: Appears well-developed and well-nourished. No distress.   HENT:   Head: Normocephalic.   Eyes: EOM are normal.   Pulmonary/Chest: Effort normal.   Neurological: Oriented to person, place, and time.   Psychiatric: Normal mood and affect.     Right Upper Extremity:  No abrasions,  lacerations, wounds.  No swelling.  No erythema.  Shoulder abduction to 100°.  Shoulder flexion to 100°.  Internal rotation to L5.  External rotation to 20°.  Positive Neer.  Positive Mendez.  No tenderness over the AC joint.  No pain with cross-body adduction test.  Pain with Apdmini's testing but 5/5 strength.  5/5 external rotation strength but with associated pain.  5/5 internal rotation strength but with associated pain.      Mild-to-moderate swelling is noted generalized throughout the thumb.  Patient has tenderness over the thumb IP joint.  No tenderness over the MCP joint.  No pain or laxity with stress of the thumb MCP ulnar and radial collateral ligaments.  Thumb IP motion is 0-40 degrees with the associated pain.  No tenderness over the distal radius.  Full active wrist flexion and extension.  Full pronation and supination.  No snuffbox tenderness.  No tenderness over the TFCC.  Patient is able to make a fist and extend all her fingers.  Sensation is intact in the median, radial, and ulnar nerve distributions.  Palpable radial pulse.    Imaging  Right-hand x-rays three views and right wrist x-rays five views were obtained today and independently reviewed by myself.  Moderate arthritic changes are noted at the thumb IP joint with associated osteophyte formation.  Additionally, arthritic changes are noted at the thumb MCP joint with an associated volar subluxation as well as subchondral cyst formation in the 1st metacarpal head.  No distal radius fracture is visualized on today's x-rays.    Assessment and Plan  70-year-old female presents for follow up.  She sustained a fall on 05/02/2025, 3 weeks and 3 days ago.  She is placed in an Exos wrist brace at our last visit due to concern she may have a distal radius fracture as she was tender here.  This pain has not resolved.  She now has pain at the thumb IP joint.  I discussed that this could either be due to a thumb sprain versus underlying arthritis which is  already present.  I have recommended meloxicam for pain control.  I sent a script to patient's pharmacy: Take with food.  Additionally, I recommended occupational therapy.  Lastly patient was fitted for a Modabber brace with thumb stay. At least 10 minutes were spent sizing, fitting, and educating for durable medical equipment application today.  This service was performed under the direction of Luisa Gonzalez MD.  CPT 19182.    Patient presents again with right shoulder pain.  She has been diagnosed with adhesive capsulitis in the past.  I discussed treatment options for this.  She elected to proceed with repeat steroid injection.  Most recent steroid injection was in July of 2024, 10 months ago.  She tolerated procedure well.  See procedure note.  Discussed that she should give the injection 2 weeks to work.      Follow up in clinic in 4 weeks for re-evaluation with x-rays of the right thumb and wrist.    Luisa Gonzalez MD  Orthopaedic Hand Surgery         [1]   Social History  Socioeconomic History    Marital status: Single   Occupational History    Occupation: Disabled   Tobacco Use    Smoking status: Former     Types: Cigarettes    Smokeless tobacco: Never   Substance and Sexual Activity    Alcohol use: Never    Drug use: No    Sexual activity: Never     Social Drivers of Health     Food Insecurity: No Food Insecurity (11/18/2024)    Received from [facility]    Hunger Vital Sign     Worried About Running Out of Food in the Last Year: Never true     Ran Out of Food in the Last Year: Never true

## 2025-05-28 NOTE — PROCEDURES
Large Joint Aspiration/Injection: R subacromial bursa    Date/Time: 5/26/2025 11:00 AM    Performed by: Luisa Gonzalez MD  Authorized by: Luisa Gonzalez MD    Consent Done?:  Yes (Verbal)  Indications:  Pain  Timeout: prior to procedure the correct patient, procedure, and site was verified      Local anesthesia used?: Yes    Anesthesia:  Local infiltration  Local anesthetic:  Lidocaine 1% without epinephrine  Anesthetic total (ml):  4      Details:  Needle Size:  22 G  Ultrasonic Guidance for needle placement?: No    Approach:  Posterior  Location:  Shoulder  Site:  R subacromial bursa  Medications:  40 mg triamcinolone acetonide 40 mg/mL  Patient tolerance:  Patient tolerated the procedure well with no immediate complications

## 2025-06-11 ENCOUNTER — PATIENT MESSAGE (OUTPATIENT)
Dept: ORTHOPEDICS | Facility: CLINIC | Age: 70
End: 2025-06-11
Payer: MEDICARE

## 2025-06-11 ENCOUNTER — TELEPHONE (OUTPATIENT)
Dept: PAIN MEDICINE | Facility: CLINIC | Age: 70
End: 2025-06-11
Payer: MEDICARE

## 2025-06-12 ENCOUNTER — OFFICE VISIT (OUTPATIENT)
Dept: PAIN MEDICINE | Facility: CLINIC | Age: 70
End: 2025-06-12
Payer: MEDICARE

## 2025-06-12 ENCOUNTER — PATIENT MESSAGE (OUTPATIENT)
Dept: INTERNAL MEDICINE | Facility: CLINIC | Age: 70
End: 2025-06-12

## 2025-06-12 ENCOUNTER — OFFICE VISIT (OUTPATIENT)
Dept: INTERNAL MEDICINE | Facility: CLINIC | Age: 70
End: 2025-06-12
Payer: MEDICARE

## 2025-06-12 VITALS
DIASTOLIC BLOOD PRESSURE: 78 MMHG | OXYGEN SATURATION: 98 % | SYSTOLIC BLOOD PRESSURE: 136 MMHG | BODY MASS INDEX: 36.88 KG/M2 | WEIGHT: 229.5 LBS | HEART RATE: 66 BPM | HEIGHT: 66 IN | TEMPERATURE: 97 F

## 2025-06-12 DIAGNOSIS — Z96.642 CHRONIC HIP PAIN AFTER TOTAL REPLACEMENT OF LEFT HIP JOINT: Primary | ICD-10-CM

## 2025-06-12 DIAGNOSIS — F33.1 MODERATE EPISODE OF RECURRENT MAJOR DEPRESSIVE DISORDER: ICD-10-CM

## 2025-06-12 DIAGNOSIS — G89.4 CHRONIC PAIN SYNDROME: ICD-10-CM

## 2025-06-12 DIAGNOSIS — M54.12 CERVICAL RADICULOPATHY: Chronic | ICD-10-CM

## 2025-06-12 DIAGNOSIS — M25.552 CHRONIC HIP PAIN AFTER TOTAL REPLACEMENT OF LEFT HIP JOINT: Primary | ICD-10-CM

## 2025-06-12 DIAGNOSIS — I10 PRIMARY HYPERTENSION: ICD-10-CM

## 2025-06-12 DIAGNOSIS — E66.01 SEVERE OBESITY (BMI 35.0-39.9) WITH COMORBIDITY: ICD-10-CM

## 2025-06-12 DIAGNOSIS — F41.1 GAD (GENERALIZED ANXIETY DISORDER): ICD-10-CM

## 2025-06-12 DIAGNOSIS — M54.16 LUMBAR RADICULOPATHY: Primary | ICD-10-CM

## 2025-06-12 DIAGNOSIS — M54.16 LUMBAR RADICULOPATHY: ICD-10-CM

## 2025-06-12 DIAGNOSIS — G89.29 CHRONIC HIP PAIN AFTER TOTAL REPLACEMENT OF LEFT HIP JOINT: Primary | ICD-10-CM

## 2025-06-12 DIAGNOSIS — M47.812 CERVICAL SPONDYLOSIS: ICD-10-CM

## 2025-06-12 PROCEDURE — 99999 PR PBB SHADOW E&M-EST. PATIENT-LVL III: CPT | Mod: PBBFAC,,,

## 2025-06-12 PROCEDURE — 4010F ACE/ARB THERAPY RXD/TAKEN: CPT | Mod: CPTII,95,, | Performed by: PHYSICIAN ASSISTANT

## 2025-06-12 PROCEDURE — 1101F PT FALLS ASSESS-DOCD LE1/YR: CPT | Mod: CPTII,S$GLB,,

## 2025-06-12 PROCEDURE — G2211 COMPLEX E/M VISIT ADD ON: HCPCS | Mod: S$GLB,,,

## 2025-06-12 PROCEDURE — 3008F BODY MASS INDEX DOCD: CPT | Mod: CPTII,S$GLB,,

## 2025-06-12 PROCEDURE — 3288F FALL RISK ASSESSMENT DOCD: CPT | Mod: CPTII,S$GLB,,

## 2025-06-12 PROCEDURE — 99214 OFFICE O/P EST MOD 30 MIN: CPT | Mod: S$GLB,,,

## 2025-06-12 PROCEDURE — 4010F ACE/ARB THERAPY RXD/TAKEN: CPT | Mod: CPTII,S$GLB,,

## 2025-06-12 PROCEDURE — 1126F AMNT PAIN NOTED NONE PRSNT: CPT | Mod: CPTII,S$GLB,,

## 2025-06-12 PROCEDURE — 3075F SYST BP GE 130 - 139MM HG: CPT | Mod: CPTII,S$GLB,,

## 2025-06-12 PROCEDURE — 3078F DIAST BP <80 MM HG: CPT | Mod: CPTII,S$GLB,,

## 2025-06-12 PROCEDURE — 1159F MED LIST DOCD IN RCRD: CPT | Mod: CPTII,S$GLB,,

## 2025-06-12 PROCEDURE — 98004 SYNCH AUDIO-VIDEO EST SF 10: CPT | Mod: 95,,, | Performed by: PHYSICIAN ASSISTANT

## 2025-06-12 RX ORDER — HYDROCODONE BITARTRATE AND ACETAMINOPHEN 10; 325 MG/1; MG/1
1 TABLET ORAL EVERY 12 HOURS PRN
Qty: 60 TABLET | Refills: 0 | Status: SHIPPED | OUTPATIENT
Start: 2025-08-11

## 2025-06-12 RX ORDER — LISINOPRIL AND HYDROCHLOROTHIAZIDE 10; 12.5 MG/1; MG/1
2 TABLET ORAL DAILY
Qty: 180 TABLET | Refills: 3 | Status: SHIPPED | OUTPATIENT
Start: 2025-06-12 | End: 2025-06-12 | Stop reason: SDUPTHER

## 2025-06-12 RX ORDER — LISINOPRIL AND HYDROCHLOROTHIAZIDE 10; 12.5 MG/1; MG/1
2 TABLET ORAL DAILY
Qty: 180 TABLET | Refills: 3 | Status: SHIPPED | OUTPATIENT
Start: 2025-06-12 | End: 2026-06-12

## 2025-06-12 RX ORDER — HYDROCODONE BITARTRATE AND ACETAMINOPHEN 10; 325 MG/1; MG/1
1 TABLET ORAL EVERY 12 HOURS PRN
Qty: 60 TABLET | Refills: 0 | Status: SHIPPED | OUTPATIENT
Start: 2025-06-12

## 2025-06-12 RX ORDER — AMLODIPINE BESYLATE 5 MG/1
5 TABLET ORAL DAILY
COMMUNITY
Start: 2025-03-17 | End: 2025-06-12

## 2025-06-12 RX ORDER — HYDROCODONE BITARTRATE AND ACETAMINOPHEN 10; 325 MG/1; MG/1
1 TABLET ORAL EVERY 12 HOURS PRN
Qty: 60 TABLET | Refills: 0 | Status: SHIPPED | OUTPATIENT
Start: 2025-07-11

## 2025-06-12 NOTE — PROGRESS NOTES
Patient ID: Mary Vo is a 70 y.o. female.    Chief Complaint: Establish Care    History of Present Illness    CHIEF COMPLAINT:  - Ms. Vo presents for a routine three-month follow-up visit and medication refills, particularly for narcotics.    HPI:  Ms. Vo reports no recent falls. She has received injections for pain management in her leg since her last visit. She broke her wrist and finger in the interim period, but currently reports no pain. She has daytime lightheadedness, which her daughter attributes to the nighttime nerve pain medication (pregabalin). This drowsiness occurs around 8-9 AM, despite taking the medication at 9 PM the previous night. She is concerned that this medication might be causing her to lose control while driving. In response, she has been attempting to discontinue the medication and has not taken it for a few nights.    Regarding blood pressure, she mentions it occasionally rises to around 142. She reports that amlodipine, a previous blood pressure medication, caused swelling, so she discontinued it independently. She expresses a desire to avoid medications that cause bloating or swelling.    She denies having any current pain.      ROS:  General: -fever, -chills, -fatigue, -weight gain, -weight loss  Eyes: -vision changes, -redness, -discharge  ENT: -ear pain, -nasal congestion, -sore throat  Cardiovascular: -chest pain, -palpitations, -lower extremity edema  Respiratory: -cough, -shortness of breath  Gastrointestinal: -abdominal pain, -nausea, -vomiting, -diarrhea, -constipation, -blood in stool  Genitourinary: -dysuria, -hematuria, -frequency  Musculoskeletal: -joint pain, -muscle pain  Skin: -rash, -lesion  Neurological: -headache, +dizziness, -numbness, -tingling  Psychiatric: -anxiety, -depression, -sleep difficulty         Pmh, Psh, Family Hx, Social Hx updated in Epic Tabs today.         3/20/2025     8:29 AM 1/8/2025     1:20 PM 4/10/2024     3:15 PM 1/10/2024      2:10 PM 7/11/2023    10:42 AM 4/3/2023    10:49 AM 7/12/2021     8:05 AM   Depression Patient Health Questionnaire   Over the last two weeks how often have you been bothered by little interest or pleasure in doing things Not at all Not at all Not at all Not at all Not at all Not at all  Not at all    Over the last two weeks how often have you been bothered by feeling down, depressed or hopeless Not at all Not at all Not at all Not at all Not at all Not at all Not at all    PHQ-2 Total Score 0 0 0 0 0 0 0       Data saved with a previous flowsheet row definition       Active Problem List with Overview Notes    Diagnosis Date Noted    Severe obesity (BMI 35.0-39.9) with comorbidity 06/12/2025    Hip osteoarthritis 05/07/2025    Chronic hip pain after total replacement of left hip joint 03/19/2025    Moderate episode of recurrent major depressive disorder 04/10/2024    Right shoulder pain 05/23/2023    Calcific tendinitis of right shoulder 05/23/2023    Impingement of right shoulder 05/23/2023    Cervical spondylosis 10/05/2021    Class 1 obesity due to excess calories with serious comorbidity and body mass index (BMI) of 32.0 to 32.9 in adult     Cervical radiculopathy 09/17/2019    Chronic pain syndrome     Lumbar radiculopathy 11/06/2018    Myofascial pain 09/13/2018    Bilateral carpal tunnel syndrome 09/13/2018     mild demyelinating on left, mild-moderate on right      Hyperlipidemia     HTN (hypertension)     Generalized osteoarthrosis, involving multiple sites      s/p THR bilateral      History of breast cancer      lumpectomy/XRT         Past Medical History:   Diagnosis Date    Breast cancer 1996    right    Chronic pain syndrome     Generalized osteoarthrosis, involving multiple sites     s/p THR bilateral    History of breast cancer 2007    lumpectomy/XRT    HTN (hypertension)     Hyperlipidemia     Morbid obesity with BMI of 40.0-44.9, adult        Past Surgical History:   Procedure Laterality Date     ANTERIOR CERVICAL DISCECTOMY W/ FUSION Left 01/03/2022    Procedure: DISCECTOMY, SPINE, CERVICAL, ANTERIOR APPROACH, WITH FUSION;  Surgeon: Pradip Fernando MD;  Location: [facility] OR;  Service: Neurosurgery;  Laterality: Left;  Three Level ACDF  C4/5, 5/6, 6/7      BREAST LUMPECTOMY Right 2006    XRT    CATARACT EXTRACTION W/  INTRAOCULAR LENS IMPLANT Left 01/15/2020    CATARACT EXTRACTION W/  INTRAOCULAR LENS IMPLANT Right 01/29/2020    COLONOSCOPY N/A 10/15/2015    Procedure: COLONOSCOPY;  Surgeon: Maylin Shipley MD;  Location: [facility] ENDO;  Service: Endoscopy;  Laterality: N/A;    COLONOSCOPY N/A 11/30/2022    Procedure: COLONOSCOPY;  Surgeon: Gary Toussaint MD;  Location: [facility] ENDO;  Service: General;  Laterality: N/A;    EPIDURAL STEROID INJECTION N/A 11/03/2020    Procedure: Lumbar L5/S1 IL KATYA;  Surgeon: Paul Lobato MD;  Location: [facility] PAIN MGT;  Service: Pain Management;  Laterality: N/A;    EPIDURAL STEROID INJECTION INTO CERVICAL SPINE N/A 09/25/2020    Procedure: C7/T1 IL KATYA;  Surgeon: Paul Lobato MD;  Location: [facility] PAIN MGT;  Service: Pain Management;  Laterality: N/A;    EPIDURAL STEROID INJECTION INTO CERVICAL SPINE N/A 10/05/2021    Procedure: C7/T1 IL KATYA with RN IV sedation;  Surgeon: Cynthia Soares MD;  Location: [facility] PAIN MGT;  Service: Pain Management;  Laterality: N/A;    ESOPHAGOGASTRODUODENOSCOPY N/A 11/04/2021    Procedure: ESOPHAGOGASTRODUODENOSCOPY (EGD);  Surgeon: Blas Hampton MD;  Location: [facility] ENDO;  Service: Endoscopy;  Laterality: N/A;    HYSTERECTOMY      INJECTION OF ANESTHETIC AGENT AROUND MEDIAL BRANCH NERVES INNERVATING CERVICAL FACET JOINT Bilateral 9/20/2024    Procedure: Bilateral Cervical C4-5-6 MBB with RN Sedation;  Surgeon: Cynthia Soares MD;  Location: [facility] PAIN MGT;  Service: Pain Management;  Laterality: Bilateral;    INJECTION OF ANESTHETIC AGENT AROUND NERVE Left 3/19/2025    Procedure: Left side Femoral and obturator nerve block;  Surgeon: Rodger  Cynthia ASHER MD;  Location: [facility] PAIN MGT;  Service: Pain Management;  Laterality: Left;    LAPAROSCOPIC LYSIS OF ADHESIONS N/A 08/30/2022    Procedure: LYSIS, ADHESIONS, LAPAROSCOPIC;  Surgeon: Blas Hampton MD;  Location: [facility] OR;  Service: General;  Laterality: N/A;    PLACEMENT OF ACELLULAR HUMAN DERMAL ALLOGRAFT Left 01/03/2022    Procedure: APPLICATION, ACELLULAR HUMAN DERMAL ALLOGRAFT;  Surgeon: Pradip Fernando MD;  Location: [facility] OR;  Service: Neurosurgery;  Laterality: Left;    PLACEMENT OF ACELLULAR HUMAN DERMAL ALLOGRAFT N/A 02/01/2023    Procedure: APPLICATION, ACELLULAR HUMAN DERMAL ALLOGRAFT;  Surgeon: Pradip Fernando MD;  Location: [facility] OR;  Service: Neurosurgery;  Laterality: N/A;    RADIOFREQUENCY ABLATION, NERVE, PERIPHERAL Left 5/7/2025    Procedure: left femoral and obturator nerve radiofrequency ablation (Cooled hip RFA);  Surgeon: Cynthia Soares MD;  Location: [facility] PAIN MGT;  Service: Pain Management;  Laterality: Left;    ROBOT-ASSISTED LAPAROSCOPIC SLEEVE GASTRECTOMY USING DA FLORENTINO XI N/A 08/30/2022    Procedure: XI ROBOTIC SLEEVE GASTRECTOMY;  Surgeon: Blas Hampton MD;  Location: [facility] OR;  Service: General;  Laterality: N/A;    TRANSFORAMINAL EPIDURAL INJECTION OF STEROID Left 09/17/2019    Procedure: Left C5/6 TF KATYA w/ RN IV sedation;  Surgeon: Paul Lobtao MD;  Location: [facility] PAIN MGT;  Service: Pain Management;  Laterality: Left;    TRANSFORAMINAL LUMBAR INTERBODY FUSION (TLIF) USING COMPUTER-ASSISTED NAVIGATION Bilateral 02/01/2023    Procedure: FUSION, SPINE, LUMBAR, TLIF, USING COMPUTER-ASSISTED NAVIGATION;  Surgeon: Pradip Fernando MD;  Location: [facility] OR;  Service: Neurosurgery;  Laterality: Bilateral;  TLIF L3-4/4-5 possibly L5-S1       Family History   Problem Relation Name Age of Onset    Cancer Mother      Diabetes Mother      Hyperlipidemia Father      Hypertension Father      Breast cancer Sister      Breast cancer Sister      Breast cancer Sister      Breast cancer  Sister      Eczema Neg Hx      Lupus Neg Hx      Psoriasis Neg Hx      Melanoma Neg Hx         Social History     Socioeconomic History    Marital status: Single   Occupational History    Occupation: Disabled   Tobacco Use    Smoking status: Former     Types: Cigarettes    Smokeless tobacco: Never   Substance and Sexual Activity    Alcohol use: Never    Drug use: No    Sexual activity: Never     Social Drivers of Health     Food Insecurity: No Food Insecurity (11/18/2024)    Received from [facility] and [facility] Subsidiaries and Affiliates    Hunger Vital Sign     Worried About Running Out of Food in the Last Year: Never true     Ran Out of Food in the Last Year: Never true       Medications Ordered Prior to Encounter[1]    Review of patient's allergies indicates:  No Known Allergies    General - Well developed, alert and oriented in NAD  HEENT - normocephalic, no evidence of trauma, sclera white, EOMI  Neck - full range of motion  COR - regular rate and rhythm without murmurs or gallops  Lungs - Clear  Abdomen - soft, non-tender  Ext - no cyanosis or edema     Assessment:     1. Lumbar radiculopathy    2. Chronic pain syndrome    3. Cervical radiculopathy    4. CURT (generalized anxiety disorder)    5. Primary hypertension    6. Severe obesity (BMI 35.0-39.9) with comorbidity    7. Moderate episode of recurrent major depressive disorder        Pertinent Labs:    Chemistry        Component Value Date/Time     03/20/2025 0810    K 3.7 03/20/2025 0810     03/20/2025 0810    CO2 26 03/20/2025 0810    BUN 15 03/20/2025 0810    CREATININE 0.8 03/20/2025 0810     03/20/2025 0810        Component Value Date/Time    CALCIUM 10.0 03/20/2025 0810    ALKPHOS 99 03/20/2025 0810    AST 18 03/20/2025 0810    ALT 15 03/20/2025 0810    BILITOT 0.5 03/20/2025 0810    ESTGFRAFRICA >60.0 12/15/2021 1035    ESTGFRAFRICA >60.0 12/15/2021 1035    EGFRNONAA >60.0 12/15/2021 1035     "EGFRNONAA >60.0 12/15/2021 1035          Lab Results   Component Value Date    WBC 5.72 07/03/2023    HGB 13.8 07/03/2023    HCT 44.1 07/03/2023    MCV 92 07/03/2023    MCH 28.9 07/03/2023    MCHC 31.3 (L) 07/03/2023    RDW 15.8 (H) 07/03/2023     07/03/2023    MPV 12.1 07/03/2023       Lab Results   Component Value Date    HGBA1C 5.5 07/03/2023    HGBA1C 5.1 03/15/2023    HGBA1C 5.4 12/14/2022     03/20/2025     Lab Results   Component Value Date    LDLCALC 131.2 03/20/2025     Lab Results   Component Value Date    TSH 0.828 03/20/2025     Lab Results   Component Value Date    CHOL 215 (H) 03/20/2025    CHOL 183 10/04/2024    CHOL 183 01/05/2024     Lab Results   Component Value Date    TRIG 94 03/20/2025    TRIG 93 10/04/2024    TRIG 60 01/05/2024     Lab Results   Component Value Date    HDL 65 03/20/2025    HDL 49 10/04/2024    HDL 56 01/05/2024     Lab Results   Component Value Date    LDLCALC 131.2 03/20/2025    LDLCALC 115.4 10/04/2024    LDLCALC 115.0 01/05/2024     No results found for: "NONHDLC"  Lab Results   Component Value Date    CHOLHDL 30.2 03/20/2025    CHOLHDL 26.8 10/04/2024    CHOLHDL 30.6 01/05/2024       The 10-year ASCVD risk score (Paul CHIU, et al., 2019) is: 13.7%    Values used to calculate the score:      Age: 70 years      Sex: Female      Is Non- : Yes      Diabetic: No      Tobacco smoker: No      Systolic Blood Pressure: 136 mmHg      Is BP treated: Yes      HDL Cholesterol: 65 mg/dL      Total Cholesterol: 215 mg/dL    Plan:     Assessment & Plan    Evaluated report of daytime drowsiness potentially related to pregabalin use at night; supported decision to discontinue this medication.  Reviewed lab results, which were WNL.    ESSENTIAL HYPERTENSION:  - Monitored blood pressure which occasionally rises to 142.  - Goal is to maintain BP less than 140 most of the time.  - Increased lisinopril from 10 mg to 20 mg daily (as lisinopril/HCTZ 20/25 mg) " for better control.  - Instructed patient to take 2 tablets until refill.    CHRONIC PAIN:  - Ms. Vo reports no current leg pain after receiving injections from pain management.  - Discussed pregabalin's sedative effects which can persist into daytime and potentially affect driving ability.  - Provided instructions on weaning off this nerve pain medication.    HISTORY OF TRAUMATIC FRACTURE:  - Ms. oV reports history of broken wrist and finger after a fall.    LONG-TERM USE OF OPIATE ANALGESIC:  - Continued Norco as previously prescribed.  - Will manage narcotic medication refills every 3 months.    GENERAL ASSESSMENT:  - Blood work appears normal.  - Assessed patient's overall condition through review of lab results and medical history.         1. Lumbar radiculopathy  - HYDROcodone-acetaminophen (NORCO)  mg per tablet; Take 1 tablet by mouth every 12 (twelve) hours as needed for Pain.  Dispense: 60 tablet; Refill: 0  - HYDROcodone-acetaminophen (NORCO)  mg per tablet; Take 1 tablet by mouth every 12 (twelve) hours as needed for Pain.  Dispense: 60 tablet; Refill: 0  - HYDROcodone-acetaminophen (NORCO)  mg per tablet; Take 1 tablet by mouth every 12 (twelve) hours as needed for Pain.  Dispense: 60 tablet; Refill: 0    2. Chronic pain syndrome  - HYDROcodone-acetaminophen (NORCO)  mg per tablet; Take 1 tablet by mouth every 12 (twelve) hours as needed for Pain.  Dispense: 60 tablet; Refill: 0  - HYDROcodone-acetaminophen (NORCO)  mg per tablet; Take 1 tablet by mouth every 12 (twelve) hours as needed for Pain.  Dispense: 60 tablet; Refill: 0  - HYDROcodone-acetaminophen (NORCO)  mg per tablet; Take 1 tablet by mouth every 12 (twelve) hours as needed for Pain.  Dispense: 60 tablet; Refill: 0    3. Cervical radiculopathy  - HYDROcodone-acetaminophen (NORCO)  mg per tablet; Take 1 tablet by mouth every 12 (twelve) hours as needed for Pain.  Dispense: 60 tablet; Refill:  0  - HYDROcodone-acetaminophen (NORCO)  mg per tablet; Take 1 tablet by mouth every 12 (twelve) hours as needed for Pain.  Dispense: 60 tablet; Refill: 0  - HYDROcodone-acetaminophen (NORCO)  mg per tablet; Take 1 tablet by mouth every 12 (twelve) hours as needed for Pain.  Dispense: 60 tablet; Refill: 0    4. CURT (generalized anxiety disorder)    5. Primary hypertension  - lisinopriL-hydrochlorothiazide (PRINZIDE,ZESTORETIC) 10-12.5 mg per tablet; Take 2 tablets by mouth once daily.  Dispense: 180 tablet; Refill: 3    6. Severe obesity (BMI 35.0-39.9) with comorbidity    7. Moderate episode of recurrent major depressive disorder    Hypertension:  Chronic, stable, controlled with current medication, continue Lisinopril-hydrochlorothiazide 20-25 mg daily.  Depression:  Symptoms well controlled with Citalopram 40 mg daily.  Anxiety: Alprazolam 1 mg nightly as needed, trying to wean off.  Chronic low back pain:  Norco 10 twice daily as needed, pregabalin 100 mg twice daily.   reviewed.  Insomnia: Zolpidem 10 mg as needed.    Immunization History   Administered Date(s) Administered    COVID-19 MRNA, LN-S PF (MODERNA HALF 0.25 ML DOSE) 04/22/2022    COVID-19, MRNA, LN-S, PF (MODERNA FULL 0.5 ML DOSE) 01/21/2021, 02/18/2021, 10/26/2021    COVID-19, mRNA, LNP-S, bivalent booster, PF (Moderna Omicron)12 + YEARS 12/16/2022    Influenza (FLUAD) - Quadrivalent - Adjuvanted - PF *Preferred* (65+) 10/19/2021    Influenza - Intradermal - Quadrivalent - PF 10/29/2012    Influenza - Intradermal - Trivalent - PF 10/29/2012    Influenza - Quadrivalent 11/21/2016    Influenza - Quadrivalent - High Dose - PF (65 years and older) 09/20/2023    Influenza - Quadrivalent - MDCK 11/06/2019    Influenza - Quadrivalent - MDCK - PF 09/16/2020    Influenza - Quadrivalent - PF *Preferred* (6 months and older) 10/26/2018    Influenza - Trivalent - Afluria, Fluzone MDV 11/21/2016    Influenza - Trivalent - Fluarix Flulaval,  Fluzone, Afluria - PF 10/14/2024    Influenza - Trivalent - Fluzone High Dose - PF (65 years and older) 10/02/2017    Pneumococcal Conjugate - 13 Valent 12/05/2017    Tdap 09/16/2016, 11/18/2024    Zoster Recombinant 09/20/2023, 01/18/2024       No orders of the defined types were placed in this encounter.      Portions of this note were generated by [employer].    Each patient to whom medical services by telemedicine are provided:  (1) informed of the relationship between the physician and patient and the respective role of any other health care provider with respect to management of the patient; and (2) notified that he or she may decline to receive medical services by telemedicine and may withdraw from such care at any time.    I spent a total of 35 minutes face to face and non-face to face on the date of this visit.This includes time preparing to see the patient (eg, review of tests, notes), obtaining and/or reviewing additional history from an independent historian and/or outside medical records, documenting clinical information in the electronic health record, independently interpreting results and/or communicating results to the patient/family/caregiver, or care coordinator.  Visit today included increased complexity associated with the care of the episodic problem addressed and managing the longitudinal care of the patient due to the serious and/or complex managed problem(s).      This note was generated with the assistance of ambient listening technology. Verbal consent was obtained by the patient and accompanying visitor(s) for the recording of patient appointment to facilitate this note. I attest to having reviewed and edited the generated note for accuracy, though some syntax or spelling errors may persist. Please contact the author of this note for any clarification.      Denis Acosta MD         [1]   Current Outpatient Medications on File Prior to Visit   Medication Sig Dispense Refill    ALPRAZolam  (XANAX) 1 MG tablet Take 1 tablet (1 mg total) by mouth nightly as needed for Anxiety. 30 tablet 5    citalopram (CELEXA) 40 MG tablet Take 1 tablet (40 mg total) by mouth once daily. 90 tablet 3    fluticasone propionate (FLONASE) 50 mcg/actuation nasal spray 2 sprays (100 mcg total) by Each Nostril route once daily. 48 g 3    meloxicam (MOBIC) 7.5 MG tablet Take 1 tablet (7.5 mg total) by mouth once daily. Take with food. 30 tablet 2    pregabalin (LYRICA) 100 MG capsule Take 1 capsule (100 mg total) by mouth 2 (two) times daily. 60 capsule 5    zolpidem (AMBIEN) 10 mg Tab TAKE ONE TABLET BY MOUTH AT BEDTIME AS NEEDED Strength: 10 mg 30 tablet 5    [DISCONTINUED] HYDROcodone-acetaminophen (NORCO)  mg per tablet Take 1 tablet by mouth every 12 (twelve) hours as needed for Pain. 60 tablet 0    [DISCONTINUED] lisinopriL-hydrochlorothiazide (PRINZIDE,ZESTORETIC) 10-12.5 mg per tablet Take 1 tablet by mouth once daily. 90 tablet 3    predniSONE (DELTASONE) 5 MG tablet Take 1 tablet (5 mg total) by mouth 2 (two) times daily. (Patient not taking: Reported on 6/12/2025) 10 tablet 0    [DISCONTINUED] amLODIPine (NORVASC) 10 MG tablet Take 1 tablet (10 mg total) by mouth once daily. (Patient not taking: Reported on 6/12/2025) 90 tablet 3    [DISCONTINUED] amLODIPine (NORVASC) 5 MG tablet Take 5 mg by mouth once daily. (Patient not taking: Reported on 6/12/2025)       No current facility-administered medications on file prior to visit.

## 2025-06-12 NOTE — TELEPHONE ENCOUNTER
Pt stated she was suppose to get 20mg tablets once a day instead of 2- 10mg tablets because insurance will not cover two tablets per day. Please review and advise.

## 2025-06-23 ENCOUNTER — OFFICE VISIT (OUTPATIENT)
Dept: ORTHOPEDICS | Facility: CLINIC | Age: 70
End: 2025-06-23
Payer: MEDICARE

## 2025-06-23 ENCOUNTER — HOSPITAL ENCOUNTER (OUTPATIENT)
Dept: RADIOLOGY | Facility: HOSPITAL | Age: 70
Discharge: HOME | End: 2025-06-23
Attending: STUDENT IN AN ORGANIZED HEALTH CARE EDUCATION/TRAINING PROGRAM
Payer: MEDICARE

## 2025-06-23 VITALS — WEIGHT: 229.5 LBS | BODY MASS INDEX: 36.88 KG/M2 | HEIGHT: 66 IN

## 2025-06-23 DIAGNOSIS — M79.644 FINGER PAIN, RIGHT: ICD-10-CM

## 2025-06-23 DIAGNOSIS — M25.531 RIGHT WRIST PAIN: ICD-10-CM

## 2025-06-23 DIAGNOSIS — S69.91XD INJURY OF RIGHT WRIST, SUBSEQUENT ENCOUNTER: Primary | ICD-10-CM

## 2025-06-23 DIAGNOSIS — M15.8 DEGENERATIVE ARTHRITIS OF INTERPHALANGEAL JOINT OF RIGHT THUMB: ICD-10-CM

## 2025-06-23 PROCEDURE — 73110 X-RAY EXAM OF WRIST: CPT | Mod: 26,RT,, | Performed by: RADIOLOGY

## 2025-06-23 PROCEDURE — 73140 X-RAY EXAM OF FINGER(S): CPT | Mod: TC,RT

## 2025-06-23 PROCEDURE — 99214 OFFICE O/P EST MOD 30 MIN: CPT | Mod: S$GLB,,, | Performed by: STUDENT IN AN ORGANIZED HEALTH CARE EDUCATION/TRAINING PROGRAM

## 2025-06-23 PROCEDURE — 3008F BODY MASS INDEX DOCD: CPT | Mod: CPTII,S$GLB,, | Performed by: STUDENT IN AN ORGANIZED HEALTH CARE EDUCATION/TRAINING PROGRAM

## 2025-06-23 PROCEDURE — 1159F MED LIST DOCD IN RCRD: CPT | Mod: CPTII,S$GLB,, | Performed by: STUDENT IN AN ORGANIZED HEALTH CARE EDUCATION/TRAINING PROGRAM

## 2025-06-23 PROCEDURE — 1126F AMNT PAIN NOTED NONE PRSNT: CPT | Mod: CPTII,S$GLB,, | Performed by: STUDENT IN AN ORGANIZED HEALTH CARE EDUCATION/TRAINING PROGRAM

## 2025-06-23 PROCEDURE — 4010F ACE/ARB THERAPY RXD/TAKEN: CPT | Mod: CPTII,S$GLB,, | Performed by: STUDENT IN AN ORGANIZED HEALTH CARE EDUCATION/TRAINING PROGRAM

## 2025-06-23 PROCEDURE — 73110 X-RAY EXAM OF WRIST: CPT | Mod: TC,RT

## 2025-06-23 PROCEDURE — 1160F RVW MEDS BY RX/DR IN RCRD: CPT | Mod: CPTII,S$GLB,, | Performed by: STUDENT IN AN ORGANIZED HEALTH CARE EDUCATION/TRAINING PROGRAM

## 2025-06-23 PROCEDURE — 3288F FALL RISK ASSESSMENT DOCD: CPT | Mod: CPTII,S$GLB,, | Performed by: STUDENT IN AN ORGANIZED HEALTH CARE EDUCATION/TRAINING PROGRAM

## 2025-06-23 PROCEDURE — 99999 PR PBB SHADOW E&M-EST. PATIENT-LVL III: CPT | Mod: PBBFAC,,, | Performed by: STUDENT IN AN ORGANIZED HEALTH CARE EDUCATION/TRAINING PROGRAM

## 2025-06-23 PROCEDURE — 1100F PTFALLS ASSESS-DOCD GE2>/YR: CPT | Mod: CPTII,S$GLB,, | Performed by: STUDENT IN AN ORGANIZED HEALTH CARE EDUCATION/TRAINING PROGRAM

## 2025-06-23 PROCEDURE — 73140 X-RAY EXAM OF FINGER(S): CPT | Mod: 26,RT,, | Performed by: RADIOLOGY

## 2025-06-23 NOTE — PROGRESS NOTES
Hand Surgery Clinic Follow Up Note    Chief Complaint  Chief Complaint   Patient presents with    Right Hand - Injury, Swelling     thumb    Right Wrist - Injury       History of Present Illness  70-year-old female presents for follow up.  She sustained a fall on 05/02/2025, 7 weeks and 3 days ago.  At the time, we had discussed concern that she may have a nondisplaced distal radius fracture as she had significant tenderness here.  This pain has now resolved.  Additionally she had some pain in the thumb IP joint at our last visit.  There is noted to be arthritic changes at the thumb IP joint.  She has been treating with a Modabber thumb brace as well as meloxicam which has been working well.  No new issues.  Pain level is a 0/10 today.  No numbness or tingling.    Review of Systems  Review of systems negative for chest pain, shortness of breath, fevers, chills, nausea/vomiting.    Vital Signs  There were no vitals filed for this visit.    Physical Exam  Constitutional: Appears well-developed and well-nourished. No distress.   HENT:   Head: Normocephalic.   Eyes: EOM are normal.   Pulmonary/Chest: Effort normal.   Neurological: Oriented to person, place, and time.   Psychiatric: Normal mood and affect.     Right Upper Extremity:  No abrasions, lacerations, wounds.  No swelling.  No erythema.  No drainage.  No ecchymosis.  Active wrist flexion to 70° and extension to 50°.  Full pronation and supination.  No tenderness over the distal radius.  No tenderness over the distal ulna.  No tenderness over the TFCC.  Patient has some generalized tenderness of the thumb IP joint.  Active thumb IP motion is 0-40 degrees with the associated pain.  The thumb is warm with brisk capillary refill.  Sensation is intact in the median, radial, ulnar nerve distributions.  No laxity with radial and ulnar stress at the thumb IP joint.  Palpable radial pulse.    Imaging  Right thumb x-rays three views were obtained today and independently  reviewed by myself.  Moderate arthritic changes are noted at the thumb IP and MCP joints with a associated decreased joint space and osteophyte formation.  There is noted to be mild radial deviation at the level of the thumb MCP joint.  Minimal to mild arthritic changes are noted at the thumb CMC joint as well with a associated osteophyte formation.      Right wrist x-rays five views were obtained today and independently reviewed by myself.  No fracture.  Minimal arthritic changes noted at the thumb CMC joint with a associated osteophyte formation.  No dislocation or subluxation.  No foreign body.    Assessment and Plan  70-year-old female presents for follow up.  She sustained a right wrist injury 7 weeks ago.  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  She is doing great.  Her pain has resolved.  No activity restrictions.  We discussed potential future interventions for the thumb IP joint including injection and fusion.  Patient was like to hold off on any intervention at this time.  She is doing well with a brace.  Follow up in hand surgery clinic as needed if symptoms recur or do not resolve.    Luisa Gonzalez MD  Orthopaedic Hand Surgery

## 2025-07-11 ENCOUNTER — PATIENT MESSAGE (OUTPATIENT)
Dept: INTERNAL MEDICINE | Facility: CLINIC | Age: 70
End: 2025-07-11
Payer: MEDICARE

## 2025-07-11 ENCOUNTER — PATIENT MESSAGE (OUTPATIENT)
Dept: SURGERY | Facility: CLINIC | Age: 70
End: 2025-07-11
Payer: MEDICARE

## 2025-07-31 ENCOUNTER — PATIENT MESSAGE (OUTPATIENT)
Dept: INTERNAL MEDICINE | Facility: CLINIC | Age: 70
End: 2025-07-31
Payer: MEDICARE

## 2025-08-04 ENCOUNTER — OFFICE VISIT (OUTPATIENT)
Dept: SURGERY | Facility: CLINIC | Age: 70
End: 2025-08-04
Payer: MEDICARE

## 2025-08-04 ENCOUNTER — NUTRITION (OUTPATIENT)
Dept: INTERNAL MEDICINE | Facility: CLINIC | Age: 70
End: 2025-08-04
Payer: MEDICARE

## 2025-08-04 VITALS
HEART RATE: 56 BPM | WEIGHT: 230.19 LBS | SYSTOLIC BLOOD PRESSURE: 152 MMHG | BODY MASS INDEX: 36.99 KG/M2 | HEIGHT: 66 IN | DIASTOLIC BLOOD PRESSURE: 77 MMHG

## 2025-08-04 DIAGNOSIS — E66.812 OBESITY, CLASS II, BMI 35-39.9: ICD-10-CM

## 2025-08-04 DIAGNOSIS — E66.09 CLASS 1 OBESITY DUE TO EXCESS CALORIES WITH SERIOUS COMORBIDITY AND BODY MASS INDEX (BMI) OF 32.0 TO 32.9 IN ADULT: Primary | ICD-10-CM

## 2025-08-04 DIAGNOSIS — E66.811 CLASS 1 OBESITY DUE TO EXCESS CALORIES WITH SERIOUS COMORBIDITY AND BODY MASS INDEX (BMI) OF 32.0 TO 32.9 IN ADULT: Primary | ICD-10-CM

## 2025-08-04 DIAGNOSIS — Z98.84 STATUS POST BARIATRIC SURGERY: ICD-10-CM

## 2025-08-04 DIAGNOSIS — I10 ESSENTIAL HYPERTENSION: Primary | ICD-10-CM

## 2025-08-04 DIAGNOSIS — I10 PRIMARY HYPERTENSION: ICD-10-CM

## 2025-08-04 DIAGNOSIS — E78.00 PURE HYPERCHOLESTEROLEMIA: ICD-10-CM

## 2025-08-04 DIAGNOSIS — Z71.3 DIETARY COUNSELING: ICD-10-CM

## 2025-08-04 DIAGNOSIS — M16.9 OSTEOARTHRITIS OF HIP, UNSPECIFIED LATERALITY, UNSPECIFIED OSTEOARTHRITIS TYPE: ICD-10-CM

## 2025-08-04 PROCEDURE — 97803 MED NUTRITION INDIV SUBSEQ: CPT | Mod: S$GLB,,, | Performed by: DIETITIAN, REGISTERED

## 2025-08-04 PROCEDURE — 99999 PR PBB SHADOW E&M-EST. PATIENT-LVL III: CPT | Mod: PBBFAC,,, | Performed by: SURGERY
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
## 2025-08-04 NOTE — PROGRESS NOTES
BARIATRIC PATIENT EVALUATION    CHIEF COMPLAINT:   morbid obesity with a BMI of 42 and inability to lose weight.    HISTORY OF PRESENT ILLNESS:  Mary Vo is a 70 y.o.-year-old female presents for bariatric follow post-op s/p sleeve gastrectomy on 08/22/2022 presents for follow up.  In the interim she has had significant weight gain.  She attributes this to decreased mobility due to multiple surgeries.  She is not currently tracking calories or input but does endorse getting full quickly when eating.      Initially presenting for morbid obesity with a BMI of 42 and inability to lose weight. The patient has tried exercising which she enjoys as well as different diets but struggling to lose any further weight.  She has severe arthritis that is started to limit her mobility..    Height 5 ft 6 in  Weight 263 lb --> 239 --> 217--> 202 ->230 lbs  BMI 42 --> 35 --> 34--> 31 -> 37      CO-MORBIDITIES:  dyslipidemia, hypertension and osteoarthritis    PAST MEDICAL HISTORY:  Past Medical History:   Diagnosis Date    Breast cancer 1996    right    Chronic pain syndrome     Generalized osteoarthrosis, involving multiple sites     s/p THR bilateral    History of breast cancer 2007    lumpectomy/XRT    HTN (hypertension)     Hyperlipidemia     Morbid obesity with BMI of 40.0-44.9, adult         PAST SURGICAL HISTORY:  Past Surgical History:   Procedure Laterality Date    ANTERIOR CERVICAL DISCECTOMY W/ FUSION Left 01/03/2022    Procedure: DISCECTOMY, SPINE, CERVICAL, ANTERIOR APPROACH, WITH FUSION;  Surgeon: Pradip Fernando MD;  Location: [facility];  Service: Neurosurgery;  Laterality: Left;  Three Level ACDF  C4/5, 5/6, 6/7      BREAST LUMPECTOMY Right 2006    XRT    CATARACT EXTRACTION W/  INTRAOCULAR LENS IMPLANT Left 01/15/2020    CATARACT EXTRACTION W/  INTRAOCULAR LENS IMPLANT Right 01/29/2020    COLONOSCOPY N/A 10/15/2015    Procedure: COLONOSCOPY;  Surgeon: Maylin Shipley MD;  Location: [facility];  Service:  Endoscopy;  Laterality: N/A;    COLONOSCOPY N/A 11/30/2022    Procedure: COLONOSCOPY;  Surgeon: Gary Toussaint MD;  Location: [facility] ENDO;  Service: General;  Laterality: N/A;    EPIDURAL STEROID INJECTION N/A 11/03/2020    Procedure: Lumbar L5/S1 IL KATYA;  Surgeon: Paul Lobato MD;  Location: [facility] PAIN MGT;  Service: Pain Management;  Laterality: N/A;    EPIDURAL STEROID INJECTION INTO CERVICAL SPINE N/A 09/25/2020    Procedure: C7/T1 IL KATYA;  Surgeon: Paul Lobato MD;  Location: [facility] PAIN MGT;  Service: Pain Management;  Laterality: N/A;    EPIDURAL STEROID INJECTION INTO CERVICAL SPINE N/A 10/05/2021    Procedure: C7/T1 IL KATYA with RN IV sedation;  Surgeon: Cynthia Soares MD;  Location: [facility] PAIN MGT;  Service: Pain Management;  Laterality: N/A;    ESOPHAGOGASTRODUODENOSCOPY N/A 11/04/2021    Procedure: ESOPHAGOGASTRODUODENOSCOPY (EGD);  Surgeon: Blas Hampton MD;  Location: [facility] ENDO;  Service: Endoscopy;  Laterality: N/A;    HYSTERECTOMY      INJECTION OF ANESTHETIC AGENT AROUND MEDIAL BRANCH NERVES INNERVATING CERVICAL FACET JOINT Bilateral 9/20/2024    Procedure: Bilateral Cervical C4-5-6 MBB with RN Sedation;  Surgeon: Cynthia Soares MD;  Location: [facility] PAIN MGT;  Service: Pain Management;  Laterality: Bilateral;    INJECTION OF ANESTHETIC AGENT AROUND NERVE Left 3/19/2025    Procedure: Left side Femoral and obturator nerve block;  Surgeon: Cynthia Soares MD;  Location: [facility] PAIN MGT;  Service: Pain Management;  Laterality: Left;    LAPAROSCOPIC LYSIS OF ADHESIONS N/A 08/30/2022    Procedure: LYSIS, ADHESIONS, LAPAROSCOPIC;  Surgeon: Blas Hampton MD;  Location: [facility] OR;  Service: General;  Laterality: N/A;    PLACEMENT OF ACELLULAR HUMAN DERMAL ALLOGRAFT Left 01/03/2022    Procedure: APPLICATION, ACELLULAR HUMAN DERMAL ALLOGRAFT;  Surgeon: Pradip Fernando MD;  Location: [facility] OR;  Service: Neurosurgery;  Laterality: Left;    PLACEMENT OF ACELLULAR HUMAN DERMAL ALLOGRAFT N/A 02/01/2023     Procedure: APPLICATION, ACELLULAR HUMAN DERMAL ALLOGRAFT;  Surgeon: Pradip Fernando MD;  Location: [facility] OR;  Service: Neurosurgery;  Laterality: N/A;    RADIOFREQUENCY ABLATION, NERVE, PERIPHERAL Left 5/7/2025    Procedure: left femoral and obturator nerve radiofrequency ablation (Cooled hip RFA);  Surgeon: Cynthia Soares MD;  Location: [facility] PAIN MGT;  Service: Pain Management;  Laterality: Left;    ROBOT-ASSISTED LAPAROSCOPIC SLEEVE GASTRECTOMY USING DA FLORENTINO XI N/A 08/30/2022    Procedure: XI ROBOTIC SLEEVE GASTRECTOMY;  Surgeon: Blas Hampton MD;  Location: [facility] OR;  Service: General;  Laterality: N/A;    TRANSFORAMINAL EPIDURAL INJECTION OF STEROID Left 09/17/2019    Procedure: Left C5/6 TF KATYA w/ RN IV sedation;  Surgeon: Paul Lobato MD;  Location: [facility] PAIN MGT;  Service: Pain Management;  Laterality: Left;    TRANSFORAMINAL LUMBAR INTERBODY FUSION (TLIF) USING COMPUTER-ASSISTED NAVIGATION Bilateral 02/01/2023    Procedure: FUSION, SPINE, LUMBAR, TLIF, USING COMPUTER-ASSISTED NAVIGATION;  Surgeon: Pradip Fernando MD;  Location: [facility] OR;  Service: Neurosurgery;  Laterality: Bilateral;  TLIF L3-4/4-5 possibly L5-S1       FAMILY HISTORY:  Family History   Problem Relation Name Age of Onset    Cancer Mother      Diabetes Mother      Hyperlipidemia Father      Hypertension Father      Breast cancer Sister      Breast cancer Sister      Breast cancer Sister      Breast cancer Sister      Eczema Neg Hx      Lupus Neg Hx      Psoriasis Neg Hx      Melanoma Neg Hx          SOCIAL HISTORY:   reports that she has quit smoking. Her smoking use included cigarettes. She has never used smokeless tobacco. She reports that she does not drink alcohol and does not use drugs.     MEDICATIONS:  Current Outpatient Medications on File Prior to Visit   Medication Sig Dispense Refill    ALPRAZolam (XANAX) 1 MG tablet Take 1 tablet (1 mg total) by mouth nightly as needed for Anxiety. 30 tablet 5    citalopram (CELEXA) 40 MG  tablet Take 1 tablet (40 mg total) by mouth once daily. 90 tablet 3    fluticasone propionate (FLONASE) 50 mcg/actuation nasal spray 2 sprays (100 mcg total) by Each Nostril route once daily. 48 g 3    HYDROcodone-acetaminophen (NORCO)  mg per tablet Take 1 tablet by mouth every 12 (twelve) hours as needed for Pain. 60 tablet 0    HYDROcodone-acetaminophen (NORCO)  mg per tablet Take 1 tablet by mouth every 12 (twelve) hours as needed for Pain. 60 tablet 0    [START ON 8/11/2025] HYDROcodone-acetaminophen (NORCO)  mg per tablet Take 1 tablet by mouth every 12 (twelve) hours as needed for Pain. 60 tablet 0    lisinopriL-hydrochlorothiazide (PRINZIDE,ZESTORETIC) 10-12.5 mg per tablet Take 2 tablets by mouth once daily. 180 tablet 3    meloxicam (MOBIC) 7.5 MG tablet Take 1 tablet (7.5 mg total) by mouth once daily. Take with food. 30 tablet 2    predniSONE (DELTASONE) 5 MG tablet Take 1 tablet (5 mg total) by mouth 2 (two) times daily. 10 tablet 0    pregabalin (LYRICA) 100 MG capsule Take 1 capsule (100 mg total) by mouth 2 (two) times daily. 60 capsule 5    zolpidem (AMBIEN) 10 mg Tab TAKE ONE TABLET BY MOUTH AT BEDTIME AS NEEDED Strength: 10 mg 30 tablet 5     No current facility-administered medications on file prior to visit.       Medications have been reviewed.    ALLERGIES:  Review of patient's allergies indicates:  No Known Allergies    Allergies have been reviewed.    ROS:  Review of Systems   Constitutional:  Negative for chills, fatigue, fever and unexpected weight change.   Respiratory:  Negative for cough, shortness of breath, wheezing and stridor.    Cardiovascular:  Negative for chest pain, palpitations and leg swelling.   Gastrointestinal:  Negative for abdominal distention, abdominal pain, constipation, diarrhea, nausea and vomiting.   Genitourinary:  Negative for difficulty urinating, dysuria, frequency, hematuria and urgency.   Skin:  Negative for color change, pallor, rash and  wound.   Hematological:  Does not bruise/bleed easily.       PE:  Vitals:    08/04/25 1415   BP: (!) 152/77   Pulse: (!) 56       Physical Exam  Vitals reviewed.   Constitutional:       General: She is not in acute distress.     Appearance: She is well-developed and normal weight. She is not ill-appearing.   HENT:      Head: Normocephalic and atraumatic.      Right Ear: External ear normal.      Left Ear: External ear normal.   Eyes:      Extraocular Movements: Extraocular movements intact.      Conjunctiva/sclera: Conjunctivae normal.   Cardiovascular:      Rate and Rhythm: Bradycardia present.   Pulmonary:      Effort: Pulmonary effort is normal. No respiratory distress.   Abdominal:      General: There is no distension.      Palpations: Abdomen is soft.      Tenderness: There is no abdominal tenderness.      Comments: Incisions well healed   Musculoskeletal:      Cervical back: Neck supple.   Skin:     General: Skin is warm and dry.   Neurological:      Mental Status: She is alert and oriented to person, place, and time.   Psychiatric:         Behavior: Behavior normal.       EGD:  Impression:            - Normal esophagus.                          - Z-line regular, 38 cm from the incisors.                          - A few gastric polyps. Biopsied.                          - Normal antrum. Biopsied.                          - Normal second portion of the duodenum.    Pathology:  Final Pathologic Diagnosis 1. Stomach, sleeve gastrectomy:   Segment of stomach, lined by gastric oxyntic mucosa, with mild chronic   inactive gastritis.   Helicobacter pylori immunostain is pending and will be reported in an   addendum.  VC      Comment: Interp By Chapincito Whitten M.D., Signed on 09/02/2022 at 13:38   Supplemental Diagnosis Helicobacter pylori immunostain is negative.   IHC controls were reviewed and were adequate.          DIAGNOSIS:  1. Morbid obesity with a BMI of 42 and inability to lose weight.  2. Co-morbidities:  dyslipidemia, hypertension and osteoarthritis    Status post robotic sleeve gastrectomy    PLAN:  - Reinforced bariatric diet/dietician.  Discussed importance of adhering to calories in tracking intake.  She will attempt a reset with bariatric liquid protein diet for 2 weeks  - increase activity as able given previous surgeries  - nutritional labs today will call with results.      HTN - stable/monitor/antihypertensives  HLD - statin therapy/dietary modifications  Osteoarthritis - weight loss

## 2025-08-05 ENCOUNTER — PATIENT MESSAGE (OUTPATIENT)
Dept: INTERNAL MEDICINE | Facility: CLINIC | Age: 70
End: 2025-08-05
Payer: MEDICARE

## 2025-08-19 ENCOUNTER — TELEPHONE (OUTPATIENT)
Dept: INTERNAL MEDICINE | Facility: CLINIC | Age: 70
End: 2025-08-19
Payer: MEDICARE

## 2025-08-19 DIAGNOSIS — I10 PRIMARY HYPERTENSION: ICD-10-CM

## 2025-08-19 RX ORDER — LISINOPRIL AND HYDROCHLOROTHIAZIDE 10; 12.5 MG/1; MG/1
2 TABLET ORAL DAILY
Qty: 180 TABLET | Refills: 3 | Status: SHIPPED | OUTPATIENT
Start: 2025-08-19 | End: 2026-08-19

## (undated) DEVICE — TOWEL OR DISP STRL BLUE 4/PK

## (undated) DEVICE — DRAPE LAP T SHT W/ INSTR PAD

## (undated) DEVICE — CANNULA SEAL 12MM

## (undated) DEVICE — DRESSING SURGICAL 1X1

## (undated) DEVICE — SYR 10CC LUER LOCK

## (undated) DEVICE — DRAPE STERI INSTRUMENT 1018

## (undated) DEVICE — Device

## (undated) DEVICE — NDL SAFETY 22G X 1.5 ECLIPSE

## (undated) DEVICE — GLOVE BIOGEL ECLIPSE SZ 7

## (undated) DEVICE — SPHERE NDI PASSIVE

## (undated) DEVICE — ELECTRODE REM PLYHSV RETURN 9

## (undated) DEVICE — ADHESIVE MASTISOL VIAL 48/BX

## (undated) DEVICE — MANIFOLD 4 PORT

## (undated) DEVICE — SUT STRATAFIX 2-0 30CM

## (undated) DEVICE — HALTER DELUXE DISCARD HEAD

## (undated) DEVICE — ADHESIVE DERMABOND ADVANCED

## (undated) DEVICE — SUT VICRYL 0 27 CT-2

## (undated) DEVICE — SYR 3CC LUER LOC

## (undated) DEVICE — CORD BIPOLAR ELECTROSURGICAL

## (undated) DEVICE — SOL NS 1000CC

## (undated) DEVICE — COVER LIGHT HANDLE 80/CA

## (undated) DEVICE — SUT VICRYL PLUS 2-0 CT1 18

## (undated) DEVICE — SUPPORT ULNA NERVE PROTECTOR

## (undated) DEVICE — DURAPREP SURG SCRUB 26ML

## (undated) DEVICE — TRAY CATH FOL SIL URIMTR 16FR

## (undated) DEVICE — SHEARS HS LONG 5MM CURVED

## (undated) DEVICE — PROBE BALL TIP 2.3MM

## (undated) DEVICE — DRAPE THREE-QUARTER 53X77IN

## (undated) DEVICE — DRAPE ABDOMINAL TIBURON 14X11

## (undated) DEVICE — DRESSING ADH SQUARE 8X8CM

## (undated) DEVICE — TAPE SILK 3IN

## (undated) DEVICE — GAUZE SPONGE 4X4 12PLY

## (undated) DEVICE — POSITIONER HEAD DONUT 9IN FOAM

## (undated) DEVICE — SUT VICRYL+ 2-0 SH 18IN

## (undated) DEVICE — PAD PINK TRENDELENBURG POS XL

## (undated) DEVICE — ALCOHOL 70% ISOP RUBBING 4OZ

## (undated) DEVICE — TUBING MEDI-VAC 20FT .25IN

## (undated) DEVICE — STAPLER SKIN PROXIMATE WIDE

## (undated) DEVICE — GOWN POLY REINF BRTH SLV XL

## (undated) DEVICE — COVER PROXIMA MAYO STAND

## (undated) DEVICE — NDL SPINAL 20GX3.5 HUB

## (undated) DEVICE — SEE MEDLINE ITEM 157131

## (undated) DEVICE — DRAPE MOBILE C-ARM

## (undated) DEVICE — LOTION DURA PREP REMOVER 40Z

## (undated) DEVICE — RELOAD SUREFORM 60 4.3 GRN 6R

## (undated) DEVICE — SYR ONLY LUER LOCK 20CC

## (undated) DEVICE — REMOVER LOTION

## (undated) DEVICE — SKIN MARKER DEVON 160

## (undated) DEVICE — SUT VICRYL PLUS 0 CT1 18IN

## (undated) DEVICE — SHEET THYROID W/ISO-BAC

## (undated) DEVICE — GAUZE SPONGE PEANUT STRL

## (undated) DEVICE — MATRIX FLOSEAL HEMOSTATIC 10ML

## (undated) DEVICE — SUT CTD VICRYL 0 UND BR SUT

## (undated) DEVICE — GLOVE BIOGEL ECLIPSE SZ 8

## (undated) DEVICE — TUBING SUCTION STRAIGHT .25X20

## (undated) DEVICE — DRESSING MEPILEX BORD LITE 2X5

## (undated) DEVICE — SUT STRATAFIX SPIRAL 20CM

## (undated) DEVICE — DRAPE OPMI STERILE

## (undated) DEVICE — GLOVE SURGICAL LATEX SZ 7

## (undated) DEVICE — NDL ECLIPSE SAFETY 18GX1-1/2IN

## (undated) DEVICE — STAPLER SUREFORM 60 SPU

## (undated) DEVICE — COVER TIP CURVED SCISSORS XI

## (undated) DEVICE — DRAPE COLUMN DAVINCI XI

## (undated) DEVICE — ELECTRODE BLD EXT 6.50 ST MDFD

## (undated) DEVICE — NDL SAFETY 25G X 1.5 ECLIPSE

## (undated) DEVICE — SPONGE GAUZE 16PLY 4X4

## (undated) DEVICE — DRAPE INCISE IOBAN 2 23X17IN

## (undated) DEVICE — PIN DISTRACTION 12MM
Type: IMPLANTABLE DEVICE | Site: NECK | Status: NON-FUNCTIONAL
Removed: 2022-01-03

## (undated) DEVICE — DRAPE FULL SHEET 70X100IN

## (undated) DEVICE — SEE MEDLINE ITEM 157027

## (undated) DEVICE — PACK BASIC SETUP SC BR

## (undated) DEVICE — GLOVE 6.5 PROTEXIS PI BLUE

## (undated) DEVICE — COVER OVERHEAD SURG LT BLUE

## (undated) DEVICE — KIT ANTIFOG W/SPONG & FLUID

## (undated) DEVICE — SPONGE NEURO 1/4X1/4

## (undated) DEVICE — BUR LEGEND MIDAS REX 14CM 13MM

## (undated) DEVICE — INSERT CUSHIONPRONE VIEW LARGE

## (undated) DEVICE — BLADE EZ CLEAN 2.5IN MODIFIED

## (undated) DEVICE — GLOVE 6.5 PROTEXIS PI MICRO

## (undated) DEVICE — RELOAD SUREFORM 60 3.5 BLU 6R

## (undated) DEVICE — DRESSING COVER AQUACEL AG SURG

## (undated) DEVICE — SEALER AQUAMANTYS 2.3 BIPOLAR

## (undated) DEVICE — DRAPE CORETEMP FLD WRM 56X62IN

## (undated) DEVICE — APPLICATOR CHLORAPREP ORN 26ML

## (undated) DEVICE — SOL NACL 0.9% INJ 250ML BG

## (undated) DEVICE — SEALER VESSEL EXTEND

## (undated) DEVICE — CORD BIPOLAR 12 FOOT

## (undated) DEVICE — UNDERGLOVES BIOGEL PI SIZE 8

## (undated) DEVICE — DRESSING SURGICAL 1/2X1/2

## (undated) DEVICE — OBTURATOR BLADELESS 8MM XI CLR

## (undated) DEVICE — SEE MEDLINE ITEM 157117

## (undated) DEVICE — CONTAINER SPECIMEN OR STER 4OZ

## (undated) DEVICE — SHEATH ENDOWRIST 45MM

## (undated) DEVICE — UNDERGLOVE BIOGEL PI SZ 6.5 LF

## (undated) DEVICE — GLOVE 7.0 PROTEXIS PI BLUE

## (undated) DEVICE — SUT MONOCRYL 4.0 PS2 CP496G

## (undated) DEVICE — SEAL UNIVERSAL 5MM-8MM XI

## (undated) DEVICE — CANNULA REDUCER 12-8MM

## (undated) DEVICE — SEE MEDLINE ITEM 157194

## (undated) DEVICE — DRAPE C-ARMOR EQUIPMENT COVER

## (undated) DEVICE — DRAPE ARM DAVINCI XI

## (undated) DEVICE — SPONGE PATTY SURGICAL .5X3IN

## (undated) DEVICE — DRAPE SURG W/TWL 17 5/8X23

## (undated) DEVICE — WAX BONE STERILE 2.5G